# Patient Record
Sex: FEMALE | Race: WHITE | NOT HISPANIC OR LATINO
[De-identification: names, ages, dates, MRNs, and addresses within clinical notes are randomized per-mention and may not be internally consistent; named-entity substitution may affect disease eponyms.]

---

## 2017-05-10 ENCOUNTER — APPOINTMENT (OUTPATIENT)
Dept: CARDIOLOGY | Facility: CLINIC | Age: 62
End: 2017-05-10

## 2017-05-18 ENCOUNTER — APPOINTMENT (OUTPATIENT)
Dept: CARDIOLOGY | Facility: CLINIC | Age: 62
End: 2017-05-18

## 2017-06-05 ENCOUNTER — APPOINTMENT (OUTPATIENT)
Dept: CARDIOLOGY | Facility: CLINIC | Age: 62
End: 2017-06-05

## 2017-07-03 ENCOUNTER — APPOINTMENT (OUTPATIENT)
Dept: CARDIOLOGY | Facility: CLINIC | Age: 62
End: 2017-07-03

## 2017-07-10 ENCOUNTER — APPOINTMENT (OUTPATIENT)
Dept: CARDIOLOGY | Facility: CLINIC | Age: 62
End: 2017-07-10

## 2017-07-24 ENCOUNTER — APPOINTMENT (OUTPATIENT)
Dept: CARDIOLOGY | Facility: CLINIC | Age: 62
End: 2017-07-24

## 2017-09-26 ENCOUNTER — APPOINTMENT (OUTPATIENT)
Dept: CARDIOLOGY | Facility: CLINIC | Age: 62
End: 2017-09-26
Payer: MEDICARE

## 2017-09-26 PROCEDURE — 99214 OFFICE O/P EST MOD 30 MIN: CPT

## 2017-10-30 ENCOUNTER — APPOINTMENT (OUTPATIENT)
Dept: CARDIOLOGY | Facility: CLINIC | Age: 62
End: 2017-10-30
Payer: MEDICARE

## 2017-10-30 PROCEDURE — 99215 OFFICE O/P EST HI 40 MIN: CPT

## 2017-12-19 ENCOUNTER — RX RENEWAL (OUTPATIENT)
Age: 62
End: 2017-12-19

## 2017-12-20 ENCOUNTER — OTHER (OUTPATIENT)
Age: 62
End: 2017-12-20

## 2017-12-22 ENCOUNTER — MEDICATION RENEWAL (OUTPATIENT)
Age: 62
End: 2017-12-22

## 2017-12-28 ENCOUNTER — RX RENEWAL (OUTPATIENT)
Age: 62
End: 2017-12-28

## 2018-02-02 ENCOUNTER — RECORD ABSTRACTING (OUTPATIENT)
Age: 63
End: 2018-02-02

## 2018-02-05 ENCOUNTER — APPOINTMENT (OUTPATIENT)
Dept: CARDIOLOGY | Facility: CLINIC | Age: 63
End: 2018-02-05
Payer: MEDICARE

## 2018-02-05 VITALS
HEIGHT: 63 IN | DIASTOLIC BLOOD PRESSURE: 62 MMHG | SYSTOLIC BLOOD PRESSURE: 102 MMHG | WEIGHT: 240 LBS | HEART RATE: 92 BPM | BODY MASS INDEX: 42.52 KG/M2

## 2018-02-05 DIAGNOSIS — J93.9 PNEUMOTHORAX, UNSPECIFIED: ICD-10-CM

## 2018-02-05 DIAGNOSIS — I26.99 OTHER PULMONARY EMBOLISM W/OUT ACUTE COR PULMONALE: ICD-10-CM

## 2018-02-05 DIAGNOSIS — K92.2 GASTROINTESTINAL HEMORRHAGE, UNSPECIFIED: ICD-10-CM

## 2018-02-05 DIAGNOSIS — D64.9 ANEMIA, UNSPECIFIED: ICD-10-CM

## 2018-02-05 DIAGNOSIS — Z87.891 PERSONAL HISTORY OF NICOTINE DEPENDENCE: ICD-10-CM

## 2018-02-05 PROCEDURE — 99214 OFFICE O/P EST MOD 30 MIN: CPT

## 2018-02-05 RX ORDER — CYANOCOBALAMIN (VITAMIN B-12) 100 MCG/ML
100 VIAL (ML) INJECTION
Refills: 0 | Status: DISCONTINUED | COMMUNITY

## 2018-02-12 DIAGNOSIS — N28.9 DISORDER OF KIDNEY AND URETER, UNSPECIFIED: ICD-10-CM

## 2018-04-28 ENCOUNTER — RX RENEWAL (OUTPATIENT)
Age: 63
End: 2018-04-28

## 2018-05-23 ENCOUNTER — RX RENEWAL (OUTPATIENT)
Age: 63
End: 2018-05-23

## 2018-06-18 ENCOUNTER — APPOINTMENT (OUTPATIENT)
Dept: CARDIOLOGY | Facility: CLINIC | Age: 63
End: 2018-06-18
Payer: MEDICARE

## 2018-06-18 ENCOUNTER — NON-APPOINTMENT (OUTPATIENT)
Age: 63
End: 2018-06-18

## 2018-06-18 VITALS
HEIGHT: 63 IN | BODY MASS INDEX: 41.64 KG/M2 | WEIGHT: 235 LBS | HEART RATE: 79 BPM | DIASTOLIC BLOOD PRESSURE: 64 MMHG | SYSTOLIC BLOOD PRESSURE: 122 MMHG

## 2018-06-18 PROCEDURE — 93000 ELECTROCARDIOGRAM COMPLETE: CPT

## 2018-06-18 PROCEDURE — 99214 OFFICE O/P EST MOD 30 MIN: CPT

## 2018-06-25 ENCOUNTER — APPOINTMENT (OUTPATIENT)
Dept: CARDIOLOGY | Facility: CLINIC | Age: 63
End: 2018-06-25
Payer: MEDICARE

## 2018-06-25 PROCEDURE — 93306 TTE W/DOPPLER COMPLETE: CPT

## 2018-07-10 ENCOUNTER — APPOINTMENT (OUTPATIENT)
Dept: CARDIOLOGY | Facility: CLINIC | Age: 63
End: 2018-07-10
Payer: MEDICARE

## 2018-07-10 VITALS
HEIGHT: 63 IN | BODY MASS INDEX: 42.35 KG/M2 | DIASTOLIC BLOOD PRESSURE: 62 MMHG | HEART RATE: 86 BPM | SYSTOLIC BLOOD PRESSURE: 112 MMHG | WEIGHT: 239 LBS

## 2018-07-10 PROCEDURE — 99214 OFFICE O/P EST MOD 30 MIN: CPT

## 2018-09-11 ENCOUNTER — RX RENEWAL (OUTPATIENT)
Age: 63
End: 2018-09-11

## 2018-09-18 ENCOUNTER — RX RENEWAL (OUTPATIENT)
Age: 63
End: 2018-09-18

## 2018-10-15 ENCOUNTER — MEDICATION RENEWAL (OUTPATIENT)
Age: 63
End: 2018-10-15

## 2018-11-09 ENCOUNTER — RX RENEWAL (OUTPATIENT)
Age: 63
End: 2018-11-09

## 2018-11-12 ENCOUNTER — RX RENEWAL (OUTPATIENT)
Age: 63
End: 2018-11-12

## 2018-11-20 ENCOUNTER — APPOINTMENT (OUTPATIENT)
Dept: CARDIOLOGY | Facility: CLINIC | Age: 63
End: 2018-11-20
Payer: MEDICARE

## 2018-11-20 VITALS
BODY MASS INDEX: 41.82 KG/M2 | HEIGHT: 63 IN | HEART RATE: 95 BPM | WEIGHT: 236 LBS | DIASTOLIC BLOOD PRESSURE: 66 MMHG | SYSTOLIC BLOOD PRESSURE: 100 MMHG

## 2018-11-20 DIAGNOSIS — I70.90 UNSPECIFIED ATHEROSCLEROSIS: ICD-10-CM

## 2018-11-20 PROCEDURE — 99214 OFFICE O/P EST MOD 30 MIN: CPT

## 2018-11-20 NOTE — REASON FOR VISIT
[Follow-Up - Clinic] : a clinic follow-up of [Anticoagulation] : anticoagulation [Atrial Fibrillation] : atrial fibrillation [Dyspnea] : dyspnea [Heart Failure] : congestive heart failure [Medication Management] : Medication management [FreeTextEntry2] : Follow up [facility] for edema, shortness of breath

## 2018-11-20 NOTE — ADDENDUM
[FreeTextEntry1] : Please note the patient was seen and examined with PA\taina I was physically present during the service of the patient and personally examined the patient. \taina I was directly involved in the management plan and recommendations of the care provided to the patient. \taina I personally reviewed the history and physical examination as documented by the PA above.\par 11/20/2018\par

## 2018-11-20 NOTE — HISTORY OF PRESENT ILLNESS
[FreeTextEntry1] : ZANDER   is a 63 year old  female with severe COPD on 4L O2, hx of spontaneous pneumothorax 2012, PE '12, NSTEMI  + CVA 2012, AF on NOAC,  cor pumonale, pulm HTN, here for follow up due to hospital admission due to worsening shortness of breath X 3 weeks, edema and weight gain.  Admitted and diuresed IV Lasix 60mg IV with improvement of edema and SOB.  PE and DVT testing negative. Pt discharged on prehospital medications.  Weight has been stable, chronic dyspnea but back to baseline.\par She follows with Pulm HTN specialist DR. Cronin [facility] next appointment Dec. 11, 2018\par \par Futher detail of complex medical history as listed below:\par \par Hhistory of morbid obesity status post laparoscopic banding procedure, COPD with chronic hypoxic respiratory failure on home oxygen therapy, chronic atrial fibrillation on NOAC, severe pulmonary hypertension with cor pulmonale, atherosclerotic calcification, prior PE, CVA.\par \par Chronic dyspnea. Physical activity limited. Decrease in oxygen saturation with physical activity. At times, feels "air hungry", the need for more oxygen therapy and even feels short of breath while wearing supplemental oxygen therapy. Exercise tolerance is limited by dyspnea on exertion. It is notable that her prior 6 minute walk test did demonstrate improvement in functional status. \par \par There is no chest pain, coughing, or wheezing. \par There are no symptomatic palpitations, lightheadedness, or dizziness. \par There have been no residual defects from her stroke. \par \par Cardiac cath demonstrates severe pulmonary hypertension. Previously referred to Dr. Dale at [facility]. Underwent comprehensive cardiopulmonary workup. Believes the etiology of her lung disease is related to COPD, hypoxemia and long term sleep apnea with resulting pulmonary hypertension. WHO Group 3 and 1? There is consideration of lung transplantation if she loses weight. Diuretics were adjusted, changed Lasix to bumex. Now sees Dr. Gallardo locally at [facility]. \par \par Clinical improvement with combination diuretic therapy. \par Prior hypokalemia, hyponatremia, reanl insufficiency (high BUN/Cr ratio) and marked weight loss with metolazone\par Observed overnight in [facility], now frequency has been reduced\par Now takes metolazone twice a week along with Bumex bid (qd on Metolazone days)\par On higher doses of Aldactone\par \par Cardiac catheterization report has been reviewed from October 2017. PA pressure of 54/18 with a mean of 34. Moderate pulmonary hypertension. High normal filling pressures. Reduced cardiac output. Increased pulmonary resistance greater than 10 RAI PA diastolic to wedge gradient 10 mm of mercury consistent with pulmonary vascular disease. There was a reduction in pulmonary artery resistance with increasing cardiac output with nitric oxide.\par \par EKG demonstrates atrial fibrillation and nonspecific ST-T wave changes. \par \par June 2018. Hemoglobin 12.9, LFTs within normal limits, magnesium 1.7, TSH 2.3.\par Labs potassium 5.1, creatinine 1.4.\par \par Echocardiogram. '18. Technically difficult study. Normal left ventricular function. Right atrial and right ventricular enlargement. Tricuspid regurgitation. Severe pulmonary hypertension.\par \par Holter monitor 5.17 atrial fibrillation. Average heart rate 93. PVCs. Aberrancy\par \par Carotid Doppler. Mild nonobstructive disease bilaterally. \par \par CT angio from [facility], there is no evidence of greater than 30% stenosis or occlusion of the right or left carotid or vertebral arteries. There was occlusion of the inferior division of the right middle cerebral artery. The other arteries appeared unremarkable. There is no evidence of an AVM or an aneurysm. \par \par MRI, multiple infarcts suggesting embolic source\par \par Cardiac catheterization report May 2013, normal left ventricular function, right dominant circulation, left main arises normally from the left coronary sinus of Valsalva, bifurcates into LAD and circumflex, left main normal, LAD arises normally from the left main normal, left circumflex arises normally from the left main and terminates in the AV groove normal, RCA arises normally from the right sinus of Valsalva, RCA is normal, global LV function normal, ejection fraction 55% to 60%, normal coronary arteries, no evidence of vasoreactivity with nitric oxide. \par \par [facility].\par There is a chest x-ray, prominent cardiac silhouette, pulmonary vascular congestion. \par Chest CT, cardiomegaly, mild atherosclerosis, enlargement of pulmonary artery consistent with pulmonary hypertension, moderate emphysema\par Nuclear medicine scan, low probability of pulmonary embolism.\par Cardiac catheterization, severe pulmonary hypertension with normal wedge pressure. No significant improvement in response to FIO2 or nitric oxide. \par Echocardiogram, left atrial enlargement, pulmonary hypertension, septal wall motion abnormality consistent with RV overload, dilated right ventricle, mildly reduced RV function, dilated right atrium, and elevated right atrial pressures with nonreactive IVC \par

## 2018-11-20 NOTE — ASSESSMENT
[FreeTextEntry1] : Continue current medications.\par Follow up with Pulm HTN specialist and PMD, discuss O2 supplementation/management.\par \par Pt on Letairis - ck LFTs\par Hx of lyte disorder in past (when dosing of metazolone qd)  monitor lytes, BMP, Mg level ordered.\par \par Follow up as scheduled with Dr Dangelo Jan 2019.

## 2018-11-20 NOTE — REVIEW OF SYSTEMS
[Feeling Fatigued] : feeling fatigued [see HPI] : see HPI [Shortness Of Breath] : shortness of breath [Dyspnea on exertion] : dyspnea during exertion [Lower Ext Edema] : lower extremity edema [Negative] : Heme/Lymph [Recent Weight Gain (___ Lbs)] : no recent weight gain

## 2018-11-20 NOTE — PHYSICAL EXAM
[General Appearance - Well Developed] : well developed [Normal Appearance] : normal appearance [Well Groomed] : well groomed [General Appearance - Well Nourished] : well nourished [No Deformities] : no deformities [General Appearance - In No Acute Distress] : no acute distress [Normal Conjunctiva] : the conjunctiva exhibited no abnormalities [Eyelids - No Xanthelasma] : the eyelids demonstrated no xanthelasmas [Normal Oral Mucosa] : normal oral mucosa [No Oral Pallor] : no oral pallor [No Oral Cyanosis] : no oral cyanosis [Exaggerated Use Of Accessory Muscles For Inspiration] : no accessory muscle use [Auscultation Breath Sounds / Voice Sounds] : lungs were clear to auscultation bilaterally [Abdomen Soft] : soft [Abdomen Tenderness] : non-tender [Abdomen Mass (___ Cm)] : no abdominal mass palpated [Abnormal Walk] : normal gait [Gait - Sufficient For Exercise Testing] : the gait was sufficient for exercise testing [Nail Clubbing] : no clubbing of the fingernails [Cyanosis, Localized] : no localized cyanosis [Petechial Hemorrhages (___cm)] : no petechial hemorrhages [Skin Color & Pigmentation] : normal skin color and pigmentation [] : no rash [Skin Lesions] : no skin lesions [No Skin Ulcers] : no skin ulcer [No Xanthoma] : no  xanthoma was observed [Oriented To Time, Place, And Person] : oriented to person, place, and time [Affect] : the affect was normal [Mood] : the mood was normal [No Anxiety] : not feeling anxious [FreeTextEntry1] : chronic venous stasis

## 2018-12-04 ENCOUNTER — RESULT REVIEW (OUTPATIENT)
Age: 63
End: 2018-12-04

## 2018-12-31 ENCOUNTER — RX RENEWAL (OUTPATIENT)
Age: 63
End: 2018-12-31

## 2019-02-11 ENCOUNTER — RX RENEWAL (OUTPATIENT)
Age: 64
End: 2019-02-11

## 2019-04-19 ENCOUNTER — APPOINTMENT (OUTPATIENT)
Dept: CARDIOLOGY | Facility: CLINIC | Age: 64
End: 2019-04-19

## 2019-08-01 ENCOUNTER — RESULT CHARGE (OUTPATIENT)
Age: 64
End: 2019-08-01

## 2019-08-02 ENCOUNTER — NON-APPOINTMENT (OUTPATIENT)
Age: 64
End: 2019-08-02

## 2019-08-02 ENCOUNTER — APPOINTMENT (OUTPATIENT)
Dept: CARDIOLOGY | Facility: CLINIC | Age: 64
End: 2019-08-02
Payer: MEDICARE

## 2019-08-02 VITALS
DIASTOLIC BLOOD PRESSURE: 54 MMHG | WEIGHT: 233 LBS | BODY MASS INDEX: 41.29 KG/M2 | SYSTOLIC BLOOD PRESSURE: 100 MMHG | HEART RATE: 78 BPM | OXYGEN SATURATION: 84 % | HEIGHT: 63 IN

## 2019-08-02 PROCEDURE — 99215 OFFICE O/P EST HI 40 MIN: CPT

## 2019-08-02 PROCEDURE — 93000 ELECTROCARDIOGRAM COMPLETE: CPT

## 2019-08-02 RX ORDER — BUMETANIDE 2 MG/1
2 TABLET ORAL TWICE DAILY
Qty: 180 | Refills: 3 | Status: DISCONTINUED | COMMUNITY
Start: 2018-11-09 | End: 2019-08-02

## 2019-08-02 RX ORDER — METOLAZONE 5 MG/1
5 TABLET ORAL
Qty: 90 | Refills: 1 | Status: DISCONTINUED | COMMUNITY
End: 2019-08-02

## 2019-08-02 RX ORDER — OMEPRAZOLE 20 MG/1
20 CAPSULE, DELAYED RELEASE ORAL DAILY
Refills: 0 | Status: DISCONTINUED | COMMUNITY
End: 2019-08-02

## 2019-08-02 RX ORDER — DILTIAZEM HYDROCHLORIDE 30 MG/1
30 TABLET ORAL
Qty: 180 | Refills: 0 | Status: DISCONTINUED | COMMUNITY
End: 2019-08-02

## 2019-08-02 RX ORDER — POTASSIUM CHLORIDE 1500 MG/1
20 TABLET, FILM COATED, EXTENDED RELEASE ORAL DAILY
Refills: 0 | Status: DISCONTINUED | COMMUNITY
End: 2019-08-02

## 2019-08-02 RX ORDER — DILTIAZEM HYDROCHLORIDE 120 MG/1
120 CAPSULE, EXTENDED RELEASE ORAL
Qty: 90 | Refills: 3 | Status: DISCONTINUED | COMMUNITY
Start: 2017-12-28 | End: 2019-08-02

## 2019-08-06 NOTE — HISTORY OF PRESENT ILLNESS
[FreeTextEntry1] : ZANDER JEAN-BAPTISTE  is a 63 year old  F\par History of heart failure with preserved ejection fraction and severe pulmonary hypertension, cor pulmonale, chronic atrial fibrillation, chronic respiratory failure, COPD, sleep apnea, obesity, coronary artery disease, stroke, GI bleed, chronic renal insufficiency,prior PE, CVA, CAD/NSTEMI\par \par Prior hospital admission due to worsening shortness of breath, edema and weight gain\par IV Lasix with improvement of edema and SOB.\par \par Res admitted to the hospital with shortness of breath or lower extremity \par There was acute on chronic respiratory failure with heart failure, right greater than left. \par She had been off of her home Trilogy machine and diuretic therapy !!\par Typically takes metolazone twice a week along with Bumex bid (qd on Metolazone days) + Aldactone\par Initially, there was hyperkalemia and hyponatremia. \par Echocardiogram demonstrated normal left ventricular function, severe tricuspid regurgitation, and pulmonary hypertension. \par Treated with IV diuretics with significant urine output. \par She reports she lost about 30 pounds in the hospital\par Restarted on BiPAP therapy. \par She was discharged several days ago.\par \par There has been confusion regarding her medications. \par Now prescribed Bumex every other day. \par She is now off her metolazone, aldactone. \par She is on short acting diltiazem \par \par Chronic dyspnea. Physical activity limited. \par There is no chest pain, coughing, or wheezing. \par There are no symptomatic palpitations, lightheadedness, or dizziness. \par There have been no residual defects from her stroke. \par \par EKG atrial fibrillation with nonspecific ST-T wave changes. \par \par Echocardiogram, February 2019 normal left ventricular function reduced right ventricular function right-sided enlargement elevated pulmonary pressures and right-sided heart failure.\par \par Cardiac cath demonstrates severe pulmonary hypertension. Previously referred to Dr. Dale at [facility]. Underwent comprehensive cardiopulmonary workup. Believes the etiology of her lung disease is related to COPD, hypoxemia and long term sleep apnea with resulting pulmonary hypertension. WHO Group 3 and 1? There is consideration of lung transplantation if she loses weight. Diuretics were adjusted, changed Lasix to bumex. Now sees Dr. Gallardo locally at [facility]. \par \par Cardiac catheterization report has been reviewed from October 2017. PA pressure of 54/18 with a mean of 34. Moderate pulmonary hypertension. High normal filling pressures. Reduced cardiac output. Increased pulmonary resistance greater than 10 RAI PA diastolic to wedge gradient 10 mm of mercury consistent with pulmonary vascular disease. There was a reduction in pulmonary artery resistance with increasing cardiac output with nitric oxide.\par \par Cardiac catheterization report May 2013, normal left ventricular function, right dominant circulation, left main arises normally from the left coronary sinus of Valsalva, bifurcates into LAD and circumflex, left main normal, LAD arises normally from the left main normal, left circumflex arises normally from the left main and terminates in the AV groove normal, RCA arises normally from the right sinus of Valsalva, RCA is normal, global LV function normal, ejection fraction 55% to 60%, normal coronary arteries, no evidence of vasoreactivity with nitric oxide. \par \par [facility].\par There is a chest x-ray, prominent cardiac silhouette, pulmonary vascular congestion. \par Chest CT, cardiomegaly, mild atherosclerosis, enlargement of pulmonary artery consistent with pulmonary hypertension, moderate emphysema\par Nuclear medicine scan, low probability of pulmonary embolism.\par Cardiac catheterization, severe pulmonary hypertension with normal wedge pressure. No significant improvement in response to FIO2 or nitric oxide. \par Echocardiogram, left atrial enlargement, pulmonary hypertension, septal wall motion abnormality consistent with RV overload, dilated right ventricle, mildly reduced RV function, dilated right atrium, and elevated right atrial pressures with nonreactive IVC \par \par

## 2019-08-06 NOTE — PHYSICAL EXAM
[General Appearance - Well Developed] : well developed [Normal Appearance] : normal appearance [General Appearance - Well Nourished] : well nourished [Well Groomed] : well groomed [No Deformities] : no deformities [General Appearance - In No Acute Distress] : no acute distress [Normal Conjunctiva] : the conjunctiva exhibited no abnormalities [Eyelids - No Xanthelasma] : the eyelids demonstrated no xanthelasmas [Normal Oral Mucosa] : normal oral mucosa [No Oral Cyanosis] : no oral cyanosis [No Oral Pallor] : no oral pallor [Exaggerated Use Of Accessory Muscles For Inspiration] : no accessory muscle use [Auscultation Breath Sounds / Voice Sounds] : lungs were clear to auscultation bilaterally [Abdomen Soft] : soft [Abdomen Tenderness] : non-tender [Abdomen Mass (___ Cm)] : no abdominal mass palpated [Gait - Sufficient For Exercise Testing] : the gait was sufficient for exercise testing [Abnormal Walk] : normal gait [Nail Clubbing] : no clubbing of the fingernails [Cyanosis, Localized] : no localized cyanosis [Petechial Hemorrhages (___cm)] : no petechial hemorrhages [Skin Color & Pigmentation] : normal skin color and pigmentation [] : no rash [No Skin Ulcers] : no skin ulcer [Skin Lesions] : no skin lesions [No Xanthoma] : no  xanthoma was observed [Oriented To Time, Place, And Person] : oriented to person, place, and time [Affect] : the affect was normal [No Anxiety] : not feeling anxious [Mood] : the mood was normal [FreeTextEntry1] : chronic venous stasis Vital Signs Last 24 Hrs  T(C): 36.6 (14 Nov 2018 21:00), Max: 36.7 (14 Nov 2018 10:06)  T(F): 97.9 (14 Nov 2018 21:00), Max: 98 (14 Nov 2018 10:06)  HR: 103 (14 Nov 2018 21:00) (78 - 105)  BP: 115/71 (14 Nov 2018 21:00) (115/71 - 136/79)  BP(mean): --  RR: 20 (14 Nov 2018 21:00) (16 - 20)  SpO2: --

## 2019-08-06 NOTE — REASON FOR VISIT
[Follow-Up - Clinic] : a clinic follow-up of [Anticoagulation] : anticoagulation [Dyspnea] : dyspnea [Atrial Fibrillation] : atrial fibrillation [Heart Failure] : congestive heart failure [Medication Management] : Medication management [FreeTextEntry2] : Follow up [facility] for edema, shortness of breath

## 2019-08-06 NOTE — ASSESSMENT
[FreeTextEntry1] : Chronic hypoxic respiratory failure. COPD, need for lung transplant?\par Obesity. ROLANDA.\par Pulmonary vascular disease. \par Severe pulmonary hypertension. \par Cor pulmonale. WHO group 1/3. Low CO Elevated PVR. Normal wedge pressure. \par Normal coronary arteries and LV function. Atherosclerosis\par Atrial fibrillation. Prior CVA. Prior PE \par \par Echocardiogram demonstrates cor pulmonale with right-sided enlargement and pulmonary hypertension. \par \par Medication list reconciled. \par Followup blood work has been requested. \par \par Restart prior dose of diuretic\par Return to long-acting diltiazem. Rate control\par Monitor daily weights.\par Restart digoxin. Monitor digoxin level. Goal digoxin level.5-1. \par Continue NOAC, Eliquis. Understands risks and benefits of novel anticoagulant. Discussed bleeding precautions. Monitor hemoglobin and renal function. \par PAH directed rx per [facility] and [facility]. On Letairis, monitor LFTs. \par Weight has been contraindication to lung [place] [facility] \par Followup with pulmonary regarding home BiPAP and oxygen therapy.  Trilogy for sleep apnea. Treatment of COPD. Chronic oxygen therapy.\par Lifestyle measures: low sodium diet, weight loss, exercise. \par \par Reviewed red flag symptoms which would warrant inpatient evaluation. \par If increasing shortness of breath, weight gain, edema instructed to call our office. \par Despite clinical improvement remains at high risk of adverse CV events due to severity of PAH. \par

## 2019-09-03 ENCOUNTER — APPOINTMENT (OUTPATIENT)
Dept: CARDIOLOGY | Facility: CLINIC | Age: 64
End: 2019-09-03
Payer: MEDICARE

## 2019-09-03 VITALS
SYSTOLIC BLOOD PRESSURE: 100 MMHG | DIASTOLIC BLOOD PRESSURE: 60 MMHG | OXYGEN SATURATION: 85 % | HEIGHT: 63 IN | WEIGHT: 230 LBS | HEART RATE: 81 BPM | BODY MASS INDEX: 40.75 KG/M2

## 2019-09-03 PROCEDURE — 99214 OFFICE O/P EST MOD 30 MIN: CPT

## 2019-09-03 RX ORDER — SULFAMETHOXAZOLE AND TRIMETHOPRIM 800; 160 MG/1; MG/1
800-160 TABLET ORAL
Qty: 14 | Refills: 0 | Status: DISCONTINUED | COMMUNITY
End: 2019-09-03

## 2019-09-03 RX ORDER — BUMETANIDE 2 MG/1
2 TABLET ORAL TWICE DAILY
Qty: 180 | Refills: 1 | Status: DISCONTINUED | COMMUNITY
End: 2019-09-03

## 2019-09-05 NOTE — ASSESSMENT
[FreeTextEntry1] : Chronic hypoxic respiratory failure. COPD, need for lung transplant?\par Obesity. ROLANDA.\par Severe pulmonary hypertension. \par Cor pulmonale. WHO group 1/3. Low CO Elevated PVR. Normal wedge pressure. \par Normal coronary arteries and LV function. Atherosclerosis\par Atrial fibrillation. Prior CVA. Prior PE \par \par Echocardiogram demonstrates cor pulmonale with right-sided enlargement and pulmonary hypertension. \par Medication list reconciled. \par Followup blood work has been requested.\par \par Increased spironolactone to 100 mg \par Restart intermittent metolazone\par Return to her prior dose of Bumex. \par Monitor daily weights.  Weight has been contraindication to lung transplant eval at [facility]. \par Followup echocardiogram to evaluate tricuspid valve. There are variable reports and the severity of tricuspid regurgitation. If there is significant tricuspid regurgitation then will need to consider valve procedure. \par Rate control.  Monitor digoxin level. Goal digoxin level.5-1. \par Continue NOAC, Eliquis. Understands risks and benefits of novel anticoagulant. Discussed bleeding precautions. Monitor hemoglobin and renal function. \par PAH directed rx per [facility] and [facility]. On Letairis, monitor LFTs. \par Followup with pulmonary regarding home BiPAP and oxygen therapy. Trilogy for sleep apnea. Treatment of COPD. Chronic oxygen therapy.\par Lifestyle measures: low sodium diet, weight loss, exercise.  ? pulm rehab\par \par Reviewed red flag symptoms which would warrant inpatient evaluation. \par If increasing shortness of breath, weight gain, edema instructed to call our office. \par Despite clinical improvement remains at high risk of adverse CV events due to severity of PAH.

## 2019-09-05 NOTE — PHYSICAL EXAM
[General Appearance - Well Developed] : well developed [Normal Appearance] : normal appearance [Well Groomed] : well groomed [General Appearance - Well Nourished] : well nourished [No Deformities] : no deformities [General Appearance - In No Acute Distress] : no acute distress [Normal Conjunctiva] : the conjunctiva exhibited no abnormalities [Eyelids - No Xanthelasma] : the eyelids demonstrated no xanthelasmas [Normal Oral Mucosa] : normal oral mucosa [No Oral Pallor] : no oral pallor [No Oral Cyanosis] : no oral cyanosis [Exaggerated Use Of Accessory Muscles For Inspiration] : no accessory muscle use [Auscultation Breath Sounds / Voice Sounds] : lungs were clear to auscultation bilaterally [Abdomen Tenderness] : non-tender [Abdomen Soft] : soft [Abdomen Mass (___ Cm)] : no abdominal mass palpated [Abnormal Walk] : normal gait [Gait - Sufficient For Exercise Testing] : the gait was sufficient for exercise testing [Nail Clubbing] : no clubbing of the fingernails [Cyanosis, Localized] : no localized cyanosis [Petechial Hemorrhages (___cm)] : no petechial hemorrhages [Skin Color & Pigmentation] : normal skin color and pigmentation [] : no rash [Skin Lesions] : no skin lesions [No Skin Ulcers] : no skin ulcer [No Xanthoma] : no  xanthoma was observed [Oriented To Time, Place, And Person] : oriented to person, place, and time [Affect] : the affect was normal [Mood] : the mood was normal [No Anxiety] : not feeling anxious [FreeTextEntry1] : chronic venous stasis

## 2019-09-05 NOTE — HISTORY OF PRESENT ILLNESS
[FreeTextEntry1] : ZANDER JEAN-BAPTISTE  is a 63 year old  F\par \par \par History of heart failure with preserved ejection fraction, severe pulmonary hypertension/cor pulmonale/TR, chronic atrial fibrillation, chronic respiratory failure, COPD, sleep apnea, obesity, coronary artery disease, stroke, GI bleed, chronic renal insufficiency, prior PE, CVA\par \par Re-admitted to the hospital with shortness of breath and lower extremity edema\par There was acute on chronic respiratory failure with heart failure, right greater than left. \par She had been off of her home Trilogy machine and diuretic therapy !!\par Typically takes metolazone twice a week along with Bumex bid (qd on Metolazone days) + Aldactone\par Initially, there was hyperkalemia and hyponatremia. \par Echocardiogram demonstrated normal left ventricular function, severe tricuspid regurgitation, and pulmonary hypertension. \par Treated with IV diuretics with significant urine output. \par Restarted on BiPAP therapy. \par \par Intermittent weight gain and has been taking increased doses of diuretics\par With increased diuretic s he feels better. \par There is hypoxia with activity Chronic dyspnea. Physical activity limited. \par There is no chest pain, coughing, or wheezing. \par There are no symptomatic palpitations, lightheadedness, or dizziness. \par There have been no residual defects from her stroke. \par Medication list is reconciled. \par She is scheduled for followup blood work. \par \par Outside blood work, August 2019, hemoglobin 9.4, potassium 4.0, and creatinine of 1.5. \par \par EKG atrial fibrillation with nonspecific ST-T wave changes. \par \par Echocardiogram, February 2019 normal left ventricular function reduced right ventricular function right-sided enlargement elevated pulmonary pressures and right-sided heart failure.\par \par Cardiac cath demonstrates severe pulmonary hypertension. Previously referred to Dr. Dale at [facility]. Underwent comprehensive cardiopulmonary workup. Believes the etiology of her lung disease is related to COPD, hypoxemia and long term sleep apnea with resulting pulmonary hypertension. WHO Group 3 and 1? There is consideration of lung transplantation if she loses weight. Diuretics were adjusted, changed Lasix to bumex. Now sees Dr. Gallardo locally at [facility]. \par \par Cardiac catheterization report has been reviewed from October 2017. PA pressure of 54/18 with a mean of 34. Moderate pulmonary hypertension. High normal filling pressures. Reduced cardiac output. Increased pulmonary resistance greater than 10 RAI PA diastolic to wedge gradient 10 mm of mercury consistent with pulmonary vascular disease. There was a reduction in pulmonary artery resistance with increasing cardiac output with nitric oxide.\par \par Cardiac catheterization report May 2013, normal left ventricular function, right dominant circulation, left main arises normally from the left coronary sinus of Valsalva, bifurcates into LAD and circumflex, left main normal, LAD arises normally from the left main normal, left circumflex arises normally from the left main and terminates in the AV groove normal, RCA arises normally from the right sinus of Valsalva, RCA is normal, global LV function normal, ejection fraction 55% to 60%, normal coronary arteries, no evidence of vasoreactivity with nitric oxide. \par \par [facility].\par There is a chest x-ray, prominent cardiac silhouette, pulmonary vascular congestion. \par Chest CT, cardiomegaly, mild atherosclerosis, enlargement of pulmonary artery consistent with pulmonary hypertension, moderate emphysema\par Nuclear medicine scan, low probability of pulmonary embolism.\par Cardiac catheterization, severe pulmonary hypertension with normal wedge pressure. No significant improvement in response to FIO2 or nitric oxide. \par Echocardiogram, left atrial enlargement, pulmonary hypertension, septal wall motion abnormality consistent with RV overload, dilated right ventricle, mildly reduced RV function, dilated right atrium, and elevated right atrial pressures with nonreactive IVC \par \par

## 2019-09-16 ENCOUNTER — APPOINTMENT (OUTPATIENT)
Dept: CARDIOLOGY | Facility: CLINIC | Age: 64
End: 2019-09-16
Payer: MEDICARE

## 2019-09-16 ENCOUNTER — MEDICATION RENEWAL (OUTPATIENT)
Age: 64
End: 2019-09-16

## 2019-09-16 PROCEDURE — 93306 TTE W/DOPPLER COMPLETE: CPT

## 2019-09-17 ENCOUNTER — MEDICATION RENEWAL (OUTPATIENT)
Age: 64
End: 2019-09-17

## 2019-09-17 ENCOUNTER — CLINICAL ADVICE (OUTPATIENT)
Age: 64
End: 2019-09-17

## 2019-09-17 RX ORDER — BUMETANIDE 2 MG/1
2 TABLET ORAL
Qty: 135 | Refills: 3 | Status: DISCONTINUED | COMMUNITY
End: 2019-09-17

## 2019-09-23 ENCOUNTER — APPOINTMENT (OUTPATIENT)
Dept: CARDIOLOGY | Facility: CLINIC | Age: 64
End: 2019-09-23

## 2019-09-27 ENCOUNTER — RX RENEWAL (OUTPATIENT)
Age: 64
End: 2019-09-27

## 2019-09-30 ENCOUNTER — MEDICATION RENEWAL (OUTPATIENT)
Age: 64
End: 2019-09-30

## 2019-10-14 ENCOUNTER — APPOINTMENT (OUTPATIENT)
Dept: CARDIOLOGY | Facility: CLINIC | Age: 64
End: 2019-10-14
Payer: MEDICARE

## 2019-10-14 VITALS
SYSTOLIC BLOOD PRESSURE: 108 MMHG | OXYGEN SATURATION: 94 % | DIASTOLIC BLOOD PRESSURE: 62 MMHG | BODY MASS INDEX: 34.38 KG/M2 | HEART RATE: 72 BPM | WEIGHT: 194 LBS | HEIGHT: 63 IN

## 2019-10-14 DIAGNOSIS — J43.9 EMPHYSEMA, UNSPECIFIED: ICD-10-CM

## 2019-10-14 PROCEDURE — 99214 OFFICE O/P EST MOD 30 MIN: CPT

## 2019-10-14 NOTE — REASON FOR VISIT
[Follow-Up - Clinic] : a clinic follow-up of [Anticoagulation] : anticoagulation [Atrial Fibrillation] : atrial fibrillation [Medication Management] : Medication management [Dyspnea] : dyspnea [Heart Failure] : congestive heart failure [FreeTextEntry2] : Follow up [facility] for edema, shortness of breath

## 2019-10-14 NOTE — ADDENDUM
[FreeTextEntry1] : Please note the patient was reviewed with RONALDO Royal.\par I was physically present during the service of the patient\par I was directly involved in the management plan and recommendations of care provided to the patient. \par I personally reviewed the history and physical exam and examination as documented by the PA above.\par 10/14/2019\par

## 2019-10-14 NOTE — PHYSICAL EXAM
[General Appearance - Well Developed] : well developed [Normal Appearance] : normal appearance [No Deformities] : no deformities [Well Groomed] : well groomed [General Appearance - Well Nourished] : well nourished [Normal Conjunctiva] : the conjunctiva exhibited no abnormalities [General Appearance - In No Acute Distress] : no acute distress [Eyelids - No Xanthelasma] : the eyelids demonstrated no xanthelasmas [Normal Oral Mucosa] : normal oral mucosa [Exaggerated Use Of Accessory Muscles For Inspiration] : no accessory muscle use [No Oral Pallor] : no oral pallor [No Oral Cyanosis] : no oral cyanosis [Auscultation Breath Sounds / Voice Sounds] : lungs were clear to auscultation bilaterally [Abdomen Tenderness] : non-tender [Abdomen Mass (___ Cm)] : no abdominal mass palpated [Abdomen Soft] : soft [Abnormal Walk] : normal gait [Gait - Sufficient For Exercise Testing] : the gait was sufficient for exercise testing [Cyanosis, Localized] : no localized cyanosis [Nail Clubbing] : no clubbing of the fingernails [Petechial Hemorrhages (___cm)] : no petechial hemorrhages [Skin Color & Pigmentation] : normal skin color and pigmentation [] : no ischemic changes [No Skin Ulcers] : no skin ulcer [No Xanthoma] : no  xanthoma was observed [Skin Lesions] : no skin lesions [Affect] : the affect was normal [Mood] : the mood was normal [Oriented To Time, Place, And Person] : oriented to person, place, and time [No Anxiety] : not feeling anxious [FreeTextEntry1] : chronic venous stasis

## 2019-10-14 NOTE — REVIEW OF SYSTEMS
[see HPI] : see HPI [Dyspnea on exertion] : dyspnea during exertion [Negative] : Endocrine [Recent Weight Gain (___ Lbs)] : no recent weight gain [Feeling Fatigued] : not feeling fatigued [Shortness Of Breath] : no shortness of breath [Lower Ext Edema] : no extremity edema

## 2019-10-14 NOTE — ASSESSMENT
[FreeTextEntry1] : ZANDER JEAN-BAPTISTE  is a 63 year F  who presents today Oct 14, 2019 with the above history and the following active issues. \par \par Chronic hypoxic respiratory failure. COPD, now has decided to proceed with the lung transplant process.\par Obesity. ROLANDA.\par Severe pulmonary hypertension. \par Cor pulmonale. WHO group 1/3. Low CO Elevated PVR. Normal wedge pressure. \par Normal coronary arteries and LV function. Atherosclerosis\par Atrial fibrillation. Prior CVA. Prior PE \par \par Echocardiogram demonstrates cor pulmonale with right-sided enlargement and pulmonary hypertension. \par Medication list reconciled. \par Followup blood work has been requested.\par \par Continue spironolactone 100 mg \par Continue intermittent metolazone\par Continue Torsemide 40mg QAM and 20mg QHS\par Monitor daily weights.  \par \par Rate control.  Monitor digoxin level. Goal digoxin level.5-1. \par Continue NOAC, Eliquis. Understands risks and benefits of novel anticoagulant. Discussed bleeding precautions. Monitor hemoglobin and renal function. \par PAH directed rx per [facility] and [facility]. On Letairis, monitor LFTs. \par Followup with pulmonary regarding home BiPAP and oxygen therapy. Trilogy for sleep apnea. Treatment of COPD. Chronic oxygen therapy.\par Lifestyle measures: low sodium diet, weight loss, exercise.  ? pulm rehab\par \par Red flag symptoms which would warrant sooner emergent evaluation reviewed with the patient. \par Questions and concerns were addressed and answered. \par \par Clinical follow-up as scheduled with SD\par \par Sincerely,\par \par Angélica Royal PA-C\par Patients history, testing and plan reviewed with supervising MD: Dr. Sarbjit Dangelo

## 2019-10-14 NOTE — HISTORY OF PRESENT ILLNESS
[FreeTextEntry1] : ZANDER JEAN-BAPTISTE  is a 63 year F  who presents today Oct 14, 2019 in clinical follow-up. Clinically doing significantly better. Weight is down from 230 to 194lbs. She is feeling well and with no new complaints. Tollerating Torsemide 40mg QAM and 20mg QHS. This is working well for her.\par \par \par History of heart failure with preserved ejection fraction, severe pulmonary hypertension/cor pulmonale/TR, chronic atrial fibrillation, chronic respiratory failure, COPD, sleep apnea, obesity, coronary artery disease, stroke, GI bleed, chronic renal insufficiency, prior PE, CVA\par \par With increased diuretic s she feels better. \par There is hypoxia with activity Chronic dyspnea. Physical activity limited. \par There is no chest pain, coughing, or wheezing. \par There are no symptomatic palpitations, lightheadedness, or dizziness. \par There have been no residual defects from her stroke. \par Medication list is reconciled. \par Recent blood work requested.\par \par Today she denies chest pain, pressure, unusual shortness of breath, lightheadedness, dizziness, near syncope or syncope. \par \par Echo 9/16/19 EF 60%, mild-mod MR, mod pulmonary pressures, mild TR\par \par Echocardiogram demonstrated normal left ventricular function, severe tricuspid regurgitation, and pulmonary hypertension. \par Outside blood work, August 2019, hemoglobin 9.4, potassium 4.0, and creatinine of 1.5. \par \par EKG atrial fibrillation with nonspecific ST-T wave changes. \par \par Echocardiogram, February 2019 normal left ventricular function reduced right ventricular function right-sided enlargement elevated pulmonary pressures and right-sided heart failure.\par \par Cardiac cath demonstrates severe pulmonary hypertension. Previously referred to Dr. Dale at [facility]. Underwent comprehensive cardiopulmonary workup. Believes the etiology of her lung disease is related to COPD, hypoxemia and long term sleep apnea with resulting pulmonary hypertension. WHO Group 3 and 1? There is consideration of lung transplantation if she loses weight. Diuretics were adjusted, changed Lasix to bumex. Now sees Dr. Gallardo locally at [facility]. \par \par Cardiac catheterization report has been reviewed from October 2017. PA pressure of 54/18 with a mean of 34. Moderate pulmonary hypertension. High normal filling pressures. Reduced cardiac output. Increased pulmonary resistance greater than 10 RAI PA diastolic to wedge gradient 10 mm of mercury consistent with pulmonary vascular disease. There was a reduction in pulmonary artery resistance with increasing cardiac output with nitric oxide.\par \par Cardiac catheterization report May 2013, normal left ventricular function, right dominant circulation, left main arises normally from the left coronary sinus of Valsalva, bifurcates into LAD and circumflex, left main normal, LAD arises normally from the left main normal, left circumflex arises normally from the left main and terminates in the AV groove normal, RCA arises normally from the right sinus of Valsalva, RCA is normal, global LV function normal, ejection fraction 55% to 60%, normal coronary arteries, no evidence of vasoreactivity with nitric oxide. \par \par [facility].\par There is a chest x-ray, prominent cardiac silhouette, pulmonary vascular congestion. \par Chest CT, cardiomegaly, mild atherosclerosis, enlargement of pulmonary artery consistent with pulmonary hypertension, moderate emphysema\par Nuclear medicine scan, low probability of pulmonary embolism.\par Cardiac catheterization, severe pulmonary hypertension with normal wedge pressure. No significant improvement in response to FIO2 or nitric oxide. \par Echocardiogram, left atrial enlargement, pulmonary hypertension, septal wall motion abnormality consistent with RV overload, dilated right ventricle, mildly reduced RV function, dilated right atrium, and elevated right atrial pressures with nonreactive IVC \par \par

## 2019-11-04 ENCOUNTER — MEDICATION RENEWAL (OUTPATIENT)
Age: 64
End: 2019-11-04

## 2019-11-20 ENCOUNTER — RECORD ABSTRACTING (OUTPATIENT)
Age: 64
End: 2019-11-20

## 2019-12-03 ENCOUNTER — APPOINTMENT (OUTPATIENT)
Dept: CARDIOLOGY | Facility: CLINIC | Age: 64
End: 2019-12-03
Payer: MEDICARE

## 2019-12-03 VITALS
DIASTOLIC BLOOD PRESSURE: 70 MMHG | HEIGHT: 63 IN | SYSTOLIC BLOOD PRESSURE: 132 MMHG | HEART RATE: 110 BPM | BODY MASS INDEX: 34.55 KG/M2 | WEIGHT: 195 LBS | OXYGEN SATURATION: 87 %

## 2019-12-03 DIAGNOSIS — G47.30 SLEEP APNEA, UNSPECIFIED: ICD-10-CM

## 2019-12-03 PROCEDURE — 99214 OFFICE O/P EST MOD 30 MIN: CPT

## 2019-12-09 NOTE — REVIEW OF SYSTEMS
[Feeling Fatigued] : feeling fatigued [see HPI] : see HPI [Lower Ext Edema] : lower extremity edema [Dyspnea on exertion] : dyspnea during exertion [Shortness Of Breath] : shortness of breath [Negative] : Heme/Lymph [Recent Weight Loss (___ Lbs)] : recent [unfilled] ~Ulb weight loss

## 2019-12-09 NOTE — HISTORY OF PRESENT ILLNESS
[FreeTextEntry1] : ZANDER JEAN-BAPTISTE  is a 64 year old  F\par \par \par History of heart failure with preserved ejection fraction, severe pulmonary hypertension/cor pulmonale/TR, chronic atrial fibrillation, chronic respiratory failure, COPD, sleep apnea, obesity, coronary artery disease, stroke, GI bleed, chronic renal insufficiency, prior PE, CVA\par \par Re-admitted to the hospital with shortness of breath and lower extremity edema\par There was acute on chronic respiratory failure with heart failure, right greater than left. \par She had been off of her home Trilogy machine and diuretic therapy !!\par Typically takes metolazone twice a week along with Bumex bid (qd on Metolazone days) + Aldactone\par Initially, there was hyperkalemia and hyponatremia. \par Echocardiogram demonstrated normal left ventricular function, severe tricuspid regurgitation, and pulmonary hypertension. \par Treated with IV diuretics with significant urine output. \par Restarted on BiPAP therapy. \par \par There is hypoxia with activity \par Chronic dyspnea. \par Physical activity limited. \par There is no chest pain, coughing, or wheezing. \par There are no symptomatic palpitations, lightheadedness, or dizziness. \par There have been no residual defects from her stroke. \par Changed from Bumex to torsemide due to insurance coverage.\par She has lost about 40 pounds. \par There is less dyspnea. \par She is compliant with sleep apnea treatment and uses 5 L of home oxygen therapy.\par \par Echocardiogram September 2019 normal left ventricular function, mild to moderate mitral regurgitation, mild tricuspid regurgitation, moderate pulmonary hypertension \par  \par Outside blood work, August 2019, hemoglobin 9.4, potassium 4.0, and creatinine of 1.5. \par \par EKG atrial fibrillation with nonspecific ST-T wave changes. \par \par Cardiac cath demonstrates severe pulmonary hypertension. Previously referred to Dr. Dale at [facility]. Underwent comprehensive cardiopulmonary workup. Believes the etiology of her lung disease is related to COPD, hypoxemia and long term sleep apnea with resulting pulmonary hypertension. WHO Group 3 and 1? There is consideration of lung transplantation if she loses weight. Diuretics were adjusted, changed Lasix to bumex. Now sees Dr. Gallardo locally at [facility]. \par \par Cardiac catheterization report has been reviewed from October 2017. PA pressure of 54/18 with a mean of 34. Moderate pulmonary hypertension. High normal filling pressures. Reduced cardiac output. Increased pulmonary resistance greater than 10 RAI PA diastolic to wedge gradient 10 mm of mercury consistent with pulmonary vascular disease. There was a reduction in pulmonary artery resistance with increasing cardiac output with nitric oxide.\par \par Cardiac catheterization report May 2013, normal left ventricular function, right dominant circulation, left main arises normally from the left coronary sinus of Valsalva, bifurcates into LAD and circumflex, left main normal, LAD arises normally from the left main normal, left circumflex arises normally from the left main and terminates in the AV groove normal, RCA arises normally from the right sinus of Valsalva, RCA is normal, global LV function normal, ejection fraction 55% to 60%, normal coronary arteries, no evidence of vasoreactivity with nitric oxide. \par \par [facility].\par There is a chest x-ray, prominent cardiac silhouette, pulmonary vascular congestion. \par Chest CT, cardiomegaly, mild atherosclerosis, enlargement of pulmonary artery consistent with pulmonary hypertension, moderate emphysema\par Nuclear medicine scan, low probability of pulmonary embolism.\par Cardiac catheterization, severe pulmonary hypertension with normal wedge pressure. No significant improvement in response to FIO2 or nitric oxide. \par Echocardiogram, left atrial enlargement, pulmonary hypertension, septal wall motion abnormality consistent with RV overload, dilated right ventricle, mildly reduced RV function, dilated right atrium, and elevated right atrial pressures with nonreactive IVC \par \par

## 2019-12-09 NOTE — ASSESSMENT
[FreeTextEntry1] : \par Chronic hypoxic respiratory failure. COPD, need for lung transplant?\par Obesity. ROLANDA.\par Severe pulmonary hypertension. \par Cor pulmonale. WHO group 1/3. Low CO Elevated PVR. Normal wedge pressure. \par Normal coronary arteries and LV function. Atherosclerosis\par Atrial fibrillation. Prior CVA. Prior PE \par Echocardiogram demonstrates cor pulmonale with right-sided enlargement and pulmonary hypertension. \par Medication list reconciled. \par Followup blood work has been requested.\par \par Continue torsemide, alactone and intermttent metolazone\par Monitor daily weights.  Weight has been contraindication to lung transplant eval at [facility]. \par Followup echocardiogram to evaluate tricuspid valve. There are variable reports and the severity of tricuspid regurgitation. If there is significant tricuspid regurgitation then will need to consider valve procedure. \par Rate control.  Monitor digoxin level. Goal digoxin level.5-1. \par Continue NOAC, Eliquis. Understands risks and benefits of novel anticoagulant. Discussed bleeding precautions. Monitor hemoglobin and renal function. \par PAH directed rx per [facility] and [facility]. On Letairis, monitor LFTs. \par Followup with pulmonary regarding home BiPAP and oxygen therapy. Trilogy for sleep apnea. Treatment of COPD. Chronic oxygen therapy.\par Lifestyle measures: low sodium diet, weight loss, exercise.  ? pulm rehab\par \par Reviewed red flag symptoms which would warrant inpatient evaluation. \par If increasing shortness of breath, weight gain, edema instructed to call our office. \par Despite clinical improvement remains at high risk of adverse CV events due to severity of PAH.

## 2019-12-09 NOTE — PHYSICAL EXAM
[General Appearance - Well Developed] : well developed [Normal Appearance] : normal appearance [Well Groomed] : well groomed [General Appearance - Well Nourished] : well nourished [No Deformities] : no deformities [General Appearance - In No Acute Distress] : no acute distress [Eyelids - No Xanthelasma] : the eyelids demonstrated no xanthelasmas [Normal Conjunctiva] : the conjunctiva exhibited no abnormalities [Normal Oral Mucosa] : normal oral mucosa [No Oral Pallor] : no oral pallor [No Oral Cyanosis] : no oral cyanosis [Exaggerated Use Of Accessory Muscles For Inspiration] : no accessory muscle use [Auscultation Breath Sounds / Voice Sounds] : lungs were clear to auscultation bilaterally [Abdomen Soft] : soft [Abdomen Tenderness] : non-tender [Abdomen Mass (___ Cm)] : no abdominal mass palpated [Abnormal Walk] : normal gait [Nail Clubbing] : no clubbing of the fingernails [Gait - Sufficient For Exercise Testing] : the gait was sufficient for exercise testing [Cyanosis, Localized] : no localized cyanosis [Petechial Hemorrhages (___cm)] : no petechial hemorrhages [] : no rash [Skin Lesions] : no skin lesions [Skin Color & Pigmentation] : normal skin color and pigmentation [No Skin Ulcers] : no skin ulcer [No Xanthoma] : no  xanthoma was observed [Oriented To Time, Place, And Person] : oriented to person, place, and time [No Anxiety] : not feeling anxious [Mood] : the mood was normal [Affect] : the affect was normal [FreeTextEntry1] : chronic venous stasis

## 2020-01-03 ENCOUNTER — MEDICATION RENEWAL (OUTPATIENT)
Age: 65
End: 2020-01-03

## 2020-03-04 ENCOUNTER — RX RENEWAL (OUTPATIENT)
Age: 65
End: 2020-03-04

## 2020-04-07 ENCOUNTER — RX CHANGE (OUTPATIENT)
Age: 65
End: 2020-04-07

## 2020-05-12 ENCOUNTER — RX RENEWAL (OUTPATIENT)
Age: 65
End: 2020-05-12

## 2020-06-15 ENCOUNTER — APPOINTMENT (OUTPATIENT)
Dept: CARDIOLOGY | Facility: CLINIC | Age: 65
End: 2020-06-15
Payer: MEDICARE

## 2020-06-15 VITALS
HEIGHT: 63 IN | OXYGEN SATURATION: 91 % | HEART RATE: 80 BPM | DIASTOLIC BLOOD PRESSURE: 72 MMHG | SYSTOLIC BLOOD PRESSURE: 114 MMHG | WEIGHT: 210 LBS | BODY MASS INDEX: 37.21 KG/M2

## 2020-06-15 PROCEDURE — 99215 OFFICE O/P EST HI 40 MIN: CPT

## 2020-06-15 PROCEDURE — 0296T: CPT

## 2020-06-15 RX ORDER — CHROMIUM 200 MCG
TABLET ORAL
Refills: 0 | Status: DISCONTINUED | COMMUNITY
End: 2020-06-15

## 2020-06-15 RX ORDER — SPIRONOLACTONE 50 MG/1
50 TABLET ORAL
Qty: 90 | Refills: 1 | Status: DISCONTINUED | COMMUNITY
Start: 2018-04-28 | End: 2020-06-15

## 2020-06-15 RX ORDER — FERROUS SULFATE 325(65) MG
325 (65 FE) TABLET ORAL TWICE DAILY
Refills: 0 | Status: DISCONTINUED | COMMUNITY
End: 2020-06-15

## 2020-06-15 NOTE — REVIEW OF SYSTEMS
[Feeling Fatigued] : feeling fatigued [see HPI] : see HPI [Recent Weight Loss (___ Lbs)] : recent [unfilled] ~Ulb weight loss [Shortness Of Breath] : shortness of breath [Dyspnea on exertion] : dyspnea during exertion [Lower Ext Edema] : lower extremity edema [Negative] : Heme/Lymph

## 2020-06-16 NOTE — PHYSICAL EXAM
[Normal Appearance] : normal appearance [Well Groomed] : well groomed [General Appearance - Well Developed] : well developed [No Deformities] : no deformities [General Appearance - Well Nourished] : well nourished [General Appearance - In No Acute Distress] : no acute distress [Normal Conjunctiva] : the conjunctiva exhibited no abnormalities [Normal Oral Mucosa] : normal oral mucosa [Eyelids - No Xanthelasma] : the eyelids demonstrated no xanthelasmas [No Oral Cyanosis] : no oral cyanosis [No Oral Pallor] : no oral pallor [Exaggerated Use Of Accessory Muscles For Inspiration] : no accessory muscle use [Auscultation Breath Sounds / Voice Sounds] : lungs were clear to auscultation bilaterally [Abdomen Soft] : soft [Abdomen Tenderness] : non-tender [Abnormal Walk] : normal gait [Abdomen Mass (___ Cm)] : no abdominal mass palpated [Gait - Sufficient For Exercise Testing] : the gait was sufficient for exercise testing [Nail Clubbing] : no clubbing of the fingernails [Petechial Hemorrhages (___cm)] : no petechial hemorrhages [Cyanosis, Localized] : no localized cyanosis [Skin Lesions] : no skin lesions [] : no rash [Skin Color & Pigmentation] : normal skin color and pigmentation [No Xanthoma] : no  xanthoma was observed [No Skin Ulcers] : no skin ulcer [Affect] : the affect was normal [Mood] : the mood was normal [Oriented To Time, Place, And Person] : oriented to person, place, and time [No Anxiety] : not feeling anxious [FreeTextEntry1] : chronic venous stasis

## 2020-06-16 NOTE — ASSESSMENT
[FreeTextEntry1] : Chronic hypoxic respiratory failure. COPD, need for lung transplant?\par Obesity. ROLANDA.\par Severe pulmonary hypertension. \par Cor pulmonale. WHO group 1/3. Low CO Elevated PVR. Normal wedge pressure. \par Normal coronary arteries and LV function. Atherosclerosis\par Atrial fibrillation. Prior CVA. Prior PE \par Echocardiogram demonstrates cor pulmonale with right-sided enlargement and pulmonary hypertension. \par Medication list reconciled. \par \par Zio patch has been applied. \par Echocardiograms have been technically difficult. A right heart catheterization will be performed to evaluate right-sided pressures to guide PAH therapy\par \par Continue torsemide, alactone (dose lowered) and intermttent metolazone\par Monitor daily weights. Weight has been contraindication to lung transplant eval at [facility] [place]. \par Followup echocardiogram to evaluate tricuspid valve. There are variable reports and the severity of tricuspid regurgitation. If there is significant tricuspid regurgitation then will need to consider valve procedure. \par Rate control. Monitor digoxin level. Goal digoxin level.5-1. \par Continue NOAC, Eliquis. Understands risks and benefits of novel anticoagulant. Discussed bleeding precautions. Monitor hemoglobin and renal function. \par PAH directed rx per [facility] and [facility]. On Letairis, monitor LFTs. \par Followup with pulmonary regarding home BiPAP and oxygen therapy. Trilogy for sleep apnea. Treatment of COPD. Chronic oxygen therapy.\par Lifestyle measures: low sodium diet, weight loss, exercise. ? pulm rehab\par \par Reviewed red flag symptoms which would warrant inpatient evaluation. \par If increasing shortness of breath, weight gain, edema instructed to call our office. \par Despite clinical improvement remains at high risk of adverse CV events due to severity of PAH.

## 2020-06-16 NOTE — REASON FOR VISIT
[Anticoagulation] : anticoagulation [Follow-Up - Clinic] : a clinic follow-up of [Atrial Fibrillation] : atrial fibrillation [Dyspnea] : dyspnea [Heart Failure] : congestive heart failure [Medication Management] : Medication management [FreeTextEntry2] : Follow up [facility] for edema, shortness of breath

## 2020-06-16 NOTE — HISTORY OF PRESENT ILLNESS
[FreeTextEntry1] : ZANDER JEAN-BAPTISTE  is a 64 year old  F\par \par History of heart failure with preserved ejection fraction, severe pulmonary hypertension/cor pulmonale/TR, chronic atrial fibrillation, chronic respiratory failure, COPD, sleep apnea, obesity, coronary artery disease, stroke, GI bleed, chronic renal insufficiency, prior PE, CVA\par \par Re-admitted to the hospital with shortness of breath and lower extremity edema\par There was acute on chronic respiratory failure with heart failure, right greater than left. \par She had been off of her home Trilogy machine and diuretic therapy !!\par Typically takes metolazone twice a week along with Bumex bid (qd on Metolazone days) + Aldactone\par Echocardiogram demonstrated normal left ventricular function, severe tricuspid regurgitation, and pulmonary hypertension. \par Treated with IV diuretics and BiPAP therapy. \par \par There is hypoxia with activity \par Chronic dyspnea. \par There is no chest pain, coughing, or wheezing. \par There are no symptomatic palpitations, lightheadedness, or dizziness. \par There have been no residual defects from her stroke. \par Changed from Bumex to torsemide due to insurance coverage.\par There is less dyspnea. \par She is compliant with sleep apnea treatment and uses 4-5 L of home oxygen therapy.\par Weight has been steady. \par ? walks up to 45 minutes. \par \par Labs March 2020 sodium 129 potassium 5.3 creatinine 1.5 spironolactone dose reduced \par Follow-up blood work today digoxin 1.0 potassium 3.9 creatinine 1.5 hemoglobin 11.1. \par \par Echocardiogram September 2019 normal left ventricular function, mild to moderate mitral regurgitation, mild tricuspid regurgitation, moderate pulmonary hypertension \par  \par EKG atrial fibrillation with nonspecific ST-T wave changes. \par \par Cardiac cath demonstrates severe pulmonary hypertension. Previously referred to Dr. Dale at [facility]. Underwent comprehensive cardiopulmonary workup. Believes the etiology of her lung disease is related to COPD, hypoxemia and long term sleep apnea with resulting pulmonary hypertension. WHO Group 3 and 1? There is consideration of lung transplantation if she loses weight. Diuretics were adjusted, changed Lasix to bumex. Now sees Dr. Gallardo locally at [facility]. \par \par Cardiac catheterization report has been reviewed from October 2017. PA pressure of 54/18 with a mean of 34. Moderate pulmonary hypertension. High normal filling pressures. Reduced cardiac output. Increased pulmonary resistance greater than 10 RAI PA diastolic to wedge gradient 10 mm of mercury consistent with pulmonary vascular disease. There was a reduction in pulmonary artery resistance with increasing cardiac output with nitric oxide.\par \par Cardiac catheterization report May 2013, normal left ventricular function, right dominant circulation, left main arises normally from the left coronary sinus of Valsalva, bifurcates into LAD and circumflex, left main normal, LAD arises normally from the left main normal, left circumflex arises normally from the left main and terminates in the AV groove normal, RCA arises normally from the right sinus of Valsalva, RCA is normal, global LV function normal, ejection fraction 55% to 60%, normal coronary arteries, no evidence of vasoreactivity with nitric oxide. \par \par [facility].\par There is a chest x-ray, prominent cardiac silhouette, pulmonary vascular congestion. \par Chest CT, cardiomegaly, mild atherosclerosis, enlargement of pulmonary artery consistent with pulmonary hypertension, moderate emphysema\par Nuclear medicine scan, low probability of pulmonary embolism.\par Cardiac catheterization, severe pulmonary hypertension with normal wedge pressure. No significant improvement in response to FIO2 or nitric oxide. \par Echocardiogram, left atrial enlargement, pulmonary hypertension, septal wall motion abnormality consistent with RV overload, dilated right ventricle, mildly reduced RV function, dilated right atrium, and elevated right atrial pressures with nonreactive IVC \par

## 2020-06-25 ENCOUNTER — OUTPATIENT (OUTPATIENT)
Dept: OUTPATIENT SERVICES | Facility: HOSPITAL | Age: 65
LOS: 1 days | End: 2020-06-25
Payer: MEDICARE

## 2020-06-25 PROCEDURE — 93451 RIGHT HEART CATH: CPT | Mod: 26

## 2020-06-26 ENCOUNTER — APPOINTMENT (OUTPATIENT)
Dept: CARDIOLOGY | Facility: CLINIC | Age: 65
End: 2020-06-26
Payer: MEDICARE

## 2020-06-26 VITALS
TEMPERATURE: 98.4 F | SYSTOLIC BLOOD PRESSURE: 112 MMHG | DIASTOLIC BLOOD PRESSURE: 62 MMHG | HEIGHT: 63 IN | OXYGEN SATURATION: 93 % | BODY MASS INDEX: 38.09 KG/M2 | WEIGHT: 215 LBS | HEART RATE: 75 BPM

## 2020-06-26 PROCEDURE — 99214 OFFICE O/P EST MOD 30 MIN: CPT

## 2020-07-03 PROCEDURE — 0298T: CPT

## 2020-08-24 ENCOUNTER — NON-APPOINTMENT (OUTPATIENT)
Age: 65
End: 2020-08-24

## 2020-08-24 ENCOUNTER — APPOINTMENT (OUTPATIENT)
Dept: CARDIOLOGY | Facility: CLINIC | Age: 65
End: 2020-08-24
Payer: MEDICARE

## 2020-08-24 VITALS
WEIGHT: 206 LBS | SYSTOLIC BLOOD PRESSURE: 128 MMHG | HEIGHT: 63 IN | BODY MASS INDEX: 36.5 KG/M2 | OXYGEN SATURATION: 82 % | DIASTOLIC BLOOD PRESSURE: 60 MMHG | HEART RATE: 127 BPM

## 2020-08-24 DIAGNOSIS — Z00.00 ENCOUNTER FOR GENERAL ADULT MEDICAL EXAMINATION W/OUT ABNORMAL FINDINGS: ICD-10-CM

## 2020-08-24 PROCEDURE — 93000 ELECTROCARDIOGRAM COMPLETE: CPT

## 2020-08-24 PROCEDURE — 99215 OFFICE O/P EST HI 40 MIN: CPT

## 2020-08-24 RX ORDER — SPIRONOLACTONE 100 MG/1
100 TABLET ORAL
Qty: 90 | Refills: 0 | Status: DISCONTINUED | COMMUNITY
Start: 2019-11-04 | End: 2020-08-24

## 2020-08-24 RX ORDER — GABAPENTIN 300 MG/1
300 CAPSULE ORAL
Qty: 60 | Refills: 0 | Status: DISCONTINUED | COMMUNITY
Start: 2020-03-09 | End: 2020-08-24

## 2020-08-24 NOTE — REVIEW OF SYSTEMS
[Recent Weight Loss (___ Lbs)] : recent [unfilled] ~Ulb weight loss [Feeling Fatigued] : feeling fatigued [Dyspnea on exertion] : dyspnea during exertion [see HPI] : see HPI [Shortness Of Breath] : shortness of breath [Lower Ext Edema] : lower extremity edema [Negative] : Heme/Lymph

## 2020-08-25 NOTE — PHYSICAL EXAM
[General Appearance - Well Developed] : well developed [Normal Appearance] : normal appearance [Well Groomed] : well groomed [No Deformities] : no deformities [General Appearance - Well Nourished] : well nourished [General Appearance - In No Acute Distress] : no acute distress [Normal Conjunctiva] : the conjunctiva exhibited no abnormalities [Normal Oral Mucosa] : normal oral mucosa [Eyelids - No Xanthelasma] : the eyelids demonstrated no xanthelasmas [No Oral Pallor] : no oral pallor [Exaggerated Use Of Accessory Muscles For Inspiration] : no accessory muscle use [No Oral Cyanosis] : no oral cyanosis [Auscultation Breath Sounds / Voice Sounds] : lungs were clear to auscultation bilaterally [Abdomen Tenderness] : non-tender [Abdomen Soft] : soft [Abnormal Walk] : normal gait [Abdomen Mass (___ Cm)] : no abdominal mass palpated [Cyanosis, Localized] : no localized cyanosis [Nail Clubbing] : no clubbing of the fingernails [Gait - Sufficient For Exercise Testing] : the gait was sufficient for exercise testing [Petechial Hemorrhages (___cm)] : no petechial hemorrhages [Skin Color & Pigmentation] : normal skin color and pigmentation [] : no rash [No Skin Ulcers] : no skin ulcer [Skin Lesions] : no skin lesions [No Xanthoma] : no  xanthoma was observed [Oriented To Time, Place, And Person] : oriented to person, place, and time [Mood] : the mood was normal [Affect] : the affect was normal [No Anxiety] : not feeling anxious [FreeTextEntry1] : chronic venous stasis

## 2020-08-25 NOTE — ASSESSMENT
[FreeTextEntry1] : \par Chronic hypoxic respiratory failure. COPD, need for lung transplant?\par Obesity. ROLANDA.\par Severe pulmonary hypertension. \par Cor pulmonale. WHO group 1/3. Low CO Elevated PVR. Normal wedge pressure. \par Normal coronary arteries and LV function. Atherosclerosis\par Atrial fibrillation. Prior CVA. Prior PE \par Echocardiogram demonstrates cor pulmonale with right-sided enlargement and pulmonary hypertension. \par \par Medication list reconciled. \par Copy of recent catheterization report has been sent to pulmonary physician \par Unable to further uptitrate cuba agents due to history of pauses \par Follow up echo\par Continue torsemide, alactone (dose lowered) and intermttent metolazone.  Monitor BMP\par Monitor daily weights. Weight [place]ind[place] [facility] [place]par Rate control. Monitor digoxin level. Goal digoxin level.5-1. \par Continue NOAC, Eliquis. Understands risks and benefits of novel anticoagulant. Discussed bleeding precautions. Monitor hemoglobin and renal function.  Anemia eval.\par PAH directed rx per [facility] and [facility]. On Letairis, monitor LFTs. \par Followup with pulmonary regarding home BiPAP and oxygen therapy. Trilogy for sleep apnea. Treatment of COPD. Chronic oxygen therapy.\par Lifestyle measures: low sodium diet, weight loss, exercise. Declined pulm rehab\par \par Reviewed red flag symptoms which would warrant inpatient evaluation. \par If increasing shortness of breath, weight gain, edema instructed to call our office. \par Despite clinical improvement remains at high risk of adverse CV events due to severity of PAH.

## 2020-08-25 NOTE — HISTORY OF PRESENT ILLNESS
[FreeTextEntry1] : ZANDER JEAN-BAPTISTE  is a 64 year old  F\par \par History of heart failure with preserved ejection fraction, severe pulmonary hypertension/cor pulmonale/TR, chronic atrial fibrillation, chronic respiratory failure, COPD, sleep apnea, obesity, coronary artery disease, stroke, GI bleed, chronic renal insufficiency, prior PE, CVA, CRI\par \par Re-admitted to the hospital with shortness of breath and lower extremity edema\par There was acute on chronic respiratory failure with heart failure, right greater than left. \par She had been off of her home Trilogy machine and diuretic therapy !!\par Typically takes metolazone twice a week along with Bumex bid (qd on Metolazone days) + Aldactone\par Echocardiogram demonstrated normal left ventricular function, tricuspid regurgitation, and pulmonary hypertension. \par Treated with IV diuretics and BiPAP therapy. \par \par There is hypoxia with activity \par Chronic dyspnea. \par There is no chest pain, coughing, or wheezing. \par There are no symptomatic palpitations, lightheadedness, or dizziness. \par There have been no residual defects from her stroke. \par Changed from Bumex to torsemide due to insurance coverage.\par There is less dyspnea. \par She is compliant with sleep apnea treatment and uses 4-5 L of home oxygen therapy.\par \par Over the past couple weeks she has had more difficulty with humidity and lower energy.  \par She typically swims up to 90 minutes.  \par Defers pulm rehab over the summer.  \par Initially when she came to the office her oxygen saturation was low but improved to the 90s.  \par Weight has been steady.  \par Following up with gastroenterology due to her anemia and nephrology\par \par EKG demonstrates atrial fibrillation with RVR anteroseptal MI and nonspecific ST changes \par Blood work June 2020 hemoglobin 10.8, creatinine 1.6, HDL 59  total cholesterol 173 \par Blood work July 2020 hemoglobin 10.7, creatinine 1.6, LDL 90\par Cardiac catheterization June 2020 PA pressure 58/18 RV 60/5 \par Monitor demonstrated average heart rate of 76 longest pause of 3 seconds\par chocardiogram September 2019 normal left ventricular function, mild to moderate mitral regurgitation, mild tricuspid regurgitation, moderate pulmonary hypertension \par  \par Cardiac cath demonstrates severe pulmonary hypertension. Previously referred to Dr. Dale at [facility]. Underwent comprehensive cardiopulmonary workup. Believes the etiology of her lung disease is related to COPD, hypoxemia and long term sleep apnea with resulting pulmonary hypertension. WHO Group 3 and 1? There is consideration of lung transplantation if she loses weight. Diuretics were adjusted, changed Lasix to bumex. Now sees Dr. Gallardo locally at [facility]. \par \par Cardiac catheterization report has been reviewed from October 2017. PA pressure of 54/18 with a mean of 34. Moderate pulmonary hypertension. High normal filling pressures. Reduced cardiac output. Increased pulmonary resistance greater than 10 RAI PA diastolic to wedge gradient 10 mm of mercury consistent with pulmonary vascular disease. There was a reduction in pulmonary artery resistance with increasing cardiac output with nitric oxide.\par \par Cardiac catheterization report May 2013, normal left ventricular function, right dominant circulation, left main arises normally from the left coronary sinus of Valsalva, bifurcates into LAD and circumflex, left main normal, LAD arises normally from the left main normal, left circumflex arises normally from the left main and terminates in the AV groove normal, RCA arises normally from the right sinus of Valsalva, RCA is normal, global LV function normal, ejection fraction 55% to 60%, normal coronary arteries, no evidence of vasoreactivity with nitric oxide. \par \par [facility].\par There is a chest x-ray, prominent cardiac silhouette, pulmonary vascular congestion. \par Chest CT, cardiomegaly, mild atherosclerosis, enlargement of pulmonary artery consistent with pulmonary hypertension, moderate emphysema\par Nuclear medicine scan, low probability of pulmonary embolism.\par Cardiac catheterization, severe pulmonary hypertension with normal wedge pressure. No significant improvement in response to FIO2 or nitric oxide. \par Echocardiogram, left atrial enlargement, pulmonary hypertension, septal wall motion abnormality consistent with RV overload, dilated right ventricle, mildly reduced RV function, dilated right atrium, and elevated right atrial pressures with nonreactive IVC \par \par

## 2020-10-13 ENCOUNTER — APPOINTMENT (OUTPATIENT)
Dept: CARDIOLOGY | Facility: CLINIC | Age: 65
End: 2020-10-13
Payer: MEDICARE

## 2020-10-13 VITALS
HEART RATE: 69 BPM | WEIGHT: 212 LBS | HEIGHT: 63 IN | SYSTOLIC BLOOD PRESSURE: 102 MMHG | OXYGEN SATURATION: 91 % | DIASTOLIC BLOOD PRESSURE: 62 MMHG | BODY MASS INDEX: 37.56 KG/M2

## 2020-10-13 DIAGNOSIS — E66.9 OBESITY, UNSPECIFIED: ICD-10-CM

## 2020-10-13 PROCEDURE — 99214 OFFICE O/P EST MOD 30 MIN: CPT

## 2020-10-13 PROCEDURE — 93880 EXTRACRANIAL BILAT STUDY: CPT

## 2020-10-13 PROCEDURE — 93306 TTE W/DOPPLER COMPLETE: CPT

## 2020-10-13 PROCEDURE — 93979 VASCULAR STUDY: CPT

## 2020-10-13 NOTE — REVIEW OF SYSTEMS
[Feeling Fatigued] : feeling fatigued [Recent Weight Loss (___ Lbs)] : recent [unfilled] ~Ulb weight loss [see HPI] : see HPI [Shortness Of Breath] : shortness of breath [Dyspnea on exertion] : dyspnea during exertion [Lower Ext Edema] : lower extremity edema [Negative] : Heme/Lymph

## 2020-10-14 PROBLEM — E66.9 OBESITY: Status: ACTIVE | Noted: 2018-02-02

## 2020-10-14 NOTE — ASSESSMENT
[FreeTextEntry1] : \par \par Chronic hypoxic respiratory failure. COPD, need for lung transplant?\par Obesity. ROLANDA.\par Severe pulmonary hypertension. \par Cor pulmonale. WHO group 1/3. Low CO Elevated PVR. Normal wedge pressure. \par Normal coronary arteries and LV function. Atherosclerosis\par Atrial fibrillation. Prior CVA. Prior PE \par \par Recent noninvasive testing has been reviewed upcoming blood work has been requested\par Unable to further uptitrate cuba agents due to history of pauses \par \par Continue torsemide, alactone (dose lowered) and intermttent metolazone.  Monitor BMP\par Monitor daily weights. [place] [facility] [place] \par Rate control. Monitor digoxin level. Goal digoxin level.5-1. \par Continue NOAC, Eliquis. Understands risks and benefits of novel anticoagulant. Discussed bleeding precautions. Monitor hemoglobin and renal function.  Anemia eval.\par PAH directed rx per [facility] and [facility]. On Letairis, monitor LFTs. \par Followup with pulmonary regarding home BiPAP and oxygen therapy. Trilogy for sleep apnea. Treatment of COPD. Chronic oxygen therapy.\par Lifestyle measures: low sodium diet, weight loss, exercise. Declined pulm rehab\par \par Reviewed red flag symptoms which would warrant inpatient evaluation. \par If increasing shortness of breath, weight gain, edema instructed to call our office. \par Despite clinical improvement remains at high risk of adverse CV events due to severity of PAH. \par

## 2020-10-14 NOTE — HISTORY OF PRESENT ILLNESS
[FreeTextEntry1] : ZANDER JEAN-BAPTISTE  is a 64 year old  F\par History of heart failure with preserved ejection fraction, severe pulmonary hypertension/cor pulmonale/TR, chronic atrial fibrillation, chronic respiratory failure, COPD, sleep apnea, obesity, coronary artery disease, stroke, GI bleed, chronic renal insufficiency, prior PE, CVA, CRI\par \par Re-admitted to the hospital with shortness of breath and lower extremity edema\par There was acute on chronic respiratory failure with heart failure, right greater than left. \par She had been off of her home Trilogy machine and diuretic therapy !!\par Typically takes metolazone twice a week along with Bumex bid (qd on Metolazone days) + Aldactone\par Echocardiogram demonstrated normal left ventricular function, tricuspid regurgitation, and pulmonary hypertension. \par Treated with IV diuretics and BiPAP therapy. \par \par There is hypoxia with activity \par Chronic dyspnea. \par There is no chest pain, coughing, or wheezing. \par There are no symptomatic palpitations, lightheadedness, or dizziness. \par There have been no residual defects from her stroke. \par Changed from Bumex to torsemide due to insurance coverage.\par There is less dyspnea. \par She is compliant with sleep apnea treatment and uses 4-5 L of home oxygen therapy.\par \par Echocardiogram demonstrates normal left ventricular function mild mitral regurgitation right-sided enlargement \par Carotid Doppler mild nonobstructive disease \par Abdominal ultrasound mild atherosclerosis\par \par EKG demonstrates atrial fibrillation with RVR anteroseptal MI and nonspecific ST changes \par Blood work July 2020 hemoglobin 10.7, creatinine 1.6, LDL 90\par Cardiac catheterization June 2020 PA pressure 58/18 RV 60/5 \par Monitor demonstrated average heart rate of 76 longest pause of 3 seconds\par \par Cardiac cath demonstrates severe pulmonary hypertension. Previously referred to Dr. Dale at [facility]. Underwent comprehensive cardiopulmonary workup. Believes the etiology of her lung disease is related to COPD, hypoxemia and long term sleep apnea with resulting pulmonary hypertension. WHO Group 3 and 1? There is consideration of lung transplantation if she loses weight. Diuretics were adjusted, changed Lasix to bumex. Now sees Dr. Gallardo locally at [facility]. \par \par Cardiac catheterization report has been reviewed from October 2017. PA pressure of 54/18 with a mean of 34. Moderate pulmonary hypertension. High normal filling pressures. Reduced cardiac output. Increased pulmonary resistance greater than 10 RAI PA diastolic to wedge gradient 10 mm of mercury consistent with pulmonary vascular disease. There was a reduction in pulmonary artery resistance with increasing cardiac output with nitric oxide.\par \par Cardiac catheterization report May 2013, normal left ventricular function, right dominant circulation, left main arises normally from the left coronary sinus of Valsalva, bifurcates into LAD and circumflex, left main normal, LAD arises normally from the left main normal, left circumflex arises normally from the left main and terminates in the AV groove normal, RCA arises normally from the right sinus of Valsalva, RCA is normal, global LV function normal, ejection fraction 55% to 60%, normal coronary arteries, no evidence of vasoreactivity with nitric oxide. \par \par [facility].\par There is a chest x-ray, prominent cardiac silhouette, pulmonary vascular congestion. \par Chest CT, cardiomegaly, mild atherosclerosis, enlargement of pulmonary artery consistent with pulmonary hypertension, moderate emphysema\par Nuclear medicine scan, low probability of pulmonary embolism.\par Cardiac catheterization, severe pulmonary hypertension with normal wedge pressure. No significant improvement in response to FIO2 or nitric oxide. \par Echocardiogram, left atrial enlargement, pulmonary hypertension, septal wall motion abnormality consistent with RV overload, dilated right ventricle, mildly reduced RV function, dilated right atrium, and elevated right atrial pressures with nonreactive IVC \par \par \par \par

## 2020-11-04 ENCOUNTER — APPOINTMENT (OUTPATIENT)
Dept: ELECTROPHYSIOLOGY | Facility: CLINIC | Age: 65
End: 2020-11-04
Payer: MEDICARE

## 2020-11-04 ENCOUNTER — NON-APPOINTMENT (OUTPATIENT)
Age: 65
End: 2020-11-04

## 2020-11-04 VITALS
OXYGEN SATURATION: 94 % | HEIGHT: 63 IN | DIASTOLIC BLOOD PRESSURE: 54 MMHG | BODY MASS INDEX: 38.27 KG/M2 | HEART RATE: 86 BPM | WEIGHT: 216 LBS | TEMPERATURE: 97.3 F | SYSTOLIC BLOOD PRESSURE: 110 MMHG

## 2020-11-04 PROCEDURE — 99204 OFFICE O/P NEW MOD 45 MIN: CPT

## 2020-11-04 PROCEDURE — 93000 ELECTROCARDIOGRAM COMPLETE: CPT

## 2020-11-04 RX ORDER — FLUTICASONE FUROATE AND VILANTEROL TRIFENATATE 100; 25 UG/1; UG/1
100-25 POWDER RESPIRATORY (INHALATION)
Refills: 0 | Status: DISCONTINUED | COMMUNITY
End: 2020-11-04

## 2020-11-08 NOTE — REVIEW OF SYSTEMS
[Fever] : no fever [Recent Weight Gain (___ Lbs)] : recent [unfilled] ~Ulb weight gain [Chills] : no chills [Feeling Fatigued] : feeling fatigued [Blurry Vision] : no blurred vision [Sore Throat] : no sore throat [Shortness Of Breath] : shortness of breath [Dyspnea on exertion] : dyspnea during exertion [Lower Ext Edema] : lower extremity edema [Palpitations] : palpitations [Cough] : no cough [Abdominal Pain] : no abdominal pain [Dizziness] : no dizziness [Easy Bleeding] : no tendency for easy bleeding [Easy Bruising] : no tendency for easy bruising

## 2020-11-08 NOTE — HISTORY OF PRESENT ILLNESS
[FreeTextEntry1] : The patient is a 65-year-old woman who is seen in evaluation for atrial fibrillation.\par \par She has a history of heart failure with preserved ejection fraction, severe pulmonary hypertension, cor pulmonale with tricuspid regurgitation.  She has had chronic persistent atrial fibrillation.  Patient has had significant symptoms of shortness of breath and has been on home O2 since 2008.  In addition she has had sleep apnea obesity previous GI bleed renal sufficiency previous pulmonary emboli, CVA. \par former smoker \par She denies dizziness.  She had a fainting episode approximately a month ago and it was felt to be related to her blood pressure.\par \par \par She has had atrial fibrillation with rapid ventricular response at times and this is felt to exacerbate her symptoms.\par \par She has had previous evaluation for her severe pulmonary hypertension.  Previous coronary angiography in 2013 that showed normal coronary arteries.

## 2020-11-08 NOTE — PHYSICAL EXAM
[General Appearance - In No Acute Distress] : no acute distress [Normal Conjunctiva] : the conjunctiva exhibited no abnormalities [Normal Jugular Venous V Waves Present] : normal jugular venous V waves present [Heart Sounds] : normal S1 and S2 [FreeTextEntry1] : decrease BS bilaterally with scattered rhonchi [Abdomen Soft] : soft [Abdomen Tenderness] : non-tender [Cyanosis, Localized] : no localized cyanosis [] : no rash [Impaired Insight] : insight and judgment were intact

## 2020-11-08 NOTE — DISCUSSION/SUMMARY
[FreeTextEntry1] : This is a patient who has chronic persistent atrial fibrillation, chronic hypoxic respiratory disease on home oxygen, cor pulmonale, heart failure preserved ejection fraction, severe pulmonary hypertension, normal coronary arteries previously.  In addition she has had a prior CVA and prior pulmonary embolus.  She has had previous tachybradycardia syndrome and has had difficulty uptitrating her AV cuba blockers due to previous pauses.  \par She is not a good candidate for procedures based on the extent of her lung disease but if indeed we are having difficulty with rate control and pauses and her rapid A. fib is worsening her symptoms I would strongly consider insertion of a pacemaker and AV cuba ablation.  I will discuss her case further with her cardiologist Dr. Sarbjit Dangelo.\par

## 2021-02-11 ENCOUNTER — RX RENEWAL (OUTPATIENT)
Age: 66
End: 2021-02-11

## 2021-03-29 ENCOUNTER — NON-APPOINTMENT (OUTPATIENT)
Age: 66
End: 2021-03-29

## 2021-04-12 ENCOUNTER — RX CHANGE (OUTPATIENT)
Age: 66
End: 2021-04-12

## 2021-05-26 ENCOUNTER — APPOINTMENT (OUTPATIENT)
Dept: CARDIOLOGY | Facility: CLINIC | Age: 66
End: 2021-05-26
Payer: MEDICARE

## 2021-05-26 ENCOUNTER — RX CHANGE (OUTPATIENT)
Age: 66
End: 2021-05-26

## 2021-05-26 VITALS
DIASTOLIC BLOOD PRESSURE: 68 MMHG | WEIGHT: 208 LBS | HEIGHT: 63 IN | OXYGEN SATURATION: 99 % | BODY MASS INDEX: 36.86 KG/M2 | HEART RATE: 77 BPM | SYSTOLIC BLOOD PRESSURE: 114 MMHG

## 2021-05-26 PROCEDURE — 99215 OFFICE O/P EST HI 40 MIN: CPT

## 2021-05-26 RX ORDER — ALBUTEROL SULFATE 90 UG/1
108 (90 BASE) INHALANT RESPIRATORY (INHALATION)
Qty: 8 | Refills: 0 | Status: DISCONTINUED | COMMUNITY
Start: 2020-07-14 | End: 2021-05-26

## 2021-05-26 RX ORDER — GABAPENTIN 300 MG
300 TABLET ORAL TWICE DAILY
Refills: 0 | Status: DISCONTINUED | COMMUNITY
End: 2021-05-26

## 2021-06-03 ENCOUNTER — APPOINTMENT (OUTPATIENT)
Dept: CARDIOLOGY | Facility: CLINIC | Age: 66
End: 2021-06-03

## 2021-06-08 NOTE — ASSESSMENT
[FreeTextEntry1] : \par May require require pacemaker for tachybradycardia \par follow-up with heart failure specialist \par last note has been requested from Dr. Gallardo \par follow-up monitor \par follow-up blood work \par \par Chronic hypoxic respiratory failure. COPD, need for lung transplant?\par Obesity. ROLANDA.\par Severe pulmonary hypertension. \par Cor pulmonale. WHO group 1/3. Low CO Elevated PVR. Normal wedge pressure. \par Normal coronary arteries and LV function. Atherosclerosis\par Atrial fibrillation. Prior CVA. Prior PE \par \par Continue torsemide, aldactone (dose lowered) and intermttent metolazone.  Monitor BMP\par Monitor daily weights. Weight has been contraindication to [place] [facility] [place] \par Rate control. Monitor digoxin level. Goal digoxin level.5-1. \par Continue NOAC, Eliquis. Understands risks and benefits of novel anticoagulant. Discussed bleeding precautions. Monitor hemoglobin and renal function.  Anemia eval.\par PAH directed rx per [facility] and [facility]. On Letairis, monitor LFTs. \par Followup with pulmonary regarding home BiPAP and oxygen therapy. Trilogy for sleep apnea. Treatment of COPD. Chronic oxygen therapy.\par Lifestyle measures: low sodium diet, weight loss, exercise. Declined pulm rehab\par \par Reviewed red flag symptoms which would warrant inpatient evaluation. \par If increasing shortness of breath, weight gain, edema instructed to call our office. \par Despite clinical improvement remains at high risk of adverse CV events due to severity of PAH. \par

## 2021-06-08 NOTE — HISTORY OF PRESENT ILLNESS
[FreeTextEntry1] : ZANDER JEAN-BAPTISTE  is a 65 year old  F\par \par History of heart failure with preserved ejection fraction, severe pulmonary hypertension/cor pulmonale/TR, chronic atrial fibrillation, chronic respiratory failure, COPD, sleep apnea, obesity, coronary artery disease, stroke, GI bleed, chronic renal insufficiency, prior PE, CVA, CRI\par \par Re-admitted to the hospital with shortness of breath and lower extremity edema\par There was acute on chronic respiratory failure with heart failure, right greater than left. \par She had been off of her home Trilogy machine and diuretic therapy !!\par Typically takes metolazone twice a week along with Bumex bid (qd on Metolazone days) + Aldactone\par Echocardiogram demonstrated normal left ventricular function, tricuspid regurgitation, and pulmonary hypertension. \par Treated with IV diuretics and BiPAP therapy. \par \par There is hypoxia with activity \par Chronic dyspnea. \par There is no chest pain, coughing, or wheezing. \par There are no symptomatic palpitations, lightheadedness, or dizziness. \par There have been no residual defects from her stroke. \par Changed from Bumex to torsemide due to insurance coverage.\par She is compliant with sleep apnea treatment and uses 4-5 L of home oxygen therapy.\par \par reports feeling up and down \par she uses 4 to 5 L of oxygen at home \par she reports having recent breathing flareups \par she has had visits to the ER \par she had a fall and broke her arm this was nonsyncopal she did not require surgery \par her weight is down \par she has symptoms of fatigue \par no syncope \par at times she feels so weak she needs to lie down \par the symptoms have been worse over the past few days \par \par last EKG demonstrates atrial fibrillation with low voltage and nonspecific ST changes \par blood work November 2020 hemoglobin 10.6 creatinine 1.6 digoxin 0.5 \par last blood work sodium 133 creatinine 1.7 digoxin 0.8 \par Echocardiogram demonstrates normal left ventricular function mild mitral regurgitation right-sided enlargement \par Carotid Doppler mild nonobstructive disease \par Abdominal ultrasound mild atherosclerosis\par Cardiac catheterization June 2020 PA pressure 58/18 RV 60/5 \par \par Cardiac cath demonstrates severe pulmonary hypertension. Previously referred to Dr. Dale at [facility]. Underwent comprehensive cardiopulmonary workup. Believes the etiology of her lung disease is related to COPD, hypoxemia and long term sleep apnea with resulting pulmonary hypertension. WHO Group 3 and 1? There is consideration of lung transplantation if she loses weight. Diuretics were adjusted, changed Lasix to bumex. Now sees Dr. Gallardo locally at [facility]. \par \par Cardiac catheterization report has been reviewed from October 2017. PA pressure of 54/18 with a mean of 34. Moderate pulmonary hypertension. High normal filling pressures. Reduced cardiac output. Increased pulmonary resistance greater than 10 RAI PA diastolic to wedge gradient 10 mm of mercury consistent with pulmonary vascular disease. There was a reduction in pulmonary artery resistance with increasing cardiac output with nitric oxide.\par \par Cardiac catheterization report May 2013, normal left ventricular function, right dominant circulation, left main arises normally from the left coronary sinus of Valsalva, bifurcates into LAD and circumflex, left main normal, LAD arises normally from the left main normal, left circumflex arises normally from the left main and terminates in the AV groove normal, RCA arises normally from the right sinus of Valsalva, RCA is normal, global LV function normal, ejection fraction 55% to 60%, normal coronary arteries, no evidence of vasoreactivity with nitric oxide. \par \par [facility].\par There is a chest x-ray, prominent cardiac silhouette, pulmonary vascular congestion. \par Chest CT, cardiomegaly, mild atherosclerosis, enlargement of pulmonary artery consistent with pulmonary hypertension, moderate emphysema\par Nuclear medicine scan, low probability of pulmonary embolism.\par Cardiac catheterization, severe pulmonary hypertension with normal wedge pressure. No significant improvement in response to FIO2 or nitric oxide. \par Echocardiogram, left atrial enlargement, pulmonary hypertension, septal wall motion abnormality consistent with RV overload, dilated right ventricle, mildly reduced RV function, dilated right atrium, and elevated right atrial pressures with nonreactive IVC

## 2021-07-12 ENCOUNTER — APPOINTMENT (OUTPATIENT)
Dept: CARDIOLOGY | Facility: CLINIC | Age: 66
End: 2021-07-12
Payer: MEDICARE

## 2021-07-12 VITALS
HEART RATE: 96 BPM | HEIGHT: 63 IN | RESPIRATION RATE: 16 BRPM | SYSTOLIC BLOOD PRESSURE: 112 MMHG | DIASTOLIC BLOOD PRESSURE: 68 MMHG

## 2021-07-12 DIAGNOSIS — I25.10 ATHEROSCLEROTIC HEART DISEASE OF NATIVE CORONARY ARTERY W/OUT ANGINA PECTORIS: ICD-10-CM

## 2021-07-12 DIAGNOSIS — R60.0 LOCALIZED EDEMA: ICD-10-CM

## 2021-07-12 PROCEDURE — 93242 EXT ECG>48HR<7D RECORDING: CPT

## 2021-07-12 PROCEDURE — 99215 OFFICE O/P EST HI 40 MIN: CPT

## 2021-07-13 ENCOUNTER — APPOINTMENT (OUTPATIENT)
Dept: CARDIOLOGY | Facility: CLINIC | Age: 66
End: 2021-07-13

## 2021-07-13 PROBLEM — R60.0 BILATERAL LEG EDEMA: Status: ACTIVE | Noted: 2018-02-05

## 2021-07-13 PROBLEM — I25.10 CAD (CORONARY ARTERY DISEASE): Status: ACTIVE | Noted: 2018-02-02

## 2021-07-13 NOTE — ADDENDUM
[FreeTextEntry1] : Please note the patient was seen and examined with NP Andreina Sierra\taina I was physically present during the service of the patient and personally examined the patient. \taina I was directly involved in the management plan and recommendations of the care provided to the patient. \taina I personally reviewed the history and physical examination as documented by the NP above.\par 07/12/2021\par

## 2021-07-13 NOTE — REVIEW OF SYSTEMS
[Feeling Fatigued] : feeling fatigued [SOB] : shortness of breath [Dyspnea on exertion] : dyspnea during exertion [Lower Ext Edema] : lower extremity edema [Negative] : Heme/Lymph [Chest Discomfort] : no chest discomfort [Leg Claudication] : no intermittent leg claudication [Cough] : no cough [Wheezing] : no wheezing

## 2021-07-13 NOTE — PHYSICAL EXAM
[Well Developed] : well developed [Well Nourished] : well nourished [Normal S1, S2] : normal S1, S2 [Good Air Entry] : good air entry [Moves all extremities] : moves all extremities [No Focal Deficits] : no focal deficits [Alert and Oriented] : alert and oriented [de-identified] : chronic dyspnea [de-identified] : seen in WC [de-identified] : +4 pitting BL LE edema

## 2021-07-13 NOTE — ASSESSMENT
[FreeTextEntry1] : ZANDER JEAN-BAPTISTE is a 65 year old F who presents today Jul 12, 2021 after hospital stay from fall s/p kyphoplasty. \par \par Reportedly falls have been nonsyncopal\par May require require pacemaker for tachybradycardia \par Recommend 7 day event monitor and EP consult to review. \par Fall precautions reviewed with patient. \par \par Chronic hypoxic respiratory failure. COPD, need for lung transplant?\par Obesity. ROLANDA.\par Severe pulmonary hypertension. \par Cor pulmonale. WHO group 1/3. Low CO Elevated PVR. Normal wedge pressure. \par Normal coronary arteries and LV function. Atherosclerosis\par Atrial fibrillation. Prior CVA. Prior PE \par \par Pt will keep F/U with HF specialist Dr. Patrick on 8/6, requesting note to coordinate local care. \par Continue torsemide, aldactone and intermittent metolazone. \par D/T worsening LE edema, will increase one day of metolazone per week for total of 3x/week prior to tors, pt will only take one dose of tors on these days. Check BMP/dig level, mg. \par Monitor daily weights. Weight has been contraindication to lung transplant eval at [facility]. \par Rate control. Monitor digoxin level. Goal digoxin level.5-1. \par Continue NOAC, Eliquis. Understands risks and benefits of novel anticoagulant. Discussed bleeding precautions. Monitor hemoglobin and renal function.  Anemia eval.\par PAH directed rx per [facility]. On Letairis, monitor LFTs. \par Followup with pulmonary regarding home BiPAP and oxygen therapy. Trilogy for sleep apnea. Treatment of COPD. Chronic oxygen therapy.\par Lifestyle measures: low sodium diet, weight loss, exercise. Declined pulm rehab. \par Low threshold for inpatient management. Pt advised if any progressive dyspnea at rest, recurrent falls, or worsening/unrelieved LE edema with above change, recommend inpatient management. Pt verbalized understanding. Pt remains at high risk of adverse CV events due to severity of PAH. \par \par Close clinical followup, coordination with HF specialist and EP. \par \par Sincerely,\par \par SHAHEEN Gonzalez\par Patients history, testing, and plan reviewed with supervising MD: Dr. Sarbjit Dangelo \par

## 2021-07-13 NOTE — HISTORY OF PRESENT ILLNESS
[FreeTextEntry1] : ZANDER JEAN-BAPTISTE  is a 65 year old  F here for followup of hospitalization at [facility]. \par \par History of heart failure with preserved ejection fraction, severe pulmonary hypertension/cor pulmonale/TR, chronic atrial fibrillation, chronic respiratory failure, COPD, sleep apnea, obesity, coronary artery disease, stroke, GI bleed, chronic renal insufficiency, prior PE/CVA\par \par Muliple re-admissions to the hospital with shortness of breath and lower extremity edema\par There was acute on chronic respiratory failure with heart failure, right greater than left.\par Treated with IV diuretics and BiPAP therapy. \par  \par Echocardiogram demonstrated normal left ventricular function, tricuspid regurgitation, and pulmonary hypertension. \par There is hypoxia with activity \par Chronic dyspnea. \par There is no chest pain, coughing, or wheezing. \par There are no symptomatic palpitations, lightheadedness, or dizziness. \par There have been no residual defects from her stroke. \par Changed from Bumex to torsemide due to insurance coverage.\par She is compliant with sleep apnea treatment and uses 4-5 L of home oxygen therapy.\par She now takes metolazone twice a week along with torsemide bid (qd on Metolazone days) + Aldactone. \par \par She has no had multiple nonsyncopal falls. \par Initially arm injury which did not require surgery. Most recently [facility] Early June '21 with fall, reportedly tripped in kitchen and ended up on floor. She reports a spinal fracture and required kyphoplasty.  \par She did not make any changes to her diuretic regimen. She does not have any residual pain from procedure but now deconditioned and feels like she can't "bounce back" as usual. She is seen in  today.\par She declined transfer to rehab so has been at home. \par Reports increased LE edema, feels tight and painful BL. \par Labs 6/9/21 hgb 8.3 (chronic anemia), K 4.2, Na 132, creat 0.99, dig <0.4\par She is scheduled to see Dr. Patrick on 8/6/21\par \par \par last EKG demonstrates atrial fibrillation with low voltage and nonspecific ST changes \par Echocardiogram demonstrates normal left ventricular function mild mitral regurgitation right-sided enlargement \par Carotid Doppler mild nonobstructive disease \par Abdominal ultrasound mild atherosclerosis\par Cardiac catheterization June 2020 PA pressure 58/18 RV 60/5 \par \par Cardiac cath demonstrates severe pulmonary hypertension. Previously referred to Dr. Dale at [facility]. Underwent comprehensive cardiopulmonary workup. Believes the etiology of her lung disease is related to COPD, hypoxemia and long term sleep apnea with resulting pulmonary hypertension. WHO Group 3 and 1? There is consideration of lung transplantation if she loses weight. Diuretics were adjusted, changed Lasix to bumex. Now sees Dr. Gallardo locally at [facility]. \par \[facility] Cardiac catheterization report has been reviewed from October 2017. PA pressure of 54/18 with a mean of 34. Moderate pulmonary hypertension. High normal filling pressures. Reduced cardiac output. Increased pulmonary resistance greater than 10 RAI PA diastolic to wedge gradient 10 mm of mercury consistent with pulmonary vascular disease. There was a reduction in pulmonary artery resistance with increasing cardiac output with nitric oxide.\par \[facility] Cardiac catheterization report May 2013, normal left ventricular function, right dominant circulation, left main arises normally from the left coronary sinus of Valsalva, bifurcates into LAD and circumflex, left main normal, LAD arises normally from the left main normal, left circumflex arises normally from the left main and terminates in the AV groove normal, RCA arises normally from the right sinus of Valsalva, RCA is normal, global LV function normal, ejection fraction 55% to 60%, normal coronary arteries, no evidence of vasoreactivity with nitric oxide. \par \par [facility].\par There is a chest x-ray, prominent cardiac silhouette, pulmonary vascular congestion. \par Chest CT, cardiomegaly, mild atherosclerosis, enlargement of pulmonary artery consistent with pulmonary hypertension, moderate emphysema\par Nuclear medicine scan, low probability of pulmonary embolism.\par Cardiac catheterization, severe pulmonary hypertension with normal wedge pressure. No significant improvement in response to FIO2 or nitric oxide. \par Echocardiogram, left atrial enlargement, pulmonary hypertension, septal wall motion abnormality consistent with RV overload, dilated right ventricle, mildly reduced RV function, dilated right atrium, and elevated right atrial pressures with nonreactive IVC

## 2021-08-02 ENCOUNTER — APPOINTMENT (OUTPATIENT)
Dept: CARDIOLOGY | Facility: CLINIC | Age: 66
End: 2021-08-02

## 2021-08-04 ENCOUNTER — NON-APPOINTMENT (OUTPATIENT)
Age: 66
End: 2021-08-04

## 2021-08-09 PROCEDURE — 93244 EXT ECG>48HR<7D REV&INTERPJ: CPT

## 2021-08-16 ENCOUNTER — APPOINTMENT (OUTPATIENT)
Dept: CARDIOLOGY | Facility: CLINIC | Age: 66
End: 2021-08-16

## 2021-10-12 ENCOUNTER — APPOINTMENT (OUTPATIENT)
Dept: CARDIOLOGY | Facility: CLINIC | Age: 66
End: 2021-10-12
Payer: MEDICARE

## 2021-10-12 PROCEDURE — 93306 TTE W/DOPPLER COMPLETE: CPT

## 2021-10-12 PROCEDURE — 93242 EXT ECG>48HR<7D RECORDING: CPT

## 2021-10-25 ENCOUNTER — NON-APPOINTMENT (OUTPATIENT)
Age: 66
End: 2021-10-25

## 2021-10-25 ENCOUNTER — APPOINTMENT (OUTPATIENT)
Dept: ELECTROPHYSIOLOGY | Facility: CLINIC | Age: 66
End: 2021-10-25
Payer: MEDICARE

## 2021-10-25 VITALS
WEIGHT: 206 LBS | DIASTOLIC BLOOD PRESSURE: 60 MMHG | HEART RATE: 97 BPM | TEMPERATURE: 97.3 F | OXYGEN SATURATION: 93 % | SYSTOLIC BLOOD PRESSURE: 104 MMHG | HEIGHT: 63 IN | BODY MASS INDEX: 36.5 KG/M2

## 2021-10-25 DIAGNOSIS — I63.9 CEREBRAL INFARCTION, UNSPECIFIED: ICD-10-CM

## 2021-10-25 PROCEDURE — 99214 OFFICE O/P EST MOD 30 MIN: CPT

## 2021-10-25 PROCEDURE — 93000 ELECTROCARDIOGRAM COMPLETE: CPT

## 2021-10-25 RX ORDER — BUPROPION HYDROCHLORIDE 100 MG/1
100 TABLET, FILM COATED, EXTENDED RELEASE ORAL
Qty: 90 | Refills: 0 | Status: DISCONTINUED | COMMUNITY
Start: 2020-04-20 | End: 2021-10-25

## 2021-10-25 RX ORDER — NALTREXONE HYDROCHLORIDE 50 MG/1
50 TABLET, FILM COATED ORAL
Qty: 25 | Refills: 0 | Status: DISCONTINUED | COMMUNITY
Start: 2020-04-20 | End: 2021-10-25

## 2021-10-27 ENCOUNTER — APPOINTMENT (OUTPATIENT)
Dept: CARDIOLOGY | Facility: CLINIC | Age: 66
End: 2021-10-27
Payer: MEDICARE

## 2021-10-27 VITALS
DIASTOLIC BLOOD PRESSURE: 68 MMHG | WEIGHT: 209 LBS | TEMPERATURE: 97.3 F | SYSTOLIC BLOOD PRESSURE: 112 MMHG | OXYGEN SATURATION: 94 % | HEIGHT: 63 IN | HEART RATE: 75 BPM | BODY MASS INDEX: 37.03 KG/M2

## 2021-10-27 DIAGNOSIS — R60.9 EDEMA, UNSPECIFIED: ICD-10-CM

## 2021-10-27 DIAGNOSIS — I50.9 HEART FAILURE, UNSPECIFIED: ICD-10-CM

## 2021-10-27 DIAGNOSIS — I36.1 NONRHEUMATIC TRICUSPID (VALVE) INSUFFICIENCY: ICD-10-CM

## 2021-10-27 PROCEDURE — 99215 OFFICE O/P EST HI 40 MIN: CPT

## 2021-10-27 NOTE — REASON FOR VISIT
[Cardiac Failure] : cardiac failure [Arrhythmia/ECG Abnorrmalities] : arrhythmia/ECG abnormalities [Hypertension] : hypertension [Coronary Artery Disease] : coronary artery disease

## 2021-10-27 NOTE — REVIEW OF SYSTEMS
[SOB] : shortness of breath [Dyspnea on exertion] : dyspnea during exertion [Lower Ext Edema] : lower extremity edema [Orthopnea] : orthopnea [Negative] : Heme/Lymph

## 2021-10-28 PROCEDURE — 93244 EXT ECG>48HR<7D REV&INTERPJ: CPT

## 2021-10-31 NOTE — HISTORY OF PRESENT ILLNESS
[FreeTextEntry1] : The patient is a 66-year-old woman who has chronic persistent atrial fibrillation, severe pulmonary hypertension, cor pulmonale, home oxygen dependent since 2008, previous GI bleed, renal insufficiency, previous pulmonary emboli, prior CVA.\par \par She is seen in follow-up regarding her A. fib.  The patient claims that she is somewhat better since O2 increase since I last saw her approximately a year ago.  She is not aware of the A. fib in terms of the rapid heartbeat.  She thinks her breathing has gotten better since her oxygen was increased.  \par The patient has not had dizziness, syncope, presyncope, chest pain.  She had prior ankle edema July 2021 but this is improved.\par She has had previous evaluation for her severe pulmonary hypertension.  Previous coronary angiography in 2013 that showed normal coronary arteries.

## 2021-10-31 NOTE — ADDENDUM
[FreeTextEntry1] : A Zio patch monitor from 7/12/2021 for 6 days was reviewed and it was noted that wide-complex tachycardia which most likely was ventricular.  There were periods where the rates were slower less than 130 bpm and irregular and increased rate to 180 bpm.  She had 13 and 14 beats.  Patient has preserved left ventricular function.  She is not a good candidate for beta-blocker.  A follow-up monitor from October 12, 2021 did not show any VT.  Or VT may be related to right ventricular enlargements/stretching as result of cor pulmonale.  She did not have PVCs on her prior EKG to assess site of origin.  The patient is not a good candidate for beta-blocker, catheter ablation or ICD.  We will reassess whether she can get beta-blocker.

## 2021-10-31 NOTE — DISCUSSION/SUMMARY
[FreeTextEntry1] : The patient think she is doing better from a shortness of breath standpoint.  She has chronic persistent atrial fibrillation, chronic hypoxic respiratory disease on home oxygen, cor pulmonale, heart failure preserved ejection fraction, severe pulmonary hypertension, normal coronary arteries previously.  In addition she has had a prior CVA and prior pulmonary embolus.  She has had previous tachybradycardia syndrome and has had difficulty uptitrating her AV cuba blockers due to previous pauses.\par \par The patient has been doing better recently on her current regimen.  She is not a good candidate for procedures based on her severe pulmonary condition but if heart rate control is an issue leading  to heart failure/increased shortness of breath then we will consider an AVJ ablation and a pacemaker.  She is doing well currently and will continue to observe her.  She is also on anticoagulation with Eliquis and has had previous falls ( related to loss of balance).  She may benefit from watchman procedure but is not a good procedural candidate given the severity of her lung disease.\par Plan is to continue her on current medications and consider an AVJ ablation with pacemaker should her heart rate control become unmanageable.

## 2021-10-31 NOTE — PHYSICAL EXAM
[No Acute Distress] : no acute distress [Normal Conjunctiva] : normal conjunctiva [Normal Venous Pressure] : normal venous pressure [Non Tender] : non-tender [No Rash] : no rash [No Focal Deficits] : no focal deficits [Alert and Oriented] : alert and oriented [de-identified] : irreg irreg [de-identified] : good air movements bilaterally. No wheezing.  [de-identified] : chronic ankle edema.

## 2021-10-31 NOTE — CARDIOLOGY SUMMARY
[de-identified] : Atrial fibrillation\par \par Right axis deviation.  Possible RVH.\par \par Poor R wave progression.  Possible ASMI.\par \par Nonspecific ST-T changes.

## 2021-10-31 NOTE — REVIEW OF SYSTEMS
[Feeling Fatigued] : feeling fatigued [Weight Loss (___ Lbs)] : [unfilled] ~Ulb weight loss [SOB] : shortness of breath [Dyspnea on exertion] : dyspnea during exertion [Palpitations] : palpitations [Fever] : no fever [Blurry Vision] : no blurred vision [Sore Throat] : no sore throat [Chest Discomfort] : no chest discomfort [Lower Ext Edema] : no extremity edema [Orthopnea] : no orthopnea [PND] : no PND [Syncope] : no syncope [Cough] : no cough [Abdominal Pain] : no abdominal pain [Rash] : no rash [Dizziness] : no dizziness [Easy Bleeding] : no tendency for easy bleeding [Easy Bruising] : no tendency for easy bruising

## 2021-11-01 PROBLEM — R60.9 EDEMA: Status: ACTIVE | Noted: 2021-07-19

## 2021-11-01 PROBLEM — I36.1 NONRHEUMATIC TRICUSPID VALVE REGURGITATION: Status: ACTIVE | Noted: 2018-02-05

## 2021-11-01 PROBLEM — I50.9 CHF (CONGESTIVE HEART FAILURE): Status: ACTIVE | Noted: 2018-02-02

## 2021-11-01 NOTE — ASSESSMENT
[FreeTextEntry1] : \par \par Chronic hypoxic respiratory failure. COPD. Severe pulmonary hypertension. Atherosclerosis\par Atrial fibrillation. Prior CVA. Prior PE. HTN. Cor pulmonale. WHO group 1/3. Low CO Elevated PVR. Normal wedge pressure.\par \par she remains at high risk \par requested heart failure evaluation previously seen by [facility] heart failure service there was discussion of lung transplant \par outpatient GI follow-up outpatient pulmonary follow-up \par follow-up with electrophysiolog\par  \par Continue torsemide, aldactone and intermittent metolazone. Monitor daily weights. \par Rate control. Monitor digoxin level. Goal digoxin level.5-1. \par Continue NOAC, Eliquis. Understands risks and benefits of novel anticoagulant. Discussed bleeding precautions. Monitor hemoglobin and renal function.  Anemia eval.\par \par Prior hisotry of HFpEF, HTN, COPD, AFib, obesity, CAD, stroke, GI bleed, chronic renal insufficiency, anemia\par Emphasized importance of healthy lifestyle measure. Low sodium diet. Daily exercise.\par Requested refill on spirolactone.  \par Reports feeling much better since last visit. \par Chronic oxygen therapy. 6-10 Liters !!\par \par Discussed consult with sub-specialist. \par \par Pt advised if any progressive dyspnea at rest, recurrent falls, or worsening/unrelieved LE edema with above change, recommend inpatient management. Pt verbalized understanding. \par Pt remains at high risk of adverse CV events due to severity of PAH. \par Instructed to call if any new symptoms\par Oct 27 2021 12:45PM\par

## 2021-11-05 ENCOUNTER — APPOINTMENT (OUTPATIENT)
Dept: HEART FAILURE | Facility: CLINIC | Age: 66
End: 2021-11-05
Payer: MEDICARE

## 2021-11-05 VITALS
DIASTOLIC BLOOD PRESSURE: 58 MMHG | SYSTOLIC BLOOD PRESSURE: 110 MMHG | WEIGHT: 209 LBS | BODY MASS INDEX: 37.03 KG/M2 | TEMPERATURE: 97.1 F | HEART RATE: 73 BPM | OXYGEN SATURATION: 94 % | HEIGHT: 63 IN

## 2021-11-05 PROCEDURE — 99203 OFFICE O/P NEW LOW 30 MIN: CPT

## 2021-11-10 NOTE — REVIEW OF SYSTEMS
[Feeling Fatigued] : feeling fatigued [SOB] : shortness of breath [Dyspnea on exertion] : dyspnea during exertion [Negative] : Psychiatric

## 2021-11-10 NOTE — HISTORY OF PRESENT ILLNESS
[FreeTextEntry1] : 66 year old female with history of severe pulmonary HTN (Group 1, 2 & 3), HFpEF, Afib, COPD on home O2 (5L NC), CKD, ROLANDA on CPAP, morbid obesity ( s/p bariatric surgery in 2012 with subsequent lap band removal due to GI bleed), PE,  CVA (MCA infarct in 2012) who comes in to establish care with heart failure due to her pulmonary HTN. \par In regards to her pulmonary HTN management, she follows with Dr. Gallardo at [place] who she recently saw in 5/2021. She is on letaris, tadalafil in addition to digoxin and diuretics. She has had prior RHCs which showed low cardiac output however her most recently RHC in 2020 showed normal RA, elevated mPAP with normal cardiac output. She was admitted in June 2021 to the hospital for SOB where she was found to be anemic. She was given blood and and placed on BIPAP. She was also sent home on higher oxygenation \par She endroses that she has SOB with walking across the room but states that this has been her baseline for a while but she notices an over decline over the years\par \par  \par \par  \par

## 2021-11-10 NOTE — ASSESSMENT
[FreeTextEntry1] : 66 year old female with history of severe pulmonary HTN (Group 1, 2 & 3), HFpEF, Afib, COPD on home O2 (5L NC), CKD, ROLANDA on CPAP, morbid obesity ( s/p bariatric surgery in 2012 with subsequent lap band removal due to GI bleed), PE,  CVA (MCA infarct in 2012) who comes in to establish care with heart failure due to her pulmonary HTN who comes in to establish care with heart failure\par \par #Pulmonary HTN/cor pulmonale: Patient has had a diagnosis of pulmonary HTN for years and follows with a pulmonary HTN specialist (Dr. Gallardo). SHe has previously had poor 6 minute walk test but her last RHC revealed normal RA with preserved cardiac output from last year.  She states that recently her O2 requirement has increased however. I spoke with both her primary cardiologist (Dr. Rushing) and her pulmonary HTN specialist regarding her care going forward. Given that she has a component of group 2 pulmonary HTN, further implementation of vasodilator therapy is not feasible as this would increase her wedge and cause her to go into pulmonary edema. Dr. Gallardo thought there could be another medication he could try when he would see her next in 2 weeks. I also brought up that she likely would need a lung transplant eval and that she should be referred to a center since we currently do not a have a lung transplant center at [facility] currently\par On exam she is not volume overloaded but becomes dyspneic with minimal effort\par - c/w torsemide and metolazone\par - c/w digoxin\par - if symptoms continue to progress will get a repeat RHC to assess cardiac output and filling pressures\par \par #Chronic diastolic heart failure \par - As above\par \par #Afib \par - Recent zio patch with rate controlled Afib and NSVT \par - c/w AC\par \par \par \par close followup\par dilt\par

## 2021-11-10 NOTE — CARDIOLOGY SUMMARY
[de-identified] : EKG: rate controlled Afib  [de-identified] : Holter October 2021: rate controlled Afib, rare ventricular couplets and triplets  [de-identified] : Echo: October 2021: Normal LVEF, dilated LA, decreased RV function with EV enlargement, moderate TR, PASP 85, severe pulm HTN  [de-identified] : Cardiac Cath: [facility] from May 2013, normal left ventricular function, right dominant circulation, left main arises normally from the left coronary sinus of Valsalva, bifurcates into LAD and circumflex, left main normal, LAD arises normally from the left main normal, left circumflex arises normally from the left main and terminates in the AV groove normal, RCA arises normally from the right sinus of Valsalva, RCA is normal, \par \par RHC (6/2020): RA 2, RV 60/5, PAP 58/18/33, CO/CI: 6.5/3.3 \par \par  \par \par 6 Minute Walk Test (6/2020): 150m

## 2021-11-10 NOTE — PHYSICAL EXAM
[Normal S1, S2] : normal S1, S2 [No Murmur] : no murmur [No Edema] : no edema [Normal] : moves all extremities, no focal deficits, normal speech [de-identified] : SOB with talking [de-identified] : +JVD [de-identified] : mild wheezing [de-identified] : mildly cyanotic fingers

## 2022-01-01 ENCOUNTER — APPOINTMENT (OUTPATIENT)
Dept: CARDIOLOGY | Facility: CLINIC | Age: 67
End: 2022-01-01
Payer: MEDICARE

## 2022-01-01 ENCOUNTER — NON-APPOINTMENT (OUTPATIENT)
Age: 67
End: 2022-01-01

## 2022-01-01 ENCOUNTER — RX RENEWAL (OUTPATIENT)
Age: 67
End: 2022-01-01

## 2022-01-01 ENCOUNTER — TRANSCRIPTION ENCOUNTER (OUTPATIENT)
Age: 67
End: 2022-01-01

## 2022-01-01 ENCOUNTER — APPOINTMENT (OUTPATIENT)
Dept: PULMONOLOGY | Facility: CLINIC | Age: 67
End: 2022-01-01

## 2022-01-01 ENCOUNTER — APPOINTMENT (OUTPATIENT)
Dept: HEART FAILURE | Facility: CLINIC | Age: 67
End: 2022-01-01

## 2022-01-01 ENCOUNTER — INPATIENT (INPATIENT)
Facility: HOSPITAL | Age: 67
LOS: 14 days | Discharge: HOME | DRG: 264 | End: 2022-06-29
Attending: STUDENT IN AN ORGANIZED HEALTH CARE EDUCATION/TRAINING PROGRAM | Admitting: HOSPITALIST
Payer: COMMERCIAL

## 2022-01-01 ENCOUNTER — RX CHANGE (OUTPATIENT)
Age: 67
End: 2022-01-01

## 2022-01-01 ENCOUNTER — APPOINTMENT (OUTPATIENT)
Dept: CARDIOLOGY | Facility: CLINIC | Age: 67
End: 2022-01-01

## 2022-01-01 ENCOUNTER — INPATIENT (INPATIENT)
Facility: HOSPITAL | Age: 67
LOS: 31 days | Discharge: HOME HEALTH | DRG: 314 | End: 2022-09-12
Attending: STUDENT IN AN ORGANIZED HEALTH CARE EDUCATION/TRAINING PROGRAM | Admitting: INTERNAL MEDICINE
Payer: COMMERCIAL

## 2022-01-01 ENCOUNTER — RESULT CHARGE (OUTPATIENT)
Age: 67
End: 2022-01-01

## 2022-01-01 VITALS
HEIGHT: 63 IN | DIASTOLIC BLOOD PRESSURE: 85 MMHG | RESPIRATION RATE: 18 BRPM | WEIGHT: 205.03 LBS | HEART RATE: 80 BPM | TEMPERATURE: 98 F | OXYGEN SATURATION: 86 % | SYSTOLIC BLOOD PRESSURE: 116 MMHG

## 2022-01-01 VITALS
OXYGEN SATURATION: 99 % | RESPIRATION RATE: 19 BRPM | TEMPERATURE: 98 F | HEART RATE: 72 BPM | SYSTOLIC BLOOD PRESSURE: 119 MMHG | DIASTOLIC BLOOD PRESSURE: 69 MMHG

## 2022-01-01 VITALS
BODY MASS INDEX: 35.26 KG/M2 | TEMPERATURE: 97.8 F | HEART RATE: 55 BPM | WEIGHT: 199 LBS | SYSTOLIC BLOOD PRESSURE: 98 MMHG | DIASTOLIC BLOOD PRESSURE: 62 MMHG | HEIGHT: 63 IN | OXYGEN SATURATION: 91 %

## 2022-01-01 VITALS
RESPIRATION RATE: 18 BRPM | TEMPERATURE: 98 F | SYSTOLIC BLOOD PRESSURE: 104 MMHG | DIASTOLIC BLOOD PRESSURE: 62 MMHG | OXYGEN SATURATION: 94 % | HEART RATE: 58 BPM

## 2022-01-01 VITALS
SYSTOLIC BLOOD PRESSURE: 120 MMHG | DIASTOLIC BLOOD PRESSURE: 69 MMHG | TEMPERATURE: 98 F | RESPIRATION RATE: 18 BRPM | OXYGEN SATURATION: 100 % | HEART RATE: 78 BPM

## 2022-01-01 VITALS
WEIGHT: 215 LBS | DIASTOLIC BLOOD PRESSURE: 56 MMHG | HEIGHT: 63 IN | OXYGEN SATURATION: 88 % | TEMPERATURE: 97.5 F | BODY MASS INDEX: 38.09 KG/M2 | HEART RATE: 73 BPM | SYSTOLIC BLOOD PRESSURE: 108 MMHG

## 2022-01-01 DIAGNOSIS — E87.5 HYPERKALEMIA: ICD-10-CM

## 2022-01-01 DIAGNOSIS — I48.20 CHRONIC ATRIAL FIBRILLATION, UNSPECIFIED: Chronic | ICD-10-CM

## 2022-01-01 DIAGNOSIS — I27.23 PULMONARY HYPERTENSION DUE TO LUNG DISEASES AND HYPOXIA: ICD-10-CM

## 2022-01-01 DIAGNOSIS — G47.33 OBSTRUCTIVE SLEEP APNEA (ADULT) (PEDIATRIC): ICD-10-CM

## 2022-01-01 DIAGNOSIS — I27.20 PULMONARY HYPERTENSION, UNSPECIFIED: ICD-10-CM

## 2022-01-01 DIAGNOSIS — J44.9 CHRONIC OBSTRUCTIVE PULMONARY DISEASE, UNSPECIFIED: ICD-10-CM

## 2022-01-01 DIAGNOSIS — I48.20 CHRONIC ATRIAL FIBRILLATION, UNSP: ICD-10-CM

## 2022-01-01 DIAGNOSIS — I50.32 CHRONIC DIASTOLIC (CONGESTIVE) HEART FAILURE: ICD-10-CM

## 2022-01-01 DIAGNOSIS — N18.9 CHRONIC KIDNEY DISEASE, UNSPECIFIED: ICD-10-CM

## 2022-01-01 DIAGNOSIS — Z29.9 ENCOUNTER FOR PROPHYLACTIC MEASURES, UNSPECIFIED: ICD-10-CM

## 2022-01-01 DIAGNOSIS — I50.33 ACUTE ON CHRONIC DIASTOLIC (CONGESTIVE) HEART FAILURE: ICD-10-CM

## 2022-01-01 DIAGNOSIS — Z51.5 ENCOUNTER FOR PALLIATIVE CARE: ICD-10-CM

## 2022-01-01 DIAGNOSIS — R29.898 OTHER SYMPTOMS AND SIGNS INVOLVING THE MUSCULOSKELETAL SYSTEM: Chronic | ICD-10-CM

## 2022-01-01 DIAGNOSIS — R06.09 OTHER FORMS OF DYSPNEA: ICD-10-CM

## 2022-01-01 DIAGNOSIS — Z98.890 OTHER SPECIFIED POSTPROCEDURAL STATES: Chronic | ICD-10-CM

## 2022-01-01 DIAGNOSIS — Z01.812 ENCOUNTER FOR PREPROCEDURAL LABORATORY EXAMINATION: ICD-10-CM

## 2022-01-01 DIAGNOSIS — K59.00 CONSTIPATION, UNSPECIFIED: ICD-10-CM

## 2022-01-01 DIAGNOSIS — Z71.89 OTHER SPECIFIED COUNSELING: ICD-10-CM

## 2022-01-01 DIAGNOSIS — D64.9 ANEMIA, UNSPECIFIED: ICD-10-CM

## 2022-01-01 DIAGNOSIS — I48.20 CHRONIC ATRIAL FIBRILLATION, UNSPECIFIED: ICD-10-CM

## 2022-01-01 DIAGNOSIS — R04.0 EPISTAXIS: ICD-10-CM

## 2022-01-01 DIAGNOSIS — J44.1 CHRONIC OBSTRUCTIVE PULMONARY DISEASE WITH (ACUTE) EXACERBATION: ICD-10-CM

## 2022-01-01 DIAGNOSIS — Z51.81 ENCOUNTER FOR THERAPEUTIC DRUG LEVEL MONITORING: ICD-10-CM

## 2022-01-01 DIAGNOSIS — J96.01 ACUTE RESPIRATORY FAILURE WITH HYPOXIA: ICD-10-CM

## 2022-01-01 DIAGNOSIS — Z20.822 ENCOUNTER FOR PREPROCEDURAL LABORATORY EXAMINATION: ICD-10-CM

## 2022-01-01 DIAGNOSIS — K21.9 GASTRO-ESOPHAGEAL REFLUX DISEASE WITHOUT ESOPHAGITIS: ICD-10-CM

## 2022-01-01 DIAGNOSIS — Z86.39 PERSONAL HISTORY OF OTHER ENDOCRINE, NUTRITIONAL AND METABOLIC DISEASE: Chronic | ICD-10-CM

## 2022-01-01 DIAGNOSIS — J96.10 CHRONIC RESPIRATORY FAILURE, UNSPECIFIED WHETHER WITH HYPOXIA OR HYPERCAPNIA: ICD-10-CM

## 2022-01-01 DIAGNOSIS — F41.9 ANXIETY DISORDER, UNSPECIFIED: ICD-10-CM

## 2022-01-01 DIAGNOSIS — N17.9 ACUTE KIDNEY FAILURE, UNSPECIFIED: ICD-10-CM

## 2022-01-01 DIAGNOSIS — I27.81 COR PULMONALE (CHRONIC): ICD-10-CM

## 2022-01-01 DIAGNOSIS — E87.1 HYPO-OSMOLALITY AND HYPONATREMIA: ICD-10-CM

## 2022-01-01 DIAGNOSIS — F32.9 MAJOR DEPRESSIVE DISORDER, SINGLE EPISODE, UNSPECIFIED: Chronic | ICD-10-CM

## 2022-01-01 DIAGNOSIS — J96.21 ACUTE AND CHRONIC RESPIRATORY FAILURE WITH HYPOXIA: ICD-10-CM

## 2022-01-01 DIAGNOSIS — J18.9 PNEUMONIA, UNSPECIFIED ORGANISM: ICD-10-CM

## 2022-01-01 LAB
A1C WITH ESTIMATED AVERAGE GLUCOSE RESULT: 5.4 % — SIGNIFICANT CHANGE UP (ref 4–5.6)
ALBUMIN SERPL ELPH-MCNC: 3.4 G/DL — SIGNIFICANT CHANGE UP (ref 3.3–5)
ALBUMIN SERPL ELPH-MCNC: 3.4 G/DL — SIGNIFICANT CHANGE UP (ref 3.3–5)
ALBUMIN SERPL ELPH-MCNC: 3.5 G/DL — SIGNIFICANT CHANGE UP (ref 3.3–5)
ALBUMIN SERPL ELPH-MCNC: 3.6 G/DL — SIGNIFICANT CHANGE UP (ref 3.3–5)
ALBUMIN SERPL ELPH-MCNC: 3.7 G/DL — SIGNIFICANT CHANGE UP (ref 3.3–5)
ALBUMIN SERPL ELPH-MCNC: 3.8 G/DL — SIGNIFICANT CHANGE UP (ref 3.3–5)
ALBUMIN SERPL ELPH-MCNC: 3.9 G/DL — SIGNIFICANT CHANGE UP (ref 3.3–5)
ALBUMIN SERPL ELPH-MCNC: 4 G/DL — SIGNIFICANT CHANGE UP (ref 3.3–5)
ALBUMIN SERPL ELPH-MCNC: 4.1 G/DL — SIGNIFICANT CHANGE UP (ref 3.3–5)
ALBUMIN SERPL ELPH-MCNC: 4.2 G/DL — SIGNIFICANT CHANGE UP (ref 3.3–5)
ALBUMIN SERPL ELPH-MCNC: 4.3 G/DL — SIGNIFICANT CHANGE UP (ref 3.3–5)
ALBUMIN SERPL ELPH-MCNC: 4.3 G/DL — SIGNIFICANT CHANGE UP (ref 3.3–5)
ALBUMIN SERPL ELPH-MCNC: 4.4 G/DL — SIGNIFICANT CHANGE UP (ref 3.3–5)
ALBUMIN SERPL ELPH-MCNC: 4.5 G/DL — SIGNIFICANT CHANGE UP (ref 3.3–5)
ALP SERPL-CCNC: 101 U/L — SIGNIFICANT CHANGE UP (ref 40–120)
ALP SERPL-CCNC: 104 U/L — SIGNIFICANT CHANGE UP (ref 40–120)
ALP SERPL-CCNC: 105 U/L — SIGNIFICANT CHANGE UP (ref 40–120)
ALP SERPL-CCNC: 106 U/L — SIGNIFICANT CHANGE UP (ref 40–120)
ALP SERPL-CCNC: 108 U/L — SIGNIFICANT CHANGE UP (ref 40–120)
ALP SERPL-CCNC: 112 U/L — SIGNIFICANT CHANGE UP (ref 40–120)
ALP SERPL-CCNC: 113 U/L — SIGNIFICANT CHANGE UP (ref 40–120)
ALP SERPL-CCNC: 114 U/L — SIGNIFICANT CHANGE UP (ref 40–120)
ALP SERPL-CCNC: 120 U/L — SIGNIFICANT CHANGE UP (ref 40–120)
ALP SERPL-CCNC: 123 U/L — HIGH (ref 40–120)
ALP SERPL-CCNC: 124 U/L — HIGH (ref 40–120)
ALP SERPL-CCNC: 125 U/L — HIGH (ref 40–120)
ALP SERPL-CCNC: 126 U/L — HIGH (ref 40–120)
ALP SERPL-CCNC: 127 U/L — HIGH (ref 40–120)
ALP SERPL-CCNC: 130 U/L — HIGH (ref 40–120)
ALP SERPL-CCNC: 131 U/L — HIGH (ref 40–120)
ALP SERPL-CCNC: 134 U/L — HIGH (ref 40–120)
ALP SERPL-CCNC: 136 U/L — HIGH (ref 40–120)
ALP SERPL-CCNC: 137 U/L — HIGH (ref 40–120)
ALP SERPL-CCNC: 137 U/L — HIGH (ref 40–120)
ALP SERPL-CCNC: 138 U/L — HIGH (ref 40–120)
ALP SERPL-CCNC: 138 U/L — HIGH (ref 40–120)
ALP SERPL-CCNC: 140 U/L — HIGH (ref 40–120)
ALP SERPL-CCNC: 141 U/L — HIGH (ref 40–120)
ALP SERPL-CCNC: 159 U/L — HIGH (ref 40–120)
ALP SERPL-CCNC: 160 U/L — HIGH (ref 40–120)
ALP SERPL-CCNC: 161 U/L — HIGH (ref 40–120)
ALP SERPL-CCNC: 162 U/L — HIGH (ref 40–120)
ALP SERPL-CCNC: 164 U/L — HIGH (ref 40–120)
ALP SERPL-CCNC: 99 U/L — SIGNIFICANT CHANGE UP (ref 40–120)
ALT FLD-CCNC: 10 U/L — SIGNIFICANT CHANGE UP (ref 10–45)
ALT FLD-CCNC: 11 U/L — SIGNIFICANT CHANGE UP (ref 10–45)
ALT FLD-CCNC: 12 U/L — SIGNIFICANT CHANGE UP (ref 10–45)
ALT FLD-CCNC: 14 U/L — SIGNIFICANT CHANGE UP (ref 10–45)
ALT FLD-CCNC: 15 U/L — SIGNIFICANT CHANGE UP (ref 10–45)
ALT FLD-CCNC: 16 U/L — SIGNIFICANT CHANGE UP (ref 10–45)
ALT FLD-CCNC: 17 U/L — SIGNIFICANT CHANGE UP (ref 10–45)
ALT FLD-CCNC: 17 U/L — SIGNIFICANT CHANGE UP (ref 10–45)
ALT FLD-CCNC: 18 U/L — SIGNIFICANT CHANGE UP (ref 10–45)
ALT FLD-CCNC: 20 U/L — SIGNIFICANT CHANGE UP (ref 10–45)
ALT FLD-CCNC: 7 U/L — LOW (ref 10–45)
ALT FLD-CCNC: 7 U/L — LOW (ref 10–45)
ALT FLD-CCNC: 8 U/L — LOW (ref 10–45)
ALT FLD-CCNC: 8 U/L — LOW (ref 10–45)
ALT FLD-CCNC: 9 U/L — LOW (ref 10–45)
ANION GAP SERPL CALC-SCNC: 10 MMOL/L — SIGNIFICANT CHANGE UP (ref 5–17)
ANION GAP SERPL CALC-SCNC: 11 MMOL/L — SIGNIFICANT CHANGE UP (ref 5–17)
ANION GAP SERPL CALC-SCNC: 12 MMOL/L — SIGNIFICANT CHANGE UP (ref 5–17)
ANION GAP SERPL CALC-SCNC: 13 MMOL/L — SIGNIFICANT CHANGE UP (ref 5–17)
ANION GAP SERPL CALC-SCNC: 14 MMOL/L — SIGNIFICANT CHANGE UP (ref 5–17)
ANION GAP SERPL CALC-SCNC: 15 MMOL/L — SIGNIFICANT CHANGE UP (ref 5–17)
ANION GAP SERPL CALC-SCNC: 16 MMOL/L — SIGNIFICANT CHANGE UP (ref 5–17)
ANION GAP SERPL CALC-SCNC: 9 MMOL/L — SIGNIFICANT CHANGE UP (ref 5–17)
APPEARANCE UR: ABNORMAL
APPEARANCE UR: CLEAR — SIGNIFICANT CHANGE UP
APTT BLD: 104.3 SEC — HIGH (ref 27.5–35.5)
APTT BLD: 128.7 SEC — CRITICAL HIGH (ref 27.5–35.5)
APTT BLD: 159.6 SEC — CRITICAL HIGH (ref 27.5–35.5)
APTT BLD: 30.4 SEC — SIGNIFICANT CHANGE UP (ref 27.5–35.5)
APTT BLD: 30.6 SEC — SIGNIFICANT CHANGE UP (ref 27.5–35.5)
APTT BLD: 31.3 SEC — SIGNIFICANT CHANGE UP (ref 27.5–35.5)
APTT BLD: 57.5 SEC — HIGH (ref 27.5–35.5)
APTT BLD: 82.1 SEC — HIGH (ref 27.5–35.5)
APTT BLD: 90.9 SEC — HIGH (ref 27.5–35.5)
AST SERPL-CCNC: 12 U/L — SIGNIFICANT CHANGE UP (ref 10–40)
AST SERPL-CCNC: 14 U/L — SIGNIFICANT CHANGE UP (ref 10–40)
AST SERPL-CCNC: 14 U/L — SIGNIFICANT CHANGE UP (ref 10–40)
AST SERPL-CCNC: 15 U/L — SIGNIFICANT CHANGE UP (ref 10–40)
AST SERPL-CCNC: 15 U/L — SIGNIFICANT CHANGE UP (ref 10–40)
AST SERPL-CCNC: 17 U/L — SIGNIFICANT CHANGE UP (ref 10–40)
AST SERPL-CCNC: 18 U/L — SIGNIFICANT CHANGE UP (ref 10–40)
AST SERPL-CCNC: 19 U/L — SIGNIFICANT CHANGE UP (ref 10–40)
AST SERPL-CCNC: 19 U/L — SIGNIFICANT CHANGE UP (ref 10–40)
AST SERPL-CCNC: 20 U/L — SIGNIFICANT CHANGE UP (ref 10–40)
AST SERPL-CCNC: 20 U/L — SIGNIFICANT CHANGE UP (ref 10–40)
AST SERPL-CCNC: 21 U/L — SIGNIFICANT CHANGE UP (ref 10–40)
AST SERPL-CCNC: 22 U/L — SIGNIFICANT CHANGE UP (ref 10–40)
AST SERPL-CCNC: 23 U/L — SIGNIFICANT CHANGE UP (ref 10–40)
AST SERPL-CCNC: 26 U/L — SIGNIFICANT CHANGE UP (ref 10–40)
AST SERPL-CCNC: 26 U/L — SIGNIFICANT CHANGE UP (ref 10–40)
AST SERPL-CCNC: 27 U/L — SIGNIFICANT CHANGE UP (ref 10–40)
AST SERPL-CCNC: 27 U/L — SIGNIFICANT CHANGE UP (ref 10–40)
AST SERPL-CCNC: 28 U/L — SIGNIFICANT CHANGE UP (ref 10–40)
AST SERPL-CCNC: 29 U/L — SIGNIFICANT CHANGE UP (ref 10–40)
AST SERPL-CCNC: 33 U/L — SIGNIFICANT CHANGE UP (ref 10–40)
BACTERIA # UR AUTO: ABNORMAL
BASE EXCESS BLDA CALC-SCNC: 5 MMOL/L — HIGH (ref -2–3)
BASE EXCESS BLDV CALC-SCNC: 6.5 MMOL/L — HIGH (ref -2–2)
BASOPHILS # BLD AUTO: 0.02 K/UL — SIGNIFICANT CHANGE UP (ref 0–0.2)
BASOPHILS # BLD AUTO: 0.03 K/UL — SIGNIFICANT CHANGE UP (ref 0–0.2)
BASOPHILS # BLD AUTO: 0.04 K/UL — SIGNIFICANT CHANGE UP (ref 0–0.2)
BASOPHILS # BLD AUTO: 0.05 K/UL — SIGNIFICANT CHANGE UP (ref 0–0.2)
BASOPHILS NFR BLD AUTO: 0.4 % — SIGNIFICANT CHANGE UP (ref 0–2)
BASOPHILS NFR BLD AUTO: 0.4 % — SIGNIFICANT CHANGE UP (ref 0–2)
BASOPHILS NFR BLD AUTO: 0.5 % — SIGNIFICANT CHANGE UP (ref 0–2)
BASOPHILS NFR BLD AUTO: 0.5 % — SIGNIFICANT CHANGE UP (ref 0–2)
BASOPHILS NFR BLD AUTO: 0.6 % — SIGNIFICANT CHANGE UP (ref 0–2)
BASOPHILS NFR BLD AUTO: 0.6 % — SIGNIFICANT CHANGE UP (ref 0–2)
BASOPHILS NFR BLD AUTO: 0.7 % — SIGNIFICANT CHANGE UP (ref 0–2)
BASOPHILS NFR BLD AUTO: 0.8 % — SIGNIFICANT CHANGE UP (ref 0–2)
BASOPHILS NFR BLD AUTO: 0.9 % — SIGNIFICANT CHANGE UP (ref 0–2)
BASOPHILS NFR BLD AUTO: 1 % — SIGNIFICANT CHANGE UP (ref 0–2)
BILIRUB DIRECT SERPL-MCNC: 0.2 MG/DL — SIGNIFICANT CHANGE UP (ref 0–0.3)
BILIRUB INDIRECT FLD-MCNC: 0.2 MG/DL — SIGNIFICANT CHANGE UP (ref 0.2–1)
BILIRUB SERPL-MCNC: 0.2 MG/DL — SIGNIFICANT CHANGE UP (ref 0.2–1.2)
BILIRUB SERPL-MCNC: 0.2 MG/DL — SIGNIFICANT CHANGE UP (ref 0.2–1.2)
BILIRUB SERPL-MCNC: 0.3 MG/DL — SIGNIFICANT CHANGE UP (ref 0.2–1.2)
BILIRUB SERPL-MCNC: 0.4 MG/DL — SIGNIFICANT CHANGE UP (ref 0.2–1.2)
BILIRUB SERPL-MCNC: 0.5 MG/DL — SIGNIFICANT CHANGE UP (ref 0.2–1.2)
BILIRUB UR-MCNC: NEGATIVE — SIGNIFICANT CHANGE UP
BILIRUB UR-MCNC: NEGATIVE — SIGNIFICANT CHANGE UP
BLD GP AB SCN SERPL QL: NEGATIVE — SIGNIFICANT CHANGE UP
BUN SERPL-MCNC: 55 MG/DL — HIGH (ref 7–23)
BUN SERPL-MCNC: 58 MG/DL — HIGH (ref 7–23)
BUN SERPL-MCNC: 58 MG/DL — HIGH (ref 7–23)
BUN SERPL-MCNC: 62 MG/DL — HIGH (ref 7–23)
BUN SERPL-MCNC: 63 MG/DL — HIGH (ref 7–23)
BUN SERPL-MCNC: 64 MG/DL — HIGH (ref 7–23)
BUN SERPL-MCNC: 69 MG/DL — HIGH (ref 7–23)
BUN SERPL-MCNC: 70 MG/DL — HIGH (ref 7–23)
BUN SERPL-MCNC: 71 MG/DL — HIGH (ref 7–23)
BUN SERPL-MCNC: 71 MG/DL — HIGH (ref 7–23)
BUN SERPL-MCNC: 72 MG/DL — HIGH (ref 7–23)
BUN SERPL-MCNC: 72 MG/DL — HIGH (ref 7–23)
BUN SERPL-MCNC: 73 MG/DL — HIGH (ref 7–23)
BUN SERPL-MCNC: 73 MG/DL — HIGH (ref 7–23)
BUN SERPL-MCNC: 74 MG/DL — HIGH (ref 7–23)
BUN SERPL-MCNC: 75 MG/DL — HIGH (ref 7–23)
BUN SERPL-MCNC: 76 MG/DL — HIGH (ref 7–23)
BUN SERPL-MCNC: 76 MG/DL — HIGH (ref 7–23)
BUN SERPL-MCNC: 77 MG/DL — HIGH (ref 7–23)
BUN SERPL-MCNC: 77 MG/DL — HIGH (ref 7–23)
BUN SERPL-MCNC: 78 MG/DL — HIGH (ref 7–23)
BUN SERPL-MCNC: 79 MG/DL — HIGH (ref 7–23)
BUN SERPL-MCNC: 80 MG/DL — HIGH (ref 7–23)
BUN SERPL-MCNC: 84 MG/DL — HIGH (ref 7–23)
BUN SERPL-MCNC: 85 MG/DL — HIGH (ref 7–23)
BUN SERPL-MCNC: 86 MG/DL — HIGH (ref 7–23)
BUN SERPL-MCNC: 86 MG/DL — HIGH (ref 7–23)
BUN SERPL-MCNC: 87 MG/DL — HIGH (ref 7–23)
BUN SERPL-MCNC: 88 MG/DL — HIGH (ref 7–23)
BUN SERPL-MCNC: 89 MG/DL — HIGH (ref 7–23)
BUN SERPL-MCNC: 89 MG/DL — HIGH (ref 7–23)
BUN SERPL-MCNC: 90 MG/DL — HIGH (ref 7–23)
BUN SERPL-MCNC: 90 MG/DL — HIGH (ref 7–23)
BUN SERPL-MCNC: 91 MG/DL — HIGH (ref 7–23)
BUN SERPL-MCNC: 92 MG/DL — HIGH (ref 7–23)
BUN SERPL-MCNC: 93 MG/DL — HIGH (ref 7–23)
BUN SERPL-MCNC: 94 MG/DL — HIGH (ref 7–23)
BUN SERPL-MCNC: 94 MG/DL — HIGH (ref 7–23)
BUN SERPL-MCNC: 95 MG/DL — HIGH (ref 7–23)
BUN SERPL-MCNC: 95 MG/DL — HIGH (ref 7–23)
BUN SERPL-MCNC: 96 MG/DL — HIGH (ref 7–23)
BUN SERPL-MCNC: 96 MG/DL — HIGH (ref 7–23)
BUN SERPL-MCNC: 97 MG/DL — HIGH (ref 7–23)
BUN SERPL-MCNC: 97 MG/DL — HIGH (ref 7–23)
CA-I SERPL-SCNC: 1.25 MMOL/L — SIGNIFICANT CHANGE UP (ref 1.15–1.33)
CALCIUM SERPL-MCNC: 10 MG/DL — SIGNIFICANT CHANGE UP (ref 8.4–10.5)
CALCIUM SERPL-MCNC: 10.1 MG/DL — SIGNIFICANT CHANGE UP (ref 8.4–10.5)
CALCIUM SERPL-MCNC: 10.2 MG/DL — SIGNIFICANT CHANGE UP (ref 8.4–10.5)
CALCIUM SERPL-MCNC: 9 MG/DL — SIGNIFICANT CHANGE UP (ref 8.4–10.5)
CALCIUM SERPL-MCNC: 9 MG/DL — SIGNIFICANT CHANGE UP (ref 8.4–10.5)
CALCIUM SERPL-MCNC: 9.2 MG/DL — SIGNIFICANT CHANGE UP (ref 8.4–10.5)
CALCIUM SERPL-MCNC: 9.3 MG/DL — SIGNIFICANT CHANGE UP (ref 8.4–10.5)
CALCIUM SERPL-MCNC: 9.4 MG/DL — SIGNIFICANT CHANGE UP (ref 8.4–10.5)
CALCIUM SERPL-MCNC: 9.5 MG/DL — SIGNIFICANT CHANGE UP (ref 8.4–10.5)
CALCIUM SERPL-MCNC: 9.6 MG/DL — SIGNIFICANT CHANGE UP (ref 8.4–10.5)
CALCIUM SERPL-MCNC: 9.7 MG/DL — SIGNIFICANT CHANGE UP (ref 8.4–10.5)
CALCIUM SERPL-MCNC: 9.8 MG/DL — SIGNIFICANT CHANGE UP (ref 8.4–10.5)
CALCIUM SERPL-MCNC: 9.9 MG/DL — SIGNIFICANT CHANGE UP (ref 8.4–10.5)
CHLORIDE BLDV-SCNC: 96 MMOL/L — SIGNIFICANT CHANGE UP (ref 96–108)
CHLORIDE SERPL-SCNC: 81 MMOL/L — LOW (ref 96–108)
CHLORIDE SERPL-SCNC: 83 MMOL/L — LOW (ref 96–108)
CHLORIDE SERPL-SCNC: 83 MMOL/L — LOW (ref 96–108)
CHLORIDE SERPL-SCNC: 84 MMOL/L — LOW (ref 96–108)
CHLORIDE SERPL-SCNC: 85 MMOL/L — LOW (ref 96–108)
CHLORIDE SERPL-SCNC: 85 MMOL/L — LOW (ref 96–108)
CHLORIDE SERPL-SCNC: 86 MMOL/L — LOW (ref 96–108)
CHLORIDE SERPL-SCNC: 87 MMOL/L — LOW (ref 96–108)
CHLORIDE SERPL-SCNC: 88 MMOL/L — LOW (ref 96–108)
CHLORIDE SERPL-SCNC: 89 MMOL/L — LOW (ref 96–108)
CHLORIDE SERPL-SCNC: 90 MMOL/L — LOW (ref 96–108)
CHLORIDE SERPL-SCNC: 91 MMOL/L — LOW (ref 96–108)
CHLORIDE SERPL-SCNC: 91 MMOL/L — LOW (ref 96–108)
CHLORIDE SERPL-SCNC: 92 MMOL/L — LOW (ref 96–108)
CHLORIDE SERPL-SCNC: 93 MMOL/L — LOW (ref 96–108)
CHLORIDE SERPL-SCNC: 94 MMOL/L — LOW (ref 96–108)
CHLORIDE SERPL-SCNC: 95 MMOL/L — LOW (ref 96–108)
CHLORIDE SERPL-SCNC: 95 MMOL/L — LOW (ref 96–108)
CHLORIDE UR-SCNC: 50 MMOL/L — SIGNIFICANT CHANGE UP
CHOLEST SERPL-MCNC: 103 MG/DL — SIGNIFICANT CHANGE UP
CO2 BLDA-SCNC: 31 MMOL/L — HIGH (ref 19–24)
CO2 BLDV-SCNC: 34 MMOL/L — HIGH (ref 22–26)
CO2 SERPL-SCNC: 24 MMOL/L — SIGNIFICANT CHANGE UP (ref 22–31)
CO2 SERPL-SCNC: 26 MMOL/L — SIGNIFICANT CHANGE UP (ref 22–31)
CO2 SERPL-SCNC: 26 MMOL/L — SIGNIFICANT CHANGE UP (ref 22–31)
CO2 SERPL-SCNC: 27 MMOL/L — SIGNIFICANT CHANGE UP (ref 22–31)
CO2 SERPL-SCNC: 28 MMOL/L — SIGNIFICANT CHANGE UP (ref 22–31)
CO2 SERPL-SCNC: 29 MMOL/L — SIGNIFICANT CHANGE UP (ref 22–31)
CO2 SERPL-SCNC: 30 MMOL/L — SIGNIFICANT CHANGE UP (ref 22–31)
CO2 SERPL-SCNC: 31 MMOL/L — SIGNIFICANT CHANGE UP (ref 22–31)
CO2 SERPL-SCNC: 32 MMOL/L — HIGH (ref 22–31)
CO2 SERPL-SCNC: 33 MMOL/L — HIGH (ref 22–31)
CO2 SERPL-SCNC: 34 MMOL/L — HIGH (ref 22–31)
CO2 SERPL-SCNC: 35 MMOL/L — HIGH (ref 22–31)
CO2 SERPL-SCNC: 36 MMOL/L — HIGH (ref 22–31)
CO2 SERPL-SCNC: 36 MMOL/L — HIGH (ref 22–31)
COLOR SPEC: SIGNIFICANT CHANGE UP
COLOR SPEC: SIGNIFICANT CHANGE UP
CREAT ?TM UR-MCNC: 30 MG/DL — SIGNIFICANT CHANGE UP
CREAT ?TM UR-MCNC: 33 MG/DL — SIGNIFICANT CHANGE UP
CREAT ?TM UR-MCNC: 33 MG/DL — SIGNIFICANT CHANGE UP
CREAT ?TM UR-MCNC: 34 MG/DL — SIGNIFICANT CHANGE UP
CREAT SERPL-MCNC: 1.29 MG/DL — SIGNIFICANT CHANGE UP (ref 0.5–1.3)
CREAT SERPL-MCNC: 1.41 MG/DL — HIGH (ref 0.5–1.3)
CREAT SERPL-MCNC: 1.47 MG/DL — HIGH (ref 0.5–1.3)
CREAT SERPL-MCNC: 1.47 MG/DL — HIGH (ref 0.5–1.3)
CREAT SERPL-MCNC: 1.5 MG/DL — HIGH (ref 0.5–1.3)
CREAT SERPL-MCNC: 1.5 MG/DL — HIGH (ref 0.5–1.3)
CREAT SERPL-MCNC: 1.53 MG/DL — HIGH (ref 0.5–1.3)
CREAT SERPL-MCNC: 1.54 MG/DL — HIGH (ref 0.5–1.3)
CREAT SERPL-MCNC: 1.55 MG/DL — HIGH (ref 0.5–1.3)
CREAT SERPL-MCNC: 1.56 MG/DL — HIGH (ref 0.5–1.3)
CREAT SERPL-MCNC: 1.61 MG/DL — HIGH (ref 0.5–1.3)
CREAT SERPL-MCNC: 1.62 MG/DL — HIGH (ref 0.5–1.3)
CREAT SERPL-MCNC: 1.63 MG/DL — HIGH (ref 0.5–1.3)
CREAT SERPL-MCNC: 1.65 MG/DL — HIGH (ref 0.5–1.3)
CREAT SERPL-MCNC: 1.67 MG/DL — HIGH (ref 0.5–1.3)
CREAT SERPL-MCNC: 1.68 MG/DL — HIGH (ref 0.5–1.3)
CREAT SERPL-MCNC: 1.68 MG/DL — HIGH (ref 0.5–1.3)
CREAT SERPL-MCNC: 1.7 MG/DL — HIGH (ref 0.5–1.3)
CREAT SERPL-MCNC: 1.77 MG/DL — HIGH (ref 0.5–1.3)
CREAT SERPL-MCNC: 1.78 MG/DL — HIGH (ref 0.5–1.3)
CREAT SERPL-MCNC: 1.8 MG/DL — HIGH (ref 0.5–1.3)
CREAT SERPL-MCNC: 1.83 MG/DL — HIGH (ref 0.5–1.3)
CREAT SERPL-MCNC: 1.84 MG/DL — HIGH (ref 0.5–1.3)
CREAT SERPL-MCNC: 1.89 MG/DL — HIGH (ref 0.5–1.3)
CREAT SERPL-MCNC: 1.89 MG/DL — HIGH (ref 0.5–1.3)
CREAT SERPL-MCNC: 1.9 MG/DL — HIGH (ref 0.5–1.3)
CREAT SERPL-MCNC: 1.92 MG/DL — HIGH (ref 0.5–1.3)
CREAT SERPL-MCNC: 1.92 MG/DL — HIGH (ref 0.5–1.3)
CREAT SERPL-MCNC: 1.94 MG/DL — HIGH (ref 0.5–1.3)
CREAT SERPL-MCNC: 1.94 MG/DL — HIGH (ref 0.5–1.3)
CREAT SERPL-MCNC: 1.96 MG/DL — HIGH (ref 0.5–1.3)
CREAT SERPL-MCNC: 1.97 MG/DL — HIGH (ref 0.5–1.3)
CREAT SERPL-MCNC: 1.99 MG/DL — HIGH (ref 0.5–1.3)
CREAT SERPL-MCNC: 2.02 MG/DL — HIGH (ref 0.5–1.3)
CREAT SERPL-MCNC: 2.02 MG/DL — HIGH (ref 0.5–1.3)
CREAT SERPL-MCNC: 2.05 MG/DL — HIGH (ref 0.5–1.3)
CREAT SERPL-MCNC: 2.06 MG/DL — HIGH (ref 0.5–1.3)
CREAT SERPL-MCNC: 2.06 MG/DL — HIGH (ref 0.5–1.3)
CREAT SERPL-MCNC: 2.08 MG/DL — HIGH (ref 0.5–1.3)
CREAT SERPL-MCNC: 2.09 MG/DL — HIGH (ref 0.5–1.3)
CREAT SERPL-MCNC: 2.1 MG/DL — HIGH (ref 0.5–1.3)
CREAT SERPL-MCNC: 2.12 MG/DL — HIGH (ref 0.5–1.3)
CREAT SERPL-MCNC: 2.15 MG/DL — HIGH (ref 0.5–1.3)
CREAT SERPL-MCNC: 2.16 MG/DL — HIGH (ref 0.5–1.3)
CREAT SERPL-MCNC: 2.18 MG/DL — HIGH (ref 0.5–1.3)
CREAT SERPL-MCNC: 2.19 MG/DL — HIGH (ref 0.5–1.3)
CREAT SERPL-MCNC: 2.2 MG/DL — HIGH (ref 0.5–1.3)
CREAT SERPL-MCNC: 2.21 MG/DL — HIGH (ref 0.5–1.3)
CREAT SERPL-MCNC: 2.22 MG/DL — HIGH (ref 0.5–1.3)
CREAT SERPL-MCNC: 2.25 MG/DL — HIGH (ref 0.5–1.3)
CREAT SERPL-MCNC: 2.28 MG/DL — HIGH (ref 0.5–1.3)
CREAT SERPL-MCNC: 2.29 MG/DL — HIGH (ref 0.5–1.3)
CREAT SERPL-MCNC: 2.29 MG/DL — HIGH (ref 0.5–1.3)
CREAT SERPL-MCNC: 2.32 MG/DL — HIGH (ref 0.5–1.3)
CREAT SERPL-MCNC: 2.34 MG/DL — HIGH (ref 0.5–1.3)
CREAT SERPL-MCNC: 2.36 MG/DL — HIGH (ref 0.5–1.3)
CREAT SERPL-MCNC: 2.39 MG/DL — HIGH (ref 0.5–1.3)
CYSTATIN C SERPL-MCNC: 2.64 MG/L — HIGH (ref 0.66–1.26)
DIFF PNL FLD: ABNORMAL
DIFF PNL FLD: NEGATIVE — SIGNIFICANT CHANGE UP
DIGOXIN SERPL-MCNC: 0.6 NG/ML — LOW (ref 0.8–2)
DIGOXIN SERPL-MCNC: 0.8 NG/ML — SIGNIFICANT CHANGE UP (ref 0.8–2)
DIGOXIN SERPL-MCNC: 1.1 NG/ML — SIGNIFICANT CHANGE UP (ref 0.8–2)
DIGOXIN SERPL-MCNC: 1.2 NG/ML — SIGNIFICANT CHANGE UP (ref 0.8–2)
DIGOXIN SERPL-MCNC: 1.2 NG/ML — SIGNIFICANT CHANGE UP (ref 0.8–2)
DIGOXIN SERPL-MCNC: 1.9 NG/ML — SIGNIFICANT CHANGE UP (ref 0.8–2)
EGFR: 22 ML/MIN/1.73M2 — LOW
EGFR: 23 ML/MIN/1.73M2 — LOW
EGFR: 24 ML/MIN/1.73M2 — LOW
EGFR: 25 ML/MIN/1.73M2 — LOW
EGFR: 26 ML/MIN/1.73M2 — LOW
EGFR: 27 ML/MIN/1.73M2 — LOW
EGFR: 28 ML/MIN/1.73M2 — LOW
EGFR: 29 ML/MIN/1.73M2 — LOW
EGFR: 30 ML/MIN/1.73M2 — LOW
EGFR: 31 ML/MIN/1.73M2 — LOW
EGFR: 31 ML/MIN/1.73M2 — LOW
EGFR: 33 ML/MIN/1.73M2 — LOW
EGFR: 34 ML/MIN/1.73M2 — LOW
EGFR: 34 ML/MIN/1.73M2 — LOW
EGFR: 35 ML/MIN/1.73M2 — LOW
EGFR: 35 ML/MIN/1.73M2 — LOW
EGFR: 36 ML/MIN/1.73M2 — LOW
EGFR: 36 ML/MIN/1.73M2 — LOW
EGFR: 37 ML/MIN/1.73M2 — LOW
EGFR: 37 ML/MIN/1.73M2 — LOW
EGFR: 38 ML/MIN/1.73M2 — LOW
EGFR: 38 ML/MIN/1.73M2 — LOW
EGFR: 39 ML/MIN/1.73M2 — LOW
EGFR: 39 ML/MIN/1.73M2 — LOW
EGFR: 41 ML/MIN/1.73M2 — LOW
EGFR: 45 ML/MIN/1.73M2 — LOW
EOSINOPHIL # BLD AUTO: 0.05 K/UL — SIGNIFICANT CHANGE UP (ref 0–0.5)
EOSINOPHIL # BLD AUTO: 0.07 K/UL — SIGNIFICANT CHANGE UP (ref 0–0.5)
EOSINOPHIL # BLD AUTO: 0.07 K/UL — SIGNIFICANT CHANGE UP (ref 0–0.5)
EOSINOPHIL # BLD AUTO: 0.08 K/UL — SIGNIFICANT CHANGE UP (ref 0–0.5)
EOSINOPHIL # BLD AUTO: 0.09 K/UL — SIGNIFICANT CHANGE UP (ref 0–0.5)
EOSINOPHIL # BLD AUTO: 0.09 K/UL — SIGNIFICANT CHANGE UP (ref 0–0.5)
EOSINOPHIL # BLD AUTO: 0.1 K/UL — SIGNIFICANT CHANGE UP (ref 0–0.5)
EOSINOPHIL # BLD AUTO: 0.11 K/UL — SIGNIFICANT CHANGE UP (ref 0–0.5)
EOSINOPHIL # BLD AUTO: 0.11 K/UL — SIGNIFICANT CHANGE UP (ref 0–0.5)
EOSINOPHIL # BLD AUTO: 0.12 K/UL — SIGNIFICANT CHANGE UP (ref 0–0.5)
EOSINOPHIL NFR BLD AUTO: 1.2 % — SIGNIFICANT CHANGE UP (ref 0–6)
EOSINOPHIL NFR BLD AUTO: 1.6 % — SIGNIFICANT CHANGE UP (ref 0–6)
EOSINOPHIL NFR BLD AUTO: 1.6 % — SIGNIFICANT CHANGE UP (ref 0–6)
EOSINOPHIL NFR BLD AUTO: 1.8 % — SIGNIFICANT CHANGE UP (ref 0–6)
EOSINOPHIL NFR BLD AUTO: 1.9 % — SIGNIFICANT CHANGE UP (ref 0–6)
EOSINOPHIL NFR BLD AUTO: 1.9 % — SIGNIFICANT CHANGE UP (ref 0–6)
EOSINOPHIL NFR BLD AUTO: 2.1 % — SIGNIFICANT CHANGE UP (ref 0–6)
EOSINOPHIL NFR BLD AUTO: 2.1 % — SIGNIFICANT CHANGE UP (ref 0–6)
EOSINOPHIL NFR BLD AUTO: 2.3 % — SIGNIFICANT CHANGE UP (ref 0–6)
EPI CELLS # UR: 1 /HPF — SIGNIFICANT CHANGE UP
ESTIMATED AVERAGE GLUCOSE: 108 MG/DL — SIGNIFICANT CHANGE UP (ref 68–114)
FERRITIN SERPL-MCNC: 26 NG/ML — SIGNIFICANT CHANGE UP (ref 15–150)
FERRITIN SERPL-MCNC: 53 NG/ML — SIGNIFICANT CHANGE UP (ref 15–150)
FOLATE SERPL-MCNC: 6.3 NG/ML — SIGNIFICANT CHANGE UP
GAS PNL BLDA: SIGNIFICANT CHANGE UP
GAS PNL BLDA: SIGNIFICANT CHANGE UP
GAS PNL BLDV: 130 MMOL/L — LOW (ref 136–145)
GAS PNL BLDV: SIGNIFICANT CHANGE UP
GFR/BSA.PRED SERPLBLD CYS-BASED-ARV: 20 ML/MIN/1.73M2 — LOW
GLUCOSE BLDC GLUCOMTR-MCNC: 114 MG/DL — HIGH (ref 70–99)
GLUCOSE BLDV-MCNC: 88 MG/DL — SIGNIFICANT CHANGE UP (ref 70–99)
GLUCOSE SERPL-MCNC: 101 MG/DL — HIGH (ref 70–99)
GLUCOSE SERPL-MCNC: 102 MG/DL — HIGH (ref 70–99)
GLUCOSE SERPL-MCNC: 107 MG/DL — HIGH (ref 70–99)
GLUCOSE SERPL-MCNC: 114 MG/DL — HIGH (ref 70–99)
GLUCOSE SERPL-MCNC: 115 MG/DL — HIGH (ref 70–99)
GLUCOSE SERPL-MCNC: 115 MG/DL — HIGH (ref 70–99)
GLUCOSE SERPL-MCNC: 133 MG/DL — HIGH (ref 70–99)
GLUCOSE SERPL-MCNC: 133 MG/DL — HIGH (ref 70–99)
GLUCOSE SERPL-MCNC: 141 MG/DL — HIGH (ref 70–99)
GLUCOSE SERPL-MCNC: 144 MG/DL — HIGH (ref 70–99)
GLUCOSE SERPL-MCNC: 151 MG/DL — HIGH (ref 70–99)
GLUCOSE SERPL-MCNC: 168 MG/DL — HIGH (ref 70–99)
GLUCOSE SERPL-MCNC: 178 MG/DL — HIGH (ref 70–99)
GLUCOSE SERPL-MCNC: 63 MG/DL — LOW (ref 70–99)
GLUCOSE SERPL-MCNC: 68 MG/DL — LOW (ref 70–99)
GLUCOSE SERPL-MCNC: 70 MG/DL — SIGNIFICANT CHANGE UP (ref 70–99)
GLUCOSE SERPL-MCNC: 71 MG/DL — SIGNIFICANT CHANGE UP (ref 70–99)
GLUCOSE SERPL-MCNC: 71 MG/DL — SIGNIFICANT CHANGE UP (ref 70–99)
GLUCOSE SERPL-MCNC: 72 MG/DL — SIGNIFICANT CHANGE UP (ref 70–99)
GLUCOSE SERPL-MCNC: 72 MG/DL — SIGNIFICANT CHANGE UP (ref 70–99)
GLUCOSE SERPL-MCNC: 73 MG/DL — SIGNIFICANT CHANGE UP (ref 70–99)
GLUCOSE SERPL-MCNC: 74 MG/DL — SIGNIFICANT CHANGE UP (ref 70–99)
GLUCOSE SERPL-MCNC: 75 MG/DL — SIGNIFICANT CHANGE UP (ref 70–99)
GLUCOSE SERPL-MCNC: 76 MG/DL — SIGNIFICANT CHANGE UP (ref 70–99)
GLUCOSE SERPL-MCNC: 76 MG/DL — SIGNIFICANT CHANGE UP (ref 70–99)
GLUCOSE SERPL-MCNC: 77 MG/DL — SIGNIFICANT CHANGE UP (ref 70–99)
GLUCOSE SERPL-MCNC: 77 MG/DL — SIGNIFICANT CHANGE UP (ref 70–99)
GLUCOSE SERPL-MCNC: 78 MG/DL — SIGNIFICANT CHANGE UP (ref 70–99)
GLUCOSE SERPL-MCNC: 78 MG/DL — SIGNIFICANT CHANGE UP (ref 70–99)
GLUCOSE SERPL-MCNC: 79 MG/DL — SIGNIFICANT CHANGE UP (ref 70–99)
GLUCOSE SERPL-MCNC: 79 MG/DL — SIGNIFICANT CHANGE UP (ref 70–99)
GLUCOSE SERPL-MCNC: 80 MG/DL — SIGNIFICANT CHANGE UP (ref 70–99)
GLUCOSE SERPL-MCNC: 80 MG/DL — SIGNIFICANT CHANGE UP (ref 70–99)
GLUCOSE SERPL-MCNC: 81 MG/DL — SIGNIFICANT CHANGE UP (ref 70–99)
GLUCOSE SERPL-MCNC: 82 MG/DL — SIGNIFICANT CHANGE UP (ref 70–99)
GLUCOSE SERPL-MCNC: 83 MG/DL — SIGNIFICANT CHANGE UP (ref 70–99)
GLUCOSE SERPL-MCNC: 84 MG/DL — SIGNIFICANT CHANGE UP (ref 70–99)
GLUCOSE SERPL-MCNC: 85 MG/DL — SIGNIFICANT CHANGE UP (ref 70–99)
GLUCOSE SERPL-MCNC: 87 MG/DL — SIGNIFICANT CHANGE UP (ref 70–99)
GLUCOSE SERPL-MCNC: 87 MG/DL — SIGNIFICANT CHANGE UP (ref 70–99)
GLUCOSE SERPL-MCNC: 89 MG/DL — SIGNIFICANT CHANGE UP (ref 70–99)
GLUCOSE SERPL-MCNC: 89 MG/DL — SIGNIFICANT CHANGE UP (ref 70–99)
GLUCOSE SERPL-MCNC: 91 MG/DL — SIGNIFICANT CHANGE UP (ref 70–99)
GLUCOSE SERPL-MCNC: 91 MG/DL — SIGNIFICANT CHANGE UP (ref 70–99)
GLUCOSE SERPL-MCNC: 94 MG/DL — SIGNIFICANT CHANGE UP (ref 70–99)
GLUCOSE SERPL-MCNC: 98 MG/DL — SIGNIFICANT CHANGE UP (ref 70–99)
GLUCOSE UR QL: NEGATIVE — SIGNIFICANT CHANGE UP
GLUCOSE UR QL: NEGATIVE — SIGNIFICANT CHANGE UP
HCO3 BLDA-SCNC: 30 MMOL/L — HIGH (ref 21–28)
HCO3 BLDV-SCNC: 32 MMOL/L — HIGH (ref 22–29)
HCT VFR BLD CALC: 22.5 % — LOW (ref 34.5–45)
HCT VFR BLD CALC: 22.8 % — LOW (ref 34.5–45)
HCT VFR BLD CALC: 23.4 % — LOW (ref 34.5–45)
HCT VFR BLD CALC: 23.5 % — LOW (ref 34.5–45)
HCT VFR BLD CALC: 23.7 % — LOW (ref 34.5–45)
HCT VFR BLD CALC: 23.8 % — LOW (ref 34.5–45)
HCT VFR BLD CALC: 24 % — LOW (ref 34.5–45)
HCT VFR BLD CALC: 24.1 % — LOW (ref 34.5–45)
HCT VFR BLD CALC: 24.3 % — LOW (ref 34.5–45)
HCT VFR BLD CALC: 24.5 % — LOW (ref 34.5–45)
HCT VFR BLD CALC: 24.5 % — LOW (ref 34.5–45)
HCT VFR BLD CALC: 24.7 % — LOW (ref 34.5–45)
HCT VFR BLD CALC: 24.7 % — LOW (ref 34.5–45)
HCT VFR BLD CALC: 25.1 % — LOW (ref 34.5–45)
HCT VFR BLD CALC: 25.2 % — LOW (ref 34.5–45)
HCT VFR BLD CALC: 25.3 % — LOW (ref 34.5–45)
HCT VFR BLD CALC: 25.5 % — LOW (ref 34.5–45)
HCT VFR BLD CALC: 25.6 % — LOW (ref 34.5–45)
HCT VFR BLD CALC: 25.7 % — LOW (ref 34.5–45)
HCT VFR BLD CALC: 25.9 % — LOW (ref 34.5–45)
HCT VFR BLD CALC: 25.9 % — LOW (ref 34.5–45)
HCT VFR BLD CALC: 26 % — LOW (ref 34.5–45)
HCT VFR BLD CALC: 26.2 % — LOW (ref 34.5–45)
HCT VFR BLD CALC: 26.3 % — LOW (ref 34.5–45)
HCT VFR BLD CALC: 26.3 % — LOW (ref 34.5–45)
HCT VFR BLD CALC: 26.4 % — LOW (ref 34.5–45)
HCT VFR BLD CALC: 26.5 % — LOW (ref 34.5–45)
HCT VFR BLD CALC: 26.5 % — LOW (ref 34.5–45)
HCT VFR BLD CALC: 26.6 % — LOW (ref 34.5–45)
HCT VFR BLD CALC: 26.8 % — LOW (ref 34.5–45)
HCT VFR BLD CALC: 26.9 % — LOW (ref 34.5–45)
HCT VFR BLD CALC: 27.1 % — LOW (ref 34.5–45)
HCT VFR BLD CALC: 28.6 % — LOW (ref 34.5–45)
HCT VFR BLD CALC: 28.8 % — LOW (ref 34.5–45)
HCT VFR BLD CALC: 29.3 % — LOW (ref 34.5–45)
HCT VFR BLD CALC: 29.8 % — LOW (ref 34.5–45)
HCT VFR BLD CALC: 29.9 % — LOW (ref 34.5–45)
HCT VFR BLD CALC: 30.4 % — LOW (ref 34.5–45)
HCT VFR BLD CALC: 31.1 % — LOW (ref 34.5–45)
HCT VFR BLD CALC: 31.2 % — LOW (ref 34.5–45)
HCT VFR BLD CALC: 31.5 % — LOW (ref 34.5–45)
HCT VFR BLDA CALC: 25 % — LOW (ref 34.5–46.5)
HCV AB S/CO SERPL IA: 0.19 S/CO — SIGNIFICANT CHANGE UP (ref 0–0.99)
HCV AB SERPL-IMP: SIGNIFICANT CHANGE UP
HDLC SERPL-MCNC: 64 MG/DL — SIGNIFICANT CHANGE UP
HGB BLD CALC-MCNC: 8.4 G/DL — LOW (ref 11.7–16.1)
HGB BLD-MCNC: 6.7 G/DL — CRITICAL LOW (ref 11.5–15.5)
HGB BLD-MCNC: 7 G/DL — CRITICAL LOW (ref 11.5–15.5)
HGB BLD-MCNC: 7.1 G/DL — LOW (ref 11.5–15.5)
HGB BLD-MCNC: 7.2 G/DL — LOW (ref 11.5–15.5)
HGB BLD-MCNC: 7.2 G/DL — LOW (ref 11.5–15.5)
HGB BLD-MCNC: 7.3 G/DL — LOW (ref 11.5–15.5)
HGB BLD-MCNC: 7.4 G/DL — LOW (ref 11.5–15.5)
HGB BLD-MCNC: 7.5 G/DL — LOW (ref 11.5–15.5)
HGB BLD-MCNC: 7.6 G/DL — LOW (ref 11.5–15.5)
HGB BLD-MCNC: 7.7 G/DL — LOW (ref 11.5–15.5)
HGB BLD-MCNC: 7.8 G/DL — LOW (ref 11.5–15.5)
HGB BLD-MCNC: 7.9 G/DL — LOW (ref 11.5–15.5)
HGB BLD-MCNC: 7.9 G/DL — LOW (ref 11.5–15.5)
HGB BLD-MCNC: 8 G/DL — LOW (ref 11.5–15.5)
HGB BLD-MCNC: 8.1 G/DL — LOW (ref 11.5–15.5)
HGB BLD-MCNC: 8.5 G/DL — LOW (ref 11.5–15.5)
HGB BLD-MCNC: 9.3 G/DL — LOW (ref 11.5–15.5)
HGB BLD-MCNC: 9.4 G/DL — LOW (ref 11.5–15.5)
HGB BLD-MCNC: 9.4 G/DL — LOW (ref 11.5–15.5)
HOROWITZ INDEX BLDA+IHG-RTO: 100 — SIGNIFICANT CHANGE UP
HYALINE CASTS # UR AUTO: 2 /LPF — SIGNIFICANT CHANGE UP (ref 0–2)
IMM GRANULOCYTES NFR BLD AUTO: 0.4 % — SIGNIFICANT CHANGE UP (ref 0–1.5)
IMM GRANULOCYTES NFR BLD AUTO: 0.4 % — SIGNIFICANT CHANGE UP (ref 0–1.5)
IMM GRANULOCYTES NFR BLD AUTO: 0.5 % — SIGNIFICANT CHANGE UP (ref 0–1.5)
IMM GRANULOCYTES NFR BLD AUTO: 0.6 % — SIGNIFICANT CHANGE UP (ref 0–1.5)
IMM GRANULOCYTES NFR BLD AUTO: 0.6 % — SIGNIFICANT CHANGE UP (ref 0–1.5)
IMM GRANULOCYTES NFR BLD AUTO: 0.7 % — SIGNIFICANT CHANGE UP (ref 0–1.5)
IMM GRANULOCYTES NFR BLD AUTO: 0.7 % — SIGNIFICANT CHANGE UP (ref 0–1.5)
IMM GRANULOCYTES NFR BLD AUTO: 0.8 % — SIGNIFICANT CHANGE UP (ref 0–1.5)
IMM GRANULOCYTES NFR BLD AUTO: 0.8 % — SIGNIFICANT CHANGE UP (ref 0–1.5)
IMM GRANULOCYTES NFR BLD AUTO: 1.2 % — SIGNIFICANT CHANGE UP (ref 0–1.5)
INR BLD: 1.03 RATIO — SIGNIFICANT CHANGE UP (ref 0.88–1.16)
INR BLD: 1.14 RATIO — SIGNIFICANT CHANGE UP (ref 0.88–1.16)
INR BLD: 1.15 RATIO — SIGNIFICANT CHANGE UP (ref 0.88–1.16)
INR BLD: 2.31 RATIO — HIGH (ref 0.88–1.16)
IRON SATN MFR SERPL: 10 % — LOW (ref 14–50)
IRON SATN MFR SERPL: 27 UG/DL — LOW (ref 30–160)
IRON SATN MFR SERPL: 31 UG/DL — SIGNIFICANT CHANGE UP (ref 30–160)
IRON SATN MFR SERPL: 8 % — LOW (ref 14–50)
KETONES UR-MCNC: NEGATIVE — SIGNIFICANT CHANGE UP
KETONES UR-MCNC: NEGATIVE — SIGNIFICANT CHANGE UP
LACTATE BLDV-MCNC: 1.3 MMOL/L — SIGNIFICANT CHANGE UP (ref 0.7–2)
LACTATE SERPL-SCNC: 0.9 MMOL/L — SIGNIFICANT CHANGE UP (ref 0.7–2)
LEGIONELLA AG UR QL: NEGATIVE — SIGNIFICANT CHANGE UP
LEUKOCYTE ESTERASE UR-ACNC: ABNORMAL
LEUKOCYTE ESTERASE UR-ACNC: NEGATIVE — SIGNIFICANT CHANGE UP
LIPID PNL WITH DIRECT LDL SERPL: 30 MG/DL — SIGNIFICANT CHANGE UP
LYMPHOCYTES # BLD AUTO: 0.46 K/UL — LOW (ref 1–3.3)
LYMPHOCYTES # BLD AUTO: 0.48 K/UL — LOW (ref 1–3.3)
LYMPHOCYTES # BLD AUTO: 0.48 K/UL — LOW (ref 1–3.3)
LYMPHOCYTES # BLD AUTO: 0.53 K/UL — LOW (ref 1–3.3)
LYMPHOCYTES # BLD AUTO: 0.58 K/UL — LOW (ref 1–3.3)
LYMPHOCYTES # BLD AUTO: 0.63 K/UL — LOW (ref 1–3.3)
LYMPHOCYTES # BLD AUTO: 0.67 K/UL — LOW (ref 1–3.3)
LYMPHOCYTES # BLD AUTO: 0.69 K/UL — LOW (ref 1–3.3)
LYMPHOCYTES # BLD AUTO: 0.75 K/UL — LOW (ref 1–3.3)
LYMPHOCYTES # BLD AUTO: 0.81 K/UL — LOW (ref 1–3.3)
LYMPHOCYTES # BLD AUTO: 0.83 K/UL — LOW (ref 1–3.3)
LYMPHOCYTES # BLD AUTO: 0.86 K/UL — LOW (ref 1–3.3)
LYMPHOCYTES # BLD AUTO: 11.3 % — LOW (ref 13–44)
LYMPHOCYTES # BLD AUTO: 11.7 % — LOW (ref 13–44)
LYMPHOCYTES # BLD AUTO: 11.9 % — LOW (ref 13–44)
LYMPHOCYTES # BLD AUTO: 12 % — LOW (ref 13–44)
LYMPHOCYTES # BLD AUTO: 12.3 % — LOW (ref 13–44)
LYMPHOCYTES # BLD AUTO: 12.5 % — LOW (ref 13–44)
LYMPHOCYTES # BLD AUTO: 12.8 % — LOW (ref 13–44)
LYMPHOCYTES # BLD AUTO: 13.2 % — SIGNIFICANT CHANGE UP (ref 13–44)
LYMPHOCYTES # BLD AUTO: 13.6 % — SIGNIFICANT CHANGE UP (ref 13–44)
LYMPHOCYTES # BLD AUTO: 14.2 % — SIGNIFICANT CHANGE UP (ref 13–44)
LYMPHOCYTES # BLD AUTO: 14.6 % — SIGNIFICANT CHANGE UP (ref 13–44)
LYMPHOCYTES # BLD AUTO: 15.4 % — SIGNIFICANT CHANGE UP (ref 13–44)
MAGNESIUM SERPL-MCNC: 1.7 MG/DL — SIGNIFICANT CHANGE UP (ref 1.6–2.6)
MAGNESIUM SERPL-MCNC: 1.9 MG/DL — SIGNIFICANT CHANGE UP (ref 1.6–2.6)
MAGNESIUM SERPL-MCNC: 2 MG/DL — SIGNIFICANT CHANGE UP (ref 1.6–2.6)
MAGNESIUM SERPL-MCNC: 2.1 MG/DL — SIGNIFICANT CHANGE UP (ref 1.6–2.6)
MAGNESIUM SERPL-MCNC: 2.2 MG/DL — SIGNIFICANT CHANGE UP (ref 1.6–2.6)
MAGNESIUM SERPL-MCNC: 2.3 MG/DL — SIGNIFICANT CHANGE UP (ref 1.6–2.6)
MAGNESIUM SERPL-MCNC: 2.4 MG/DL — SIGNIFICANT CHANGE UP (ref 1.6–2.6)
MAGNESIUM SERPL-MCNC: 2.5 MG/DL — SIGNIFICANT CHANGE UP (ref 1.6–2.6)
MAGNESIUM SERPL-MCNC: 2.6 MG/DL — SIGNIFICANT CHANGE UP (ref 1.6–2.6)
MCHC RBC-ENTMCNC: 25 PG — LOW (ref 27–34)
MCHC RBC-ENTMCNC: 25.2 PG — LOW (ref 27–34)
MCHC RBC-ENTMCNC: 25.2 PG — LOW (ref 27–34)
MCHC RBC-ENTMCNC: 25.3 PG — LOW (ref 27–34)
MCHC RBC-ENTMCNC: 25.3 PG — LOW (ref 27–34)
MCHC RBC-ENTMCNC: 25.4 PG — LOW (ref 27–34)
MCHC RBC-ENTMCNC: 25.5 PG — LOW (ref 27–34)
MCHC RBC-ENTMCNC: 25.6 PG — LOW (ref 27–34)
MCHC RBC-ENTMCNC: 25.8 PG — LOW (ref 27–34)
MCHC RBC-ENTMCNC: 26 PG — LOW (ref 27–34)
MCHC RBC-ENTMCNC: 26.1 PG — LOW (ref 27–34)
MCHC RBC-ENTMCNC: 26.2 PG — LOW (ref 27–34)
MCHC RBC-ENTMCNC: 26.2 PG — LOW (ref 27–34)
MCHC RBC-ENTMCNC: 26.3 PG — LOW (ref 27–34)
MCHC RBC-ENTMCNC: 26.6 PG — LOW (ref 27–34)
MCHC RBC-ENTMCNC: 26.7 PG — LOW (ref 27–34)
MCHC RBC-ENTMCNC: 26.9 PG — LOW (ref 27–34)
MCHC RBC-ENTMCNC: 26.9 PG — LOW (ref 27–34)
MCHC RBC-ENTMCNC: 27 PG — SIGNIFICANT CHANGE UP (ref 27–34)
MCHC RBC-ENTMCNC: 27 PG — SIGNIFICANT CHANGE UP (ref 27–34)
MCHC RBC-ENTMCNC: 27.1 PG — SIGNIFICANT CHANGE UP (ref 27–34)
MCHC RBC-ENTMCNC: 27.2 PG — SIGNIFICANT CHANGE UP (ref 27–34)
MCHC RBC-ENTMCNC: 27.2 PG — SIGNIFICANT CHANGE UP (ref 27–34)
MCHC RBC-ENTMCNC: 27.3 PG — SIGNIFICANT CHANGE UP (ref 27–34)
MCHC RBC-ENTMCNC: 27.4 PG — SIGNIFICANT CHANGE UP (ref 27–34)
MCHC RBC-ENTMCNC: 27.5 PG — SIGNIFICANT CHANGE UP (ref 27–34)
MCHC RBC-ENTMCNC: 27.5 PG — SIGNIFICANT CHANGE UP (ref 27–34)
MCHC RBC-ENTMCNC: 27.6 PG — SIGNIFICANT CHANGE UP (ref 27–34)
MCHC RBC-ENTMCNC: 27.7 PG — SIGNIFICANT CHANGE UP (ref 27–34)
MCHC RBC-ENTMCNC: 27.8 PG — SIGNIFICANT CHANGE UP (ref 27–34)
MCHC RBC-ENTMCNC: 27.8 PG — SIGNIFICANT CHANGE UP (ref 27–34)
MCHC RBC-ENTMCNC: 27.9 PG — SIGNIFICANT CHANGE UP (ref 27–34)
MCHC RBC-ENTMCNC: 28 GM/DL — LOW (ref 32–36)
MCHC RBC-ENTMCNC: 28 PG — SIGNIFICANT CHANGE UP (ref 27–34)
MCHC RBC-ENTMCNC: 28.2 PG — SIGNIFICANT CHANGE UP (ref 27–34)
MCHC RBC-ENTMCNC: 28.2 PG — SIGNIFICANT CHANGE UP (ref 27–34)
MCHC RBC-ENTMCNC: 28.3 PG — SIGNIFICANT CHANGE UP (ref 27–34)
MCHC RBC-ENTMCNC: 28.4 PG — SIGNIFICANT CHANGE UP (ref 27–34)
MCHC RBC-ENTMCNC: 28.5 GM/DL — LOW (ref 32–36)
MCHC RBC-ENTMCNC: 28.5 PG — SIGNIFICANT CHANGE UP (ref 27–34)
MCHC RBC-ENTMCNC: 29 GM/DL — LOW (ref 32–36)
MCHC RBC-ENTMCNC: 29.4 GM/DL — LOW (ref 32–36)
MCHC RBC-ENTMCNC: 29.4 GM/DL — LOW (ref 32–36)
MCHC RBC-ENTMCNC: 29.5 GM/DL — LOW (ref 32–36)
MCHC RBC-ENTMCNC: 29.5 GM/DL — LOW (ref 32–36)
MCHC RBC-ENTMCNC: 29.6 GM/DL — LOW (ref 32–36)
MCHC RBC-ENTMCNC: 29.6 GM/DL — LOW (ref 32–36)
MCHC RBC-ENTMCNC: 29.7 GM/DL — LOW (ref 32–36)
MCHC RBC-ENTMCNC: 29.8 GM/DL — LOW (ref 32–36)
MCHC RBC-ENTMCNC: 29.9 GM/DL — LOW (ref 32–36)
MCHC RBC-ENTMCNC: 30 GM/DL — LOW (ref 32–36)
MCHC RBC-ENTMCNC: 30 GM/DL — LOW (ref 32–36)
MCHC RBC-ENTMCNC: 30.1 GM/DL — LOW (ref 32–36)
MCHC RBC-ENTMCNC: 30.1 GM/DL — LOW (ref 32–36)
MCHC RBC-ENTMCNC: 30.2 GM/DL — LOW (ref 32–36)
MCHC RBC-ENTMCNC: 30.3 GM/DL — LOW (ref 32–36)
MCHC RBC-ENTMCNC: 30.3 GM/DL — LOW (ref 32–36)
MCHC RBC-ENTMCNC: 30.4 GM/DL — LOW (ref 32–36)
MCHC RBC-ENTMCNC: 30.5 GM/DL — LOW (ref 32–36)
MCHC RBC-ENTMCNC: 30.6 GM/DL — LOW (ref 32–36)
MCHC RBC-ENTMCNC: 30.6 GM/DL — LOW (ref 32–36)
MCHC RBC-ENTMCNC: 30.7 GM/DL — LOW (ref 32–36)
MCHC RBC-ENTMCNC: 30.8 GM/DL — LOW (ref 32–36)
MCHC RBC-ENTMCNC: 30.9 GM/DL — LOW (ref 32–36)
MCHC RBC-ENTMCNC: 31 GM/DL — LOW (ref 32–36)
MCHC RBC-ENTMCNC: 31.1 GM/DL — LOW (ref 32–36)
MCHC RBC-ENTMCNC: 31.1 GM/DL — LOW (ref 32–36)
MCHC RBC-ENTMCNC: 31.2 GM/DL — LOW (ref 32–36)
MCHC RBC-ENTMCNC: 31.2 GM/DL — LOW (ref 32–36)
MCHC RBC-ENTMCNC: 31.8 GM/DL — LOW (ref 32–36)
MCV RBC AUTO: 82.3 FL — SIGNIFICANT CHANGE UP (ref 80–100)
MCV RBC AUTO: 83.6 FL — SIGNIFICANT CHANGE UP (ref 80–100)
MCV RBC AUTO: 83.7 FL — SIGNIFICANT CHANGE UP (ref 80–100)
MCV RBC AUTO: 84.2 FL — SIGNIFICANT CHANGE UP (ref 80–100)
MCV RBC AUTO: 84.3 FL — SIGNIFICANT CHANGE UP (ref 80–100)
MCV RBC AUTO: 84.8 FL — SIGNIFICANT CHANGE UP (ref 80–100)
MCV RBC AUTO: 84.9 FL — SIGNIFICANT CHANGE UP (ref 80–100)
MCV RBC AUTO: 85.2 FL — SIGNIFICANT CHANGE UP (ref 80–100)
MCV RBC AUTO: 85.7 FL — SIGNIFICANT CHANGE UP (ref 80–100)
MCV RBC AUTO: 85.8 FL — SIGNIFICANT CHANGE UP (ref 80–100)
MCV RBC AUTO: 85.9 FL — SIGNIFICANT CHANGE UP (ref 80–100)
MCV RBC AUTO: 86 FL — SIGNIFICANT CHANGE UP (ref 80–100)
MCV RBC AUTO: 86.2 FL — SIGNIFICANT CHANGE UP (ref 80–100)
MCV RBC AUTO: 86.2 FL — SIGNIFICANT CHANGE UP (ref 80–100)
MCV RBC AUTO: 86.5 FL — SIGNIFICANT CHANGE UP (ref 80–100)
MCV RBC AUTO: 86.7 FL — SIGNIFICANT CHANGE UP (ref 80–100)
MCV RBC AUTO: 86.9 FL — SIGNIFICANT CHANGE UP (ref 80–100)
MCV RBC AUTO: 86.9 FL — SIGNIFICANT CHANGE UP (ref 80–100)
MCV RBC AUTO: 87.6 FL — SIGNIFICANT CHANGE UP (ref 80–100)
MCV RBC AUTO: 88.2 FL — SIGNIFICANT CHANGE UP (ref 80–100)
MCV RBC AUTO: 88.6 FL — SIGNIFICANT CHANGE UP (ref 80–100)
MCV RBC AUTO: 88.8 FL — SIGNIFICANT CHANGE UP (ref 80–100)
MCV RBC AUTO: 88.9 FL — SIGNIFICANT CHANGE UP (ref 80–100)
MCV RBC AUTO: 89 FL — SIGNIFICANT CHANGE UP (ref 80–100)
MCV RBC AUTO: 90 FL — SIGNIFICANT CHANGE UP (ref 80–100)
MCV RBC AUTO: 90.5 FL — SIGNIFICANT CHANGE UP (ref 80–100)
MCV RBC AUTO: 90.6 FL — SIGNIFICANT CHANGE UP (ref 80–100)
MCV RBC AUTO: 90.6 FL — SIGNIFICANT CHANGE UP (ref 80–100)
MCV RBC AUTO: 90.8 FL — SIGNIFICANT CHANGE UP (ref 80–100)
MCV RBC AUTO: 90.9 FL — SIGNIFICANT CHANGE UP (ref 80–100)
MCV RBC AUTO: 91 FL — SIGNIFICANT CHANGE UP (ref 80–100)
MCV RBC AUTO: 91 FL — SIGNIFICANT CHANGE UP (ref 80–100)
MCV RBC AUTO: 91.1 FL — SIGNIFICANT CHANGE UP (ref 80–100)
MCV RBC AUTO: 91.1 FL — SIGNIFICANT CHANGE UP (ref 80–100)
MCV RBC AUTO: 91.2 FL — SIGNIFICANT CHANGE UP (ref 80–100)
MCV RBC AUTO: 91.3 FL — SIGNIFICANT CHANGE UP (ref 80–100)
MCV RBC AUTO: 91.3 FL — SIGNIFICANT CHANGE UP (ref 80–100)
MCV RBC AUTO: 91.4 FL — SIGNIFICANT CHANGE UP (ref 80–100)
MCV RBC AUTO: 91.5 FL — SIGNIFICANT CHANGE UP (ref 80–100)
MCV RBC AUTO: 91.5 FL — SIGNIFICANT CHANGE UP (ref 80–100)
MCV RBC AUTO: 91.9 FL — SIGNIFICANT CHANGE UP (ref 80–100)
MCV RBC AUTO: 92 FL — SIGNIFICANT CHANGE UP (ref 80–100)
MCV RBC AUTO: 92 FL — SIGNIFICANT CHANGE UP (ref 80–100)
MCV RBC AUTO: 92.3 FL — SIGNIFICANT CHANGE UP (ref 80–100)
MONOCYTES # BLD AUTO: 0.39 K/UL — SIGNIFICANT CHANGE UP (ref 0–0.9)
MONOCYTES # BLD AUTO: 0.41 K/UL — SIGNIFICANT CHANGE UP (ref 0–0.9)
MONOCYTES # BLD AUTO: 0.41 K/UL — SIGNIFICANT CHANGE UP (ref 0–0.9)
MONOCYTES # BLD AUTO: 0.42 K/UL — SIGNIFICANT CHANGE UP (ref 0–0.9)
MONOCYTES # BLD AUTO: 0.42 K/UL — SIGNIFICANT CHANGE UP (ref 0–0.9)
MONOCYTES # BLD AUTO: 0.43 K/UL — SIGNIFICANT CHANGE UP (ref 0–0.9)
MONOCYTES # BLD AUTO: 0.43 K/UL — SIGNIFICANT CHANGE UP (ref 0–0.9)
MONOCYTES # BLD AUTO: 0.48 K/UL — SIGNIFICANT CHANGE UP (ref 0–0.9)
MONOCYTES # BLD AUTO: 0.48 K/UL — SIGNIFICANT CHANGE UP (ref 0–0.9)
MONOCYTES # BLD AUTO: 0.5 K/UL — SIGNIFICANT CHANGE UP (ref 0–0.9)
MONOCYTES # BLD AUTO: 0.66 K/UL — SIGNIFICANT CHANGE UP (ref 0–0.9)
MONOCYTES # BLD AUTO: 0.67 K/UL — SIGNIFICANT CHANGE UP (ref 0–0.9)
MONOCYTES NFR BLD AUTO: 10.7 % — SIGNIFICANT CHANGE UP (ref 2–14)
MONOCYTES NFR BLD AUTO: 11.4 % — SIGNIFICANT CHANGE UP (ref 2–14)
MONOCYTES NFR BLD AUTO: 12.2 % — SIGNIFICANT CHANGE UP (ref 2–14)
MONOCYTES NFR BLD AUTO: 8.3 % — SIGNIFICANT CHANGE UP (ref 2–14)
MONOCYTES NFR BLD AUTO: 8.4 % — SIGNIFICANT CHANGE UP (ref 2–14)
MONOCYTES NFR BLD AUTO: 8.5 % — SIGNIFICANT CHANGE UP (ref 2–14)
MONOCYTES NFR BLD AUTO: 8.6 % — SIGNIFICANT CHANGE UP (ref 2–14)
MONOCYTES NFR BLD AUTO: 8.8 % — SIGNIFICANT CHANGE UP (ref 2–14)
MONOCYTES NFR BLD AUTO: 9 % — SIGNIFICANT CHANGE UP (ref 2–14)
MONOCYTES NFR BLD AUTO: 9.4 % — SIGNIFICANT CHANGE UP (ref 2–14)
MONOCYTES NFR BLD AUTO: 9.8 % — SIGNIFICANT CHANGE UP (ref 2–14)
MONOCYTES NFR BLD AUTO: 9.9 % — SIGNIFICANT CHANGE UP (ref 2–14)
MRSA PCR RESULT.: DETECTED
MRSA PCR RESULT.: SIGNIFICANT CHANGE UP
NEUTROPHILS # BLD AUTO: 2.67 K/UL — SIGNIFICANT CHANGE UP (ref 1.8–7.4)
NEUTROPHILS # BLD AUTO: 2.88 K/UL — SIGNIFICANT CHANGE UP (ref 1.8–7.4)
NEUTROPHILS # BLD AUTO: 3.02 K/UL — SIGNIFICANT CHANGE UP (ref 1.8–7.4)
NEUTROPHILS # BLD AUTO: 3.1 K/UL — SIGNIFICANT CHANGE UP (ref 1.8–7.4)
NEUTROPHILS # BLD AUTO: 3.56 K/UL — SIGNIFICANT CHANGE UP (ref 1.8–7.4)
NEUTROPHILS # BLD AUTO: 3.57 K/UL — SIGNIFICANT CHANGE UP (ref 1.8–7.4)
NEUTROPHILS # BLD AUTO: 3.69 K/UL — SIGNIFICANT CHANGE UP (ref 1.8–7.4)
NEUTROPHILS # BLD AUTO: 3.72 K/UL — SIGNIFICANT CHANGE UP (ref 1.8–7.4)
NEUTROPHILS # BLD AUTO: 4.09 K/UL — SIGNIFICANT CHANGE UP (ref 1.8–7.4)
NEUTROPHILS # BLD AUTO: 4.16 K/UL — SIGNIFICANT CHANGE UP (ref 1.8–7.4)
NEUTROPHILS # BLD AUTO: 5.03 K/UL — SIGNIFICANT CHANGE UP (ref 1.8–7.4)
NEUTROPHILS # BLD AUTO: 5.15 K/UL — SIGNIFICANT CHANGE UP (ref 1.8–7.4)
NEUTROPHILS NFR BLD AUTO: 72.9 % — SIGNIFICANT CHANGE UP (ref 43–77)
NEUTROPHILS NFR BLD AUTO: 73.1 % — SIGNIFICANT CHANGE UP (ref 43–77)
NEUTROPHILS NFR BLD AUTO: 73.2 % — SIGNIFICANT CHANGE UP (ref 43–77)
NEUTROPHILS NFR BLD AUTO: 73.3 % — SIGNIFICANT CHANGE UP (ref 43–77)
NEUTROPHILS NFR BLD AUTO: 73.6 % — SIGNIFICANT CHANGE UP (ref 43–77)
NEUTROPHILS NFR BLD AUTO: 73.7 % — SIGNIFICANT CHANGE UP (ref 43–77)
NEUTROPHILS NFR BLD AUTO: 74.3 % — SIGNIFICANT CHANGE UP (ref 43–77)
NEUTROPHILS NFR BLD AUTO: 74.9 % — SIGNIFICANT CHANGE UP (ref 43–77)
NEUTROPHILS NFR BLD AUTO: 75.3 % — SIGNIFICANT CHANGE UP (ref 43–77)
NEUTROPHILS NFR BLD AUTO: 75.6 % — SIGNIFICANT CHANGE UP (ref 43–77)
NEUTROPHILS NFR BLD AUTO: 75.6 % — SIGNIFICANT CHANGE UP (ref 43–77)
NEUTROPHILS NFR BLD AUTO: 76.4 % — SIGNIFICANT CHANGE UP (ref 43–77)
NITRITE UR-MCNC: NEGATIVE — SIGNIFICANT CHANGE UP
NITRITE UR-MCNC: NEGATIVE — SIGNIFICANT CHANGE UP
NON HDL CHOLESTEROL: 39 MG/DL — SIGNIFICANT CHANGE UP
NRBC # BLD: 0 /100 WBCS — SIGNIFICANT CHANGE UP (ref 0–0)
NT-PROBNP SERPL-SCNC: 7563 PG/ML — HIGH (ref 0–300)
NT-PROBNP SERPL-SCNC: 7812 PG/ML — HIGH (ref 0–300)
NT-PROBNP SERPL-SCNC: 7941 PG/ML — HIGH (ref 0–300)
NT-PROBNP SERPL-SCNC: 8947 PG/ML — HIGH (ref 0–300)
NT-PROBNP SERPL-SCNC: HIGH PG/ML (ref 0–300)
OSMOLALITY UR: 332 MOS/KG — SIGNIFICANT CHANGE UP (ref 300–900)
PCO2 BLDA: 44 MMHG — SIGNIFICANT CHANGE UP (ref 32–45)
PCO2 BLDV: 52 MMHG — HIGH (ref 39–42)
PH BLDA: 7.44 — SIGNIFICANT CHANGE UP (ref 7.35–7.45)
PH BLDV: 7.4 — SIGNIFICANT CHANGE UP (ref 7.32–7.43)
PH UR: 5.5 — SIGNIFICANT CHANGE UP (ref 5–8)
PH UR: 6 — SIGNIFICANT CHANGE UP (ref 5–8)
PHOSPHATE SERPL-MCNC: 3.2 MG/DL — SIGNIFICANT CHANGE UP (ref 2.5–4.5)
PHOSPHATE SERPL-MCNC: 3.2 MG/DL — SIGNIFICANT CHANGE UP (ref 2.5–4.5)
PHOSPHATE SERPL-MCNC: 3.3 MG/DL — SIGNIFICANT CHANGE UP (ref 2.5–4.5)
PHOSPHATE SERPL-MCNC: 3.3 MG/DL — SIGNIFICANT CHANGE UP (ref 2.5–4.5)
PHOSPHATE SERPL-MCNC: 3.4 MG/DL — SIGNIFICANT CHANGE UP (ref 2.5–4.5)
PHOSPHATE SERPL-MCNC: 3.4 MG/DL — SIGNIFICANT CHANGE UP (ref 2.5–4.5)
PHOSPHATE SERPL-MCNC: 3.5 MG/DL — SIGNIFICANT CHANGE UP (ref 2.5–4.5)
PHOSPHATE SERPL-MCNC: 3.6 MG/DL — SIGNIFICANT CHANGE UP (ref 2.5–4.5)
PHOSPHATE SERPL-MCNC: 3.7 MG/DL — SIGNIFICANT CHANGE UP (ref 2.5–4.5)
PHOSPHATE SERPL-MCNC: 3.8 MG/DL — SIGNIFICANT CHANGE UP (ref 2.5–4.5)
PHOSPHATE SERPL-MCNC: 3.9 MG/DL — SIGNIFICANT CHANGE UP (ref 2.5–4.5)
PHOSPHATE SERPL-MCNC: 4 MG/DL — SIGNIFICANT CHANGE UP (ref 2.5–4.5)
PHOSPHATE SERPL-MCNC: 4 MG/DL — SIGNIFICANT CHANGE UP (ref 2.5–4.5)
PHOSPHATE SERPL-MCNC: 4.1 MG/DL — SIGNIFICANT CHANGE UP (ref 2.5–4.5)
PHOSPHATE SERPL-MCNC: 4.1 MG/DL — SIGNIFICANT CHANGE UP (ref 2.5–4.5)
PHOSPHATE SERPL-MCNC: 4.2 MG/DL — SIGNIFICANT CHANGE UP (ref 2.5–4.5)
PHOSPHATE SERPL-MCNC: 4.3 MG/DL — SIGNIFICANT CHANGE UP (ref 2.5–4.5)
PHOSPHATE SERPL-MCNC: 4.3 MG/DL — SIGNIFICANT CHANGE UP (ref 2.5–4.5)
PHOSPHATE SERPL-MCNC: 4.4 MG/DL — SIGNIFICANT CHANGE UP (ref 2.5–4.5)
PHOSPHATE SERPL-MCNC: 4.5 MG/DL — SIGNIFICANT CHANGE UP (ref 2.5–4.5)
PHOSPHATE SERPL-MCNC: 4.6 MG/DL — HIGH (ref 2.5–4.5)
PHOSPHATE SERPL-MCNC: 4.6 MG/DL — HIGH (ref 2.5–4.5)
PHOSPHATE SERPL-MCNC: 4.7 MG/DL — HIGH (ref 2.5–4.5)
PLATELET # BLD AUTO: 163 K/UL — SIGNIFICANT CHANGE UP (ref 150–400)
PLATELET # BLD AUTO: 175 K/UL — SIGNIFICANT CHANGE UP (ref 150–400)
PLATELET # BLD AUTO: 185 K/UL — SIGNIFICANT CHANGE UP (ref 150–400)
PLATELET # BLD AUTO: 187 K/UL — SIGNIFICANT CHANGE UP (ref 150–400)
PLATELET # BLD AUTO: 189 K/UL — SIGNIFICANT CHANGE UP (ref 150–400)
PLATELET # BLD AUTO: 190 K/UL — SIGNIFICANT CHANGE UP (ref 150–400)
PLATELET # BLD AUTO: 192 K/UL — SIGNIFICANT CHANGE UP (ref 150–400)
PLATELET # BLD AUTO: 192 K/UL — SIGNIFICANT CHANGE UP (ref 150–400)
PLATELET # BLD AUTO: 193 K/UL — SIGNIFICANT CHANGE UP (ref 150–400)
PLATELET # BLD AUTO: 197 K/UL — SIGNIFICANT CHANGE UP (ref 150–400)
PLATELET # BLD AUTO: 198 K/UL — SIGNIFICANT CHANGE UP (ref 150–400)
PLATELET # BLD AUTO: 202 K/UL — SIGNIFICANT CHANGE UP (ref 150–400)
PLATELET # BLD AUTO: 205 K/UL — SIGNIFICANT CHANGE UP (ref 150–400)
PLATELET # BLD AUTO: 206 K/UL — SIGNIFICANT CHANGE UP (ref 150–400)
PLATELET # BLD AUTO: 207 K/UL — SIGNIFICANT CHANGE UP (ref 150–400)
PLATELET # BLD AUTO: 209 K/UL — SIGNIFICANT CHANGE UP (ref 150–400)
PLATELET # BLD AUTO: 211 K/UL — SIGNIFICANT CHANGE UP (ref 150–400)
PLATELET # BLD AUTO: 215 K/UL — SIGNIFICANT CHANGE UP (ref 150–400)
PLATELET # BLD AUTO: 218 K/UL — SIGNIFICANT CHANGE UP (ref 150–400)
PLATELET # BLD AUTO: 223 K/UL — SIGNIFICANT CHANGE UP (ref 150–400)
PLATELET # BLD AUTO: 224 K/UL — SIGNIFICANT CHANGE UP (ref 150–400)
PLATELET # BLD AUTO: 225 K/UL — SIGNIFICANT CHANGE UP (ref 150–400)
PLATELET # BLD AUTO: 228 K/UL — SIGNIFICANT CHANGE UP (ref 150–400)
PLATELET # BLD AUTO: 230 K/UL — SIGNIFICANT CHANGE UP (ref 150–400)
PLATELET # BLD AUTO: 231 K/UL — SIGNIFICANT CHANGE UP (ref 150–400)
PLATELET # BLD AUTO: 231 K/UL — SIGNIFICANT CHANGE UP (ref 150–400)
PLATELET # BLD AUTO: 233 K/UL — SIGNIFICANT CHANGE UP (ref 150–400)
PLATELET # BLD AUTO: 234 K/UL — SIGNIFICANT CHANGE UP (ref 150–400)
PLATELET # BLD AUTO: 234 K/UL — SIGNIFICANT CHANGE UP (ref 150–400)
PLATELET # BLD AUTO: 237 K/UL — SIGNIFICANT CHANGE UP (ref 150–400)
PLATELET # BLD AUTO: 239 K/UL — SIGNIFICANT CHANGE UP (ref 150–400)
PLATELET # BLD AUTO: 240 K/UL — SIGNIFICANT CHANGE UP (ref 150–400)
PLATELET # BLD AUTO: 245 K/UL — SIGNIFICANT CHANGE UP (ref 150–400)
PLATELET # BLD AUTO: 247 K/UL — SIGNIFICANT CHANGE UP (ref 150–400)
PLATELET # BLD AUTO: 248 K/UL — SIGNIFICANT CHANGE UP (ref 150–400)
PLATELET # BLD AUTO: 248 K/UL — SIGNIFICANT CHANGE UP (ref 150–400)
PLATELET # BLD AUTO: 254 K/UL — SIGNIFICANT CHANGE UP (ref 150–400)
PLATELET # BLD AUTO: 255 K/UL — SIGNIFICANT CHANGE UP (ref 150–400)
PLATELET # BLD AUTO: 256 K/UL — SIGNIFICANT CHANGE UP (ref 150–400)
PLATELET # BLD AUTO: 258 K/UL — SIGNIFICANT CHANGE UP (ref 150–400)
PLATELET # BLD AUTO: 258 K/UL — SIGNIFICANT CHANGE UP (ref 150–400)
PLATELET # BLD AUTO: 284 K/UL — SIGNIFICANT CHANGE UP (ref 150–400)
PLATELET # BLD AUTO: 298 K/UL — SIGNIFICANT CHANGE UP (ref 150–400)
PLATELET # BLD AUTO: 313 K/UL — SIGNIFICANT CHANGE UP (ref 150–400)
PLATELET # BLD AUTO: 315 K/UL — SIGNIFICANT CHANGE UP (ref 150–400)
PLATELET # BLD AUTO: 324 K/UL — SIGNIFICANT CHANGE UP (ref 150–400)
PO2 BLDA: 178 MMHG — HIGH (ref 83–108)
PO2 BLDV: 61 MMHG — HIGH (ref 25–45)
POTASSIUM BLDV-SCNC: 4.5 MMOL/L — SIGNIFICANT CHANGE UP (ref 3.5–5.1)
POTASSIUM SERPL-MCNC: 3.2 MMOL/L — LOW (ref 3.5–5.3)
POTASSIUM SERPL-MCNC: 3.2 MMOL/L — LOW (ref 3.5–5.3)
POTASSIUM SERPL-MCNC: 3.4 MMOL/L — LOW (ref 3.5–5.3)
POTASSIUM SERPL-MCNC: 3.4 MMOL/L — LOW (ref 3.5–5.3)
POTASSIUM SERPL-MCNC: 3.5 MMOL/L — SIGNIFICANT CHANGE UP (ref 3.5–5.3)
POTASSIUM SERPL-MCNC: 3.6 MMOL/L — SIGNIFICANT CHANGE UP (ref 3.5–5.3)
POTASSIUM SERPL-MCNC: 3.6 MMOL/L — SIGNIFICANT CHANGE UP (ref 3.5–5.3)
POTASSIUM SERPL-MCNC: 3.7 MMOL/L — SIGNIFICANT CHANGE UP (ref 3.5–5.3)
POTASSIUM SERPL-MCNC: 3.8 MMOL/L — SIGNIFICANT CHANGE UP (ref 3.5–5.3)
POTASSIUM SERPL-MCNC: 3.9 MMOL/L — SIGNIFICANT CHANGE UP (ref 3.5–5.3)
POTASSIUM SERPL-MCNC: 4 MMOL/L — SIGNIFICANT CHANGE UP (ref 3.5–5.3)
POTASSIUM SERPL-MCNC: 4.1 MMOL/L — SIGNIFICANT CHANGE UP (ref 3.5–5.3)
POTASSIUM SERPL-MCNC: 4.2 MMOL/L — SIGNIFICANT CHANGE UP (ref 3.5–5.3)
POTASSIUM SERPL-MCNC: 4.3 MMOL/L — SIGNIFICANT CHANGE UP (ref 3.5–5.3)
POTASSIUM SERPL-MCNC: 4.4 MMOL/L — SIGNIFICANT CHANGE UP (ref 3.5–5.3)
POTASSIUM SERPL-MCNC: 4.5 MMOL/L — SIGNIFICANT CHANGE UP (ref 3.5–5.3)
POTASSIUM SERPL-MCNC: 5 MMOL/L — SIGNIFICANT CHANGE UP (ref 3.5–5.3)
POTASSIUM SERPL-MCNC: 5.1 MMOL/L — SIGNIFICANT CHANGE UP (ref 3.5–5.3)
POTASSIUM SERPL-MCNC: 5.3 MMOL/L — SIGNIFICANT CHANGE UP (ref 3.5–5.3)
POTASSIUM SERPL-MCNC: 5.5 MMOL/L — HIGH (ref 3.5–5.3)
POTASSIUM SERPL-SCNC: 3.2 MMOL/L — LOW (ref 3.5–5.3)
POTASSIUM SERPL-SCNC: 3.2 MMOL/L — LOW (ref 3.5–5.3)
POTASSIUM SERPL-SCNC: 3.4 MMOL/L — LOW (ref 3.5–5.3)
POTASSIUM SERPL-SCNC: 3.4 MMOL/L — LOW (ref 3.5–5.3)
POTASSIUM SERPL-SCNC: 3.5 MMOL/L — SIGNIFICANT CHANGE UP (ref 3.5–5.3)
POTASSIUM SERPL-SCNC: 3.6 MMOL/L — SIGNIFICANT CHANGE UP (ref 3.5–5.3)
POTASSIUM SERPL-SCNC: 3.6 MMOL/L — SIGNIFICANT CHANGE UP (ref 3.5–5.3)
POTASSIUM SERPL-SCNC: 3.7 MMOL/L — SIGNIFICANT CHANGE UP (ref 3.5–5.3)
POTASSIUM SERPL-SCNC: 3.8 MMOL/L — SIGNIFICANT CHANGE UP (ref 3.5–5.3)
POTASSIUM SERPL-SCNC: 3.9 MMOL/L — SIGNIFICANT CHANGE UP (ref 3.5–5.3)
POTASSIUM SERPL-SCNC: 4 MMOL/L — SIGNIFICANT CHANGE UP (ref 3.5–5.3)
POTASSIUM SERPL-SCNC: 4.1 MMOL/L — SIGNIFICANT CHANGE UP (ref 3.5–5.3)
POTASSIUM SERPL-SCNC: 4.2 MMOL/L — SIGNIFICANT CHANGE UP (ref 3.5–5.3)
POTASSIUM SERPL-SCNC: 4.3 MMOL/L — SIGNIFICANT CHANGE UP (ref 3.5–5.3)
POTASSIUM SERPL-SCNC: 4.4 MMOL/L — SIGNIFICANT CHANGE UP (ref 3.5–5.3)
POTASSIUM SERPL-SCNC: 4.5 MMOL/L — SIGNIFICANT CHANGE UP (ref 3.5–5.3)
POTASSIUM SERPL-SCNC: 5 MMOL/L — SIGNIFICANT CHANGE UP (ref 3.5–5.3)
POTASSIUM SERPL-SCNC: 5.1 MMOL/L — SIGNIFICANT CHANGE UP (ref 3.5–5.3)
POTASSIUM SERPL-SCNC: 5.3 MMOL/L — SIGNIFICANT CHANGE UP (ref 3.5–5.3)
POTASSIUM SERPL-SCNC: 5.5 MMOL/L — HIGH (ref 3.5–5.3)
POTASSIUM UR-SCNC: 28 MMOL/L — SIGNIFICANT CHANGE UP
PROCALCITONIN SERPL-MCNC: 0.28 NG/ML — HIGH (ref 0.02–0.1)
PROT ?TM UR-MCNC: 10 MG/DL — SIGNIFICANT CHANGE UP (ref 0–12)
PROT ?TM UR-MCNC: 9 MG/DL — SIGNIFICANT CHANGE UP (ref 0–12)
PROT SERPL-MCNC: 6.3 G/DL — SIGNIFICANT CHANGE UP (ref 6–8.3)
PROT SERPL-MCNC: 6.4 G/DL — SIGNIFICANT CHANGE UP (ref 6–8.3)
PROT SERPL-MCNC: 6.5 G/DL — SIGNIFICANT CHANGE UP (ref 6–8.3)
PROT SERPL-MCNC: 6.6 G/DL — SIGNIFICANT CHANGE UP (ref 6–8.3)
PROT SERPL-MCNC: 6.7 G/DL — SIGNIFICANT CHANGE UP (ref 6–8.3)
PROT SERPL-MCNC: 6.8 G/DL — SIGNIFICANT CHANGE UP (ref 6–8.3)
PROT SERPL-MCNC: 6.9 G/DL — SIGNIFICANT CHANGE UP (ref 6–8.3)
PROT SERPL-MCNC: 7 G/DL — SIGNIFICANT CHANGE UP (ref 6–8.3)
PROT SERPL-MCNC: 7.1 G/DL — SIGNIFICANT CHANGE UP (ref 6–8.3)
PROT SERPL-MCNC: 7.2 G/DL — SIGNIFICANT CHANGE UP (ref 6–8.3)
PROT SERPL-MCNC: 7.2 G/DL — SIGNIFICANT CHANGE UP (ref 6–8.3)
PROT SERPL-MCNC: 7.3 G/DL — SIGNIFICANT CHANGE UP (ref 6–8.3)
PROT SERPL-MCNC: 7.4 G/DL — SIGNIFICANT CHANGE UP (ref 6–8.3)
PROT SERPL-MCNC: 7.4 G/DL — SIGNIFICANT CHANGE UP (ref 6–8.3)
PROT SERPL-MCNC: 7.9 G/DL — SIGNIFICANT CHANGE UP (ref 6–8.3)
PROT SERPL-MCNC: 7.9 G/DL — SIGNIFICANT CHANGE UP (ref 6–8.3)
PROT SERPL-MCNC: 8 G/DL — SIGNIFICANT CHANGE UP (ref 6–8.3)
PROT SERPL-MCNC: 8.1 G/DL — SIGNIFICANT CHANGE UP (ref 6–8.3)
PROT SERPL-MCNC: 8.2 G/DL — SIGNIFICANT CHANGE UP (ref 6–8.3)
PROT UR-MCNC: NEGATIVE — SIGNIFICANT CHANGE UP
PROT UR-MCNC: SIGNIFICANT CHANGE UP
PROT/CREAT UR-RTO: 0.3 RATIO — HIGH (ref 0–0.2)
PROTHROM AB SERPL-ACNC: 12 SEC — SIGNIFICANT CHANGE UP (ref 10.5–13.4)
PROTHROM AB SERPL-ACNC: 13.1 SEC — SIGNIFICANT CHANGE UP (ref 10.5–13.4)
PROTHROM AB SERPL-ACNC: 13.3 SEC — SIGNIFICANT CHANGE UP (ref 10.5–13.4)
PROTHROM AB SERPL-ACNC: 27 SEC — HIGH (ref 10.5–13.4)
RAPID RVP RESULT: SIGNIFICANT CHANGE UP
RBC # BLD: 2.48 M/UL — LOW (ref 3.8–5.2)
RBC # BLD: 2.61 M/UL — LOW (ref 3.8–5.2)
RBC # BLD: 2.62 M/UL — LOW (ref 3.8–5.2)
RBC # BLD: 2.64 M/UL — LOW (ref 3.8–5.2)
RBC # BLD: 2.69 M/UL — LOW (ref 3.8–5.2)
RBC # BLD: 2.7 M/UL — LOW (ref 3.8–5.2)
RBC # BLD: 2.72 M/UL — LOW (ref 3.8–5.2)
RBC # BLD: 2.75 M/UL — LOW (ref 3.8–5.2)
RBC # BLD: 2.77 M/UL — LOW (ref 3.8–5.2)
RBC # BLD: 2.78 M/UL — LOW (ref 3.8–5.2)
RBC # BLD: 2.78 M/UL — LOW (ref 3.8–5.2)
RBC # BLD: 2.8 M/UL — LOW (ref 3.8–5.2)
RBC # BLD: 2.81 M/UL — LOW (ref 3.8–5.2)
RBC # BLD: 2.83 M/UL — LOW (ref 3.8–5.2)
RBC # BLD: 2.83 M/UL — LOW (ref 3.8–5.2)
RBC # BLD: 2.87 M/UL — LOW (ref 3.8–5.2)
RBC # BLD: 2.89 M/UL — LOW (ref 3.8–5.2)
RBC # BLD: 2.9 M/UL — LOW (ref 3.8–5.2)
RBC # BLD: 2.91 M/UL — LOW (ref 3.8–5.2)
RBC # BLD: 2.92 M/UL — LOW (ref 3.8–5.2)
RBC # BLD: 2.95 M/UL — LOW (ref 3.8–5.2)
RBC # BLD: 2.95 M/UL — LOW (ref 3.8–5.2)
RBC # BLD: 2.96 M/UL — LOW (ref 3.8–5.2)
RBC # BLD: 2.97 M/UL — LOW (ref 3.8–5.2)
RBC # BLD: 2.98 M/UL — LOW (ref 3.8–5.2)
RBC # BLD: 3.01 M/UL — LOW (ref 3.8–5.2)
RBC # BLD: 3.04 M/UL — LOW (ref 3.8–5.2)
RBC # BLD: 3.04 M/UL — LOW (ref 3.8–5.2)
RBC # BLD: 3.1 M/UL — LOW (ref 3.8–5.2)
RBC # BLD: 3.1 M/UL — LOW (ref 3.8–5.2)
RBC # BLD: 3.14 M/UL — LOW (ref 3.8–5.2)
RBC # BLD: 3.16 M/UL — LOW (ref 3.8–5.2)
RBC # BLD: 3.32 M/UL — LOW (ref 3.8–5.2)
RBC # BLD: 3.37 M/UL — LOW (ref 3.8–5.2)
RBC # BLD: 3.4 M/UL — LOW (ref 3.8–5.2)
RBC # BLD: 3.55 M/UL — LOW (ref 3.8–5.2)
RBC # BLD: 3.58 M/UL — LOW (ref 3.8–5.2)
RBC # BLD: 3.58 M/UL — LOW (ref 3.8–5.2)
RBC # BLD: 3.64 M/UL — LOW (ref 3.8–5.2)
RBC # BLD: 3.7 M/UL — LOW (ref 3.8–5.2)
RBC # FLD: 17 % — HIGH (ref 10.3–14.5)
RBC # FLD: 17.1 % — HIGH (ref 10.3–14.5)
RBC # FLD: 17.3 % — HIGH (ref 10.3–14.5)
RBC # FLD: 17.5 % — HIGH (ref 10.3–14.5)
RBC # FLD: 17.5 % — HIGH (ref 10.3–14.5)
RBC # FLD: 17.6 % — HIGH (ref 10.3–14.5)
RBC # FLD: 17.8 % — HIGH (ref 10.3–14.5)
RBC # FLD: 17.8 % — HIGH (ref 10.3–14.5)
RBC # FLD: 17.9 % — HIGH (ref 10.3–14.5)
RBC # FLD: 18 % — HIGH (ref 10.3–14.5)
RBC # FLD: 18 % — HIGH (ref 10.3–14.5)
RBC # FLD: 18.1 % — HIGH (ref 10.3–14.5)
RBC # FLD: 18.2 % — HIGH (ref 10.3–14.5)
RBC # FLD: 18.3 % — HIGH (ref 10.3–14.5)
RBC # FLD: 18.4 % — HIGH (ref 10.3–14.5)
RBC # FLD: 18.4 % — HIGH (ref 10.3–14.5)
RBC # FLD: 18.5 % — HIGH (ref 10.3–14.5)
RBC # FLD: 18.6 % — HIGH (ref 10.3–14.5)
RBC # FLD: 18.9 % — HIGH (ref 10.3–14.5)
RBC # FLD: 19 % — HIGH (ref 10.3–14.5)
RBC # FLD: 19.2 % — HIGH (ref 10.3–14.5)
RBC # FLD: 19.3 % — HIGH (ref 10.3–14.5)
RBC # FLD: 19.4 % — HIGH (ref 10.3–14.5)
RBC # FLD: 19.5 % — HIGH (ref 10.3–14.5)
RBC # FLD: 19.6 % — HIGH (ref 10.3–14.5)
RBC # FLD: 19.6 % — HIGH (ref 10.3–14.5)
RBC # FLD: 19.7 % — HIGH (ref 10.3–14.5)
RBC # FLD: 19.9 % — HIGH (ref 10.3–14.5)
RBC # FLD: 20 % — HIGH (ref 10.3–14.5)
RBC CASTS # UR COMP ASSIST: 3 /HPF — SIGNIFICANT CHANGE UP (ref 0–4)
RETICS #: 104.1 K/UL — SIGNIFICANT CHANGE UP (ref 25–125)
RETICS/RBC NFR: 3.5 % — HIGH (ref 0.5–2.5)
RH IG SCN BLD-IMP: POSITIVE — SIGNIFICANT CHANGE UP
S AUREUS DNA NOSE QL NAA+PROBE: DETECTED
S AUREUS DNA NOSE QL NAA+PROBE: SIGNIFICANT CHANGE UP
SAO2 % BLDA: 97.6 % — SIGNIFICANT CHANGE UP (ref 94–98)
SAO2 % BLDV: 89.4 % — HIGH (ref 67–88)
SARS-COV-2 RNA SPEC QL NAA+PROBE: SIGNIFICANT CHANGE UP
SODIUM SERPL-SCNC: 126 MMOL/L — LOW (ref 135–145)
SODIUM SERPL-SCNC: 127 MMOL/L — LOW (ref 135–145)
SODIUM SERPL-SCNC: 128 MMOL/L — LOW (ref 135–145)
SODIUM SERPL-SCNC: 129 MMOL/L — LOW (ref 135–145)
SODIUM SERPL-SCNC: 130 MMOL/L — LOW (ref 135–145)
SODIUM SERPL-SCNC: 131 MMOL/L — LOW (ref 135–145)
SODIUM SERPL-SCNC: 132 MMOL/L — LOW (ref 135–145)
SODIUM SERPL-SCNC: 133 MMOL/L — LOW (ref 135–145)
SODIUM SERPL-SCNC: 134 MMOL/L — LOW (ref 135–145)
SODIUM SERPL-SCNC: 135 MMOL/L — SIGNIFICANT CHANGE UP (ref 135–145)
SODIUM SERPL-SCNC: 136 MMOL/L — SIGNIFICANT CHANGE UP (ref 135–145)
SODIUM SERPL-SCNC: 136 MMOL/L — SIGNIFICANT CHANGE UP (ref 135–145)
SODIUM SERPL-SCNC: 137 MMOL/L — SIGNIFICANT CHANGE UP (ref 135–145)
SODIUM SERPL-SCNC: 137 MMOL/L — SIGNIFICANT CHANGE UP (ref 135–145)
SODIUM UR-SCNC: 40 MMOL/L — SIGNIFICANT CHANGE UP
SODIUM UR-SCNC: 52 MMOL/L — SIGNIFICANT CHANGE UP
SODIUM UR-SCNC: 90 MMOL/L — SIGNIFICANT CHANGE UP
SP GR SPEC: 1.01 — SIGNIFICANT CHANGE UP (ref 1.01–1.02)
SP GR SPEC: 1.01 — SIGNIFICANT CHANGE UP (ref 1.01–1.02)
TIBC SERPL-MCNC: 293 UG/DL — SIGNIFICANT CHANGE UP (ref 220–430)
TIBC SERPL-MCNC: 350 UG/DL — SIGNIFICANT CHANGE UP (ref 220–430)
TRANSFERRIN SERPL-MCNC: 308 MG/DL — SIGNIFICANT CHANGE UP (ref 200–360)
TRIGL SERPL-MCNC: 48 MG/DL — SIGNIFICANT CHANGE UP
UIBC SERPL-MCNC: 263 UG/DL — SIGNIFICANT CHANGE UP (ref 110–370)
UIBC SERPL-MCNC: 322 UG/DL — SIGNIFICANT CHANGE UP (ref 110–370)
UROBILINOGEN FLD QL: NEGATIVE — SIGNIFICANT CHANGE UP
UROBILINOGEN FLD QL: NEGATIVE — SIGNIFICANT CHANGE UP
UUN UR-MCNC: 298 MG/DL — SIGNIFICANT CHANGE UP
UUN UR-MCNC: 397 MG/DL — SIGNIFICANT CHANGE UP
UUN UR-MCNC: 425 MG/DL — SIGNIFICANT CHANGE UP
UUN UR-MCNC: 428 MG/DL — SIGNIFICANT CHANGE UP
VIT B12 SERPL-MCNC: 692 PG/ML — SIGNIFICANT CHANGE UP (ref 232–1245)
WBC # BLD: 3.38 K/UL — LOW (ref 3.8–10.5)
WBC # BLD: 3.61 K/UL — LOW (ref 3.8–10.5)
WBC # BLD: 3.67 K/UL — LOW (ref 3.8–10.5)
WBC # BLD: 3.69 K/UL — LOW (ref 3.8–10.5)
WBC # BLD: 3.72 K/UL — LOW (ref 3.8–10.5)
WBC # BLD: 3.72 K/UL — LOW (ref 3.8–10.5)
WBC # BLD: 3.76 K/UL — LOW (ref 3.8–10.5)
WBC # BLD: 3.9 K/UL — SIGNIFICANT CHANGE UP (ref 3.8–10.5)
WBC # BLD: 3.91 K/UL — SIGNIFICANT CHANGE UP (ref 3.8–10.5)
WBC # BLD: 3.96 K/UL — SIGNIFICANT CHANGE UP (ref 3.8–10.5)
WBC # BLD: 4.07 K/UL — SIGNIFICANT CHANGE UP (ref 3.8–10.5)
WBC # BLD: 4.09 K/UL — SIGNIFICANT CHANGE UP (ref 3.8–10.5)
WBC # BLD: 4.1 K/UL — SIGNIFICANT CHANGE UP (ref 3.8–10.5)
WBC # BLD: 4.14 K/UL — SIGNIFICANT CHANGE UP (ref 3.8–10.5)
WBC # BLD: 4.16 K/UL — SIGNIFICANT CHANGE UP (ref 3.8–10.5)
WBC # BLD: 4.18 K/UL — SIGNIFICANT CHANGE UP (ref 3.8–10.5)
WBC # BLD: 4.21 K/UL — SIGNIFICANT CHANGE UP (ref 3.8–10.5)
WBC # BLD: 4.22 K/UL — SIGNIFICANT CHANGE UP (ref 3.8–10.5)
WBC # BLD: 4.22 K/UL — SIGNIFICANT CHANGE UP (ref 3.8–10.5)
WBC # BLD: 4.24 K/UL — SIGNIFICANT CHANGE UP (ref 3.8–10.5)
WBC # BLD: 4.24 K/UL — SIGNIFICANT CHANGE UP (ref 3.8–10.5)
WBC # BLD: 4.28 K/UL — SIGNIFICANT CHANGE UP (ref 3.8–10.5)
WBC # BLD: 4.4 K/UL — SIGNIFICANT CHANGE UP (ref 3.8–10.5)
WBC # BLD: 4.4 K/UL — SIGNIFICANT CHANGE UP (ref 3.8–10.5)
WBC # BLD: 4.46 K/UL — SIGNIFICANT CHANGE UP (ref 3.8–10.5)
WBC # BLD: 4.47 K/UL — SIGNIFICANT CHANGE UP (ref 3.8–10.5)
WBC # BLD: 4.51 K/UL — SIGNIFICANT CHANGE UP (ref 3.8–10.5)
WBC # BLD: 4.62 K/UL — SIGNIFICANT CHANGE UP (ref 3.8–10.5)
WBC # BLD: 4.79 K/UL — SIGNIFICANT CHANGE UP (ref 3.8–10.5)
WBC # BLD: 4.83 K/UL — SIGNIFICANT CHANGE UP (ref 3.8–10.5)
WBC # BLD: 4.87 K/UL — SIGNIFICANT CHANGE UP (ref 3.8–10.5)
WBC # BLD: 4.9 K/UL — SIGNIFICANT CHANGE UP (ref 3.8–10.5)
WBC # BLD: 4.94 K/UL — SIGNIFICANT CHANGE UP (ref 3.8–10.5)
WBC # BLD: 5.01 K/UL — SIGNIFICANT CHANGE UP (ref 3.8–10.5)
WBC # BLD: 5.21 K/UL — SIGNIFICANT CHANGE UP (ref 3.8–10.5)
WBC # BLD: 5.26 K/UL — SIGNIFICANT CHANGE UP (ref 3.8–10.5)
WBC # BLD: 5.59 K/UL — SIGNIFICANT CHANGE UP (ref 3.8–10.5)
WBC # BLD: 5.59 K/UL — SIGNIFICANT CHANGE UP (ref 3.8–10.5)
WBC # BLD: 5.69 K/UL — SIGNIFICANT CHANGE UP (ref 3.8–10.5)
WBC # BLD: 5.9 K/UL — SIGNIFICANT CHANGE UP (ref 3.8–10.5)
WBC # BLD: 6.27 K/UL — SIGNIFICANT CHANGE UP (ref 3.8–10.5)
WBC # BLD: 6.65 K/UL — SIGNIFICANT CHANGE UP (ref 3.8–10.5)
WBC # BLD: 6.82 K/UL — SIGNIFICANT CHANGE UP (ref 3.8–10.5)
WBC # BLD: 7.11 K/UL — SIGNIFICANT CHANGE UP (ref 3.8–10.5)
WBC # BLD: 7.82 K/UL — SIGNIFICANT CHANGE UP (ref 3.8–10.5)
WBC # BLD: 8.21 K/UL — SIGNIFICANT CHANGE UP (ref 3.8–10.5)
WBC # BLD: 8.27 K/UL — SIGNIFICANT CHANGE UP (ref 3.8–10.5)
WBC # BLD: 8.44 K/UL — SIGNIFICANT CHANGE UP (ref 3.8–10.5)
WBC # FLD AUTO: 3.38 K/UL — LOW (ref 3.8–10.5)
WBC # FLD AUTO: 3.61 K/UL — LOW (ref 3.8–10.5)
WBC # FLD AUTO: 3.67 K/UL — LOW (ref 3.8–10.5)
WBC # FLD AUTO: 3.69 K/UL — LOW (ref 3.8–10.5)
WBC # FLD AUTO: 3.72 K/UL — LOW (ref 3.8–10.5)
WBC # FLD AUTO: 3.72 K/UL — LOW (ref 3.8–10.5)
WBC # FLD AUTO: 3.76 K/UL — LOW (ref 3.8–10.5)
WBC # FLD AUTO: 3.9 K/UL — SIGNIFICANT CHANGE UP (ref 3.8–10.5)
WBC # FLD AUTO: 3.91 K/UL — SIGNIFICANT CHANGE UP (ref 3.8–10.5)
WBC # FLD AUTO: 3.96 K/UL — SIGNIFICANT CHANGE UP (ref 3.8–10.5)
WBC # FLD AUTO: 4.07 K/UL — SIGNIFICANT CHANGE UP (ref 3.8–10.5)
WBC # FLD AUTO: 4.09 K/UL — SIGNIFICANT CHANGE UP (ref 3.8–10.5)
WBC # FLD AUTO: 4.1 K/UL — SIGNIFICANT CHANGE UP (ref 3.8–10.5)
WBC # FLD AUTO: 4.14 K/UL — SIGNIFICANT CHANGE UP (ref 3.8–10.5)
WBC # FLD AUTO: 4.16 K/UL — SIGNIFICANT CHANGE UP (ref 3.8–10.5)
WBC # FLD AUTO: 4.18 K/UL — SIGNIFICANT CHANGE UP (ref 3.8–10.5)
WBC # FLD AUTO: 4.21 K/UL — SIGNIFICANT CHANGE UP (ref 3.8–10.5)
WBC # FLD AUTO: 4.22 K/UL — SIGNIFICANT CHANGE UP (ref 3.8–10.5)
WBC # FLD AUTO: 4.22 K/UL — SIGNIFICANT CHANGE UP (ref 3.8–10.5)
WBC # FLD AUTO: 4.24 K/UL — SIGNIFICANT CHANGE UP (ref 3.8–10.5)
WBC # FLD AUTO: 4.24 K/UL — SIGNIFICANT CHANGE UP (ref 3.8–10.5)
WBC # FLD AUTO: 4.28 K/UL — SIGNIFICANT CHANGE UP (ref 3.8–10.5)
WBC # FLD AUTO: 4.4 K/UL — SIGNIFICANT CHANGE UP (ref 3.8–10.5)
WBC # FLD AUTO: 4.4 K/UL — SIGNIFICANT CHANGE UP (ref 3.8–10.5)
WBC # FLD AUTO: 4.46 K/UL — SIGNIFICANT CHANGE UP (ref 3.8–10.5)
WBC # FLD AUTO: 4.47 K/UL — SIGNIFICANT CHANGE UP (ref 3.8–10.5)
WBC # FLD AUTO: 4.51 K/UL — SIGNIFICANT CHANGE UP (ref 3.8–10.5)
WBC # FLD AUTO: 4.62 K/UL — SIGNIFICANT CHANGE UP (ref 3.8–10.5)
WBC # FLD AUTO: 4.79 K/UL — SIGNIFICANT CHANGE UP (ref 3.8–10.5)
WBC # FLD AUTO: 4.83 K/UL — SIGNIFICANT CHANGE UP (ref 3.8–10.5)
WBC # FLD AUTO: 4.87 K/UL — SIGNIFICANT CHANGE UP (ref 3.8–10.5)
WBC # FLD AUTO: 4.9 K/UL — SIGNIFICANT CHANGE UP (ref 3.8–10.5)
WBC # FLD AUTO: 4.94 K/UL — SIGNIFICANT CHANGE UP (ref 3.8–10.5)
WBC # FLD AUTO: 5.01 K/UL — SIGNIFICANT CHANGE UP (ref 3.8–10.5)
WBC # FLD AUTO: 5.21 K/UL — SIGNIFICANT CHANGE UP (ref 3.8–10.5)
WBC # FLD AUTO: 5.26 K/UL — SIGNIFICANT CHANGE UP (ref 3.8–10.5)
WBC # FLD AUTO: 5.59 K/UL — SIGNIFICANT CHANGE UP (ref 3.8–10.5)
WBC # FLD AUTO: 5.59 K/UL — SIGNIFICANT CHANGE UP (ref 3.8–10.5)
WBC # FLD AUTO: 5.69 K/UL — SIGNIFICANT CHANGE UP (ref 3.8–10.5)
WBC # FLD AUTO: 5.9 K/UL — SIGNIFICANT CHANGE UP (ref 3.8–10.5)
WBC # FLD AUTO: 6.27 K/UL — SIGNIFICANT CHANGE UP (ref 3.8–10.5)
WBC # FLD AUTO: 6.65 K/UL — SIGNIFICANT CHANGE UP (ref 3.8–10.5)
WBC # FLD AUTO: 6.82 K/UL — SIGNIFICANT CHANGE UP (ref 3.8–10.5)
WBC # FLD AUTO: 7.11 K/UL — SIGNIFICANT CHANGE UP (ref 3.8–10.5)
WBC # FLD AUTO: 7.82 K/UL — SIGNIFICANT CHANGE UP (ref 3.8–10.5)
WBC # FLD AUTO: 8.21 K/UL — SIGNIFICANT CHANGE UP (ref 3.8–10.5)
WBC # FLD AUTO: 8.27 K/UL — SIGNIFICANT CHANGE UP (ref 3.8–10.5)
WBC # FLD AUTO: 8.44 K/UL — SIGNIFICANT CHANGE UP (ref 3.8–10.5)
WBC UR QL: 530 /HPF — HIGH (ref 0–5)

## 2022-01-01 PROCEDURE — ZZZZZ: CPT

## 2022-01-01 PROCEDURE — 99233 SBSQ HOSP IP/OBS HIGH 50: CPT

## 2022-01-01 PROCEDURE — 82570 ASSAY OF URINE CREATININE: CPT

## 2022-01-01 PROCEDURE — 36415 COLL VENOUS BLD VENIPUNCTURE: CPT

## 2022-01-01 PROCEDURE — 99233 SBSQ HOSP IP/OBS HIGH 50: CPT | Mod: GC

## 2022-01-01 PROCEDURE — 97116 GAIT TRAINING THERAPY: CPT

## 2022-01-01 PROCEDURE — 82610 CYSTATIN C: CPT

## 2022-01-01 PROCEDURE — 74176 CT ABD & PELVIS W/O CONTRAST: CPT

## 2022-01-01 PROCEDURE — 84295 ASSAY OF SERUM SODIUM: CPT

## 2022-01-01 PROCEDURE — 85610 PROTHROMBIN TIME: CPT

## 2022-01-01 PROCEDURE — 86923 COMPATIBILITY TEST ELECTRIC: CPT

## 2022-01-01 PROCEDURE — 87449 NOS EACH ORGANISM AG IA: CPT

## 2022-01-01 PROCEDURE — C2624: CPT

## 2022-01-01 PROCEDURE — 97110 THERAPEUTIC EXERCISES: CPT

## 2022-01-01 PROCEDURE — 12345: CPT | Mod: NC,GC

## 2022-01-01 PROCEDURE — 82330 ASSAY OF CALCIUM: CPT

## 2022-01-01 PROCEDURE — 80048 BASIC METABOLIC PNL TOTAL CA: CPT

## 2022-01-01 PROCEDURE — 82803 BLOOD GASES ANY COMBINATION: CPT

## 2022-01-01 PROCEDURE — 74176 CT ABD & PELVIS W/O CONTRAST: CPT | Mod: 26

## 2022-01-01 PROCEDURE — 82962 GLUCOSE BLOOD TEST: CPT

## 2022-01-01 PROCEDURE — 83735 ASSAY OF MAGNESIUM: CPT

## 2022-01-01 PROCEDURE — 80162 ASSAY OF DIGOXIN TOTAL: CPT

## 2022-01-01 PROCEDURE — 86901 BLOOD TYPING SEROLOGIC RH(D): CPT

## 2022-01-01 PROCEDURE — 83540 ASSAY OF IRON: CPT

## 2022-01-01 PROCEDURE — 99223 1ST HOSP IP/OBS HIGH 75: CPT

## 2022-01-01 PROCEDURE — 0225U NFCT DS DNA&RNA 21 SARSCOV2: CPT

## 2022-01-01 PROCEDURE — 93306 TTE W/DOPPLER COMPLETE: CPT | Mod: 26

## 2022-01-01 PROCEDURE — 83880 ASSAY OF NATRIURETIC PEPTIDE: CPT

## 2022-01-01 PROCEDURE — 97535 SELF CARE MNGMENT TRAINING: CPT

## 2022-01-01 PROCEDURE — 85730 THROMBOPLASTIN TIME PARTIAL: CPT

## 2022-01-01 PROCEDURE — 99153 MOD SED SAME PHYS/QHP EA: CPT

## 2022-01-01 PROCEDURE — C1889: CPT

## 2022-01-01 PROCEDURE — U0005: CPT

## 2022-01-01 PROCEDURE — 84100 ASSAY OF PHOSPHORUS: CPT

## 2022-01-01 PROCEDURE — 99222 1ST HOSP IP/OBS MODERATE 55: CPT

## 2022-01-01 PROCEDURE — 84132 ASSAY OF SERUM POTASSIUM: CPT

## 2022-01-01 PROCEDURE — 71045 X-RAY EXAM CHEST 1 VIEW: CPT | Mod: 26

## 2022-01-01 PROCEDURE — 94640 AIRWAY INHALATION TREATMENT: CPT

## 2022-01-01 PROCEDURE — 97161 PT EVAL LOW COMPLEX 20 MIN: CPT

## 2022-01-01 PROCEDURE — 83605 ASSAY OF LACTIC ACID: CPT

## 2022-01-01 PROCEDURE — 81001 URINALYSIS AUTO W/SCOPE: CPT

## 2022-01-01 PROCEDURE — 99223 1ST HOSP IP/OBS HIGH 75: CPT | Mod: GC

## 2022-01-01 PROCEDURE — C8924: CPT

## 2022-01-01 PROCEDURE — 99356: CPT

## 2022-01-01 PROCEDURE — 99232 SBSQ HOSP IP/OBS MODERATE 35: CPT | Mod: GC

## 2022-01-01 PROCEDURE — 82435 ASSAY OF BLOOD CHLORIDE: CPT

## 2022-01-01 PROCEDURE — C1769: CPT

## 2022-01-01 PROCEDURE — C1894: CPT

## 2022-01-01 PROCEDURE — 97166 OT EVAL MOD COMPLEX 45 MIN: CPT

## 2022-01-01 PROCEDURE — P9016: CPT

## 2022-01-01 PROCEDURE — 99231 SBSQ HOSP IP/OBS SF/LOW 25: CPT | Mod: 24,57

## 2022-01-01 PROCEDURE — 93321 DOPPLER ECHO F-UP/LMTD STD: CPT

## 2022-01-01 PROCEDURE — 86900 BLOOD TYPING SEROLOGIC ABO: CPT

## 2022-01-01 PROCEDURE — 93010 ELECTROCARDIOGRAM REPORT: CPT

## 2022-01-01 PROCEDURE — 84540 ASSAY OF URINE/UREA-N: CPT

## 2022-01-01 PROCEDURE — 87641 MR-STAPH DNA AMP PROBE: CPT

## 2022-01-01 PROCEDURE — 83036 HEMOGLOBIN GLYCOSYLATED A1C: CPT

## 2022-01-01 PROCEDURE — 84300 ASSAY OF URINE SODIUM: CPT

## 2022-01-01 PROCEDURE — 85025 COMPLETE CBC W/AUTO DIFF WBC: CPT

## 2022-01-01 PROCEDURE — 99152 MOD SED SAME PHYS/QHP 5/>YRS: CPT

## 2022-01-01 PROCEDURE — 99214 OFFICE O/P EST MOD 30 MIN: CPT

## 2022-01-01 PROCEDURE — 81003 URINALYSIS AUTO W/O SCOPE: CPT

## 2022-01-01 PROCEDURE — 99232 SBSQ HOSP IP/OBS MODERATE 35: CPT | Mod: 24

## 2022-01-01 PROCEDURE — 93308 TTE F-UP OR LMTD: CPT | Mod: 26

## 2022-01-01 PROCEDURE — 87640 STAPH A DNA AMP PROBE: CPT

## 2022-01-01 PROCEDURE — 93321 DOPPLER ECHO F-UP/LMTD STD: CPT | Mod: 26

## 2022-01-01 PROCEDURE — 33289 TCAT IMPL WRLS P-ART PRS SNR: CPT

## 2022-01-01 PROCEDURE — 80061 LIPID PANEL: CPT

## 2022-01-01 PROCEDURE — 82947 ASSAY GLUCOSE BLOOD QUANT: CPT

## 2022-01-01 PROCEDURE — 71250 CT THORAX DX C-: CPT

## 2022-01-01 PROCEDURE — 36600 WITHDRAWAL OF ARTERIAL BLOOD: CPT

## 2022-01-01 PROCEDURE — 83935 ASSAY OF URINE OSMOLALITY: CPT

## 2022-01-01 PROCEDURE — 85014 HEMATOCRIT: CPT

## 2022-01-01 PROCEDURE — 97530 THERAPEUTIC ACTIVITIES: CPT

## 2022-01-01 PROCEDURE — 99239 HOSP IP/OBS DSCHRG MGMT >30: CPT | Mod: GC

## 2022-01-01 PROCEDURE — 36430 TRANSFUSION BLD/BLD COMPNT: CPT

## 2022-01-01 PROCEDURE — 86850 RBC ANTIBODY SCREEN: CPT

## 2022-01-01 PROCEDURE — 84145 PROCALCITONIN (PCT): CPT

## 2022-01-01 PROCEDURE — 80076 HEPATIC FUNCTION PANEL: CPT

## 2022-01-01 PROCEDURE — 71250 CT THORAX DX C-: CPT | Mod: 26

## 2022-01-01 PROCEDURE — 93308 TTE F-UP OR LMTD: CPT

## 2022-01-01 PROCEDURE — 86803 HEPATITIS C AB TEST: CPT

## 2022-01-01 PROCEDURE — 82436 ASSAY OF URINE CHLORIDE: CPT

## 2022-01-01 PROCEDURE — 99497 ADVNCD CARE PLAN 30 MIN: CPT | Mod: 25

## 2022-01-01 PROCEDURE — 85018 HEMOGLOBIN: CPT

## 2022-01-01 PROCEDURE — U0003: CPT

## 2022-01-01 PROCEDURE — 99232 SBSQ HOSP IP/OBS MODERATE 35: CPT

## 2022-01-01 PROCEDURE — 99215 OFFICE O/P EST HI 40 MIN: CPT

## 2022-01-01 PROCEDURE — 93005 ELECTROCARDIOGRAM TRACING: CPT

## 2022-01-01 PROCEDURE — 80053 COMPREHEN METABOLIC PANEL: CPT

## 2022-01-01 PROCEDURE — 85027 COMPLETE CBC AUTOMATED: CPT

## 2022-01-01 PROCEDURE — 85045 AUTOMATED RETICULOCYTE COUNT: CPT

## 2022-01-01 PROCEDURE — 83550 IRON BINDING TEST: CPT

## 2022-01-01 PROCEDURE — C1760: CPT

## 2022-01-01 PROCEDURE — 71045 X-RAY EXAM CHEST 1 VIEW: CPT

## 2022-01-01 PROCEDURE — 84466 ASSAY OF TRANSFERRIN: CPT

## 2022-01-01 PROCEDURE — 93306 TTE W/DOPPLER COMPLETE: CPT

## 2022-01-01 PROCEDURE — 84156 ASSAY OF PROTEIN URINE: CPT

## 2022-01-01 PROCEDURE — 82728 ASSAY OF FERRITIN: CPT

## 2022-01-01 PROCEDURE — 82607 VITAMIN B-12: CPT

## 2022-01-01 PROCEDURE — 82746 ASSAY OF FOLIC ACID SERUM: CPT

## 2022-01-01 PROCEDURE — 93264 REM MNTR WRLS P-ART PRS SNR: CPT

## 2022-01-01 PROCEDURE — 84133 ASSAY OF URINE POTASSIUM: CPT

## 2022-01-01 RX ORDER — ASPIRIN/CALCIUM CARB/MAGNESIUM 324 MG
81 TABLET ORAL DAILY
Refills: 0 | Status: DISCONTINUED | OUTPATIENT
Start: 2022-01-01 | End: 2022-01-01

## 2022-01-01 RX ORDER — AZITHROMYCIN 500 MG/1
TABLET, FILM COATED ORAL
Refills: 0 | Status: DISCONTINUED | OUTPATIENT
Start: 2022-01-01 | End: 2022-01-01

## 2022-01-01 RX ORDER — POTASSIUM CHLORIDE 20 MEQ
40 PACKET (EA) ORAL ONCE
Refills: 0 | Status: COMPLETED | OUTPATIENT
Start: 2022-01-01 | End: 2022-01-01

## 2022-01-01 RX ORDER — FERROUS SULFATE 325(65) MG
325 TABLET ORAL DAILY
Refills: 0 | Status: DISCONTINUED | OUTPATIENT
Start: 2022-01-01 | End: 2022-01-01

## 2022-01-01 RX ORDER — FUROSEMIDE 40 MG
10 TABLET ORAL
Qty: 500 | Refills: 0 | Status: DISCONTINUED | OUTPATIENT
Start: 2022-01-01 | End: 2022-01-01

## 2022-01-01 RX ORDER — AMBRISENTAN 10 MG/1
1 TABLET, FILM COATED ORAL
Qty: 30 | Refills: 0
Start: 2022-01-01 | End: 2022-01-01

## 2022-01-01 RX ORDER — IPRATROPIUM/ALBUTEROL SULFATE 18-103MCG
3 AEROSOL WITH ADAPTER (GRAM) INHALATION
Qty: 360 | Refills: 0
Start: 2022-01-01 | End: 2022-01-01

## 2022-01-01 RX ORDER — BUMETANIDE 0.25 MG/ML
2 INJECTION INTRAMUSCULAR; INTRAVENOUS
Qty: 120 | Refills: 0
Start: 2022-01-01 | End: 2022-01-01

## 2022-01-01 RX ORDER — BUMETANIDE 2 MG/1
2 TABLET ORAL TWICE DAILY
Refills: 0 | Status: ACTIVE | COMMUNITY
Start: 2022-01-01

## 2022-01-01 RX ORDER — DIGOXIN 250 MCG
1 TABLET ORAL
Qty: 0 | Refills: 0 | DISCHARGE

## 2022-01-01 RX ORDER — SPIRONOLACTONE 25 MG/1
1 TABLET, FILM COATED ORAL
Qty: 60 | Refills: 0
Start: 2022-01-01 | End: 2022-01-01

## 2022-01-01 RX ORDER — FERROUS SULFATE 325(65) MG
325 TABLET ORAL
Refills: 0 | Status: DISCONTINUED | OUTPATIENT
Start: 2022-01-01 | End: 2022-01-01

## 2022-01-01 RX ORDER — BUDESONIDE AND FORMOTEROL FUMARATE DIHYDRATE 160; 4.5 UG/1; UG/1
2 AEROSOL RESPIRATORY (INHALATION)
Refills: 0 | Status: DISCONTINUED | OUTPATIENT
Start: 2022-01-01 | End: 2022-01-01

## 2022-01-01 RX ORDER — SPIRONOLACTONE 25 MG/1
50 TABLET, FILM COATED ORAL DAILY
Refills: 0 | Status: DISCONTINUED | OUTPATIENT
Start: 2022-01-01 | End: 2022-01-01

## 2022-01-01 RX ORDER — BUMETANIDE 0.25 MG/ML
4 INJECTION INTRAMUSCULAR; INTRAVENOUS EVERY 12 HOURS
Refills: 0 | Status: DISCONTINUED | OUTPATIENT
Start: 2022-01-01 | End: 2022-01-01

## 2022-01-01 RX ORDER — HEPARIN SODIUM 5000 [USP'U]/ML
3500 INJECTION INTRAVENOUS; SUBCUTANEOUS EVERY 6 HOURS
Refills: 0 | Status: DISCONTINUED | OUTPATIENT
Start: 2022-01-01 | End: 2022-01-01

## 2022-01-01 RX ORDER — FLUTICASONE FUROATE, UMECLIDINIUM BROMIDE AND VILANTEROL TRIFENATATE 200; 62.5; 25 UG/1; UG/1; UG/1
1 POWDER RESPIRATORY (INHALATION)
Qty: 0 | Refills: 0 | DISCHARGE

## 2022-01-01 RX ORDER — TADALAFIL 10 MG/1
2 TABLET, FILM COATED ORAL
Qty: 0 | Refills: 0 | DISCHARGE

## 2022-01-01 RX ORDER — BUMETANIDE 0.25 MG/ML
2 INJECTION INTRAMUSCULAR; INTRAVENOUS
Qty: 20 | Refills: 0 | Status: DISCONTINUED | OUTPATIENT
Start: 2022-01-01 | End: 2022-01-01

## 2022-01-01 RX ORDER — BUMETANIDE 0.25 MG/ML
2 INJECTION INTRAMUSCULAR; INTRAVENOUS
Qty: 360 | Refills: 0
Start: 2022-01-01 | End: 2022-01-01

## 2022-01-01 RX ORDER — FERROUS SULFATE 325(65) MG
1 TABLET ORAL
Qty: 30 | Refills: 0
Start: 2022-01-01 | End: 2022-01-01

## 2022-01-01 RX ORDER — POLYETHYLENE GLYCOL 3350 17 G/17G
17 POWDER, FOR SOLUTION ORAL DAILY
Refills: 0 | Status: DISCONTINUED | OUTPATIENT
Start: 2022-01-01 | End: 2022-01-01

## 2022-01-01 RX ORDER — APIXABAN 2.5 MG/1
5 TABLET, FILM COATED ORAL EVERY 12 HOURS
Refills: 0 | Status: COMPLETED | OUTPATIENT
Start: 2022-01-01 | End: 2022-01-01

## 2022-01-01 RX ORDER — FERROUS SULFATE 325(65) MG
1 TABLET ORAL
Qty: 0 | Refills: 0 | DISCHARGE
Start: 2022-01-01

## 2022-01-01 RX ORDER — APIXABAN 2.5 MG/1
5 TABLET, FILM COATED ORAL
Refills: 0 | Status: DISCONTINUED | OUTPATIENT
Start: 2022-01-01 | End: 2022-01-01

## 2022-01-01 RX ORDER — ACETAMINOPHEN 500 MG
975 TABLET ORAL EVERY 6 HOURS
Refills: 0 | Status: DISCONTINUED | OUTPATIENT
Start: 2022-01-01 | End: 2022-01-01

## 2022-01-01 RX ORDER — TORSEMIDE 20 MG/1
20 TABLET ORAL
Qty: 360 | Refills: 1 | Status: DISCONTINUED | COMMUNITY
Start: 1900-01-01 | End: 2022-01-01

## 2022-01-01 RX ORDER — BUPROPION HYDROCHLORIDE 150 MG/1
150 TABLET, EXTENDED RELEASE ORAL DAILY
Refills: 0 | Status: DISCONTINUED | OUTPATIENT
Start: 2022-01-01 | End: 2022-01-01

## 2022-01-01 RX ORDER — LEVALBUTEROL HYDROCHLORIDE 1.25 MG/3ML
1.25 SOLUTION RESPIRATORY (INHALATION)
Refills: 0 | Status: ACTIVE | COMMUNITY

## 2022-01-01 RX ORDER — SODIUM CHLORIDE 9 MG/ML
3 INJECTION INTRAMUSCULAR; INTRAVENOUS; SUBCUTANEOUS EVERY 8 HOURS
Refills: 0 | Status: DISCONTINUED | OUTPATIENT
Start: 2022-01-01 | End: 2022-01-01

## 2022-01-01 RX ORDER — HEPARIN SODIUM 5000 [USP'U]/ML
INJECTION INTRAVENOUS; SUBCUTANEOUS
Qty: 25000 | Refills: 0 | Status: DISCONTINUED | OUTPATIENT
Start: 2022-01-01 | End: 2022-01-01

## 2022-01-01 RX ORDER — DILTIAZEM HCL 120 MG
1 CAPSULE, EXT RELEASE 24 HR ORAL
Qty: 0 | Refills: 0 | DISCHARGE

## 2022-01-01 RX ORDER — AMBRISENTAN 10 MG/1
10 TABLET, FILM COATED ORAL DAILY
Refills: 0 | Status: DISCONTINUED | OUTPATIENT
Start: 2022-01-01 | End: 2022-01-01

## 2022-01-01 RX ORDER — DIGOXIN 250 MCG
125 TABLET ORAL DAILY
Refills: 0 | Status: DISCONTINUED | OUTPATIENT
Start: 2022-01-01 | End: 2022-01-01

## 2022-01-01 RX ORDER — ACETAMINOPHEN 500 MG
650 TABLET ORAL EVERY 6 HOURS
Refills: 0 | Status: DISCONTINUED | OUTPATIENT
Start: 2022-01-01 | End: 2022-01-01

## 2022-01-01 RX ORDER — BUMETANIDE 0.25 MG/ML
2 INJECTION INTRAMUSCULAR; INTRAVENOUS
Refills: 0 | Status: DISCONTINUED | OUTPATIENT
Start: 2022-01-01 | End: 2022-01-01

## 2022-01-01 RX ORDER — POTASSIUM CHLORIDE 20 MEQ
20 PACKET (EA) ORAL ONCE
Refills: 0 | Status: COMPLETED | OUTPATIENT
Start: 2022-01-01 | End: 2022-01-01

## 2022-01-01 RX ORDER — HEPARIN SODIUM 5000 [USP'U]/ML
7500 INJECTION INTRAVENOUS; SUBCUTANEOUS EVERY 6 HOURS
Refills: 0 | Status: DISCONTINUED | OUTPATIENT
Start: 2022-01-01 | End: 2022-01-01

## 2022-01-01 RX ORDER — SODIUM CHLORIDE 5 G/100ML
150 INJECTION, SOLUTION INTRAVENOUS
Refills: 0 | Status: COMPLETED | OUTPATIENT
Start: 2022-01-01 | End: 2022-01-01

## 2022-01-01 RX ORDER — DAPAGLIFLOZIN 10 MG/1
1 TABLET, FILM COATED ORAL
Qty: 30 | Refills: 0
Start: 2022-01-01

## 2022-01-01 RX ORDER — IPRATROPIUM/ALBUTEROL SULFATE 18-103MCG
3 AEROSOL WITH ADAPTER (GRAM) INHALATION EVERY 6 HOURS
Refills: 0 | Status: DISCONTINUED | OUTPATIENT
Start: 2022-01-01 | End: 2022-01-01

## 2022-01-01 RX ORDER — HYDROMORPHONE HYDROCHLORIDE 2 MG/ML
0.5 INJECTION INTRAMUSCULAR; INTRAVENOUS; SUBCUTANEOUS EVERY 4 HOURS
Refills: 0 | Status: DISCONTINUED | OUTPATIENT
Start: 2022-01-01 | End: 2022-01-01

## 2022-01-01 RX ORDER — BUMETANIDE 0.25 MG/ML
4 INJECTION INTRAMUSCULAR; INTRAVENOUS
Refills: 0 | Status: DISCONTINUED | OUTPATIENT
Start: 2022-01-01 | End: 2022-01-01

## 2022-01-01 RX ORDER — SODIUM CHLORIDE 0.65 %
1 AEROSOL, SPRAY (ML) NASAL
Refills: 0 | Status: DISCONTINUED | OUTPATIENT
Start: 2022-01-01 | End: 2022-01-01

## 2022-01-01 RX ORDER — LEVALBUTEROL 1.25 MG/.5ML
3 SOLUTION, CONCENTRATE RESPIRATORY (INHALATION)
Qty: 0 | Refills: 0 | DISCHARGE

## 2022-01-01 RX ORDER — SENNA PLUS 8.6 MG/1
2 TABLET ORAL AT BEDTIME
Refills: 0 | Status: DISCONTINUED | OUTPATIENT
Start: 2022-01-01 | End: 2022-01-01

## 2022-01-01 RX ORDER — BUMETANIDE 0.25 MG/ML
2 INJECTION INTRAMUSCULAR; INTRAVENOUS ONCE
Refills: 0 | Status: COMPLETED | OUTPATIENT
Start: 2022-01-01 | End: 2022-01-01

## 2022-01-01 RX ORDER — BUMETANIDE 0.25 MG/ML
2 INJECTION INTRAMUSCULAR; INTRAVENOUS EVERY 8 HOURS
Refills: 0 | Status: DISCONTINUED | OUTPATIENT
Start: 2022-01-01 | End: 2022-01-01

## 2022-01-01 RX ORDER — AZITHROMYCIN 500 MG/1
500 TABLET, FILM COATED ORAL ONCE
Refills: 0 | Status: COMPLETED | OUTPATIENT
Start: 2022-01-01 | End: 2022-01-01

## 2022-01-01 RX ORDER — ATORVASTATIN CALCIUM 20 MG/1
20 TABLET, FILM COATED ORAL
Qty: 90 | Refills: 3 | Status: ACTIVE | COMMUNITY
Start: 1900-01-01 | End: 1900-01-01

## 2022-01-01 RX ORDER — POTASSIUM CHLORIDE 20 MEQ
10 PACKET (EA) ORAL ONCE
Refills: 0 | Status: COMPLETED | OUTPATIENT
Start: 2022-01-01 | End: 2022-01-01

## 2022-01-01 RX ORDER — FUROSEMIDE 40 MG
80 TABLET ORAL
Refills: 0 | Status: DISCONTINUED | OUTPATIENT
Start: 2022-01-01 | End: 2022-01-01

## 2022-01-01 RX ORDER — SPIRONOLACTONE 25 MG/1
1 TABLET, FILM COATED ORAL
Qty: 180 | Refills: 0
Start: 2022-01-01 | End: 2022-01-01

## 2022-01-01 RX ORDER — PANTOPRAZOLE SODIUM 20 MG/1
40 TABLET, DELAYED RELEASE ORAL
Refills: 0 | Status: DISCONTINUED | OUTPATIENT
Start: 2022-01-01 | End: 2022-01-01

## 2022-01-01 RX ORDER — CHLORHEXIDINE GLUCONATE 213 G/1000ML
1 SOLUTION TOPICAL
Refills: 0 | Status: DISCONTINUED | OUTPATIENT
Start: 2022-01-01 | End: 2022-01-01

## 2022-01-01 RX ORDER — FLUTICASONE FUROATE, UMECLIDINIUM BROMIDE AND VILANTEROL TRIFENATATE 100; 62.5; 25 UG/1; UG/1; UG/1
100-62.5-25 POWDER RESPIRATORY (INHALATION)
Qty: 60 | Refills: 0 | Status: ACTIVE | COMMUNITY
Start: 2020-02-03

## 2022-01-01 RX ORDER — OXYMETAZOLINE HYDROCHLORIDE 0.5 MG/ML
1 SPRAY NASAL ONCE
Refills: 0 | Status: COMPLETED | OUTPATIENT
Start: 2022-01-01 | End: 2022-01-01

## 2022-01-01 RX ORDER — TIOTROPIUM BROMIDE 18 UG/1
1 CAPSULE ORAL; RESPIRATORY (INHALATION) DAILY
Refills: 0 | Status: DISCONTINUED | OUTPATIENT
Start: 2022-01-01 | End: 2022-01-01

## 2022-01-01 RX ORDER — SPIRONOLACTONE 25 MG/1
25 TABLET ORAL TWICE DAILY
Qty: 60 | Refills: 1 | Status: COMPLETED | COMMUNITY
Start: 2020-05-12 | End: 2022-01-01

## 2022-01-01 RX ORDER — DIGOXIN 125 UG/1
125 TABLET ORAL
Refills: 0 | Status: ACTIVE | COMMUNITY

## 2022-01-01 RX ORDER — POLYETHYLENE GLYCOL 3350 17 G/17G
17 POWDER, FOR SOLUTION ORAL DAILY
Refills: 0 | Status: ACTIVE | COMMUNITY
Start: 2022-01-01

## 2022-01-01 RX ORDER — ACETAZOLAMIDE 250 MG/1
250 TABLET ORAL ONCE
Refills: 0 | Status: COMPLETED | OUTPATIENT
Start: 2022-01-01 | End: 2022-01-01

## 2022-01-01 RX ORDER — TADALAFIL 10 MG/1
2 TABLET, FILM COATED ORAL
Qty: 60 | Refills: 0
Start: 2022-01-01 | End: 2022-01-01

## 2022-01-01 RX ORDER — TIOTROPIUM BROMIDE AND OLODATEROL 3.124; 2.736 UG/1; UG/1
2 SPRAY, METERED RESPIRATORY (INHALATION)
Qty: 1 | Refills: 0
Start: 2022-01-01

## 2022-01-01 RX ORDER — APIXABAN 2.5 MG/1
5 TABLET, FILM COATED ORAL EVERY 12 HOURS
Refills: 0 | Status: DISCONTINUED | OUTPATIENT
Start: 2022-01-01 | End: 2022-01-01

## 2022-01-01 RX ORDER — MUPIROCIN 20 MG/G
1 OINTMENT TOPICAL
Refills: 0 | Status: COMPLETED | OUTPATIENT
Start: 2022-01-01 | End: 2022-01-01

## 2022-01-01 RX ORDER — SODIUM CHLORIDE 0.65 %
2 AEROSOL, SPRAY (ML) NASAL
Refills: 0 | Status: DISCONTINUED | OUTPATIENT
Start: 2022-01-01 | End: 2022-01-01

## 2022-01-01 RX ORDER — APIXABAN 5 MG/1
5 TABLET, FILM COATED ORAL
Qty: 180 | Refills: 3 | Status: ACTIVE | COMMUNITY
Start: 2018-05-23

## 2022-01-01 RX ORDER — AMBRISENTAN 10 MG/1
10 TABLET, FILM COATED ORAL DAILY
Refills: 0 | Status: COMPLETED | OUTPATIENT
Start: 2022-01-01 | End: 2022-01-01

## 2022-01-01 RX ORDER — ALBUTEROL 90 UG/1
2 AEROSOL, METERED ORAL EVERY 6 HOURS
Refills: 0 | Status: DISCONTINUED | OUTPATIENT
Start: 2022-01-01 | End: 2022-01-01

## 2022-01-01 RX ORDER — BUPROPION HYDROCHLORIDE 150 MG/1
300 TABLET, EXTENDED RELEASE ORAL DAILY
Refills: 0 | Status: DISCONTINUED | OUTPATIENT
Start: 2022-01-01 | End: 2022-01-01

## 2022-01-01 RX ORDER — UMECLIDINIUM 62.5 UG/1
1 AEROSOL, POWDER ORAL
Qty: 21 | Refills: 0
Start: 2022-01-01 | End: 2022-01-01

## 2022-01-01 RX ORDER — DILTIAZEM HCL 120 MG
120 CAPSULE, EXT RELEASE 24 HR ORAL DAILY
Refills: 0 | Status: DISCONTINUED | OUTPATIENT
Start: 2022-01-01 | End: 2022-01-01

## 2022-01-01 RX ORDER — AMBRISENTAN 10 MG/1
1 TABLET, FILM COATED ORAL
Qty: 90 | Refills: 0
Start: 2022-01-01 | End: 2022-01-01

## 2022-01-01 RX ORDER — TADALAFIL 20 MG/1
20 TABLET ORAL DAILY
Refills: 0 | Status: ACTIVE | COMMUNITY
Start: 2022-01-01

## 2022-01-01 RX ORDER — ROSUVASTATIN CALCIUM 5 MG/1
5 TABLET, FILM COATED ORAL
Qty: 90 | Refills: 3 | Status: DISCONTINUED | COMMUNITY
Start: 1900-01-01 | End: 2022-01-01

## 2022-01-01 RX ORDER — SODIUM CHLORIDE 5 G/100ML
150 INJECTION, SOLUTION INTRAVENOUS
Refills: 0 | Status: DISCONTINUED | OUTPATIENT
Start: 2022-01-01 | End: 2022-01-01

## 2022-01-01 RX ORDER — HYDROMORPHONE HYDROCHLORIDE 2 MG/ML
0.25 INJECTION INTRAMUSCULAR; INTRAVENOUS; SUBCUTANEOUS EVERY 4 HOURS
Refills: 0 | Status: DISCONTINUED | OUTPATIENT
Start: 2022-01-01 | End: 2022-01-01

## 2022-01-01 RX ORDER — POTASSIUM CHLORIDE 20 MEQ
40 PACKET (EA) ORAL EVERY 4 HOURS
Refills: 0 | Status: COMPLETED | OUTPATIENT
Start: 2022-01-01 | End: 2022-01-01

## 2022-01-01 RX ORDER — ATORVASTATIN CALCIUM 80 MG/1
20 TABLET, FILM COATED ORAL AT BEDTIME
Refills: 0 | Status: DISCONTINUED | OUTPATIENT
Start: 2022-01-01 | End: 2022-01-01

## 2022-01-01 RX ORDER — METOLAZONE 5 MG/1
5 TABLET ORAL
Qty: 16 | Refills: 0 | Status: DISCONTINUED | COMMUNITY
Start: 2019-09-03 | End: 2022-01-01

## 2022-01-01 RX ORDER — ACETAMINOPHEN 500 MG
1000 TABLET ORAL ONCE
Refills: 0 | Status: COMPLETED | OUTPATIENT
Start: 2022-01-01 | End: 2022-01-01

## 2022-01-01 RX ORDER — SPIRONOLACTONE 25 MG/1
1 TABLET, FILM COATED ORAL
Qty: 0 | Refills: 0 | DISCHARGE

## 2022-01-01 RX ORDER — ROSUVASTATIN CALCIUM 5 MG/1
1 TABLET ORAL
Qty: 0 | Refills: 0 | DISCHARGE

## 2022-01-01 RX ORDER — BUDESONIDE AND FORMOTEROL FUMARATE DIHYDRATE 160; 4.5 UG/1; UG/1
2 AEROSOL RESPIRATORY (INHALATION)
Qty: 1 | Refills: 0
Start: 2022-01-01 | End: 2022-01-01

## 2022-01-01 RX ORDER — AMBRISENTAN 10 MG/1
10 TABLET, FILM COATED ORAL DAILY
Refills: 0 | Status: ACTIVE | COMMUNITY
Start: 2022-01-01

## 2022-01-01 RX ORDER — FLUTICASONE PROPIONATE 50 MCG
1 SPRAY, SUSPENSION NASAL
Refills: 0 | Status: DISCONTINUED | OUTPATIENT
Start: 2022-01-01 | End: 2022-01-01

## 2022-01-01 RX ORDER — ROSUVASTATIN CALCIUM 5 MG/1
5 TABLET ORAL AT BEDTIME
Refills: 0 | Status: DISCONTINUED | OUTPATIENT
Start: 2022-01-01 | End: 2022-01-01

## 2022-01-01 RX ORDER — PANTOPRAZOLE 40 MG/1
40 TABLET, DELAYED RELEASE ORAL
Refills: 0 | Status: ACTIVE | COMMUNITY

## 2022-01-01 RX ORDER — MULTIVIT WITH MIN/MFOLATE/K2 340-15/3 G
3 POWDER (GRAM) ORAL
Qty: 30 | Refills: 0 | Status: ACTIVE | COMMUNITY
Start: 2022-01-01

## 2022-01-01 RX ORDER — FLUTICASONE PROPIONATE 50 UG/1
50 SPRAY, METERED NASAL
Refills: 0 | Status: ACTIVE | COMMUNITY

## 2022-01-01 RX ORDER — IRON SUCROSE 20 MG/ML
200 INJECTION, SOLUTION INTRAVENOUS ONCE
Refills: 0 | Status: COMPLETED | OUTPATIENT
Start: 2022-01-01 | End: 2022-01-01

## 2022-01-01 RX ORDER — AMBRISENTAN 10 MG/1
1 TABLET, FILM COATED ORAL
Qty: 0 | Refills: 0 | DISCHARGE

## 2022-01-01 RX ORDER — FUROSEMIDE 40 MG
60 TABLET ORAL
Refills: 0 | Status: DISCONTINUED | OUTPATIENT
Start: 2022-01-01 | End: 2022-01-01

## 2022-01-01 RX ORDER — DIGOXIN 250 MCG
1 TABLET ORAL
Qty: 0 | Refills: 0 | DISCHARGE
Start: 2022-01-01

## 2022-01-01 RX ORDER — MAGNESIUM OXIDE 400 MG ORAL TABLET 241.3 MG
400 TABLET ORAL
Refills: 0 | Status: COMPLETED | OUTPATIENT
Start: 2022-01-01 | End: 2022-01-01

## 2022-01-01 RX ORDER — SODIUM ZIRCONIUM CYCLOSILICATE 10 G/10G
10 POWDER, FOR SUSPENSION ORAL ONCE
Refills: 0 | Status: COMPLETED | OUTPATIENT
Start: 2022-01-01 | End: 2022-01-01

## 2022-01-01 RX ORDER — BUMETANIDE 0.25 MG/ML
1 INJECTION INTRAMUSCULAR; INTRAVENOUS
Qty: 20 | Refills: 0 | Status: DISCONTINUED | OUTPATIENT
Start: 2022-01-01 | End: 2022-01-01

## 2022-01-01 RX ORDER — FUROSEMIDE 40 MG
40 TABLET ORAL
Refills: 0 | Status: DISCONTINUED | OUTPATIENT
Start: 2022-01-01 | End: 2022-01-01

## 2022-01-01 RX ORDER — DIGOXIN 250 MCG
125 TABLET ORAL EVERY OTHER DAY
Refills: 0 | Status: DISCONTINUED | OUTPATIENT
Start: 2022-01-01 | End: 2022-01-01

## 2022-01-01 RX ORDER — DIGOXIN 125 UG/1
125 TABLET ORAL
Qty: 7 | Refills: 0 | Status: DISCONTINUED | COMMUNITY
Start: 2017-12-19 | End: 2022-01-01

## 2022-01-01 RX ORDER — TIOTROPIUM BROMIDE AND OLODATEROL 3.124; 2.736 UG/1; UG/1
2 SPRAY, METERED RESPIRATORY (INHALATION) DAILY
Refills: 0 | Status: DISCONTINUED | OUTPATIENT
Start: 2022-01-01 | End: 2022-01-01

## 2022-01-01 RX ORDER — LANOLIN ALCOHOL/MO/W.PET/CERES
3 CREAM (GRAM) TOPICAL AT BEDTIME
Refills: 0 | Status: DISCONTINUED | OUTPATIENT
Start: 2022-01-01 | End: 2022-01-01

## 2022-01-01 RX ORDER — ASPIRIN/CALCIUM CARB/MAGNESIUM 324 MG
1 TABLET ORAL
Qty: 30 | Refills: 0
Start: 2022-01-01 | End: 2022-01-01

## 2022-01-01 RX ORDER — BUMETANIDE 0.25 MG/ML
6 INJECTION INTRAMUSCULAR; INTRAVENOUS EVERY 12 HOURS
Refills: 0 | Status: DISCONTINUED | OUTPATIENT
Start: 2022-01-01 | End: 2022-01-01

## 2022-01-01 RX ORDER — TADALAFIL 20 MG/1
20 TABLET ORAL
Refills: 0 | Status: DISCONTINUED | COMMUNITY
End: 2022-01-01

## 2022-01-01 RX ORDER — TORSEMIDE 20 MG/1
20 TABLET ORAL DAILY
Qty: 60 | Refills: 0 | Status: DISCONTINUED | COMMUNITY
Start: 2019-09-17 | End: 2022-01-01

## 2022-01-01 RX ORDER — PANTOPRAZOLE SODIUM 20 MG/1
1 TABLET, DELAYED RELEASE ORAL
Qty: 0 | Refills: 0 | DISCHARGE

## 2022-01-01 RX ORDER — TADALAFIL 10 MG/1
40 TABLET, FILM COATED ORAL DAILY
Refills: 0 | Status: DISCONTINUED | OUTPATIENT
Start: 2022-01-01 | End: 2022-01-01

## 2022-01-01 RX ORDER — BUMETANIDE 0.25 MG/ML
2 INJECTION INTRAMUSCULAR; INTRAVENOUS
Qty: 0 | Refills: 0 | DISCHARGE
Start: 2022-01-01

## 2022-01-01 RX ORDER — BUPROPION HYDROCHLORIDE 100 MG/1
100 TABLET, FILM COATED ORAL TWICE DAILY
Refills: 0 | Status: ACTIVE | COMMUNITY
Start: 2022-01-01

## 2022-01-01 RX ORDER — NIRMATRELVIR AND RITONAVIR 150-100 MG
2 KIT ORAL
Qty: 30 | Refills: 0
Start: 2022-01-01 | End: 2022-01-01

## 2022-01-01 RX ORDER — APIXABAN 2.5 MG/1
1 TABLET, FILM COATED ORAL
Qty: 0 | Refills: 0 | DISCHARGE

## 2022-01-01 RX ORDER — SPIRONOLACTONE 25 MG/1
1 TABLET, FILM COATED ORAL
Qty: 0 | Refills: 0 | DISCHARGE
Start: 2022-01-01

## 2022-01-01 RX ORDER — POLYETHYLENE GLYCOL 3350 17 G/17G
17 POWDER, FOR SOLUTION ORAL
Qty: 0 | Refills: 0 | DISCHARGE
Start: 2022-01-01

## 2022-01-01 RX ORDER — FUROSEMIDE 40 MG
40 TABLET ORAL ONCE
Refills: 0 | Status: COMPLETED | OUTPATIENT
Start: 2022-01-01 | End: 2022-01-01

## 2022-01-01 RX ORDER — BUMETANIDE 0.25 MG/ML
2 INJECTION INTRAMUSCULAR; INTRAVENOUS EVERY 12 HOURS
Refills: 0 | Status: DISCONTINUED | OUTPATIENT
Start: 2022-01-01 | End: 2022-01-01

## 2022-01-01 RX ORDER — DILTIAZEM HYDROCHLORIDE 120 MG/1
120 CAPSULE, EXTENDED RELEASE ORAL
Qty: 90 | Refills: 0 | Status: ACTIVE | COMMUNITY
Start: 2019-08-02 | End: 1900-01-01

## 2022-01-01 RX ORDER — BUMETANIDE 0.25 MG/ML
3 INJECTION INTRAMUSCULAR; INTRAVENOUS
Refills: 0 | Status: DISCONTINUED | OUTPATIENT
Start: 2022-01-01 | End: 2022-01-01

## 2022-01-01 RX ORDER — CHLORHEXIDINE GLUCONATE 4 %
325 (65 FE) LIQUID (ML) TOPICAL
Refills: 0 | Status: ACTIVE | COMMUNITY
Start: 2022-01-01

## 2022-01-01 RX ORDER — BUPROPION HYDROCHLORIDE 150 MG/1
1 TABLET, EXTENDED RELEASE ORAL
Qty: 0 | Refills: 0 | DISCHARGE

## 2022-01-01 RX ORDER — SPIRONOLACTONE 25 MG/1
25 TABLET, FILM COATED ORAL
Refills: 0 | Status: DISCONTINUED | OUTPATIENT
Start: 2022-01-01 | End: 2022-01-01

## 2022-01-01 RX ORDER — CEPHALEXIN 500 MG
500 CAPSULE ORAL EVERY 12 HOURS
Refills: 0 | Status: COMPLETED | OUTPATIENT
Start: 2022-01-01 | End: 2022-01-01

## 2022-01-01 RX ORDER — EMPAGLIFLOZIN 10 MG/1
1 TABLET, FILM COATED ORAL
Qty: 30 | Refills: 0
Start: 2022-01-01

## 2022-01-01 RX ORDER — EMPAGLIFLOZIN 10 MG/1
1 TABLET, FILM COATED ORAL
Qty: 30 | Refills: 0
Start: 2022-01-01 | End: 2022-01-01

## 2022-01-01 RX ORDER — POLYETHYLENE GLYCOL 3350 17 G/17G
17 POWDER, FOR SOLUTION ORAL
Qty: 510 | Refills: 0
Start: 2022-01-01 | End: 2022-01-01

## 2022-01-01 RX ADMIN — Medication 120 MILLIGRAM(S): at 05:29

## 2022-01-01 RX ADMIN — TIOTROPIUM BROMIDE 1 CAPSULE(S): 18 CAPSULE ORAL; RESPIRATORY (INHALATION) at 12:26

## 2022-01-01 RX ADMIN — APIXABAN 5 MILLIGRAM(S): 2.5 TABLET, FILM COATED ORAL at 06:03

## 2022-01-01 RX ADMIN — SPIRONOLACTONE 50 MILLIGRAM(S): 25 TABLET, FILM COATED ORAL at 05:06

## 2022-01-01 RX ADMIN — BUMETANIDE 2 MILLIGRAM(S): 0.25 INJECTION INTRAMUSCULAR; INTRAVENOUS at 13:16

## 2022-01-01 RX ADMIN — TADALAFIL 40 MILLIGRAM(S): 10 TABLET, FILM COATED ORAL at 08:20

## 2022-01-01 RX ADMIN — Medication 80 MILLIGRAM(S): at 17:11

## 2022-01-01 RX ADMIN — BUMETANIDE 10 MG/HR: 0.25 INJECTION INTRAMUSCULAR; INTRAVENOUS at 21:06

## 2022-01-01 RX ADMIN — BUDESONIDE AND FORMOTEROL FUMARATE DIHYDRATE 2 PUFF(S): 160; 4.5 AEROSOL RESPIRATORY (INHALATION) at 17:21

## 2022-01-01 RX ADMIN — TIOTROPIUM BROMIDE 1 CAPSULE(S): 18 CAPSULE ORAL; RESPIRATORY (INHALATION) at 11:50

## 2022-01-01 RX ADMIN — SPIRONOLACTONE 25 MILLIGRAM(S): 25 TABLET, FILM COATED ORAL at 18:03

## 2022-01-01 RX ADMIN — APIXABAN 5 MILLIGRAM(S): 2.5 TABLET, FILM COATED ORAL at 17:40

## 2022-01-01 RX ADMIN — TADALAFIL 40 MILLIGRAM(S): 10 TABLET, FILM COATED ORAL at 13:01

## 2022-01-01 RX ADMIN — Medication 80 MILLIGRAM(S): at 13:47

## 2022-01-01 RX ADMIN — Medication 120 MILLIGRAM(S): at 05:15

## 2022-01-01 RX ADMIN — TIOTROPIUM BROMIDE AND OLODATEROL 2 PUFF(S): 3.124; 2.736 SPRAY, METERED RESPIRATORY (INHALATION) at 11:53

## 2022-01-01 RX ADMIN — Medication 975 MILLIGRAM(S): at 07:01

## 2022-01-01 RX ADMIN — TADALAFIL 40 MILLIGRAM(S): 10 TABLET, FILM COATED ORAL at 11:20

## 2022-01-01 RX ADMIN — PANTOPRAZOLE SODIUM 40 MILLIGRAM(S): 20 TABLET, DELAYED RELEASE ORAL at 05:42

## 2022-01-01 RX ADMIN — Medication 120 MILLIGRAM(S): at 05:24

## 2022-01-01 RX ADMIN — Medication 325 MILLIGRAM(S): at 11:44

## 2022-01-01 RX ADMIN — BUMETANIDE 4 MILLIGRAM(S): 0.25 INJECTION INTRAMUSCULAR; INTRAVENOUS at 06:19

## 2022-01-01 RX ADMIN — Medication 975 MILLIGRAM(S): at 22:32

## 2022-01-01 RX ADMIN — HYDROMORPHONE HYDROCHLORIDE 0.5 MILLIGRAM(S): 2 INJECTION INTRAMUSCULAR; INTRAVENOUS; SUBCUTANEOUS at 16:40

## 2022-01-01 RX ADMIN — TADALAFIL 40 MILLIGRAM(S): 10 TABLET, FILM COATED ORAL at 11:52

## 2022-01-01 RX ADMIN — Medication 650 MILLIGRAM(S): at 22:07

## 2022-01-01 RX ADMIN — BUDESONIDE AND FORMOTEROL FUMARATE DIHYDRATE 2 PUFF(S): 160; 4.5 AEROSOL RESPIRATORY (INHALATION) at 06:14

## 2022-01-01 RX ADMIN — Medication 40 MILLIEQUIVALENT(S): at 07:32

## 2022-01-01 RX ADMIN — POLYETHYLENE GLYCOL 3350 17 GRAM(S): 17 POWDER, FOR SOLUTION ORAL at 12:44

## 2022-01-01 RX ADMIN — TIOTROPIUM BROMIDE AND OLODATEROL 2 PUFF(S): 3.124; 2.736 SPRAY, METERED RESPIRATORY (INHALATION) at 11:19

## 2022-01-01 RX ADMIN — Medication 2 SPRAY(S): at 05:17

## 2022-01-01 RX ADMIN — Medication 2 SPRAY(S): at 18:01

## 2022-01-01 RX ADMIN — HYDROMORPHONE HYDROCHLORIDE 0.25 MILLIGRAM(S): 2 INJECTION INTRAMUSCULAR; INTRAVENOUS; SUBCUTANEOUS at 02:18

## 2022-01-01 RX ADMIN — Medication 975 MILLIGRAM(S): at 22:38

## 2022-01-01 RX ADMIN — Medication 120 MILLIGRAM(S): at 05:47

## 2022-01-01 RX ADMIN — BUDESONIDE AND FORMOTEROL FUMARATE DIHYDRATE 2 PUFF(S): 160; 4.5 AEROSOL RESPIRATORY (INHALATION) at 18:03

## 2022-01-01 RX ADMIN — Medication 650 MILLIGRAM(S): at 21:55

## 2022-01-01 RX ADMIN — HYDROMORPHONE HYDROCHLORIDE 0.25 MILLIGRAM(S): 2 INJECTION INTRAMUSCULAR; INTRAVENOUS; SUBCUTANEOUS at 04:27

## 2022-01-01 RX ADMIN — AMBRISENTAN 10 MILLIGRAM(S): 10 TABLET, FILM COATED ORAL at 11:31

## 2022-01-01 RX ADMIN — TIOTROPIUM BROMIDE AND OLODATEROL 2 PUFF(S): 3.124; 2.736 SPRAY, METERED RESPIRATORY (INHALATION) at 11:57

## 2022-01-01 RX ADMIN — BUMETANIDE 4 MILLIGRAM(S): 0.25 INJECTION INTRAMUSCULAR; INTRAVENOUS at 05:21

## 2022-01-01 RX ADMIN — Medication 975 MILLIGRAM(S): at 22:34

## 2022-01-01 RX ADMIN — ATORVASTATIN CALCIUM 20 MILLIGRAM(S): 80 TABLET, FILM COATED ORAL at 21:09

## 2022-01-01 RX ADMIN — Medication 5 MILLIGRAM(S): at 05:46

## 2022-01-01 RX ADMIN — Medication 2 SPRAY(S): at 17:11

## 2022-01-01 RX ADMIN — Medication 120 MILLIGRAM(S): at 06:34

## 2022-01-01 RX ADMIN — TIOTROPIUM BROMIDE 1 CAPSULE(S): 18 CAPSULE ORAL; RESPIRATORY (INHALATION) at 11:43

## 2022-01-01 RX ADMIN — Medication 1 SPRAY(S): at 17:36

## 2022-01-01 RX ADMIN — SENNA PLUS 2 TABLET(S): 8.6 TABLET ORAL at 21:09

## 2022-01-01 RX ADMIN — TIOTROPIUM BROMIDE 1 CAPSULE(S): 18 CAPSULE ORAL; RESPIRATORY (INHALATION) at 11:54

## 2022-01-01 RX ADMIN — TADALAFIL 40 MILLIGRAM(S): 10 TABLET, FILM COATED ORAL at 11:53

## 2022-01-01 RX ADMIN — BUDESONIDE AND FORMOTEROL FUMARATE DIHYDRATE 2 PUFF(S): 160; 4.5 AEROSOL RESPIRATORY (INHALATION) at 05:32

## 2022-01-01 RX ADMIN — CHLORHEXIDINE GLUCONATE 1 APPLICATION(S): 213 SOLUTION TOPICAL at 05:45

## 2022-01-01 RX ADMIN — BUMETANIDE 2 MILLIGRAM(S): 0.25 INJECTION INTRAMUSCULAR; INTRAVENOUS at 17:49

## 2022-01-01 RX ADMIN — BUDESONIDE AND FORMOTEROL FUMARATE DIHYDRATE 2 PUFF(S): 160; 4.5 AEROSOL RESPIRATORY (INHALATION) at 05:46

## 2022-01-01 RX ADMIN — Medication 81 MILLIGRAM(S): at 11:12

## 2022-01-01 RX ADMIN — Medication 650 MILLIGRAM(S): at 06:01

## 2022-01-01 RX ADMIN — Medication 2 SPRAY(S): at 06:13

## 2022-01-01 RX ADMIN — TIOTROPIUM BROMIDE 1 CAPSULE(S): 18 CAPSULE ORAL; RESPIRATORY (INHALATION) at 11:08

## 2022-01-01 RX ADMIN — BUPROPION HYDROCHLORIDE 150 MILLIGRAM(S): 150 TABLET, EXTENDED RELEASE ORAL at 13:03

## 2022-01-01 RX ADMIN — Medication 1 SPRAY(S): at 23:37

## 2022-01-01 RX ADMIN — Medication 2 SPRAY(S): at 23:22

## 2022-01-01 RX ADMIN — ATORVASTATIN CALCIUM 20 MILLIGRAM(S): 80 TABLET, FILM COATED ORAL at 22:31

## 2022-01-01 RX ADMIN — BUMETANIDE 4 MILLIGRAM(S): 0.25 INJECTION INTRAMUSCULAR; INTRAVENOUS at 17:12

## 2022-01-01 RX ADMIN — Medication 650 MILLIGRAM(S): at 22:26

## 2022-01-01 RX ADMIN — Medication 2 SPRAY(S): at 11:28

## 2022-01-01 RX ADMIN — AMBRISENTAN 10 MILLIGRAM(S): 10 TABLET, FILM COATED ORAL at 12:27

## 2022-01-01 RX ADMIN — APIXABAN 5 MILLIGRAM(S): 2.5 TABLET, FILM COATED ORAL at 18:36

## 2022-01-01 RX ADMIN — Medication 3 MILLILITER(S): at 00:10

## 2022-01-01 RX ADMIN — BUPROPION HYDROCHLORIDE 300 MILLIGRAM(S): 150 TABLET, EXTENDED RELEASE ORAL at 11:59

## 2022-01-01 RX ADMIN — ATORVASTATIN CALCIUM 20 MILLIGRAM(S): 80 TABLET, FILM COATED ORAL at 21:20

## 2022-01-01 RX ADMIN — BUMETANIDE 4 MILLIGRAM(S): 0.25 INJECTION INTRAMUSCULAR; INTRAVENOUS at 05:17

## 2022-01-01 RX ADMIN — Medication 975 MILLIGRAM(S): at 06:30

## 2022-01-01 RX ADMIN — TADALAFIL 40 MILLIGRAM(S): 10 TABLET, FILM COATED ORAL at 11:10

## 2022-01-01 RX ADMIN — TIOTROPIUM BROMIDE AND OLODATEROL 2 PUFF(S): 3.124; 2.736 SPRAY, METERED RESPIRATORY (INHALATION) at 11:55

## 2022-01-01 RX ADMIN — BUDESONIDE AND FORMOTEROL FUMARATE DIHYDRATE 2 PUFF(S): 160; 4.5 AEROSOL RESPIRATORY (INHALATION) at 17:01

## 2022-01-01 RX ADMIN — BUDESONIDE AND FORMOTEROL FUMARATE DIHYDRATE 2 PUFF(S): 160; 4.5 AEROSOL RESPIRATORY (INHALATION) at 17:33

## 2022-01-01 RX ADMIN — Medication 975 MILLIGRAM(S): at 02:01

## 2022-01-01 RX ADMIN — TADALAFIL 40 MILLIGRAM(S): 10 TABLET, FILM COATED ORAL at 12:25

## 2022-01-01 RX ADMIN — APIXABAN 5 MILLIGRAM(S): 2.5 TABLET, FILM COATED ORAL at 06:52

## 2022-01-01 RX ADMIN — SPIRONOLACTONE 25 MILLIGRAM(S): 25 TABLET, FILM COATED ORAL at 05:21

## 2022-01-01 RX ADMIN — Medication 650 MILLIGRAM(S): at 21:46

## 2022-01-01 RX ADMIN — Medication 975 MILLIGRAM(S): at 23:10

## 2022-01-01 RX ADMIN — BUDESONIDE AND FORMOTEROL FUMARATE DIHYDRATE 2 PUFF(S): 160; 4.5 AEROSOL RESPIRATORY (INHALATION) at 06:56

## 2022-01-01 RX ADMIN — PANTOPRAZOLE SODIUM 40 MILLIGRAM(S): 20 TABLET, DELAYED RELEASE ORAL at 06:13

## 2022-01-01 RX ADMIN — Medication 650 MILLIGRAM(S): at 22:12

## 2022-01-01 RX ADMIN — Medication 975 MILLIGRAM(S): at 21:18

## 2022-01-01 RX ADMIN — Medication 325 MILLIGRAM(S): at 11:10

## 2022-01-01 RX ADMIN — Medication 80 MILLIGRAM(S): at 05:11

## 2022-01-01 RX ADMIN — SPIRONOLACTONE 50 MILLIGRAM(S): 25 TABLET, FILM COATED ORAL at 06:06

## 2022-01-01 RX ADMIN — TADALAFIL 40 MILLIGRAM(S): 10 TABLET, FILM COATED ORAL at 11:32

## 2022-01-01 RX ADMIN — PANTOPRAZOLE SODIUM 40 MILLIGRAM(S): 20 TABLET, DELAYED RELEASE ORAL at 05:55

## 2022-01-01 RX ADMIN — Medication 120 MILLIGRAM(S): at 05:46

## 2022-01-01 RX ADMIN — APIXABAN 5 MILLIGRAM(S): 2.5 TABLET, FILM COATED ORAL at 06:19

## 2022-01-01 RX ADMIN — AZITHROMYCIN 255 MILLIGRAM(S): 500 TABLET, FILM COATED ORAL at 12:45

## 2022-01-01 RX ADMIN — TIOTROPIUM BROMIDE AND OLODATEROL 2 PUFF(S): 3.124; 2.736 SPRAY, METERED RESPIRATORY (INHALATION) at 11:16

## 2022-01-01 RX ADMIN — Medication 81 MILLIGRAM(S): at 12:21

## 2022-01-01 RX ADMIN — Medication 80 MILLIGRAM(S): at 06:29

## 2022-01-01 RX ADMIN — BUPROPION HYDROCHLORIDE 300 MILLIGRAM(S): 150 TABLET, EXTENDED RELEASE ORAL at 13:07

## 2022-01-01 RX ADMIN — Medication 3 MILLIGRAM(S): at 02:02

## 2022-01-01 RX ADMIN — Medication 2 SPRAY(S): at 22:46

## 2022-01-01 RX ADMIN — Medication 3 MILLIGRAM(S): at 22:41

## 2022-01-01 RX ADMIN — BUDESONIDE AND FORMOTEROL FUMARATE DIHYDRATE 2 PUFF(S): 160; 4.5 AEROSOL RESPIRATORY (INHALATION) at 18:50

## 2022-01-01 RX ADMIN — AMBRISENTAN 10 MILLIGRAM(S): 10 TABLET, FILM COATED ORAL at 12:50

## 2022-01-01 RX ADMIN — Medication 650 MILLIGRAM(S): at 23:06

## 2022-01-01 RX ADMIN — TADALAFIL 40 MILLIGRAM(S): 10 TABLET, FILM COATED ORAL at 11:49

## 2022-01-01 RX ADMIN — BUPROPION HYDROCHLORIDE 300 MILLIGRAM(S): 150 TABLET, EXTENDED RELEASE ORAL at 12:40

## 2022-01-01 RX ADMIN — APIXABAN 5 MILLIGRAM(S): 2.5 TABLET, FILM COATED ORAL at 17:22

## 2022-01-01 RX ADMIN — Medication 975 MILLIGRAM(S): at 23:37

## 2022-01-01 RX ADMIN — TIOTROPIUM BROMIDE AND OLODATEROL 2 PUFF(S): 3.124; 2.736 SPRAY, METERED RESPIRATORY (INHALATION) at 12:59

## 2022-01-01 RX ADMIN — SPIRONOLACTONE 25 MILLIGRAM(S): 25 TABLET, FILM COATED ORAL at 05:14

## 2022-01-01 RX ADMIN — ATORVASTATIN CALCIUM 20 MILLIGRAM(S): 80 TABLET, FILM COATED ORAL at 20:08

## 2022-01-01 RX ADMIN — Medication 650 MILLIGRAM(S): at 01:08

## 2022-01-01 RX ADMIN — PANTOPRAZOLE SODIUM 40 MILLIGRAM(S): 20 TABLET, DELAYED RELEASE ORAL at 05:10

## 2022-01-01 RX ADMIN — Medication 2 SPRAY(S): at 05:12

## 2022-01-01 RX ADMIN — SODIUM CHLORIDE 300 MILLILITER(S): 5 INJECTION, SOLUTION INTRAVENOUS at 15:40

## 2022-01-01 RX ADMIN — BUDESONIDE AND FORMOTEROL FUMARATE DIHYDRATE 2 PUFF(S): 160; 4.5 AEROSOL RESPIRATORY (INHALATION) at 06:04

## 2022-01-01 RX ADMIN — Medication 650 MILLIGRAM(S): at 21:25

## 2022-01-01 RX ADMIN — BUDESONIDE AND FORMOTEROL FUMARATE DIHYDRATE 2 PUFF(S): 160; 4.5 AEROSOL RESPIRATORY (INHALATION) at 17:00

## 2022-01-01 RX ADMIN — Medication 2 SPRAY(S): at 06:41

## 2022-01-01 RX ADMIN — BUMETANIDE 10 MG/HR: 0.25 INJECTION INTRAMUSCULAR; INTRAVENOUS at 22:37

## 2022-01-01 RX ADMIN — Medication 3 MILLILITER(S): at 17:21

## 2022-01-01 RX ADMIN — Medication 3 MILLIGRAM(S): at 23:40

## 2022-01-01 RX ADMIN — Medication 975 MILLIGRAM(S): at 20:02

## 2022-01-01 RX ADMIN — ATORVASTATIN CALCIUM 20 MILLIGRAM(S): 80 TABLET, FILM COATED ORAL at 22:17

## 2022-01-01 RX ADMIN — TADALAFIL 40 MILLIGRAM(S): 10 TABLET, FILM COATED ORAL at 13:07

## 2022-01-01 RX ADMIN — SPIRONOLACTONE 50 MILLIGRAM(S): 25 TABLET, FILM COATED ORAL at 05:42

## 2022-01-01 RX ADMIN — BUDESONIDE AND FORMOTEROL FUMARATE DIHYDRATE 2 PUFF(S): 160; 4.5 AEROSOL RESPIRATORY (INHALATION) at 17:47

## 2022-01-01 RX ADMIN — Medication 3 MILLILITER(S): at 11:52

## 2022-01-01 RX ADMIN — Medication 975 MILLIGRAM(S): at 22:59

## 2022-01-01 RX ADMIN — Medication 650 MILLIGRAM(S): at 14:00

## 2022-01-01 RX ADMIN — Medication 975 MILLIGRAM(S): at 21:44

## 2022-01-01 RX ADMIN — HYDROMORPHONE HYDROCHLORIDE 0.25 MILLIGRAM(S): 2 INJECTION INTRAMUSCULAR; INTRAVENOUS; SUBCUTANEOUS at 18:27

## 2022-01-01 RX ADMIN — BUMETANIDE 10 MG/HR: 0.25 INJECTION INTRAMUSCULAR; INTRAVENOUS at 06:40

## 2022-01-01 RX ADMIN — Medication 2 SPRAY(S): at 06:25

## 2022-01-01 RX ADMIN — Medication 3 MILLILITER(S): at 15:02

## 2022-01-01 RX ADMIN — APIXABAN 5 MILLIGRAM(S): 2.5 TABLET, FILM COATED ORAL at 17:47

## 2022-01-01 RX ADMIN — Medication 325 MILLIGRAM(S): at 06:40

## 2022-01-01 RX ADMIN — Medication 40 MILLIEQUIVALENT(S): at 11:46

## 2022-01-01 RX ADMIN — Medication 975 MILLIGRAM(S): at 02:00

## 2022-01-01 RX ADMIN — APIXABAN 5 MILLIGRAM(S): 2.5 TABLET, FILM COATED ORAL at 05:30

## 2022-01-01 RX ADMIN — Medication 40 MILLIEQUIVALENT(S): at 18:48

## 2022-01-01 RX ADMIN — BUDESONIDE AND FORMOTEROL FUMARATE DIHYDRATE 2 PUFF(S): 160; 4.5 AEROSOL RESPIRATORY (INHALATION) at 17:24

## 2022-01-01 RX ADMIN — BUMETANIDE 2 MILLIGRAM(S): 0.25 INJECTION INTRAMUSCULAR; INTRAVENOUS at 13:44

## 2022-01-01 RX ADMIN — Medication 3 MILLILITER(S): at 06:09

## 2022-01-01 RX ADMIN — BUDESONIDE AND FORMOTEROL FUMARATE DIHYDRATE 2 PUFF(S): 160; 4.5 AEROSOL RESPIRATORY (INHALATION) at 17:23

## 2022-01-01 RX ADMIN — Medication 81 MILLIGRAM(S): at 11:55

## 2022-01-01 RX ADMIN — BUPROPION HYDROCHLORIDE 300 MILLIGRAM(S): 150 TABLET, EXTENDED RELEASE ORAL at 11:12

## 2022-01-01 RX ADMIN — CHLORHEXIDINE GLUCONATE 1 APPLICATION(S): 213 SOLUTION TOPICAL at 05:20

## 2022-01-01 RX ADMIN — APIXABAN 5 MILLIGRAM(S): 2.5 TABLET, FILM COATED ORAL at 05:13

## 2022-01-01 RX ADMIN — Medication 2 SPRAY(S): at 23:17

## 2022-01-01 RX ADMIN — PANTOPRAZOLE SODIUM 40 MILLIGRAM(S): 20 TABLET, DELAYED RELEASE ORAL at 05:26

## 2022-01-01 RX ADMIN — BUMETANIDE 10 MG/HR: 0.25 INJECTION INTRAMUSCULAR; INTRAVENOUS at 09:02

## 2022-01-01 RX ADMIN — TIOTROPIUM BROMIDE 1 CAPSULE(S): 18 CAPSULE ORAL; RESPIRATORY (INHALATION) at 11:28

## 2022-01-01 RX ADMIN — Medication 120 MILLIGRAM(S): at 06:48

## 2022-01-01 RX ADMIN — PANTOPRAZOLE SODIUM 40 MILLIGRAM(S): 20 TABLET, DELAYED RELEASE ORAL at 06:34

## 2022-01-01 RX ADMIN — Medication 125 MICROGRAM(S): at 06:03

## 2022-01-01 RX ADMIN — TIOTROPIUM BROMIDE AND OLODATEROL 2 PUFF(S): 3.124; 2.736 SPRAY, METERED RESPIRATORY (INHALATION) at 13:04

## 2022-01-01 RX ADMIN — Medication 60 MILLIGRAM(S): at 13:23

## 2022-01-01 RX ADMIN — CHLORHEXIDINE GLUCONATE 1 APPLICATION(S): 213 SOLUTION TOPICAL at 05:17

## 2022-01-01 RX ADMIN — Medication 500 MILLIGRAM(S): at 04:28

## 2022-01-01 RX ADMIN — Medication 120 MILLIGRAM(S): at 05:23

## 2022-01-01 RX ADMIN — Medication 120 MILLIGRAM(S): at 05:49

## 2022-01-01 RX ADMIN — PANTOPRAZOLE SODIUM 40 MILLIGRAM(S): 20 TABLET, DELAYED RELEASE ORAL at 05:56

## 2022-01-01 RX ADMIN — Medication 650 MILLIGRAM(S): at 22:04

## 2022-01-01 RX ADMIN — Medication 650 MILLIGRAM(S): at 08:28

## 2022-01-01 RX ADMIN — Medication 650 MILLIGRAM(S): at 02:30

## 2022-01-01 RX ADMIN — TADALAFIL 40 MILLIGRAM(S): 10 TABLET, FILM COATED ORAL at 13:14

## 2022-01-01 RX ADMIN — Medication 2 SPRAY(S): at 13:07

## 2022-01-01 RX ADMIN — Medication 2 SPRAY(S): at 22:11

## 2022-01-01 RX ADMIN — BUDESONIDE AND FORMOTEROL FUMARATE DIHYDRATE 2 PUFF(S): 160; 4.5 AEROSOL RESPIRATORY (INHALATION) at 06:17

## 2022-01-01 RX ADMIN — BUPROPION HYDROCHLORIDE 300 MILLIGRAM(S): 150 TABLET, EXTENDED RELEASE ORAL at 11:28

## 2022-01-01 RX ADMIN — BUPROPION HYDROCHLORIDE 150 MILLIGRAM(S): 150 TABLET, EXTENDED RELEASE ORAL at 12:57

## 2022-01-01 RX ADMIN — Medication 3 MILLIGRAM(S): at 22:10

## 2022-01-01 RX ADMIN — TADALAFIL 40 MILLIGRAM(S): 10 TABLET, FILM COATED ORAL at 11:29

## 2022-01-01 RX ADMIN — APIXABAN 5 MILLIGRAM(S): 2.5 TABLET, FILM COATED ORAL at 17:17

## 2022-01-01 RX ADMIN — BUPROPION HYDROCHLORIDE 300 MILLIGRAM(S): 150 TABLET, EXTENDED RELEASE ORAL at 13:27

## 2022-01-01 RX ADMIN — BUMETANIDE 4 MILLIGRAM(S): 0.25 INJECTION INTRAMUSCULAR; INTRAVENOUS at 18:04

## 2022-01-01 RX ADMIN — CHLORHEXIDINE GLUCONATE 1 APPLICATION(S): 213 SOLUTION TOPICAL at 05:47

## 2022-01-01 RX ADMIN — HYDROMORPHONE HYDROCHLORIDE 0.25 MILLIGRAM(S): 2 INJECTION INTRAMUSCULAR; INTRAVENOUS; SUBCUTANEOUS at 00:50

## 2022-01-01 RX ADMIN — Medication 120 MILLIGRAM(S): at 04:29

## 2022-01-01 RX ADMIN — Medication 3 MILLILITER(S): at 11:38

## 2022-01-01 RX ADMIN — AMBRISENTAN 10 MILLIGRAM(S): 10 TABLET, FILM COATED ORAL at 12:55

## 2022-01-01 RX ADMIN — Medication 120 MILLIGRAM(S): at 06:03

## 2022-01-01 RX ADMIN — SPIRONOLACTONE 50 MILLIGRAM(S): 25 TABLET, FILM COATED ORAL at 04:28

## 2022-01-01 RX ADMIN — Medication 975 MILLIGRAM(S): at 06:15

## 2022-01-01 RX ADMIN — Medication 3 MILLIGRAM(S): at 23:50

## 2022-01-01 RX ADMIN — BUMETANIDE 6 MILLIGRAM(S): 0.25 INJECTION INTRAMUSCULAR; INTRAVENOUS at 05:20

## 2022-01-01 RX ADMIN — Medication 3 MILLILITER(S): at 18:08

## 2022-01-01 RX ADMIN — CHLORHEXIDINE GLUCONATE 1 APPLICATION(S): 213 SOLUTION TOPICAL at 06:35

## 2022-01-01 RX ADMIN — Medication 3 MILLILITER(S): at 22:04

## 2022-01-01 RX ADMIN — PANTOPRAZOLE SODIUM 40 MILLIGRAM(S): 20 TABLET, DELAYED RELEASE ORAL at 06:03

## 2022-01-01 RX ADMIN — CHLORHEXIDINE GLUCONATE 1 APPLICATION(S): 213 SOLUTION TOPICAL at 05:50

## 2022-01-01 RX ADMIN — Medication 2 SPRAY(S): at 18:03

## 2022-01-01 RX ADMIN — TADALAFIL 40 MILLIGRAM(S): 10 TABLET, FILM COATED ORAL at 11:50

## 2022-01-01 RX ADMIN — Medication 3 MILLIGRAM(S): at 23:10

## 2022-01-01 RX ADMIN — BUDESONIDE AND FORMOTEROL FUMARATE DIHYDRATE 2 PUFF(S): 160; 4.5 AEROSOL RESPIRATORY (INHALATION) at 17:11

## 2022-01-01 RX ADMIN — Medication 2 SPRAY(S): at 23:08

## 2022-01-01 RX ADMIN — TIOTROPIUM BROMIDE 1 CAPSULE(S): 18 CAPSULE ORAL; RESPIRATORY (INHALATION) at 11:11

## 2022-01-01 RX ADMIN — APIXABAN 5 MILLIGRAM(S): 2.5 TABLET, FILM COATED ORAL at 18:15

## 2022-01-01 RX ADMIN — Medication 975 MILLIGRAM(S): at 20:20

## 2022-01-01 RX ADMIN — TADALAFIL 40 MILLIGRAM(S): 10 TABLET, FILM COATED ORAL at 12:44

## 2022-01-01 RX ADMIN — APIXABAN 5 MILLIGRAM(S): 2.5 TABLET, FILM COATED ORAL at 05:56

## 2022-01-01 RX ADMIN — Medication 3 MILLILITER(S): at 18:56

## 2022-01-01 RX ADMIN — Medication 120 MILLIGRAM(S): at 06:40

## 2022-01-01 RX ADMIN — PANTOPRAZOLE SODIUM 40 MILLIGRAM(S): 20 TABLET, DELAYED RELEASE ORAL at 05:13

## 2022-01-01 RX ADMIN — APIXABAN 5 MILLIGRAM(S): 2.5 TABLET, FILM COATED ORAL at 06:14

## 2022-01-01 RX ADMIN — ROSUVASTATIN CALCIUM 5 MILLIGRAM(S): 5 TABLET ORAL at 22:04

## 2022-01-01 RX ADMIN — Medication 10 MILLIEQUIVALENT(S): at 08:53

## 2022-01-01 RX ADMIN — ATORVASTATIN CALCIUM 20 MILLIGRAM(S): 80 TABLET, FILM COATED ORAL at 20:34

## 2022-01-01 RX ADMIN — Medication 3 MILLIGRAM(S): at 22:18

## 2022-01-01 RX ADMIN — BUPROPION HYDROCHLORIDE 300 MILLIGRAM(S): 150 TABLET, EXTENDED RELEASE ORAL at 11:32

## 2022-01-01 RX ADMIN — TADALAFIL 40 MILLIGRAM(S): 10 TABLET, FILM COATED ORAL at 17:12

## 2022-01-01 RX ADMIN — BUMETANIDE 2 MILLIGRAM(S): 0.25 INJECTION INTRAMUSCULAR; INTRAVENOUS at 12:15

## 2022-01-01 RX ADMIN — Medication 3 MILLIGRAM(S): at 22:32

## 2022-01-01 RX ADMIN — Medication 125 MICROGRAM(S): at 05:36

## 2022-01-01 RX ADMIN — TADALAFIL 40 MILLIGRAM(S): 10 TABLET, FILM COATED ORAL at 13:43

## 2022-01-01 RX ADMIN — BUPROPION HYDROCHLORIDE 150 MILLIGRAM(S): 150 TABLET, EXTENDED RELEASE ORAL at 11:22

## 2022-01-01 RX ADMIN — SPIRONOLACTONE 50 MILLIGRAM(S): 25 TABLET, FILM COATED ORAL at 05:39

## 2022-01-01 RX ADMIN — BUPROPION HYDROCHLORIDE 300 MILLIGRAM(S): 150 TABLET, EXTENDED RELEASE ORAL at 12:12

## 2022-01-01 RX ADMIN — SPIRONOLACTONE 50 MILLIGRAM(S): 25 TABLET, FILM COATED ORAL at 05:31

## 2022-01-01 RX ADMIN — HYDROMORPHONE HYDROCHLORIDE 0.25 MILLIGRAM(S): 2 INJECTION INTRAMUSCULAR; INTRAVENOUS; SUBCUTANEOUS at 01:23

## 2022-01-01 RX ADMIN — Medication 650 MILLIGRAM(S): at 21:57

## 2022-01-01 RX ADMIN — Medication 125 MICROGRAM(S): at 05:30

## 2022-01-01 RX ADMIN — SODIUM ZIRCONIUM CYCLOSILICATE 10 GRAM(S): 10 POWDER, FOR SUSPENSION ORAL at 08:40

## 2022-01-01 RX ADMIN — BUMETANIDE 5 MG/HR: 0.25 INJECTION INTRAMUSCULAR; INTRAVENOUS at 14:18

## 2022-01-01 RX ADMIN — Medication 3 MILLILITER(S): at 23:32

## 2022-01-01 RX ADMIN — Medication 975 MILLIGRAM(S): at 21:38

## 2022-01-01 RX ADMIN — BUMETANIDE 4 MILLIGRAM(S): 0.25 INJECTION INTRAMUSCULAR; INTRAVENOUS at 21:14

## 2022-01-01 RX ADMIN — Medication 81 MILLIGRAM(S): at 11:21

## 2022-01-01 RX ADMIN — ROSUVASTATIN CALCIUM 5 MILLIGRAM(S): 5 TABLET ORAL at 22:06

## 2022-01-01 RX ADMIN — APIXABAN 5 MILLIGRAM(S): 2.5 TABLET, FILM COATED ORAL at 05:17

## 2022-01-01 RX ADMIN — ALBUTEROL 2 PUFF(S): 90 AEROSOL, METERED ORAL at 12:30

## 2022-01-01 RX ADMIN — Medication 2 SPRAY(S): at 22:18

## 2022-01-01 RX ADMIN — Medication 2 SPRAY(S): at 22:27

## 2022-01-01 RX ADMIN — Medication 120 MILLIGRAM(S): at 05:39

## 2022-01-01 RX ADMIN — Medication 975 MILLIGRAM(S): at 22:08

## 2022-01-01 RX ADMIN — AMBRISENTAN 10 MILLIGRAM(S): 10 TABLET, FILM COATED ORAL at 11:38

## 2022-01-01 RX ADMIN — MUPIROCIN 1 APPLICATION(S): 20 OINTMENT TOPICAL at 05:29

## 2022-01-01 RX ADMIN — Medication 125 MICROGRAM(S): at 12:30

## 2022-01-01 RX ADMIN — APIXABAN 5 MILLIGRAM(S): 2.5 TABLET, FILM COATED ORAL at 18:19

## 2022-01-01 RX ADMIN — Medication 120 MILLIGRAM(S): at 05:09

## 2022-01-01 RX ADMIN — ATORVASTATIN CALCIUM 20 MILLIGRAM(S): 80 TABLET, FILM COATED ORAL at 22:18

## 2022-01-01 RX ADMIN — BUMETANIDE 5 MG/HR: 0.25 INJECTION INTRAMUSCULAR; INTRAVENOUS at 21:44

## 2022-01-01 RX ADMIN — APIXABAN 5 MILLIGRAM(S): 2.5 TABLET, FILM COATED ORAL at 05:46

## 2022-01-01 RX ADMIN — ATORVASTATIN CALCIUM 20 MILLIGRAM(S): 80 TABLET, FILM COATED ORAL at 22:08

## 2022-01-01 RX ADMIN — CHLORHEXIDINE GLUCONATE 1 APPLICATION(S): 213 SOLUTION TOPICAL at 05:33

## 2022-01-01 RX ADMIN — Medication 5 MILLIGRAM(S): at 05:30

## 2022-01-01 RX ADMIN — BUDESONIDE AND FORMOTEROL FUMARATE DIHYDRATE 2 PUFF(S): 160; 4.5 AEROSOL RESPIRATORY (INHALATION) at 05:13

## 2022-01-01 RX ADMIN — BUDESONIDE AND FORMOTEROL FUMARATE DIHYDRATE 2 PUFF(S): 160; 4.5 AEROSOL RESPIRATORY (INHALATION) at 17:12

## 2022-01-01 RX ADMIN — CHLORHEXIDINE GLUCONATE 1 APPLICATION(S): 213 SOLUTION TOPICAL at 05:11

## 2022-01-01 RX ADMIN — PANTOPRAZOLE SODIUM 40 MILLIGRAM(S): 20 TABLET, DELAYED RELEASE ORAL at 05:45

## 2022-01-01 RX ADMIN — Medication 3 MILLILITER(S): at 17:02

## 2022-01-01 RX ADMIN — BUDESONIDE AND FORMOTEROL FUMARATE DIHYDRATE 2 PUFF(S): 160; 4.5 AEROSOL RESPIRATORY (INHALATION) at 05:30

## 2022-01-01 RX ADMIN — TIOTROPIUM BROMIDE 1 CAPSULE(S): 18 CAPSULE ORAL; RESPIRATORY (INHALATION) at 12:46

## 2022-01-01 RX ADMIN — CHLORHEXIDINE GLUCONATE 1 APPLICATION(S): 213 SOLUTION TOPICAL at 05:46

## 2022-01-01 RX ADMIN — AMBRISENTAN 10 MILLIGRAM(S): 10 TABLET, FILM COATED ORAL at 12:16

## 2022-01-01 RX ADMIN — BUMETANIDE 4 MILLIGRAM(S): 0.25 INJECTION INTRAMUSCULAR; INTRAVENOUS at 18:00

## 2022-01-01 RX ADMIN — HYDROMORPHONE HYDROCHLORIDE 0.5 MILLIGRAM(S): 2 INJECTION INTRAMUSCULAR; INTRAVENOUS; SUBCUTANEOUS at 15:41

## 2022-01-01 RX ADMIN — AMBRISENTAN 10 MILLIGRAM(S): 10 TABLET, FILM COATED ORAL at 11:57

## 2022-01-01 RX ADMIN — Medication 81 MILLIGRAM(S): at 13:06

## 2022-01-01 RX ADMIN — APIXABAN 5 MILLIGRAM(S): 2.5 TABLET, FILM COATED ORAL at 17:38

## 2022-01-01 RX ADMIN — Medication 650 MILLIGRAM(S): at 22:18

## 2022-01-01 RX ADMIN — Medication 2 SPRAY(S): at 23:03

## 2022-01-01 RX ADMIN — POLYETHYLENE GLYCOL 3350 17 GRAM(S): 17 POWDER, FOR SOLUTION ORAL at 11:29

## 2022-01-01 RX ADMIN — TIOTROPIUM BROMIDE 1 CAPSULE(S): 18 CAPSULE ORAL; RESPIRATORY (INHALATION) at 14:18

## 2022-01-01 RX ADMIN — Medication 120 MILLIGRAM(S): at 05:17

## 2022-01-01 RX ADMIN — TADALAFIL 40 MILLIGRAM(S): 10 TABLET, FILM COATED ORAL at 18:11

## 2022-01-01 RX ADMIN — CHLORHEXIDINE GLUCONATE 1 APPLICATION(S): 213 SOLUTION TOPICAL at 06:29

## 2022-01-01 RX ADMIN — TIOTROPIUM BROMIDE 1 CAPSULE(S): 18 CAPSULE ORAL; RESPIRATORY (INHALATION) at 12:40

## 2022-01-01 RX ADMIN — OXYMETAZOLINE HYDROCHLORIDE 1 SPRAY(S): 0.5 SPRAY NASAL at 11:11

## 2022-01-01 RX ADMIN — Medication 975 MILLIGRAM(S): at 22:28

## 2022-01-01 RX ADMIN — APIXABAN 5 MILLIGRAM(S): 2.5 TABLET, FILM COATED ORAL at 05:05

## 2022-01-01 RX ADMIN — ATORVASTATIN CALCIUM 20 MILLIGRAM(S): 80 TABLET, FILM COATED ORAL at 21:05

## 2022-01-01 RX ADMIN — Medication 81 MILLIGRAM(S): at 11:28

## 2022-01-01 RX ADMIN — PANTOPRAZOLE SODIUM 40 MILLIGRAM(S): 20 TABLET, DELAYED RELEASE ORAL at 05:24

## 2022-01-01 RX ADMIN — ROSUVASTATIN CALCIUM 5 MILLIGRAM(S): 5 TABLET ORAL at 21:25

## 2022-01-01 RX ADMIN — BUPROPION HYDROCHLORIDE 300 MILLIGRAM(S): 150 TABLET, EXTENDED RELEASE ORAL at 13:43

## 2022-01-01 RX ADMIN — Medication 1 SPRAY(S): at 12:17

## 2022-01-01 RX ADMIN — HYDROMORPHONE HYDROCHLORIDE 0.25 MILLIGRAM(S): 2 INJECTION INTRAMUSCULAR; INTRAVENOUS; SUBCUTANEOUS at 09:47

## 2022-01-01 RX ADMIN — Medication 2 SPRAY(S): at 11:49

## 2022-01-01 RX ADMIN — TIOTROPIUM BROMIDE 1 CAPSULE(S): 18 CAPSULE ORAL; RESPIRATORY (INHALATION) at 12:01

## 2022-01-01 RX ADMIN — Medication 5 MG/HR: at 08:25

## 2022-01-01 RX ADMIN — APIXABAN 5 MILLIGRAM(S): 2.5 TABLET, FILM COATED ORAL at 05:23

## 2022-01-01 RX ADMIN — Medication 2 SPRAY(S): at 12:32

## 2022-01-01 RX ADMIN — Medication 120 MILLIGRAM(S): at 06:29

## 2022-01-01 RX ADMIN — TADALAFIL 40 MILLIGRAM(S): 10 TABLET, FILM COATED ORAL at 12:23

## 2022-01-01 RX ADMIN — APIXABAN 5 MILLIGRAM(S): 2.5 TABLET, FILM COATED ORAL at 18:23

## 2022-01-01 RX ADMIN — Medication 975 MILLIGRAM(S): at 16:03

## 2022-01-01 RX ADMIN — Medication 325 MILLIGRAM(S): at 12:54

## 2022-01-01 RX ADMIN — SPIRONOLACTONE 50 MILLIGRAM(S): 25 TABLET, FILM COATED ORAL at 05:10

## 2022-01-01 RX ADMIN — Medication 975 MILLIGRAM(S): at 20:29

## 2022-01-01 RX ADMIN — TIOTROPIUM BROMIDE 1 CAPSULE(S): 18 CAPSULE ORAL; RESPIRATORY (INHALATION) at 13:00

## 2022-01-01 RX ADMIN — Medication 3 MILLIGRAM(S): at 21:28

## 2022-01-01 RX ADMIN — APIXABAN 5 MILLIGRAM(S): 2.5 TABLET, FILM COATED ORAL at 17:01

## 2022-01-01 RX ADMIN — Medication 650 MILLIGRAM(S): at 00:29

## 2022-01-01 RX ADMIN — BUMETANIDE 4 MILLIGRAM(S): 0.25 INJECTION INTRAMUSCULAR; INTRAVENOUS at 07:52

## 2022-01-01 RX ADMIN — TIOTROPIUM BROMIDE 1 CAPSULE(S): 18 CAPSULE ORAL; RESPIRATORY (INHALATION) at 13:34

## 2022-01-01 RX ADMIN — Medication 325 MILLIGRAM(S): at 06:25

## 2022-01-01 RX ADMIN — CHLORHEXIDINE GLUCONATE 1 APPLICATION(S): 213 SOLUTION TOPICAL at 05:13

## 2022-01-01 RX ADMIN — HEPARIN SODIUM 1200 UNIT(S)/HR: 5000 INJECTION INTRAVENOUS; SUBCUTANEOUS at 22:28

## 2022-01-01 RX ADMIN — Medication 3 MILLILITER(S): at 05:48

## 2022-01-01 RX ADMIN — MUPIROCIN 1 APPLICATION(S): 20 OINTMENT TOPICAL at 17:48

## 2022-01-01 RX ADMIN — Medication 975 MILLIGRAM(S): at 12:46

## 2022-01-01 RX ADMIN — BUMETANIDE 2 MILLIGRAM(S): 0.25 INJECTION INTRAMUSCULAR; INTRAVENOUS at 06:31

## 2022-01-01 RX ADMIN — PANTOPRAZOLE SODIUM 40 MILLIGRAM(S): 20 TABLET, DELAYED RELEASE ORAL at 06:25

## 2022-01-01 RX ADMIN — SPIRONOLACTONE 50 MILLIGRAM(S): 25 TABLET, FILM COATED ORAL at 05:51

## 2022-01-01 RX ADMIN — Medication 650 MILLIGRAM(S): at 22:30

## 2022-01-01 RX ADMIN — Medication 20 MILLIEQUIVALENT(S): at 12:03

## 2022-01-01 RX ADMIN — APIXABAN 5 MILLIGRAM(S): 2.5 TABLET, FILM COATED ORAL at 17:33

## 2022-01-01 RX ADMIN — TADALAFIL 40 MILLIGRAM(S): 10 TABLET, FILM COATED ORAL at 13:03

## 2022-01-01 RX ADMIN — Medication 3 MILLILITER(S): at 12:42

## 2022-01-01 RX ADMIN — Medication 650 MILLIGRAM(S): at 22:03

## 2022-01-01 RX ADMIN — Medication 975 MILLIGRAM(S): at 19:42

## 2022-01-01 RX ADMIN — Medication 120 MILLIGRAM(S): at 05:14

## 2022-01-01 RX ADMIN — Medication 975 MILLIGRAM(S): at 18:14

## 2022-01-01 RX ADMIN — Medication 1 SPRAY(S): at 11:10

## 2022-01-01 RX ADMIN — Medication 81 MILLIGRAM(S): at 11:09

## 2022-01-01 RX ADMIN — PANTOPRAZOLE SODIUM 40 MILLIGRAM(S): 20 TABLET, DELAYED RELEASE ORAL at 05:15

## 2022-01-01 RX ADMIN — BUPROPION HYDROCHLORIDE 150 MILLIGRAM(S): 150 TABLET, EXTENDED RELEASE ORAL at 12:44

## 2022-01-01 RX ADMIN — Medication 3 MILLILITER(S): at 17:00

## 2022-01-01 RX ADMIN — Medication 500 MILLIGRAM(S): at 17:39

## 2022-01-01 RX ADMIN — Medication 2 SPRAY(S): at 06:51

## 2022-01-01 RX ADMIN — Medication 975 MILLIGRAM(S): at 22:13

## 2022-01-01 RX ADMIN — BUMETANIDE 2 MILLIGRAM(S): 0.25 INJECTION INTRAMUSCULAR; INTRAVENOUS at 06:06

## 2022-01-01 RX ADMIN — Medication 325 MILLIGRAM(S): at 11:13

## 2022-01-01 RX ADMIN — TADALAFIL 40 MILLIGRAM(S): 10 TABLET, FILM COATED ORAL at 12:00

## 2022-01-01 RX ADMIN — SPIRONOLACTONE 50 MILLIGRAM(S): 25 TABLET, FILM COATED ORAL at 06:13

## 2022-01-01 RX ADMIN — BUMETANIDE 132 MILLIGRAM(S): 0.25 INJECTION INTRAMUSCULAR; INTRAVENOUS at 13:05

## 2022-01-01 RX ADMIN — AMBRISENTAN 10 MILLIGRAM(S): 10 TABLET, FILM COATED ORAL at 13:27

## 2022-01-01 RX ADMIN — BUMETANIDE 124 MILLIGRAM(S): 0.25 INJECTION INTRAMUSCULAR; INTRAVENOUS at 05:55

## 2022-01-01 RX ADMIN — Medication 80 MILLIGRAM(S): at 17:56

## 2022-01-01 RX ADMIN — Medication 650 MILLIGRAM(S): at 12:47

## 2022-01-01 RX ADMIN — TADALAFIL 40 MILLIGRAM(S): 10 TABLET, FILM COATED ORAL at 15:13

## 2022-01-01 RX ADMIN — Medication 120 MILLIGRAM(S): at 06:23

## 2022-01-01 RX ADMIN — Medication 2 SPRAY(S): at 05:04

## 2022-01-01 RX ADMIN — ATORVASTATIN CALCIUM 20 MILLIGRAM(S): 80 TABLET, FILM COATED ORAL at 22:13

## 2022-01-01 RX ADMIN — TIOTROPIUM BROMIDE 1 CAPSULE(S): 18 CAPSULE ORAL; RESPIRATORY (INHALATION) at 13:01

## 2022-01-01 RX ADMIN — Medication 1 SPRAY(S): at 00:56

## 2022-01-01 RX ADMIN — BUPROPION HYDROCHLORIDE 300 MILLIGRAM(S): 150 TABLET, EXTENDED RELEASE ORAL at 12:13

## 2022-01-01 RX ADMIN — APIXABAN 5 MILLIGRAM(S): 2.5 TABLET, FILM COATED ORAL at 17:11

## 2022-01-01 RX ADMIN — Medication 2 SPRAY(S): at 06:26

## 2022-01-01 RX ADMIN — Medication 975 MILLIGRAM(S): at 21:04

## 2022-01-01 RX ADMIN — BUDESONIDE AND FORMOTEROL FUMARATE DIHYDRATE 2 PUFF(S): 160; 4.5 AEROSOL RESPIRATORY (INHALATION) at 04:30

## 2022-01-01 RX ADMIN — AMBRISENTAN 10 MILLIGRAM(S): 10 TABLET, FILM COATED ORAL at 11:30

## 2022-01-01 RX ADMIN — Medication 650 MILLIGRAM(S): at 13:23

## 2022-01-01 RX ADMIN — BUDESONIDE AND FORMOTEROL FUMARATE DIHYDRATE 2 PUFF(S): 160; 4.5 AEROSOL RESPIRATORY (INHALATION) at 05:11

## 2022-01-01 RX ADMIN — Medication 2 SPRAY(S): at 17:21

## 2022-01-01 RX ADMIN — Medication 60 MILLIGRAM(S): at 05:39

## 2022-01-01 RX ADMIN — Medication 400 MILLIGRAM(S): at 07:47

## 2022-01-01 RX ADMIN — TADALAFIL 40 MILLIGRAM(S): 10 TABLET, FILM COATED ORAL at 18:08

## 2022-01-01 RX ADMIN — BUMETANIDE 10 MG/HR: 0.25 INJECTION INTRAMUSCULAR; INTRAVENOUS at 06:59

## 2022-01-01 RX ADMIN — BUMETANIDE 4 MILLIGRAM(S): 0.25 INJECTION INTRAMUSCULAR; INTRAVENOUS at 17:40

## 2022-01-01 RX ADMIN — PANTOPRAZOLE SODIUM 40 MILLIGRAM(S): 20 TABLET, DELAYED RELEASE ORAL at 05:38

## 2022-01-01 RX ADMIN — Medication 2 SPRAY(S): at 22:28

## 2022-01-01 RX ADMIN — Medication 650 MILLIGRAM(S): at 17:14

## 2022-01-01 RX ADMIN — Medication 2 SPRAY(S): at 17:16

## 2022-01-01 RX ADMIN — Medication 2 SPRAY(S): at 11:13

## 2022-01-01 RX ADMIN — Medication 1 SPRAY(S): at 13:31

## 2022-01-01 RX ADMIN — TIOTROPIUM BROMIDE AND OLODATEROL 2 PUFF(S): 3.124; 2.736 SPRAY, METERED RESPIRATORY (INHALATION) at 11:38

## 2022-01-01 RX ADMIN — Medication 81 MILLIGRAM(S): at 11:07

## 2022-01-01 RX ADMIN — Medication 40 MILLIEQUIVALENT(S): at 08:12

## 2022-01-01 RX ADMIN — Medication 650 MILLIGRAM(S): at 08:29

## 2022-01-01 RX ADMIN — Medication 2 SPRAY(S): at 05:08

## 2022-01-01 RX ADMIN — Medication 975 MILLIGRAM(S): at 22:47

## 2022-01-01 RX ADMIN — Medication 3 MILLIGRAM(S): at 22:34

## 2022-01-01 RX ADMIN — MUPIROCIN 1 APPLICATION(S): 20 OINTMENT TOPICAL at 18:12

## 2022-01-01 RX ADMIN — Medication 325 MILLIGRAM(S): at 06:19

## 2022-01-01 RX ADMIN — Medication 650 MILLIGRAM(S): at 21:34

## 2022-01-01 RX ADMIN — Medication 3 MILLILITER(S): at 06:12

## 2022-01-01 RX ADMIN — HYDROMORPHONE HYDROCHLORIDE 0.25 MILLIGRAM(S): 2 INJECTION INTRAMUSCULAR; INTRAVENOUS; SUBCUTANEOUS at 18:12

## 2022-01-01 RX ADMIN — Medication 120 MILLIGRAM(S): at 05:55

## 2022-01-01 RX ADMIN — Medication 40 MILLIGRAM(S): at 05:06

## 2022-01-01 RX ADMIN — TIOTROPIUM BROMIDE 1 CAPSULE(S): 18 CAPSULE ORAL; RESPIRATORY (INHALATION) at 11:30

## 2022-01-01 RX ADMIN — Medication 81 MILLIGRAM(S): at 11:15

## 2022-01-01 RX ADMIN — Medication 2 SPRAY(S): at 00:16

## 2022-01-01 RX ADMIN — Medication 3 MILLIGRAM(S): at 01:08

## 2022-01-01 RX ADMIN — Medication 1 SPRAY(S): at 00:49

## 2022-01-01 RX ADMIN — TADALAFIL 40 MILLIGRAM(S): 10 TABLET, FILM COATED ORAL at 12:39

## 2022-01-01 RX ADMIN — CHLORHEXIDINE GLUCONATE 1 APPLICATION(S): 213 SOLUTION TOPICAL at 05:22

## 2022-01-01 RX ADMIN — CHLORHEXIDINE GLUCONATE 1 APPLICATION(S): 213 SOLUTION TOPICAL at 05:32

## 2022-01-01 RX ADMIN — TIOTROPIUM BROMIDE 1 CAPSULE(S): 18 CAPSULE ORAL; RESPIRATORY (INHALATION) at 12:21

## 2022-01-01 RX ADMIN — Medication 975 MILLIGRAM(S): at 02:31

## 2022-01-01 RX ADMIN — Medication 975 MILLIGRAM(S): at 00:07

## 2022-01-01 RX ADMIN — Medication 125 MICROGRAM(S): at 05:57

## 2022-01-01 RX ADMIN — Medication 5 MILLIGRAM(S): at 17:23

## 2022-01-01 RX ADMIN — AMBRISENTAN 10 MILLIGRAM(S): 10 TABLET, FILM COATED ORAL at 13:14

## 2022-01-01 RX ADMIN — BUPROPION HYDROCHLORIDE 300 MILLIGRAM(S): 150 TABLET, EXTENDED RELEASE ORAL at 12:26

## 2022-01-01 RX ADMIN — Medication 650 MILLIGRAM(S): at 07:20

## 2022-01-01 RX ADMIN — BUDESONIDE AND FORMOTEROL FUMARATE DIHYDRATE 2 PUFF(S): 160; 4.5 AEROSOL RESPIRATORY (INHALATION) at 05:17

## 2022-01-01 RX ADMIN — BUMETANIDE 2 MILLIGRAM(S): 0.25 INJECTION INTRAMUSCULAR; INTRAVENOUS at 17:07

## 2022-01-01 RX ADMIN — Medication 2 SPRAY(S): at 17:41

## 2022-01-01 RX ADMIN — CHLORHEXIDINE GLUCONATE 1 APPLICATION(S): 213 SOLUTION TOPICAL at 06:05

## 2022-01-01 RX ADMIN — Medication 125 MICROGRAM(S): at 10:29

## 2022-01-01 RX ADMIN — AMBRISENTAN 10 MILLIGRAM(S): 10 TABLET, FILM COATED ORAL at 11:08

## 2022-01-01 RX ADMIN — BUPROPION HYDROCHLORIDE 300 MILLIGRAM(S): 150 TABLET, EXTENDED RELEASE ORAL at 11:43

## 2022-01-01 RX ADMIN — BUDESONIDE AND FORMOTEROL FUMARATE DIHYDRATE 2 PUFF(S): 160; 4.5 AEROSOL RESPIRATORY (INHALATION) at 17:25

## 2022-01-01 RX ADMIN — Medication 3 MILLILITER(S): at 05:39

## 2022-01-01 RX ADMIN — ROSUVASTATIN CALCIUM 5 MILLIGRAM(S): 5 TABLET ORAL at 21:27

## 2022-01-01 RX ADMIN — SPIRONOLACTONE 25 MILLIGRAM(S): 25 TABLET, FILM COATED ORAL at 18:19

## 2022-01-01 RX ADMIN — Medication 2 SPRAY(S): at 05:21

## 2022-01-01 RX ADMIN — AMBRISENTAN 10 MILLIGRAM(S): 10 TABLET, FILM COATED ORAL at 11:51

## 2022-01-01 RX ADMIN — Medication 2 SPRAY(S): at 12:43

## 2022-01-01 RX ADMIN — Medication 975 MILLIGRAM(S): at 21:25

## 2022-01-01 RX ADMIN — SPIRONOLACTONE 25 MILLIGRAM(S): 25 TABLET, FILM COATED ORAL at 18:01

## 2022-01-01 RX ADMIN — BUDESONIDE AND FORMOTEROL FUMARATE DIHYDRATE 2 PUFF(S): 160; 4.5 AEROSOL RESPIRATORY (INHALATION) at 06:30

## 2022-01-01 RX ADMIN — Medication 3 MILLILITER(S): at 00:38

## 2022-01-01 RX ADMIN — BUDESONIDE AND FORMOTEROL FUMARATE DIHYDRATE 2 PUFF(S): 160; 4.5 AEROSOL RESPIRATORY (INHALATION) at 18:09

## 2022-01-01 RX ADMIN — Medication 60 MILLIGRAM(S): at 13:45

## 2022-01-01 RX ADMIN — Medication 975 MILLIGRAM(S): at 23:05

## 2022-01-01 RX ADMIN — SODIUM CHLORIDE 300 MILLILITER(S): 5 INJECTION, SOLUTION INTRAVENOUS at 16:31

## 2022-01-01 RX ADMIN — Medication 325 MILLIGRAM(S): at 12:12

## 2022-01-01 RX ADMIN — BUDESONIDE AND FORMOTEROL FUMARATE DIHYDRATE 2 PUFF(S): 160; 4.5 AEROSOL RESPIRATORY (INHALATION) at 19:47

## 2022-01-01 RX ADMIN — Medication 975 MILLIGRAM(S): at 07:02

## 2022-01-01 RX ADMIN — BUDESONIDE AND FORMOTEROL FUMARATE DIHYDRATE 2 PUFF(S): 160; 4.5 AEROSOL RESPIRATORY (INHALATION) at 05:20

## 2022-01-01 RX ADMIN — BUDESONIDE AND FORMOTEROL FUMARATE DIHYDRATE 2 PUFF(S): 160; 4.5 AEROSOL RESPIRATORY (INHALATION) at 06:51

## 2022-01-01 RX ADMIN — Medication 975 MILLIGRAM(S): at 23:24

## 2022-01-01 RX ADMIN — MUPIROCIN 1 APPLICATION(S): 20 OINTMENT TOPICAL at 06:32

## 2022-01-01 RX ADMIN — CHLORHEXIDINE GLUCONATE 1 APPLICATION(S): 213 SOLUTION TOPICAL at 05:07

## 2022-01-01 RX ADMIN — AMBRISENTAN 10 MILLIGRAM(S): 10 TABLET, FILM COATED ORAL at 13:43

## 2022-01-01 RX ADMIN — HEPARIN SODIUM 900 UNIT(S)/HR: 5000 INJECTION INTRAVENOUS; SUBCUTANEOUS at 15:17

## 2022-01-01 RX ADMIN — Medication 3 MILLILITER(S): at 11:20

## 2022-01-01 RX ADMIN — CHLORHEXIDINE GLUCONATE 1 APPLICATION(S): 213 SOLUTION TOPICAL at 05:30

## 2022-01-01 RX ADMIN — Medication 650 MILLIGRAM(S): at 23:38

## 2022-01-01 RX ADMIN — Medication 975 MILLIGRAM(S): at 21:56

## 2022-01-01 RX ADMIN — Medication 1000 MILLIGRAM(S): at 08:17

## 2022-01-01 RX ADMIN — BUMETANIDE 124 MILLIGRAM(S): 0.25 INJECTION INTRAMUSCULAR; INTRAVENOUS at 17:32

## 2022-01-01 RX ADMIN — Medication 2 SPRAY(S): at 12:41

## 2022-01-01 RX ADMIN — Medication 650 MILLIGRAM(S): at 23:32

## 2022-01-01 RX ADMIN — APIXABAN 5 MILLIGRAM(S): 2.5 TABLET, FILM COATED ORAL at 17:21

## 2022-01-01 RX ADMIN — BUPROPION HYDROCHLORIDE 300 MILLIGRAM(S): 150 TABLET, EXTENDED RELEASE ORAL at 11:04

## 2022-01-01 RX ADMIN — Medication 975 MILLIGRAM(S): at 12:06

## 2022-01-01 RX ADMIN — Medication 125 MICROGRAM(S): at 12:00

## 2022-01-01 RX ADMIN — Medication 125 MICROGRAM(S): at 06:34

## 2022-01-01 RX ADMIN — BUDESONIDE AND FORMOTEROL FUMARATE DIHYDRATE 2 PUFF(S): 160; 4.5 AEROSOL RESPIRATORY (INHALATION) at 05:43

## 2022-01-01 RX ADMIN — BUPROPION HYDROCHLORIDE 150 MILLIGRAM(S): 150 TABLET, EXTENDED RELEASE ORAL at 11:37

## 2022-01-01 RX ADMIN — Medication 60 MILLIGRAM(S): at 05:16

## 2022-01-01 RX ADMIN — Medication 3 MILLILITER(S): at 06:35

## 2022-01-01 RX ADMIN — Medication 975 MILLIGRAM(S): at 22:39

## 2022-01-01 RX ADMIN — Medication 120 MILLIGRAM(S): at 05:07

## 2022-01-01 RX ADMIN — BUPROPION HYDROCHLORIDE 300 MILLIGRAM(S): 150 TABLET, EXTENDED RELEASE ORAL at 13:06

## 2022-01-01 RX ADMIN — BUMETANIDE 2 MILLIGRAM(S): 0.25 INJECTION INTRAMUSCULAR; INTRAVENOUS at 17:01

## 2022-01-01 RX ADMIN — ATORVASTATIN CALCIUM 20 MILLIGRAM(S): 80 TABLET, FILM COATED ORAL at 20:22

## 2022-01-01 RX ADMIN — BUMETANIDE 2 MILLIGRAM(S): 0.25 INJECTION INTRAMUSCULAR; INTRAVENOUS at 12:59

## 2022-01-01 RX ADMIN — TIOTROPIUM BROMIDE 1 CAPSULE(S): 18 CAPSULE ORAL; RESPIRATORY (INHALATION) at 11:12

## 2022-01-01 RX ADMIN — Medication 125 MICROGRAM(S): at 05:31

## 2022-01-01 RX ADMIN — SODIUM CHLORIDE 300 MILLILITER(S): 5 INJECTION, SOLUTION INTRAVENOUS at 21:23

## 2022-01-01 RX ADMIN — APIXABAN 5 MILLIGRAM(S): 2.5 TABLET, FILM COATED ORAL at 18:38

## 2022-01-01 RX ADMIN — APIXABAN 5 MILLIGRAM(S): 2.5 TABLET, FILM COATED ORAL at 17:32

## 2022-01-01 RX ADMIN — TIOTROPIUM BROMIDE AND OLODATEROL 2 PUFF(S): 3.124; 2.736 SPRAY, METERED RESPIRATORY (INHALATION) at 12:21

## 2022-01-01 RX ADMIN — Medication 120 MILLIGRAM(S): at 06:19

## 2022-01-01 RX ADMIN — Medication 325 MILLIGRAM(S): at 06:52

## 2022-01-01 RX ADMIN — TIOTROPIUM BROMIDE AND OLODATEROL 2 PUFF(S): 3.124; 2.736 SPRAY, METERED RESPIRATORY (INHALATION) at 11:20

## 2022-01-01 RX ADMIN — Medication 975 MILLIGRAM(S): at 21:06

## 2022-01-01 RX ADMIN — BUMETANIDE 2 MILLIGRAM(S): 0.25 INJECTION INTRAMUSCULAR; INTRAVENOUS at 22:31

## 2022-01-01 RX ADMIN — SODIUM CHLORIDE 300 MILLILITER(S): 5 INJECTION, SOLUTION INTRAVENOUS at 13:40

## 2022-01-01 RX ADMIN — PANTOPRAZOLE SODIUM 40 MILLIGRAM(S): 20 TABLET, DELAYED RELEASE ORAL at 04:29

## 2022-01-01 RX ADMIN — Medication 20 MILLIGRAM(S): at 08:39

## 2022-01-01 RX ADMIN — Medication 975 MILLIGRAM(S): at 11:38

## 2022-01-01 RX ADMIN — Medication 125 MICROGRAM(S): at 05:06

## 2022-01-01 RX ADMIN — APIXABAN 5 MILLIGRAM(S): 2.5 TABLET, FILM COATED ORAL at 05:09

## 2022-01-01 RX ADMIN — SODIUM CHLORIDE 300 MILLILITER(S): 5 INJECTION, SOLUTION INTRAVENOUS at 00:06

## 2022-01-01 RX ADMIN — BUMETANIDE 132 MILLIGRAM(S): 0.25 INJECTION INTRAMUSCULAR; INTRAVENOUS at 13:44

## 2022-01-01 RX ADMIN — BUDESONIDE AND FORMOTEROL FUMARATE DIHYDRATE 2 PUFF(S): 160; 4.5 AEROSOL RESPIRATORY (INHALATION) at 05:16

## 2022-01-01 RX ADMIN — BUMETANIDE 132 MILLIGRAM(S): 0.25 INJECTION INTRAMUSCULAR; INTRAVENOUS at 14:15

## 2022-01-01 RX ADMIN — ATORVASTATIN CALCIUM 20 MILLIGRAM(S): 80 TABLET, FILM COATED ORAL at 21:06

## 2022-01-01 RX ADMIN — TADALAFIL 40 MILLIGRAM(S): 10 TABLET, FILM COATED ORAL at 12:40

## 2022-01-01 RX ADMIN — TIOTROPIUM BROMIDE 1 CAPSULE(S): 18 CAPSULE ORAL; RESPIRATORY (INHALATION) at 12:11

## 2022-01-01 RX ADMIN — BUDESONIDE AND FORMOTEROL FUMARATE DIHYDRATE 2 PUFF(S): 160; 4.5 AEROSOL RESPIRATORY (INHALATION) at 17:02

## 2022-01-01 RX ADMIN — BUMETANIDE 4 MILLIGRAM(S): 0.25 INJECTION INTRAMUSCULAR; INTRAVENOUS at 05:14

## 2022-01-01 RX ADMIN — PANTOPRAZOLE SODIUM 40 MILLIGRAM(S): 20 TABLET, DELAYED RELEASE ORAL at 05:16

## 2022-01-01 RX ADMIN — BUMETANIDE 4 MILLIGRAM(S): 0.25 INJECTION INTRAMUSCULAR; INTRAVENOUS at 18:01

## 2022-01-01 RX ADMIN — BUMETANIDE 2 MILLIGRAM(S): 0.25 INJECTION INTRAMUSCULAR; INTRAVENOUS at 14:11

## 2022-01-01 RX ADMIN — ROSUVASTATIN CALCIUM 5 MILLIGRAM(S): 5 TABLET ORAL at 21:35

## 2022-01-01 RX ADMIN — Medication 2 SPRAY(S): at 06:00

## 2022-01-01 RX ADMIN — Medication 325 MILLIGRAM(S): at 05:08

## 2022-01-01 RX ADMIN — SPIRONOLACTONE 50 MILLIGRAM(S): 25 TABLET, FILM COATED ORAL at 06:03

## 2022-01-01 RX ADMIN — PANTOPRAZOLE SODIUM 40 MILLIGRAM(S): 20 TABLET, DELAYED RELEASE ORAL at 06:30

## 2022-01-01 RX ADMIN — PANTOPRAZOLE SODIUM 40 MILLIGRAM(S): 20 TABLET, DELAYED RELEASE ORAL at 05:31

## 2022-01-01 RX ADMIN — AMBRISENTAN 10 MILLIGRAM(S): 10 TABLET, FILM COATED ORAL at 12:51

## 2022-01-01 RX ADMIN — Medication 975 MILLIGRAM(S): at 22:00

## 2022-01-01 RX ADMIN — BUMETANIDE 2 MILLIGRAM(S): 0.25 INJECTION INTRAMUSCULAR; INTRAVENOUS at 12:25

## 2022-01-01 RX ADMIN — BUDESONIDE AND FORMOTEROL FUMARATE DIHYDRATE 2 PUFF(S): 160; 4.5 AEROSOL RESPIRATORY (INHALATION) at 06:48

## 2022-01-01 RX ADMIN — Medication 125 MICROGRAM(S): at 11:13

## 2022-01-01 RX ADMIN — TADALAFIL 40 MILLIGRAM(S): 10 TABLET, FILM COATED ORAL at 12:34

## 2022-01-01 RX ADMIN — Medication 2 SPRAY(S): at 17:46

## 2022-01-01 RX ADMIN — Medication 40 MILLIEQUIVALENT(S): at 11:48

## 2022-01-01 RX ADMIN — Medication 650 MILLIGRAM(S): at 22:40

## 2022-01-01 RX ADMIN — BUDESONIDE AND FORMOTEROL FUMARATE DIHYDRATE 2 PUFF(S): 160; 4.5 AEROSOL RESPIRATORY (INHALATION) at 17:41

## 2022-01-01 RX ADMIN — Medication 125 MICROGRAM(S): at 06:29

## 2022-01-01 RX ADMIN — APIXABAN 5 MILLIGRAM(S): 2.5 TABLET, FILM COATED ORAL at 06:12

## 2022-01-01 RX ADMIN — Medication 81 MILLIGRAM(S): at 11:51

## 2022-01-01 RX ADMIN — AMBRISENTAN 10 MILLIGRAM(S): 10 TABLET, FILM COATED ORAL at 11:14

## 2022-01-01 RX ADMIN — CHLORHEXIDINE GLUCONATE 1 APPLICATION(S): 213 SOLUTION TOPICAL at 06:04

## 2022-01-01 RX ADMIN — BUMETANIDE 132 MILLIGRAM(S): 0.25 INJECTION INTRAMUSCULAR; INTRAVENOUS at 05:33

## 2022-01-01 RX ADMIN — Medication 650 MILLIGRAM(S): at 23:03

## 2022-01-01 RX ADMIN — BUMETANIDE 132 MILLIGRAM(S): 0.25 INJECTION INTRAMUSCULAR; INTRAVENOUS at 05:47

## 2022-01-01 RX ADMIN — BUDESONIDE AND FORMOTEROL FUMARATE DIHYDRATE 2 PUFF(S): 160; 4.5 AEROSOL RESPIRATORY (INHALATION) at 05:26

## 2022-01-01 RX ADMIN — BUMETANIDE 10 MG/HR: 0.25 INJECTION INTRAMUSCULAR; INTRAVENOUS at 16:51

## 2022-01-01 RX ADMIN — Medication 975 MILLIGRAM(S): at 13:15

## 2022-01-01 RX ADMIN — SODIUM CHLORIDE 300 MILLILITER(S): 5 INJECTION, SOLUTION INTRAVENOUS at 13:06

## 2022-01-01 RX ADMIN — BUPROPION HYDROCHLORIDE 150 MILLIGRAM(S): 150 TABLET, EXTENDED RELEASE ORAL at 11:15

## 2022-01-01 RX ADMIN — Medication 1 SPRAY(S): at 05:18

## 2022-01-01 RX ADMIN — Medication 2 SPRAY(S): at 05:15

## 2022-01-01 RX ADMIN — TADALAFIL 40 MILLIGRAM(S): 10 TABLET, FILM COATED ORAL at 11:43

## 2022-01-01 RX ADMIN — BUMETANIDE 2 MILLIGRAM(S): 0.25 INJECTION INTRAMUSCULAR; INTRAVENOUS at 11:01

## 2022-01-01 RX ADMIN — TIOTROPIUM BROMIDE 1 CAPSULE(S): 18 CAPSULE ORAL; RESPIRATORY (INHALATION) at 11:56

## 2022-01-01 RX ADMIN — Medication 3 MILLILITER(S): at 21:30

## 2022-01-01 RX ADMIN — AMBRISENTAN 10 MILLIGRAM(S): 10 TABLET, FILM COATED ORAL at 11:55

## 2022-01-01 RX ADMIN — PANTOPRAZOLE SODIUM 40 MILLIGRAM(S): 20 TABLET, DELAYED RELEASE ORAL at 07:01

## 2022-01-01 RX ADMIN — Medication 975 MILLIGRAM(S): at 06:31

## 2022-01-01 RX ADMIN — PANTOPRAZOLE SODIUM 40 MILLIGRAM(S): 20 TABLET, DELAYED RELEASE ORAL at 05:47

## 2022-01-01 RX ADMIN — Medication 2 SPRAY(S): at 17:25

## 2022-01-01 RX ADMIN — MUPIROCIN 1 APPLICATION(S): 20 OINTMENT TOPICAL at 17:01

## 2022-01-01 RX ADMIN — Medication 2 SPRAY(S): at 18:38

## 2022-01-01 RX ADMIN — APIXABAN 5 MILLIGRAM(S): 2.5 TABLET, FILM COATED ORAL at 05:15

## 2022-01-01 RX ADMIN — Medication 125 MICROGRAM(S): at 12:40

## 2022-01-01 RX ADMIN — AMBRISENTAN 10 MILLIGRAM(S): 10 TABLET, FILM COATED ORAL at 08:17

## 2022-01-01 RX ADMIN — BUPROPION HYDROCHLORIDE 150 MILLIGRAM(S): 150 TABLET, EXTENDED RELEASE ORAL at 11:51

## 2022-01-01 RX ADMIN — MUPIROCIN 1 APPLICATION(S): 20 OINTMENT TOPICAL at 17:25

## 2022-01-01 RX ADMIN — BUMETANIDE 2 MILLIGRAM(S): 0.25 INJECTION INTRAMUSCULAR; INTRAVENOUS at 06:15

## 2022-01-01 RX ADMIN — TIOTROPIUM BROMIDE AND OLODATEROL 2 PUFF(S): 3.124; 2.736 SPRAY, METERED RESPIRATORY (INHALATION) at 13:17

## 2022-01-01 RX ADMIN — Medication 81 MILLIGRAM(S): at 12:12

## 2022-01-01 RX ADMIN — BUPROPION HYDROCHLORIDE 300 MILLIGRAM(S): 150 TABLET, EXTENDED RELEASE ORAL at 11:50

## 2022-01-01 RX ADMIN — Medication 975 MILLIGRAM(S): at 23:01

## 2022-01-01 RX ADMIN — CHLORHEXIDINE GLUCONATE 1 APPLICATION(S): 213 SOLUTION TOPICAL at 05:15

## 2022-01-01 RX ADMIN — TADALAFIL 40 MILLIGRAM(S): 10 TABLET, FILM COATED ORAL at 12:26

## 2022-01-01 RX ADMIN — PANTOPRAZOLE SODIUM 40 MILLIGRAM(S): 20 TABLET, DELAYED RELEASE ORAL at 05:04

## 2022-01-01 RX ADMIN — BUDESONIDE AND FORMOTEROL FUMARATE DIHYDRATE 2 PUFF(S): 160; 4.5 AEROSOL RESPIRATORY (INHALATION) at 17:05

## 2022-01-01 RX ADMIN — PANTOPRAZOLE SODIUM 40 MILLIGRAM(S): 20 TABLET, DELAYED RELEASE ORAL at 06:19

## 2022-01-01 RX ADMIN — APIXABAN 5 MILLIGRAM(S): 2.5 TABLET, FILM COATED ORAL at 06:02

## 2022-01-01 RX ADMIN — BUDESONIDE AND FORMOTEROL FUMARATE DIHYDRATE 2 PUFF(S): 160; 4.5 AEROSOL RESPIRATORY (INHALATION) at 06:35

## 2022-01-01 RX ADMIN — TIOTROPIUM BROMIDE 1 CAPSULE(S): 18 CAPSULE ORAL; RESPIRATORY (INHALATION) at 11:04

## 2022-01-01 RX ADMIN — BUMETANIDE 2 MILLIGRAM(S): 0.25 INJECTION INTRAMUSCULAR; INTRAVENOUS at 21:49

## 2022-01-01 RX ADMIN — ATORVASTATIN CALCIUM 20 MILLIGRAM(S): 80 TABLET, FILM COATED ORAL at 21:38

## 2022-01-01 RX ADMIN — Medication 125 MICROGRAM(S): at 06:48

## 2022-01-01 RX ADMIN — TADALAFIL 40 MILLIGRAM(S): 10 TABLET, FILM COATED ORAL at 12:32

## 2022-01-01 RX ADMIN — ROSUVASTATIN CALCIUM 5 MILLIGRAM(S): 5 TABLET ORAL at 21:30

## 2022-01-01 RX ADMIN — BUMETANIDE 4 MILLIGRAM(S): 0.25 INJECTION INTRAMUSCULAR; INTRAVENOUS at 05:04

## 2022-01-01 RX ADMIN — Medication 500 MILLIGRAM(S): at 17:05

## 2022-01-01 RX ADMIN — SPIRONOLACTONE 50 MILLIGRAM(S): 25 TABLET, FILM COATED ORAL at 05:46

## 2022-01-01 RX ADMIN — Medication 120 MILLIGRAM(S): at 05:04

## 2022-01-01 RX ADMIN — Medication 1 SPRAY(S): at 11:08

## 2022-01-01 RX ADMIN — ATORVASTATIN CALCIUM 20 MILLIGRAM(S): 80 TABLET, FILM COATED ORAL at 20:48

## 2022-01-01 RX ADMIN — Medication 3 MILLIGRAM(S): at 21:07

## 2022-01-01 RX ADMIN — Medication 2 SPRAY(S): at 23:24

## 2022-01-01 RX ADMIN — Medication 3 MILLILITER(S): at 17:17

## 2022-01-01 RX ADMIN — Medication 325 MILLIGRAM(S): at 13:07

## 2022-01-01 RX ADMIN — Medication 2 SPRAY(S): at 21:21

## 2022-01-01 RX ADMIN — BUDESONIDE AND FORMOTEROL FUMARATE DIHYDRATE 2 PUFF(S): 160; 4.5 AEROSOL RESPIRATORY (INHALATION) at 06:26

## 2022-01-01 RX ADMIN — Medication 120 MILLIGRAM(S): at 05:30

## 2022-01-01 RX ADMIN — BUDESONIDE AND FORMOTEROL FUMARATE DIHYDRATE 2 PUFF(S): 160; 4.5 AEROSOL RESPIRATORY (INHALATION) at 17:52

## 2022-01-01 RX ADMIN — TADALAFIL 40 MILLIGRAM(S): 10 TABLET, FILM COATED ORAL at 12:51

## 2022-01-01 RX ADMIN — BUPROPION HYDROCHLORIDE 300 MILLIGRAM(S): 150 TABLET, EXTENDED RELEASE ORAL at 12:01

## 2022-01-01 RX ADMIN — Medication 975 MILLIGRAM(S): at 11:08

## 2022-01-01 RX ADMIN — Medication 125 MICROGRAM(S): at 12:23

## 2022-01-01 RX ADMIN — Medication 2 SPRAY(S): at 06:04

## 2022-01-01 RX ADMIN — Medication 125 MICROGRAM(S): at 05:55

## 2022-01-01 RX ADMIN — BUMETANIDE 2 MILLIGRAM(S): 0.25 INJECTION INTRAMUSCULAR; INTRAVENOUS at 06:25

## 2022-01-01 RX ADMIN — Medication 2 SPRAY(S): at 18:16

## 2022-01-01 RX ADMIN — Medication 81 MILLIGRAM(S): at 12:11

## 2022-01-01 RX ADMIN — Medication 1 SPRAY(S): at 11:27

## 2022-01-01 RX ADMIN — Medication 2 SPRAY(S): at 11:29

## 2022-01-01 RX ADMIN — Medication 3 MILLILITER(S): at 05:45

## 2022-01-01 RX ADMIN — Medication 1 SPRAY(S): at 17:07

## 2022-01-01 RX ADMIN — ATORVASTATIN CALCIUM 20 MILLIGRAM(S): 80 TABLET, FILM COATED ORAL at 23:16

## 2022-01-01 RX ADMIN — ROSUVASTATIN CALCIUM 5 MILLIGRAM(S): 5 TABLET ORAL at 22:03

## 2022-01-01 RX ADMIN — BUPROPION HYDROCHLORIDE 300 MILLIGRAM(S): 150 TABLET, EXTENDED RELEASE ORAL at 12:38

## 2022-01-01 RX ADMIN — APIXABAN 5 MILLIGRAM(S): 2.5 TABLET, FILM COATED ORAL at 18:03

## 2022-01-01 RX ADMIN — Medication 975 MILLIGRAM(S): at 07:30

## 2022-01-01 RX ADMIN — Medication 20 MILLIGRAM(S): at 14:15

## 2022-01-01 RX ADMIN — PANTOPRAZOLE SODIUM 40 MILLIGRAM(S): 20 TABLET, DELAYED RELEASE ORAL at 06:14

## 2022-01-01 RX ADMIN — Medication 2 SPRAY(S): at 12:53

## 2022-01-01 RX ADMIN — Medication 3 MILLILITER(S): at 06:04

## 2022-01-01 RX ADMIN — Medication 3 MILLILITER(S): at 17:47

## 2022-01-01 RX ADMIN — Medication 125 MICROGRAM(S): at 05:10

## 2022-01-01 RX ADMIN — Medication 650 MILLIGRAM(S): at 04:38

## 2022-01-01 RX ADMIN — Medication 2 SPRAY(S): at 11:46

## 2022-01-01 RX ADMIN — Medication 500 MILLIGRAM(S): at 05:55

## 2022-01-01 RX ADMIN — PANTOPRAZOLE SODIUM 40 MILLIGRAM(S): 20 TABLET, DELAYED RELEASE ORAL at 05:17

## 2022-01-01 RX ADMIN — SPIRONOLACTONE 50 MILLIGRAM(S): 25 TABLET, FILM COATED ORAL at 06:30

## 2022-01-01 RX ADMIN — Medication 3 MILLILITER(S): at 12:21

## 2022-01-01 RX ADMIN — POLYETHYLENE GLYCOL 3350 17 GRAM(S): 17 POWDER, FOR SOLUTION ORAL at 11:10

## 2022-01-01 RX ADMIN — BUDESONIDE AND FORMOTEROL FUMARATE DIHYDRATE 2 PUFF(S): 160; 4.5 AEROSOL RESPIRATORY (INHALATION) at 17:13

## 2022-01-01 RX ADMIN — Medication 81 MILLIGRAM(S): at 08:18

## 2022-01-01 RX ADMIN — BUPROPION HYDROCHLORIDE 300 MILLIGRAM(S): 150 TABLET, EXTENDED RELEASE ORAL at 13:32

## 2022-01-01 RX ADMIN — CHLORHEXIDINE GLUCONATE 1 APPLICATION(S): 213 SOLUTION TOPICAL at 05:14

## 2022-01-01 RX ADMIN — SPIRONOLACTONE 50 MILLIGRAM(S): 25 TABLET, FILM COATED ORAL at 06:42

## 2022-01-01 RX ADMIN — Medication 125 MICROGRAM(S): at 05:28

## 2022-01-01 RX ADMIN — Medication 3 MILLILITER(S): at 17:09

## 2022-01-01 RX ADMIN — Medication 2 SPRAY(S): at 16:56

## 2022-01-01 RX ADMIN — PANTOPRAZOLE SODIUM 40 MILLIGRAM(S): 20 TABLET, DELAYED RELEASE ORAL at 05:46

## 2022-01-01 RX ADMIN — BUPROPION HYDROCHLORIDE 300 MILLIGRAM(S): 150 TABLET, EXTENDED RELEASE ORAL at 11:10

## 2022-01-01 RX ADMIN — APIXABAN 5 MILLIGRAM(S): 2.5 TABLET, FILM COATED ORAL at 18:01

## 2022-01-01 RX ADMIN — APIXABAN 5 MILLIGRAM(S): 2.5 TABLET, FILM COATED ORAL at 18:32

## 2022-01-01 RX ADMIN — BUMETANIDE 10 MG/HR: 0.25 INJECTION INTRAMUSCULAR; INTRAVENOUS at 21:17

## 2022-01-01 RX ADMIN — CHLORHEXIDINE GLUCONATE 1 APPLICATION(S): 213 SOLUTION TOPICAL at 06:41

## 2022-01-01 RX ADMIN — AMBRISENTAN 10 MILLIGRAM(S): 10 TABLET, FILM COATED ORAL at 11:28

## 2022-01-01 RX ADMIN — Medication 5 MILLIGRAM(S): at 05:07

## 2022-01-01 RX ADMIN — TADALAFIL 40 MILLIGRAM(S): 10 TABLET, FILM COATED ORAL at 12:55

## 2022-01-01 RX ADMIN — Medication 975 MILLIGRAM(S): at 05:16

## 2022-01-01 RX ADMIN — TADALAFIL 40 MILLIGRAM(S): 10 TABLET, FILM COATED ORAL at 14:12

## 2022-01-01 RX ADMIN — Medication 1 SPRAY(S): at 05:36

## 2022-01-01 RX ADMIN — Medication 2 SPRAY(S): at 06:55

## 2022-01-01 RX ADMIN — APIXABAN 5 MILLIGRAM(S): 2.5 TABLET, FILM COATED ORAL at 05:31

## 2022-01-01 RX ADMIN — BUDESONIDE AND FORMOTEROL FUMARATE DIHYDRATE 2 PUFF(S): 160; 4.5 AEROSOL RESPIRATORY (INHALATION) at 17:14

## 2022-01-01 RX ADMIN — Medication 120 MILLIGRAM(S): at 05:56

## 2022-01-01 RX ADMIN — Medication 650 MILLIGRAM(S): at 06:31

## 2022-01-01 RX ADMIN — BUPROPION HYDROCHLORIDE 150 MILLIGRAM(S): 150 TABLET, EXTENDED RELEASE ORAL at 12:11

## 2022-01-01 RX ADMIN — Medication 3 MILLIGRAM(S): at 22:17

## 2022-01-01 RX ADMIN — Medication 125 MICROGRAM(S): at 05:42

## 2022-01-01 RX ADMIN — PANTOPRAZOLE SODIUM 40 MILLIGRAM(S): 20 TABLET, DELAYED RELEASE ORAL at 05:11

## 2022-01-01 RX ADMIN — Medication 125 MICROGRAM(S): at 06:14

## 2022-01-01 RX ADMIN — PANTOPRAZOLE SODIUM 40 MILLIGRAM(S): 20 TABLET, DELAYED RELEASE ORAL at 06:51

## 2022-01-01 RX ADMIN — BUMETANIDE 10 MG/HR: 0.25 INJECTION INTRAMUSCULAR; INTRAVENOUS at 22:12

## 2022-01-01 RX ADMIN — BUDESONIDE AND FORMOTEROL FUMARATE DIHYDRATE 2 PUFF(S): 160; 4.5 AEROSOL RESPIRATORY (INHALATION) at 18:12

## 2022-01-01 RX ADMIN — TADALAFIL 40 MILLIGRAM(S): 10 TABLET, FILM COATED ORAL at 18:02

## 2022-01-01 RX ADMIN — Medication 120 MILLIGRAM(S): at 05:20

## 2022-01-01 RX ADMIN — Medication 125 MICROGRAM(S): at 05:46

## 2022-01-01 RX ADMIN — Medication 2 SPRAY(S): at 06:17

## 2022-01-01 RX ADMIN — SODIUM CHLORIDE 300 MILLILITER(S): 5 INJECTION, SOLUTION INTRAVENOUS at 05:31

## 2022-01-01 RX ADMIN — BUDESONIDE AND FORMOTEROL FUMARATE DIHYDRATE 2 PUFF(S): 160; 4.5 AEROSOL RESPIRATORY (INHALATION) at 06:34

## 2022-01-01 RX ADMIN — Medication 1 SPRAY(S): at 05:46

## 2022-01-01 RX ADMIN — Medication 1 SPRAY(S): at 00:00

## 2022-01-01 RX ADMIN — APIXABAN 5 MILLIGRAM(S): 2.5 TABLET, FILM COATED ORAL at 06:25

## 2022-01-01 RX ADMIN — Medication 2 SPRAY(S): at 18:20

## 2022-01-01 RX ADMIN — Medication 2 SPRAY(S): at 23:50

## 2022-01-01 RX ADMIN — Medication 81 MILLIGRAM(S): at 13:01

## 2022-01-01 RX ADMIN — Medication 325 MILLIGRAM(S): at 05:15

## 2022-01-01 RX ADMIN — Medication 975 MILLIGRAM(S): at 06:53

## 2022-01-01 RX ADMIN — ATORVASTATIN CALCIUM 20 MILLIGRAM(S): 80 TABLET, FILM COATED ORAL at 20:33

## 2022-01-01 RX ADMIN — BUDESONIDE AND FORMOTEROL FUMARATE DIHYDRATE 2 PUFF(S): 160; 4.5 AEROSOL RESPIRATORY (INHALATION) at 18:20

## 2022-01-01 RX ADMIN — Medication 650 MILLIGRAM(S): at 22:10

## 2022-01-01 RX ADMIN — Medication 3 MILLILITER(S): at 05:26

## 2022-01-01 RX ADMIN — BUDESONIDE AND FORMOTEROL FUMARATE DIHYDRATE 2 PUFF(S): 160; 4.5 AEROSOL RESPIRATORY (INHALATION) at 19:19

## 2022-01-01 RX ADMIN — Medication 120 MILLIGRAM(S): at 05:10

## 2022-01-01 RX ADMIN — Medication 40 MILLIGRAM(S): at 18:52

## 2022-01-01 RX ADMIN — AMBRISENTAN 10 MILLIGRAM(S): 10 TABLET, FILM COATED ORAL at 12:54

## 2022-01-01 RX ADMIN — APIXABAN 5 MILLIGRAM(S): 2.5 TABLET, FILM COATED ORAL at 06:40

## 2022-01-01 RX ADMIN — Medication 120 MILLIGRAM(S): at 06:14

## 2022-01-01 RX ADMIN — APIXABAN 5 MILLIGRAM(S): 2.5 TABLET, FILM COATED ORAL at 06:55

## 2022-01-01 RX ADMIN — Medication 650 MILLIGRAM(S): at 01:57

## 2022-01-01 RX ADMIN — Medication 3 MILLIGRAM(S): at 22:08

## 2022-01-01 RX ADMIN — IRON SUCROSE 110 MILLIGRAM(S): 20 INJECTION, SOLUTION INTRAVENOUS at 16:51

## 2022-01-01 RX ADMIN — HYDROMORPHONE HYDROCHLORIDE 0.25 MILLIGRAM(S): 2 INJECTION INTRAMUSCULAR; INTRAVENOUS; SUBCUTANEOUS at 20:47

## 2022-01-01 RX ADMIN — BUDESONIDE AND FORMOTEROL FUMARATE DIHYDRATE 2 PUFF(S): 160; 4.5 AEROSOL RESPIRATORY (INHALATION) at 05:08

## 2022-01-01 RX ADMIN — Medication 3 MILLIGRAM(S): at 21:48

## 2022-01-01 RX ADMIN — TIOTROPIUM BROMIDE 1 CAPSULE(S): 18 CAPSULE ORAL; RESPIRATORY (INHALATION) at 12:13

## 2022-01-01 RX ADMIN — BUDESONIDE AND FORMOTEROL FUMARATE DIHYDRATE 2 PUFF(S): 160; 4.5 AEROSOL RESPIRATORY (INHALATION) at 05:15

## 2022-01-01 RX ADMIN — BUPROPION HYDROCHLORIDE 150 MILLIGRAM(S): 150 TABLET, EXTENDED RELEASE ORAL at 11:53

## 2022-01-01 RX ADMIN — TADALAFIL 40 MILLIGRAM(S): 10 TABLET, FILM COATED ORAL at 13:09

## 2022-01-01 RX ADMIN — Medication 3 MILLILITER(S): at 11:15

## 2022-01-01 RX ADMIN — BUPROPION HYDROCHLORIDE 300 MILLIGRAM(S): 150 TABLET, EXTENDED RELEASE ORAL at 11:13

## 2022-01-01 RX ADMIN — SODIUM CHLORIDE 300 MILLILITER(S): 5 INJECTION, SOLUTION INTRAVENOUS at 13:36

## 2022-01-01 RX ADMIN — Medication 1 SPRAY(S): at 11:12

## 2022-01-01 RX ADMIN — Medication 1 SPRAY(S): at 17:13

## 2022-01-01 RX ADMIN — ATORVASTATIN CALCIUM 20 MILLIGRAM(S): 80 TABLET, FILM COATED ORAL at 22:28

## 2022-01-01 RX ADMIN — Medication 3 MILLILITER(S): at 00:13

## 2022-01-01 RX ADMIN — Medication 125 MICROGRAM(S): at 05:24

## 2022-01-01 RX ADMIN — Medication 975 MILLIGRAM(S): at 20:50

## 2022-01-01 RX ADMIN — Medication 2 SPRAY(S): at 13:38

## 2022-01-01 RX ADMIN — APIXABAN 5 MILLIGRAM(S): 2.5 TABLET, FILM COATED ORAL at 18:00

## 2022-01-01 RX ADMIN — BUMETANIDE 6 MILLIGRAM(S): 0.25 INJECTION INTRAMUSCULAR; INTRAVENOUS at 17:37

## 2022-01-01 RX ADMIN — BUMETANIDE 4 MILLIGRAM(S): 0.25 INJECTION INTRAMUSCULAR; INTRAVENOUS at 18:20

## 2022-01-01 RX ADMIN — BUDESONIDE AND FORMOTEROL FUMARATE DIHYDRATE 2 PUFF(S): 160; 4.5 AEROSOL RESPIRATORY (INHALATION) at 05:14

## 2022-01-01 RX ADMIN — Medication 3 MILLIGRAM(S): at 21:20

## 2022-01-01 RX ADMIN — HEPARIN SODIUM 900 UNIT(S)/HR: 5000 INJECTION INTRAVENOUS; SUBCUTANEOUS at 08:25

## 2022-01-01 RX ADMIN — Medication 975 MILLIGRAM(S): at 06:02

## 2022-01-01 RX ADMIN — Medication 975 MILLIGRAM(S): at 06:43

## 2022-01-01 RX ADMIN — BUMETANIDE 2 MILLIGRAM(S): 0.25 INJECTION INTRAMUSCULAR; INTRAVENOUS at 05:45

## 2022-01-01 RX ADMIN — Medication 975 MILLIGRAM(S): at 13:20

## 2022-01-01 RX ADMIN — Medication 10 MILLIGRAM(S): at 13:51

## 2022-01-01 RX ADMIN — ATORVASTATIN CALCIUM 20 MILLIGRAM(S): 80 TABLET, FILM COATED ORAL at 22:34

## 2022-01-01 RX ADMIN — TIOTROPIUM BROMIDE 1 CAPSULE(S): 18 CAPSULE ORAL; RESPIRATORY (INHALATION) at 11:55

## 2022-01-01 RX ADMIN — Medication 60 MILLIGRAM(S): at 05:09

## 2022-01-01 RX ADMIN — ATORVASTATIN CALCIUM 20 MILLIGRAM(S): 80 TABLET, FILM COATED ORAL at 20:30

## 2022-01-01 RX ADMIN — BUDESONIDE AND FORMOTEROL FUMARATE DIHYDRATE 2 PUFF(S): 160; 4.5 AEROSOL RESPIRATORY (INHALATION) at 16:55

## 2022-01-01 RX ADMIN — TIOTROPIUM BROMIDE 1 CAPSULE(S): 18 CAPSULE ORAL; RESPIRATORY (INHALATION) at 08:18

## 2022-01-01 RX ADMIN — SENNA PLUS 2 TABLET(S): 8.6 TABLET ORAL at 21:44

## 2022-01-01 RX ADMIN — BUDESONIDE AND FORMOTEROL FUMARATE DIHYDRATE 2 PUFF(S): 160; 4.5 AEROSOL RESPIRATORY (INHALATION) at 05:40

## 2022-01-01 RX ADMIN — TIOTROPIUM BROMIDE 1 CAPSULE(S): 18 CAPSULE ORAL; RESPIRATORY (INHALATION) at 13:06

## 2022-01-01 RX ADMIN — SODIUM CHLORIDE 300 MILLILITER(S): 5 INJECTION, SOLUTION INTRAVENOUS at 05:27

## 2022-01-01 RX ADMIN — BUDESONIDE AND FORMOTEROL FUMARATE DIHYDRATE 2 PUFF(S): 160; 4.5 AEROSOL RESPIRATORY (INHALATION) at 05:50

## 2022-01-01 RX ADMIN — Medication 650 MILLIGRAM(S): at 07:47

## 2022-01-01 RX ADMIN — BUMETANIDE 132 MILLIGRAM(S): 0.25 INJECTION INTRAMUSCULAR; INTRAVENOUS at 20:18

## 2022-01-01 RX ADMIN — Medication 20 MILLIGRAM(S): at 05:45

## 2022-01-01 RX ADMIN — BUDESONIDE AND FORMOTEROL FUMARATE DIHYDRATE 2 PUFF(S): 160; 4.5 AEROSOL RESPIRATORY (INHALATION) at 18:38

## 2022-01-01 RX ADMIN — BUMETANIDE 10 MG/HR: 0.25 INJECTION INTRAMUSCULAR; INTRAVENOUS at 07:06

## 2022-01-01 RX ADMIN — Medication 975 MILLIGRAM(S): at 05:22

## 2022-01-01 RX ADMIN — Medication 3 MILLIGRAM(S): at 22:13

## 2022-01-01 RX ADMIN — BUDESONIDE AND FORMOTEROL FUMARATE DIHYDRATE 2 PUFF(S): 160; 4.5 AEROSOL RESPIRATORY (INHALATION) at 05:07

## 2022-01-01 RX ADMIN — Medication 2 SPRAY(S): at 12:21

## 2022-01-01 RX ADMIN — Medication 975 MILLIGRAM(S): at 23:50

## 2022-01-01 RX ADMIN — BUDESONIDE AND FORMOTEROL FUMARATE DIHYDRATE 2 PUFF(S): 160; 4.5 AEROSOL RESPIRATORY (INHALATION) at 18:01

## 2022-01-01 RX ADMIN — CHLORHEXIDINE GLUCONATE 1 APPLICATION(S): 213 SOLUTION TOPICAL at 10:00

## 2022-01-01 RX ADMIN — Medication 20 MILLIGRAM(S): at 12:13

## 2022-01-01 RX ADMIN — Medication 650 MILLIGRAM(S): at 16:44

## 2022-01-01 RX ADMIN — Medication 975 MILLIGRAM(S): at 21:29

## 2022-01-01 RX ADMIN — ROSUVASTATIN CALCIUM 5 MILLIGRAM(S): 5 TABLET ORAL at 21:09

## 2022-01-01 RX ADMIN — SPIRONOLACTONE 50 MILLIGRAM(S): 25 TABLET, FILM COATED ORAL at 05:17

## 2022-01-01 RX ADMIN — Medication 5 MG/HR: at 07:46

## 2022-01-01 RX ADMIN — Medication 325 MILLIGRAM(S): at 05:14

## 2022-01-01 RX ADMIN — AMBRISENTAN 10 MILLIGRAM(S): 10 TABLET, FILM COATED ORAL at 12:33

## 2022-01-01 RX ADMIN — BUPROPION HYDROCHLORIDE 300 MILLIGRAM(S): 150 TABLET, EXTENDED RELEASE ORAL at 11:07

## 2022-01-01 RX ADMIN — Medication 3 MILLIGRAM(S): at 23:21

## 2022-01-01 RX ADMIN — BUDESONIDE AND FORMOTEROL FUMARATE DIHYDRATE 2 PUFF(S): 160; 4.5 AEROSOL RESPIRATORY (INHALATION) at 17:29

## 2022-01-01 RX ADMIN — BUPROPION HYDROCHLORIDE 150 MILLIGRAM(S): 150 TABLET, EXTENDED RELEASE ORAL at 13:16

## 2022-01-01 RX ADMIN — BUPROPION HYDROCHLORIDE 150 MILLIGRAM(S): 150 TABLET, EXTENDED RELEASE ORAL at 13:01

## 2022-01-01 RX ADMIN — Medication 325 MILLIGRAM(S): at 05:05

## 2022-01-01 RX ADMIN — Medication 2 SPRAY(S): at 17:04

## 2022-01-01 RX ADMIN — Medication 1 SPRAY(S): at 16:37

## 2022-01-01 RX ADMIN — Medication 325 MILLIGRAM(S): at 06:15

## 2022-01-01 RX ADMIN — TIOTROPIUM BROMIDE 1 CAPSULE(S): 18 CAPSULE ORAL; RESPIRATORY (INHALATION) at 13:07

## 2022-01-01 RX ADMIN — Medication 120 MILLIGRAM(S): at 05:13

## 2022-01-01 RX ADMIN — Medication 650 MILLIGRAM(S): at 04:40

## 2022-01-01 RX ADMIN — SENNA PLUS 2 TABLET(S): 8.6 TABLET ORAL at 22:07

## 2022-01-01 RX ADMIN — Medication 650 MILLIGRAM(S): at 22:44

## 2022-01-01 RX ADMIN — Medication 975 MILLIGRAM(S): at 23:00

## 2022-01-01 RX ADMIN — BUPROPION HYDROCHLORIDE 300 MILLIGRAM(S): 150 TABLET, EXTENDED RELEASE ORAL at 12:07

## 2022-01-01 RX ADMIN — Medication 3 MILLIGRAM(S): at 02:29

## 2022-01-01 RX ADMIN — ATORVASTATIN CALCIUM 20 MILLIGRAM(S): 80 TABLET, FILM COATED ORAL at 21:48

## 2022-01-01 RX ADMIN — BUPROPION HYDROCHLORIDE 300 MILLIGRAM(S): 150 TABLET, EXTENDED RELEASE ORAL at 12:31

## 2022-01-01 RX ADMIN — Medication 125 MICROGRAM(S): at 05:25

## 2022-01-01 RX ADMIN — BUDESONIDE AND FORMOTEROL FUMARATE DIHYDRATE 2 PUFF(S): 160; 4.5 AEROSOL RESPIRATORY (INHALATION) at 18:00

## 2022-01-01 RX ADMIN — ATORVASTATIN CALCIUM 20 MILLIGRAM(S): 80 TABLET, FILM COATED ORAL at 20:50

## 2022-01-01 RX ADMIN — APIXABAN 5 MILLIGRAM(S): 2.5 TABLET, FILM COATED ORAL at 17:29

## 2022-01-01 RX ADMIN — Medication 81 MILLIGRAM(S): at 11:11

## 2022-01-01 RX ADMIN — Medication 650 MILLIGRAM(S): at 23:08

## 2022-01-01 RX ADMIN — Medication 120 MILLIGRAM(S): at 05:12

## 2022-01-01 RX ADMIN — Medication 125 MICROGRAM(S): at 05:17

## 2022-01-01 RX ADMIN — PANTOPRAZOLE SODIUM 40 MILLIGRAM(S): 20 TABLET, DELAYED RELEASE ORAL at 06:41

## 2022-01-01 RX ADMIN — PANTOPRAZOLE SODIUM 40 MILLIGRAM(S): 20 TABLET, DELAYED RELEASE ORAL at 05:49

## 2022-01-01 RX ADMIN — BUDESONIDE AND FORMOTEROL FUMARATE DIHYDRATE 2 PUFF(S): 160; 4.5 AEROSOL RESPIRATORY (INHALATION) at 05:47

## 2022-01-01 RX ADMIN — Medication 3 MILLILITER(S): at 17:51

## 2022-01-01 RX ADMIN — ACETAZOLAMIDE 250 MILLIGRAM(S): 250 TABLET ORAL at 18:08

## 2022-01-01 RX ADMIN — BUPROPION HYDROCHLORIDE 300 MILLIGRAM(S): 150 TABLET, EXTENDED RELEASE ORAL at 12:22

## 2022-01-01 RX ADMIN — ROSUVASTATIN CALCIUM 5 MILLIGRAM(S): 5 TABLET ORAL at 21:49

## 2022-01-01 RX ADMIN — PANTOPRAZOLE SODIUM 40 MILLIGRAM(S): 20 TABLET, DELAYED RELEASE ORAL at 05:06

## 2022-01-01 RX ADMIN — Medication 40 MILLIGRAM(S): at 08:10

## 2022-01-01 RX ADMIN — HYDROMORPHONE HYDROCHLORIDE 0.25 MILLIGRAM(S): 2 INJECTION INTRAMUSCULAR; INTRAVENOUS; SUBCUTANEOUS at 01:02

## 2022-01-01 RX ADMIN — Medication 2 SPRAY(S): at 11:59

## 2022-01-01 RX ADMIN — SPIRONOLACTONE 50 MILLIGRAM(S): 25 TABLET, FILM COATED ORAL at 05:28

## 2022-01-01 RX ADMIN — ATORVASTATIN CALCIUM 20 MILLIGRAM(S): 80 TABLET, FILM COATED ORAL at 21:03

## 2022-01-01 RX ADMIN — APIXABAN 5 MILLIGRAM(S): 2.5 TABLET, FILM COATED ORAL at 17:12

## 2022-01-01 RX ADMIN — SODIUM CHLORIDE 300 MILLILITER(S): 5 INJECTION, SOLUTION INTRAVENOUS at 18:08

## 2022-01-01 RX ADMIN — SPIRONOLACTONE 25 MILLIGRAM(S): 25 TABLET, FILM COATED ORAL at 05:17

## 2022-01-01 RX ADMIN — Medication 975 MILLIGRAM(S): at 10:28

## 2022-01-01 RX ADMIN — Medication 40 MILLIGRAM(S): at 05:26

## 2022-01-01 RX ADMIN — TADALAFIL 40 MILLIGRAM(S): 10 TABLET, FILM COATED ORAL at 15:16

## 2022-01-01 RX ADMIN — SPIRONOLACTONE 25 MILLIGRAM(S): 25 TABLET, FILM COATED ORAL at 06:23

## 2022-01-01 RX ADMIN — BUPROPION HYDROCHLORIDE 300 MILLIGRAM(S): 150 TABLET, EXTENDED RELEASE ORAL at 11:55

## 2022-01-01 RX ADMIN — BUDESONIDE AND FORMOTEROL FUMARATE DIHYDRATE 2 PUFF(S): 160; 4.5 AEROSOL RESPIRATORY (INHALATION) at 05:56

## 2022-01-01 RX ADMIN — Medication 2 SPRAY(S): at 12:28

## 2022-01-01 RX ADMIN — BUDESONIDE AND FORMOTEROL FUMARATE DIHYDRATE 2 PUFF(S): 160; 4.5 AEROSOL RESPIRATORY (INHALATION) at 06:13

## 2022-01-01 RX ADMIN — MUPIROCIN 1 APPLICATION(S): 20 OINTMENT TOPICAL at 05:47

## 2022-01-01 RX ADMIN — Medication 2 SPRAY(S): at 05:14

## 2022-01-01 RX ADMIN — Medication 975 MILLIGRAM(S): at 22:09

## 2022-01-01 RX ADMIN — Medication 2 SPRAY(S): at 05:52

## 2022-01-01 RX ADMIN — Medication 20 MILLIEQUIVALENT(S): at 13:51

## 2022-01-01 RX ADMIN — Medication 120 MILLIGRAM(S): at 05:25

## 2022-01-01 RX ADMIN — ATORVASTATIN CALCIUM 20 MILLIGRAM(S): 80 TABLET, FILM COATED ORAL at 21:07

## 2022-01-01 RX ADMIN — MUPIROCIN 1 APPLICATION(S): 20 OINTMENT TOPICAL at 07:08

## 2022-01-01 RX ADMIN — Medication 650 MILLIGRAM(S): at 21:50

## 2022-01-01 RX ADMIN — Medication 60 MILLIGRAM(S): at 04:29

## 2022-01-01 RX ADMIN — HEPARIN SODIUM 1500 UNIT(S)/HR: 5000 INJECTION INTRAVENOUS; SUBCUTANEOUS at 22:45

## 2022-01-01 RX ADMIN — SPIRONOLACTONE 25 MILLIGRAM(S): 25 TABLET, FILM COATED ORAL at 18:00

## 2022-01-01 RX ADMIN — BUPROPION HYDROCHLORIDE 300 MILLIGRAM(S): 150 TABLET, EXTENDED RELEASE ORAL at 08:18

## 2022-01-01 RX ADMIN — Medication 3 MILLILITER(S): at 12:59

## 2022-01-01 RX ADMIN — APIXABAN 5 MILLIGRAM(S): 2.5 TABLET, FILM COATED ORAL at 16:56

## 2022-01-01 RX ADMIN — Medication 3 MILLILITER(S): at 19:19

## 2022-01-01 RX ADMIN — AMBRISENTAN 10 MILLIGRAM(S): 10 TABLET, FILM COATED ORAL at 11:18

## 2022-01-01 RX ADMIN — Medication 3 MILLILITER(S): at 11:49

## 2022-01-01 RX ADMIN — SODIUM CHLORIDE 300 MILLILITER(S): 5 INJECTION, SOLUTION INTRAVENOUS at 19:01

## 2022-01-01 RX ADMIN — Medication 500 MILLIGRAM(S): at 17:23

## 2022-01-01 RX ADMIN — Medication 81 MILLIGRAM(S): at 18:10

## 2022-01-01 RX ADMIN — Medication 125 MICROGRAM(S): at 13:43

## 2022-01-01 RX ADMIN — TADALAFIL 40 MILLIGRAM(S): 10 TABLET, FILM COATED ORAL at 12:21

## 2022-01-01 RX ADMIN — BUDESONIDE AND FORMOTEROL FUMARATE DIHYDRATE 2 PUFF(S): 160; 4.5 AEROSOL RESPIRATORY (INHALATION) at 06:41

## 2022-01-01 RX ADMIN — Medication 2 SPRAY(S): at 05:57

## 2022-01-01 RX ADMIN — ATORVASTATIN CALCIUM 20 MILLIGRAM(S): 80 TABLET, FILM COATED ORAL at 21:28

## 2022-01-01 RX ADMIN — Medication 650 MILLIGRAM(S): at 08:10

## 2022-01-01 RX ADMIN — Medication 80 MILLIGRAM(S): at 06:49

## 2022-01-01 RX ADMIN — Medication 120 MILLIGRAM(S): at 06:15

## 2022-01-01 RX ADMIN — Medication 5 MILLIGRAM(S): at 05:31

## 2022-01-01 RX ADMIN — Medication 20 MILLIEQUIVALENT(S): at 07:05

## 2022-01-01 RX ADMIN — CHLORHEXIDINE GLUCONATE 1 APPLICATION(S): 213 SOLUTION TOPICAL at 05:27

## 2022-01-01 RX ADMIN — ROSUVASTATIN CALCIUM 5 MILLIGRAM(S): 5 TABLET ORAL at 21:43

## 2022-01-01 RX ADMIN — HEPARIN SODIUM 1200 UNIT(S)/HR: 5000 INJECTION INTRAVENOUS; SUBCUTANEOUS at 08:23

## 2022-01-01 RX ADMIN — Medication 2 SPRAY(S): at 11:47

## 2022-01-01 RX ADMIN — Medication 125 MICROGRAM(S): at 11:56

## 2022-01-01 RX ADMIN — Medication 60 MILLIGRAM(S): at 13:09

## 2022-01-01 RX ADMIN — BUDESONIDE AND FORMOTEROL FUMARATE DIHYDRATE 2 PUFF(S): 160; 4.5 AEROSOL RESPIRATORY (INHALATION) at 05:39

## 2022-01-01 RX ADMIN — APIXABAN 5 MILLIGRAM(S): 2.5 TABLET, FILM COATED ORAL at 05:25

## 2022-01-01 RX ADMIN — APIXABAN 5 MILLIGRAM(S): 2.5 TABLET, FILM COATED ORAL at 17:03

## 2022-01-01 RX ADMIN — Medication 120 MILLIGRAM(S): at 05:42

## 2022-01-01 RX ADMIN — Medication 650 MILLIGRAM(S): at 13:22

## 2022-01-01 RX ADMIN — BUDESONIDE AND FORMOTEROL FUMARATE DIHYDRATE 2 PUFF(S): 160; 4.5 AEROSOL RESPIRATORY (INHALATION) at 17:36

## 2022-01-01 RX ADMIN — APIXABAN 5 MILLIGRAM(S): 2.5 TABLET, FILM COATED ORAL at 05:38

## 2022-01-01 RX ADMIN — Medication 5 MG/HR: at 00:04

## 2022-01-01 RX ADMIN — Medication 1 SPRAY(S): at 17:12

## 2022-01-01 RX ADMIN — ROSUVASTATIN CALCIUM 5 MILLIGRAM(S): 5 TABLET ORAL at 21:48

## 2022-01-01 RX ADMIN — AMBRISENTAN 10 MILLIGRAM(S): 10 TABLET, FILM COATED ORAL at 17:39

## 2022-01-01 RX ADMIN — Medication 975 MILLIGRAM(S): at 22:17

## 2022-01-01 RX ADMIN — AMBRISENTAN 10 MILLIGRAM(S): 10 TABLET, FILM COATED ORAL at 11:33

## 2022-01-01 RX ADMIN — ACETAZOLAMIDE 250 MILLIGRAM(S): 250 TABLET ORAL at 17:06

## 2022-01-01 RX ADMIN — CHLORHEXIDINE GLUCONATE 1 APPLICATION(S): 213 SOLUTION TOPICAL at 05:39

## 2022-01-01 RX ADMIN — TADALAFIL 40 MILLIGRAM(S): 10 TABLET, FILM COATED ORAL at 11:14

## 2022-01-01 RX ADMIN — POLYETHYLENE GLYCOL 3350 17 GRAM(S): 17 POWDER, FOR SOLUTION ORAL at 16:21

## 2022-01-01 RX ADMIN — BUDESONIDE AND FORMOTEROL FUMARATE DIHYDRATE 2 PUFF(S): 160; 4.5 AEROSOL RESPIRATORY (INHALATION) at 18:15

## 2022-01-01 RX ADMIN — Medication 2 SPRAY(S): at 17:23

## 2022-01-01 RX ADMIN — PANTOPRAZOLE SODIUM 40 MILLIGRAM(S): 20 TABLET, DELAYED RELEASE ORAL at 05:22

## 2022-01-01 RX ADMIN — Medication 40 MILLIEQUIVALENT(S): at 18:52

## 2022-01-01 RX ADMIN — Medication 650 MILLIGRAM(S): at 21:42

## 2022-01-01 RX ADMIN — Medication 500 MILLIGRAM(S): at 05:17

## 2022-01-01 RX ADMIN — Medication 975 MILLIGRAM(S): at 23:04

## 2022-01-01 RX ADMIN — Medication 120 MILLIGRAM(S): at 08:54

## 2022-01-01 RX ADMIN — BUMETANIDE 4 MILLIGRAM(S): 0.25 INJECTION INTRAMUSCULAR; INTRAVENOUS at 06:03

## 2022-01-01 RX ADMIN — Medication 120 MILLIGRAM(S): at 11:07

## 2022-01-01 RX ADMIN — Medication 81 MILLIGRAM(S): at 13:03

## 2022-01-01 RX ADMIN — ROSUVASTATIN CALCIUM 5 MILLIGRAM(S): 5 TABLET ORAL at 21:51

## 2022-01-01 RX ADMIN — BUPROPION HYDROCHLORIDE 150 MILLIGRAM(S): 150 TABLET, EXTENDED RELEASE ORAL at 11:19

## 2022-01-01 RX ADMIN — Medication 975 MILLIGRAM(S): at 06:21

## 2022-01-01 RX ADMIN — Medication 650 MILLIGRAM(S): at 03:56

## 2022-01-01 RX ADMIN — ROSUVASTATIN CALCIUM 5 MILLIGRAM(S): 5 TABLET ORAL at 21:44

## 2022-01-01 RX ADMIN — PANTOPRAZOLE SODIUM 40 MILLIGRAM(S): 20 TABLET, DELAYED RELEASE ORAL at 06:48

## 2022-01-01 RX ADMIN — Medication 5 MILLIGRAM(S): at 05:56

## 2022-01-01 RX ADMIN — Medication 120 MILLIGRAM(S): at 05:36

## 2022-01-01 RX ADMIN — Medication 81 MILLIGRAM(S): at 11:20

## 2022-01-01 RX ADMIN — MUPIROCIN 1 APPLICATION(S): 20 OINTMENT TOPICAL at 17:09

## 2022-01-01 RX ADMIN — APIXABAN 5 MILLIGRAM(S): 2.5 TABLET, FILM COATED ORAL at 05:49

## 2022-01-01 RX ADMIN — Medication 60 MILLIGRAM(S): at 05:42

## 2022-01-01 RX ADMIN — BUDESONIDE AND FORMOTEROL FUMARATE DIHYDRATE 2 PUFF(S): 160; 4.5 AEROSOL RESPIRATORY (INHALATION) at 18:58

## 2022-01-01 RX ADMIN — TIOTROPIUM BROMIDE AND OLODATEROL 2 PUFF(S): 3.124; 2.736 SPRAY, METERED RESPIRATORY (INHALATION) at 14:17

## 2022-01-01 RX ADMIN — Medication 975 MILLIGRAM(S): at 21:00

## 2022-01-01 RX ADMIN — Medication 2 SPRAY(S): at 12:01

## 2022-01-01 RX ADMIN — ATORVASTATIN CALCIUM 20 MILLIGRAM(S): 80 TABLET, FILM COATED ORAL at 21:52

## 2022-01-01 RX ADMIN — Medication 2 SPRAY(S): at 05:20

## 2022-01-01 RX ADMIN — Medication 3 MILLILITER(S): at 11:14

## 2022-01-01 RX ADMIN — Medication 650 MILLIGRAM(S): at 21:27

## 2022-01-01 RX ADMIN — BUMETANIDE 2 MILLIGRAM(S): 0.25 INJECTION INTRAMUSCULAR; INTRAVENOUS at 12:40

## 2022-01-01 RX ADMIN — BUPROPION HYDROCHLORIDE 300 MILLIGRAM(S): 150 TABLET, EXTENDED RELEASE ORAL at 11:29

## 2022-01-01 RX ADMIN — Medication 125 MICROGRAM(S): at 05:56

## 2022-01-01 RX ADMIN — BUMETANIDE 2 MILLIGRAM(S): 0.25 INJECTION INTRAMUSCULAR; INTRAVENOUS at 05:10

## 2022-01-01 RX ADMIN — Medication 3 MILLILITER(S): at 05:33

## 2022-01-01 RX ADMIN — APIXABAN 5 MILLIGRAM(S): 2.5 TABLET, FILM COATED ORAL at 18:57

## 2022-01-01 RX ADMIN — Medication 975 MILLIGRAM(S): at 22:40

## 2022-01-01 RX ADMIN — Medication 1 SPRAY(S): at 05:12

## 2022-01-01 RX ADMIN — ROSUVASTATIN CALCIUM 5 MILLIGRAM(S): 5 TABLET ORAL at 22:44

## 2022-01-01 RX ADMIN — Medication 125 MICROGRAM(S): at 11:29

## 2022-01-01 RX ADMIN — SENNA PLUS 2 TABLET(S): 8.6 TABLET ORAL at 21:04

## 2022-01-01 RX ADMIN — BUDESONIDE AND FORMOTEROL FUMARATE DIHYDRATE 2 PUFF(S): 160; 4.5 AEROSOL RESPIRATORY (INHALATION) at 05:22

## 2022-01-01 RX ADMIN — APIXABAN 5 MILLIGRAM(S): 2.5 TABLET, FILM COATED ORAL at 17:49

## 2022-01-01 RX ADMIN — Medication 650 MILLIGRAM(S): at 00:00

## 2022-01-01 RX ADMIN — HYDROMORPHONE HYDROCHLORIDE 0.25 MILLIGRAM(S): 2 INJECTION INTRAMUSCULAR; INTRAVENOUS; SUBCUTANEOUS at 00:32

## 2022-01-01 RX ADMIN — TIOTROPIUM BROMIDE AND OLODATEROL 2 PUFF(S): 3.124; 2.736 SPRAY, METERED RESPIRATORY (INHALATION) at 12:56

## 2022-01-01 RX ADMIN — APIXABAN 5 MILLIGRAM(S): 2.5 TABLET, FILM COATED ORAL at 18:10

## 2022-01-01 RX ADMIN — HYDROMORPHONE HYDROCHLORIDE 0.25 MILLIGRAM(S): 2 INJECTION INTRAMUSCULAR; INTRAVENOUS; SUBCUTANEOUS at 01:08

## 2022-01-01 RX ADMIN — BUDESONIDE AND FORMOTEROL FUMARATE DIHYDRATE 2 PUFF(S): 160; 4.5 AEROSOL RESPIRATORY (INHALATION) at 17:03

## 2022-01-01 RX ADMIN — BUMETANIDE 2 MILLIGRAM(S): 0.25 INJECTION INTRAMUSCULAR; INTRAVENOUS at 05:38

## 2022-01-01 RX ADMIN — SODIUM CHLORIDE 300 MILLILITER(S): 5 INJECTION, SOLUTION INTRAVENOUS at 16:37

## 2022-01-01 RX ADMIN — Medication 125 MICROGRAM(S): at 05:11

## 2022-01-01 RX ADMIN — Medication 650 MILLIGRAM(S): at 03:19

## 2022-01-01 RX ADMIN — TADALAFIL 40 MILLIGRAM(S): 10 TABLET, FILM COATED ORAL at 13:33

## 2022-01-01 RX ADMIN — Medication 3 MILLIGRAM(S): at 22:35

## 2022-01-01 RX ADMIN — Medication 975 MILLIGRAM(S): at 21:20

## 2022-01-01 RX ADMIN — Medication 2 SPRAY(S): at 12:37

## 2022-01-01 RX ADMIN — TADALAFIL 40 MILLIGRAM(S): 10 TABLET, FILM COATED ORAL at 11:12

## 2022-01-01 RX ADMIN — CHLORHEXIDINE GLUCONATE 1 APPLICATION(S): 213 SOLUTION TOPICAL at 06:13

## 2022-01-01 RX ADMIN — Medication 120 MILLIGRAM(S): at 05:22

## 2022-01-01 RX ADMIN — Medication 2 SPRAY(S): at 23:21

## 2022-01-01 RX ADMIN — TIOTROPIUM BROMIDE AND OLODATEROL 2 PUFF(S): 3.124; 2.736 SPRAY, METERED RESPIRATORY (INHALATION) at 12:12

## 2022-01-01 RX ADMIN — HYDROMORPHONE HYDROCHLORIDE 0.25 MILLIGRAM(S): 2 INJECTION INTRAMUSCULAR; INTRAVENOUS; SUBCUTANEOUS at 21:17

## 2022-01-01 RX ADMIN — Medication 3 MILLIGRAM(S): at 22:28

## 2022-01-01 RX ADMIN — SPIRONOLACTONE 25 MILLIGRAM(S): 25 TABLET, FILM COATED ORAL at 05:07

## 2022-01-01 RX ADMIN — SPIRONOLACTONE 50 MILLIGRAM(S): 25 TABLET, FILM COATED ORAL at 05:56

## 2022-01-01 RX ADMIN — Medication 60 MILLIGRAM(S): at 15:16

## 2022-01-01 RX ADMIN — BUDESONIDE AND FORMOTEROL FUMARATE DIHYDRATE 2 PUFF(S): 160; 4.5 AEROSOL RESPIRATORY (INHALATION) at 17:16

## 2022-01-01 RX ADMIN — PANTOPRAZOLE SODIUM 40 MILLIGRAM(S): 20 TABLET, DELAYED RELEASE ORAL at 06:54

## 2022-01-01 RX ADMIN — Medication 3 MILLILITER(S): at 17:40

## 2022-01-01 RX ADMIN — Medication 975 MILLIGRAM(S): at 22:10

## 2022-01-01 RX ADMIN — HEPARIN SODIUM 1700 UNIT(S)/HR: 5000 INJECTION INTRAVENOUS; SUBCUTANEOUS at 17:00

## 2022-01-01 RX ADMIN — ATORVASTATIN CALCIUM 20 MILLIGRAM(S): 80 TABLET, FILM COATED ORAL at 22:42

## 2022-01-01 RX ADMIN — Medication 3 MILLIGRAM(S): at 22:19

## 2022-01-01 RX ADMIN — Medication 125 MICROGRAM(S): at 06:13

## 2022-01-01 RX ADMIN — BUMETANIDE 2 MILLIGRAM(S): 0.25 INJECTION INTRAMUSCULAR; INTRAVENOUS at 06:02

## 2022-01-01 RX ADMIN — Medication 125 MICROGRAM(S): at 11:51

## 2022-01-01 RX ADMIN — Medication 975 MILLIGRAM(S): at 21:48

## 2022-01-01 RX ADMIN — Medication 650 MILLIGRAM(S): at 20:22

## 2022-01-01 RX ADMIN — TADALAFIL 40 MILLIGRAM(S): 10 TABLET, FILM COATED ORAL at 12:08

## 2022-01-01 RX ADMIN — ATORVASTATIN CALCIUM 20 MILLIGRAM(S): 80 TABLET, FILM COATED ORAL at 21:25

## 2022-01-01 RX ADMIN — PANTOPRAZOLE SODIUM 40 MILLIGRAM(S): 20 TABLET, DELAYED RELEASE ORAL at 05:57

## 2022-01-01 RX ADMIN — Medication 2 SPRAY(S): at 18:34

## 2022-01-01 RX ADMIN — APIXABAN 5 MILLIGRAM(S): 2.5 TABLET, FILM COATED ORAL at 17:23

## 2022-01-01 RX ADMIN — BUDESONIDE AND FORMOTEROL FUMARATE DIHYDRATE 2 PUFF(S): 160; 4.5 AEROSOL RESPIRATORY (INHALATION) at 18:34

## 2022-01-01 RX ADMIN — Medication 3 MILLILITER(S): at 17:33

## 2022-01-01 RX ADMIN — BUPROPION HYDROCHLORIDE 150 MILLIGRAM(S): 150 TABLET, EXTENDED RELEASE ORAL at 13:51

## 2022-01-01 RX ADMIN — AMBRISENTAN 10 MILLIGRAM(S): 10 TABLET, FILM COATED ORAL at 15:40

## 2022-01-01 RX ADMIN — TIOTROPIUM BROMIDE AND OLODATEROL 2 PUFF(S): 3.124; 2.736 SPRAY, METERED RESPIRATORY (INHALATION) at 11:15

## 2022-01-01 RX ADMIN — BUDESONIDE AND FORMOTEROL FUMARATE DIHYDRATE 2 PUFF(S): 160; 4.5 AEROSOL RESPIRATORY (INHALATION) at 17:48

## 2022-01-01 RX ADMIN — AMBRISENTAN 10 MILLIGRAM(S): 10 TABLET, FILM COATED ORAL at 11:11

## 2022-01-01 RX ADMIN — TADALAFIL 40 MILLIGRAM(S): 10 TABLET, FILM COATED ORAL at 11:19

## 2022-01-01 RX ADMIN — Medication 2 SPRAY(S): at 17:36

## 2022-01-01 RX ADMIN — Medication 2 SPRAY(S): at 00:55

## 2022-01-01 RX ADMIN — TADALAFIL 40 MILLIGRAM(S): 10 TABLET, FILM COATED ORAL at 11:38

## 2022-01-01 RX ADMIN — Medication 20 MILLIGRAM(S): at 05:31

## 2022-01-01 RX ADMIN — Medication 3 MILLIGRAM(S): at 22:37

## 2022-01-01 RX ADMIN — Medication 1 SPRAY(S): at 05:55

## 2022-01-01 RX ADMIN — TIOTROPIUM BROMIDE 1 CAPSULE(S): 18 CAPSULE ORAL; RESPIRATORY (INHALATION) at 13:36

## 2022-01-01 RX ADMIN — APIXABAN 5 MILLIGRAM(S): 2.5 TABLET, FILM COATED ORAL at 17:25

## 2022-01-01 RX ADMIN — BUMETANIDE 5 MG/HR: 0.25 INJECTION INTRAMUSCULAR; INTRAVENOUS at 07:47

## 2022-01-01 RX ADMIN — Medication 2 SPRAY(S): at 11:55

## 2022-01-01 RX ADMIN — PANTOPRAZOLE SODIUM 40 MILLIGRAM(S): 20 TABLET, DELAYED RELEASE ORAL at 05:28

## 2022-01-01 RX ADMIN — Medication 5 MG/HR: at 16:30

## 2022-01-01 RX ADMIN — Medication 3 MILLILITER(S): at 18:00

## 2022-01-01 RX ADMIN — Medication 3 MILLILITER(S): at 13:15

## 2022-01-01 RX ADMIN — BUDESONIDE AND FORMOTEROL FUMARATE DIHYDRATE 2 PUFF(S): 160; 4.5 AEROSOL RESPIRATORY (INHALATION) at 05:41

## 2022-01-01 RX ADMIN — Medication 2 SPRAY(S): at 23:11

## 2022-01-01 RX ADMIN — SODIUM CHLORIDE 300 MILLILITER(S): 5 INJECTION, SOLUTION INTRAVENOUS at 17:31

## 2022-01-01 RX ADMIN — Medication 975 MILLIGRAM(S): at 22:41

## 2022-01-01 RX ADMIN — HEPARIN SODIUM 0 UNIT(S)/HR: 5000 INJECTION INTRAVENOUS; SUBCUTANEOUS at 06:58

## 2022-01-01 RX ADMIN — BUDESONIDE AND FORMOTEROL FUMARATE DIHYDRATE 2 PUFF(S): 160; 4.5 AEROSOL RESPIRATORY (INHALATION) at 06:10

## 2022-01-01 RX ADMIN — POLYETHYLENE GLYCOL 3350 17 GRAM(S): 17 POWDER, FOR SOLUTION ORAL at 12:06

## 2022-01-01 RX ADMIN — TADALAFIL 40 MILLIGRAM(S): 10 TABLET, FILM COATED ORAL at 14:18

## 2022-01-01 RX ADMIN — Medication 3 MILLILITER(S): at 11:19

## 2022-01-01 RX ADMIN — Medication 5 MG/HR: at 15:45

## 2022-01-01 RX ADMIN — PANTOPRAZOLE SODIUM 40 MILLIGRAM(S): 20 TABLET, DELAYED RELEASE ORAL at 05:30

## 2022-01-01 RX ADMIN — Medication 2 SPRAY(S): at 22:31

## 2022-01-01 RX ADMIN — Medication 3 MILLILITER(S): at 13:03

## 2022-01-01 RX ADMIN — Medication 125 MICROGRAM(S): at 05:45

## 2022-01-01 RX ADMIN — Medication 3 MILLILITER(S): at 00:36

## 2022-01-01 RX ADMIN — Medication 120 MILLIGRAM(S): at 06:57

## 2022-01-01 RX ADMIN — PANTOPRAZOLE SODIUM 40 MILLIGRAM(S): 20 TABLET, DELAYED RELEASE ORAL at 05:21

## 2022-01-01 RX ADMIN — Medication 81 MILLIGRAM(S): at 11:03

## 2022-01-01 RX ADMIN — SPIRONOLACTONE 50 MILLIGRAM(S): 25 TABLET, FILM COATED ORAL at 06:34

## 2022-01-01 RX ADMIN — TIOTROPIUM BROMIDE 1 CAPSULE(S): 18 CAPSULE ORAL; RESPIRATORY (INHALATION) at 11:48

## 2022-01-01 RX ADMIN — MUPIROCIN 1 APPLICATION(S): 20 OINTMENT TOPICAL at 06:35

## 2022-01-01 RX ADMIN — BUMETANIDE 10 MG/HR: 0.25 INJECTION INTRAMUSCULAR; INTRAVENOUS at 11:55

## 2022-01-01 RX ADMIN — BUPROPION HYDROCHLORIDE 150 MILLIGRAM(S): 150 TABLET, EXTENDED RELEASE ORAL at 11:20

## 2022-01-01 RX ADMIN — Medication 3 MILLILITER(S): at 05:31

## 2022-01-01 RX ADMIN — BUPROPION HYDROCHLORIDE 300 MILLIGRAM(S): 150 TABLET, EXTENDED RELEASE ORAL at 12:44

## 2022-01-01 RX ADMIN — Medication 5 MILLIGRAM(S): at 18:57

## 2022-01-01 RX ADMIN — Medication 40 MILLIEQUIVALENT(S): at 18:32

## 2022-01-01 RX ADMIN — APIXABAN 5 MILLIGRAM(S): 2.5 TABLET, FILM COATED ORAL at 05:45

## 2022-01-01 RX ADMIN — BUPROPION HYDROCHLORIDE 150 MILLIGRAM(S): 150 TABLET, EXTENDED RELEASE ORAL at 11:50

## 2022-01-01 RX ADMIN — Medication 3 MILLILITER(S): at 12:56

## 2022-01-01 RX ADMIN — Medication 2 SPRAY(S): at 17:29

## 2022-01-01 RX ADMIN — Medication 125 MICROGRAM(S): at 05:39

## 2022-01-01 RX ADMIN — AMBRISENTAN 10 MILLIGRAM(S): 10 TABLET, FILM COATED ORAL at 12:22

## 2022-01-01 RX ADMIN — BUDESONIDE AND FORMOTEROL FUMARATE DIHYDRATE 2 PUFF(S): 160; 4.5 AEROSOL RESPIRATORY (INHALATION) at 17:49

## 2022-01-01 RX ADMIN — HEPARIN SODIUM 0 UNIT(S)/HR: 5000 INJECTION INTRAVENOUS; SUBCUTANEOUS at 08:18

## 2022-01-01 RX ADMIN — Medication 975 MILLIGRAM(S): at 01:26

## 2022-01-01 RX ADMIN — AMBRISENTAN 10 MILLIGRAM(S): 10 TABLET, FILM COATED ORAL at 12:01

## 2022-01-01 RX ADMIN — BUDESONIDE AND FORMOTEROL FUMARATE DIHYDRATE 2 PUFF(S): 160; 4.5 AEROSOL RESPIRATORY (INHALATION) at 16:37

## 2022-01-01 RX ADMIN — Medication 325 MILLIGRAM(S): at 12:45

## 2022-01-01 RX ADMIN — ATORVASTATIN CALCIUM 20 MILLIGRAM(S): 80 TABLET, FILM COATED ORAL at 21:04

## 2022-01-01 RX ADMIN — BUMETANIDE 10 MG/HR: 0.25 INJECTION INTRAMUSCULAR; INTRAVENOUS at 11:45

## 2022-01-01 RX ADMIN — BUDESONIDE AND FORMOTEROL FUMARATE DIHYDRATE 2 PUFF(S): 160; 4.5 AEROSOL RESPIRATORY (INHALATION) at 17:50

## 2022-01-01 RX ADMIN — TIOTROPIUM BROMIDE AND OLODATEROL 2 PUFF(S): 3.124; 2.736 SPRAY, METERED RESPIRATORY (INHALATION) at 18:45

## 2022-01-01 RX ADMIN — BUDESONIDE AND FORMOTEROL FUMARATE DIHYDRATE 2 PUFF(S): 160; 4.5 AEROSOL RESPIRATORY (INHALATION) at 05:09

## 2022-01-01 RX ADMIN — BUDESONIDE AND FORMOTEROL FUMARATE DIHYDRATE 2 PUFF(S): 160; 4.5 AEROSOL RESPIRATORY (INHALATION) at 05:57

## 2022-01-01 RX ADMIN — Medication 3 MILLILITER(S): at 12:12

## 2022-01-01 RX ADMIN — AMBRISENTAN 10 MILLIGRAM(S): 10 TABLET, FILM COATED ORAL at 13:36

## 2022-01-01 RX ADMIN — Medication 2 SPRAY(S): at 12:12

## 2022-01-01 RX ADMIN — PANTOPRAZOLE SODIUM 40 MILLIGRAM(S): 20 TABLET, DELAYED RELEASE ORAL at 06:02

## 2022-01-01 RX ADMIN — TIOTROPIUM BROMIDE 1 CAPSULE(S): 18 CAPSULE ORAL; RESPIRATORY (INHALATION) at 11:59

## 2022-01-01 RX ADMIN — APIXABAN 5 MILLIGRAM(S): 2.5 TABLET, FILM COATED ORAL at 17:00

## 2022-01-01 RX ADMIN — Medication 3 MILLILITER(S): at 05:07

## 2022-01-01 RX ADMIN — AMBRISENTAN 10 MILLIGRAM(S): 10 TABLET, FILM COATED ORAL at 11:24

## 2022-01-01 RX ADMIN — Medication 5 MG/HR: at 11:08

## 2022-01-01 RX ADMIN — SPIRONOLACTONE 25 MILLIGRAM(S): 25 TABLET, FILM COATED ORAL at 17:12

## 2022-01-01 RX ADMIN — BUMETANIDE 2 MILLIGRAM(S): 0.25 INJECTION INTRAMUSCULAR; INTRAVENOUS at 18:15

## 2022-01-01 RX ADMIN — Medication 5 MILLIGRAM(S): at 13:03

## 2022-01-01 RX ADMIN — HEPARIN SODIUM 1200 UNIT(S)/HR: 5000 INJECTION INTRAVENOUS; SUBCUTANEOUS at 16:27

## 2022-01-01 RX ADMIN — Medication 2 SPRAY(S): at 00:00

## 2022-01-01 RX ADMIN — AMBRISENTAN 10 MILLIGRAM(S): 10 TABLET, FILM COATED ORAL at 12:03

## 2022-01-01 RX ADMIN — Medication 3 MILLIGRAM(S): at 21:38

## 2022-01-01 RX ADMIN — BUMETANIDE 132 MILLIGRAM(S): 0.25 INJECTION INTRAMUSCULAR; INTRAVENOUS at 06:34

## 2022-01-01 RX ADMIN — SPIRONOLACTONE 50 MILLIGRAM(S): 25 TABLET, FILM COATED ORAL at 05:25

## 2022-01-01 RX ADMIN — Medication 3 MILLILITER(S): at 05:46

## 2022-01-01 RX ADMIN — Medication 3 MILLIGRAM(S): at 23:17

## 2022-01-01 RX ADMIN — AMBRISENTAN 10 MILLIGRAM(S): 10 TABLET, FILM COATED ORAL at 11:49

## 2022-01-01 RX ADMIN — Medication 125 MICROGRAM(S): at 04:29

## 2022-01-01 RX ADMIN — ALBUTEROL 2 PUFF(S): 90 AEROSOL, METERED ORAL at 05:37

## 2022-01-01 RX ADMIN — SPIRONOLACTONE 50 MILLIGRAM(S): 25 TABLET, FILM COATED ORAL at 05:30

## 2022-01-01 RX ADMIN — MAGNESIUM OXIDE 400 MG ORAL TABLET 400 MILLIGRAM(S): 241.3 TABLET ORAL at 08:06

## 2022-01-01 RX ADMIN — SPIRONOLACTONE 50 MILLIGRAM(S): 25 TABLET, FILM COATED ORAL at 05:55

## 2022-01-01 RX ADMIN — HYDROMORPHONE HYDROCHLORIDE 0.25 MILLIGRAM(S): 2 INJECTION INTRAMUSCULAR; INTRAVENOUS; SUBCUTANEOUS at 10:09

## 2022-01-01 RX ADMIN — Medication 975 MILLIGRAM(S): at 20:59

## 2022-01-01 RX ADMIN — PANTOPRAZOLE SODIUM 40 MILLIGRAM(S): 20 TABLET, DELAYED RELEASE ORAL at 05:39

## 2022-01-01 RX ADMIN — TADALAFIL 40 MILLIGRAM(S): 10 TABLET, FILM COATED ORAL at 11:11

## 2022-01-01 RX ADMIN — Medication 2 SPRAY(S): at 17:12

## 2022-01-01 RX ADMIN — Medication 2 SPRAY(S): at 00:54

## 2022-01-01 RX ADMIN — ATORVASTATIN CALCIUM 20 MILLIGRAM(S): 80 TABLET, FILM COATED ORAL at 22:35

## 2022-01-01 RX ADMIN — TIOTROPIUM BROMIDE 1 CAPSULE(S): 18 CAPSULE ORAL; RESPIRATORY (INHALATION) at 12:31

## 2022-01-01 RX ADMIN — BUPROPION HYDROCHLORIDE 300 MILLIGRAM(S): 150 TABLET, EXTENDED RELEASE ORAL at 11:49

## 2022-01-01 RX ADMIN — Medication 2 SPRAY(S): at 11:54

## 2022-01-01 RX ADMIN — Medication 3 MILLILITER(S): at 06:32

## 2022-01-01 RX ADMIN — Medication 2 SPRAY(S): at 18:29

## 2022-01-01 RX ADMIN — BUDESONIDE AND FORMOTEROL FUMARATE DIHYDRATE 2 PUFF(S): 160; 4.5 AEROSOL RESPIRATORY (INHALATION) at 05:33

## 2022-01-01 RX ADMIN — BUMETANIDE 132 MILLIGRAM(S): 0.25 INJECTION INTRAMUSCULAR; INTRAVENOUS at 08:59

## 2022-01-01 RX ADMIN — MAGNESIUM OXIDE 400 MG ORAL TABLET 400 MILLIGRAM(S): 241.3 TABLET ORAL at 17:01

## 2022-01-01 RX ADMIN — Medication 650 MILLIGRAM(S): at 23:14

## 2022-01-01 RX ADMIN — TADALAFIL 40 MILLIGRAM(S): 10 TABLET, FILM COATED ORAL at 14:17

## 2022-01-01 RX ADMIN — Medication 3 MILLILITER(S): at 18:11

## 2022-01-01 RX ADMIN — HYDROMORPHONE HYDROCHLORIDE 0.25 MILLIGRAM(S): 2 INJECTION INTRAMUSCULAR; INTRAVENOUS; SUBCUTANEOUS at 05:00

## 2022-01-01 RX ADMIN — AMBRISENTAN 10 MILLIGRAM(S): 10 TABLET, FILM COATED ORAL at 11:59

## 2022-01-01 RX ADMIN — APIXABAN 5 MILLIGRAM(S): 2.5 TABLET, FILM COATED ORAL at 05:21

## 2022-01-01 RX ADMIN — Medication 650 MILLIGRAM(S): at 08:25

## 2022-01-01 RX ADMIN — Medication 20 MILLIGRAM(S): at 15:02

## 2022-01-01 RX ADMIN — APIXABAN 5 MILLIGRAM(S): 2.5 TABLET, FILM COATED ORAL at 05:20

## 2022-01-01 RX ADMIN — BUDESONIDE AND FORMOTEROL FUMARATE DIHYDRATE 2 PUFF(S): 160; 4.5 AEROSOL RESPIRATORY (INHALATION) at 17:09

## 2022-01-01 RX ADMIN — Medication 3 MILLILITER(S): at 06:14

## 2022-01-01 RX ADMIN — SENNA PLUS 2 TABLET(S): 8.6 TABLET ORAL at 22:03

## 2022-01-01 RX ADMIN — Medication 3 MILLILITER(S): at 17:24

## 2022-01-23 NOTE — HISTORY OF PRESENT ILLNESS
[FreeTextEntry1] : ZANDER JEAN-BAPTISTE  is a 66 year old  F\par \par History of heart failure with preserved ejection fraction, severe pulmonary hypertension/cor pulmonale/TR, chronic atrial fibrillation, chronic respiratory failure, COPD, sleep apnea, obesity, coronary artery disease, stroke, GI bleed, chronic renal insufficiency, prior PE/CVA, anemia. \par  \par Histor of prior nonsyncopal falls. Initially arm injury which did not require surgery.\par Most recently [facility] Early June '21 with fall, reportedly on carpet and ended up on floor. She reports a spinal fracture and required kyphoplasty.  \par \par re admitted acute on chronic respiratory failure pulmonary hypertension anemia \par admitted 4 days chest x-ray had consolidation fecal occult blood was positive BNP was elevated \par treated with BiPAP transfused \par \par in the interim she saw her pulmonary specialist at [place] she is now on a new higher flow oxygen \par she reports feeling much better and having no further falls \par she is back to using metolazone twice a week \par Reports on 6 L while out of the house. At home on 7/8 L. Up to 10L. \par Reports decreased LE edema. \par Triple COVID19 vaccinated\par Reports weight loss. \par \par There is chronic dyspnea on exertion and orthopnea. \par There is no chest pain, coughing, or wheezing. \par There are no symptomatic palpitations, lightheadedness, or dizziness. \par There have been no residual defects from her stroke. \par \par Recent monitor with rate controlled atrial fibrillation and ventricular ectopy \par echocardiogram demonstrates normal left ventricular function reduced right ventricular function tricuspid regurgitation severe pulmonary hypertension\par recent echocardiogram demonstrates normal left ventricular function RV overload right-sided enlargement reduced RV function tricuspid regurgitation severe pulmonary hypertension \par last EKG demonstrates atrial fibrillation with a right axis deviation poor R wave progression and nonspecific ST changes \par outpatient blood work October 2021 potassium 3.9 creatinine 1.5 hemoglobin 10.6 \par follow-up monitor demonstrates atrial fibrillation with no significant pauses or ectopy\par \par Carotid Doppler mild nonobstructive disease \par Abdominal ultrasound mild atherosclerosis\par Cardiac catheterization June 2020 PA pressure 58/18 RV 60/5 \par \par Cardiac cath demonstrates severe pulmonary hypertension. Previously referred to Dr. Dale at [facility]. Underwent comprehensive cardiopulmonary workup. Believes the etiology of her lung disease is related to COPD, hypoxemia and long term sleep apnea with resulting pulmonary hypertension. WHO Group 3 and 1? There is consideration of lung transplantation if she loses weight. Diuretics were adjusted, changed Lasix to bumex. Now sees Dr. Gallardo locally at [facility]. \par \par Cardiac catheterization report has been reviewed from October 2017. PA pressure of 54/18 with a mean of 34. Moderate pulmonary hypertension. High normal filling pressures. Reduced cardiac output. Increased pulmonary resistance greater than 10 RAI PA diastolic to wedge gradient 10 mm of mercury consistent with pulmonary vascular disease. There was a reduction in pulmonary artery resistance with increasing cardiac output with nitric oxide.\par \par Cardiac catheterization report May 2013, normal left ventricular function, right dominant circulation, left main arises normally from the left coronary sinus of Valsalva, bifurcates into LAD and circumflex, left main normal, LAD arises normally from the left main normal, left circumflex arises normally from the left main and terminates in the AV groove normal, RCA arises normally from the right sinus of Valsalva, RCA is normal, global LV function normal, ejection fraction 55% to 60%, normal coronary arteries, no evidence of vasoreactivity with nitric oxide. \par \par [facility].\par There is a chest x-ray, prominent cardiac silhouette, pulmonary vascular congestion. \par Chest CT, cardiomegaly, mild atherosclerosis, enlargement of pulmonary artery consistent with pulmonary hypertension, moderate emphysema\par Nuclear medicine scan, low probability of pulmonary embolism.\par Cardiac catheterization, severe pulmonary hypertension with normal wedge pressure. No significant improvement in response to FIO2 or nitric oxide. \par Echocardiogram, left atrial enlargement, pulmonary hypertension, septal wall motion abnormality consistent with RV overload, dilated right ventricle, mildly reduced RV function, dilated right atrium, and elevated right atrial pressures with nonreactive IVC . \par \par \par \par Chronic hypoxic respiratory failure. COPD. Severe pulmonary hypertension. Atherosclerosis\par Atrial fibrillation. Prior CVA. Prior PE. HTN. Cor pulmonale. WHO group 1/3. Low CO Elevated PVR. Normal wedge pressure.\par \par she remains at high risk \par requested heart failure evaluation previously seen by [facility] heart failure service there was discussion of lung transplant \par outpatient GI follow-up outpatient pulmonary follow-up \par follow-up with electrophysiolog\par  \par Continue torsemide, aldactone and intermittent metolazone. Monitor daily weights. \par Rate control. Monitor digoxin level. Goal digoxin level.5-1. \par Continue NOAC, Eliquis. Understands risks and benefits of novel anticoagulant. Discussed bleeding precautions. Monitor hemoglobin and renal function.  Anemia eval.\par \par Prior hisotry of HFpEF, HTN, COPD, AFib, obesity, CAD, stroke, GI bleed, chronic renal insufficiency, anemia\par Emphasized importance of healthy lifestyle measure. Low sodium diet. Daily exercise.\par Requested refill on spirolactone.  \par Reports feeling much better since last visit. \par Chronic oxygen therapy. 6-10 Liters !!\par \par Discussed consult with sub-specialist. \par \par Pt advised if any progressive dyspnea at rest, recurrent falls, or worsening/unrelieved LE edema with above change, recommend inpatient management. Pt verbalized understanding. \par Pt remains at high risk of adverse CV events due to severity of PAH. \par Instructed to call if any new symptoms\par Oct 27 2021 12:45PM

## 2022-02-09 NOTE — HISTORY OF PRESENT ILLNESS
[FreeTextEntry1] : ZANDER JEAN-BAPTISTE  is a 66 year old  F\par \par History of heart failure with preserved ejection fraction, severe pulmonary hypertension/cor pulmonale/TR, chronic atrial fibrillation, chronic respiratory failure, COPD, sleep apnea, obesity, coronary artery disease, stroke, GI bleed, chronic renal insufficiency, prior PE/CVA, anemia. \par  \par Histor of prior nonsyncopal falls. Initially arm injury which did not require surgery.\par Most recently [facility] Early June '21 with fall, reportedly on carpet and ended up on floor. She reports a spinal fracture and required kyphoplasty.  \par \par re admitted acute on chronic respiratory failure pulmonary hypertension anemia \par admitted 4 days chest x-ray had consolidation fecal occult blood was positive BNP was elevated \par treated with BiPAP transfused \par \par in the interim she saw her pulmonary specialist at [place] she is now on a new higher flow oxygen \par she reports feeling much better and having no further falls \par she is back to using metolazone twice a week \par Reports on 6 L while out of the house. At home on 7/8 L. Up to 10L. \par Reports decreased LE edema. \par Reports weight loss. \par \par There is chronic dyspnea on exertion and orthopnea. \par There is no chest pain, coughing, or wheezing. \par There are no symptomatic palpitations, lightheadedness, or dizziness. \par There have been no residual defects from her stroke. \par \par Recent monitor with rate controlled atrial fibrillation and ventricular ectopy \par echocardiogram demonstrates normal left ventricular function reduced right ventricular function tricuspid regurgitation severe pulmonary hypertension\par last EKG demonstrates atrial fibrillation with a right axis deviation poor R wave progression and nonspecific ST changes \par October 2021 potassium 3.9 creatinine 1.5 hemoglobin 10.6 \par \par Carotid Doppler mild nonobstructive disease \par Abdominal ultrasound mild atherosclerosis\par Cardiac catheterization June 2020 PA pressure 58/18 RV 60/5 \par \par Cardiac cath demonstrates severe pulmonary hypertension. Previously referred to Dr. Dale at [facility]. Underwent comprehensive cardiopulmonary workup. Believes the etiology of her lung disease is related to COPD, hypoxemia and long term sleep apnea with resulting pulmonary hypertension. WHO Group 3 and 1? There is consideration of lung transplantation if she loses weight. Diuretics were adjusted, changed Lasix to bumex. Now sees Dr. Gallardo locally at [facility]. \par \par Cardiac catheterization report has been reviewed from October 2017. PA pressure of 54/18 with a mean of 34. Moderate pulmonary hypertension. High normal filling pressures. Reduced cardiac output. Increased pulmonary resistance greater than 10 RAI PA diastolic to wedge gradient 10 mm of mercury consistent with pulmonary vascular disease. There was a reduction in pulmonary artery resistance with increasing cardiac output with nitric oxide.\par \par Cardiac catheterization report May 2013, normal left ventricular function, right dominant circulation, left main arises normally from the left coronary sinus of Valsalva, bifurcates into LAD and circumflex, left main normal, LAD arises normally from the left main normal, left circumflex arises normally from the left main and terminates in the AV groove normal, RCA arises normally from the right sinus of Valsalva, RCA is normal, global LV function normal, ejection fraction 55% to 60%, normal coronary arteries, no evidence of vasoreactivity with nitric oxide. \par \par [facility].\par There is a chest x-ray, prominent cardiac silhouette, pulmonary vascular congestion. \par Chest CT, cardiomegaly, mild atherosclerosis, enlargement of pulmonary artery consistent with pulmonary hypertension, moderate emphysema\par Nuclear medicine scan, low probability of pulmonary embolism.\par Cardiac catheterization, severe pulmonary hypertension with normal wedge pressure. No significant improvement in response to FIO2 or nitric oxide. \par Echocardiogram, left atrial enlargement, pulmonary hypertension, septal wall motion abnormality consistent with RV overload, dilated right ventricle, mildly reduced RV function, dilated right atrium, and elevated right atrial pressures with nonreactive IVC . \par \par \par Chronic hypoxic respiratory failure. COPD. Severe pulmonary hypertension. Atherosclerosis\par Atrial fibrillation. Prior CVA. Prior PE. HTN. Cor pulmonale. WHO group 1/3. Low CO Elevated PVR. Normal wedge pressure.\par \par Prior history of HFpEF, HTN, COPD, AFib, obesity, CAD, stroke, GI bleed, chronic renal insufficiency, anemia\par \par she remains at high risk \par requested heart failure evaluation previously seen by [facility] heart failure service there was discussion of lung transplant \par  \par Continue torsemide, aldactone and intermittent metolazone. Monitor daily weights. \par Rate control. Monitor digoxin level. Goal digoxin level.5-1. \par Continue NOAC, Eliquis. Understands risks and benefits of novel anticoagulant. Discussed bleeding precautions. Monitor hemoglobin and renal function.  Anemia eval.\par Chronic oxygen therapy. 6-10 Liters !!\par Emphasized importance of healthy lifestyle measure. Low sodium diet. Daily exercise.\par \par follow-up with HF, electrophysiology GI and pulm\par Pt advised if any progressive dyspnea at rest, recurrent falls, or worsening/unrelieved LE edema with above change, recommend inpatient management. Pt verbalized understanding. \par Pt remains at high risk of adverse CV events due to severity of PAH. \par Instructed to call if any new symptoms\par

## 2022-05-27 NOTE — ASSESSMENT
[FreeTextEntry1] : \par Chronic hypoxic respiratory failure. COPD. Severe pulmonary hypertension.  Cor pulmonale. WHO group 1/3. Low CO Elevated PVR. \par HFpEF\par Atrial fibrillation. Prior CVA\par Prior PE\par HTN\par Atherosclerosis. \par Obesity\par Anemia/GI bleed\par Chronic renal insufficiency\par \par high risk of MACE\par Outside records have been requested from [facility]\par Will refer to lung transplant team\par Follow up with HF and PH specialist\par Refer to EP for LAAo if unable to take OAC\par \par  Continue torsemide, aldactone and intermittent metolazone. Monitor daily weights.  Monitor labs, renal function and digoxin level\par Rate control. Monitor digoxin level. Goal digoxin level.5-1. \par Continue NOAC, Eliquis. Understands risks and benefits of novel anticoagulant. Discussed bleeding precautions. Monitor hemoglobin and renal function.  Anemia eval.  Gi f/u\par Low sodium diet. Daily exercise as tolerated.  ? cardiopulm rehab\par Chronic oxygen therapy. 6-10 Liters !!\par \par Low threshold for ER if progressive dyspnea, sx\par Instructed to call if any new symptoms

## 2022-05-27 NOTE — HISTORY OF PRESENT ILLNESS
[FreeTextEntry1] : ZANDER JEAN-BAPTISTE  is a 66 year old  F\par \par \par \par History of heart failure with preserved ejection fraction, severe pulmonary hypertension/cor pulmonale/TR, chronic atrial fibrillation, chronic respiratory failure, COPD, sleep apnea, obesity, coronary artery disease, stroke, GI bleed, chronic renal insufficiency, prior PE/CVA, anemia, falls. \par  \par Histor of prior nonsyncopal falls. \par Arm injury which did not require surgery.\par Then [facility] June '21 with fall, spinal fracture and required kyphoplasty.  \par \par re admitted acute on chronic respiratory failure pulmonary hypertension anemia \par admitted 4 days \par chest x-ray had consolidation fecal occult blood was positive BNP was elevated \par treated with BiPAP transfused \par \par There is chronic dyspnea on exertion, edema and orthopnea. \par There is no chest pain\par There are no symptomatic palpitations, lightheadedness, or syncope\par \par She was last seen in the ICU.  She reports she is admitted for 1 week. \par \par Recently presented to the hospital with shortness of breath.  Given Lasix.  Required nonrebreather.  There was pulmonary vascular congestion.  Elevated BNP.  Acute on chronic respiratory failure.  Required high flow.  ICU care.  BiPAP.  Lower extremity Doppler and VQ scan were negative.  Required transfusion.  IV diuretics.  Transition to nasal cannula and home trilogy.  Return to baseline oxygen requirements.  Discharged on home oxygen.  Previous admit transferred from [place] for treatment of pulmonary hypertension\par \par Now takes a higher dose of torsemide and metolazone twice a week.  Other medications remain unchanged including anticoagulation, MRA, calcium channel blocker and digoxin. \par \par She reports feeling better when she was discharged in the hospital.  Now she returns the office and reports increasing fatigue, dyspnea, less edema.  She is walking less.  There is a decline in her quality of life.  She saw her pulmonary hypertension specialist.  Told that there are no further options.  She uses 8 L of oxygen.  \par \par April 2022 sodium 131 potassium 4.3 creatinine 1.7 hemoglobin 9.2\par May 2022 hemoglobin 8.4 BNP 8859 sodium 133 potassium 4.6 creatinine 1.9 LDL 39\par \par last monitor with rate controlled atrial fibrillation and ventricular ectopy \par echocardiogram demonstrates normal left ventricular function reduced right ventricular function tricuspid regurgitation severe pulmonary hypertension\par last EKG demonstrates atrial fibrillation with a right axis deviation poor R wave progression and nonspecific ST changes \par Carotid Doppler mild nonobstructive disease \par Abdominal ultrasound mild atherosclerosis\par \par Cardiac cath demonstrates severe pulmonary hypertension. Previously referred to Dr. Dale at [facility]. Underwent comprehensive cardiopulmonary workup. Believes the etiology of her lung disease is related to COPD, hypoxemia and long term sleep apnea with resulting pulmonary hypertension. WHO Group 3 and 1? There is consideration of lung transplantation if she loses weight. Diuretics were adjusted, changed Lasix to bumex. [facility] \par \[facility] Cardiac catheterization report has been reviewed from October 2017. PA pressure of 54/18 with a mean of 34. Moderate pulmonary hypertension. High normal filling pressures. Reduced cardiac output. Increased pulmonary resistance greater than 10 RAI PA diastolic to wedge gradient 10 mm of mercury consistent with pulmonary vascular disease. There was a reduction in pulmonary artery resistance with increasing cardiac output with nitric oxide.\par \par Cardiac catheterization report May 2013, normal left ventricular function, right dominant circulation, left main arises normally from the left coronary sinus of Valsalva, bifurcates into LAD and circumflex, left main normal, LAD arises normally from the left main normal, left circumflex arises normally from the left main and terminates in the AV groove normal, RCA arises normally from the right sinus of Valsalva, RCA is normal, global LV function normal, ejection fraction 55% to 60%, normal coronary arteries, no evidence of vasoreactivity with nitric oxide. \par \par [facility].\par There is a chest x-ray, prominent cardiac silhouette, pulmonary vascular congestion. \par Chest CT, cardiomegaly, mild atherosclerosis, enlargement of pulmonary artery consistent with pulmonary hypertension, moderate emphysema\par Nuclear medicine scan, low probability of pulmonary embolism.\par Cardiac catheterization, severe pulmonary hypertension with normal wedge pressure. No significant improvement in response to FIO2 or nitric oxide. \par Echocardiogram, left atrial enlargement, pulmonary hypertension, septal wall motion abnormality consistent with RV overload, dilated right ventricle, mildly reduced RV function, dilated right atrium, and elevated right atrial pressures with nonreactive IVC . \par

## 2022-06-14 NOTE — H&P ADULT - ATTENDING COMMENTS
66F w/ pmh of pulmonary hypertension, HTN, HFpEF (echo 60% PASP 79mmhg), COPD  (8L), Afib , HLD,  CKD  admitted to [facility]  for RINALDI and fatigue x 2 wks , increased o2 reqs , imaging w/ RUL consolidation , patient tx w/ diuretics and abx , T/F to [facility] for further HF evaluation, currently on HFNC , here labs w/ hgb 7.4 stable, BNP > 15K ; will continue oxygen supplementation , wean as tolerated ,  continue diuresis , obtain echo and  HF consult during day time.  continue COPD  medications,   obtain pulm consult, can continue other home medications

## 2022-06-14 NOTE — PROGRESS NOTE ADULT - PROBLEM SELECTOR PLAN 1
Most likely 2/2 to COPD and possible superimposed pneumonia vs. HFpEF vs. COPD exacerbation   - Reportedly CT with RUL consolidation     Plan:   - Abx as below  - Supplementary O2 as need to titrate to O2 saturation of 90-93%   - Avoid over oxygenation to prevent decreasing respiratory drive   - HFNC start at 40% and 40L   - ABG reviewed and oxygenation more then adequate will titrate down Most likely 2/2 to COPD and possible superimposed pneumonia vs. HFpEF vs. COPD exacerbation   - Reportedly CT with RUL consolidation     Plan:   - Abx as below  - Supplementary O2 as need to titrate to O2 saturation of 90-93%   - Avoid over oxygenation to prevent decreasing respiratory drive   - HFNC start at 40% and 40L   - Switch from Lasix 40 IV BID to Lasix gtt @10mg/hr  - ABG reviewed and oxygenation more then adequate will titrate down Most likely 2/2 to COPD and possible superimposed pneumonia vs. HFpEF vs. COPD exacerbation   - Reportedly CT with RUL consolidation     Plan:   - Abx as below  - Supplementary O2 as need to titrate to O2 saturation of 90-93%   - Avoid over oxygenation to prevent decreasing respiratory drive   - HFNC start at 40% and 40L   - Switch from Lasix 40 IV BID to Bumex gtt @1mg/hr  - ABG reviewed and oxygenation more then adequate will titrate down  - Obtain CT scan from [place]  - Patient will need CT scan when euvolemic

## 2022-06-14 NOTE — H&P ADULT - PROBLEM SELECTOR PLAN 6
- C/w home tadalafil 40mg qdaily   - Will require pulm team to order ambrisentan 10mg home medication  - TTE for further characterization of cardiac pathology

## 2022-06-14 NOTE — CONSULT NOTE ADULT - SUBJECTIVE AND OBJECTIVE BOX
Patient seen and evaluated at bedside    Chief Complaint: Shortness of breath     HPI:  Patient is a 65 y/o F w/ pmh of pulmonary hypertension, HTN, HFpEF (echo 60% PASP 79mmhg), COPD  (8L), Afib (in digoxin, diltiazem), HLD, prior spont pneumothorax, and CKD who presents as transfer from [facility] with 1-2 weeks of fatigue, lethargy, and progressively worsening RINALDI. Over the past year, the patient has had a long standing history of worsening SOB that has been attributed to her heart failure. She has been hospitalized 4 times in the past year and has been treated as an outpatient with aggressively with diuretics and vasodilators for COPD.     At the OSH, she was noted to have mil interstitial edema. She was given lasix and was admitted to medicine for diuresis. CT scan was noted to have a RUL consolidation concerning for active infection. She was started on Ceftriaxone. Pulmonary, Cardiology and Nephrology were consulted. She was found to have worsening hypoxia to the 70s then transitioned to HFNC. The Cardiology team at that point recommended transfer to [facility] and was accepted by Dr. Archer.  (14 Jun 2022 03:38)      PMHx:   COPD exacerbation    Severe pulmonary hypertension    Chronic kidney disease, unspecified CKD stage    Acute on chronic diastolic congestive heart failure    Pulmonary embolism    Anxiety and depression    GERD (gastroesophageal reflux disease)    Obstructive sleep apnea    Chronic atrial fibrillation        PSHx:   H/O pneumonectomy    Chronic atrial fibrillation    H/O hyperlipidemia    Major depression    Right leg weakness        Allergies:  nitrates (Unknown)      Home Meds:    Current Medications:   acetaminophen     Tablet .. 650 milliGRAM(s) Oral every 6 hours PRN  albuterol/ipratropium for Nebulization 3 milliLiter(s) Nebulizer every 6 hours  apixaban 5 milliGRAM(s) Oral two times a day  budesonide 160 MICROgram(s)/formoterol 4.5 MICROgram(s) Inhaler 2 Puff(s) Inhalation two times a day  buPROPion XL (24-Hour) . 150 milliGRAM(s) Oral daily  digoxin     Tablet 125 MICROGram(s) Oral daily  diltiazem    milliGRAM(s) Oral daily  furosemide   Injectable 40 milliGRAM(s) IV Push two times a day  pantoprazole    Tablet 40 milliGRAM(s) Oral before breakfast  rosuvastatin 5 milliGRAM(s) Oral at bedtime  spironolactone 50 milliGRAM(s) Oral daily  tadalafil 40 milliGRAM(s) Oral daily  tiotropium 2.5 MICROgram(s)/olodaterol 2.5 MICROgram(s) (STIOLTO) Inhaler 2 Puff(s) Inhalation daily      FAMILY HISTORY:  No pertinent family history        Social History:  Smoking History:  Alcohol Use:  Drug Use:    REVIEW OF SYSTEMS:  Constitutional:     [x ] negative [ ] fevers [ ] chills [ ] weight loss [ ] weight gain  HEENT:                  [x ] negative [ ] dry eyes [ ] eye irritation [ ] postnasal drip [ ] nasal congestion  CV:                         [ x] negative  [ ] chest pain [ ] orthopnea [ ] palpitations [ ] murmur  Resp:                     [x ] negative [ ] cough [ ] shortness of breath [ ] dyspnea [ ] wheezing [ ] sputum [ ]hemoptysis  GI:                          [ x] negative [ ] nausea [ ] vomiting [ ] diarrhea [ ] constipation [ ] abd pain [ ] dysphagia   :                        [ x] negative [ ] dysuria [ ] nocturia [ ] hematuria [ ] increased urinary frequency  Musculoskeletal: [x ] negative [ ] back pain [ ] myalgias [ ] arthralgias [ ] fracture  Skin:                       [ x] negative [ ] rash [ ] itch  Neurological:        [ x] negative [ ] headache [ ] dizziness [ ] syncope [ ] weakness [ ] numbness  Psychiatric:           [ x] negative [ ] anxiety [ ] depression  Endocrine:            [ x] negative [ ] diabetes [ ] thyroid problem  Heme/Lymph:      [ x] negative [ ] anemia [ ] bleeding problem  Allergic/Immune: [ x] negative [ ] itchy eyes [ ] nasal discharge [ ] hives [ ] angioedema    [ x] All other systems negative  [ ] Unable to assess ROS due to      Physical Exam:  T(F): 97 (06-14), Max: 97.5 (06-14)  HR: 71 (06-14) (71 - 78)  BP: 112/75 (06-14) (112/75 - 120/69)  RR: 21 (06-14)  SpO2: 94% (06-14)  GENERAL: No acute distress, well-developed  HEAD:  Atraumatic, Normocephalic  ENT: EOMI, PERRLA, conjunctiva and sclera clear, Neck supple, No JVD, moist mucosa  CHEST/LUNG: Clear to auscultation bilaterally; No wheeze, equal breath sounds bilaterally   BACK: No spinal tenderness  HEART: Regular rate and rhythm; No murmurs, rubs, or gallops  ABDOMEN: Soft, Nontender, Nondistended; Bowel sounds present  EXTREMITIES:  No clubbing, cyanosis, or edema  PSYCH: Nl behavior, nl affect  NEUROLOGY: AAOx3, non-focal, cranial nerves intact  SKIN: Normal color, No rashes or lesions  LINES:    Cardiovascular Diagnostic Testing:    ECG: Personally reviewed:    Echo: Personally reviewed:    Stress Testing:    Cath:    Imaging:    CXR: Personally reviewed    Labs: Personally reviewed                        7.4    8.44  )-----------( 205      ( 14 Jun 2022 02:45 )             24.5     06-14    133<L>  |  94<L>  |  63<H>  ----------------------------<  115<H>  3.9   |  27  |  1.29    Ca    9.3      14 Jun 2022 02:45  Phos  3.2     06-14  Mg     1.7     06-14    TPro  7.0  /  Alb  3.8  /  TBili  0.3  /  DBili  x   /  AST  14  /  ALT  10  /  AlkPhos  105  06-14    PT/INR - ( 14 Jun 2022 02:45 )   PT: 27.0 sec;   INR: 2.31 ratio         PTT - ( 14 Jun 2022 02:45 )  PTT:31.3 sec  Serum Pro-Brain Natriuretic Peptide: 14457 pg/mL (06-14 @ 02:45)         Patient seen and evaluated at bedside    Chief Complaint: Shortness of breath     HPI:  This is a 67yo Female with PMHx of pHTN, HTN, HFpEF (EF 60%), COPD on baseline 8L O2 at home, Afib on Digoxin and Diltiazem, HLD, prior hx of spontaneous pneumothorax, and CKD, who presented as a transfer from [facility] for 1-2 weeks of fatigue, lethargy, progressive dyspnea on exertion.     Long standing hx of worsening shortness of breath. Hospitalized 4 times in the past year and has been treated as an outpatient with aggressively with diuretics and vasodilators for COPD.     At OSH, given Lasix 60mg IV BID. CT with RUL consolidation concerning for active infection. She was started on Ceftriaxone. Pulmonary, Cardiology and Nephrology were consulted. She was found to have worsening hypoxia to the 70s then transitioned to HFNC. Transferred here for further evaluation.     EKG Afib, rate 77, non-specific ST changes.     Currently on HFNC FiO2 50% with SpO2 97%. Denies shortness of breath at rest. Denies fevers, chills, chest pain.       PMHx:   COPD exacerbation  Severe pulmonary hypertension  Chronic kidney disease, unspecified CKD stage  Acute on chronic diastolic congestive heart failure  Pulmonary embolism  Anxiety and depression  GERD (gastroesophageal reflux disease)  Obstructive sleep apnea  Chronic atrial fibrillation      PSHx:   H/O pneumonectomy  Chronic atrial fibrillation  H/O hyperlipidemia  Major depression  Right leg weakness      Allergies:  nitrates (Unknown)      Current Medications:   acetaminophen     Tablet .. 650 milliGRAM(s) Oral every 6 hours PRN  albuterol/ipratropium for Nebulization 3 milliLiter(s) Nebulizer every 6 hours  apixaban 5 milliGRAM(s) Oral two times a day  budesonide 160 MICROgram(s)/formoterol 4.5 MICROgram(s) Inhaler 2 Puff(s) Inhalation two times a day  buPROPion XL (24-Hour) . 150 milliGRAM(s) Oral daily  digoxin     Tablet 125 MICROGram(s) Oral daily  diltiazem    milliGRAM(s) Oral daily  furosemide   Injectable 40 milliGRAM(s) IV Push two times a day  pantoprazole    Tablet 40 milliGRAM(s) Oral before breakfast  rosuvastatin 5 milliGRAM(s) Oral at bedtime  spironolactone 50 milliGRAM(s) Oral daily  tadalafil 40 milliGRAM(s) Oral daily  tiotropium 2.5 MICROgram(s)/olodaterol 2.5 MICROgram(s) (STIOLTO) Inhaler 2 Puff(s) Inhalation daily      FAMILY HISTORY:  No pertinent family history    Social History:  Smoking History: denies  Alcohol Use: denies  Drug Use: denies     REVIEW OF SYSTEMS:  Constitutional:     [x ] negative [ ] fevers [ ] chills [ ] weight loss [ ] weight gain  HEENT:                  [x ] negative [ ] dry eyes [ ] eye irritation [ ] postnasal drip [ ] nasal congestion  CV:                         [ x] negative  [ ] chest pain [ ] orthopnea [ ] palpitations [ ] murmur  Resp:                     [ ] negative [ ] cough [x ] shortness of breath [ x] dyspnea [ ] wheezing [ ] sputum [ ]hemoptysis  GI:                          [ x] negative [ ] nausea [ ] vomiting [ ] diarrhea [ ] constipation [ ] abd pain [ ] dysphagia   :                        [ x] negative [ ] dysuria [ ] nocturia [ ] hematuria [ ] increased urinary frequency  Musculoskeletal: [x ] negative [ ] back pain [ ] myalgias [ ] arthralgias [ ] fracture  Skin:                       [ x] negative [ ] rash [ ] itch  Neurological:        [ x] negative [ ] headache [ ] dizziness [ ] syncope [ ] weakness [ ] numbness  Psychiatric:           [ x] negative [ ] anxiety [ ] depression  Endocrine:            [ x] negative [ ] diabetes [ ] thyroid problem  Heme/Lymph:      [ x] negative [ ] anemia [ ] bleeding problem  Allergic/Immune: [ x] negative [ ] itchy eyes [ ] nasal discharge [ ] hives [ ] angioedema    [ x] All other systems negative  [ ] Unable to assess ROS due to      Physical Exam:  T(F): 97 (06-14), Max: 97.5 (06-14)  HR: 71 (06-14) (71 - 78)  BP: 112/75 (06-14) (112/75 - 120/69)  RR: 21 (06-14)  SpO2: 94% (06-14)  GENERAL: No acute distress, well-developed  HEAD:  Atraumatic, Normocephalic  ENT: EOMI, conjunctiva and sclera clear, Neck supple, elevated JVD, moist mucosa  CHEST/LUNG: crackles at bases, no wheezing   HEART: Regular rate and irregular rhythm; No murmurs, rubs, or gallops  ABDOMEN: Soft, Nontender, Nondistended; Bowel sounds present  EXTREMITIES:  1+ edema bilaterally   PSYCH: Nl behavior, nl affect  NEUROLOGY: AAOx3, non-focal    Cardiovascular Diagnostic Testing:    ECG: Personally reviewed:  Afib, non-specific ST changes    Imaging:    CXR: Personally reviewed    Labs: Personally reviewed                        7.4    8.44  )-----------( 205      ( 14 Jun 2022 02:45 )             24.5     06-14    133<L>  |  94<L>  |  63<H>  ----------------------------<  115<H>  3.9   |  27  |  1.29    Ca    9.3      14 Jun 2022 02:45  Phos  3.2     06-14  Mg     1.7     06-14    TPro  7.0  /  Alb  3.8  /  TBili  0.3  /  DBili  x   /  AST  14  /  ALT  10  /  AlkPhos  105  06-14    PT/INR - ( 14 Jun 2022 02:45 )   PT: 27.0 sec;   INR: 2.31 ratio         PTT - ( 14 Jun 2022 02:45 )  PTT:31.3 sec  Serum Pro-Brain Natriuretic Peptide: 74767 pg/mL (06-14 @ 02:45)

## 2022-06-14 NOTE — H&P ADULT - PROBLEM SELECTOR PLAN 1
Most likely 2/2 to COPD and possible superimposed pneumonia vs. HFpEF vs. COPD exacerbation   - Reportedly CT with RUL consolidation     Plan:   - Abx as below  - Supplementary O2 as need to titrate to O2 saturation of 90-93%   - Avoid over oxygenation to prevent decreasing respiratory drive   - HFNC start at 40% and 40L   - Stat ABG since OSH transport Most likely 2/2 to COPD and possible superimposed pneumonia vs. HFpEF vs. COPD exacerbation   - Reportedly CT with RUL consolidation     Plan:   - Abx as below  - Supplementary O2 as need to titrate to O2 saturation of 90-93%   - Avoid over oxygenation to prevent decreasing respiratory drive   - HFNC start at 40% and 40L   - ABG reviewed and oxygenation more then adequate will titrate down

## 2022-06-14 NOTE — PROVIDER CONTACT NOTE (OTHER) - BACKGROUND
Patient 65 y/o F H/O pulmonary hypertension, HTN, HFpEF (echo 60% PASP 79mmhg), COPD  (8L), Afib (in digoxin, diltiazem, Eliquis, transferred from [facility] concerning for COPD vs. HFpEF

## 2022-06-14 NOTE — H&P ADULT - NSHPPHYSICALEXAM_GEN_ALL_CORE
PHYSICAL EXAM:  GENERAL: NAD, lying in bed comfortably  HEAD:  Atraumatic, Normocephalic  EYES: EOMI, PERRLA, conjunctiva and sclera clear  ENT: Moist mucous membranes  NECK: Supple, + JVD  CHEST/LUNG: Bilateral decrease in breath sounds, no wheezing, rales or rhonchi. Unlabored respirations  HEART: Regular rate and rhythm; Normal s1 and s2, No murmurs, rubs, or gallops  ABDOMEN: Bowel sounds present; Soft, Nontender, Nondistended. No hepatomegaly  EXTREMITIES:  2+ Peripheral Pulses, brisk capillary refill. No clubbing, cyanosis, Non pitting edema, Scar showing wear skin graft was taken   NERVOUS SYSTEM:  Alert & Oriented X3, speech clear. No deficits   MSK: FROM all 4 extremities, full and equal strength  SKIN: No rashes or lesions

## 2022-06-14 NOTE — CONSULT NOTE ADULT - ASSESSMENT
a 65 y/o F w/ pmh of pulmonary hypertension, HTN, HFpEF (echo 60% PASP 79mmhg), COPD  (8L), Afib (in digoxin, diltiazem), HLD, prior spont pneumothorax, and CKD who presents as transfer from [facility] with 1-2 weeks of fatigue, lethargy, and progressively worsening RINALDI. Referred by Cardiologist Dr Sarbjit Dangelo for lung transplant evaluation transfer to [facility].    #pre lung transplant eval  #fluid overload  #PAH  -management of PAH per pulmonary service  -recommend aggressive diuresis  -discussed risk and benefits of lung transplant at this time patient would like to think about it   -from transplant perspective BMI out of candidacy range however willing to work with patient and extensive edema/fluid overload  -may benefit from [facility]

## 2022-06-14 NOTE — PROGRESS NOTE ADULT - PROBLEM SELECTOR PLAN 2
Reported as HFpEF although unclear exact etiology. Most likely 2/2 to pulmonary HTN and WHO class III. Differential includes ICM vs. NICM vs. RHF from Pulm HTN.    Plan:   - TTE to assess LV function  - Lasix 40mg IV q12 hours until further evaluation, holding home torsemide  - C/w home Spironolactone  - Consider Advanced HF consult pending evaluation with TTE   - BNP 12,525 at OSH, now 15,334   - Trend BNP q72 hours   - Strict ins and outs   - Daily standing weights  - Replete K > 4, Mg > 2, and Phos > 3.

## 2022-06-14 NOTE — H&P ADULT - NSICDXPASTMEDICALHX_GEN_ALL_CORE_FT
PAST MEDICAL HISTORY:  Acute on chronic diastolic congestive heart failure     Anxiety and depression     Chronic atrial fibrillation     Chronic kidney disease, unspecified CKD stage     COPD exacerbation     GERD (gastroesophageal reflux disease)     Obstructive sleep apnea     Pulmonary embolism     Severe pulmonary hypertension

## 2022-06-14 NOTE — CONSULT NOTE ADULT - SUBJECTIVE AND OBJECTIVE BOX
CHIEF COMPLAINT:  shortness of breath, dyspnea on exertion     HPI:    Victoria Domingo is a 66 year old female w/pulmonary HTN, HTN, HFpEF, COPD (on 8 L at home), afib, HLD, prior spontaneous pneumothorax, and CKD who presents to [facility] as a transfer from [facility] with reported 1-2 weeks of fatigue, lethargy, and progressively worsening shortness of breath.  She has reportedly been hospitalized 4 times in the past year.  Per chart review, she was initially diuresed at OSH and started on ceftriaxone as CT scan showed RUL consolidation concerning for pneumonia.  She was requiring HFNC to maintain normal O2 saturations and was subsequently transferred to [facility] for further management.    On exam patient states that she can only walk 5-10 feet with oxygen before feeling short of breath, which overall represents a worsening of her condition.  She states that she does feel slightly overloaded with regards to her volume status but that she does not normally eat that much.  She endorses compliance with her home diuretics and states that she takes torsemide 40 mg daily as well as daily spironolactone and metolazone twice a week.   She endorses a former history of smoking but states she quit about 27 years ago.        PAST MEDICAL & SURGICAL HISTORY:  COPD exacerbation      Severe pulmonary hypertension      Chronic kidney disease, unspecified CKD stage      Acute on chronic diastolic congestive heart failure      Pulmonary embolism      Anxiety and depression      GERD (gastroesophageal reflux disease)      Obstructive sleep apnea      Chronic atrial fibrillation      H/O pneumonectomy      Right leg weakness          FAMILY HISTORY:  No pertinent family history        SOCIAL HISTORY:  Smoking: [ ] Never Smoked [x ] Former Smoker (__ packs x ___ years) [ ] Current Smoker  (__ packs x ___ years)      Allergies    nitrates (Unknown)    Intolerances        HOME MEDICATIONS:  Home Medications:  ambrisentan 10 mg oral tablet: 1 tab(s) orally once a day (2022 03:27)  apixaban 5 mg oral tablet: 1 tab(s) orally 2 times a day (2022 03:26)  buPROPion 100 mg oral tablet: 1 tab(s) orally 2 times a day (2022 03:31)  digoxin 125 mcg (0.125 mg) oral tablet: 1 tab(s) orally every other day (at bedtime) (2022 03:28)  dilTIAZem 120 mg/24 hours oral capsule, extended release: 1 cap(s) orally once a day (2022 03:31)  pantoprazole 40 mg oral delayed release tablet: 1 tab(s) orally once a day (2022 03:27)  rosuvastatin 5 mg oral tablet: 1 tab(s) orally once a day (2022 03:27)  spironolactone 50 mg oral tablet: 1 tab(s) orally once a day (2022 03:29)  tadalafil 20 mg oral tablet: 2 tab(s) orally once a day (2022 03:29)  torsemide 20 mg oral tablet: 2 tab(s) orally once a day (2022 03:30)      REVIEW OF SYSTEMS:  Constitutional: [ ] negative [ ] fevers [ ] chills [ ] weight loss [ ] weight gain  HEENT: [ ] negative [ ] dry eyes [ ] eye irritation [ ] postnasal drip [ ] nasal congestion  CV: [ ] negative  [ ] chest pain [ ] orthopnea [ ] palpitations [ ] murmur  Resp: [ ] negative [ ] cough [x ] shortness of breath [ x] dyspnea [ ] wheezing [ ] sputum [ ] hemoptysis  GI: [ ] negative [ ] nausea [ ] vomiting [ ] diarrhea [ ] constipation [ ] abd pain [ ] dysphagia   : [ ] negative [ ] dysuria [ ] nocturia [ ] hematuria [ ] increased urinary frequency  Musculoskeletal: [ ] negative [ ] back pain [ ] myalgias [ ] arthralgias [ ] fracture  Skin: [ ] negative [ ] rash [ ] itch  Neurological: [ ] negative [ ] headache [ ] dizziness [ ] syncope [ ] weakness [ ] numbness  Psychiatric: [ ] negative [ ] anxiety [ ] depression  Endocrine: [ ] negative [ ] diabetes [ ] thyroid problem  Hematologic/Lymphatic: [ ] negative [ ] anemia [ ] bleeding problem  Allergic/Immunologic: [ ] negative [ ] itchy eyes [ ] nasal discharge [ ] hives [ ] angioedema  [ x] All other systems negative  [ ] Unable to assess ROS because ________    OBJECTIVE:  ICU Vital Signs Last 24 Hrs  T(C): 36.6 (2022 08:21), Max: 36.6 (2022 08:21)  T(F): 97.8 (2022 08:21), Max: 97.8 (2022 08:21)  HR: 78 (2022 08:21) (71 - 78)  BP: 114/64 (2022 08:21) (112/75 - 120/69)  BP(mean): --  ABP: --  ABP(mean): --  RR: 20 (2022 08:21) (18 - 22)  SpO2: 93% (2022 08:21) (92% - 100%)        06-13 @ 07:01  -  -14 @ 07:00  --------------------------------------------------------  IN: 240 mL / OUT: 400 mL / NET: -160 mL      CAPILLARY BLOOD GLUCOSE      POCT Blood Glucose.: 114 mg/dL (2022 07:22)      PHYSICAL EXAM:  General:  no acute distress   HEENT: atraumatic, normocephalic   Neck:  thick neck   Respiratory:  no crackles, no cough/wheezes, no use of accessory muscles of respiration    Cardiovascular:  RRR, no rubs, gallops, m urmurs   Abdomen: obese, non-tender   Extremities: bilateral edema, no significant cyanosis   Skin: no rash   Neurological: no gross/focal deficits appreciated    Psychiatry: appropriate     LINES:     HOSPITAL MEDICATIONS:  Standing Meds:  albuterol/ipratropium for Nebulization 3 milliLiter(s) Nebulizer every 6 hours  apixaban 5 milliGRAM(s) Oral two times a day  budesonide 160 MICROgram(s)/formoterol 4.5 MICROgram(s) Inhaler 2 Puff(s) Inhalation two times a day  buPROPion XL (24-Hour) . 150 milliGRAM(s) Oral daily  digoxin     Tablet 125 MICROGram(s) Oral daily  diltiazem    milliGRAM(s) Oral daily  furosemide   Injectable 40 milliGRAM(s) IV Push two times a day  pantoprazole    Tablet 40 milliGRAM(s) Oral before breakfast  rosuvastatin 5 milliGRAM(s) Oral at bedtime  spironolactone 50 milliGRAM(s) Oral daily  tadalafil 40 milliGRAM(s) Oral daily  tiotropium 2.5 MICROgram(s)/olodaterol 2.5 MICROgram(s) (STIOLTO) Inhaler 2 Puff(s) Inhalation daily      PRN Meds:  acetaminophen     Tablet .. 650 milliGRAM(s) Oral every 6 hours PRN      LABS:                        7.4    8.44  )-----------( 205      ( 2022 02:45 )             24.5     Hgb Trend: 7.4<--  06-14    133<L>  |  94<L>  |  63<H>  ----------------------------<  115<H>  3.9   |  27  |  1.29    Ca    9.3      2022 02:45  Phos  3.2       Mg     1.7         TPro  7.0  /  Alb  3.8  /  TBili  0.3  /  DBili  x   /  AST  14  /  ALT  10  /  AlkPhos  105  14    Creatinine Trend: 1.29<--  PT/INR - ( 2022 02:45 )   PT: 27.0 sec;   INR: 2.31 ratio         PTT - ( 2022 02:45 )  PTT:31.3 sec  Urinalysis Basic - ( 2022 02:31 )    Color: Light Yellow / Appearance: Clear / S.011 / pH: x  Gluc: x / Ketone: Negative  / Bili: Negative / Urobili: Negative   Blood: x / Protein: Negative / Nitrite: Negative   Leuk Esterase: Negative / RBC: x / WBC x   Sq Epi: x / Non Sq Epi: x / Bacteria: x      Arterial Blood Gas:   @ 02:39  7.44/44/178/30/97.6/5.0  ABG lactate: --        MICROBIOLOGY:       RADIOLOGY:  [ ] Reviewed and interpreted by me    PULMONARY FUNCTION TESTS:    EKG:   CHIEF COMPLAINT:  shortness of breath, dyspnea on exertion     HPI:    Victoria Domingo is a 66 year old female w/pulmonary HTN, HTN, HFpEF, COPD (on 8 L at home), afib, HLD, prior spontaneous pneumothorax, and CKD who presents to [facility] as a transfer from [facility] with reported 1-2 weeks of fatigue, lethargy, and progressively worsening shortness of breath.  She has reportedly been hospitalized 4 times in the past year.  Per chart review, she was initially diuresed at OSH and started on ceftriaxone as CT scan showed RUL consolidation concerning for pneumonia.  She was requiring HFNC to maintain normal O2 saturations and was subsequently transferred to [facility] for further management.    On exam patient states that she can only walk 5-10 feet with oxygen before feeling short of breath, which overall represents a worsening of her condition.  She states that she does feel slightly overloaded with regards to her volume status but that she does not normally eat that much.  She endorses compliance with her home diuretics and states that she takes torsemide 40 mg daily as well as daily spironolactone and metolazone twice a week.   She endorses a former history of smoking but states she quit about 27 years ago.        PAST MEDICAL & SURGICAL HISTORY:  COPD exacerbation      Severe pulmonary hypertension      Chronic kidney disease, unspecified CKD stage      Acute on chronic diastolic congestive heart failure      Pulmonary embolism      Anxiety and depression      GERD (gastroesophageal reflux disease)      Obstructive sleep apnea      Chronic atrial fibrillation      H/O pneumonectomy      Right leg weakness          FAMILY HISTORY:  No pertinent family history in parents        SOCIAL HISTORY:  Smoking: [ ] Never Smoked [x ] Former Smoker (__ packs x ___ years) [ ] Current Smoker  (__ packs x ___ years)  no current etoh    Allergies    nitrates (Unknown)    Intolerances        HOME MEDICATIONS:  Home Medications:  ambrisentan 10 mg oral tablet: 1 tab(s) orally once a day (2022 03:27)  apixaban 5 mg oral tablet: 1 tab(s) orally 2 times a day (2022 03:26)  buPROPion 100 mg oral tablet: 1 tab(s) orally 2 times a day (2022 03:31)  digoxin 125 mcg (0.125 mg) oral tablet: 1 tab(s) orally every other day (at bedtime) (2022 03:28)  dilTIAZem 120 mg/24 hours oral capsule, extended release: 1 cap(s) orally once a day (2022 03:31)  pantoprazole 40 mg oral delayed release tablet: 1 tab(s) orally once a day (2022 03:27)  rosuvastatin 5 mg oral tablet: 1 tab(s) orally once a day (2022 03:27)  spironolactone 50 mg oral tablet: 1 tab(s) orally once a day (2022 03:29)  tadalafil 20 mg oral tablet: 2 tab(s) orally once a day (2022 03:29)  torsemide 20 mg oral tablet: 2 tab(s) orally once a day (2022 03:30)      REVIEW OF SYSTEMS:  Constitutional: [x ] negative fevers or chills  [ ] fevers [ ] chills [ ] weight loss [ ] weight gain  HEENT: [ ] negative [ ] dry eyes [ ] eye irritation [ ] postnasal drip [ ] nasal congestion  CV: [ ] negative  [ ] chest pain [ ] orthopnea [ ] palpitations [ ] murmur  Resp: [ ] negative [ ] cough [x ] shortness of breath [ x] dyspnea [ ] wheezing [ ] sputum [ ] hemoptysis  GI: [ ] negative [ ] nausea [ ] vomiting [ ] diarrhea [ ] constipation [ ] abd pain [ ] dysphagia   : [ ] negative [ ] dysuria [ ] nocturia [ ] hematuria [ ] increased urinary frequency  Musculoskeletal: [ ] negative [ ] back pain [ ] myalgias [ ] arthralgias [ ] fracture  Skin: [ ] negative [ ] rash [ ] itch  Neurological: [ ] negative [ ] headache [ ] dizziness [ ] syncope [ ] weakness [ ] numbness  Psychiatric: [ ] negative [ ] anxiety [ ] depression  Endocrine: [ ] negative [ ] diabetes [ ] thyroid problem  Hematologic/Lymphatic: [ ] negative [ ] anemia [ ] bleeding problem  Allergic/Immunologic: [ ] negative [ ] itchy eyes [ ] nasal discharge [ ] hives [ ] angioedema  [ x] All other systems negative  [ ] Unable to assess ROS because ________    OBJECTIVE:  ICU Vital Signs Last 24 Hrs  T(C): 36.6 (2022 08:21), Max: 36.6 (2022 08:21)  T(F): 97.8 (2022 08:21), Max: 97.8 (2022 08:21)  HR: 78 (2022 08:21) (71 - 78)  BP: 114/64 (2022 08:21) (112/75 - 120/69)  BP(mean): --  ABP: --  ABP(mean): --  RR: 20 (2022 08:21) (18 - 22)  SpO2: 93% (2022 08:21) (92% - 100%)        06-13 @ 07:01  -  06-14 @ 07:00  --------------------------------------------------------  IN: 240 mL / OUT: 400 mL / NET: -160 mL      CAPILLARY BLOOD GLUCOSE      POCT Blood Glucose.: 114 mg/dL (2022 07:22)      PHYSICAL EXAM:  General:  no acute distress   HEENT: atraumatic, normocephalic   Neck:  thick neck   Respiratory:  no crackles, no cough/wheezes, no use of accessory muscles of respiration    Cardiovascular:  RRR, no rubs, gallops, m urmurs   Abdomen: obese, non-tender   Extremities: bilateral edema, no significant cyanosis   Skin: no rash   Neurological: no gross/focal deficits appreciated    Psychiatry: appropriate     LINES:     HOSPITAL MEDICATIONS:  Standing Meds:  albuterol/ipratropium for Nebulization 3 milliLiter(s) Nebulizer every 6 hours  apixaban 5 milliGRAM(s) Oral two times a day  budesonide 160 MICROgram(s)/formoterol 4.5 MICROgram(s) Inhaler 2 Puff(s) Inhalation two times a day  buPROPion XL (24-Hour) . 150 milliGRAM(s) Oral daily  digoxin     Tablet 125 MICROGram(s) Oral daily  diltiazem    milliGRAM(s) Oral daily  furosemide   Injectable 40 milliGRAM(s) IV Push two times a day  pantoprazole    Tablet 40 milliGRAM(s) Oral before breakfast  rosuvastatin 5 milliGRAM(s) Oral at bedtime  spironolactone 50 milliGRAM(s) Oral daily  tadalafil 40 milliGRAM(s) Oral daily  tiotropium 2.5 MICROgram(s)/olodaterol 2.5 MICROgram(s) (STIOLTO) Inhaler 2 Puff(s) Inhalation daily      PRN Meds:  acetaminophen     Tablet .. 650 milliGRAM(s) Oral every 6 hours PRN      LABS:                        7.4    8.44  )-----------( 205      ( 2022 02:45 )             24.5     Hgb Trend: 7.4<--  14    133<L>  |  94<L>  |  63<H>  ----------------------------<  115<H>  3.9   |  27  |  1.29    Ca    9.3      2022 02:45  Phos  3.2       Mg     1.7         TPro  7.0  /  Alb  3.8  /  TBili  0.3  /  DBili  x   /  AST  14  /  ALT  10  /  AlkPhos  105  14    Creatinine Trend: 1.29<--  PT/INR - ( 2022 02:45 )   PT: 27.0 sec;   INR: 2.31 ratio         PTT - ( 2022 02:45 )  PTT:31.3 sec  Urinalysis Basic - ( 2022 02:31 )    Color: Light Yellow / Appearance: Clear / S.011 / pH: x  Gluc: x / Ketone: Negative  / Bili: Negative / Urobili: Negative   Blood: x / Protein: Negative / Nitrite: Negative   Leuk Esterase: Negative / RBC: x / WBC x   Sq Epi: x / Non Sq Epi: x / Bacteria: x      Arterial Blood Gas:   @ 02:39  7.44/44/178/30/97.6/5.0  ABG lactate: --        MICROBIOLOGY:       RADIOLOGY:  [ ] Reviewed and interpreted by me    PULMONARY FUNCTION TESTS:    EKG:

## 2022-06-14 NOTE — PROGRESS NOTE ADULT - PROBLEM SELECTOR PLAN 9
- Nocturnal CPAP with home machine after verified during the day - Nocturnal AVAPs with home machine after verified during the day

## 2022-06-14 NOTE — PROGRESS NOTE ADULT - ASSESSMENT
Patient is a 67 y/o F w/ pmh of pulmonary hypertension, HTN, HFpEF (echo 60% PASP 79mmhg), COPD  (8L), Afib (in digoxin, diltiazem), HLD, prior spont pneumothorax, and CKD who presents as transfer from [facility] with 1-2 weeks of fatigue, lethargy, and progressively worsening RINALDI concerning for COPD vs. HFpEF. Unclear etiology of heart failure although I suspect his is likely R sided heart disease from severe COPD, per patient she underwent a RHF, but never a LHC per patient.  Patient is a 67 y/o F w/ pmh of pulmonary hypertension, HTN, HFpEF (echo 60% PASP 79mmhg), COPD  (8L), Afib (in digoxin, diltiazem), HLD, prior spont pneumothorax, and CKD who presents as transfer from [facility] with 1-2 weeks of fatigue, lethargy, and progressively worsening RINALDI concerning for COPD vs. HFpEF. Unclear etiology of heart failure although I suspect hers is likely R sided heart disease from severe COPD, per patient she underwent a RHF, but never a LHC per patient.

## 2022-06-14 NOTE — PROGRESS NOTE ADULT - PROBLEM SELECTOR PLAN 3
Severe advanced pulmonary hypertension with advanced COPD with potentially superimposed heart failure. Home on 8L high concentrate = 4L in hospital. Per patient saturates in the high 80s.     Plan;   - Pulmonary consult in the am   - C/w home tadalafil 40mg qdaily   - Will require pulm team to order ambrisentan 10mg home medication   - Possible outpatient lung transplant workup  - No colonoscopy or mammogram in 10 years Severe advanced pulmonary hypertension with advanced COPD with potentially superimposed heart failure. Home on 8L high concentrate = 4L in hospital. Per patient saturates in the high 80s.     Plan;   - Pulmonary consult in the am   - C/w home tadalafil 40mg qdaily   - Will speak to pharmacy about ambrisentan 10mg home medication   - Possible outpatient lung transplant workup  - No colonoscopy or mammogram in 10 years

## 2022-06-14 NOTE — PROGRESS NOTE ADULT - ASSESSMENT
Patient is a 65 y/o F w/ pmh of pulmonary hypertension, HTN, HFpEF (echo 60% PASP 79mmhg), COPD  (8L), Afib (in digoxin, diltiazem), HLD, prior spont pneumothorax, and CKD who presents as transfer from [facility] with 1-2 weeks of fatigue, lethargy, and progressively worsening RINALDI concerning for COPD vs. HFpEF. Unclear etiology of heart failure although I suspect his is likely R sided heart disease from severe COPD, per patient she underwent a RHF, but never a LHC per patient

## 2022-06-14 NOTE — PROGRESS NOTE ADULT - PROBLEM SELECTOR PLAN 2
Reported as HFpEF although unclear exact etiology. Most likely 2/2 to pulmonary HTN and WHO class III. Differential includes ICM vs. NICM vs. RHF from Pulm HTN.    Plan:   - TTE to assess LV function  - Lasix 40mg IV q12 hours until further evaluation, holding home torsemide  - C/w home Spironolactone  - Consider Advanced HF consult pending evaluation with TTE   - BNP 12,525 at OSH, now 15,334   - Trend BNP q72 hours   - Strict ins and outs   - Daily standing weights  - Replete K > 4, Mg > 2, and Phos > 3 Reported as HFpEF although unclear exact etiology. Most likely 2/2 to pulmonary HTN and WHO class III. Differential includes ICM vs. NICM vs. RHF from Pulm HTN.    Plan:   - F/u CXR   - TTE to assess LV function   - Switch from Lasix 40 IV BID to Lasix gtt @10mg/hr  - C/w home Spironolactone  - Consider Advanced HF consult pending evaluation with TTE   - BNP 12,525 at OSH, now 15,334   - Trend BNP q72 hours   - Strict ins and outs   - Daily standing weights  - Replete K > 4, Mg > 2, and Phos > 3 Reported as HFpEF although unclear exact etiology. Most likely 2/2 to pulmonary HTN and WHO class III. Differential includes ICM vs. NICM vs. RHF from Pulm HTN.    Plan:   - F/u CXR   - TTE to assess LV function   - Switch from Lasix 40 IV BID to Bumex gtt @1mg/hr  - C/w home Spironolactone  - Consider Advanced HF consult pending evaluation with TTE   - BNP 12,525 at OSH, now 15,334   - Trend BNP q72 hours   - Strict ins and outs   - Daily standing weights  - Replete K > 4, Mg > 2, and Phos > 3

## 2022-06-14 NOTE — H&P ADULT - PROBLEM SELECTOR PLAN 9
- Nocturnal CPAP with home machine after verified during the day Complex Repair And Rotation Flap Text: The defect edges were debeveled with a #15 scalpel blade.  The primary defect was closed partially with a complex linear closure.  Given the location of the remaining defect, shape of the defect and the proximity to free margins a rotation flap was deemed most appropriate for complete closure of the defect.  Using a sterile surgical marker, an appropriate advancement flap was drawn incorporating the defect and placing the expected incisions within the relaxed skin tension lines where possible.    The area thus outlined was incised deep to adipose tissue with a #15 scalpel blade.  The skin margins were undermined to an appropriate distance in all directions utilizing iris scissors.

## 2022-06-14 NOTE — H&P ADULT - PROBLEM SELECTOR PLAN 2
Reported as HFpEF although unclear exact etiology. Most likely 2/2 to pulmonary HTN and WHO class III. Differential includes ICM vs. NICM vs. RHF from Pulm HTN.    Plan:   - TTE to assess LV function  - Consider Advanced HF consult pending evaluation with TTE   - BNP 12,525 at OSH, now 15,334   - Trend BNP q48 hours   - Strict ins and outs   - Daily standing weights  - Replete K > 4, Mg > 2, and Phos > 3 Reported as HFpEF although unclear exact etiology. Most likely 2/2 to pulmonary HTN and WHO class III. Differential includes ICM vs. NICM vs. RHF from Pulm HTN.    Plan:   - TTE to assess LV function  - Consider Advanced HF consult pending evaluation with TTE   - BNP 12,525 at OSH, now 15,334   - Trend BNP q72 hours   - Strict ins and outs   - Daily standing weights  - Replete K > 4, Mg > 2, and Phos > 3 Reported as HFpEF although unclear exact etiology. Most likely 2/2 to pulmonary HTN and WHO class III. Differential includes ICM vs. NICM vs. RHF from Pulm HTN.    Plan:   - TTE to assess LV function  - Lasix 40mg IV q12 hours until further evaluation   - Consider Advanced HF consult pending evaluation with TTE   - BNP 12,525 at OSH, now 15,334   - Trend BNP q72 hours   - Strict ins and outs   - Daily standing weights  - Replete K > 4, Mg > 2, and Phos > 3

## 2022-06-14 NOTE — CONSULT NOTE ADULT - SUBJECTIVE AND OBJECTIVE BOX
Lung Transplant Consultation   =====================================================  HPI:  Patient is a 65 y/o F w/ pmh of pulmonary hypertension, HTN, HFpEF (echo 60% PASP 79mmhg), COPD  (8L), Afib (in digoxin, diltiazem), HLD, prior spont pneumothorax, and CKD who presents as transfer from [facility] with 1-2 weeks of fatigue, lethargy, and progressively worsening RINALDI. Over the past year, the patient has had a long standing history of worsening SOB that has been attributed to her heart failure. She has been hospitalized 4 times in the past year and has been treated as an outpatient with aggressively with diuretics and vasodilators for COPD.     At the OSH, she was noted to have mil interstitial edema. She was given lasix and was admitted to medicine for diuresis. CT scan was noted to have a RUL consolidation concerning for active infection. She was started on Ceftriaxone. Pulmonary, Cardiology and Nephrology were consulted. She was found to have worsening hypoxia to the 70s then transitioned to HFNC. The Cardiology team at that point recommended transfer to [facility] and was accepted by Dr. Archer.  (2022 03:38)      PAST MEDICAL & SURGICAL HISTORY:  COPD exacerbation      Severe pulmonary hypertension      Chronic kidney disease, unspecified CKD stage      Acute on chronic diastolic congestive heart failure      Pulmonary embolism      Anxiety and depression      GERD (gastroesophageal reflux disease)      Obstructive sleep apnea      Chronic atrial fibrillation      H/O pneumonectomy      Right leg weakness        Home Meds: Home Medications:  ambrisentan 10 mg oral tablet: 1 tab(s) orally once a day (2022 03:27)  apixaban 5 mg oral tablet: 1 tab(s) orally 2 times a day (2022 03:26)  buPROPion 100 mg oral tablet: 1 tab(s) orally 2 times a day (2022 03:31)  digoxin 125 mcg (0.125 mg) oral tablet: 1 tab(s) orally every other day (at bedtime) (2022 03:28)  dilTIAZem 120 mg/24 hours oral capsule, extended release: 1 cap(s) orally once a day (2022 03:31)  pantoprazole 40 mg oral delayed release tablet: 1 tab(s) orally once a day (2022 03:27)  rosuvastatin 5 mg oral tablet: 1 tab(s) orally once a day (2022 03:27)  spironolactone 50 mg oral tablet: 1 tab(s) orally once a day (2022 03:29)  tadalafil 20 mg oral tablet: 2 tab(s) orally once a day (:29)  torsemide 20 mg oral tablet: 2 tab(s) orally once a day (2022 03:30)    Allergies: Allergies    nitrates (Unknown)    Intolerances      Soc:   Advanced Directives: Presumed Full Code     ROS:    REVIEW OF SYSTEMS    [x] A ten-point review of systems was otherwise negative except as noted.  [ ] Due to altered mental status/intubation, subjective information were not able to be obtained from the patient. History was obtained, to the extent possible, from review of the chart and collateral sources of information.      CURRENT MEDICATIONS:   --------------------------------------------------------------------------------------  Neurologic Medications  acetaminophen     Tablet .. 650 milliGRAM(s) Oral every 6 hours PRN Temp greater or equal to 38C (100.4F), Mild Pain (1 - 3), Moderate Pain (4 - 6)  buPROPion XL (24-Hour) . 150 milliGRAM(s) Oral daily    Respiratory Medications  albuterol/ipratropium for Nebulization 3 milliLiter(s) Nebulizer every 6 hours  budesonide 160 MICROgram(s)/formoterol 4.5 MICROgram(s) Inhaler 2 Puff(s) Inhalation two times a day  tiotropium 2.5 MICROgram(s)/olodaterol 2.5 MICROgram(s) (STIOLTO) Inhaler 2 Puff(s) Inhalation daily    Cardiovascular Medications  buMETAnide Infusion 1 mG/Hr IV Continuous <Continuous>  digoxin     Tablet 125 MICROGram(s) Oral daily  diltiazem    milliGRAM(s) Oral daily  spironolactone 50 milliGRAM(s) Oral daily  tadalafil 40 milliGRAM(s) Oral daily    Gastrointestinal Medications  pantoprazole    Tablet 40 milliGRAM(s) Oral before breakfast    Genitourinary Medications    Hematologic/Oncologic Medications  apixaban 5 milliGRAM(s) Oral two times a day    Antimicrobial/Immunologic Medications    Endocrine/Metabolic Medications  rosuvastatin 5 milliGRAM(s) Oral at bedtime    Topical/Other Medications  chlorhexidine 2% Cloths 1 Application(s) Topical <User Schedule>    --------------------------------------------------------------------------------------    VITAL SIGNS, INS/OUTS (last 24 hours):  --------------------------------------------------------------------------------------  ICU Vital Signs Last 24 Hrs  T(C): 36.4 (2022 15:59), Max: 36.6 (2022 08:21)  T(F): 97.6 (2022 15:59), Max: 97.8 (2022 08:21)  HR: 75 (2022 15:59) (71 - 81)  BP: 101/52 (2022 15:59) (101/52 - 120/69)  BP(mean): --  ABP: --  ABP(mean): --  RR: 20 (2022 15:59) (18 - 22)  SpO2: 99% (2022 15:59) (92% - 100%)    I&O's Summary    2022 07:01  -  2022 07:00  --------------------------------------------------------  IN: 240 mL / OUT: 400 mL / NET: -160 mL    2022 07:01  -  2022 17:27  --------------------------------------------------------  IN: 480 mL / OUT: 300 mL / NET: 180 mL      --------------------------------------------------------------------------------------    EXAM:  General/Neuro  Exam: Normal, NAD, alert, oriented x 3, no focal deficits.     Respiratory  Exam: Lungs diminished BS BL auscultation, Normal expansion/effort.      Cardiovascular  Exam: S1, S2.  Regular rate and rhythm.  +++ Peripheral edema   Cardiac Rhythm: Normal Sinus Rhythm    GI  Exam: Abdomen soft, Non-tender, Non-distended.      Extremities  Exam: Extremities warm, pink, well-perfused.      Derm:  Exam: Good skin turgor, no skin breakdown.      :   Exam: voiding    LABS  --------------------------------------------------------------------------------------  Labs:  CAPILLARY BLOOD GLUCOSE      POCT Blood Glucose.: 114 mg/dL (2022 07:22)                          7.4    8.44  )-----------( 205      ( 2022 02:45 )             24.5         06-14    133<L>  |  94<L>  |  63<H>  ----------------------------<  115<H>  3.9   |  27  |  1.29      Calcium, Total Serum: 9.3 mg/dL (22 @ 02:45)      LFTs:             7.0  | 0.3  | 14       ------------------[105     ( 2022 02:45 )  3.8  | x    | 10          Lipase:x      Amylase:x         Lactate, Blood: 0.9 mmol/L (22 @ 02:45)    ABG - ( 2022 02:39 )  pH: 7.44  /  pCO2: 44    /  pO2: 178   / HCO3: 30    / Base Excess: 5.0   /  SaO2: 97.6              Coags:     27.0   ----< 2.31    ( 2022 02:45 )     31.3        Serum Pro-Brain Natriuretic Peptide: 72190 pg/mL (22 @ 02:45)      Urinalysis Basic - ( 2022 02:31 )    Color: Light Yellow / Appearance: Clear / S.011 / pH: x  Gluc: x / Ketone: Negative  / Bili: Negative / Urobili: Negative   Blood: x / Protein: Negative / Nitrite: Negative   Leuk Esterase: Negative / RBC: x / WBC x   Sq Epi: x / Non Sq Epi: x / Bacteria: x          --------------------------------------------------------------------------------------    OTHER LABS    IMAGING RESULTS

## 2022-06-14 NOTE — PROGRESS NOTE ADULT - PROBLEM SELECTOR PLAN 4
H/H 7.4/24.5 i/s/o chronic kidney disease, AOCD. R/o occult bleed   - Anemia panel - ferritin, TIBC, Transferrin  - FOBT H/H 7.4/24.5 i/s/o chronic kidney disease, AOCD. R/o occult bleed   - Anemia panel - ferritin, TIBC, Transferrin  - FOBT  -  transfusion if Hb < 7  - Maintain active type and screen.

## 2022-06-14 NOTE — H&P ADULT - PROBLEM SELECTOR PLAN 4
RUL consolidation on OSH CT scan.   - C/w ceftriaxone 1g qdaily   - Start azithromycin 500mg qdaily for 3 days for COPD and atypical coverage   - Unknown if cultures obtained   - Afebrile non-toxic, w/o leukocytosis   - Will culture if febrile RUL consolidation on OSH CT scan.   - C/w ceftriaxone 1g qdaily (06/14 - 06/17)   - Start azithromycin 500mg once, 250mg for 4 days for COPD and atypical coverage ( 6/14 - 6/19)   - Unknown if cultures obtained, will not culture further   - Afebrile non-toxic, w/o leukocytosis   - Will culture if febrile

## 2022-06-14 NOTE — H&P ADULT - NSHPLABSRESULTS_GEN_ALL_CORE
7.4    8.44  )-----------( 205      ( 14 Jun 2022 02:45 )             24.5     06-14-22 @ 02:45    133<L>  |  94<L>  |  63<H>             --------------------------< 115<H>     3.9  |  27  | 1.29    eGFR AA: --  eGFR N-AA: --    Calcium: 9.3  Phosphorus: 3.2  Magnesium: 1.7    AST: 14    ALT: 10  AlkPhos: 105  Protein: 7.0  Albumin: 3.8  TBili: 0.3  D-Bili: --

## 2022-06-14 NOTE — PROGRESS NOTE ADULT - PROBLEM SELECTOR PLAN 5
RUL consolidation on OSH CT scan.   - C/w ceftriaxone 1g qdaily (06/14 - 06/17)   - Start azithromycin 500mg once, 250mg for 4 days for COPD and atypical coverage ( 6/14 - 6/19)   - Unknown if cultures obtained, will not culture further   - Afebrile non-toxic, w/o leukocytosis   - Will culture if febrile.  - CXR ordered 6/14 AM

## 2022-06-14 NOTE — PROGRESS NOTE ADULT - PROBLEM SELECTOR PLAN 4
RUL consolidation on OSH CT scan.   - C/w ceftriaxone 1g qdaily (06/14 - 06/17)   - Start azithromycin 500mg once, 250mg for 4 days for COPD and atypical coverage ( 6/14 - 6/19)   - Unknown if cultures obtained, will not culture further   - Afebrile non-toxic, w/o leukocytosis   - Will culture if febrile RUL consolidation on OSH CT scan.   - WIll obtain OSH records  - F/u Procalcitonin  - C/w ceftriaxone 1g qdaily (06/14 - 06/17)   - Start azithromycin 500mg once, 250mg for 4 days for COPD and atypical coverage ( 6/14 - 6/19)   - Unknown if cultures obtained, will not culture further   - Afebrile non-toxic, w/o leukocytosis   - Will culture if febrile

## 2022-06-14 NOTE — H&P ADULT - PROBLEM SELECTOR PLAN 11
DVT/PE ppx: Eliquis 5mg BID   Diet: DASH/TLC  Code: Full   Dispo pending medical stabilization DVT/PE ppx: Eliquis 5mg BID   Diet: DASH/TLC  Code: Full   Dispo: pending medical stabilization

## 2022-06-14 NOTE — H&P ADULT - NSHPREVIEWOFSYSTEMS_GEN_ALL_CORE
REVIEW OF SYSTEMS:  CONSTITUTIONAL: No weakness, fevers or chills, + fatigue   EYES/ENT: No visual changes;  No vertigo or throat pain   NECK: No pain or stiffness  RESPIRATORY: No cough, wheezing, hemoptysis; + shortness of breath  CARDIOVASCULAR: No chest pain or palpitations  GASTROINTESTINAL: No abdominal or epigastric pain. No nausea, vomiting, or hematemesis; No diarrhea or constipation. No melena or hematochezia.  GENITOURINARY: No dysuria, frequency or hematuria  NEUROLOGICAL: No numbness or weakness  SKIN: No itching, burning, rashes, or lesions   All other review of systems is negative unless indicated above.

## 2022-06-14 NOTE — PROGRESS NOTE ADULT - PROBLEM SELECTOR PLAN 12
DVT/PE ppx: Eliquis 5mg BID   Diet: DASH/TLC  Code: Full   Dispo: pending medical stabilization.  B/l LE tenderness - order b/L VA duplex US

## 2022-06-14 NOTE — CONSULT NOTE ADULT - ASSESSMENT
This is a 65yo Female with PMHx of pHTN, HTN, HFpEF (EF 60%), COPD on baseline 8L O2 at home, Afib on Digoxin and Diltiazem, HLD, prior hx of spontaneous pneumothorax, and CKD, who presented as a transfer from [place] for 1-2 weeks of fatigue, lethargy, progressive dyspnea on exertion.     1. Acute on Chronic HFpEF  - Currently on Lasix 40mg IV BID. Exam with elevated JVD, lower extremity edema, crackles at bases. BNP 15k  - Please order CXR  - Recommend Lasix gtt at 10mg/hr  - Strict I+Os, monitor on Telemetry   - Keep K > 4, Mag > 2    2. Severe Pulmonary HTN (Group 1, 2, 3)  - Continue Tadalafil 40mg daily     3. Chronic Afib  - Continue Digoxin, Diltiazem      Mahin Valenzuela MD  Cardiology Fellow - PGY 4  Text or Call: [phone]  For all New Consults and Questions:  [email or web address]   Login: [employer]

## 2022-06-14 NOTE — PROGRESS NOTE ADULT - PROBLEM SELECTOR PLAN 3
Severe advanced pulmonary hypertension with advanced COPD with potentially superimposed heart failure. Home on 8L high concentrate = 4L in hospital. Per patient saturates in the high 80s.     Plan;   - Pulmonary consult in the am   - C/w home tadalafil 40mg qdaily   - Will require pulm team to order ambrisentan 10mg home medication   - Possible outpatient lung transplant workup  - No colonoscopy or mammogram in 10 years.

## 2022-06-14 NOTE — PROGRESS NOTE ADULT - ATTENDING COMMENTS
66F w/ h/o pulmonary hypertension, HTN, HFpEF (echo 60% PASP 85), COPD  (home on 8L), chronic Afib , HLD,  CKD presenting with worsening volume overload and acute hypoxemic respiratory failure c/w acute on chronic HF + PNA.  #Acute hypoxemic respiratory failure - multifactorial from HF, pulm HTN, PNA. Wean HFNC, complete Abx for 5 day course. Diuresis.    #Acute on chronic HF - Cards following, Bumex gtt, tele  #Pulm HTN (groups 1-3) - continue home meds, pulm evaluating for transplant  #Chronic Afib - continue eliquis  #Anemia - trend hgb  #COPD/ROLANDA - AVAPS

## 2022-06-14 NOTE — CONSULT NOTE ADULT - ASSESSMENT
Victoria Domingo is a 66 year old female w/pulmonary HTN, HTN, HFpEF, COPD (on 8 L at home), afib, HLD, prior spontaneous pneumothorax, and CKD who presents to [facility] as a transfer from [facility] with reported 1-2 weeks of fatigue, lethargy, and progressively worsening shortness of breath.  She has reportedly been hospitalized 4 times in the past year.  Per chart review, she was initially diuresed at OSH and started on ceftriaxone as CT scan showed RUL consolidation concerning for pneumonia.  She was requiring HFNC to maintain normal O2 saturations and was subsequently transferred to [facility] for further management.      #Acute hypoxic respiratory failure  - etiology likely multifactorial and related to volume overload given physical exam findings, significantly elevated BNP  - patient afebrile, without white count, but previously treated at OSH with ceftriaxone given concern for pneumonia    #Pulm HTN  - patient w/multiple reported hospitalizations in past year requiring diuresis, also states she has had previous RHC  - reportedly on ambresentan and tadalafil at home, on tadalafil while inaptient   - currently diuresed with lasix 40 mg IV BID   - heart failure following    #COPD  - patient reportedly on 8 L at home, currently requiring HFNC to maintain normal O2 saturations but presentation not consistent with exacerbation    Recommendations:  - agree w/heart failure consult  - please confirm patient on ambrisentan at home, if so patient will likely have to bring in medication to be continued as inpatient (would discuss with pharmacy)  - please obtain outside RHC records as well as any records pertaining to prior treatment of pulmonary HTN including previous therapies utilized, patient will possibly need repeat RHC this admission but will require more aggressive diuresis first as below  - would diurese aggressively to net negative at least 1 L daily for the next several days, please maintain strict Is/Os while diuresing  - obtain TTE  - check pro-calcitonin, obtain CT scan from OSH as there was reportedly consolidation on this scan  - please provide and encourage use of incentive spirometry while inpatinet  - ensure patient has PT/OT  - f/u any pulmonary transplant recommendations    Note incomplete, to be discussed w/attending    Landry Knott PGY4  [id number] Victoria Domingo is a 66 year old female w/pulmonary HTN, HTN, HFpEF, COPD (on 8 L at home), afib, HLD, prior spontaneous pneumothorax, and CKD who presents to [facility] as a transfer from [facility] with reported 1-2 weeks of fatigue, lethargy, and progressively worsening shortness of breath.  She has reportedly been hospitalized 4 times in the past year.  Per chart review, she was initially diuresed at OSH and started on ceftriaxone as CT scan showed RUL consolidation concerning for pneumonia.  She was requiring HFNC to maintain normal O2 saturations and was subsequently transferred to [facility] for further management.      #Acute hypoxic respiratory failure  - etiology likely multifactorial and related to volume overload given physical exam findings, significantly elevated BNP  - patient afebrile, without white count, but previously treated at OSH with ceftriaxone given concern for pneumonia    #Pulm HTN  - patient w/multiple reported hospitalizations in past year requiring diuresis, also states she has had previous RHC  - reportedly on ambresentan and tadalafil at home, on tadalafil while inaptient   - currently diuresed with lasix 40 mg IV BID   - heart failure following    #COPD  - patient reportedly on 8 L at home, currently requiring HFNC to maintain normal O2 saturations but presentation not consistent with exacerbation    Recommendations:  - agree w/heart failure consult  - please confirm patient on ambrisentan at home, if so patient will likely have to bring in medication as ideally this would continued as inpatient (would discuss with pharmacy)  - please obtain outside RHC records as well as any records pertaining to prior treatment of pulmonary HTN including previous therapies utilized, patient will possibly need repeat RHC this admission but will require more aggressive diuresis first as below  - would diurese aggressively to net negative at least 1 L daily for the next several days, please maintain strict Is/Os while diuresing  - obtain TTE  - check pro-calcitonin, obtain CT scan from OSH as there was reportedly consolidation on this scan  - please provide and encourage use of incentive spirometry while inpatinet  - ensure patient has PT/OT  - f/u any pulmonary transplant recommendations    Patient discussed w/Dr. Vince Knott PGY4  [id number] Victoria Domingo is a 66 year old female w/pulmonary HTN, HTN, HFpEF, COPD (on 8 L at home), afib, HLD, prior spontaneous pneumothorax, and CKD who presents to [facility] as a transfer from [facility] with reported 1-2 weeks of fatigue, lethargy, and progressively worsening shortness of breath.  She has reportedly been hospitalized 4 times in the past year.  Per chart review, she was initially diuresed at OSH and started on ceftriaxone as CT scan showed RUL consolidation concerning for pneumonia.  She was requiring HFNC to maintain normal O2 saturations and was subsequently transferred to [facility] for further management.      #Acute hypoxic respiratory failure  - etiology likely multifactorial and related to volume overload given physical exam findings, significantly elevated BNP  - patient afebrile, without white count, but previously treated at OSH with ceftriaxone given concern for pneumonia    #Pulm HTN  - patient w/multiple reported hospitalizations in past year requiring diuresis, also states she has had previous RHC  - reportedly on ambresentan and tadalafil at home, on tadalafil while inaptient   - currently diuresed with lasix 40 mg IV BID   - heart failure following    #COPD  - patient reportedly on 8 L at home, currently requiring HFNC to maintain normal O2 saturations but presentation not consistent with exacerbation    Recommendations:  - agree w/heart failure consult  - continue empiric antibiotics for total of 5 days of therapy   - please confirm patient on ambrisentan at home, if so patient will likely have to bring in medication as ideally this would continued as inpatient (would discuss with pharmacy)  - please obtain outside RHC records as well as any records pertaining to prior treatment of pulmonary HTN including previous therapies utilized, patient will possibly need repeat RHC this admission but will require more aggressive diuresis first as below  - would diurese aggressively to net negative at least 1 L daily for the next several days, please maintain strict Is/Os while diuresing  - obtain TTE w/bubble study   - please order patient's home AVAPs for night time use and ensure AVAPs initiated at night   - check pro-calcitonin, upload CT scan from OSH as there was reportedly consolidation on this scan, patient will need repeat CT scan when more euvolemic  - please provide and encourage use of incentive spirometry while inpatinet  - ensure patient has PT/OT  - f/u any pulmonary transplant recommendations    Patient discussed w/Dr. Vince Knott PGY4  [id number]

## 2022-06-14 NOTE — PROVIDER CONTACT NOTE (OTHER) - ASSESSMENT
Pt alert and oriented x 4. VSS. Asymptomatic. Bumex gtt continues and tolerated well. Strict I&O's maintained.

## 2022-06-14 NOTE — PROGRESS NOTE ADULT - SUBJECTIVE AND OBJECTIVE BOX
DATE OF SERVICE: 22 @ 07:13    Patient is a 66y old  Female who presents with a chief complaint of     SUBJECTIVE / OVERNIGHT EVENTS: NAEO.  Patient afebrile o/n. Patient feels     MEDICATIONS  (STANDING):  albuterol/ipratropium for Nebulization 3 milliLiter(s) Nebulizer every 6 hours  apixaban 5 milliGRAM(s) Oral two times a day  budesonide 160 MICROgram(s)/formoterol 4.5 MICROgram(s) Inhaler 2 Puff(s) Inhalation two times a day  buPROPion XL (24-Hour) . 150 milliGRAM(s) Oral daily  digoxin     Tablet 125 MICROGram(s) Oral daily  diltiazem    milliGRAM(s) Oral daily  furosemide   Injectable 40 milliGRAM(s) IV Push two times a day  pantoprazole    Tablet 40 milliGRAM(s) Oral before breakfast  rosuvastatin 5 milliGRAM(s) Oral at bedtime  spironolactone 50 milliGRAM(s) Oral daily  tadalafil 40 milliGRAM(s) Oral daily  tiotropium 2.5 MICROgram(s)/olodaterol 2.5 MICROgram(s) (STIOLTO) Inhaler 2 Puff(s) Inhalation daily    MEDICATIONS  (PRN):  acetaminophen     Tablet .. 650 milliGRAM(s) Oral every 6 hours PRN Temp greater or equal to 38C (100.4F), Mild Pain (1 - 3), Moderate Pain (4 - 6)      Vital Signs Last 24 Hrs  T(C): 36.1 (2022 04:18), Max: 36.4 (2022 00:20)  T(F): 97 (2022 04:18), Max: 97.5 (2022 00:20)  HR: 71 (2022 04:18) (71 - 78)  BP: 112/75 (2022 04:18) (112/75 - 120/69)  BP(mean): --  RR: 21 (2022 04:18) (18 - 22)  SpO2: 94% (2022 04:18) (92% - 100%)  CAPILLARY BLOOD GLUCOSE        I&O's Summary    2022 07:01  -  2022 07:00  --------------------------------------------------------  IN: 240 mL / OUT: 400 mL / NET: -160 mL        PHYSICAL EXAM:  GENERAL: NAD, lying in bed comfortably  HEAD:  Atraumatic, Normocephalic  EYES: EOMI, PERRLA, conjunctiva and sclera clear  ENT: Moist mucous membranes  NECK: Supple, + JVD  CHEST/LUNG: Bilateral decrease in breath sounds, no wheezing, rales or rhonchi. Unlabored respirations  HEART: Regular rate and rhythm; Normal s1 and s2, No murmurs, rubs, or gallops  ABDOMEN: Bowel sounds present; Soft, Nontender, Nondistended. No hepatomegaly  EXTREMITIES:  2+ Peripheral Pulses, brisk capillary refill. No clubbing, cyanosis, Non pitting edema, Scar showing where skin graft was taken   NERVOUS SYSTEM:  Alert & Oriented X3, speech clear. No deficits   MSK: FROM all 4 extremities, full and equal strength  SKIN: No rashes or lesions    LABS:                        7.4    8.44  )-----------( 205      ( 2022 02:45 )             24.5     06-14    133<L>  |  94<L>  |  63<H>  ----------------------------<  115<H>  3.9   |  27  |  1.29    Ca    9.3      2022 02:45  Phos  3.2     06-14  Mg     1.7     06-14    TPro  7.0  /  Alb  3.8  /  TBili  0.3  /  DBili  x   /  AST  14  /  ALT  10  /  AlkPhos  105  06-14    PT/INR - ( 2022 02:45 )   PT: 27.0 sec;   INR: 2.31 ratio         PTT - ( 2022 02:45 )  PTT:31.3 sec      Urinalysis Basic - ( 2022 02:31 )    Color: Light Yellow / Appearance: Clear / S.011 / pH: x  Gluc: x / Ketone: Negative  / Bili: Negative / Urobili: Negative   Blood: x / Protein: Negative / Nitrite: Negative   Leuk Esterase: Negative / RBC: x / WBC x   Sq Epi: x / Non Sq Epi: x / Bacteria: x        RADIOLOGY & ADDITIONAL TESTS:    Imaging Personally Reviewed:    Consultant(s) Notes Reviewed:      Care Discussed with Consultants/Other Providers:   DATE OF SERVICE: 22 @ 07:13    Patient is a 66y old  Female who presents with a chief complaint of     SUBJECTIVE / OVERNIGHT EVENTS: NAEO.  Patient afebrile o/n. Patient feels slightly better this morning on HFNC. Patient says she feels a little tired. Denies CP, SOB, fevers/chills, N/V/D    MEDICATIONS  (STANDING):  albuterol/ipratropium for Nebulization 3 milliLiter(s) Nebulizer every 6 hours  apixaban 5 milliGRAM(s) Oral two times a day  budesonide 160 MICROgram(s)/formoterol 4.5 MICROgram(s) Inhaler 2 Puff(s) Inhalation two times a day  buPROPion XL (24-Hour) . 150 milliGRAM(s) Oral daily  digoxin     Tablet 125 MICROGram(s) Oral daily  diltiazem    milliGRAM(s) Oral daily  furosemide   Injectable 40 milliGRAM(s) IV Push two times a day  pantoprazole    Tablet 40 milliGRAM(s) Oral before breakfast  rosuvastatin 5 milliGRAM(s) Oral at bedtime  spironolactone 50 milliGRAM(s) Oral daily  tadalafil 40 milliGRAM(s) Oral daily  tiotropium 2.5 MICROgram(s)/olodaterol 2.5 MICROgram(s) (STIOLTO) Inhaler 2 Puff(s) Inhalation daily    MEDICATIONS  (PRN):  acetaminophen     Tablet .. 650 milliGRAM(s) Oral every 6 hours PRN Temp greater or equal to 38C (100.4F), Mild Pain (1 - 3), Moderate Pain (4 - 6)      Vital Signs Last 24 Hrs  T(C): 36.1 (2022 04:18), Max: 36.4 (2022 00:20)  T(F): 97 (2022 04:18), Max: 97.5 (2022 00:20)  HR: 71 (2022 04:18) (71 - 78)  BP: 112/75 (2022 04:18) (112/75 - 120/69)  BP(mean): --  RR: 21 (2022 04:18) (18 - 22)  SpO2: 94% (2022 04:18) (92% - 100%)  CAPILLARY BLOOD GLUCOSE        I&O's Summary    2022 07:01  -  2022 07:00  --------------------------------------------------------  IN: 240 mL / OUT: 400 mL / NET: -160 mL        PHYSICAL EXAM:  GENERAL: NAD, lying in bed comfortably  HEAD:  Atraumatic, Normocephalic  EYES: EOMI, PERRLA, conjunctiva and sclera clear  ENT: Moist mucous membranes  NECK: Supple, + JVD  CHEST/LUNG: Bilateral decrease in breath sounds, no wheezing, rales or rhonchi. Unlabored respirations  HEART: Regular rate and rhythm; Normal s1 and s2, No murmurs, rubs, or gallops  ABDOMEN: Bowel sounds present; Soft, Nontender, Nondistended. No hepatomegaly  EXTREMITIES:  2+ Peripheral Pulses, brisk capillary refill. No clubbing, cyanosis, Non pitting edema, Scar showing where skin graft was taken   NERVOUS SYSTEM:  Alert & Oriented X3, speech clear. No deficits   MSK: FROM all 4 extremities, full and equal strength  SKIN: No rashes or lesions    LABS:                        7.4    8.44  )-----------( 205      ( 2022 02:45 )             24.5     06-14    133<L>  |  94<L>  |  63<H>  ----------------------------<  115<H>  3.9   |  27  |  1.29    Ca    9.3      2022 02:45  Phos  3.2     06-14  Mg     1.7     06-14    TPro  7.0  /  Alb  3.8  /  TBili  0.3  /  DBili  x   /  AST  14  /  ALT  10  /  AlkPhos  105  06-14    PT/INR - ( 2022 02:45 )   PT: 27.0 sec;   INR: 2.31 ratio         PTT - ( 2022 02:45 )  PTT:31.3 sec      Urinalysis Basic - ( 2022 02:31 )    Color: Light Yellow / Appearance: Clear / S.011 / pH: x  Gluc: x / Ketone: Negative  / Bili: Negative / Urobili: Negative   Blood: x / Protein: Negative / Nitrite: Negative   Leuk Esterase: Negative / RBC: x / WBC x   Sq Epi: x / Non Sq Epi: x / Bacteria: x        RADIOLOGY & ADDITIONAL TESTS:    Imaging Personally Reviewed:    Consultant(s) Notes Reviewed:      Care Discussed with Consultants/Other Providers:   DATE OF SERVICE: 22 @ 07:13    Patient is a 66y old  Female who presents with a chief complaint of     SUBJECTIVE / OVERNIGHT EVENTS: NAEO.  Patient afebrile o/n. Patient feels slightly better this morning on HFNC. Patient says she feels a little tired. Denies CP, SOB, fevers/chills, N/V/D    MEDICATIONS  (STANDING):  albuterol/ipratropium for Nebulization 3 milliLiter(s) Nebulizer every 6 hours  apixaban 5 milliGRAM(s) Oral two times a day  budesonide 160 MICROgram(s)/formoterol 4.5 MICROgram(s) Inhaler 2 Puff(s) Inhalation two times a day  buPROPion XL (24-Hour) . 150 milliGRAM(s) Oral daily  digoxin     Tablet 125 MICROGram(s) Oral daily  diltiazem    milliGRAM(s) Oral daily  furosemide   Injectable 40 milliGRAM(s) IV Push two times a day  pantoprazole    Tablet 40 milliGRAM(s) Oral before breakfast  rosuvastatin 5 milliGRAM(s) Oral at bedtime  spironolactone 50 milliGRAM(s) Oral daily  tadalafil 40 milliGRAM(s) Oral daily  tiotropium 2.5 MICROgram(s)/olodaterol 2.5 MICROgram(s) (STIOLTO) Inhaler 2 Puff(s) Inhalation daily    MEDICATIONS  (PRN):  acetaminophen     Tablet .. 650 milliGRAM(s) Oral every 6 hours PRN Temp greater or equal to 38C (100.4F), Mild Pain (1 - 3), Moderate Pain (4 - 6)      Vital Signs Last 24 Hrs  T(C): 36.1 (2022 04:18), Max: 36.4 (2022 00:20)  T(F): 97 (2022 04:18), Max: 97.5 (2022 00:20)  HR: 71 (2022 04:18) (71 - 78)  BP: 112/75 (2022 04:18) (112/75 - 120/69)  BP(mean): --  RR: 21 (2022 04:18) (18 - 22)  SpO2: 94% (2022 04:18) (92% - 100%)  CAPILLARY BLOOD GLUCOSE        I&O's Summary    2022 07:01  -  2022 07:00  --------------------------------------------------------  IN: 240 mL / OUT: 400 mL / NET: -160 mL        PHYSICAL EXAM:  GENERAL: NAD, lying in bed comfortably  HEAD:  Atraumatic, Normocephalic  EYES: EOMI, PERRLA, conjunctiva and sclera clear  ENT: Moist mucous membranes  NECK: Supple, + JVD  CHEST/LUNG: Bilateral decrease in breath sounds, no wheezing, rales or rhonchi. Unlabored respirations  HEART: Irregularly irregular rate and rhythm; Normal s1 and s2, No murmurs, rubs, or gallops  ABDOMEN: Bowel sounds present; Soft, Nontender, Nondistended. No hepatomegaly  EXTREMITIES:  2+ Peripheral Pulses, brisk capillary refill. No clubbing, cyanosis, Non pitting edema, Scar showing where skin graft was taken   NERVOUS SYSTEM:  Alert & Oriented X3, speech clear. No deficits   MSK: FROM all 4 extremities, full and equal strength  SKIN: No rashes or lesions    LABS:                        7.4    8.44  )-----------( 205      ( 2022 02:45 )             24.5     06-14    133<L>  |  94<L>  |  63<H>  ----------------------------<  115<H>  3.9   |  27  |  1.29    Ca    9.3      2022 02:45  Phos  3.2     06-14  Mg     1.7     06-14    TPro  7.0  /  Alb  3.8  /  TBili  0.3  /  DBili  x   /  AST  14  /  ALT  10  /  AlkPhos  105  06-14    PT/INR - ( 2022 02:45 )   PT: 27.0 sec;   INR: 2.31 ratio         PTT - ( 2022 02:45 )  PTT:31.3 sec      Urinalysis Basic - ( 2022 02:31 )    Color: Light Yellow / Appearance: Clear / S.011 / pH: x  Gluc: x / Ketone: Negative  / Bili: Negative / Urobili: Negative   Blood: x / Protein: Negative / Nitrite: Negative   Leuk Esterase: Negative / RBC: x / WBC x   Sq Epi: x / Non Sq Epi: x / Bacteria: x        RADIOLOGY & ADDITIONAL TESTS:    Imaging Personally Reviewed:    Consultant(s) Notes Reviewed:      Care Discussed with Consultants/Other Providers:

## 2022-06-14 NOTE — PROGRESS NOTE ADULT - SUBJECTIVE AND OBJECTIVE BOX
Alejandro Roth    Medical Student  Please see "resident assignment" column on [facility] for contact and coverage info  ___________________________________________________________________________________________________      ZANDER JEAN-BAPTISTE 66y Female    Overnight events/subjective: No acute overnight events. Nurse reports SpO2 . Patient seen and examined at bedside. Pt rreports improvement with her breathing on HFNC. Denies CP, SOB, Cough, or hemoptysis. Pt complains of b/l LE pain and tenderness from her feet to few inches about her knees. The pain is chronic and denies acute exacerbation. She denies fever, chills, chest pain, shortness of breath, abdominal pain, nausea, vomiting, changes in bowel habits, or urinary symptoms.    Vital Signs Last 24 Hrs  T(C): 36.6 (2022 08:21), Max: 36.6 (2022 08:21)  T(F): 97.8 (2022 08:21), Max: 97.8 (2022 08:21)  HR: 78 (2022 08:21) (71 - 78)  BP: 114/64 (2022 08:21) (112/75 - 120/69)  BP(mean): --  RR: 20 (2022 08:21) (18 - 22)  SpO2: 93% (2022 08:21) (92% - 100%)    PHYSICAL EXAM:  GENERAL: no acute distress, laying comfortably at 30-45 degree incline.   HEENT - atraumatic, normocephalic, pupils equal and reactive to light; extraocular muscles intact  CV: + rhythym irregularly irregular; normal S1/S2; + grade II-III systolic ejec murmur, otherwise no rubs, or gallops, No JVD, No Hepatojuglar reflux  PULM: clear to auscultation bilaterally; no rales, rhonchi, wheezing  ABDOMEN: soft, nontender, nondistended; bowel sounds present  MSK: extremities atraumatic; ny cyanosis or clubbing  SKIN: +cool, dry, no rashes or lesions  NEURO:  no focal deficits, sensory and motor grossly intact  PSYCH: alert and oriented x3; appropriate mood and affect  EXT: limited due to tenderness. B/L swelling unable to illicit pitting vs nonpitting due to pain.     HOSPITAL MEDICATIONS:  MEDICATIONS  (STANDING):  albuterol/ipratropium for Nebulization 3 milliLiter(s) Nebulizer every 6 hours  apixaban 5 milliGRAM(s) Oral two times a day  budesonide 160 MICROgram(s)/formoterol 4.5 MICROgram(s) Inhaler 2 Puff(s) Inhalation two times a day  buPROPion XL (24-Hour) . 150 milliGRAM(s) Oral daily  digoxin     Tablet 125 MICROGram(s) Oral daily  diltiazem    milliGRAM(s) Oral daily  furosemide   Injectable 40 milliGRAM(s) IV Push two times a day  pantoprazole    Tablet 40 milliGRAM(s) Oral before breakfast  rosuvastatin 5 milliGRAM(s) Oral at bedtime  spironolactone 50 milliGRAM(s) Oral daily  tadalafil 40 milliGRAM(s) Oral daily  tiotropium 2.5 MICROgram(s)/olodaterol 2.5 MICROgram(s) (STIOLTO) Inhaler 2 Puff(s) Inhalation daily    MEDICATIONS  (PRN):  acetaminophen     Tablet .. 650 milliGRAM(s) Oral every 6 hours PRN Temp greater or equal to 38C (100.4F), Mild Pain (1 - 3), Moderate Pain (4 - 6)      LABS:                        7.4    8.44  )-----------( 205      ( 2022 02:45 )             24.5     06-14    133<L>  |  94<L>  |  63<H>  ----------------------------<  115<H>  3.9   |  27  |  1.29    Ca    9.3      2022 02:45  Phos  3.2     06-14  Mg     1.7     06-14    TPro  7.0  /  Alb  3.8  /  TBili  0.3  /  DBili  x   /  AST  14  /  ALT  10  /  AlkPhos  105  06-14    PT/INR - ( 2022 02:45 )   PT: 27.0 sec;   INR: 2.31 ratio      Serum Pro-Brain Natriuretic Peptide (22 @ 02:45)    Serum Pro-Brain Natriuretic Peptide: 76739 pg/mL    Blood Gas Profile - Arterial (22 @ 02:39)    pH, Arterial: 7.44: No collection time indicated, please interpret with caution    pCO2, Arterial: 44 mmHg    pO2, Arterial: 178 mmHg    HCO3, Arterial: 30 mmol/L    Base Excess, Arterial: 5.0 mmol/L    Oxygen Saturation, Arterial: 97.6 %    Total CO2, Arterial: 31 mmol/L    FIO2, Arterial: 100    Blood Gas Source Arterial: Arterial    Urinalysis (22 @ 02:31)    Glucose Qualitative, Urine: Negative    Blood, Urine: Negative    pH Urine: 5.5    Color: Light Yellow    Urine Appearance: Clear    Bilirubin: Negative    Ketone - Urine: Negative    Specific Gravity: 1.011    Protein, Urine: Negative    Urobilinogen: Negative    Nitrite: Negative    Leukocyte Esterase Concentration: Negative         PTT - ( 2022 02:45 )  PTT:31.3 sec  Urinalysis Basic - ( 2022 02:31 )    Color: Light Yellow / Appearance: Clear / S.011 / pH: x  Gluc: x / Ketone: Negative  / Bili: Negative / Urobili: Negative   Blood: x / Protein: Negative / Nitrite: Negative   Leuk Esterase: Negative / RBC: x / WBC x   Sq Epi: x / Non Sq Epi: x / Bacteria: x

## 2022-06-14 NOTE — PROGRESS NOTE ADULT - PROBLEM SELECTOR PLAN 1
Most likely 2/2 to COPD and possible superimposed pneumonia vs. HFpEF vs. COPD exacerbation   - Reportedly CT with RUL consolidation     Plan:   - Abx as below  - Supplementary O2 as need to titrate to O2 saturation of 90-93%   - Avoid over oxygenation to prevent decreasing respiratory drive   - HFNC start at 40% and 40L   - ABG reviewed and oxygenation more then adequate will titrate down.

## 2022-06-14 NOTE — PROGRESS NOTE ADULT - PROBLEM SELECTOR PLAN 6
C/w home tadalafil 40mg qdaily   - Will require pulm team to order ambrisentan 10mg home medication  - TTE for further characterization of cardiac pathology.

## 2022-06-14 NOTE — H&P ADULT - PROBLEM SELECTOR PLAN 3
Severe advanced pulmonary hypertension with advanced COPD with potentially superimposed heart failure. Home on 8L high concentrate = 4L in hospital. Per patient saturates in the high 80s.     Plan;   - Pulmonary consult in the am   - C/w home tadalafil 40mg qdaily   - Will require pulm team to order ambrisentan 10mg home medication   - Possible outpatient lung transplant workup  - No colonoscopy or mammogram in 10 years

## 2022-06-14 NOTE — H&P ADULT - ASSESSMENT
Patient is a 65 y/o F w/ pmh of pulmonary hypertension, HTN, HFpEF (echo 60% PASP 79mmhg), COPD  (8L), Afib (in digoxin, diltiazem), HLD, prior spont pneumothorax, and CKD who presents as transfer from [facility] with 1-2 weeks of fatigue, lethargy, and progressively worsening RINALDI concerning for COPD vs. HFpEF. Unclear etiology of heart failure although I suspect his is likely R sided heart disease from severe COPD, per patient she underwent a RHF, but never a LHC per patient.

## 2022-06-14 NOTE — H&P ADULT - HISTORY OF PRESENT ILLNESS
Patient is a 67 y/o F w/ pmh of pulmonary hypertension, HTN, HFpEF (echo 60% PASP 79mmhg), COPD  (8L), Afib (in digoxin, diltiazem), HLD, prior spont pneumothorax, and CKD who presents as transfer from [facility] with 1-2 weeks of fatigue, lethargy, and progressively worsening RINALDI. Over the past year, the patient has had a long standing history of worsening SOB that has been attributed to her heart failure. She has been hospitalized 4 times in the past year and has been treated as an outpatient with aggressively with diuretics and vasodilators for COPD.     At the OSH, she was noted to have mil interstitial edema. She was given lasix and was admitted to medicine for diuresis. CT scan was noted to have a RUL consolidation concerning for active infection. She was started on Ceftriaxone. Pulmonary, Cardiology and Nephrology were consulted. She was found to have worsening hypoxia to the 70s then transitioned to HFNC. The Cardiology team at that point recommended transfer to [facility] and was accepted by Dr. Archer.

## 2022-06-14 NOTE — PROGRESS NOTE ADULT - PROBLEM SELECTOR PLAN 6
- C/w home tadalafil 40mg qdaily   - Will require pulm team to order ambrisentan 10mg home medication  - TTE for further characterization of cardiac pathology - C/w home tadalafil 40mg qdaily   - Will speak to pharmacy about ambrisentan 10mg home medication   - TTE for further characterization of cardiac pathology

## 2022-06-14 NOTE — PATIENT PROFILE ADULT - FALL HARM RISK - HARM RISK INTERVENTIONS
Assistance with ambulation/Assistance OOB with selected safe patient handling equipment/Communicate Risk of Fall with Harm to all staff/Monitor for mental status changes/Monitor gait and stability/Reinforce activity limits and safety measures with patient and family/Reorient to person, place and time as needed/Review medications for side effects contributing to fall risk/Sit up slowly, dangle for a short time, stand at bedside before walking/Tailored Fall Risk Interventions/Use of alarms - bed, chair and/or voice tab/Visual Cue: Yellow wristband and red socks/Bed in lowest position, wheels locked, appropriate side rails in place/Call bell, personal items and telephone in reach/Instruct patient to call for assistance before getting out of bed or chair/Non-slip footwear when patient is out of bed/Browns to call system/Physically safe environment - no spills, clutter or unnecessary equipment/Purposeful Proactive Rounding/Room/bathroom lighting operational, light cord in reach

## 2022-06-14 NOTE — H&P ADULT - PROBLEM SELECTOR PLAN 7
Home regimen: Digoxin 125mcg every other day, Diltiazem 120mg ER daily, AC with eliquis 5mg BID   - C/w home medications as above  - C/w eliquis 5mg BID   - No active bleeding hemoglobin was between 7-8 at OSH

## 2022-06-14 NOTE — CONSULT NOTE ADULT - ATTENDING COMMENTS
66 year old female with history of severe pulmonary HTN (Group 1, 2 & 3), HFpEF, Afib, COPD on home O2 (5L NC), CKD, ROLANDA on CPAP, morbid obesity ( s/p bariatric surgery in 2012 with subsequent lap band removal due to GI bleed), PE, CVA (MCA infarct in 2012) who comes in with with worsening SOB to an OSH. She was started on hi flow nasal cannula and started on diuresis. She was transferred to [facility] for consideration of lung transplant evaluation. On exam patient is massively volume overloaded but warm  Would start bumex drip at 1mg/hr, keep strict I/Os   Will obtain a RHC when closer to euvolemia  Will consult pulm to help guide further pulmonary HTN management  Obtain repeat echo  Transplant pulmonology recs appreciated
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, on ambrisentan and tadalafil), COPD on 8L, afib, CKD admitted with worsening sob and acute hypoxemic respiratory failure due to likely acute pulmonary edema    Had prior echo that showed grade III diastolic dysfunction and severe pulm htn with preserved TAPSE  Admitted to [place] on 6/10 for acute hypoxemic respiratory failure; RUL consolidation, decompensated diastolic hf; tx to [facility] for transplant eval    # acute hypoxemic respiratory failure   # acute on chronic decompensated diastolic HF  # acute on chronic RV failure  - clinically hypervolemic  - c/w HFNC  - would diurese aggressively to keep net negative  - f/u TTE here, will eventually need RHC/LHC as part of transplant eval    #COPD  - not in an exacerbation  - c/w inhalers, no need for systemic steroids  - chronic hypercapnic respiratory failure; c/w AVAPS qhs and prn (home settings:  PEEP 5 inspiratory pressure range 6-20, RR 12)    # community acquired pneumonia  - started on treatment for consolidation seen on prior CT chest at [facility] on 6/10  - would upload CT chest to our PACS here  - would do total 5 day course of ceftriaxone (started at [place])  - will eventually need repeat CT chest as part of lung transplant workup; had lymphadenopathy on prior CT that was never worked up, as per patient; if still present once she is more euvolemic, she will likely need EBUS

## 2022-06-15 NOTE — PROGRESS NOTE ADULT - PROBLEM SELECTOR PLAN 3
Severe advanced pulmonary hypertension with advanced COPD with potentially superimposed heart failure. Home on 8L high concentrate = 4L in hospital. Per patient saturates in the high 80s.     Plan;    - C/w home tadalafil 40mg qdaily   - ambrisentan 10mg not available per pharmacy, will need to be brought in   - C/w Duonebs q6  - No colonoscopy or mammogram in 10 years.

## 2022-06-15 NOTE — PROGRESS NOTE ADULT - SUBJECTIVE AND OBJECTIVE BOX
Ashley Paz MD  Cardiology Fellow  [phone]  All Cardiology service information can be found 24/7 on [email or web address], password: [email or web address]      Overnight Events: Patient is feeling better, still on high flow.     Review Of Systems: No chest pain or palpitations            Current Meds:  acetaminophen     Tablet .. 650 milliGRAM(s) Oral every 6 hours PRN  albuterol/ipratropium for Nebulization 3 milliLiter(s) Nebulizer every 6 hours  apixaban 5 milliGRAM(s) Oral two times a day  budesonide 160 MICROgram(s)/formoterol 4.5 MICROgram(s) Inhaler 2 Puff(s) Inhalation two times a day  buMETAnide IVPB 4 milliGRAM(s) IV Intermittent two times a day  buPROPion XL (24-Hour) . 150 milliGRAM(s) Oral daily  chlorhexidine 2% Cloths 1 Application(s) Topical <User Schedule>  digoxin     Tablet 125 MICROGram(s) Oral daily  diltiazem    milliGRAM(s) Oral daily  mupirocin 2% Ointment 1 Application(s) Both Nostrils two times a day  pantoprazole    Tablet 40 milliGRAM(s) Oral before breakfast  rosuvastatin 5 milliGRAM(s) Oral at bedtime  spironolactone 50 milliGRAM(s) Oral daily  tadalafil 40 milliGRAM(s) Oral daily  tiotropium 2.5 MICROgram(s)/olodaterol 2.5 MICROgram(s) (STIOLTO) Inhaler 2 Puff(s) Inhalation daily      Vitals:  T(F): 98 (06-15), Max: 98.8 (06-15)  HR: 81 (06-15) (75 - 101)  BP: 111/72 (06-15) (101/52 - 124/80)  RR: 18 (06-15)  SpO2: 92% (06-15)  I&O's Summary    14 Jun 2022 07:01  -  15 Nile 2022 07:00  --------------------------------------------------------  IN: 1130 mL / OUT: 2100 mL / NET: -970 mL    15 Nile 2022 07:01  -  15 Nile 2022 13:45  --------------------------------------------------------  IN: 360 mL / OUT: 0 mL / NET: 360 mL        Physical Exam:  Appearance: No acute distress; well appearing  Eyes: PERRL, EOMI, pink conjunctiva  HEENT: Normal oral mucosa  Cardiovascular: RRR, S1, S2, no murmurs, rubs, or gallops; no edema; no JVD  Respiratory: crackles bilaterally   Gastrointestinal: soft, non-tender, non-distended with normal bowel sounds  Musculoskeletal: 1+ pitting edema, improved.                           7.7    7.11  )-----------( 224      ( 15 Nile 2022 10:15 )             25.9     06-15    134<L>  |  92<L>  |  69<H>  ----------------------------<  94  4.4   |  29  |  1.54<H>    Ca    9.4      15 Nile 2022 03:07  Phos  3.2     06-14  Mg     1.7     06-14    TPro  7.0  /  Alb  3.8  /  TBili  0.3  /  DBili  x   /  AST  14  /  ALT  10  /  AlkPhos  105  06-14    PT/INR - ( 14 Jun 2022 02:45 )   PT: 27.0 sec;   INR: 2.31 ratio         PTT - ( 14 Jun 2022 02:45 )  PTT:31.3 sec      Serum Pro-Brain Natriuretic Peptide: 96568 pg/mL (06-14 @ 02:45)

## 2022-06-15 NOTE — PROGRESS NOTE ADULT - ASSESSMENT
Patient is a 67 y/o F w/ pmh of pulmonary hypertension, HTN, HFpEF (echo 60% PASP 79mmhg), COPD  (8L), Afib (in digoxin, diltiazem), HLD, prior spont pneumothorax, and CKD who presents as transfer from [facility] with 1-2 weeks of fatigue, lethargy, and progressively worsening RINALDI concerning for COPD vs. HFpEF. Unclear etiology of heart failure although I suspect hers is likely R sided heart disease from severe COPD, per patient she underwent a RHF, but never a LHC per patient, TTE revealed normal LV systolic fxn and severe pulm HTN

## 2022-06-15 NOTE — PROGRESS NOTE ADULT - ATTENDING COMMENTS
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, on ambrisentan and tadalafil), COPD on 8L, afib, CKD admitted with worsening sob and acute hypoxemic respiratory failure due to likely acute pulmonary edema    Feels same as yesterday.  Ambulated today but felt dyspneic.    # acute hypoxemic respiratory failure   # acute on chronic decompensated diastolic HF  # acute on chronic RV failure  # pulm htn  - clinically hypervolemic  - c/w HFNC  - would diurese aggressively to keep net negative (drip versus pushes)  - TTE reviewed, will eventually needs RHC/LHC but only once euvolemic  - c/w tadalafil, okay to hold ambrisentan    #COPD  - not in an exacerbation  - c/w inhalers, no need for systemic steroids  - chronic hypercapnic respiratory failure; c/w AVAPS qhs and prn (home settings:  PEEP 5 inspiratory pressure range 6-20, RR 12)    # community acquired pneumonia  - started on treatment for consolidation seen on prior CT chest at [facility] on 6/10; recommended giving five days but was not started on abx here  - afebrile now, monitor off abx  - will eventually need repeat CT chest as part of lung transplant workup; had lymphadenopathy on prior CT that was never worked up, as per patient; if still present once she is more euvolemic, she will likely need EBUS .

## 2022-06-15 NOTE — PROGRESS NOTE ADULT - PROBLEM SELECTOR PLAN 4
RUL consolidation on OSH CT scan.   - F/u CT scan- to be uploaded  - Procal 0.28  - C/w ceftriaxone 1g qdaily (06/14 - 06/17)   - legionella Ag negative- will d/c azithromycin   - Afebrile non-toxic, w/o leukocytosis   - Will culture if febrile RUL consolidation on OSH CT scan.   - F/u CT scan- to be uploaded  - Procal 0.28  - C/w ceftriaxone 1g qdaily (06/11 - 06/15)   - legionella Ag negative- will d/c azithromycin   - Afebrile non-toxic, w/o leukocytosis   - Will culture if febrile

## 2022-06-15 NOTE — PROGRESS NOTE ADULT - PROBLEM SELECTOR PLAN 5
Admission Cr at OSH 1.2. Admission Cr: 1.29   - Uptrending Cr likely i/s/o diuresis  - CTM Cr qdaily

## 2022-06-15 NOTE — OCCUPATIONAL THERAPY INITIAL EVALUATION ADULT - PRECAUTIONS/LIMITATIONS, REHAB EVAL
+NCHF. She has been hospitalized 4 times in the past year and has been treated as an outpatient with aggressively with diuretics and vasodilators for COPD. At the OSH, she was noted to have mil interstitial edema. She was given lasix and was admitted to medicine for diuresis. CT scan was noted to have a RUL consolidation concerning for active infection. She was started on Ceftriaxone. Pulmonary, Cardiology and Nephrology were consulted. She was found to have worsening hypoxia to the 70s then transitioned to HFNC. The Cardiology team at that point recommended transfer to [facility] and was accepted by Dr. Archer. Concerning for COPD vs. HFpEF. Unclear etiology of heart failure although I suspect hers is likely R sided heart disease from severe COPD, per patient she underwent a RHF, but never a LHC per patient, TTE revealed normal LV systolic fxn and severe pulm HTN/fall precautions/oxygen therapy device and L/min

## 2022-06-15 NOTE — PROGRESS NOTE ADULT - PROBLEM SELECTOR PLAN 2
-patient diuresing will, decrease bumex to 4mg IV BID   -may consider patient for RHC and cardiomems tomorrow. Will have [facility] ACP give patient education today

## 2022-06-15 NOTE — PROGRESS NOTE ADULT - PROBLEM SELECTOR PLAN 2
Reported as HFpEF although unclear exact etiology. Most likely 2/2 to pulmonary HTN and WHO class III. Differential includes ICM vs. NICM vs. RHF from Pulm HTN.    Plan:   - C/w Bumex gtt @1mg/hr- goal 1L net negative per day  - C/w home Spironolactone, trend cr  - BNP 12,525 at OSH, now 15,334 6/14  - Trend BNP q72 hours, next on 6/17  - Strict ins and outs, net neg 970cc 6/15  - Daily standing weights  - Replete K > 4, Mg > 2, and Phos > 3.  - Fu CXR  - TTE showed severe pulm HTN, normal LV systolic fxn  - F/u limited TTE w/ bubble study

## 2022-06-15 NOTE — PROGRESS NOTE ADULT - PROBLEM SELECTOR PLAN 1
-patient is still on Hi flow however this can likely be weaned down. Respiratory to assist to get her back to her home requirements  -Appreciate pulmonary team input  -She will eventually need consideration for lung transplant   -reportedly on ambresentan and tadalafil at home, on tadalafil while inpatient

## 2022-06-15 NOTE — PROGRESS NOTE ADULT - ATTENDING COMMENTS
66F w/ h/o pulmonary hypertension, HTN, HFpEF (echo 60% PASP 85), COPD  (home on 8L), chronic Afib , HLD,  CKD presenting with worsening volume overload and acute hypoxemic respiratory failure c/w acute on chronic HF + PNA.  #Acute hypoxemic respiratory failure - multifactorial from HF, pulm HTN, PNA. Wean HFNC, s/p abx. Diuresis with bumex gtt.    #Acute on chronic HF - Cards following, Bumex gtt with appropriate output, tele  #Pulm HTN (groups 1-3) - continue tadalafil, need to get home ambrisentan, pulm evaluating for transplant.  #Chronic Afib - continue eliquis  #Anemia - trend hgb  #COPD/ROLANDA - AVAPS . 66F w/ h/o pulmonary hypertension, HTN, HFpEF (echo 60% PASP 85), COPD  (home on 8L), chronic Afib , HLD,  CKD presenting with worsening volume overload and acute hypoxemic respiratory failure c/w acute on chronic HF + PNA.  #Acute hypoxemic respiratory failure - multifactorial from HF, pulm HTN, PNA. Wean HFNC, s/p abx. Diuresis with bumex.    #Acute on chronic HF - Cards following, transitioning bumex gtt to 4mg q12  #Pulm HTN (groups 1-3) - continue tadalafil, need to get home ambrisentan, pulm evaluating for transplant.  #Chronic Afib - continue eliquis  #Anemia - trend hgb  #COPD/ROLANDA - AVAPS . 66F w/ h/o pulmonary hypertension, HTN, HFpEF (echo 60% PASP 85), COPD  (home on 8L), chronic Afib , HLD,  CKD presenting with worsening volume overload and acute hypoxemic respiratory failure c/w acute on chronic HF + PNA.  #Acute hypoxemic respiratory failure - multifactorial from HF, pulm HTN, PNA. Wean HFNC, s/p abx. Diuresis with bumex.    #Acute on chronic HF - Cards following, transitioning bumex gtt to 4mg q12  #Pulm HTN (groups 1-3) - continue tadalafil, need to get home ambrisentan, pulm evaluating for transplant.  #Chronic Afib - continue eliquis  #Anemia - trend hgb  #COPD/ROLANDA - AVAPS, inhalers

## 2022-06-15 NOTE — PROGRESS NOTE ADULT - PROBLEM SELECTOR PLAN 6
- C/w home tadalafil 40mg qdaily   - ambrisentan 10mg not available per pharmacy, will need to be brought in   - F/u Pulm, transplant pulm

## 2022-06-15 NOTE — PROGRESS NOTE ADULT - PROBLEM SELECTOR PLAN 6
C/w home tadalafil 40mg qdaily   - ambrisentan 10mg not available per pharmacy, will need to be brought in   - F/u Pulm, transplant pulm.

## 2022-06-15 NOTE — PROGRESS NOTE ADULT - SUBJECTIVE AND OBJECTIVE BOX
Chet Pearson MD    Internal Medicine Resident (PGY-3)  Please see "resident assignment" column on [place] for contact and coverage info  ___________________________________________________________________________________________________      ZANDER JEAN-BAPTISTE 66y Female    Overnight events/subjective: No acute overnight events. Nurse reports SpO2 in the low 90s on HFNC 50%/50lpm. Patient seen and examined at bedside. Pt reports improvement with her breathing on HFNC. Coughing started today productive of white sputum. Denies CP, purulent sputum or hemoptysis. B/l LE pain and tenderness from her feet to few inches about her knees improved with tylenol. The pain is chronic and denies acute exacerbation. She denies fever, chills, chest pain, abdominal pain, nausea, vomiting, no changes in bowel habits, no bloody stools, or urinary symptoms.    I&O's Summary    2022 07:01  -  15 Nile 2022 07:00  --------------------------------------------------------  IN: 1130 mL / OUT: 2100 mL / NET: -970 mL    CAPILLARY BLOOD GLUCOSE        Vital Signs Last 24 Hrs  T(C): 37.1 (15 Nile 2022 08:27), Max: 37.1 (15 Nile 2022 08:27)  T(F): 98.8 (15 Nile 2022 08:27), Max: 98.8 (15 Nile 2022 08:27)  HR: 92 (15 Nile 2022 08:27) (71 - 101)  BP: 105/55 (15 Nile 2022 08:27) (101/52 - 124/80)  BP(mean): --  RR: 18 (15 Nile 2022 08:27) (18 - 21)  SpO2: 96% (15 Nile 2022 08:27) (92% - 99%)    PHYSICAL EXAM:  GENERAL: no acute distress, laying comfortably at 30-45 degree incline.   HEENT - atraumatic, normocephalic, pupils equal and reactive to light; extraocular muscles intact  CV: + rhythm irregularly irregular; normal S1/S2; + grade II systolic ejec murmur, otherwise no rubs, or gallops, No JVD, No Hepatojuglar reflux  PULM: clear to auscultation bilaterally; no rales, rhonchi, wheezing  ABDOMEN: Abdominal tenderness all 4 quadrants No rebound tenderness. soft, nondistended; bowel sounds present  MSK: extremities atraumatic; ny cyanosis or clubbing  SKIN: +cool, dry, no rashes or lesions  NEURO:  no focal deficits, sensory and motor grossly intact  PSYCH: alert and oriented x3; appropriate mood and affect  EXT: Decr LE tenderness. B/L nonpitting. + venous stasis.      HOSPITAL MEDICATIONS:  MEDICATIONS  (STANDING):  albuterol/ipratropium for Nebulization 3 milliLiter(s) Nebulizer every 6 hours  apixaban 5 milliGRAM(s) Oral two times a day  budesonide 160 MICROgram(s)/formoterol 4.5 MICROgram(s) Inhaler 2 Puff(s) Inhalation two times a day  buMETAnide Infusion 1 mG/Hr (5 mL/Hr) IV Continuous <Continuous>  buPROPion XL (24-Hour) . 150 milliGRAM(s) Oral daily  chlorhexidine 2% Cloths 1 Application(s) Topical <User Schedule>  digoxin     Tablet 125 MICROGram(s) Oral daily  diltiazem    milliGRAM(s) Oral daily  pantoprazole    Tablet 40 milliGRAM(s) Oral before breakfast  rosuvastatin 5 milliGRAM(s) Oral at bedtime  spironolactone 50 milliGRAM(s) Oral daily  tadalafil 40 milliGRAM(s) Oral daily  tiotropium 2.5 MICROgram(s)/olodaterol 2.5 MICROgram(s) (STIOLTO) Inhaler 2 Puff(s) Inhalation daily    MEDICATIONS  (PRN):  acetaminophen     Tablet .. 650 milliGRAM(s) Oral every 6 hours PRN Temp greater or equal to 38C (100.4F), Mild Pain (1 - 3), Moderate Pain (4 - 6)      LABS:          CBC               7.4    8.44  )-----------( 205      ( 2022 02:45 )             24.5     06-15    134<L>  |  92<L>  |  69<H> <--63  ----------------------------<  94  4.4   |  29  |  1.54<H> <--1.29    Ca    9.4      15 Nile 2022 03:07  Phos  3.2       Mg     1.7         TPro  7.0  /  Alb  3.8  /  TBili  0.3  /  DBili  x   /  AST  14  /  ALT  10  /  AlkPhos  105      PT/INR - ( 2022 02:45 )   PT: 27.0 sec;   INR: 2.31 ratio         PTT - ( 2022 02:45 )  PTT:31.3 sec  Urinalysis Basic - ( 2022 02:31 )    Blood Gas Profile - Venous (06.15.22 @ 06:41)    pH, Venous: 7.40: No collection time indicated, please interpret with caution    pCO2, Venous: 52 mmHg    pO2, Venous: 61 mmHg    HCO3, Venous: 32 mmol/L    Base Excess, Venous: 6.5 mmol/L    Oxygen Saturation, Venous: 89.4 %    Total CO2, Venous: 34 mmol/L    Color: Light Yellow / Appearance: Clear / S.011 / pH: x  Gluc: x / Ketone: Negative  / Bili: Negative / Urobili: Negative   Blood: x / Protein: Negative / Nitrite: Negative   Leuk Esterase: Negative / RBC: x / WBC x   Sq Epi: x / Non Sq Epi: x / Bacteria: x    < from: TTE with Doppler (w/Cont) (22 @ 09:59) >  Conclusions:  Endocardial visualization enhanced with intravenous  injection of Ultrasonic Enhancing Agent (Lumason).  Normal left ventricular systolic function. No segmental  wall motion abnormalities.  Right ventricular enlargement with decreased right  ventricular systolic function.  Severe pulmonary hypertension.    < end of copied text >

## 2022-06-15 NOTE — PROGRESS NOTE ADULT - ASSESSMENT
Patient is a 67 y/o F w/ pmh of pulmonary hypertension, HTN, HFpEF (echo 60% PASP 79mmhg), COPD  (8L), Afib (in digoxin, diltiazem), HLD, prior spont pneumothorax, and CKD who presents as transfer from [facility] with 1-2 weeks of fatigue, lethargy, and progressively worsening RINALDI concerning for COPD vs. HFpEF. Unclear etiology of heart failure although I suspect hers is likely R sided heart disease from severe COPD, per patient she underwent a RHF, but never a LHC per patient, TTE revealed normal LV systolic fxn and severe pulm HTN

## 2022-06-15 NOTE — PROGRESS NOTE ADULT - PROBLEM SELECTOR PLAN 1
Most likely 2/2 to worsening pulm HTN and acute on chronic HFpEF w/ possible COPD exacerbation w/ superimposed pneumonia    - Reportedly CT with RUL consolidation, will be uploaded by radiology CD given to radiology department      Plan:   - Abx as below  - Supplementary O2 as need to titrate to O2 saturation of 90-93%   - Avoid over oxygenation to prevent decreasing respiratory drive   - HFNC start at 40% and 40L  - C/w nocturnal AVAPs (Patient's own)   - C/w Bumex gtt @1mg/hr- goal 1L net negative per day  - ABG reviewed and oxygenation more then adequate will titrate down  - CT scan from [facility] to be uploaded   - Patient will need CT scan when euvolemic Most likely 2/2 to worsening pulm HTN and acute on chronic HFpEF w/ possible COPD exacerbation w/ superimposed pneumonia    - Reportedly CT with RUL consolidation, will be uploaded by radiology CD given to radiology department      Plan:   - Abx as below  - Supplementary O2 as need to titrate to O2 saturation of 90-93%   - Avoid over oxygenation to prevent decreasing respiratory drive   - HFNC start at 40% and 40L  - C/w nocturnal AVAPs (Patient's own)   - C/w Bumex gtt @1mg/hr- goal 1L net negative per day  - C/w Duonebs q6  - ABG reviewed and oxygenation more then adequate will titrate down  - CT scan from [facility] to be uploaded   - Patient will need CT scan when euvolemic

## 2022-06-15 NOTE — PROGRESS NOTE ADULT - SUBJECTIVE AND OBJECTIVE BOX
CHIEF COMPLAINT:  shortness of breath, dyspnea on exertion     Interval events:  - patient seen and examined this AM, states overall that breathing is stable compared with prior exam  - TTE  demonstrating decreased RV systolic function, RV enlargement, and PASP 85 mm Hg  - patient started on bumex gtt         PAST MEDICAL & SURGICAL HISTORY:  COPD exacerbation      Severe pulmonary hypertension      Chronic kidney disease, unspecified CKD stage      Acute on chronic diastolic congestive heart failure      Pulmonary embolism      Anxiety and depression      GERD (gastroesophageal reflux disease)      Obstructive sleep apnea      Chronic atrial fibrillation      H/O pneumonectomy      Right leg weakness          FAMILY HISTORY:  No pertinent family history in parents        SOCIAL HISTORY:  Smoking: [ ] Never Smoked [x ] Former Smoker (__ packs x ___ years) [ ] Current Smoker  (__ packs x ___ years)  no current etoh    Allergies    nitrates (Unknown)    Intolerances        HOME MEDICATIONS:  Home Medications:  ambrisentan 10 mg oral tablet: 1 tab(s) orally once a day (2022 03:27)  apixaban 5 mg oral tablet: 1 tab(s) orally 2 times a day (2022 03:26)  buPROPion 100 mg oral tablet: 1 tab(s) orally 2 times a day (2022 03:31)  digoxin 125 mcg (0.125 mg) oral tablet: 1 tab(s) orally every other day (at bedtime) (2022 03:28)  dilTIAZem 120 mg/24 hours oral capsule, extended release: 1 cap(s) orally once a day (2022 03:31)  pantoprazole 40 mg oral delayed release tablet: 1 tab(s) orally once a day (2022 03:27)  rosuvastatin 5 mg oral tablet: 1 tab(s) orally once a day (2022 03:27)  spironolactone 50 mg oral tablet: 1 tab(s) orally once a day (2022 03:29)  tadalafil 20 mg oral tablet: 2 tab(s) orally once a day (2022 03:29)  torsemide 20 mg oral tablet: 2 tab(s) orally once a day (2022 03:30)      REVIEW OF SYSTEMS:  Constitutional: [x ] negative fevers or chills  [ ] fevers [ ] chills [ ] weight loss [ ] weight gain  HEENT: [ ] negative [ ] dry eyes [ ] eye irritation [ ] postnasal drip [ ] nasal congestion  CV: [ ] negative  [ ] chest pain [ ] orthopnea [ ] palpitations [ ] murmur  Resp: [ ] negative [ ] cough [x ] shortness of breath [ x] dyspnea [ ] wheezing [ ] sputum [ ] hemoptysis  GI: [ ] negative [ ] nausea [ ] vomiting [ ] diarrhea [ ] constipation [ ] abd pain [ ] dysphagia   : [ ] negative [ ] dysuria [ ] nocturia [ ] hematuria [ ] increased urinary frequency  Musculoskeletal: [ ] negative [ ] back pain [ ] myalgias [ ] arthralgias [ ] fracture  Skin: [ ] negative [ ] rash [ ] itch  Neurological: [ ] negative [ ] headache [ ] dizziness [ ] syncope [ ] weakness [ ] numbness  Psychiatric: [ ] negative [ ] anxiety [ ] depression  Endocrine: [ ] negative [ ] diabetes [ ] thyroid problem  Hematologic/Lymphatic: [ ] negative [ ] anemia [ ] bleeding problem  Allergic/Immunologic: [ ] negative [ ] itchy eyes [ ] nasal discharge [ ] hives [ ] angioedema  [ x] All other systems negative  [ ] Unable to assess ROS because ________    OBJECTIVE:  ICU Vital Signs Last 24 Hrs  T(C): 36.6 (2022 08:21), Max: 36.6 (2022 08:21)  T(F): 97.8 (2022 08:21), Max: 97.8 (2022 08:21)  HR: 78 (2022 08:21) (71 - 78)  BP: 114/64 (2022 08:21) (112/75 - 120/69)  BP(mean): --  ABP: --  ABP(mean): --  RR: 20 (2022 08:21) (18 - 22)  SpO2: 93% (2022 08:21) (92% - 100%)        -13 @ 07:01  -  -14 @ 07:00  --------------------------------------------------------  IN: 240 mL / OUT: 400 mL / NET: -160 mL      CAPILLARY BLOOD GLUCOSE      POCT Blood Glucose.: 114 mg/dL (2022 07:22)      PHYSICAL EXAM:  General:  no acute distress   HEENT: atraumatic, normocephalic   Neck:  thick neck   Respiratory:  no crackles, no cough/wheezes, no use of accessory muscles of respiration    Cardiovascular:  RRR, no rubs, gallops, m urmurs   Abdomen: obese, non-tender   Extremities: bilateral edema, no significant cyanosis   Skin: no rash   Neurological: no gross/focal deficits appreciated    Psychiatry: appropriate     LINES:     HOSPITAL MEDICATIONS:  Standing Meds:  albuterol/ipratropium for Nebulization 3 milliLiter(s) Nebulizer every 6 hours  apixaban 5 milliGRAM(s) Oral two times a day  budesonide 160 MICROgram(s)/formoterol 4.5 MICROgram(s) Inhaler 2 Puff(s) Inhalation two times a day  buPROPion XL (24-Hour) . 150 milliGRAM(s) Oral daily  digoxin     Tablet 125 MICROGram(s) Oral daily  diltiazem    milliGRAM(s) Oral daily  furosemide   Injectable 40 milliGRAM(s) IV Push two times a day  pantoprazole    Tablet 40 milliGRAM(s) Oral before breakfast  rosuvastatin 5 milliGRAM(s) Oral at bedtime  spironolactone 50 milliGRAM(s) Oral daily  tadalafil 40 milliGRAM(s) Oral daily  tiotropium 2.5 MICROgram(s)/olodaterol 2.5 MICROgram(s) (STIOLTO) Inhaler 2 Puff(s) Inhalation daily      PRN Meds:  acetaminophen     Tablet .. 650 milliGRAM(s) Oral every 6 hours PRN      LABS:                        7.4    8.44  )-----------( 205      ( 2022 02:45 )             24.5     Hgb Trend: 7.4<--  06-14    133<L>  |  94<L>  |  63<H>  ----------------------------<  115<H>  3.9   |  27  |  1.29    Ca    9.3      2022 02:45  Phos  3.2     -  Mg     1.7     -    TPro  7.0  /  Alb  3.8  /  TBili  0.3  /  DBili  x   /  AST  14  /  ALT  10  /  AlkPhos  105      Creatinine Trend: 1.29<--  PT/INR - ( 2022 02:45 )   PT: 27.0 sec;   INR: 2.31 ratio         PTT - ( 2022 02:45 )  PTT:31.3 sec  Urinalysis Basic - ( 2022 02:31 )    Color: Light Yellow / Appearance: Clear / S.011 / pH: x  Gluc: x / Ketone: Negative  / Bili: Negative / Urobili: Negative   Blood: x / Protein: Negative / Nitrite: Negative   Leuk Esterase: Negative / RBC: x / WBC x   Sq Epi: x / Non Sq Epi: x / Bacteria: x      Arterial Blood Gas:   @ 02:39  7.44/44/178/30/97.6/5.0  ABG lactate: --        MICROBIOLOGY:       RADIOLOGY:  [ ] Reviewed and interpreted by me    PULMONARY FUNCTION TESTS:    EKG:

## 2022-06-15 NOTE — OCCUPATIONAL THERAPY INITIAL EVALUATION ADULT - ANTICIPATED DISCHARGE DISPOSITION, OT EVAL
Recommending subacute rehab to improve functional abilities. Discussed with  Denise/rehabilitation facility

## 2022-06-15 NOTE — PROGRESS NOTE ADULT - PROBLEM SELECTOR PLAN 3
Severe advanced pulmonary hypertension with advanced COPD with potentially superimposed heart failure. Home on 8L high concentrate = 4L in hospital. Per patient saturates in the high 80s.     Plan;    - C/w home tadalafil 40mg qdaily   - ambrisentan 10mg not available per pharmacy, will need to be brought in   - No colonoscopy or mammogram in 10 years Severe advanced pulmonary hypertension with advanced COPD with potentially superimposed heart failure. Home on 8L high concentrate = 4L in hospital. Per patient saturates in the high 80s.     Plan;    - C/w home tadalafil 40mg qdaily   - ambrisentan 10mg not available per pharmacy, will need to be brought in   - C/w Duonebs q6  - No colonoscopy or mammogram in 10 years

## 2022-06-15 NOTE — PROGRESS NOTE ADULT - ATTENDING COMMENTS
Patient was transferred for a lung transplant evaluation however she is currently not a candidate due to BMI. Patient still noted to be volume overloaded. Will continue with diuresis and continue to optimize her volume status. once her volume is optimized, will plan for  RHC and possible cardiomems implant.  Her pulmonary HTN is from WHO  group 2/3 predominantly, will continue to optimize volume and respiratory status

## 2022-06-15 NOTE — PROGRESS NOTE ADULT - PROBLEM SELECTOR PLAN 4
H/H 7.4/24.5 i/s/o chronic kidney disease, AOCD. On eliquis R/o occult bleed   - Anemia panel - ferritin, TIBC, Transferrin  - FOBT  -  transfusion if Hb < 7  - Maintain active type and screen.  - Abdominal CT

## 2022-06-15 NOTE — PROGRESS NOTE ADULT - ASSESSMENT
Victoria Domingo is a 66 year old female w/pulmonary HTN, HTN, HFpEF, COPD (on 8 L at home), afib, HLD, prior spontaneous pneumothorax, and CKD who presents to [facility] as a transfer from [facility] with reported 1-2 weeks of fatigue, lethargy, and progressively worsening shortness of breath.  She has reportedly been hospitalized 4 times in the past year.  Per chart review, she was initially diuresed at OSH and started on ceftriaxone as CT scan showed RUL consolidation concerning for pneumonia.  She was requiring HFNC to maintain normal O2 saturations and was subsequently transferred to [facility] for further management.      #Acute hypoxic respiratory failure  - etiology likely multifactorial and related to volume overload given physical exam findings, significantly elevated BNP  - patient afebrile, without white count, but previously treated at OSH with ceftriaxone given concern for pneumonia    #Pulm HTN  - patient w/multiple reported hospitalizations in past year requiring diuresis, also states she has had previous RHC  - reportedly on ambresentan and tadalafil at home, on tadalafil while inaptient   - currently on bumex gtt for diuresis  - heart failure following  - TTE demonstrating RV enlargement, decreased RV systolic function, and PASP 85 mm Hg    #COPD  - patient reportedly on 8 L at home, currently requiring HFNC to maintain normal O2 saturations but presentation not consistent with exacerbation    Recommendations:  - agree w/heart failure consult  - continue ceftriaxone for total of 5 days of therapy   - please re-start patient's home ambrisentan  - please obtain outside RHC records as well as any records pertaining to prior treatment of pulmonary HTN including previous therapies utilized, patient will possibly need repeat RHC this admission but will require more aggressive diuresis first as below  - agree w/bumex gtt, would diurese aggressively to net negative at least 1 L daily for the next several days and so please follow up on Is/Os and adjust bumex gtt as needed   - please order patient's home AVAPs for night time use and ensure AVAPs initiated at night   - please provide and encourage use of incentive spirometry while inpatinet  - ensure patient has PT/OT  - f/u any pulmonary transplant recommendations    Patient discussed w/Dr. Vince Knott [id number]  [id number]

## 2022-06-15 NOTE — PROGRESS NOTE ADULT - SUBJECTIVE AND OBJECTIVE BOX
DATE OF SERVICE: 06-15-22 @ 06:58    Patient is a 66y old  Female who presents with a chief complaint of SOB (2022 17:26)      SUBJECTIVE / OVERNIGHT EVENTS: Patient had brief episode of tachycardia to 130s, patient has Hx of Afib. NAEO. Patient afebrile o/n. Patient feels.     MEDICATIONS  (STANDING):  albuterol/ipratropium for Nebulization 3 milliLiter(s) Nebulizer every 6 hours  apixaban 5 milliGRAM(s) Oral two times a day  budesonide 160 MICROgram(s)/formoterol 4.5 MICROgram(s) Inhaler 2 Puff(s) Inhalation two times a day  buMETAnide Infusion 1 mG/Hr (5 mL/Hr) IV Continuous <Continuous>  buPROPion XL (24-Hour) . 150 milliGRAM(s) Oral daily  chlorhexidine 2% Cloths 1 Application(s) Topical <User Schedule>  digoxin     Tablet 125 MICROGram(s) Oral daily  diltiazem    milliGRAM(s) Oral daily  pantoprazole    Tablet 40 milliGRAM(s) Oral before breakfast  rosuvastatin 5 milliGRAM(s) Oral at bedtime  spironolactone 50 milliGRAM(s) Oral daily  tadalafil 40 milliGRAM(s) Oral daily  tiotropium 2.5 MICROgram(s)/olodaterol 2.5 MICROgram(s) (STIOLTO) Inhaler 2 Puff(s) Inhalation daily    MEDICATIONS  (PRN):  acetaminophen     Tablet .. 650 milliGRAM(s) Oral every 6 hours PRN Temp greater or equal to 38C (100.4F), Mild Pain (1 - 3), Moderate Pain (4 - 6)      Vital Signs Last 24 Hrs  T(C): 36.7 (15 Nile 2022 05:51), Max: 36.7 (15 Nile 2022 00:02)  T(F): 98 (15 Nile 2022 05:51), Max: 98 (15 Nile 2022 00:02)  HR: 76 (15 Nile 2022 06:00) (71 - 101)  BP: 110/70 (15 Nile 2022 06:00) (101/52 - 124/80)  BP(mean): --  RR: 18 (15 Nile 2022 05:51) (18 - 21)  SpO2: 96% (15 Nile 2022 05:51) (92% - 99%)  CAPILLARY BLOOD GLUCOSE      POCT Blood Glucose.: 114 mg/dL (2022 07:22)    I&O's Summary    2022 07:01  -  2022 07:00  --------------------------------------------------------  IN: 240 mL / OUT: 400 mL / NET: -160 mL    2022 07:01  -  15 Nile 2022 06:58  --------------------------------------------------------  IN: 1130 mL / OUT: 2100 mL / NET: -970 mL        PHYSICAL EXAM:  GENERAL: NAD, well-developed  HEAD:  Atraumatic, Normocephalic  EYES: EOMI, PERRLA, conjunctiva and sclera clear  NECK: Supple, No JVD  CHEST/LUNG: Clear to auscultation bilaterally; No wheeze  HEART: Regular rate and rhythm; No murmurs, rubs, or gallops  ABDOMEN: Soft, Nontender, Nondistended; Bowel sounds present  EXTREMITIES:  2+ Peripheral Pulses, No clubbing, cyanosis, or edema  PSYCH: AAOx3  NEUROLOGY: non-focal  SKIN: No rashes or lesions    LABS:                        7.4    8.44  )-----------( 205      ( 2022 02:45 )             24.5     06-15    134<L>  |  92<L>  |  69<H>  ----------------------------<  94  4.4   |  29  |  1.54<H>    Ca    9.4      15 Nile 2022 03:07  Phos  3.2     06-14  Mg     1.7     06-14    TPro  7.0  /  Alb  3.8  /  TBili  0.3  /  DBili  x   /  AST  14  /  ALT  10  /  AlkPhos  105  06-14    PT/INR - ( 2022 02:45 )   PT: 27.0 sec;   INR: 2.31 ratio         PTT - ( 2022 02:45 )  PTT:31.3 sec      Urinalysis Basic - ( 2022 02:31 )    Color: Light Yellow / Appearance: Clear / S.011 / pH: x  Gluc: x / Ketone: Negative  / Bili: Negative / Urobili: Negative   Blood: x / Protein: Negative / Nitrite: Negative   Leuk Esterase: Negative / RBC: x / WBC x   Sq Epi: x / Non Sq Epi: x / Bacteria: x        RADIOLOGY & ADDITIONAL TESTS:    Imaging Personally Reviewed:    Consultant(s) Notes Reviewed:      Care Discussed with Consultants/Other Providers:   DATE OF SERVICE: 06-15-22 @ 06:58    Patient is a 66y old  Female who presents with a chief complaint of SOB (2022 17:26)      SUBJECTIVE / OVERNIGHT EVENTS: Patient had brief episode of tachycardia to 130s, asymptomatic self resolved, patient has Hx of Afib. NAEO. Patient afebrile o/n. Patient feels "ok" this AM, says dyspnea is improved with the HFNC, tolerated home AVAPs o/n. Patient says she feels a discomfort in her chest and noted some sputum production since yesterday- white in color. Patient denies CP, fevers/chills, N/V/D     MEDICATIONS  (STANDING):  albuterol/ipratropium for Nebulization 3 milliLiter(s) Nebulizer every 6 hours  apixaban 5 milliGRAM(s) Oral two times a day  budesonide 160 MICROgram(s)/formoterol 4.5 MICROgram(s) Inhaler 2 Puff(s) Inhalation two times a day  buMETAnide Infusion 1 mG/Hr (5 mL/Hr) IV Continuous <Continuous>  buPROPion XL (24-Hour) . 150 milliGRAM(s) Oral daily  chlorhexidine 2% Cloths 1 Application(s) Topical <User Schedule>  digoxin     Tablet 125 MICROGram(s) Oral daily  diltiazem    milliGRAM(s) Oral daily  pantoprazole    Tablet 40 milliGRAM(s) Oral before breakfast  rosuvastatin 5 milliGRAM(s) Oral at bedtime  spironolactone 50 milliGRAM(s) Oral daily  tadalafil 40 milliGRAM(s) Oral daily  tiotropium 2.5 MICROgram(s)/olodaterol 2.5 MICROgram(s) (STIOLTO) Inhaler 2 Puff(s) Inhalation daily    MEDICATIONS  (PRN):  acetaminophen     Tablet .. 650 milliGRAM(s) Oral every 6 hours PRN Temp greater or equal to 38C (100.4F), Mild Pain (1 - 3), Moderate Pain (4 - 6)      Vital Signs Last 24 Hrs  T(C): 36.7 (15 Nile 2022 05:51), Max: 36.7 (15 Nile 2022 00:02)  T(F): 98 (15 Nile 2022 05:51), Max: 98 (15 Nile 2022 00:02)  HR: 76 (15 Nile 2022 06:00) (71 - 101)  BP: 110/70 (15 Nile 2022 06:00) (101/52 - 124/80)  BP(mean): --  RR: 18 (15 Nile 2022 05:51) (18 - 21)  SpO2: 96% (15 Nile 2022 05:51) (92% - 99%)  CAPILLARY BLOOD GLUCOSE      POCT Blood Glucose.: 114 mg/dL (2022 07:22)    I&O's Summary    2022 07:01  -  2022 07:00  --------------------------------------------------------  IN: 240 mL / OUT: 400 mL / NET: -160 mL    2022 07:01  -  15 Nile 2022 06:58  --------------------------------------------------------  IN: 1130 mL / OUT: 2100 mL / NET: -970 mL        PHYSICAL EXAM:  GENERAL: NAD, HFNC  HEAD:  Atraumatic, Normocephalic  EYES: EOMI, PERRLA, conjunctiva and sclera clear  ENT: Moist mucous membranes  NECK: Supple, + JVD  CHEST/LUNG: Bilateral decrease in breath sounds, no wheezing, rales or rhonchi. Unlabored respirations  HEART: Irregularly irregular rate and rhythm; Normal s1 and s2, No murmurs, rubs, or gallops  ABDOMEN: Bowel sounds present; Soft, Nontender, Nondistended. No hepatomegaly  EXTREMITIES:  2+ Peripheral Pulses, brisk capillary refill. No clubbing, cyanosis, Non pitting edema, Scar showing where skin graft was taken   NERVOUS SYSTEM:  Alert & Oriented X3, speech clear. No deficits   MSK: FROM all 4 extremities, full and equal strength  SKIN: No rashes or lesions      LABS:                        7.4    8.44  )-----------( 205      ( 2022 02:45 )             24.5     06-15    134<L>  |  92<L>  |  69<H>  ----------------------------<  94  4.4   |  29  |  1.54<H>    Ca    9.4      15 Nile 2022 03:07  Phos  3.2     -  Mg     1.7     -    TPro  7.0  /  Alb  3.8  /  TBili  0.3  /  DBili  x   /  AST  14  /  ALT  10  /  AlkPhos  105  06-14    PT/INR - ( 2022 02:45 )   PT: 27.0 sec;   INR: 2.31 ratio         PTT - ( 2022 02:45 )  PTT:31.3 sec      Urinalysis Basic - ( 2022 02:31 )    Color: Light Yellow / Appearance: Clear / S.011 / pH: x  Gluc: x / Ketone: Negative  / Bili: Negative / Urobili: Negative   Blood: x / Protein: Negative / Nitrite: Negative   Leuk Esterase: Negative / RBC: x / WBC x   Sq Epi: x / Non Sq Epi: x / Bacteria: x        RADIOLOGY & ADDITIONAL TESTS:    Imaging Personally Reviewed:    Consultant(s) Notes Reviewed:      Care Discussed with Consultants/Other Providers:   DATE OF SERVICE: 06-15-22 @ 06:58    Patient is a 66y old  Female who presents with a chief complaint of SOB (2022 17:26)      SUBJECTIVE / OVERNIGHT EVENTS: Patient had brief episode of tachycardia to 130s, asymptomatic self resolved, patient has Hx of Afib. NAEO. Patient afebrile o/n. Patient feels "ok" this AM, says dyspnea is improved with the HFNC, tolerated home AVAPs o/n. Patient says she feels a discomfort in her chest and noted some sputum production since yesterday- white in color. Patient says she is considering lung transplant. Patient denies CP, fevers/chills, N/V/D     MEDICATIONS  (STANDING):  albuterol/ipratropium for Nebulization 3 milliLiter(s) Nebulizer every 6 hours  apixaban 5 milliGRAM(s) Oral two times a day  budesonide 160 MICROgram(s)/formoterol 4.5 MICROgram(s) Inhaler 2 Puff(s) Inhalation two times a day  buMETAnide Infusion 1 mG/Hr (5 mL/Hr) IV Continuous <Continuous>  buPROPion XL (24-Hour) . 150 milliGRAM(s) Oral daily  chlorhexidine 2% Cloths 1 Application(s) Topical <User Schedule>  digoxin     Tablet 125 MICROGram(s) Oral daily  diltiazem    milliGRAM(s) Oral daily  pantoprazole    Tablet 40 milliGRAM(s) Oral before breakfast  rosuvastatin 5 milliGRAM(s) Oral at bedtime  spironolactone 50 milliGRAM(s) Oral daily  tadalafil 40 milliGRAM(s) Oral daily  tiotropium 2.5 MICROgram(s)/olodaterol 2.5 MICROgram(s) (STIOLTO) Inhaler 2 Puff(s) Inhalation daily    MEDICATIONS  (PRN):  acetaminophen     Tablet .. 650 milliGRAM(s) Oral every 6 hours PRN Temp greater or equal to 38C (100.4F), Mild Pain (1 - 3), Moderate Pain (4 - 6)      Vital Signs Last 24 Hrs  T(C): 36.7 (15 Nile 2022 05:51), Max: 36.7 (15 Nile 2022 00:02)  T(F): 98 (15 Nile 2022 05:51), Max: 98 (15 Nile 2022 00:02)  HR: 76 (15 Nile 2022 06:00) (71 - 101)  BP: 110/70 (15 Nile 2022 06:00) (101/52 - 124/80)  BP(mean): --  RR: 18 (15 Nile 2022 05:51) (18 - 21)  SpO2: 96% (15 Nile 2022 05:51) (92% - 99%)  CAPILLARY BLOOD GLUCOSE      POCT Blood Glucose.: 114 mg/dL (2022 07:22)    I&O's Summary    2022 07:01  -  2022 07:00  --------------------------------------------------------  IN: 240 mL / OUT: 400 mL / NET: -160 mL    2022 07:01  -  15 Nile 2022 06:58  --------------------------------------------------------  IN: 1130 mL / OUT: 2100 mL / NET: -970 mL        PHYSICAL EXAM:  GENERAL: NAD, HFNC  HEAD:  Atraumatic, Normocephalic  EYES: EOMI, PERRLA, conjunctiva and sclera clear  ENT: Moist mucous membranes  NECK: Supple, + JVD  CHEST/LUNG: Bilateral decrease in breath sounds, no wheezing, rales or rhonchi. Unlabored respirations  HEART: Irregularly irregular rate and rhythm; Normal s1 and s2, No murmurs, rubs, or gallops  ABDOMEN: Bowel sounds present; Soft, Nontender, Nondistended. No hepatomegaly  EXTREMITIES:  2+ Peripheral Pulses, brisk capillary refill. No clubbing, cyanosis, Non pitting edema, Scar showing where skin graft was taken   NERVOUS SYSTEM:  Alert & Oriented X3, speech clear. No deficits   MSK: FROM all 4 extremities, full and equal strength  SKIN: No rashes or lesions      LABS:                        7.4    8.44  )-----------( 205      ( 2022 02:45 )             24.5     06-15    134<L>  |  92<L>  |  69<H>  ----------------------------<  94  4.4   |  29  |  1.54<H>    Ca    9.4      15 Nile 2022 03:07  Phos  3.2     06-14  Mg     1.7     06-14    TPro  7.0  /  Alb  3.8  /  TBili  0.3  /  DBili  x   /  AST  14  /  ALT  10  /  AlkPhos  105  06-14    PT/INR - ( 2022 02:45 )   PT: 27.0 sec;   INR: 2.31 ratio         PTT - ( 2022 02:45 )  PTT:31.3 sec      Urinalysis Basic - ( 2022 02:31 )    Color: Light Yellow / Appearance: Clear / S.011 / pH: x  Gluc: x / Ketone: Negative  / Bili: Negative / Urobili: Negative   Blood: x / Protein: Negative / Nitrite: Negative   Leuk Esterase: Negative / RBC: x / WBC x   Sq Epi: x / Non Sq Epi: x / Bacteria: x        RADIOLOGY & ADDITIONAL TESTS:    Imaging Personally Reviewed:    Consultant(s) Notes Reviewed:      Care Discussed with Consultants/Other Providers:

## 2022-06-15 NOTE — PHYSICAL THERAPY INITIAL EVALUATION ADULT - ADDITIONAL COMMENTS
Pt lives in trailers with sons with 4 steps to enter with b/l handrail. Pt was (I) with all ADLs and amb without AD

## 2022-06-15 NOTE — PROGRESS NOTE ADULT - ASSESSMENT
This is a 66 year old female with history of severe pulmonary HTN (Group 1, 2 & 3), HFpEF, Afib, COPD on home O2 (5L NC), CKD, ROLANDA on CPAP, morbid obesity ( s/p bariatric surgery in 2012 with subsequent lap band removal due to GI bleed), PE, CVA (MCA infarct in 2012) who comes in to establish care with heart failure due to her pulmonary HTN who who presented as a transfer from [place] for 1-2 weeks of fatigue, lethargy, progressive dyspnea on exertion.     Studies:   6/2022 TTE:  Normal LVEF, dilated LA, decreased RV function with EV enlargement, moderate TR, PASP 85, severe pulm HTN   5/2013 Cardiac Cath/PCI: Cardiac Cath: right dominant circulation, left main arises normally from the left coronary sinus of Valsalva, bifurcates into LAD and circumflex, left main normal, LAD arises normally from the left main normal, left circumflex arises normally from the left main and terminates in the AV groove normal, RCA arises normally from the right sinus of Valsalva, RCA is normal,     Hemodynamics:  RHC (6/2020): RA 2, RV 60/5, PAP 58/18/33, CO/CI: 6.5/3.3

## 2022-06-15 NOTE — PROGRESS NOTE ADULT - PROBLEM SELECTOR PLAN 1
Most likely 2/2 to worsening pulm HTN and acute on chronic HFpEF w/ possible COPD exacerbation w/ superimposed pneumonia    - Reportedly CT with RUL consolidation, will be uploaded by radiology CD given to radiology department      Plan:   - Abx as below  - Supplementary O2 as need to titrate to O2 saturation of 90-93%   - Avoid over oxygenation to prevent decreasing respiratory drive   - HFNC start at 40% and 40L  - C/w nocturnal AVAPs (Patient's own)   - C/w Bumex gtt @1mg/hr- goal 1L net negative per day  - C/w Duonebs q6  - ABG reviewed and oxygenation more then adequate will titrate down  - CT scan, CXR from [facility] uploaded   - Patient will need CT scan when euvolemic.  - HFNC on 50% and 50L sating low 90s 6/15

## 2022-06-15 NOTE — PHYSICAL THERAPY INITIAL EVALUATION ADULT - PERTINENT HX OF CURRENT PROBLEM, REHAB EVAL
Pt is a 65 y/o female admitted to [facility] on 6/14/22 transferred from [facility] of pulmonary hypertension, HTN, HFpEF, COPD  (8L), Afib , HLD, prior spont pneumothorax, and CKD who presents  with 1-2 weeks of fatigue, lethargy, and progressively worsening RINALDI. Over the past year, the patient has had a long standing history of worsening SOB that has been attributed to her heart failure.

## 2022-06-15 NOTE — PROGRESS NOTE ADULT - PROBLEM SELECTOR PLAN 2
Reported as HFpEF although unclear exact etiology. Most likely 2/2 to pulmonary HTN and WHO class III. Differential includes ICM vs. NICM vs. RHF from Pulm HTN.    Plan:   - F/u CXR  - TTE showed severe pulm HTN, normal LV systolic fxn  - F/u limited TTE w/ bubble study   - C/w Bumex gtt @1mg/hr- goal 1L net negative per day  - C/w home Spironolactone  - BNP 12,525 at OSH, now 15,334   - Trend BNP q72 hours   - Strict ins and outs   - Daily standing weights  - Replete K > 4, Mg > 2, and Phos > 3 Reported as HFpEF although unclear exact etiology. Most likely 2/2 to pulmonary HTN and WHO class III. Differential includes ICM vs. NICM vs. RHF from Pulm HTN.    Plan:   - F/u CXR  - TTE showed severe pulm HTN, normal LV systolic fxn, RV enlargement w/ decreased systolic fxn  - F/u limited TTE w/ bubble study   - C/w Bumex gtt @1mg/hr- goal 1L net negative per day  - C/w home Spironolactone  - BNP 12,525 at OSH, now 15,334   - Trend BNP q72 hours   - Strict ins and outs   - Daily standing weights  - Replete K > 4, Mg > 2, and Phos > 3

## 2022-06-15 NOTE — PROGRESS NOTE ADULT - PROBLEM SELECTOR PLAN 5
- Continue Digoxin, Diltiazem  -patient on apixaban 5mg, please hold AM dose tomorrow morning in case she goes for RHC

## 2022-06-15 NOTE — PROGRESS NOTE ADULT - PROBLEM SELECTOR PLAN 5
RUL consolidation on OSH CT scan. 6/15 new onset cough productive of white sputum  - Procal 0.28  - C/w ceftriaxone 1g qdaily (06/11 - 06/15), completed  - legionella Ag negative- will d/c azithromycin   - Afebrile non-toxic, w/o leukocytosis   - Will culture if febrile.  - Reviewed uploaded CT scan

## 2022-06-16 NOTE — PROGRESS NOTE ADULT - PROBLEM SELECTOR PLAN 9
Addended by: RAMA LIAO on: 1/18/2018 01:44 PM     Modules accepted: Orders     C/w nocturnal AVAPs- patient's own.

## 2022-06-16 NOTE — PROGRESS NOTE ADULT - ATTENDING COMMENTS
Patient was transferred for a lung transplant evaluation however she is currently not a candidate due to BMI, can be reevaluated as an oupatient. Patient still noted to be volume overloaded. Diuresed well with bumex 4mg IV BID. Will plan for  RHC and cardiomem placement tomorrow.  Her pulmonary HTN is from WHO  group 2/3 predominantly, will continue to optimize volume and respiratory status

## 2022-06-16 NOTE — PROGRESS NOTE ADULT - PROBLEM SELECTOR PLAN 1
Most likely 2/2 to worsening pulm HTN and acute on chronic HFpEF w/ possible COPD exacerbation w/ superimposed pneumonia    - Reportedly CT with RUL consolidation, will be uploaded by radiology CD given to radiology department      Plan:   - Abx as below  - Avoid over oxygenation to prevent decreasing respiratory drive   - HFNC start at 40% and 40L titrate to O2 saturation of 90-93%   - C/w nocturnal AVAPs (Patient's own)   - Switch bumex to 4mg IV BID- goal 1L net negative per day  - C/w Duonebs q6  - CXR shows improving pulm edema  - Patient will need repeat CT scan when euvolemic Most likely 2/2 to worsening pulm HTN and acute on chronic HFpEF w/ possible COPD exacerbation w/ superimposed pneumonia    - Reportedly CT with RUL consolidation, will be uploaded by radiology CD given to radiology department      Plan:   - Abx as below  - Avoid over oxygenation to prevent decreasing respiratory drive   - HFNC start at 40% and 40L titrate to O2 saturation of 88-92%   - C/w nocturnal AVAPs (Patient's own)   - Switch bumex to 4mg IV BID- goal 1L net negative per day  - C/w Duonebs q6  - CXR shows improving pulm edema  - Patient will need repeat CT scan when euvolemic

## 2022-06-16 NOTE — PROGRESS NOTE ADULT - PROBLEM SELECTOR PLAN 12
DVT/PE ppx: Eliquis 5mg BID for Afib  Diet: DASH/TLC  Code: Full   Dispo: pending medical stabilization.

## 2022-06-16 NOTE — PROGRESS NOTE ADULT - SUBJECTIVE AND OBJECTIVE BOX
Chet Pearson MD    Internal Medicine Resident (PGY-3)  Please see "resident assignment" column on [place] for contact and coverage info  ___________________________________________________________________________________________________      ZANDER JEAN-BAPTISTE 66y Female    Overnight events/subjective: No acute overnight events. Respiratory therapist reports SpO2 droped to 88% so HFNC increased to 50%/50lpm, since then sating 93-96. Patient seen and examined at bedside. Pt reports improvement with her breathing on HFNC. Coughing resolved. B/l LE pain and tenderness from her feet to few inches about her knees improved. She denies fever, chills, chest pain, abdominal pain, nausea, vomiting, no changes in bowel habits, no bloody stools, or urinary symptoms.    Vital Signs Last 24 Hrs  T(C): 36.6 (16 Jun 2022 04:00), Max: 36.7 (15 Nile 2022 11:30)  T(F): 97.8 (16 Jun 2022 04:00), Max: 98 (15 Nile 2022 11:30)  HR: 84 (16 Jun 2022 08:24) (63 - 90)  BP: 103/61 (16 Jun 2022 06:32) (96/60 - 119/69)  BP(mean): --  RR: 20 (16 Jun 2022 08:24) (18 - 20)  SpO2: 92% (16 Jun 2022 08:24) (92% - 100%)    I&O's Summary    15 Nile 2022 07:01  -  16 Jun 2022 07:00  --------------------------------------------------------  IN: 550 mL / OUT: 2600 mL / NET: -2050 mL      PHYSICAL EXAM:  GENERAL: no acute distress, laying comfortably at 30-45 degree incline.   HEENT - atraumatic, normocephalic, pupils equal and reactive to light; extraocular muscles intact  CV: + rhythm irregularly irregular; normal S1/S2; otherwise no rubs, or gallops, ?JVD, No Hepatojuglar reflux  PULM: clear to auscultation bilaterally; no rales, rhonchi, wheezing  ABDOMEN: No tenderness. No rebound tenderness. soft, nondistended; bowel sounds present  MSK: extremities atraumatic; ny cyanosis or clubbing  SKIN: +cool, dry, no rashes or lesions  NEURO:  no focal deficits, sensory and motor grossly intact  PSYCH: alert and oriented x3; appropriate mood and affect  EXT: Decr LE tenderness. B/L nonpitting. + venous stasis.        HOSPITAL MEDICATIONS:  MEDICATIONS  (STANDING):  albuterol/ipratropium for Nebulization 3 milliLiter(s) Nebulizer every 6 hours  budesonide 160 MICROgram(s)/formoterol 4.5 MICROgram(s) Inhaler 2 Puff(s) Inhalation two times a day  buMETAnide IVPB 4 milliGRAM(s) IV Intermittent two times a day  buPROPion XL (24-Hour) . 150 milliGRAM(s) Oral daily  chlorhexidine 2% Cloths 1 Application(s) Topical <User Schedule>  digoxin     Tablet 125 MICROGram(s) Oral daily  diltiazem    milliGRAM(s) Oral daily  mupirocin 2% Ointment 1 Application(s) Both Nostrils two times a day  pantoprazole    Tablet 40 milliGRAM(s) Oral before breakfast  rosuvastatin 5 milliGRAM(s) Oral at bedtime  spironolactone 50 milliGRAM(s) Oral daily  tadalafil 40 milliGRAM(s) Oral daily  tiotropium 2.5 MICROgram(s)/olodaterol 2.5 MICROgram(s) (STIOLTO) Inhaler 2 Puff(s) Inhalation daily    MEDICATIONS  (PRN):  acetaminophen     Tablet .. 650 milliGRAM(s) Oral every 6 hours PRN Temp greater or equal to 38C (100.4F), Mild Pain (1 - 3), Moderate Pain (4 - 6)      LABS:                                8.0  <--7.7  5.59 <--7.11 )-----------( 231      ( 16 Jun 2022 07:20 )                     26.8 <-- 26.8    06-16    133<L>  |  92<L>  |  72<H> <--69  ----------------------------<  77  4.2   |  31  |  1.53<H> <--1.53    Ca    9.6      16 Jun 2022 07:20  Phos  3.8     06-16  Mg     1.9     06-16    TPro  7.1  /  Alb  3.7  /  TBili  0.3  /  DBili  x   /  AST  12  /  ALT  7<L>  /  AlkPhos  108  06-16

## 2022-06-16 NOTE — PROGRESS NOTE ADULT - PROBLEM SELECTOR PLAN 2
Reported as HFpEF although unclear exact etiology. Most likely 2/2 to pulmonary HTN and WHO class III. Differential includes ICM vs. NICM vs. RHF from Pulm HTN.    Plan:   - CXR shows improving Pulm edema  - TTE showed severe pulm HTN, normal LV systolic fxn, RV enlargement w/ decreased systolic fxn  - No PFO on limited TTE w/ bubble study   - Switch bumex to 4mg IV BID- goal 1L net negative per day  - C/w home Spironolactone  - BNP 12,525 at OSH, now 15,334   - Trend BNP q72 hours   - Strict ins and outs   - Daily standing weights  - Replete K > 4, Mg > 2, and Phos > 3  - Eventual RHC/Cardio mems per cards/HF.

## 2022-06-16 NOTE — PROGRESS NOTE ADULT - SUBJECTIVE AND OBJECTIVE BOX
Ashley Paz MD  Cardiology Fellow  [phone]  All Cardiology service information can be found 24/7 on [email or web address], password: [email or web address]      Overnight Events: Patient is off Hi flow and  back on her home O2 requirement. She is feeling well. She got teaching with the nurse for cardiomems yesterday.     Review Of Systems: No chest pain, shortness of breath, or palpitations            Current Meds:  acetaminophen     Tablet .. 650 milliGRAM(s) Oral every 6 hours PRN  albuterol/ipratropium for Nebulization 3 milliLiter(s) Nebulizer every 6 hours  budesonide 160 MICROgram(s)/formoterol 4.5 MICROgram(s) Inhaler 2 Puff(s) Inhalation two times a day  buMETAnide IVPB 4 milliGRAM(s) IV Intermittent two times a day  buPROPion XL (24-Hour) . 150 milliGRAM(s) Oral daily  chlorhexidine 2% Cloths 1 Application(s) Topical <User Schedule>  digoxin     Tablet 125 MICROGram(s) Oral daily  diltiazem    milliGRAM(s) Oral daily  mupirocin 2% Ointment 1 Application(s) Both Nostrils two times a day  pantoprazole    Tablet 40 milliGRAM(s) Oral before breakfast  rosuvastatin 5 milliGRAM(s) Oral at bedtime  spironolactone 50 milliGRAM(s) Oral daily  tadalafil 40 milliGRAM(s) Oral daily  tiotropium 2.5 MICROgram(s)/olodaterol 2.5 MICROgram(s) (STIOLTO) Inhaler 2 Puff(s) Inhalation daily      Vitals:  T(F): 97.6 (06-16), Max: 98.1 (06-16)  HR: 73 (06-16) (63 - 84)  BP: 104/66 (06-16) (96/60 - 112/67)  RR: 20 (06-16)  SpO2: 93% (06-16)  I&O's Summary    15 Nile 2022 07:01  -  16 Jun 2022 07:00  --------------------------------------------------------  IN: 550 mL / OUT: 2600 mL / NET: -2050 mL        Physical Exam:  Appearance: No acute distress; well appearing  Eyes: PERRL, EOMI, pink conjunctiva  HEENT: Normal oral mucosa  Cardiovascular: RRR, S1, S2, no murmurs, rubs, or gallops; no edema; no JVD  Respiratory: Clear to auscultation bilaterally  Gastrointestinal: soft, non-tender, non-distended with normal bowel sounds  Musculoskeletal: No clubbing; no joint deformity   Neurologic: Non-focal  Lymphatic: No lymphadenopathy  Psychiatry: AAOx3, mood & affect appropriate  Skin: No rashes, ecchymoses, or cyanosis                          8.0    5.59  )-----------( 231      ( 16 Jun 2022 07:20 )             26.8     06-16    133<L>  |  92<L>  |  72<H>  ----------------------------<  77  4.2   |  31  |  1.53<H>    Ca    9.6      16 Jun 2022 07:20  Phos  3.8     06-16  Mg     1.9     06-16    TPro  7.1  /  Alb  3.7  /  TBili  0.3  /  DBili  x   /  AST  12  /  ALT  7<L>  /  AlkPhos  108  06-16          Serum Pro-Brain Natriuretic Peptide: 45546 pg/mL (06-14 @ 02:45)

## 2022-06-16 NOTE — PROGRESS NOTE ADULT - PROBLEM SELECTOR PLAN 1
Most likely 2/2 to worsening pulm HTN and acute on chronic HFpEF w/ possible COPD exacerbation w/ superimposed pneumonia    - Reportedly CT with RUL consolidation, will be uploaded by radiology CD given to radiology department      Plan:   - Abx as below  - Avoid over oxygenation to prevent decreasing respiratory drive   - HFNC start at 40% and 40L titrate to O2 saturation of 90-93%   - C/w nocturnal AVAPs (Patient's own)   - Switch bumex to 4mg IV BID- goal 1L net negative per day  - C/w Duonebs q6  - CXR shows improving pulm edema  - Patient will need repeat CT scan when euvolemic.  - HFNC on 50% and 50L sating low 90s 6/15.

## 2022-06-16 NOTE — PROGRESS NOTE ADULT - PROBLEM SELECTOR PLAN 4
H/H 7.4/24.5 i/s/o chronic kidney disease, AOCD. On eliquis R/o occult bleed   - Anemia panel - ferritin, TIBC, Transferrin  - FOBT  -  transfusion if Hb < 7  - Maintain active type and screen.  - Abdominal pain resolved, No active bleeding hemoglobin was between 7-8 at OSH.  - continue to trend CBC

## 2022-06-16 NOTE — PROGRESS NOTE ADULT - ATTENDING COMMENTS
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     # acute hypoxemic respiratory failure   # acute on chronic decompensated diastolic HF  # acute on chronic RV failure  # pulm htn  - clinically hypervolemic  - c/w HFNC  - continue to diurese aggressively to keep net negative  - TTE reviewed, will eventually needs RHC/LHC but only once euvolemic  - c/w tadalafil, okay to hold ambrisentan    #COPD  - not in an exacerbation  - c/w inhalers, no need for systemic steroids  - chronic hypercapnic respiratory failure  - c/w AVAPS qhs and prn (home settings:  PEEP 5 inspiratory pressure range 6-20, RR 12)    # community acquired pneumonia  - started on treatment for consolidation seen on prior CT chest at [facility] on 6/10; recommended giving five days but was not started on abx here  - afebrile now, monitor off abx  - will eventually need repeat CT chest as part of lung transplant workup; had lymphadenopathy on prior CT that was never worked up, as per patient; if still present once she is more euvolemic, she will likely need EBUS .

## 2022-06-16 NOTE — PROGRESS NOTE ADULT - PROBLEM SELECTOR PLAN 5
RUL consolidation on OSH CT scan.   - OSH CT scan uploaded  - Procal 0.28  - S/p ceftriaxone 1g qdaily (06/11 - 06/15)   - legionella Ag negative- S/p Azithromycin 6/14-6/14   - Afebrile non-toxic, w/o leukocytosis   - Will culture if febrile  - Will need repeat CT scan after diuresis.

## 2022-06-16 NOTE — PROGRESS NOTE ADULT - PROBLEM SELECTOR PLAN 3
Severe advanced pulmonary hypertension with advanced COPD with potentially superimposed heart failure. Home on 8L high concentrate = 4L in hospital. Per patient saturates in the high 80s.     Plan;    - C/w home tadalafil 40mg qdaily   - ambrisentan 10mg not available per pharmacy, will need to be brought in   - C/w Duonebs q6  - No colonoscopy or mammogram in 10 years

## 2022-06-16 NOTE — PROGRESS NOTE ADULT - ATTENDING COMMENTS
66F w/ h/o pulmonary hypertension, HTN, HFpEF (echo 60% PASP 85), COPD  (home on 8L)chronic Afib , HLD,  CKD presenting with worsening volume overload and acute hypoxemic respiratory failure c/w acute on chronic HF + PNA. Improving on IV diuresis  #Acute hypoxemic respiratory failure - multifactorial from HF, pulm HTN, PNA (resolved). Bumnex 4mg IV bid, transitioned to home 02, ctm   #Acute on chronic HF - Cards following, continue bumex, possible RHC with cardiomems today  #Pulm HTN (groups 2/3) - continue tadalafil,, pulm evaluating for transplant.  #Chronic Afib - continue eliquis  #Anemia - trend hgb  #COPD/ROLANDA - AVAPS, inhalers.

## 2022-06-16 NOTE — PROGRESS NOTE ADULT - SUBJECTIVE AND OBJECTIVE BOX
DATE OF SERVICE: 06-16-22 @ 06:50    Patient is a 66y old  Female who presents with a chief complaint of 66y old  Female who presents with a chief complaint of SOB (15 Nile 2022 08:59)      SUBJECTIVE / OVERNIGHT EVENTS: NAEO. Patient afebrile o/n. Patient feels    MEDICATIONS  (STANDING):  albuterol/ipratropium for Nebulization 3 milliLiter(s) Nebulizer every 6 hours  budesonide 160 MICROgram(s)/formoterol 4.5 MICROgram(s) Inhaler 2 Puff(s) Inhalation two times a day  buMETAnide IVPB 4 milliGRAM(s) IV Intermittent two times a day  buPROPion XL (24-Hour) . 150 milliGRAM(s) Oral daily  chlorhexidine 2% Cloths 1 Application(s) Topical <User Schedule>  digoxin     Tablet 125 MICROGram(s) Oral daily  diltiazem    milliGRAM(s) Oral daily  mupirocin 2% Ointment 1 Application(s) Both Nostrils two times a day  pantoprazole    Tablet 40 milliGRAM(s) Oral before breakfast  rosuvastatin 5 milliGRAM(s) Oral at bedtime  spironolactone 50 milliGRAM(s) Oral daily  tadalafil 40 milliGRAM(s) Oral daily  tiotropium 2.5 MICROgram(s)/olodaterol 2.5 MICROgram(s) (STIOLTO) Inhaler 2 Puff(s) Inhalation daily    MEDICATIONS  (PRN):  acetaminophen     Tablet .. 650 milliGRAM(s) Oral every 6 hours PRN Temp greater or equal to 38C (100.4F), Mild Pain (1 - 3), Moderate Pain (4 - 6)      Vital Signs Last 24 Hrs  T(C): 36.6 (16 Jun 2022 04:00), Max: 37.1 (15 Nile 2022 08:27)  T(F): 97.8 (16 Jun 2022 04:00), Max: 98.8 (15 Nile 2022 08:27)  HR: 76 (16 Jun 2022 06:32) (63 - 92)  BP: 103/61 (16 Jun 2022 06:32) (96/60 - 119/69)  BP(mean): --  RR: 20 (16 Jun 2022 06:00) (18 - 20)  SpO2: 93% (16 Jun 2022 06:00) (92% - 100%)  CAPILLARY BLOOD GLUCOSE        I&O's Summary    14 Jun 2022 07:01  -  15 Nile 2022 07:00  --------------------------------------------------------  IN: 1130 mL / OUT: 2100 mL / NET: -970 mL    15 Nile 2022 07:01  -  16 Jun 2022 06:50  --------------------------------------------------------  IN: 550 mL / OUT: 1950 mL / NET: -1400 mL        PHYSICAL EXAM:  GENERAL: NAD, HFNC  HEAD:  Atraumatic, Normocephalic  EYES: EOMI, PERRLA, conjunctiva and sclera clear  ENT: Moist mucous membranes  NECK: Supple, + JVD  CHEST/LUNG: Bilateral decrease in breath sounds, no wheezing, rales or rhonchi. Unlabored respirations  HEART: Irregularly irregular rate and rhythm; Normal s1 and s2, No murmurs, rubs, or gallops  ABDOMEN: Bowel sounds present; Soft, Nontender, Nondistended. No hepatomegaly  EXTREMITIES:  2+ Peripheral Pulses, brisk capillary refill. No clubbing, cyanosis, Non pitting edema, Scar showing where skin graft was taken   NERVOUS SYSTEM:  Alert & Oriented X3, speech clear. No deficits   MSK: FROM all 4 extremities, full and equal strength  SKIN: No rashes or lesions    LABS:                        7.7    7.11  )-----------( 224      ( 15 Nile 2022 10:15 )             25.9     06-15    134<L>  |  92<L>  |  69<H>  ----------------------------<  94  4.4   |  29  |  1.54<H>    Ca    9.4      15 Nile 2022 03:07                RADIOLOGY & ADDITIONAL TESTS:    Imaging Personally Reviewed:    Consultant(s) Notes Reviewed:      Care Discussed with Consultants/Other Providers:   DATE OF SERVICE: 06-16-22 @ 06:50    Patient is a 66y old  Female who presents with a chief complaint of 66y old  Female who presents with a chief complaint of SOB (15 Nile 2022 08:59)      SUBJECTIVE / OVERNIGHT EVENTS: NAEO. Patient afebrile o/n. Patient feels better this morning, says little to no sputum production overnight. Patient endorses minimal cough. Denies CP, SOB, fevers/chills, N/V/D.     MEDICATIONS  (STANDING):  albuterol/ipratropium for Nebulization 3 milliLiter(s) Nebulizer every 6 hours  budesonide 160 MICROgram(s)/formoterol 4.5 MICROgram(s) Inhaler 2 Puff(s) Inhalation two times a day  buMETAnide IVPB 4 milliGRAM(s) IV Intermittent two times a day  buPROPion XL (24-Hour) . 150 milliGRAM(s) Oral daily  chlorhexidine 2% Cloths 1 Application(s) Topical <User Schedule>  digoxin     Tablet 125 MICROGram(s) Oral daily  diltiazem    milliGRAM(s) Oral daily  mupirocin 2% Ointment 1 Application(s) Both Nostrils two times a day  pantoprazole    Tablet 40 milliGRAM(s) Oral before breakfast  rosuvastatin 5 milliGRAM(s) Oral at bedtime  spironolactone 50 milliGRAM(s) Oral daily  tadalafil 40 milliGRAM(s) Oral daily  tiotropium 2.5 MICROgram(s)/olodaterol 2.5 MICROgram(s) (STIOLTO) Inhaler 2 Puff(s) Inhalation daily    MEDICATIONS  (PRN):  acetaminophen     Tablet .. 650 milliGRAM(s) Oral every 6 hours PRN Temp greater or equal to 38C (100.4F), Mild Pain (1 - 3), Moderate Pain (4 - 6)      Vital Signs Last 24 Hrs  T(C): 36.6 (16 Jun 2022 04:00), Max: 37.1 (15 Nile 2022 08:27)  T(F): 97.8 (16 Jun 2022 04:00), Max: 98.8 (15 Nile 2022 08:27)  HR: 76 (16 Jun 2022 06:32) (63 - 92)  BP: 103/61 (16 Jun 2022 06:32) (96/60 - 119/69)  BP(mean): --  RR: 20 (16 Jun 2022 06:00) (18 - 20)  SpO2: 93% (16 Jun 2022 06:00) (92% - 100%)  CAPILLARY BLOOD GLUCOSE        I&O's Summary    14 Jun 2022 07:01  -  15 Nile 2022 07:00  --------------------------------------------------------  IN: 1130 mL / OUT: 2100 mL / NET: -970 mL    15 Nile 2022 07:01  -  16 Jun 2022 06:50  --------------------------------------------------------  IN: 550 mL / OUT: 1950 mL / NET: -1400 mL        PHYSICAL EXAM:  GENERAL: NAD, HFNC  HEAD:  Atraumatic, Normocephalic  EYES: EOMI, PERRLA, conjunctiva and sclera clear  ENT: Moist mucous membranes  NECK: Supple, + JVD  CHEST/LUNG: Bilateral decrease in breath sounds, no wheezing, rales or rhonchi. Unlabored respirations  HEART: Irregularly irregular rate and rhythm; Normal s1 and s2, No murmurs, rubs, or gallops  ABDOMEN: Bowel sounds present; Soft, Nontender, Nondistended. No hepatomegaly  EXTREMITIES:  2+ Peripheral Pulses, brisk capillary refill. No clubbing, cyanosis, Non pitting edema, Scar showing where skin graft was taken   NERVOUS SYSTEM:  Alert & Oriented X3, speech clear. No deficits   MSK: FROM all 4 extremities, full and equal strength  SKIN: No rashes or lesions    LABS:                        7.7    7.11  )-----------( 224      ( 15 Nile 2022 10:15 )             25.9     06-15    134<L>  |  92<L>  |  69<H>  ----------------------------<  94  4.4   |  29  |  1.54<H>    Ca    9.4      15 Nile 2022 03:07                RADIOLOGY & ADDITIONAL TESTS:    Imaging Personally Reviewed:    Consultant(s) Notes Reviewed:      Care Discussed with Consultants/Other Providers:

## 2022-06-16 NOTE — PROGRESS NOTE ADULT - ASSESSMENT
This is a 66 year old female with history of severe pulmonary HTN (Group 1, 2 & 3), HFpEF, Afib, COPD on home O2 (5L NC), CKD, ROLANDA on CPAP, morbid obesity ( s/p bariatric surgery in 2012 with subsequent lap band removal due to GI bleed), PE, CVA (MCA infarct in 2012) who comes in to establish care with heart failure due to her pulmonary HTN who who presented as a transfer from [place] for 1-2 weeks of fatigue, lethargy, progressive dyspnea on exertion.     Studies:   6/2022 TTE:  Normal LVEF, dilated LA, decreased RV function with EV enlargement, moderate TR, PASP 85, severe pulm HTN   5/2013 Cardiac Cath/PCI: Cardiac Cath: right dominant circulation, left main arises normally from the left coronary sinus of Valsalva, bifurcates into LAD and circumflex, left main normal, LAD arises normally from the left main normal, left circumflex arises normally from the left main and terminates in the AV groove normal, RCA arises normally from the right sinus of Valsalva, RCA is normal,     Hemodynamics:  RHC (6/2020): RA 2, RV 60/5, PAP 58/18/33, CO/CI: 6.5/3.3

## 2022-06-16 NOTE — PROGRESS NOTE ADULT - PROBLEM SELECTOR PLAN 1
-back to her home O2 requirement of 8 L  -Appreciate pulmonary team input  -She will eventually need consideration for lung transplant   -reportedly on ambresentan and tadalafil at home, on tadalafil while inpatient

## 2022-06-16 NOTE — PROGRESS NOTE ADULT - ASSESSMENT
Patient is a 65 y/o F w/ pmh of pulmonary hypertension, HTN, HFpEF (echo 60% PASP 79mmhg), COPD  (8L), Afib (in digoxin, diltiazem), HLD, prior spont pneumothorax, and CKD who presents as transfer from [facility] with 1-2 weeks of fatigue, lethargy, and progressively worsening RINALDI concerning for progression of pulm HTN/COPD vs. acute on chronic HFpEF, TTE revealed normal LV systolic fxn and severe pulm HTN, undergoing diuresis, pending RHC/cardio mems and repeat imaging once euvolemic

## 2022-06-16 NOTE — PROGRESS NOTE ADULT - SUBJECTIVE AND OBJECTIVE BOX
CHIEF COMPLAINT:  shortness of breath, dyspnea on exertion     Interval events:  - patient seen and examined this AM, states overall that breathing is improved compared with prior exam  - transitioned from bumex gtt to 4 mg IV BID          PAST MEDICAL & SURGICAL HISTORY:  COPD exacerbation      Severe pulmonary hypertension      Chronic kidney disease, unspecified CKD stage      Acute on chronic diastolic congestive heart failure      Pulmonary embolism      Anxiety and depression      GERD (gastroesophageal reflux disease)      Obstructive sleep apnea      Chronic atrial fibrillation      H/O pneumonectomy      Right leg weakness          FAMILY HISTORY:  No pertinent family history in parents        SOCIAL HISTORY:  Smoking: [ ] Never Smoked [x ] Former Smoker (__ packs x ___ years) [ ] Current Smoker  (__ packs x ___ years)  no current etoh    Allergies    nitrates (Unknown)    Intolerances        HOME MEDICATIONS:  Home Medications:  ambrisentan 10 mg oral tablet: 1 tab(s) orally once a day (2022 03:27)  apixaban 5 mg oral tablet: 1 tab(s) orally 2 times a day (2022 03:26)  buPROPion 100 mg oral tablet: 1 tab(s) orally 2 times a day (2022 03:31)  digoxin 125 mcg (0.125 mg) oral tablet: 1 tab(s) orally every other day (at bedtime) (2022 03:28)  dilTIAZem 120 mg/24 hours oral capsule, extended release: 1 cap(s) orally once a day (2022 03:31)  pantoprazole 40 mg oral delayed release tablet: 1 tab(s) orally once a day (2022 03:27)  rosuvastatin 5 mg oral tablet: 1 tab(s) orally once a day (2022 03:27)  spironolactone 50 mg oral tablet: 1 tab(s) orally once a day (2022 03:29)  tadalafil 20 mg oral tablet: 2 tab(s) orally once a day (2022 03:29)  torsemide 20 mg oral tablet: 2 tab(s) orally once a day (2022 03:30)      REVIEW OF SYSTEMS:  Constitutional: [x ] negative fevers or chills  [ ] fevers [ ] chills [ ] weight loss [ ] weight gain  HEENT: [ ] negative [ ] dry eyes [ ] eye irritation [ ] postnasal drip [ ] nasal congestion  CV: [ ] negative  [ ] chest pain [ ] orthopnea [ ] palpitations [ ] murmur  Resp: [ ] negative [ ] cough [x ] shortness of breath [ x] dyspnea [ ] wheezing [ ] sputum [ ] hemoptysis  GI: [ ] negative [ ] nausea [ ] vomiting [ ] diarrhea [ ] constipation [ ] abd pain [ ] dysphagia   : [ ] negative [ ] dysuria [ ] nocturia [ ] hematuria [ ] increased urinary frequency  Musculoskeletal: [ ] negative [ ] back pain [ ] myalgias [ ] arthralgias [ ] fracture  Skin: [ ] negative [ ] rash [ ] itch  Neurological: [ ] negative [ ] headache [ ] dizziness [ ] syncope [ ] weakness [ ] numbness  Psychiatric: [ ] negative [ ] anxiety [ ] depression  Endocrine: [ ] negative [ ] diabetes [ ] thyroid problem  Hematologic/Lymphatic: [ ] negative [ ] anemia [ ] bleeding problem  Allergic/Immunologic: [ ] negative [ ] itchy eyes [ ] nasal discharge [ ] hives [ ] angioedema  [ x] All other systems negative  [ ] Unable to assess ROS because ________    OBJECTIVE:  ICU Vital Signs Last 24 Hrs  T(C): 36.6 (2022 08:21), Max: 36.6 (2022 08:21)  T(F): 97.8 (2022 08:21), Max: 97.8 (2022 08:21)  HR: 78 (2022 08:21) (71 - 78)  BP: 114/64 (2022 08:21) (112/75 - 120/69)  BP(mean): --  ABP: --  ABP(mean): --  RR: 20 (2022 08:21) (18 - 22)  SpO2: 93% (2022 08:21) (92% - 100%)        06-13 @ 07:01  -  06-14 @ 07:00  --------------------------------------------------------  IN: 240 mL / OUT: 400 mL / NET: -160 mL      CAPILLARY BLOOD GLUCOSE      POCT Blood Glucose.: 114 mg/dL (2022 07:22)      PHYSICAL EXAM:  General:  no acute distress   HEENT: atraumatic, normocephalic   Neck:  thick neck   Respiratory:  no crackles, no cough/wheezes, no use of accessory muscles of respiration    Cardiovascular:  RRR, no rubs, gallops, m urmurs   Abdomen: obese, non-tender   Extremities: bilateral edema, no significant cyanosis   Skin: no rash   Neurological: no gross/focal deficits appreciated    Psychiatry: appropriate     LINES:     HOSPITAL MEDICATIONS:  Standing Meds:  albuterol/ipratropium for Nebulization 3 milliLiter(s) Nebulizer every 6 hours  apixaban 5 milliGRAM(s) Oral two times a day  budesonide 160 MICROgram(s)/formoterol 4.5 MICROgram(s) Inhaler 2 Puff(s) Inhalation two times a day  buPROPion XL (24-Hour) . 150 milliGRAM(s) Oral daily  digoxin     Tablet 125 MICROGram(s) Oral daily  diltiazem    milliGRAM(s) Oral daily  furosemide   Injectable 40 milliGRAM(s) IV Push two times a day  pantoprazole    Tablet 40 milliGRAM(s) Oral before breakfast  rosuvastatin 5 milliGRAM(s) Oral at bedtime  spironolactone 50 milliGRAM(s) Oral daily  tadalafil 40 milliGRAM(s) Oral daily  tiotropium 2.5 MICROgram(s)/olodaterol 2.5 MICROgram(s) (STIOLTO) Inhaler 2 Puff(s) Inhalation daily      PRN Meds:  acetaminophen     Tablet .. 650 milliGRAM(s) Oral every 6 hours PRN      LABS:                        7.4    8.44  )-----------( 205      ( 2022 02:45 )             24.5     Hgb Trend: 7.4<--  06-14    133<L>  |  94<L>  |  63<H>  ----------------------------<  115<H>  3.9   |  27  |  1.29    Ca    9.3      2022 02:45  Phos  3.2     06-14  Mg     1.7     -14    TPro  7.0  /  Alb  3.8  /  TBili  0.3  /  DBili  x   /  AST  14  /  ALT  10  /  AlkPhos  105  06-14    Creatinine Trend: 1.29<--  PT/INR - ( 2022 02:45 )   PT: 27.0 sec;   INR: 2.31 ratio         PTT - ( 2022 02:45 )  PTT:31.3 sec  Urinalysis Basic - ( 2022 02:31 )    Color: Light Yellow / Appearance: Clear / S.011 / pH: x  Gluc: x / Ketone: Negative  / Bili: Negative / Urobili: Negative   Blood: x / Protein: Negative / Nitrite: Negative   Leuk Esterase: Negative / RBC: x / WBC x   Sq Epi: x / Non Sq Epi: x / Bacteria: x      Arterial Blood Gas:   @ 02:39  7.44/44/178/30/97.6/5.0  ABG lactate: --        MICROBIOLOGY:       RADIOLOGY:  [ ] Reviewed and interpreted by me    PULMONARY FUNCTION TESTS:    EKG:

## 2022-06-16 NOTE — PROGRESS NOTE ADULT - PROBLEM SELECTOR PLAN 2
-continue with bumex to 4mg IV BID today  -Plan RHC and cardiomems tomorrow, please hold eliquis starting today.

## 2022-06-16 NOTE — PROGRESS NOTE ADULT - ASSESSMENT
Patient is a 67 y/o F w/ pmh of pulmonary hypertension, HTN, HFpEF (echo 60% PASP 79mmhg), COPD  (8L), Afib (in digoxin, diltiazem), HLD, prior spont pneumothorax, and CKD who presents as transfer from [facility] with 1-2 weeks of fatigue, lethargy, and progressively worsening RINALDI concerning for progression of pulm HTN/COPD vs. acute on chronic HFpEF, TTE revealed normal LV systolic fxn and severe pulm HTN, undergoing diuresis, pending RHC/cardio mems and repeat imaging once euvolemic

## 2022-06-16 NOTE — PROGRESS NOTE ADULT - PROBLEM SELECTOR PLAN 2
Reported as HFpEF although unclear exact etiology. Most likely 2/2 to pulmonary HTN and WHO class III. Differential includes ICM vs. NICM vs. RHF from Pulm HTN.    Plan:   - CXR shows improving Pulm edema  - TTE showed severe pulm HTN, normal LV systolic fxn, RV enlargement w/ decreased systolic fxn  - No PFO on limited TTE w/ bubble study   - Switch bumex to 4mg IV BID- goal 1L net negative per day  - C/w home Spironolactone  - BNP 12,525 at OSH, now 15,334   - Trend BNP q72 hours   - Strict ins and outs   - Daily standing weights  - Replete K > 4, Mg > 2, and Phos > 3  - Eventual RHC/Cardio mems per cards/HF

## 2022-06-16 NOTE — PROGRESS NOTE ADULT - ASSESSMENT
Victoria Domingo is a 66 year old female w/pulmonary HTN, HTN, HFpEF, COPD (on 8 L at home), afib, HLD, prior spontaneous pneumothorax, and CKD who presents to [facility] as a transfer from [facility] with reported 1-2 weeks of fatigue, lethargy, and progressively worsening shortness of breath.  She has reportedly been hospitalized 4 times in the past year.  Per chart review, she was initially diuresed at OSH and started on ceftriaxone as CT scan showed RUL consolidation concerning for pneumonia.  She was requiring HFNC to maintain normal O2 saturations and was subsequently transferred to [facility] for further management.      #Acute hypoxic respiratory failure  - etiology likely multifactorial and related to volume overload given physical exam findings, significantly elevated BNP  - patient afebrile, without white count, but previously treated at OSH with ceftriaxone given concern for pneumonia    #Pulm HTN  - patient w/multiple reported hospitalizations in past year requiring diuresis, also states she has had previous RHC  - reportedly on ambresentan and tadalafil at home, on tadalafil while inaptient   - currently on bumex 4 mg BID for diuresis  - heart failure following, per notes plan for RHC with possible cardiomems placement   - TTE demonstrating RV enlargement, decreased RV systolic function, and PASP 85 mm Hg    #COPD  - patient reportedly on 8 L at home, currently requiring HFNC to maintain normal O2 saturations but presentation not consistent with exacerbation    Recommendations:  - agree w/bumex 4 mg IV BID  - please order patient's home AVAPs for night time use and ensure AVAPs initiated at night   - please provide and encourage use of incentive spirometry while inpatinet  - ensure patient has PT/OT  - f/u any pulmonary transplant recommendations    Note incomplete    Patient to be discussed w/Dr. Vince Knott PGY4  [id number] Victoria Domingo is a 66 year old female w/pulmonary HTN, HTN, HFpEF, COPD (on 8 L at home), afib, HLD, prior spontaneous pneumothorax, and CKD who presents to [facility] as a transfer from [facility] with reported 1-2 weeks of fatigue, lethargy, and progressively worsening shortness of breath.  She has reportedly been hospitalized 4 times in the past year.  Per chart review, she was initially diuresed at OSH and started on ceftriaxone as CT scan showed RUL consolidation concerning for pneumonia.  She was requiring HFNC to maintain normal O2 saturations and was subsequently transferred to [facility] for further management.      #Acute hypoxic respiratory failure--improving  - etiology likely multifactorial and related to volume overload given physical exam findings, significantly elevated BNP  - patient afebrile, without white count, but previously treated at OSH with ceftriaxone given concern for pneumonia  - O2 requirements improving w/diuresis     #Pulm HTN  - patient w/multiple reported hospitalizations in past year requiring diuresis, also states she has had previous RHC  - reportedly on ambresentan and tadalafil at home, on tadalafil while inaptient   - currently on bumex 4 mg BID for diuresis  - heart failure following, per notes plan for RHC with possible cardiomems placement   - TTE demonstrating RV enlargement, decreased RV systolic function, and PASP 85 mm Hg    #COPD  - patient reportedly on 8 L at home, now w/improving O2 requirements as above   - presentation not consistent with exacerbation    Recommendations:  - agree w/bumex 4 mg IV BID for now, monitor strict Is/Os and diurese patient to net negative fluid balance   - please order patient's home AVAPs for night time use and ensure AVAPs initiated at night   - please provide and encourage use of incentive spirometry while inpatinet  - ensure patient has PT/OT  - f/u any pulmonary transplant recommendations  - f/u heart failure recommendations    Patient discussed w/Dr. Vince Knott PGY4  [id number]

## 2022-06-16 NOTE — PROGRESS NOTE ADULT - PROBLEM SELECTOR PLAN 4
RUL consolidation on OSH CT scan.   - OSH CT scan uploaded  - Procal 0.28  - S/p ceftriaxone 1g qdaily (06/11 - 06/15)   - legionella Ag negative- S/p Azithromycin 6/14-6/14   - Afebrile non-toxic, w/o leukocytosis   - Will culture if febrile  - Will need repeat CT scan after diuresis

## 2022-06-17 NOTE — PROGRESS NOTE ADULT - PROBLEM SELECTOR PLAN 6
C/w home tadalafil 40mg qdaily   - ambrisentan 10mg ordered by pharmacy, patient will need proof of enrollment in clinical trial, Dr. Gallardo's (pulm) office called and left a message  - F/u Pulm, transplant pulm.

## 2022-06-17 NOTE — PROGRESS NOTE ADULT - PROBLEM SELECTOR PLAN 2
-continue with bumex to 4mg IV BID today  -Plan RHC and cardiomems today, please hold eliquis, restart after RHC

## 2022-06-17 NOTE — PROGRESS NOTE ADULT - ATTENDING COMMENTS
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     # acute hypoxemic respiratory failure   # acute on chronic decompensated diastolic HF  # acute on chronic RV failure  # pulm htn  - clinically hypervolemic still  - c/w HFNC  - continue to diurese aggressively to keep net negative  - TTE reviewed, planning on RHC today  - c/w tadalafil, okay to hold ambrisentan    #COPD  - not in an exacerbation  - c/w inhalers, no need for systemic steroids  - chronic hypercapnic respiratory failure  - c/w AVAPS qhs and prn (home settings:  PEEP 5 inspiratory pressure range 6-20, RR 12)    # community acquired pneumonia  - started on treatment for consolidation seen on prior CT chest at [facility] on 6/10; recommended giving five days but was not started on abx here  - afebrile now, monitor off abx  - will eventually need repeat CT chest as part of lung transplant workup; had lymphadenopathy on prior CT that was never worked up, as per patient; if still present once she is more euvolemic, she will likely need EBUS as part of transplant workup

## 2022-06-17 NOTE — PROGRESS NOTE ADULT - PROBLEM SELECTOR PLAN 1
-back to her home O2 requirement of 8 L  -will get RHC and Cardiomems today  -Appreciate pulmonary team input  -She will eventually need consideration for lung transplant   -reportedly on ambresentan and tadalafil at home, on tadalafil while inpatient

## 2022-06-17 NOTE — PROGRESS NOTE ADULT - ATTENDING COMMENTS
66F w/ h/o pulmonary hypertension, HTN, HFpEF (echo 60% PASP 85), COPD  (home on 8L)chronic Afib , HLD,  CKD presenting with worsening volume overload and acute hypoxemic respiratory failure c/w acute on chronic HF + PNA. Improving on IV diuresis  #Acute hypoxemic respiratory failure - multifactorial from HF, pulm HTN, PNA (resolved). Bumex 4mg IV bid, on home O2  #Acute on chronic HF - Cards following, continue bumex, RHC with cardiomems today  #Pulm HTN (groups 2/3) - continue tadalafil,, pulm evaluating for transplant, needs to lose weight  #Chronic Afib - continue eliquis  #Anemia - trend hgb  #COPD/ROLANDA - AVAPS, inhalers.

## 2022-06-17 NOTE — PROGRESS NOTE ADULT - SUBJECTIVE AND OBJECTIVE BOX
HPI:  65 y/o F w/ pmh of pulmonary hypertension, HFpEF (echo 60% PASP 79mmhg), COPD (8L), Afib (in digoxin, diltiazem), HLD, prior spontaneous pneumothorax, and CKD who presented from [facility] with fatigue, lethargy, worsening RINALDI.  She was fluid overloaded with suspicion of pneumonia, treated with diuresis and antibiotics.  Patient was transferred to [facility] for further management.    Patient seen at bedside, on room air, in no acute distress. She states her breathing is better with diuresis.  She is motivated to participate in physical therapy. She states she is having a cardiac procedure done today.        Review Of Systems:  Constitutional: [ ] Fever [ ] Chills [ ] Fatigue [ ] Weight change   HEENT: [ ] Blurred vision [ ] Eye Pain [ ] Headache [ ] Runny nose [ ] Sore Throat   Respiratory: [ ] Cough [ ] Wheezing [ ] Shortness of breath  Cardiovascular: [ ] Chest Pain [ ] Palpitations [ ] RINALDI [ ] PND [ ] Orthopnea  Gastrointestinal: [ ] Abdominal Pain [ ] Diarrhea [ ] Constipation [ ] Hemorrhoids [ ] Nausea [ ] Vomiting  Genitourinary: [ ] Nocturia [ ] Dysuria [ ] Incontinence  Extremities: [ ] Swelling [ ] Joint Pain  Neurologic: [ ] Focal deficit [ ] Paresthesias [ ] Syncope  Lymphatic: [ ] Swelling [ ] Lymphadenopathy   Skin: [ ] Rash [ ] Ecchymoses [ ] Wounds [ ] Lesions  Psychiatry: [ ] Depression [ ] Suicidal/Homicidal Ideation [ ] Anxiety [ ] Sleep Disturbances  [x] 10 point review of systems is otherwise negative except as mentioned above            [ ]Unable to obtain    Medications:  acetaminophen Tablet .. 650 milliGRAM(s) Oral every 6 hours PRN  albuterol/ipratropium for Nebulization 3 milliLiter(s) Nebulizer every 6 hours  budesonide 160 MICROgram(s)/formoterol 4.5 MICROgram(s) Inhaler 2 Puff(s) Inhalation two times a day  buMETAnide IVPB 4 milliGRAM(s) IV Intermittent two times a day  buPROPion XL (24-Hour) . 150 milliGRAM(s) Oral daily  chlorhexidine 2% Cloths 1 Application(s) Topical <User Schedule>  digoxin  Tablet 125 MICROGram(s) Oral daily  diltiazem  milliGRAM(s) Oral daily  fluticasone propionate 50 MICROgram(s)/spray Nasal Spray 1 Spray(s) Both Nostrils two times a day PRN  mupirocin 2% Ointment 1 Application(s) Both Nostrils two times a day  pantoprazole Tablet 40 milliGRAM(s) Oral before breakfast  rosuvastatin 5 milliGRAM(s) Oral at bedtime  spironolactone 50 milliGRAM(s) Oral daily  tadalafil 40 milliGRAM(s) Oral daily  tiotropium 2.5 MICROgram(s)/olodaterol 2.5 MICROgram(s) (STIOLTO) Inhaler 2 Puff(s) Inhalation daily    PMH/PSH/FH/SH: [x] Unchanged  Vitals:  T(C): 36.4 (06-17-22 @ 11:20), Max: 37.2 (06-17-22 @ 00:04)  HR: 84 (06-17-22 @ 11:20) (67 - 87)  BP: 106/69 (06-17-22 @ 11:20) (101/60 - 122/69)  BP(mean): --  RR: 18 (06-17-22 @ 11:20) (18 - 20)  SpO2: 89% (06-17-22 @ 11:20) (87% - 95%)    I&O's Summary    16 Jun 2022 07:01  -  17 Jun 2022 07:00  --------------------------------------------------------  IN: 720 mL / OUT: 3200 mL / NET: -2480 mL        Physical Exam:  Appearance: ON 10l nc, comfortable  Cardiovascular: regular rate and rhythm   Respiratory: bibasilar crackles, no wheezing   Gastrointestinal: obese, soft, non-tender, non-distended, +BS  Extremities: warm and well perfused, +++pitting edema     06-17    132<L>  |  94<L>  |  75<H>  ----------------------------<  63<L>  4.4   |  24  |  1.47<H>    Ca    9.5      17 Jun 2022 05:58  Phos  3.4     06-17  Mg     1.9     06-17    TPro  7.1  /  Alb  3.7  /  TBili  0.3  /  DBili  x   /  AST  12  /  ALT  7<L>  /  AlkPhos  108  06-16    Serum Pro-Brain Natriuretic Peptide: 84421 pg/mL (06-14 @ 02:45

## 2022-06-17 NOTE — PROGRESS NOTE ADULT - ASSESSMENT
This is a 66 year old female with history of severe pulmonary HTN (Group 1, 2 & 3), HFpEF, Afib, COPD on home O2 (5L NC), CKD, ROLANDA on CPAP, morbid obesity ( s/p bariatric surgery in 2012 with subsequent lap band removal due to GI bleed), PE, CVA (MCA infarct in 2012) who comes in to establish care with heart failure due to her pulmonary HTN who who presented as a transfer from [place] for 1-2 weeks of fatigue, lethargy, progressive dyspnea on exertion.     Studies:   6/2022 TTE:  Normal LVEF, dilated LA, decreased RV function with EV enlargement, moderate TR, PASP 85, severe pulm HTN   5/2013 Cardiac Cath/PCI: Cardiac Cath: right dominant circulation, left main arises normally from the left coronary sinus of Valsalva, bifurcates into LAD and circumflex, left main normal, LAD arises normally from the left main normal, left circumflex arises normally from the left main and terminates in the AV groove normal, RCA arises normally from the right sinus of Valsalva, RCA is normal,     Hemodynamics:  RHC (6/2020): RA 2, RV 60/5, PAP 58/18/33, CO/CI: 6.5/3.3

## 2022-06-17 NOTE — PROGRESS NOTE ADULT - ASSESSMENT
Victoria Domingo is a 66 year old female w/pulmonary HTN, HTN, HFpEF, COPD (on 8 L at home), afib, HLD, prior spontaneous pneumothorax, and CKD who presents to [facility] as a transfer from [facility] with reported 1-2 weeks of fatigue, lethargy, and progressively worsening shortness of breath.  She has reportedly been hospitalized 4 times in the past year.  Per chart review, she was initially diuresed at OSH and started on ceftriaxone as CT scan showed RUL consolidation concerning for pneumonia.  She was requiring HFNC to maintain normal O2 saturations and was subsequently transferred to [facility] for further management.      #Acute hypoxic respiratory failure--improving  - etiology likely multifactorial and related to volume overload given physical exam findings, significantly elevated BNP  - patient afebrile, without white count, but previously treated at OSH with ceftriaxone given concern for pneumonia  - O2 requirements improving w/diuresis     #Pulm HTN  - patient w/multiple reported hospitalizations in past year requiring diuresis, also states she has had previous RHC  - reportedly on ambresentan and tadalafil at home, on tadalafil while inaptient   - currently on bumex 4 mg BID for diuresis  - heart failure following, per notes plan for RHC with possible cardiomems placement   - TTE demonstrating RV enlargement, decreased RV systolic function, and PASP 85 mm Hg    #COPD  - patient reportedly on 8 L at home, now w/improving O2 requirements as above   - presentation not consistent with exacerbation    Recommendations:  - agree w/bumex 4 mg IV BID for now, monitor strict Is/Os and diurese patient to net negative fluid balance   - please order patient's home AVAPs for night time use and ensure AVAPs initiated at night   - please provide and encourage use of incentive spirometry while inpatinet  - ensure patient has PT/OT  - f/u any pulmonary transplant recommendations  - f/u heart failure recommendations    Note incomplete     Patient to be discussed w/Dr. Vince Knott PGY4  [id number] Victoria Domingo is a 66 year old female w/pulmonary HTN, HTN, HFpEF, COPD (on 8 L at home), afib, HLD, prior spontaneous pneumothorax, and CKD who presents to [facility] as a transfer from [facility] with reported 1-2 weeks of fatigue, lethargy, and progressively worsening shortness of breath.  She has reportedly been hospitalized 4 times in the past year.  Per chart review, she was initially diuresed at OSH and started on ceftriaxone as CT scan showed RUL consolidation concerning for pneumonia.  She was requiring HFNC to maintain normal O2 saturations and was subsequently transferred to [facility] for further management.      #Acute hypoxic respiratory failure--improving  - etiology likely multifactorial and related to volume overload given physical exam findings, significantly elevated BNP  - patient afebrile, without white count, but previously treated at OSH with ceftriaxone given concern for pneumonia  - O2 requirements improving w/diuresis, patient now on home O2 (8L)    #Pulm HTN  - patient w/multiple reported hospitalizations in past year requiring diuresis, also states she has had previous RHC  - reportedly on ambresentan and tadalafil at home, on tadalafil while inaptient   - currently on bumex 4 mg BID for diuresis  - heart failure following, to undergo RHC w/cardiomems placement 6/17  - TTE demonstrating RV enlargement, decreased RV systolic function, and PASP 85 mm Hg    #COPD  - patient reportedly on 8 L at home, now w/improving O2 requirements as above   - presentation not consistent with exacerbation    Recommendations:  - agree w/bumex 4 mg IV BID for now, monitor strict Is/Os and diurese patient to net negative fluid balance   - f/u results of RHC   - continue home AVAPs qhs  - please provide and encourage use of incentive spirometry while inpatient  - ensure patient has PT/OT  - f/u any pulmonary transplant recommendations  - f/u heart failure recommendations    Patient discussed w/Dr. Vince Knott PGY4  [id number]

## 2022-06-17 NOTE — PROGRESS NOTE ADULT - PROBLEM SELECTOR PLAN 2
Reported as HFpEF although unclear exact etiology. Most likely 2/2 to pulmonary HTN and WHO class III. Differential includes ICM vs. NICM vs. RHF from Pulm HTN.    Plan:   - CXR shows improving Pulm edema  - TTE showed severe pulm HTN, normal LV systolic fxn, RV enlargement w/ decreased systolic fxn  - No PFO on limited TTE w/ bubble study   - Switch bumex to 4mg IV BID- goal 1L net negative per day  - C/w home Spironolactone  - BNP 12,525 at OSH, now 15,334   - Trend BNP q72 hours   - Strict ins and outs   - Daily standing weights  - Replete K > 4, Mg > 2, and Phos > 3  - Eventual RHC/Cardio mems per cards/HF. Reported as HFpEF although unclear exact etiology. Most likely 2/2 to pulmonary HTN and WHO class III. Differential includes ICM vs. NICM vs. RHF from Pulm HTN.    Plan:   - CXR shows improving Pulm edema  - TTE showed severe pulm HTN, normal LV systolic fxn, RV enlargement w/ decreased systolic fxn  - No PFO on limited TTE w/ bubble study   - C/w bumex 4mg IV BID- goal 1L net negative per day  - C/w home Spironolactone  - BNP 12,525 at OSH, now 15,334   - Trend BNP q72 hours   - Strict ins and outs   - Daily standing weights  - Replete K > 4, Mg > 2, and Phos > 3  - Eventual RHC/Cardio mems per cards/HF.

## 2022-06-17 NOTE — PROGRESS NOTE ADULT - PROBLEM SELECTOR PLAN 1
Most likely 2/2 to worsening pulm HTN and acute on chronic HFpEF w/ possible COPD exacerbation w/ superimposed pneumonia        Plan:   - Abx as below  - Avoid over oxygenation to prevent decreasing respiratory drive   - HFNC start at 40% and 40L titrate to O2 saturation of 90-93%   - C/w nocturnal AVAPs (Patient's own)   - Switch bumex to 4mg IV BID- goal 1L net negative per day  - C/w Duonebs q6  - CXR shows improving pulm edema  - Patient will need repeat CT scan when euvolemic.  - HFNC on 50% and 50L sating low 90s 6/15. Most likely 2/2 to worsening pulm HTN and acute on chronic HFpEF w/ possible COPD exacerbation w/ superimposed pneumonia        Plan:   - Abx as below  - Avoid over oxygenation to prevent decreasing respiratory drive   - HFNC start at 40% and 40L titrate to O2 saturation of 90-93%   - C/w nocturnal AVAPs (Patient's own)   - C/w bumex 4mg IV BID- goal 1L net negative per day  - C/w Duonebs q6  - CXR shows improving pulm edema  - Patient will need repeat CT scan when euvolemic.  - HFNC on 50% and 50L sating low 90s 6/15.

## 2022-06-17 NOTE — PROGRESS NOTE ADULT - ASSESSMENT
67 y/o F w/ pmh of pulmonary hypertension, HTN, HFpEF (echo 60% PASP 79mmhg), COPD (8L), Afib (in digoxin, diltiazem), HLD, prior spontaneous pneumothorax, and CKD who presented from [facility] with fatigue, lethargy, worsening RINALDI.  She was fluid overloaded with suspicion of pneumonia, treated with diuresis and antibiotics.  Patient was transferred to [facility] for further management. Transplant pulmonology consulted lung transplant evaluation.    #pre lung transplant  #fluid overload  #PAH  -aggressive diuresis needed prior to launching eval  -management of PH per cardiology/primary team  -Needs to lose weight   -motivated for lung transplant eval    Above discussed with Dr. Reaves   67 y/o F w/ pmh of pulmonary hypertension, HTN, HFpEF (echo 60% PASP 79mmhg), COPD (8L), Afib (in digoxin, diltiazem), HLD, prior spontaneous pneumothorax, and CKD who presented from [facility] with fatigue, lethargy, worsening RINALDI.  She was fluid overloaded with suspicion of pneumonia, treated with diuresis and antibiotics.  Patient was transferred to [facility] for further management. Transplant pulmonology consulted lung transplant evaluation.    #pre lung transplant  #fluid overload  #PAH  -aggressive diuresis needed prior to launching eval  -management of PH per cardiology/primary team  -Cardimems scheduled for today  -Needs to lose weight   -motivated for lung transplant eval    Above discussed with Dr. Reaves

## 2022-06-17 NOTE — PROGRESS NOTE ADULT - ATTENDING COMMENTS
Patient diuresing really well on bumex 4mg IV BID. Planned for cardiomems today however received eliquis so will plan for cardiomems on Monday

## 2022-06-17 NOTE — PROGRESS NOTE ADULT - ASSESSMENT
Patient is a 65 y/o F w/ pmh of pulmonary hypertension, HTN, HFpEF (echo 60% PASP 79mmhg), COPD  (8L), Afib (in digoxin, diltiazem), HLD, prior spont pneumothorax, and CKD who presents as transfer from [facility] with 1-2 weeks of fatigue, lethargy, and progressively worsening RINALDI concerning for progression of pulm HTN/COPD vs. acute on chronic HFpEF, TTE revealed normal LV systolic fxn and severe pulm HTN, undergoing diuresis, pending RHC/cardio mems and repeat imaging once euvolemic

## 2022-06-17 NOTE — PROGRESS NOTE ADULT - PROBLEM SELECTOR PLAN 5
RUL consolidation on OSH CT scan.   - OSH CT scan uploaded  - Procal 0.28  - S/p ceftriaxone 1g qdaily (06/11 - 06/15)   - legionella Ag negative- S/p Azithromycin 6/14   - Afebrile non-toxic, w/o leukocytosis   - Will culture if febrile  - Will need repeat CT scan after diuresis.

## 2022-06-17 NOTE — PROGRESS NOTE ADULT - PROBLEM SELECTOR PLAN 3
Severe advanced pulmonary hypertension with advanced COPD with potentially superimposed heart failure. Home on 8L high concentrate = 4L in hospital. Per patient saturates in the high 80s.     Plan;    - C/w home tadalafil 40mg qdaily   - ambrisentan 10mg ordered by pharmacy, patient will need proof of enrollment in clinical trial, Dr. Gallardo's (pul) office called and left a message  - C/w Duonebs q6  - No colonoscopy or mammogram in 10 years.

## 2022-06-17 NOTE — PROGRESS NOTE ADULT - SUBJECTIVE AND OBJECTIVE BOX
Ashley Paz MD  Cardiology Fellow  [phone]  All Cardiology service information can be found 24/7 on [email or web address], password: [email or web address]      Overnight Events: patient continues to diurese well.     Review Of Systems: No chest pain, shortness of breath, or palpitations            Current Meds:  acetaminophen     Tablet .. 650 milliGRAM(s) Oral every 6 hours PRN  albuterol/ipratropium for Nebulization 3 milliLiter(s) Nebulizer every 6 hours  budesonide 160 MICROgram(s)/formoterol 4.5 MICROgram(s) Inhaler 2 Puff(s) Inhalation two times a day  buMETAnide IVPB 4 milliGRAM(s) IV Intermittent two times a day  buPROPion XL (24-Hour) . 150 milliGRAM(s) Oral daily  chlorhexidine 2% Cloths 1 Application(s) Topical <User Schedule>  digoxin     Tablet 125 MICROGram(s) Oral daily  diltiazem    milliGRAM(s) Oral daily  fluticasone propionate 50 MICROgram(s)/spray Nasal Spray 1 Spray(s) Both Nostrils two times a day PRN  mupirocin 2% Ointment 1 Application(s) Both Nostrils two times a day  pantoprazole    Tablet 40 milliGRAM(s) Oral before breakfast  rosuvastatin 5 milliGRAM(s) Oral at bedtime  spironolactone 50 milliGRAM(s) Oral daily  tadalafil 40 milliGRAM(s) Oral daily  tiotropium 2.5 MICROgram(s)/olodaterol 2.5 MICROgram(s) (STIOLTO) Inhaler 2 Puff(s) Inhalation daily      Vitals:  T(F): 97.6 (06-17), Max: 98.9 (06-17)  HR: 84 (06-17) (67 - 87)  BP: 106/69 (06-17) (101/60 - 122/69)  RR: 18 (06-17)  SpO2: 89% (06-17)  I&O's Summary    16 Jun 2022 07:01  -  17 Jun 2022 07:00  --------------------------------------------------------  IN: 720 mL / OUT: 3200 mL / NET: -2480 mL    17 Jun 2022 07:01  -  17 Jun 2022 13:42  --------------------------------------------------------  IN: 480 mL / OUT: 1000 mL / NET: -520 mL        Physical Exam:  Appearance: No acute distress; well appearing  Eyes: PERRL, EOMI, pink conjunctiva  HEENT: Normal oral mucosa  Cardiovascular: RRR, S1, S2, no murmurs, rubs, or gallops; no edema; no JVD  Respiratory: Clear to auscultation bilaterally  Gastrointestinal: soft, non-tender, non-distended with normal bowel sounds  Musculoskeletal: No clubbing; no joint deformity   Neurologic: Non-focal  Lymphatic: No lymphadenopathy  Psychiatry: AAOx3, mood & affect appropriate  Skin: No rashes, ecchymoses, or cyanosis                          8.0    5.69  )-----------( 223      ( 17 Jun 2022 05:58 )             28.6     06-17    132<L>  |  94<L>  |  75<H>  ----------------------------<  63<L>  4.4   |  24  |  1.47<H>    Ca    9.5      17 Jun 2022 05:58  Phos  3.4     06-17  Mg     1.9     06-17    TPro  7.1  /  Alb  3.7  /  TBili  0.3  /  DBili  x   /  AST  12  /  ALT  7<L>  /  AlkPhos  108  06-16          Serum Pro-Brain Natriuretic Peptide: [id number] pg/mL (06-14 @ 02:45)

## 2022-06-17 NOTE — PROGRESS NOTE ADULT - PROBLEM SELECTOR PLAN 12
DVT/PE ppx: Eliquis 5mg BID for Afib  Diet: DASH/TLC  Code: Full   Dispo: pending medical stabilization. DVT/PE ppx: Eliquis 5mg BID for Afib (Held this AM for possible RHC)  Diet: DASH/TLC  Code: Full   Dispo: pending medical stabilization.

## 2022-06-17 NOTE — PROGRESS NOTE ADULT - SUBJECTIVE AND OBJECTIVE BOX
DATE OF SERVICE: 06-17-22 @ 06:53    Patient is a 66y old  Female who presents with a chief complaint of 66y old  Female who presents with a chief complaint of SOB (16 Jun 2022 08:33)      SUBJECTIVE / OVERNIGHT EVENTS: NAEO. Patient afebrile o/n. Patient feels    MEDICATIONS  (STANDING):  albuterol/ipratropium for Nebulization 3 milliLiter(s) Nebulizer every 6 hours  budesonide 160 MICROgram(s)/formoterol 4.5 MICROgram(s) Inhaler 2 Puff(s) Inhalation two times a day  buMETAnide IVPB 4 milliGRAM(s) IV Intermittent two times a day  buPROPion XL (24-Hour) . 150 milliGRAM(s) Oral daily  chlorhexidine 2% Cloths 1 Application(s) Topical <User Schedule>  digoxin     Tablet 125 MICROGram(s) Oral daily  diltiazem    milliGRAM(s) Oral daily  mupirocin 2% Ointment 1 Application(s) Both Nostrils two times a day  pantoprazole    Tablet 40 milliGRAM(s) Oral before breakfast  rosuvastatin 5 milliGRAM(s) Oral at bedtime  spironolactone 50 milliGRAM(s) Oral daily  tadalafil 40 milliGRAM(s) Oral daily  tiotropium 2.5 MICROgram(s)/olodaterol 2.5 MICROgram(s) (STIOLTO) Inhaler 2 Puff(s) Inhalation daily    MEDICATIONS  (PRN):  acetaminophen     Tablet .. 650 milliGRAM(s) Oral every 6 hours PRN Temp greater or equal to 38C (100.4F), Mild Pain (1 - 3), Moderate Pain (4 - 6)      Vital Signs Last 24 Hrs  T(C): 36.9 (17 Jun 2022 04:32), Max: 37.2 (17 Jun 2022 00:04)  T(F): 98.4 (17 Jun 2022 04:32), Max: 98.9 (17 Jun 2022 00:04)  HR: 71 (17 Jun 2022 04:32) (67 - 87)  BP: 101/60 (17 Jun 2022 04:32) (101/60 - 122/69)  BP(mean): --  RR: 19 (17 Jun 2022 04:32) (19 - 20)  SpO2: 95% (17 Jun 2022 04:32) (90% - 95%)  CAPILLARY BLOOD GLUCOSE        I&O's Summary    15 Nile 2022 07:01  -  16 Jun 2022 07:00  --------------------------------------------------------  IN: 550 mL / OUT: 2600 mL / NET: -2050 mL    16 Jun 2022 07:01  -  17 Jun 2022 06:53  --------------------------------------------------------  IN: 720 mL / OUT: 2950 mL / NET: -2230 mL        PHYSICAL EXAM:  GENERAL: NAD, 8LNC  HEAD:  Atraumatic, Normocephalic  EYES: EOMI, PERRLA, conjunctiva and sclera clear  ENT: Moist mucous membranes  NECK: Supple, + JVD  CHEST/LUNG: Bilateral decrease in breath sounds, no wheezing, rales or rhonchi. Unlabored respirations  HEART: Irregularly irregular rate and rhythm; Normal s1 and s2, No murmurs, rubs, or gallops  ABDOMEN: Bowel sounds present; Soft, Nontender, Nondistended. No hepatomegaly  EXTREMITIES:  2+ Peripheral Pulses, brisk capillary refill. No clubbing, cyanosis, Non pitting edema, Scar showing where skin graft was taken   NERVOUS SYSTEM:  Alert & Oriented X3, speech clear. No deficits   MSK: FROM all 4 extremities, full and equal strength  SKIN: No rashes or lesions    LABS:                        8.0    5.69  )-----------( 223      ( 17 Jun 2022 05:58 )             28.6     06-17    132<L>  |  94<L>  |  75<H>  ----------------------------<  63<L>  4.4   |  24  |  1.47<H>    Ca    9.5      17 Jun 2022 05:58  Phos  3.4     06-17  Mg     1.9     06-17    TPro  7.1  /  Alb  3.7  /  TBili  0.3  /  DBili  x   /  AST  12  /  ALT  7<L>  /  AlkPhos  108  06-16              RADIOLOGY & ADDITIONAL TESTS:    Imaging Personally Reviewed:    Consultant(s) Notes Reviewed:      Care Discussed with Consultants/Other Providers:   DATE OF SERVICE: 06-17-22 @ 06:53    Patient is a 66y old  Female who presents with a chief complaint of 66y old  Female who presents with a chief complaint of SOB (16 Jun 2022 08:33)      SUBJECTIVE / OVERNIGHT EVENTS: NAEO. Patient afebrile o/n. Patient feels closer to baseline this morning, says she only feels short of breath when doing things, still on 8LNC. Denies CP, palpitations, N/V/D, fevers/chills    MEDICATIONS  (STANDING):  albuterol/ipratropium for Nebulization 3 milliLiter(s) Nebulizer every 6 hours  budesonide 160 MICROgram(s)/formoterol 4.5 MICROgram(s) Inhaler 2 Puff(s) Inhalation two times a day  buMETAnide IVPB 4 milliGRAM(s) IV Intermittent two times a day  buPROPion XL (24-Hour) . 150 milliGRAM(s) Oral daily  chlorhexidine 2% Cloths 1 Application(s) Topical <User Schedule>  digoxin     Tablet 125 MICROGram(s) Oral daily  diltiazem    milliGRAM(s) Oral daily  mupirocin 2% Ointment 1 Application(s) Both Nostrils two times a day  pantoprazole    Tablet 40 milliGRAM(s) Oral before breakfast  rosuvastatin 5 milliGRAM(s) Oral at bedtime  spironolactone 50 milliGRAM(s) Oral daily  tadalafil 40 milliGRAM(s) Oral daily  tiotropium 2.5 MICROgram(s)/olodaterol 2.5 MICROgram(s) (STIOLTO) Inhaler 2 Puff(s) Inhalation daily    MEDICATIONS  (PRN):  acetaminophen     Tablet .. 650 milliGRAM(s) Oral every 6 hours PRN Temp greater or equal to 38C (100.4F), Mild Pain (1 - 3), Moderate Pain (4 - 6)      Vital Signs Last 24 Hrs  T(C): 36.9 (17 Jun 2022 04:32), Max: 37.2 (17 Jun 2022 00:04)  T(F): 98.4 (17 Jun 2022 04:32), Max: 98.9 (17 Jun 2022 00:04)  HR: 71 (17 Jun 2022 04:32) (67 - 87)  BP: 101/60 (17 Jun 2022 04:32) (101/60 - 122/69)  BP(mean): --  RR: 19 (17 Jun 2022 04:32) (19 - 20)  SpO2: 95% (17 Jun 2022 04:32) (90% - 95%)  CAPILLARY BLOOD GLUCOSE        I&O's Summary    15 Nile 2022 07:01  -  16 Jun 2022 07:00  --------------------------------------------------------  IN: 550 mL / OUT: 2600 mL / NET: -2050 mL    16 Jun 2022 07:01  -  17 Jun 2022 06:53  --------------------------------------------------------  IN: 720 mL / OUT: 2950 mL / NET: -2230 mL        PHYSICAL EXAM:  GENERAL: NAD, 8LNC  HEAD:  Atraumatic, Normocephalic  EYES: EOMI, PERRLA, conjunctiva and sclera clear  ENT: Moist mucous membranes  NECK: Supple, + JVD  CHEST/LUNG: Bilateral decrease in breath sounds, no wheezing, rales or rhonchi. Unlabored respirations  HEART: Irregularly irregular rate and rhythm; Normal s1 and s2, No murmurs, rubs, or gallops  ABDOMEN: Bowel sounds present; Soft, Nontender, Nondistended. No hepatomegaly  EXTREMITIES:  2+ Peripheral Pulses, brisk capillary refill. No clubbing, cyanosis, Non pitting edema, Scar showing where skin graft was taken   NERVOUS SYSTEM:  Alert & Oriented X3, speech clear. No deficits   MSK: FROM all 4 extremities, full and equal strength  SKIN: No rashes or lesions    LABS:                        8.0    5.69  )-----------( 223      ( 17 Jun 2022 05:58 )             28.6     06-17    132<L>  |  94<L>  |  75<H>  ----------------------------<  63<L>  4.4   |  24  |  1.47<H>    Ca    9.5      17 Jun 2022 05:58  Phos  3.4     06-17  Mg     1.9     06-17    TPro  7.1  /  Alb  3.7  /  TBili  0.3  /  DBili  x   /  AST  12  /  ALT  7<L>  /  AlkPhos  108  06-16              RADIOLOGY & ADDITIONAL TESTS:    Imaging Personally Reviewed:    Consultant(s) Notes Reviewed:      Care Discussed with Consultants/Other Providers:

## 2022-06-17 NOTE — PROGRESS NOTE ADULT - SUBJECTIVE AND OBJECTIVE BOX
CHIEF COMPLAINT:  shortness of breath, dyspnea on exertion     Interval events:  - no acute events overnight           PAST MEDICAL & SURGICAL HISTORY:  COPD exacerbation      Severe pulmonary hypertension      Chronic kidney disease, unspecified CKD stage      Acute on chronic diastolic congestive heart failure      Pulmonary embolism      Anxiety and depression      GERD (gastroesophageal reflux disease)      Obstructive sleep apnea      Chronic atrial fibrillation      H/O pneumonectomy      Right leg weakness          FAMILY HISTORY:  No pertinent family history in parents        SOCIAL HISTORY:  Smoking: [ ] Never Smoked [x ] Former Smoker (__ packs x ___ years) [ ] Current Smoker  (__ packs x ___ years)  no current etoh    Allergies    nitrates (Unknown)    Intolerances        HOME MEDICATIONS:  Home Medications:  ambrisentan 10 mg oral tablet: 1 tab(s) orally once a day (2022 03:27)  apixaban 5 mg oral tablet: 1 tab(s) orally 2 times a day (2022 03:26)  buPROPion 100 mg oral tablet: 1 tab(s) orally 2 times a day (2022 03:31)  digoxin 125 mcg (0.125 mg) oral tablet: 1 tab(s) orally every other day (at bedtime) (2022 03:28)  dilTIAZem 120 mg/24 hours oral capsule, extended release: 1 cap(s) orally once a day (2022 03:31)  pantoprazole 40 mg oral delayed release tablet: 1 tab(s) orally once a day (2022 03:27)  rosuvastatin 5 mg oral tablet: 1 tab(s) orally once a day (2022 03:27)  spironolactone 50 mg oral tablet: 1 tab(s) orally once a day (2022 03:29)  tadalafil 20 mg oral tablet: 2 tab(s) orally once a day (2022 03:29)  torsemide 20 mg oral tablet: 2 tab(s) orally once a day (2022 03:30)      REVIEW OF SYSTEMS:  Constitutional: [x ] negative fevers or chills  [ ] fevers [ ] chills [ ] weight loss [ ] weight gain  HEENT: [ ] negative [ ] dry eyes [ ] eye irritation [ ] postnasal drip [ ] nasal congestion  CV: [ ] negative  [ ] chest pain [ ] orthopnea [ ] palpitations [ ] murmur  Resp: [ ] negative [ ] cough [x ] shortness of breath [ x] dyspnea [ ] wheezing [ ] sputum [ ] hemoptysis  GI: [ ] negative [ ] nausea [ ] vomiting [ ] diarrhea [ ] constipation [ ] abd pain [ ] dysphagia   : [ ] negative [ ] dysuria [ ] nocturia [ ] hematuria [ ] increased urinary frequency  Musculoskeletal: [ ] negative [ ] back pain [ ] myalgias [ ] arthralgias [ ] fracture  Skin: [ ] negative [ ] rash [ ] itch  Neurological: [ ] negative [ ] headache [ ] dizziness [ ] syncope [ ] weakness [ ] numbness  Psychiatric: [ ] negative [ ] anxiety [ ] depression  Endocrine: [ ] negative [ ] diabetes [ ] thyroid problem  Hematologic/Lymphatic: [ ] negative [ ] anemia [ ] bleeding problem  Allergic/Immunologic: [ ] negative [ ] itchy eyes [ ] nasal discharge [ ] hives [ ] angioedema  [ x] All other systems negative  [ ] Unable to assess ROS because ________    OBJECTIVE:  ICU Vital Signs Last 24 Hrs  T(C): 36.6 (2022 08:21), Max: 36.6 (2022 08:21)  T(F): 97.8 (2022 08:21), Max: 97.8 (2022 08:21)  HR: 78 (2022 08:21) (71 - 78)  BP: 114/64 (2022 08:21) (112/75 - 120/69)  BP(mean): --  ABP: --  ABP(mean): --  RR: 20 (2022 08:21) (18 - 22)  SpO2: 93% (2022 08:21) (92% - 100%)        13 @ 07:01  -  06-14 @ 07:00  --------------------------------------------------------  IN: 240 mL / OUT: 400 mL / NET: -160 mL      CAPILLARY BLOOD GLUCOSE      POCT Blood Glucose.: 114 mg/dL (2022 07:22)      PHYSICAL EXAM:  General:  no acute distress   HEENT: atraumatic, normocephalic   Neck:  thick neck   Respiratory:  no crackles, no cough/wheezes, no use of accessory muscles of respiration    Cardiovascular:  RRR, no rubs, gallops, m urmurs   Abdomen: obese, non-tender   Extremities: bilateral edema, no significant cyanosis   Skin: no rash   Neurological: no gross/focal deficits appreciated    Psychiatry: appropriate     LINES:     HOSPITAL MEDICATIONS:  Standing Meds:  albuterol/ipratropium for Nebulization 3 milliLiter(s) Nebulizer every 6 hours  apixaban 5 milliGRAM(s) Oral two times a day  budesonide 160 MICROgram(s)/formoterol 4.5 MICROgram(s) Inhaler 2 Puff(s) Inhalation two times a day  buPROPion XL (24-Hour) . 150 milliGRAM(s) Oral daily  digoxin     Tablet 125 MICROGram(s) Oral daily  diltiazem    milliGRAM(s) Oral daily  furosemide   Injectable 40 milliGRAM(s) IV Push two times a day  pantoprazole    Tablet 40 milliGRAM(s) Oral before breakfast  rosuvastatin 5 milliGRAM(s) Oral at bedtime  spironolactone 50 milliGRAM(s) Oral daily  tadalafil 40 milliGRAM(s) Oral daily  tiotropium 2.5 MICROgram(s)/olodaterol 2.5 MICROgram(s) (STIOLTO) Inhaler 2 Puff(s) Inhalation daily      PRN Meds:  acetaminophen     Tablet .. 650 milliGRAM(s) Oral every 6 hours PRN      LABS:                        7.4    8.44  )-----------( 205      ( 2022 02:45 )             24.5     Hgb Trend: 7.4<--      133<L>  |  94<L>  |  63<H>  ----------------------------<  115<H>  3.9   |  27  |  1.29    Ca    9.3      2022 02:45  Phos  3.2       Mg     1.7         TPro  7.0  /  Alb  3.8  /  TBili  0.3  /  DBili  x   /  AST  14  /  ALT  10  /  AlkPhos  105      Creatinine Trend: 1.29<--  PT/INR - ( 2022 02:45 )   PT: 27.0 sec;   INR: 2.31 ratio         PTT - ( 2022 02:45 )  PTT:31.3 sec  Urinalysis Basic - ( 2022 02:31 )    Color: Light Yellow / Appearance: Clear / S.011 / pH: x  Gluc: x / Ketone: Negative  / Bili: Negative / Urobili: Negative   Blood: x / Protein: Negative / Nitrite: Negative   Leuk Esterase: Negative / RBC: x / WBC x   Sq Epi: x / Non Sq Epi: x / Bacteria: x      Arterial Blood Gas:   @ 02:39  7.44/44/178/30/97.6/5.0  ABG lactate: --        MICROBIOLOGY:       RADIOLOGY:  [ ] Reviewed and interpreted by me    PULMONARY FUNCTION TESTS:    EKG:   CHIEF COMPLAINT:  shortness of breath, dyspnea on exertion     Interval events:  - no acute events overnight, patient scheduled for RHC and cardiomems placement today ()          PAST MEDICAL & SURGICAL HISTORY:  COPD exacerbation      Severe pulmonary hypertension      Chronic kidney disease, unspecified CKD stage      Acute on chronic diastolic congestive heart failure      Pulmonary embolism      Anxiety and depression      GERD (gastroesophageal reflux disease)      Obstructive sleep apnea      Chronic atrial fibrillation      H/O pneumonectomy      Right leg weakness          FAMILY HISTORY:  No pertinent family history in parents        SOCIAL HISTORY:  Smoking: [ ] Never Smoked [x ] Former Smoker (__ packs x ___ years) [ ] Current Smoker  (__ packs x ___ years)  no current etoh    Allergies    nitrates (Unknown)    Intolerances        HOME MEDICATIONS:  Home Medications:  ambrisentan 10 mg oral tablet: 1 tab(s) orally once a day (2022 03:27)  apixaban 5 mg oral tablet: 1 tab(s) orally 2 times a day (2022 03:26)  buPROPion 100 mg oral tablet: 1 tab(s) orally 2 times a day (2022 03:31)  digoxin 125 mcg (0.125 mg) oral tablet: 1 tab(s) orally every other day (at bedtime) (2022 03:28)  dilTIAZem 120 mg/24 hours oral capsule, extended release: 1 cap(s) orally once a day (2022 03:31)  pantoprazole 40 mg oral delayed release tablet: 1 tab(s) orally once a day (2022 03:27)  rosuvastatin 5 mg oral tablet: 1 tab(s) orally once a day (2022 03:27)  spironolactone 50 mg oral tablet: 1 tab(s) orally once a day (2022 03:29)  tadalafil 20 mg oral tablet: 2 tab(s) orally once a day (2022 03:29)  torsemide 20 mg oral tablet: 2 tab(s) orally once a day (2022 03:30)      REVIEW OF SYSTEMS:  Constitutional: [x ] negative fevers or chills  [ ] fevers [ ] chills [ ] weight loss [ ] weight gain  HEENT: [ ] negative [ ] dry eyes [ ] eye irritation [ ] postnasal drip [ ] nasal congestion  CV: [ ] negative  [ ] chest pain [ ] orthopnea [ ] palpitations [ ] murmur  Resp: [ ] negative [ ] cough [x ] shortness of breath [ x] dyspnea [ ] wheezing [ ] sputum [ ] hemoptysis  GI: [ ] negative [ ] nausea [ ] vomiting [ ] diarrhea [ ] constipation [ ] abd pain [ ] dysphagia   : [ ] negative [ ] dysuria [ ] nocturia [ ] hematuria [ ] increased urinary frequency  Musculoskeletal: [ ] negative [ ] back pain [ ] myalgias [ ] arthralgias [ ] fracture  Skin: [ ] negative [ ] rash [ ] itch  Neurological: [ ] negative [ ] headache [ ] dizziness [ ] syncope [ ] weakness [ ] numbness  Psychiatric: [ ] negative [ ] anxiety [ ] depression  Endocrine: [ ] negative [ ] diabetes [ ] thyroid problem  Hematologic/Lymphatic: [ ] negative [ ] anemia [ ] bleeding problem  Allergic/Immunologic: [ ] negative [ ] itchy eyes [ ] nasal discharge [ ] hives [ ] angioedema  [ x] All other systems negative  [ ] Unable to assess ROS because ________    OBJECTIVE:  ICU Vital Signs Last 24 Hrs  T(C): 36.6 (2022 08:21), Max: 36.6 (2022 08:21)  T(F): 97.8 (2022 08:21), Max: 97.8 (2022 08:21)  HR: 78 (2022 08:21) (71 - 78)  BP: 114/64 (2022 08:21) (112/75 - 120/69)  BP(mean): --  ABP: --  ABP(mean): --  RR: 20 (2022 08:21) (18 - 22)  SpO2: 93% (2022 08:21) (92% - 100%)        06-13 @ 07:01  -  06-14 @ 07:00  --------------------------------------------------------  IN: 240 mL / OUT: 400 mL / NET: -160 mL      CAPILLARY BLOOD GLUCOSE      POCT Blood Glucose.: 114 mg/dL (2022 07:22)      PHYSICAL EXAM:  General:  no acute distress   HEENT: atraumatic, normocephalic   Neck:  thick neck   Respiratory:  no crackles, no cough/wheezes, no use of accessory muscles of respiration    Cardiovascular:  RRR, no rubs, gallops, m urmurs   Abdomen: obese, non-tender   Extremities: bilateral edema, no significant cyanosis   Skin: no rash   Neurological: no gross/focal deficits appreciated    Psychiatry: appropriate     LINES:     HOSPITAL MEDICATIONS:  Standing Meds:  albuterol/ipratropium for Nebulization 3 milliLiter(s) Nebulizer every 6 hours  apixaban 5 milliGRAM(s) Oral two times a day  budesonide 160 MICROgram(s)/formoterol 4.5 MICROgram(s) Inhaler 2 Puff(s) Inhalation two times a day  buPROPion XL (24-Hour) . 150 milliGRAM(s) Oral daily  digoxin     Tablet 125 MICROGram(s) Oral daily  diltiazem    milliGRAM(s) Oral daily  furosemide   Injectable 40 milliGRAM(s) IV Push two times a day  pantoprazole    Tablet 40 milliGRAM(s) Oral before breakfast  rosuvastatin 5 milliGRAM(s) Oral at bedtime  spironolactone 50 milliGRAM(s) Oral daily  tadalafil 40 milliGRAM(s) Oral daily  tiotropium 2.5 MICROgram(s)/olodaterol 2.5 MICROgram(s) (STIOLTO) Inhaler 2 Puff(s) Inhalation daily      PRN Meds:  acetaminophen     Tablet .. 650 milliGRAM(s) Oral every 6 hours PRN      LABS:                        7.4    8.44  )-----------( 205      ( 2022 02:45 )             24.5     Hgb Trend: 7.4<--      133<L>  |  94<L>  |  63<H>  ----------------------------<  115<H>  3.9   |  27  |  1.29    Ca    9.3      2022 02:45  Phos  3.2     -  Mg     1.7         TPro  7.0  /  Alb  3.8  /  TBili  0.3  /  DBili  x   /  AST  14  /  ALT  10  /  AlkPhos  105  -14    Creatinine Trend: 1.29<--  PT/INR - ( 2022 02:45 )   PT: 27.0 sec;   INR: 2.31 ratio         PTT - ( 2022 02:45 )  PTT:31.3 sec  Urinalysis Basic - ( 2022 02:31 )    Color: Light Yellow / Appearance: Clear / S.011 / pH: x  Gluc: x / Ketone: Negative  / Bili: Negative / Urobili: Negative   Blood: x / Protein: Negative / Nitrite: Negative   Leuk Esterase: Negative / RBC: x / WBC x   Sq Epi: x / Non Sq Epi: x / Bacteria: x      Arterial Blood Gas:   @ 02:39  7.44/44/178/30/97.6/5.0  ABG lactate: --        MICROBIOLOGY:       RADIOLOGY:  [ ] Reviewed and interpreted by me    PULMONARY FUNCTION TESTS:    EKG:

## 2022-06-18 NOTE — PROGRESS NOTE ADULT - PROBLEM SELECTOR PLAN 2
Reported as HFpEF although unclear exact etiology. Most likely 2/2 to pulmonary HTN and WHO class III. Differential includes ICM vs. NICM vs. RHF from Pulm HTN.    Plan:   - CXR shows improving Pulm edema  - TTE showed severe pulm HTN, normal LV systolic fxn, RV enlargement w/ decreased systolic fxn  - No PFO on limited TTE w/ bubble study   - C/w bumex 4mg IV BID- goal 1L net negative per day  - C/w home Spironolactone  - BNP 12,525 at OSH, now 15,334   - Trend BNP q72 hours   - Strict ins and outs   - Daily standing weights  - Replete K > 4, Mg > 2, and Phos > 3  - Eventual RHC/Cardio mems per cards/HF, plan for Monday 6/20

## 2022-06-18 NOTE — PROGRESS NOTE ADULT - SUBJECTIVE AND OBJECTIVE BOX
Interval Events:  No events overnight, patient states she feels improved  CardioMEMS deferred until Monday    REVIEW OF SYSTEMS:  Constitutional: [ ] Fever [ ] Chills [ ] Fatigue [ ] Weight change   HEENT: [ ] Blurred vision [ ] Eye Pain [ ] Headache [ ] Runny nose [ ] Sore Throat   Respiratory: [ ] Cough [ ] Wheezing [ ] Shortness of breath  Cardiovascular: [ ] Chest Pain [ ] Palpitations [ ] RINALDI [ ] PND [ ] Orthopnea  Gastrointestinal: [ ] Abdominal Pain [ ] Diarrhea [ ] Constipation [ ] Hemorrhoids [ ] Nausea [ ] Vomiting  Genitourinary: [ ] Nocturia [ ] Dysuria [ ] Incontinence  Extremities: [ ] Swelling [ ] Joint Pain  Neurologic: [ ] Focal deficit [ ] Paresthesias [ ] Syncope  Lymphatic: [ ] Swelling [ ] Lymphadenopathy   Skin: [ ] Rash [ ] Ecchymoses [ ] Wounds [ ] Lesions  Psychiatry: [ ] Depression [ ] Suicidal/Homicidal Ideation [ ] Anxiety [ ] Sleep Disturbances      OBJECTIVE:  ICU Vital Signs Last 24 Hrs  T(C): 36.9 (18 Jun 2022 08:30), Max: 37 (18 Jun 2022 04:18)  T(F): 98.4 (18 Jun 2022 08:30), Max: 98.6 (18 Jun 2022 04:18)  HR: 72 (18 Jun 2022 09:43) (67 - 86)  BP: 106/53 (18 Jun 2022 08:30) (106/53 - 127/76)  BP(mean): --  ABP: --  ABP(mean): --  RR: 18 (18 Jun 2022 08:30) (18 - 18)  SpO2: 93% (18 Jun 2022 09:43) (90% - 100%)        06-17 @ 07:01  -  06-18 @ 07:00  --------------------------------------------------------  IN: 770 mL / OUT: 4600 mL / NET: -3830 mL    06-18 @ 07:01  - 06-18 @ 11:36  --------------------------------------------------------  IN: 400 mL / OUT: 0 mL / NET: 400 mL          PHYSICAL EXAM:  Appearance:  comfortable  Cardiovascular: regular rate and rhythm   Respiratory: bibasilar crackles, no wheezing   Gastrointestinal: obese, soft, non-tender, non-distended, +BS  Extremities: warm and well perfused, ++pitting edema     HOSPITAL MEDICATIONS:  MEDICATIONS  (STANDING):  albuterol/ipratropium for Nebulization 3 milliLiter(s) Nebulizer every 6 hours  budesonide 160 MICROgram(s)/formoterol 4.5 MICROgram(s) Inhaler 2 Puff(s) Inhalation two times a day  buMETAnide IVPB 4 milliGRAM(s) IV Intermittent two times a day  buPROPion XL (24-Hour) . 150 milliGRAM(s) Oral daily  chlorhexidine 2% Cloths 1 Application(s) Topical <User Schedule>  digoxin     Tablet 125 MICROGram(s) Oral daily  diltiazem    milliGRAM(s) Oral daily  mupirocin 2% Ointment 1 Application(s) Both Nostrils two times a day  pantoprazole    Tablet 40 milliGRAM(s) Oral before breakfast  rosuvastatin 5 milliGRAM(s) Oral at bedtime  spironolactone 50 milliGRAM(s) Oral daily  tadalafil 40 milliGRAM(s) Oral daily  tiotropium 2.5 MICROgram(s)/olodaterol 2.5 MICROgram(s) (STIOLTO) Inhaler 2 Puff(s) Inhalation daily    MEDICATIONS  (PRN):  acetaminophen     Tablet .. 650 milliGRAM(s) Oral every 6 hours PRN Temp greater or equal to 38C (100.4F), Mild Pain (1 - 3), Moderate Pain (4 - 6)  fluticasone propionate 50 MICROgram(s)/spray Nasal Spray 1 Spray(s) Both Nostrils two times a day PRN Congestion      LABS:                        7.9    5.59  )-----------( 248      ( 18 Jun 2022 06:24 )             26.9     06-18    130<L>  |  90<L>  |  71<H>  ----------------------------<  81  4.2   |  30  |  1.55<H>    Ca    9.4      18 Jun 2022 06:24  Phos  3.3     06-18  Mg     1.9     06-18    TPro  6.9  /  Alb  3.5  /  TBili  0.4  /  DBili  x   /  AST  15  /  ALT  9<L>  /  AlkPhos  127<H>  06-18      RADIOLOGY:  [x] Reviewed and interpreted by me

## 2022-06-18 NOTE — PROGRESS NOTE ADULT - ASSESSMENT
67 y/o F w/ pmh of pulmonary hypertension, HTN, HFpEF (echo 60% PASP 79mmhg), COPD (8L), Afib (in digoxin, diltiazem), HLD, prior spontaneous pneumothorax, and CKD who presented from [facility] with fatigue, lethargy, worsening RINALDI.  She was fluid overloaded with suspicion of pneumonia, treated with diuresis and antibiotics.  Patient was transferred to [facility] for further management. Transplant pulmonology consulted lung transplant evaluation.    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, on ambrisentan and tadalafil), COPD on 8L, afib, CKD admitted with worsening sob and acute hypoxemic respiratory failure due to likely acute pulmonary edema    # acute hypoxemic respiratory failure   # acute on chronic decompensated diastolic HF  # acute on chronic RV failure  # pulm htn  #lung transplant evaluation    Plan  -Feels improved today, cont to diurese aggressively to keep net negative  -Planned for CardioMEMS on Monday  -Aggressive diuresis needed prior to launching eval  -Management of PH per cardiology/primary team  -Needs to lose weight   -Motivated for lung transplant eval  -CT chest to evaluate LAD once more euvolemic

## 2022-06-18 NOTE — PROGRESS NOTE ADULT - PROBLEM SELECTOR PLAN 1
Most likely 2/2 to worsening pulm HTN and acute on chronic HFpEF w/ possible COPD exacerbation w/ superimposed pneumonia        Plan:   - Completed 5 day course of Abx  - Avoid over oxygenation to prevent decreasing respiratory drive   - Currently on 8LNC (home settings)   - C/w nocturnal AVAPs (Patient's own)   - C/w bumex 4mg IV BID- goal 1L net negative per day  - C/w Duonebs q6  - CXR shows improving pulm edema  - Patient will need repeat CT scan when euvolemic.

## 2022-06-18 NOTE — PROGRESS NOTE ADULT - PROBLEM SELECTOR PLAN 12
DVT/PE ppx: Eliquis 5mg BID for Afib (Hold 6/19 for possible RHC on 6/20)  Diet: DASH/TLC  Code: Full   Dispo: pending medical stabilization.

## 2022-06-18 NOTE — PROGRESS NOTE ADULT - ATTENDING COMMENTS
above plans discussed with Dr. Soria    #Acute on chronic hypoxemic respiratory failure - multifactorial from HF, pulm HTN, PNA (resolved). Continue Bumex 4mg IV bid, on home O2 8L  #Acute on chronic HF - Cards following, continue bumex, RHC with cardiomems on Monday; continue to hold eliquis  #Pulm HTN (groups 2/3) - continue tadalafil, pulm evaluating for transplant  #Chronic Afib - holding eliquis for now in anticipation of RHC/cardiomems on Monday  #Anemia - trend hgb  #COPD/ROLANDA - AVAPS, inhalers    Zhanna Velasquez MD  Division of Hospital Medicine  Contact via [employer] Teams  Office: [phone]

## 2022-06-18 NOTE — CHART NOTE - NSCHARTNOTEFT_GEN_A_CORE
Heart Failure Brief Chart Note    -Patient is net negative 4.6L as of today.  -Okay decrease Bumex from 4mg IV BID to 2mg IV BID.    Discussed with Dr. Solmoon.    Mercy Quintero MD PGY-6  Cardiology Fellow

## 2022-06-18 NOTE — PROGRESS NOTE ADULT - ASSESSMENT
Patient is a 65 y/o F w/ pmh of pulmonary hypertension, HTN, HFpEF (echo 60% PASP 79mmhg), COPD  (8L), Afib (in digoxin, diltiazem), HLD, prior spont pneumothorax, and CKD who presents as transfer from [facility] with 1-2 weeks of fatigue, lethargy, and progressively worsening RINALDI concerning for progression of pulm HTN/COPD vs. acute on chronic HFpEF, TTE revealed normal LV systolic fxn and severe pulm HTN, undergoing diuresis, pending RHC/cardio mems and repeat imaging once euvolemic

## 2022-06-18 NOTE — PROGRESS NOTE ADULT - SUBJECTIVE AND OBJECTIVE BOX
DATE OF SERVICE: 06-18-22 @ 07:01    Patient is a 66y old  Female who presents with a chief complaint of shortness of breath and hypoxia (17 Jun 2022 13:13)      SUBJECTIVE / OVERNIGHT EVENTS: NAEO. Patient afebrile o/n. Patient feels     MEDICATIONS  (STANDING):  albuterol/ipratropium for Nebulization 3 milliLiter(s) Nebulizer every 6 hours  budesonide 160 MICROgram(s)/formoterol 4.5 MICROgram(s) Inhaler 2 Puff(s) Inhalation two times a day  buMETAnide IVPB 4 milliGRAM(s) IV Intermittent two times a day  buPROPion XL (24-Hour) . 150 milliGRAM(s) Oral daily  chlorhexidine 2% Cloths 1 Application(s) Topical <User Schedule>  digoxin     Tablet 125 MICROGram(s) Oral daily  diltiazem    milliGRAM(s) Oral daily  mupirocin 2% Ointment 1 Application(s) Both Nostrils two times a day  pantoprazole    Tablet 40 milliGRAM(s) Oral before breakfast  rosuvastatin 5 milliGRAM(s) Oral at bedtime  spironolactone 50 milliGRAM(s) Oral daily  tadalafil 40 milliGRAM(s) Oral daily  tiotropium 2.5 MICROgram(s)/olodaterol 2.5 MICROgram(s) (STIOLTO) Inhaler 2 Puff(s) Inhalation daily    MEDICATIONS  (PRN):  acetaminophen     Tablet .. 650 milliGRAM(s) Oral every 6 hours PRN Temp greater or equal to 38C (100.4F), Mild Pain (1 - 3), Moderate Pain (4 - 6)  fluticasone propionate 50 MICROgram(s)/spray Nasal Spray 1 Spray(s) Both Nostrils two times a day PRN Congestion      Vital Signs Last 24 Hrs  T(C): 37 (18 Jun 2022 04:18), Max: 37 (18 Jun 2022 04:18)  T(F): 98.6 (18 Jun 2022 04:18), Max: 98.6 (18 Jun 2022 04:18)  HR: 67 (18 Jun 2022 06:04) (67 - 86)  BP: 110/69 (18 Jun 2022 04:18) (106/69 - 127/76)  BP(mean): --  RR: 18 (18 Jun 2022 04:18) (18 - 19)  SpO2: 91% (18 Jun 2022 06:04) (87% - 100%)  CAPILLARY BLOOD GLUCOSE        I&O's Summary    17 Jun 2022 07:01  -  18 Jun 2022 07:00  --------------------------------------------------------  IN: 770 mL / OUT: 4600 mL / NET: -3830 mL        PHYSICAL EXAM:  GENERAL: NAD, 8LNC  HEAD:  Atraumatic, Normocephalic  EYES: EOMI, PERRLA, conjunctiva and sclera clear  ENT: Moist mucous membranes  NECK: Supple, + JVD  CHEST/LUNG: Bilateral decrease in breath sounds, no wheezing, rales or rhonchi. Unlabored respirations  HEART: Irregularly irregular rate and rhythm; Normal s1 and s2, No murmurs, rubs, or gallops  ABDOMEN: Bowel sounds present; Soft, Nontender, Nondistended. No hepatomegaly  EXTREMITIES:  2+ Peripheral Pulses, brisk capillary refill. No clubbing, cyanosis, Non pitting edema, Scar showing where skin graft was taken   NERVOUS SYSTEM:  Alert & Oriented X3, speech clear. No deficits   MSK: FROM all 4 extremities, full and equal strength  SKIN: No rashes or lesions    LABS:                        7.9    5.59  )-----------( 248      ( 18 Jun 2022 06:24 )             26.9     06-17    132<L>  |  94<L>  |  75<H>  ----------------------------<  63<L>  4.4   |  24  |  1.47<H>    Ca    9.5      17 Jun 2022 05:58  Phos  3.4     06-17  Mg     1.9     06-17    TPro  7.1  /  Alb  3.7  /  TBili  0.3  /  DBili  x   /  AST  12  /  ALT  7<L>  /  AlkPhos  108  06-16              RADIOLOGY & ADDITIONAL TESTS:    Imaging Personally Reviewed:    Consultant(s) Notes Reviewed:      Care Discussed with Consultants/Other Providers:   DATE OF SERVICE: 06-18-22 @ 07:01    Patient is a 66y old  Female who presents with a chief complaint of shortness of breath and hypoxia (17 Jun 2022 13:13)      SUBJECTIVE / OVERNIGHT EVENTS: Patient with intermittent desaturations to low 80s last night. Patient afebrile o/n. Patient feels "good" this AM, says she does not feel short of breath on 8LN at rest, denies CP, palpitations, N/V/D.    MEDICATIONS  (STANDING):  albuterol/ipratropium for Nebulization 3 milliLiter(s) Nebulizer every 6 hours  budesonide 160 MICROgram(s)/formoterol 4.5 MICROgram(s) Inhaler 2 Puff(s) Inhalation two times a day  buMETAnide IVPB 4 milliGRAM(s) IV Intermittent two times a day  buPROPion XL (24-Hour) . 150 milliGRAM(s) Oral daily  chlorhexidine 2% Cloths 1 Application(s) Topical <User Schedule>  digoxin     Tablet 125 MICROGram(s) Oral daily  diltiazem    milliGRAM(s) Oral daily  mupirocin 2% Ointment 1 Application(s) Both Nostrils two times a day  pantoprazole    Tablet 40 milliGRAM(s) Oral before breakfast  rosuvastatin 5 milliGRAM(s) Oral at bedtime  spironolactone 50 milliGRAM(s) Oral daily  tadalafil 40 milliGRAM(s) Oral daily  tiotropium 2.5 MICROgram(s)/olodaterol 2.5 MICROgram(s) (STIOLTO) Inhaler 2 Puff(s) Inhalation daily    MEDICATIONS  (PRN):  acetaminophen     Tablet .. 650 milliGRAM(s) Oral every 6 hours PRN Temp greater or equal to 38C (100.4F), Mild Pain (1 - 3), Moderate Pain (4 - 6)  fluticasone propionate 50 MICROgram(s)/spray Nasal Spray 1 Spray(s) Both Nostrils two times a day PRN Congestion      Vital Signs Last 24 Hrs  T(C): 37 (18 Jun 2022 04:18), Max: 37 (18 Jun 2022 04:18)  T(F): 98.6 (18 Jun 2022 04:18), Max: 98.6 (18 Jun 2022 04:18)  HR: 67 (18 Jun 2022 06:04) (67 - 86)  BP: 110/69 (18 Jun 2022 04:18) (106/69 - 127/76)  BP(mean): --  RR: 18 (18 Jun 2022 04:18) (18 - 19)  SpO2: 91% (18 Jun 2022 06:04) (87% - 100%)  CAPILLARY BLOOD GLUCOSE        I&O's Summary    17 Jun 2022 07:01  -  18 Jun 2022 07:00  --------------------------------------------------------  IN: 770 mL / OUT: 4600 mL / NET: -3830 mL        PHYSICAL EXAM:  GENERAL: NAD, 8LNC  HEAD:  Atraumatic, Normocephalic  EYES: EOMI, PERRLA, conjunctiva and sclera clear  ENT: Moist mucous membranes  NECK: Supple, + JVD  CHEST/LUNG: Bilateral decrease in breath sounds, no wheezing, rales or rhonchi. Unlabored respirations  HEART: Irregularly irregular rate and rhythm; Normal s1 and s2, No murmurs, rubs, or gallops  ABDOMEN: Bowel sounds present; Soft, Nontender, Nondistended. No hepatomegaly  EXTREMITIES:  2+ Peripheral Pulses, brisk capillary refill. No clubbing, cyanosis, Non pitting edema, Scar showing where skin graft was taken   NERVOUS SYSTEM:  Alert & Oriented X3, speech clear. No deficits   MSK: FROM all 4 extremities, full and equal strength  SKIN: No rashes or lesions    LABS:                        7.9    5.59  )-----------( 248      ( 18 Jun 2022 06:24 )             26.9     06-17    132<L>  |  94<L>  |  75<H>  ----------------------------<  63<L>  4.4   |  24  |  1.47<H>    Ca    9.5      17 Jun 2022 05:58  Phos  3.4     06-17  Mg     1.9     06-17    TPro  7.1  /  Alb  3.7  /  TBili  0.3  /  DBili  x   /  AST  12  /  ALT  7<L>  /  AlkPhos  108  06-16              RADIOLOGY & ADDITIONAL TESTS:    Imaging Personally Reviewed:    Consultant(s) Notes Reviewed:      Care Discussed with Consultants/Other Providers:

## 2022-06-19 NOTE — PROGRESS NOTE ADULT - ASSESSMENT
Patient is a 65 y/o F w/ pmh of pulmonary hypertension, HTN, HFpEF (echo 60% PASP 79mmhg), COPD  (8L), Afib (in digoxin, diltiazem), HLD, prior spont pneumothorax, and CKD who presents as transfer from [facility] with 1-2 weeks of fatigue, lethargy, and progressively worsening RINALDI concerning for progression of pulm HTN/COPD vs. acute on chronic HFpEF, TTE revealed normal LV systolic fxn and severe pulm HTN, undergoing diuresis, pending RHC/cardio mems and repeat imaging once euvolemic

## 2022-06-19 NOTE — PROGRESS NOTE ADULT - SUBJECTIVE AND OBJECTIVE BOX
PROGRESS NOTE:     Patient is a 66y old  Female who presents with a chief complaint of shortness of breath and hypoxia (18 Jun 2022 11:34)      SUBJECTIVE / OVERNIGHT EVENTS:     REVIEW OF SYSTEMS:    CONSTITUTIONAL: No weakness, fevers or chills  EYES/ENT: No visual changes;  No vertigo or throat pain   NECK: No pain or stiffness  RESPIRATORY: No cough, wheezing, hemoptysis; No shortness of breath  CARDIOVASCULAR: No chest pain or palpitations  GASTROINTESTINAL: No abdominal or epigastric pain. No nausea, vomiting, or hematemesis; No diarrhea or constipation. No melena or hematochezia.  GENITOURINARY: No dysuria, frequency or hematuria  NEUROLOGICAL: No numbness or weakness  SKIN: No itching, rashes    MEDICATIONS  (STANDING):  albuterol/ipratropium for Nebulization 3 milliLiter(s) Nebulizer every 6 hours  budesonide 160 MICROgram(s)/formoterol 4.5 MICROgram(s) Inhaler 2 Puff(s) Inhalation two times a day  buMETAnide Injectable 2 milliGRAM(s) IV Push two times a day  buPROPion XL (24-Hour) . 150 milliGRAM(s) Oral daily  chlorhexidine 2% Cloths 1 Application(s) Topical <User Schedule>  digoxin     Tablet 125 MICROGram(s) Oral daily  diltiazem    milliGRAM(s) Oral daily  mupirocin 2% Ointment 1 Application(s) Both Nostrils two times a day  pantoprazole    Tablet 40 milliGRAM(s) Oral before breakfast  rosuvastatin 5 milliGRAM(s) Oral at bedtime  spironolactone 50 milliGRAM(s) Oral daily  tadalafil 40 milliGRAM(s) Oral daily  tiotropium 2.5 MICROgram(s)/olodaterol 2.5 MICROgram(s) (STIOLTO) Inhaler 2 Puff(s) Inhalation daily    MEDICATIONS  (PRN):  acetaminophen     Tablet .. 650 milliGRAM(s) Oral every 6 hours PRN Temp greater or equal to 38C (100.4F), Mild Pain (1 - 3), Moderate Pain (4 - 6)  fluticasone propionate 50 MICROgram(s)/spray Nasal Spray 1 Spray(s) Both Nostrils two times a day PRN Congestion      CAPILLARY BLOOD GLUCOSE        I&O's Summary    18 Jun 2022 07:01  -  19 Jun 2022 07:00  --------------------------------------------------------  IN: 640 mL / OUT: 2750 mL / NET: -2110 mL        PHYSICAL EXAM:  Vital Signs Last 24 Hrs  T(C): 36.7 (19 Jun 2022 04:55), Max: 36.9 (18 Jun 2022 08:30)  T(F): 98.1 (19 Jun 2022 04:55), Max: 98.4 (18 Jun 2022 08:30)  HR: 69 (19 Jun 2022 06:37) (52 - 81)  BP: 119/75 (19 Jun 2022 04:55) (97/54 - 119/75)  BP(mean): --  RR: 18 (19 Jun 2022 04:55) (18 - 19)  SpO2: 91% (19 Jun 2022 06:37) (90% - 95%)    CONSTITUTIONAL: NAD, well-developed  RESPIRATORY: Normal respiratory effort; lungs are clear to auscultation bilaterally  CARDIOVASCULAR: Regular rate and rhythm, normal S1 and S2, no murmur/rub/gallop; No lower extremity edema; Peripheral pulses are 2+ bilaterally  ABDOMEN: Nontender to palpation, normoactive bowel sounds, no rebound/guarding; No hepatosplenomegaly  MUSCLOSKELETAL: no clubbing or cyanosis of digits; no joint swelling or tenderness to palpation  PSYCH: A+O to person, place, and time; affect appropriate  NEURO: Non-focal, no tremors  SKIN: No rashes    LABS:                        7.9    5.59  )-----------( 248      ( 18 Jun 2022 06:24 )             26.9     06-19    130<L>  |  88<L>  |  72<H>  ----------------------------<  78  4.2   |  29  |  1.63<H>    Ca    9.4      19 Jun 2022 06:40  Phos  3.6     06-19  Mg     1.9     06-19    TPro  6.7  /  Alb  3.5  /  TBili  0.4  /  DBili  x   /  AST  14  /  ALT  9<L>  /  AlkPhos  120  06-19                RADIOLOGY & ADDITIONAL TESTS:  No new imaging or tests    COORDINATION OF CARE:  Care Discussed with Consultants/Other Providers [Y/N]:  Prior or Outpatient Records Reviewed [Y/N]:   PROGRESS NOTE:     Patient is a 66y old  Female who presents with a chief complaint of shortness of breath and hypoxia (18 Jun 2022 11:34)      SUBJECTIVE / OVERNIGHT EVENTS: No acute events overnight. Tolerating NC at 8L. No desat if not exerting self. Patient states that she would like to participate in PT more, but is limited by desat during exertion. Still experiencing chest pressure. ROS otherwise negative.       MEDICATIONS  (STANDING):  albuterol/ipratropium for Nebulization 3 milliLiter(s) Nebulizer every 6 hours  budesonide 160 MICROgram(s)/formoterol 4.5 MICROgram(s) Inhaler 2 Puff(s) Inhalation two times a day  buMETAnide Injectable 2 milliGRAM(s) IV Push two times a day  buPROPion XL (24-Hour) . 150 milliGRAM(s) Oral daily  chlorhexidine 2% Cloths 1 Application(s) Topical <User Schedule>  digoxin     Tablet 125 MICROGram(s) Oral daily  diltiazem    milliGRAM(s) Oral daily  mupirocin 2% Ointment 1 Application(s) Both Nostrils two times a day  pantoprazole    Tablet 40 milliGRAM(s) Oral before breakfast  rosuvastatin 5 milliGRAM(s) Oral at bedtime  spironolactone 50 milliGRAM(s) Oral daily  tadalafil 40 milliGRAM(s) Oral daily  tiotropium 2.5 MICROgram(s)/olodaterol 2.5 MICROgram(s) (STIOLTO) Inhaler 2 Puff(s) Inhalation daily    MEDICATIONS  (PRN):  acetaminophen     Tablet .. 650 milliGRAM(s) Oral every 6 hours PRN Temp greater or equal to 38C (100.4F), Mild Pain (1 - 3), Moderate Pain (4 - 6)  fluticasone propionate 50 MICROgram(s)/spray Nasal Spray 1 Spray(s) Both Nostrils two times a day PRN Congestion      CAPILLARY BLOOD GLUCOSE        I&O's Summary    18 Jun 2022 07:01  -  19 Jun 2022 07:00  --------------------------------------------------------  IN: 640 mL / OUT: 2750 mL / NET: -2110 mL        PHYSICAL EXAM:  Vital Signs Last 24 Hrs  T(C): 36.7 (19 Jun 2022 04:55), Max: 36.9 (18 Jun 2022 08:30)  T(F): 98.1 (19 Jun 2022 04:55), Max: 98.4 (18 Jun 2022 08:30)  HR: 69 (19 Jun 2022 06:37) (52 - 81)  BP: 119/75 (19 Jun 2022 04:55) (97/54 - 119/75)  BP(mean): --  RR: 18 (19 Jun 2022 04:55) (18 - 19)  SpO2: 91% (19 Jun 2022 06:37) (90% - 95%)    CONSTITUTIONAL: Mild to moderate effort while speaking  RESPIRATORY: Mild effort on 8L at rest, moderate effort when speaking. Lungs are clear to auscultation bilaterally except basilar crackles  CARDIOVASCULAR: Irregular rhythm, normal S1 and S2, no murmur/rub/gallop; 1+ lower extremity edema  ABDOMEN: Nontender to palpation, normoactive bowel sounds, no rebound/guarding; No hepatosplenomegaly  MUSCULOSKELETAL: no joint swelling or tenderness to palpation  PSYCH: A+O to person, place, and time; affect appropriate  NEURO: Non-focal, no tremors  SKIN: No rashes    LABS:                        7.9    5.59  )-----------( 248      ( 18 Jun 2022 06:24 )             26.9     06-19    130<L>  |  88<L>  |  72<H>  ----------------------------<  78  4.2   |  29  |  1.63<H>    Ca    9.4      19 Jun 2022 06:40  Phos  3.6     06-19  Mg     1.9     06-19    TPro  6.7  /  Alb  3.5  /  TBili  0.4  /  DBili  x   /  AST  14  /  ALT  9<L>  /  AlkPhos  120  06-19

## 2022-06-19 NOTE — PROGRESS NOTE ADULT - ATTENDING COMMENTS
above plans discussed with Dr. Blount    #Acute on chronic hypoxemic respiratory failure - multifactorial from HF, pulm HTN, PNA (resolved). Continue Bumex 2mg IV bid, on home O2 8L  #Acute on chronic HF - Cards following, continue bumex, RHC with cardiomems on Monday; continue to hold eliquis  #Pulm HTN (groups 2/3) - continue tadalafil, pulm evaluating for transplant  #Chronic Afib - holding eliquis for now in anticipation of RHC/cardiomems on Monday  #Anemia - trend hgb  #COPD/ROLANDA - AVAPS, inhalers      Zhanna Velasquez MD  Division of Hospital Medicine  Contact via [employer] Teams  Office: [phone]

## 2022-06-19 NOTE — PROGRESS NOTE ADULT - ASSESSMENT
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, on ambrisentan and tadalafil), COPD on 8L, afib, CKD admitted with worsening sob and acute hypoxemic respiratory failure due to likely acute pulmonary edema    # acute hypoxemic respiratory failure   # acute on chronic decompensated diastolic HF  # acute on chronic RV failure  # pulm htn  #lung transplant evaluation    Plan  -Feels improved today, cont to diuresis as per primary team  -Planned for CardioMEMS on Monday  -Aggressive diuresis needed prior to launching eval  -Management of PH per cardiology/primary team  -Needs to lose weight   -Motivated for lung transplant eval  -CT chest to evaluate LAD once more euvolemic  -Encourage OOB to chair

## 2022-06-19 NOTE — PROGRESS NOTE ADULT - SUBJECTIVE AND OBJECTIVE BOX
Interval Events:  No acute events overnight  Dyspnea improved  Planned for OOB to chair today    REVIEW OF SYSTEMS:  Constitutional:   Eyes:  ENT:  CV:  Resp:  GI:  :  MSK:  Integumentary:  Neurological:  Psychiatric:  Endocrine:  Hematologic/Lymphatic:  Allergic/Immunologic:  [x] All other systems negative  [ ] Unable to assess ROS because ________    OBJECTIVE:  ICU Vital Signs Last 24 Hrs  T(C): 36.7 (19 Jun 2022 04:55), Max: 36.7 (18 Jun 2022 16:02)  T(F): 98.1 (19 Jun 2022 04:55), Max: 98.1 (19 Jun 2022 04:55)  HR: 89 (19 Jun 2022 09:32) (52 - 89)  BP: 119/75 (19 Jun 2022 04:55) (97/54 - 119/75)  BP(mean): --  ABP: --  ABP(mean): --  RR: 18 (19 Jun 2022 04:55) (18 - 19)  SpO2: 94% (19 Jun 2022 09:32) (91% - 95%)        06-18 @ 07:01 - 06-19 @ 07:00  --------------------------------------------------------  IN: 640 mL / OUT: 2750 mL / NET: -2110 mL    06-19 @ 07:01 - 06-19 @ 11:28  --------------------------------------------------------  IN: 300 mL / OUT: 0 mL / NET: 300 mL      CAPILLARY BLOOD GLUCOSE          PHYSICAL EXAM:  Appearance:  comfortable  Cardiovascular: regular rate and rhythm   Respiratory: bibasilar crackles, no wheezing   Gastrointestinal: obese, soft, non-tender, non-distended, +BS  Extremities: warm and well perfused, 1.5+ pitting edema     HOSPITAL MEDICATIONS:  MEDICATIONS  (STANDING):  albuterol/ipratropium for Nebulization 3 milliLiter(s) Nebulizer every 6 hours  budesonide 160 MICROgram(s)/formoterol 4.5 MICROgram(s) Inhaler 2 Puff(s) Inhalation two times a day  buMETAnide Injectable 2 milliGRAM(s) IV Push two times a day  buPROPion XL (24-Hour) . 150 milliGRAM(s) Oral daily  chlorhexidine 2% Cloths 1 Application(s) Topical <User Schedule>  digoxin     Tablet 125 MICROGram(s) Oral daily  diltiazem    milliGRAM(s) Oral daily  magnesium oxide 400 milliGRAM(s) Oral two times a day with meals  mupirocin 2% Ointment 1 Application(s) Both Nostrils two times a day  pantoprazole    Tablet 40 milliGRAM(s) Oral before breakfast  rosuvastatin 5 milliGRAM(s) Oral at bedtime  spironolactone 50 milliGRAM(s) Oral daily  tadalafil 40 milliGRAM(s) Oral daily  tiotropium 2.5 MICROgram(s)/olodaterol 2.5 MICROgram(s) (STIOLTO) Inhaler 2 Puff(s) Inhalation daily    MEDICATIONS  (PRN):  acetaminophen     Tablet .. 650 milliGRAM(s) Oral every 6 hours PRN Temp greater or equal to 38C (100.4F), Mild Pain (1 - 3), Moderate Pain (4 - 6)  fluticasone propionate 50 MICROgram(s)/spray Nasal Spray 1 Spray(s) Both Nostrils two times a day PRN Congestion      LABS:                        7.7    5.21  )-----------( 233      ( 19 Jun 2022 07:31 )             26.2     06-19    130<L>  |  88<L>  |  72<H>  ----------------------------<  78  4.2   |  29  |  1.63<H>    Ca    9.4      19 Jun 2022 06:40  Phos  3.6     06-19  Mg     1.9     06-19    TPro  6.7  /  Alb  3.5  /  TBili  0.4  /  DBili  x   /  AST  14  /  ALT  9<L>  /  AlkPhos  120  06-19      RADIOLOGY:  [x] Reviewed and interpreted by me

## 2022-06-20 NOTE — PROGRESS NOTE ADULT - ASSESSMENT
Patient is a 65 y/o F w/ pmh of pulmonary hypertension, HTN, HFpEF (echo 60% PASP 79mmhg), COPD  (8L), Afib (in digoxin, diltiazem), HLD, prior spont pneumothorax, and CKD who presents as transfer from [facility] with 1-2 weeks of fatigue, lethargy, and progressively worsening RINALDI concerning for progression of pulm HTN/COPD vs. acute on chronic HFpEF, TTE revealed normal LV systolic fxn and severe pulm HTN, undergoing diuresis, pending RHC/cardio mems and repeat imaging once euvolemic

## 2022-06-20 NOTE — PROGRESS NOTE ADULT - SUBJECTIVE AND OBJECTIVE BOX
CHIEF COMPLAINT:  shortness of breath, dyspnea on exertion     Interval events:  - no acute events overnight, patient scheduled for RHC and cardiomems placement today ()          PAST MEDICAL & SURGICAL HISTORY:  COPD exacerbation      Severe pulmonary hypertension      Chronic kidney disease, unspecified CKD stage      Acute on chronic diastolic congestive heart failure      Pulmonary embolism      Anxiety and depression      GERD (gastroesophageal reflux disease)      Obstructive sleep apnea      Chronic atrial fibrillation      H/O pneumonectomy      Right leg weakness          FAMILY HISTORY:  No pertinent family history in parents        SOCIAL HISTORY:  Smoking: [ ] Never Smoked [x ] Former Smoker (__ packs x ___ years) [ ] Current Smoker  (__ packs x ___ years)  no current etoh    Allergies    nitrates (Unknown)    Intolerances        HOME MEDICATIONS:  Home Medications:  ambrisentan 10 mg oral tablet: 1 tab(s) orally once a day (2022 03:27)  apixaban 5 mg oral tablet: 1 tab(s) orally 2 times a day (2022 03:26)  buPROPion 100 mg oral tablet: 1 tab(s) orally 2 times a day (2022 03:31)  digoxin 125 mcg (0.125 mg) oral tablet: 1 tab(s) orally every other day (at bedtime) (2022 03:28)  dilTIAZem 120 mg/24 hours oral capsule, extended release: 1 cap(s) orally once a day (2022 03:31)  pantoprazole 40 mg oral delayed release tablet: 1 tab(s) orally once a day (2022 03:27)  rosuvastatin 5 mg oral tablet: 1 tab(s) orally once a day (2022 03:27)  spironolactone 50 mg oral tablet: 1 tab(s) orally once a day (2022 03:29)  tadalafil 20 mg oral tablet: 2 tab(s) orally once a day (2022 03:29)  torsemide 20 mg oral tablet: 2 tab(s) orally once a day (2022 03:30)      REVIEW OF SYSTEMS:  Constitutional: [x ] negative fevers or chills  [ ] fevers [ ] chills [ ] weight loss [ ] weight gain  HEENT: [ ] negative [ ] dry eyes [ ] eye irritation [ ] postnasal drip [ ] nasal congestion  CV: [ ] negative  [ ] chest pain [ ] orthopnea [ ] palpitations [ ] murmur  Resp: [ ] negative [ ] cough [x ] shortness of breath [ x] dyspnea [ ] wheezing [ ] sputum [ ] hemoptysis  GI: [ ] negative [ ] nausea [ ] vomiting [ ] diarrhea [ ] constipation [ ] abd pain [ ] dysphagia   : [ ] negative [ ] dysuria [ ] nocturia [ ] hematuria [ ] increased urinary frequency  Musculoskeletal: [ ] negative [ ] back pain [ ] myalgias [ ] arthralgias [ ] fracture  Skin: [ ] negative [ ] rash [ ] itch  Neurological: [ ] negative [ ] headache [ ] dizziness [ ] syncope [ ] weakness [ ] numbness  Psychiatric: [ ] negative [ ] anxiety [ ] depression  Endocrine: [ ] negative [ ] diabetes [ ] thyroid problem  Hematologic/Lymphatic: [ ] negative [ ] anemia [ ] bleeding problem  Allergic/Immunologic: [ ] negative [ ] itchy eyes [ ] nasal discharge [ ] hives [ ] angioedema  [ x] All other systems negative  [ ] Unable to assess ROS because ________    OBJECTIVE:  ICU Vital Signs Last 24 Hrs  T(C): 36.6 (2022 08:21), Max: 36.6 (2022 08:21)  T(F): 97.8 (2022 08:21), Max: 97.8 (2022 08:21)  HR: 78 (2022 08:21) (71 - 78)  BP: 114/64 (2022 08:21) (112/75 - 120/69)  BP(mean): --  ABP: --  ABP(mean): --  RR: 20 (2022 08:21) (18 - 22)  SpO2: 93% (2022 08:21) (92% - 100%)        06-13 @ 07:01  -  06-14 @ 07:00  --------------------------------------------------------  IN: 240 mL / OUT: 400 mL / NET: -160 mL      CAPILLARY BLOOD GLUCOSE      POCT Blood Glucose.: 114 mg/dL (2022 07:22)      PHYSICAL EXAM:  General:  no acute distress   HEENT: atraumatic, normocephalic   Neck:  thick neck   Respiratory:  no crackles, no cough/wheezes, no use of accessory muscles of respiration    Cardiovascular:  RRR, no rubs, gallops, m urmurs   Abdomen: obese, non-tender   Extremities: bilateral edema, no significant cyanosis   Skin: no rash   Neurological: no gross/focal deficits appreciated    Psychiatry: appropriate     LINES:     HOSPITAL MEDICATIONS:  Standing Meds:  albuterol/ipratropium for Nebulization 3 milliLiter(s) Nebulizer every 6 hours  apixaban 5 milliGRAM(s) Oral two times a day  budesonide 160 MICROgram(s)/formoterol 4.5 MICROgram(s) Inhaler 2 Puff(s) Inhalation two times a day  buPROPion XL (24-Hour) . 150 milliGRAM(s) Oral daily  digoxin     Tablet 125 MICROGram(s) Oral daily  diltiazem    milliGRAM(s) Oral daily  furosemide   Injectable 40 milliGRAM(s) IV Push two times a day  pantoprazole    Tablet 40 milliGRAM(s) Oral before breakfast  rosuvastatin 5 milliGRAM(s) Oral at bedtime  spironolactone 50 milliGRAM(s) Oral daily  tadalafil 40 milliGRAM(s) Oral daily  tiotropium 2.5 MICROgram(s)/olodaterol 2.5 MICROgram(s) (STIOLTO) Inhaler 2 Puff(s) Inhalation daily      PRN Meds:  acetaminophen     Tablet .. 650 milliGRAM(s) Oral every 6 hours PRN      LABS:                        7.4    8.44  )-----------( 205      ( 2022 02:45 )             24.5     Hgb Trend: 7.4<--      133<L>  |  94<L>  |  63<H>  ----------------------------<  115<H>  3.9   |  27  |  1.29    Ca    9.3      2022 02:45  Phos  3.2     -  Mg     1.7         TPro  7.0  /  Alb  3.8  /  TBili  0.3  /  DBili  x   /  AST  14  /  ALT  10  /  AlkPhos  105  -14    Creatinine Trend: 1.29<--  PT/INR - ( 2022 02:45 )   PT: 27.0 sec;   INR: 2.31 ratio         PTT - ( 2022 02:45 )  PTT:31.3 sec  Urinalysis Basic - ( 2022 02:31 )    Color: Light Yellow / Appearance: Clear / S.011 / pH: x  Gluc: x / Ketone: Negative  / Bili: Negative / Urobili: Negative   Blood: x / Protein: Negative / Nitrite: Negative   Leuk Esterase: Negative / RBC: x / WBC x   Sq Epi: x / Non Sq Epi: x / Bacteria: x      Arterial Blood Gas:   @ 02:39  7.44/44/178/30/97.6/5.0  ABG lactate: --        MICROBIOLOGY:       RADIOLOGY:  [ ] Reviewed and interpreted by me    PULMONARY FUNCTION TESTS:    EKG:

## 2022-06-20 NOTE — PROGRESS NOTE ADULT - ATTENDING COMMENTS
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     # acute hypoxemic respiratory failure   # acute on chronic decompensated diastolic HF  # acute on chronic RV failure  # pulm htn  - clinically hypervolemic still  - c/w HFNC  - continue to diurese aggressively to keep net negative  - TTE reviewed, planning on RHC today  - c/w tadalafil, okay to hold ambrisentan    #COPD  - not in an exacerbation  - c/w inhalers, no need for systemic steroids  - chronic hypercapnic respiratory failure  - c/w AVAPS qhs and prn (home settings:  PEEP 5 inspiratory pressure range 6-20, RR 12)    # community acquired pneumonia  - started on treatment for consolidation seen on prior CT chest at [facility] on 6/10; recommended giving five days but was not started on abx here  - afebrile now, monitor off abx  - will eventually need repeat CT chest as part of lung transplant workup; had lymphadenopathy on prior CT that was never worked up, as per patient; if still present once she is more euvolemic, she will likely need EBUS as part of transplant workup

## 2022-06-20 NOTE — PROGRESS NOTE ADULT - PROBLEM SELECTOR PLAN 1
-back to her home O2 requirement of 8 L  -will get RHC and Cardiomems today. Will adjust diuretics following results of RHC.   -Appreciate pulmonary team input  -She will eventually need consideration for lung transplant   -reportedly on ambresentan and tadalafil at home, on tadalafil while inpatient

## 2022-06-20 NOTE — PROGRESS NOTE ADULT - SUBJECTIVE AND OBJECTIVE BOX
HPI:  67 y/o F w/ pmh of pulmonary hypertension, HFpEF (echo 60% PASP 79mmhg), COPD (8L), Afib (in digoxin, diltiazem), HLD, prior spontaneous pneumothorax, and CKD who presented from [facility] with fatigue, lethargy, worsening RINALDI.  She was fluid overloaded with suspicion of pneumonia, treated with diuresis and antibiotics.  Patient was transferred to [facility] for further management.    Patient seen at bedside, sitting up, eating lunch.  Currently on 10L N[place], in NAD. She states her breathing has improved, and feels better since admission to the hospital.  going for Cardimems procedure this afternoon.       Review Of Systems:  Constitutional: [ ] Fever [ ] Chills [ ] Fatigue [ ] Weight change   HEENT: [ ] Blurred vision [ ] Eye Pain [ ] Headache [ ] Runny nose [ ] Sore Throat   Respiratory: [ ] Cough [ ] Wheezing [ ] Shortness of breath  Cardiovascular: [ ] Chest Pain [ ] Palpitations [ ] RINALDI [ ] PND [ ] Orthopnea  Gastrointestinal: [ ] Abdominal Pain [ ] Diarrhea [ ] Constipation [ ] Hemorrhoids [ ] Nausea [ ] Vomiting  Genitourinary: [ ] Nocturia [ ] Dysuria [ ] Incontinence  Extremities: [ ] Swelling [ ] Joint Pain  Neurologic: [ ] Focal deficit [ ] Paresthesias [ ] Syncope  Lymphatic: [ ] Swelling [ ] Lymphadenopathy   Skin: [ ] Rash [ ] Ecchymoses [ ] Wounds [ ] Lesions  Psychiatry: [ ] Depression [ ] Suicidal/Homicidal Ideation [ ] Anxiety [ ] Sleep Disturbances  [x] 10 point review of systems is otherwise negative except as mentioned above            [ ]Unable to obtain    Medications:  acetaminophen     Tablet .. 650 milliGRAM(s) Oral every 6 hours PRN  albuterol/ipratropium for Nebulization 3 milliLiter(s) Nebulizer every 6 hours  budesonide 160 MICROgram(s)/formoterol 4.5 MICROgram(s) Inhaler 2 Puff(s) Inhalation two times a day  buMETAnide Injectable 2 milliGRAM(s) IV Push two times a day  buPROPion XL (24-Hour) . 150 milliGRAM(s) Oral daily  chlorhexidine 2% Cloths 1 Application(s) Topical <User Schedule>  digoxin     Tablet 125 MICROGram(s) Oral daily  diltiazem    milliGRAM(s) Oral daily  fluticasone propionate 50 MICROgram(s)/spray Nasal Spray 1 Spray(s) Both Nostrils two times a day PRN  pantoprazole    Tablet 40 milliGRAM(s) Oral before breakfast  rosuvastatin 5 milliGRAM(s) Oral at bedtime  spironolactone 50 milliGRAM(s) Oral daily  tadalafil 40 milliGRAM(s) Oral daily  tiotropium 2.5 MICROgram(s)/olodaterol 2.5 MICROgram(s) (STIOLTO) Inhaler 2 Puff(s) Inhalation daily    PMH/PSH/FH/SH: [x] Unchanged  Vitals:  T(C): 36.7 (22 @ 14:53), Max: 37 (22 @ 04:55)  HR: 85 (22 @ 14:53) (62 - 86)  BP: 122/58 (22 @ 14:53) (112/57 - 122/58)  BP(mean): --  RR: 18 (22 @ 14:53) (18 - 20)  SpO2: 98% (22 @ 14:53) (91% - 98%)  Daily Weight in k.8 (2022 08:00)  I&O's Summary    2022 07:  -  2022 07:00  --------------------------------------------------------  IN: 300 mL / OUT: 2300 mL / NET: -2000 mL    2022 07:01  -  2022 14:58  --------------------------------------------------------  IN: 600 mL / OUT: 1300 mL / NET: -700 mL    Physical Exam:  Appearance: on 10L NAD  Cardiovascular: RRR  Respiratory: Coarse breath sounds bilaterally   Gastrointestinal: obese abdomen, Soft, Non-tender, Non-distended  Extremities: warm and well perfused, ++lower extremity edema (improving)  Neuro: AAO x 3     -20    133<L>  |  90<L>  |  62<H>  ----------------------------<  75  4.4   |  31  |  1.41<H>    Ca    9.5      2022 06:24  Phos  3.4     06-  Mg     2.0         TPro  6.9  /  Alb  3.5  /  TBili  0.4  /  DBili  x   /  AST  18  /  ALT  11  /  AlkPhos  137<H>  -    Serum Pro-Brain Natriuretic Peptide: 39942 pg/mL ( @ 02:45)

## 2022-06-20 NOTE — PROGRESS NOTE ADULT - SUBJECTIVE AND OBJECTIVE BOX
DATE OF SERVICE: 06-20-22 @ 07:03    Patient is a 66y old  Female who presents with a chief complaint of shortness of breath and hypoxia (19 Jun 2022 11:27)      SUBJECTIVE / OVERNIGHT EVENTS: NAEO. Patient afebrile o/n. Patient feels     MEDICATIONS  (STANDING):  albuterol/ipratropium for Nebulization 3 milliLiter(s) Nebulizer every 6 hours  budesonide 160 MICROgram(s)/formoterol 4.5 MICROgram(s) Inhaler 2 Puff(s) Inhalation two times a day  buMETAnide Injectable 2 milliGRAM(s) IV Push two times a day  buPROPion XL (24-Hour) . 150 milliGRAM(s) Oral daily  chlorhexidine 2% Cloths 1 Application(s) Topical <User Schedule>  digoxin     Tablet 125 MICROGram(s) Oral daily  diltiazem    milliGRAM(s) Oral daily  magnesium oxide 400 milliGRAM(s) Oral two times a day with meals  mupirocin 2% Ointment 1 Application(s) Both Nostrils two times a day  pantoprazole    Tablet 40 milliGRAM(s) Oral before breakfast  rosuvastatin 5 milliGRAM(s) Oral at bedtime  spironolactone 50 milliGRAM(s) Oral daily  tadalafil 40 milliGRAM(s) Oral daily  tiotropium 2.5 MICROgram(s)/olodaterol 2.5 MICROgram(s) (STIOLTO) Inhaler 2 Puff(s) Inhalation daily    MEDICATIONS  (PRN):  acetaminophen     Tablet .. 650 milliGRAM(s) Oral every 6 hours PRN Temp greater or equal to 38C (100.4F), Mild Pain (1 - 3), Moderate Pain (4 - 6)  fluticasone propionate 50 MICROgram(s)/spray Nasal Spray 1 Spray(s) Both Nostrils two times a day PRN Congestion      Vital Signs Last 24 Hrs  T(C): 37 (20 Jun 2022 04:55), Max: 37 (20 Jun 2022 04:55)  T(F): 98.6 (20 Jun 2022 04:55), Max: 98.6 (20 Jun 2022 04:55)  HR: 80 (20 Jun 2022 05:46) (62 - 89)  BP: 117/83 (20 Jun 2022 04:55) (105/65 - 120/75)  BP(mean): --  RR: 18 (20 Jun 2022 04:55) (18 - 18)  SpO2: 91% (20 Jun 2022 05:46) (91% - 97%)  CAPILLARY BLOOD GLUCOSE        I&O's Summary    19 Jun 2022 07:01  -  20 Jun 2022 07:00  --------------------------------------------------------  IN: 300 mL / OUT: 2300 mL / NET: -2000 mL        PHYSICAL EXAM:  GENERAL: NAD, 8LNC  HEAD:  Atraumatic, Normocephalic  EYES: EOMI, PERRLA, conjunctiva and sclera clear  ENT: Moist mucous membranes  NECK: Supple, No JVD  CHEST/LUNG: Bilateral decrease in breath sounds, no wheezing, rales or rhonchi. Unlabored respirations  HEART: Irregularly irregular rate and rhythm; Normal s1 and s2, No murmurs, rubs, or gallops  ABDOMEN: Bowel sounds present; Soft, Nontender, Nondistended. No hepatomegaly  EXTREMITIES:  2+ Peripheral Pulses, brisk capillary refill. No clubbing, cyanosis, Non pitting edema, Scar showing where skin graft was taken   NERVOUS SYSTEM:  Alert & Oriented X3, speech clear. No deficits   MSK: FROM all 4 extremities, full and equal strength  SKIN: No rashes or lesions    LABS:                        8.5    4.94  )-----------( 247      ( 20 Jun 2022 06:26 )             29.3     06-19    130<L>  |  88<L>  |  72<H>  ----------------------------<  78  4.2   |  29  |  1.63<H>    Ca    9.4      19 Jun 2022 06:40  Phos  3.6     06-19  Mg     1.9     06-19    TPro  6.7  /  Alb  3.5  /  TBili  0.4  /  DBili  x   /  AST  14  /  ALT  9<L>  /  AlkPhos  120  06-19              RADIOLOGY & ADDITIONAL TESTS:    Imaging Personally Reviewed:    Consultant(s) Notes Reviewed:      Care Discussed with Consultants/Other Providers:   DATE OF SERVICE: 06-20-22 @ 07:03    Patient is a 66y old  Female who presents with a chief complaint of shortness of breath and hypoxia (19 Jun 2022 11:27)      SUBJECTIVE / OVERNIGHT EVENTS: NAEO. Patient afebrile o/n. Patient feels "great" this AM. Says she feels close to baseline, wishes to ambulate more frequently w/ PT, denies CP, SOB on 8LNC at rest, fevers/chills, N/V/D. Plan for RHC w/ cardiomems this AM.     MEDICATIONS  (STANDING):  albuterol/ipratropium for Nebulization 3 milliLiter(s) Nebulizer every 6 hours  budesonide 160 MICROgram(s)/formoterol 4.5 MICROgram(s) Inhaler 2 Puff(s) Inhalation two times a day  buMETAnide Injectable 2 milliGRAM(s) IV Push two times a day  buPROPion XL (24-Hour) . 150 milliGRAM(s) Oral daily  chlorhexidine 2% Cloths 1 Application(s) Topical <User Schedule>  digoxin     Tablet 125 MICROGram(s) Oral daily  diltiazem    milliGRAM(s) Oral daily  magnesium oxide 400 milliGRAM(s) Oral two times a day with meals  mupirocin 2% Ointment 1 Application(s) Both Nostrils two times a day  pantoprazole    Tablet 40 milliGRAM(s) Oral before breakfast  rosuvastatin 5 milliGRAM(s) Oral at bedtime  spironolactone 50 milliGRAM(s) Oral daily  tadalafil 40 milliGRAM(s) Oral daily  tiotropium 2.5 MICROgram(s)/olodaterol 2.5 MICROgram(s) (STIOLTO) Inhaler 2 Puff(s) Inhalation daily    MEDICATIONS  (PRN):  acetaminophen     Tablet .. 650 milliGRAM(s) Oral every 6 hours PRN Temp greater or equal to 38C (100.4F), Mild Pain (1 - 3), Moderate Pain (4 - 6)  fluticasone propionate 50 MICROgram(s)/spray Nasal Spray 1 Spray(s) Both Nostrils two times a day PRN Congestion      Vital Signs Last 24 Hrs  T(C): 37 (20 Jun 2022 04:55), Max: 37 (20 Jun 2022 04:55)  T(F): 98.6 (20 Jun 2022 04:55), Max: 98.6 (20 Jun 2022 04:55)  HR: 80 (20 Jun 2022 05:46) (62 - 89)  BP: 117/83 (20 Jun 2022 04:55) (105/65 - 120/75)  BP(mean): --  RR: 18 (20 Jun 2022 04:55) (18 - 18)  SpO2: 91% (20 Jun 2022 05:46) (91% - 97%)  CAPILLARY BLOOD GLUCOSE        I&O's Summary    19 Jun 2022 07:01  -  20 Jun 2022 07:00  --------------------------------------------------------  IN: 300 mL / OUT: 2300 mL / NET: -2000 mL        PHYSICAL EXAM:  GENERAL: NAD, 8LNC  HEAD:  Atraumatic, Normocephalic  EYES: EOMI, PERRLA, conjunctiva and sclera clear  ENT: Moist mucous membranes  NECK: Supple, No JVD  CHEST/LUNG: Bilateral decrease in breath sounds, no wheezing, rales or rhonchi. Unlabored respirations  HEART: Irregularly irregular rate and rhythm; Normal s1 and s2, No murmurs, rubs, or gallops  ABDOMEN: Bowel sounds present; Soft, Nontender, Nondistended. No hepatomegaly  EXTREMITIES:  2+ Peripheral Pulses, brisk capillary refill. No clubbing, cyanosis, Non pitting edema, Scar showing where skin graft was taken   NERVOUS SYSTEM:  Alert & Oriented X3, speech clear. No deficits   MSK: FROM all 4 extremities, full and equal strength  SKIN: No rashes or lesions    LABS:                        8.5    4.94  )-----------( 247      ( 20 Jun 2022 06:26 )             29.3     06-19    130<L>  |  88<L>  |  72<H>  ----------------------------<  78  4.2   |  29  |  1.63<H>    Ca    9.4      19 Jun 2022 06:40  Phos  3.6     06-19  Mg     1.9     06-19    TPro  6.7  /  Alb  3.5  /  TBili  0.4  /  DBili  x   /  AST  14  /  ALT  9<L>  /  AlkPhos  120  06-19              RADIOLOGY & ADDITIONAL TESTS:    Imaging Personally Reviewed:    Consultant(s) Notes Reviewed:      Care Discussed with Consultants/Other Providers:

## 2022-06-20 NOTE — PROGRESS NOTE ADULT - PROBLEM SELECTOR PLAN 1
Most likely 2/2 to worsening pulm HTN and acute on chronic HFpEF w/ possible COPD exacerbation w/ superimposed pneumonia        Plan:   - Completed 5 day course of Abx  - Avoid over oxygenation to prevent decreasing respiratory drive   - Currently on 8LNC (home settings)   - C/w nocturnal AVAPs (Patient's own)   - Switched to Bumex 2mg BID  - C/w Duonebs q6  - CXR shows improving pulm edema  - Patient will need repeat CT scan when euvolemic.

## 2022-06-20 NOTE — PROGRESS NOTE ADULT - ASSESSMENT
67 y/o F w/ pmh of pulmonary hypertension, HTN, HFpEF (echo 60% PASP 79mmhg), COPD (8L), Afib (in digoxin, diltiazem), HLD, prior spontaneous pneumothorax, and CKD who presented from [facility] with fatigue, lethargy, worsening RINALDI.  She was fluid overloaded with suspicion of pneumonia, treated with diuresis and antibiotics.  Patient was transferred to [facility] for further management. Transplant pulmonology consulted lung transplant evaluation.    #pre lung transplant  #fluid overload  #PAH  -aggressive diuresis needed prior to launching eval  -management of PH per cardiology/primary team  -Cardimems scheduled for today 6/20  -Needs to lose weight   -motivated for lung transplant eval  -Rest of care per primary team     Above discussed with Dr. Pérez

## 2022-06-20 NOTE — PROGRESS NOTE ADULT - ATTENDING COMMENTS
This is a 66F w/ pmh of pulmonary hypertension, HTN, HFpEF (echo 60% PASP 79mmhg), COPD  (8L), Afib (in digoxin, diltiazem), HLD, prior spont pneumothorax, and CKD who presents as transfer from [facility] with 1-2 weeks of fatigue, lethargy, and progressively worsening RINALDI concerning for progression of pulm HTN/COPD vs. acute on chronic HFpEF, TTE revealed normal LV systolic fxn and severe pulm HTN. HF team, Pulmonary evaluated.        1. Acute on chronic hypoxemic respiratory failure - multifactorial from HF, pulm HTN, PNA (resolved). Continue Bumex 2mg IV bid, on home O2 8L    2. Acute on chronic HF -   - Has been well diuresed. HF team plans for RHC/CardioMems today  - c/w Bumex 2mg IV bid, Aldactone 50mg/d, continue to hold eliquis  - Daily I/O, weight    3. Severe Pulm HTN (groups 2/3) -   - c/w bronchodilators, inhaled steroid, tadalafil  - Pulmonary f/u noted    4. . Chronic Afib - Rate controlled  - c/w Cardizem, Digoxin  - Held DOAC for RHC/CardioMems today    5. COPD/ROLANDA - AVAPS, inhalers    6. OOB to chair, PT in progress

## 2022-06-20 NOTE — PROGRESS NOTE ADULT - ASSESSMENT
Victoria Domingo is a 66 year old female w/pulmonary HTN, HTN, HFpEF, COPD (on 8 L at home), afib, HLD, prior spontaneous pneumothorax, and CKD who presents to [facility] as a transfer from [facility] with reported 1-2 weeks of fatigue, lethargy, and progressively worsening shortness of breath.  She has reportedly been hospitalized 4 times in the past year.  Per chart review, she was initially diuresed at OSH and started on ceftriaxone as CT scan showed RUL consolidation concerning for pneumonia.  She was requiring HFNC to maintain normal O2 saturations and was subsequently transferred to [facility] for further management.      #Acute hypoxic respiratory failure--improving  - etiology likely multifactorial and related to volume overload given physical exam findings, significantly elevated BNP  - patient afebrile, without white count, but previously treated at OSH with ceftriaxone given concern for pneumonia  - O2 requirements improving w/diuresis, patient now on home O2 (8L)    #Pulm HTN  - patient w/multiple reported hospitalizations in past year requiring diuresis, also states she has had previous RHC  - reportedly on ambresentan and tadalafil at home, on tadalafil while inaptient   - currently on bumex 2mg IV BID for diuresis  - heart failure following, to undergo RHC w/cardiomems placement today (6/20)  - TTE demonstrating RV enlargement, decreased RV systolic function, and PASP 85 mm Hg    #COPD  - patient reportedly on 8 L at home, now w/improving O2 requirements as above   - presentation not consistent with exacerbation    Recommendations:  - agree w/bumex 2 mg IV BID for now, monitor strict Is/Os and diurese patient to net negative fluid balance   - f/u results of RHC   - continue home AVAPs qhs  - please provide and encourage use of incentive spirometry while inpatient  - ensure patient has PT/OT  - f/u any pulmonary transplant recommendations  - f/u heart failure recommendations    Note incomplete    To be discussed w/Dr. Trinidad Knott PGY4  [id number]

## 2022-06-20 NOTE — PROGRESS NOTE ADULT - PROBLEM SELECTOR PLAN 2
Reported as HFpEF although unclear exact etiology. Most likely 2/2 to pulmonary HTN and WHO class III. Differential includes ICM vs. NICM vs. RHF from Pulm HTN.    Plan:   - CXR shows improving Pulm edema  - TTE showed severe pulm HTN, normal LV systolic fxn, RV enlargement w/ decreased systolic fxn  - No PFO on limited TTE w/ bubble study   - Switched to Bumex 2mg BID  - C/w home Spironolactone  - BNP 12,525 at OSH, 15,334 on admission   - Strict ins and outs   - Daily standing weights  - Replete K > 4, Mg > 2, and Phos > 3  - Eventual RHC/Cardio mems per cards/HF, plan for Monday 6/20

## 2022-06-20 NOTE — PROGRESS NOTE ADULT - ASSESSMENT
This is a 66 year old female with history of severe pulmonary HTN (Group 1, 2 & 3), HFpEF, Afib, COPD on home O2 (5L NC), CKD, ROLANDA on CPAP, morbid obesity ( s/p bariatric surgery in 2012 with subsequent lap band removal due to GI bleed), PE, CVA (MCA infarct in 2012) who comes in to establish care with heart failure due to her pulmonary HTN who who presented as a transfer from [place] for 1-2 weeks of fatigue, lethargy, progressive dyspnea on exertion.     She's been symptomatically improving with diuresis. Her Cr is downtrending. She's normotensive and her a fib is rate controlled. Appears mildly volume overloaded still on exam with plan for RHC/cardiomems today.     Studies:   6/2022 TTE:  Normal LVEF, dilated LA, decreased RV function with EV enlargement, moderate TR, PASP 85, severe pulm HTN   5/2013 Cardiac Cath/PCI: Cardiac Cath: right dominant circulation, left main arises normally from the left coronary sinus of Valsalva, bifurcates into LAD and circumflex, left main normal, LAD arises normally from the left main normal, left circumflex arises normally from the left main and terminates in the AV groove normal, RCA arises normally from the right sinus of Valsalva, RCA is normal,     Hemodynamics:  RHC (6/2020): RA 2, RV 60/5, PAP 58/18/33, CO/CI: 6.5/3.3

## 2022-06-20 NOTE — PROGRESS NOTE ADULT - SUBJECTIVE AND OBJECTIVE BOX
Subjective:  - notes improvement with diuresis although still feels she has some additional fluid on board. Reports feeling LE edema and some SOB  - OOB in chair.   - plan for RHC/cardiomems this afternoon    Medications:  acetaminophen     Tablet .. 650 milliGRAM(s) Oral every 6 hours PRN  albuterol/ipratropium for Nebulization 3 milliLiter(s) Nebulizer every 6 hours  budesonide 160 MICROgram(s)/formoterol 4.5 MICROgram(s) Inhaler 2 Puff(s) Inhalation two times a day  buMETAnide Injectable 2 milliGRAM(s) IV Push two times a day  buPROPion XL (24-Hour) . 150 milliGRAM(s) Oral daily  chlorhexidine 2% Cloths 1 Application(s) Topical <User Schedule>  digoxin     Tablet 125 MICROGram(s) Oral daily  diltiazem    milliGRAM(s) Oral daily  fluticasone propionate 50 MICROgram(s)/spray Nasal Spray 1 Spray(s) Both Nostrils two times a day PRN  pantoprazole    Tablet 40 milliGRAM(s) Oral before breakfast  rosuvastatin 5 milliGRAM(s) Oral at bedtime  spironolactone 50 milliGRAM(s) Oral daily  tadalafil 40 milliGRAM(s) Oral daily  tiotropium 2.5 MICROgram(s)/olodaterol 2.5 MICROgram(s) (STIOLTO) Inhaler 2 Puff(s) Inhalation daily      Physical Exam:    Vitals:  Vital Signs Last 24 Hours  T(C): 36.6 (22 @ 12:02), Max: 37 (22 @ 04:55)  HR: 76 (22 @ 12:02) (62 - 86)  BP: 113/64 (22 @ 12:02) (112/57 - 120/75)  RR: 20 (22 @ 12:02) (18 - 20)  SpO2: 92% (22 @ 12:02) (91% - 97%)    Weight in k.8 ( @ 08:00)    I&O's Summary    2022 07:01  -  2022 07:00  --------------------------------------------------------  IN: 300 mL / OUT: 2300 mL / NET: -2000 mL    2022 07:01  -  2022 14:09  --------------------------------------------------------  IN: 600 mL / OUT: 1300 mL / NET: -700 mL    Tele: afib 60-90s, PVCs    General: No distress. Comfortable.  HEENT: EOM intact.  Neck: Neck supple. JVP ~12 cm H2O. No masses  Chest: coarse crackles in bilateral bases to mid lung fields  CV: Normal S1 and S2. No murmurs, rub, or gallops. Radial pulses normal. +1 BLE edema.   Abdomen: Soft, non-distended, non-tender  Skin: No rashes or skin breakdown  Neurology: Alert and oriented times three. Sensation intact  Psych: Affect normal    Labs:                        8.5    4.94  )-----------( 247      ( 2022 06:26 )             29.3     06-20    133<L>  |  90<L>  |  62<H>  ----------------------------<  75  4.4   |  31  |  1.41<H>    Ca    9.5      2022 06:24  Phos  3.4       Mg     2.0         TPro  6.9  /  Alb  3.5  /  TBili  0.4  /  DBili  x   /  AST  18  /  ALT  11  /  AlkPhos  137<H>      Serum Pro-Brain Natriuretic Peptide: [id number] pg/mL ( @ 02:45)

## 2022-06-21 NOTE — PROGRESS NOTE ADULT - ASSESSMENT
65 y/o F w/ pmh of pulmonary hypertension, HTN, HFpEF (echo 60% PASP 79mmhg), COPD (8L), Afib (in digoxin, diltiazem), HLD, prior spontaneous pneumothorax, and CKD who presented from [facility] with fatigue, lethargy, worsening RINALDI.  She was fluid overloaded with suspicion of pneumonia, treated with diuresis and antibiotics.  Patient was transferred to [facility] for further management. Transplant pulmonology consulted lung transplant evaluation.    #pre lung transplant  #fluid overload  #PAH  -S/p Cardimems, Heart Failure team following for aggressive diuresis  -Needs weight loss and aggressive PT  -COPD being managed by pulmonary team   -would optimize medically prior to launching lung transplant eval: pHTN, weight loss, PT  -Rest of care per primary team     Above discussed with Dr. Pérez

## 2022-06-21 NOTE — PROGRESS NOTE ADULT - PROBLEM SELECTOR PLAN 2
Reported as HFpEF although unclear exact etiology. Most likely 2/2 to pulmonary HTN and WHO class III. Differential includes ICM vs. NICM vs. RHF from Pulm HTN.    Plan:   - CXR shows improving Pulm edema  - TTE showed severe pulm HTN, normal LV systolic fxn, RV enlargement w/ decreased systolic fxn  - No PFO on limited TTE w/ bubble study   - Switched to Bumex 2mg BID  - C/w home Spironolactone  - BNP 12,525 at OSH, 15,334 on admission   - Strict ins and outs   - Daily standing weights  - Replete K > 4, Mg > 2, and Phos > 3  - S/p RHC/Cardio mems 6/20 Reported as HFpEF although unclear exact etiology. Most likely 2/2 to pulmonary HTN and WHO class III. Differential includes ICM vs. NICM vs. RHF from Pulm HTN.    Plan:   - CXR shows improving Pulm edema  - TTE showed severe pulm HTN, normal LV systolic fxn, RV enlargement w/ decreased systolic fxn  - No PFO on limited TTE w/ bubble study   - Switched to Bumex 2mg BID  - C/w home Spironolactone  - BNP 12,525 at OSH, 15,334 on admission   - Strict ins and outs   - Daily standing weights  - Replete K > 4, Mg > 2, and Phos > 3  - S/p RHC/Cardio mems 6/20  - Patient will need ASA 81 for 1 month post cardiomems 6/21

## 2022-06-21 NOTE — PROGRESS NOTE ADULT - SUBJECTIVE AND OBJECTIVE BOX
CHIEF COMPLAINT:  shortness of breath, dyspnea on exertion     Interval events:  - patient s/p RHC 22,   -       PAST MEDICAL & SURGICAL HISTORY:  COPD exacerbation      Severe pulmonary hypertension      Chronic kidney disease, unspecified CKD stage      Acute on chronic diastolic congestive heart failure      Pulmonary embolism      Anxiety and depression      GERD (gastroesophageal reflux disease)      Obstructive sleep apnea      Chronic atrial fibrillation      H/O pneumonectomy      Right leg weakness          FAMILY HISTORY:  No pertinent family history in parents        SOCIAL HISTORY:  Smoking: [ ] Never Smoked [x ] Former Smoker (__ packs x ___ years) [ ] Current Smoker  (__ packs x ___ years)  no current etoh    Allergies    nitrates (Unknown)    Intolerances        HOME MEDICATIONS:  Home Medications:  ambrisentan 10 mg oral tablet: 1 tab(s) orally once a day (2022 03:27)  apixaban 5 mg oral tablet: 1 tab(s) orally 2 times a day (2022 03:26)  buPROPion 100 mg oral tablet: 1 tab(s) orally 2 times a day (2022 03:31)  digoxin 125 mcg (0.125 mg) oral tablet: 1 tab(s) orally every other day (at bedtime) (2022 03:28)  dilTIAZem 120 mg/24 hours oral capsule, extended release: 1 cap(s) orally once a day (2022 03:31)  pantoprazole 40 mg oral delayed release tablet: 1 tab(s) orally once a day (2022 03:27)  rosuvastatin 5 mg oral tablet: 1 tab(s) orally once a day (2022 03:27)  spironolactone 50 mg oral tablet: 1 tab(s) orally once a day (2022 03:29)  tadalafil 20 mg oral tablet: 2 tab(s) orally once a day (2022 03:29)  torsemide 20 mg oral tablet: 2 tab(s) orally once a day (2022 03:30)      REVIEW OF SYSTEMS:  Constitutional: [x ] negative fevers or chills  [ ] fevers [ ] chills [ ] weight loss [ ] weight gain  HEENT: [ ] negative [ ] dry eyes [ ] eye irritation [ ] postnasal drip [ ] nasal congestion  CV: [ ] negative  [ ] chest pain [ ] orthopnea [ ] palpitations [ ] murmur  Resp: [ ] negative [ ] cough [x ] shortness of breath [ x] dyspnea [ ] wheezing [ ] sputum [ ] hemoptysis  GI: [ ] negative [ ] nausea [ ] vomiting [ ] diarrhea [ ] constipation [ ] abd pain [ ] dysphagia   : [ ] negative [ ] dysuria [ ] nocturia [ ] hematuria [ ] increased urinary frequency  Musculoskeletal: [ ] negative [ ] back pain [ ] myalgias [ ] arthralgias [ ] fracture  Skin: [ ] negative [ ] rash [ ] itch  Neurological: [ ] negative [ ] headache [ ] dizziness [ ] syncope [ ] weakness [ ] numbness  Psychiatric: [ ] negative [ ] anxiety [ ] depression  Endocrine: [ ] negative [ ] diabetes [ ] thyroid problem  Hematologic/Lymphatic: [ ] negative [ ] anemia [ ] bleeding problem  Allergic/Immunologic: [ ] negative [ ] itchy eyes [ ] nasal discharge [ ] hives [ ] angioedema  [ x] All other systems negative  [ ] Unable to assess ROS because ________    OBJECTIVE:  ICU Vital Signs Last 24 Hrs  T(C): 36.6 (2022 08:21), Max: 36.6 (2022 08:21)  T(F): 97.8 (2022 08:21), Max: 97.8 (2022 08:21)  HR: 78 (2022 08:21) (71 - 78)  BP: 114/64 (2022 08:21) (112/75 - 120/69)  BP(mean): --  ABP: --  ABP(mean): --  RR: 20 (2022 08:21) (18 - 22)  SpO2: 93% (2022 08:21) (92% - 100%)        06-13 @ 07:01  -  06-14 @ 07:00  --------------------------------------------------------  IN: 240 mL / OUT: 400 mL / NET: -160 mL      CAPILLARY BLOOD GLUCOSE      POCT Blood Glucose.: 114 mg/dL (2022 07:22)      PHYSICAL EXAM:  General:  no acute distress   HEENT: atraumatic, normocephalic   Neck:  thick neck   Respiratory:  no crackles, no cough/wheezes, no use of accessory muscles of respiration    Cardiovascular:  RRR, no rubs, gallops, m urmurs   Abdomen: obese, non-tender   Extremities: bilateral edema, no significant cyanosis   Skin: no rash   Neurological: no gross/focal deficits appreciated    Psychiatry: appropriate     LINES:     HOSPITAL MEDICATIONS:  Standing Meds:  albuterol/ipratropium for Nebulization 3 milliLiter(s) Nebulizer every 6 hours  apixaban 5 milliGRAM(s) Oral two times a day  budesonide 160 MICROgram(s)/formoterol 4.5 MICROgram(s) Inhaler 2 Puff(s) Inhalation two times a day  buPROPion XL (24-Hour) . 150 milliGRAM(s) Oral daily  digoxin     Tablet 125 MICROGram(s) Oral daily  diltiazem    milliGRAM(s) Oral daily  furosemide   Injectable 40 milliGRAM(s) IV Push two times a day  pantoprazole    Tablet 40 milliGRAM(s) Oral before breakfast  rosuvastatin 5 milliGRAM(s) Oral at bedtime  spironolactone 50 milliGRAM(s) Oral daily  tadalafil 40 milliGRAM(s) Oral daily  tiotropium 2.5 MICROgram(s)/olodaterol 2.5 MICROgram(s) (STIOLTO) Inhaler 2 Puff(s) Inhalation daily      PRN Meds:  acetaminophen     Tablet .. 650 milliGRAM(s) Oral every 6 hours PRN      LABS:                        7.4    8.44  )-----------( 205      ( 2022 02:45 )             24.5     Hgb Trend: 7.4<--      133<L>  |  94<L>  |  63<H>  ----------------------------<  115<H>  3.9   |  27  |  1.29    Ca    9.3      2022 02:45  Phos  3.2       Mg     1.7         TPro  7.0  /  Alb  3.8  /  TBili  0.3  /  DBili  x   /  AST  14  /  ALT  10  /  AlkPhos  105      Creatinine Trend: 1.29<--  PT/INR - ( 2022 02:45 )   PT: 27.0 sec;   INR: 2.31 ratio         PTT - ( 2022 02:45 )  PTT:31.3 sec  Urinalysis Basic - ( 2022 02:31 )    Color: Light Yellow / Appearance: Clear / S.011 / pH: x  Gluc: x / Ketone: Negative  / Bili: Negative / Urobili: Negative   Blood: x / Protein: Negative / Nitrite: Negative   Leuk Esterase: Negative / RBC: x / WBC x   Sq Epi: x / Non Sq Epi: x / Bacteria: x      Arterial Blood Gas:  14 @ 02:39  7.44/44/178/30/97.6/5.0  ABG lactate: --        MICROBIOLOGY:       RADIOLOGY:  [ ] Reviewed and interpreted by me    PULMONARY FUNCTION TESTS:    EKG:   CHIEF COMPLAINT:  shortness of breath, dyspnea on exertion     Interval events:  - patient s/p RHC 22 and cardiomems placement   -       PAST MEDICAL & SURGICAL HISTORY:  COPD exacerbation      Severe pulmonary hypertension      Chronic kidney disease, unspecified CKD stage      Acute on chronic diastolic congestive heart failure      Pulmonary embolism      Anxiety and depression      GERD (gastroesophageal reflux disease)      Obstructive sleep apnea      Chronic atrial fibrillation      H/O pneumonectomy      Right leg weakness          FAMILY HISTORY:  No pertinent family history in parents        SOCIAL HISTORY:  Smoking: [ ] Never Smoked [x ] Former Smoker (__ packs x ___ years) [ ] Current Smoker  (__ packs x ___ years)  no current etoh    Allergies    nitrates (Unknown)    Intolerances        HOME MEDICATIONS:  Home Medications:  ambrisentan 10 mg oral tablet: 1 tab(s) orally once a day (2022 03:27)  apixaban 5 mg oral tablet: 1 tab(s) orally 2 times a day (2022 03:26)  buPROPion 100 mg oral tablet: 1 tab(s) orally 2 times a day (2022 03:31)  digoxin 125 mcg (0.125 mg) oral tablet: 1 tab(s) orally every other day (at bedtime) (2022 03:28)  dilTIAZem 120 mg/24 hours oral capsule, extended release: 1 cap(s) orally once a day (2022 03:31)  pantoprazole 40 mg oral delayed release tablet: 1 tab(s) orally once a day (2022 03:27)  rosuvastatin 5 mg oral tablet: 1 tab(s) orally once a day (2022 03:27)  spironolactone 50 mg oral tablet: 1 tab(s) orally once a day (2022 03:29)  tadalafil 20 mg oral tablet: 2 tab(s) orally once a day (2022 03:29)  torsemide 20 mg oral tablet: 2 tab(s) orally once a day (2022 03:30)      REVIEW OF SYSTEMS:  Constitutional: [x ] negative fevers or chills  [ ] fevers [ ] chills [ ] weight loss [ ] weight gain  HEENT: [ ] negative [ ] dry eyes [ ] eye irritation [ ] postnasal drip [ ] nasal congestion  CV: [ ] negative  [ ] chest pain [ ] orthopnea [ ] palpitations [ ] murmur  Resp: [ ] negative [ ] cough [x ] shortness of breath [ x] dyspnea [ ] wheezing [ ] sputum [ ] hemoptysis  GI: [ ] negative [ ] nausea [ ] vomiting [ ] diarrhea [ ] constipation [ ] abd pain [ ] dysphagia   : [ ] negative [ ] dysuria [ ] nocturia [ ] hematuria [ ] increased urinary frequency  Musculoskeletal: [ ] negative [ ] back pain [ ] myalgias [ ] arthralgias [ ] fracture  Skin: [ ] negative [ ] rash [ ] itch  Neurological: [ ] negative [ ] headache [ ] dizziness [ ] syncope [ ] weakness [ ] numbness  Psychiatric: [ ] negative [ ] anxiety [ ] depression  Endocrine: [ ] negative [ ] diabetes [ ] thyroid problem  Hematologic/Lymphatic: [ ] negative [ ] anemia [ ] bleeding problem  Allergic/Immunologic: [ ] negative [ ] itchy eyes [ ] nasal discharge [ ] hives [ ] angioedema  [ x] All other systems negative  [ ] Unable to assess ROS because ________    OBJECTIVE:  ICU Vital Signs Last 24 Hrs  T(C): 36.6 (2022 08:21), Max: 36.6 (2022 08:21)  T(F): 97.8 (2022 08:21), Max: 97.8 (2022 08:21)  HR: 78 (2022 08:21) (71 - 78)  BP: 114/64 (2022 08:21) (112/75 - 120/69)  BP(mean): --  ABP: --  ABP(mean): --  RR: 20 (2022 08:21) (18 - 22)  SpO2: 93% (2022 08:21) (92% - 100%)        06-13 @ 07:01  -  06-14 @ 07:00  --------------------------------------------------------  IN: 240 mL / OUT: 400 mL / NET: -160 mL      CAPILLARY BLOOD GLUCOSE      POCT Blood Glucose.: 114 mg/dL (2022 07:22)      PHYSICAL EXAM:  General:  no acute distress   HEENT: atraumatic, normocephalic   Neck:  thick neck   Respiratory:  no crackles, no cough/wheezes, no use of accessory muscles of respiration    Cardiovascular:  RRR, no rubs, gallops, m urmurs   Abdomen: obese, non-tender   Extremities: bilateral edema, no significant cyanosis   Skin: no rash   Neurological: no gross/focal deficits appreciated    Psychiatry: appropriate     LINES:     HOSPITAL MEDICATIONS:  Standing Meds:  albuterol/ipratropium for Nebulization 3 milliLiter(s) Nebulizer every 6 hours  apixaban 5 milliGRAM(s) Oral two times a day  budesonide 160 MICROgram(s)/formoterol 4.5 MICROgram(s) Inhaler 2 Puff(s) Inhalation two times a day  buPROPion XL (24-Hour) . 150 milliGRAM(s) Oral daily  digoxin     Tablet 125 MICROGram(s) Oral daily  diltiazem    milliGRAM(s) Oral daily  furosemide   Injectable 40 milliGRAM(s) IV Push two times a day  pantoprazole    Tablet 40 milliGRAM(s) Oral before breakfast  rosuvastatin 5 milliGRAM(s) Oral at bedtime  spironolactone 50 milliGRAM(s) Oral daily  tadalafil 40 milliGRAM(s) Oral daily  tiotropium 2.5 MICROgram(s)/olodaterol 2.5 MICROgram(s) (STIOLTO) Inhaler 2 Puff(s) Inhalation daily      PRN Meds:  acetaminophen     Tablet .. 650 milliGRAM(s) Oral every 6 hours PRN      LABS:                        7.4    8.44  )-----------( 205      ( 2022 02:45 )             24.5     Hgb Trend: 7.4<--      133<L>  |  94<L>  |  63<H>  ----------------------------<  115<H>  3.9   |  27  |  1.29    Ca    9.3      2022 02:45  Phos  3.2       Mg     1.7         TPro  7.0  /  Alb  3.8  /  TBili  0.3  /  DBili  x   /  AST  14  /  ALT  10  /  AlkPhos  105      Creatinine Trend: 1.29<--  PT/INR - ( 2022 02:45 )   PT: 27.0 sec;   INR: 2.31 ratio         PTT - ( 2022 02:45 )  PTT:31.3 sec  Urinalysis Basic - ( 2022 02:31 )    Color: Light Yellow / Appearance: Clear / S.011 / pH: x  Gluc: x / Ketone: Negative  / Bili: Negative / Urobili: Negative   Blood: x / Protein: Negative / Nitrite: Negative   Leuk Esterase: Negative / RBC: x / WBC x   Sq Epi: x / Non Sq Epi: x / Bacteria: x      Arterial Blood Gas:   @ 02:39  7.44/44/178/30/97.6/5.0  ABG lactate: --        MICROBIOLOGY:       RADIOLOGY:  [ ] Reviewed and interpreted by me    PULMONARY FUNCTION TESTS:    EKG:   CHIEF COMPLAINT:  shortness of breath, dyspnea on exertion     Interval events:  - patient s/p RHC 22 and cardiomems placement   - seen and examined this AM on home 8 L NC, appeared comfortable       PAST MEDICAL & SURGICAL HISTORY:  COPD exacerbation      Severe pulmonary hypertension      Chronic kidney disease, unspecified CKD stage      Acute on chronic diastolic congestive heart failure      Pulmonary embolism      Anxiety and depression      GERD (gastroesophageal reflux disease)      Obstructive sleep apnea      Chronic atrial fibrillation      H/O pneumonectomy      Right leg weakness          FAMILY HISTORY:  No pertinent family history in parents        SOCIAL HISTORY:  Smoking: [ ] Never Smoked [x ] Former Smoker (__ packs x ___ years) [ ] Current Smoker  (__ packs x ___ years)  no current etoh    Allergies    nitrates (Unknown)    Intolerances        HOME MEDICATIONS:  Home Medications:  ambrisentan 10 mg oral tablet: 1 tab(s) orally once a day (2022 03:27)  apixaban 5 mg oral tablet: 1 tab(s) orally 2 times a day (2022 03:26)  buPROPion 100 mg oral tablet: 1 tab(s) orally 2 times a day (2022 03:31)  digoxin 125 mcg (0.125 mg) oral tablet: 1 tab(s) orally every other day (at bedtime) (2022 03:28)  dilTIAZem 120 mg/24 hours oral capsule, extended release: 1 cap(s) orally once a day (2022 03:31)  pantoprazole 40 mg oral delayed release tablet: 1 tab(s) orally once a day (2022 03:27)  rosuvastatin 5 mg oral tablet: 1 tab(s) orally once a day (2022 03:27)  spironolactone 50 mg oral tablet: 1 tab(s) orally once a day (2022 03:29)  tadalafil 20 mg oral tablet: 2 tab(s) orally once a day (2022 03:29)  torsemide 20 mg oral tablet: 2 tab(s) orally once a day (2022 03:30)      REVIEW OF SYSTEMS:  Constitutional: [x ] negative fevers or chills  [ ] fevers [ ] chills [ ] weight loss [ ] weight gain  HEENT: [ ] negative [ ] dry eyes [ ] eye irritation [ ] postnasal drip [ ] nasal congestion  CV: [ ] negative  [ ] chest pain [ ] orthopnea [ ] palpitations [ ] murmur  Resp: [ ] negative [ ] cough [x ] shortness of breath [ x] dyspnea [ ] wheezing [ ] sputum [ ] hemoptysis  GI: [ ] negative [ ] nausea [ ] vomiting [ ] diarrhea [ ] constipation [ ] abd pain [ ] dysphagia   : [ ] negative [ ] dysuria [ ] nocturia [ ] hematuria [ ] increased urinary frequency  Musculoskeletal: [ ] negative [ ] back pain [ ] myalgias [ ] arthralgias [ ] fracture  Skin: [ ] negative [ ] rash [ ] itch  Neurological: [ ] negative [ ] headache [ ] dizziness [ ] syncope [ ] weakness [ ] numbness  Psychiatric: [ ] negative [ ] anxiety [ ] depression  Endocrine: [ ] negative [ ] diabetes [ ] thyroid problem  Hematologic/Lymphatic: [ ] negative [ ] anemia [ ] bleeding problem  Allergic/Immunologic: [ ] negative [ ] itchy eyes [ ] nasal discharge [ ] hives [ ] angioedema  [ x] All other systems negative  [ ] Unable to assess ROS because ________    OBJECTIVE:  ICU Vital Signs Last 24 Hrs  T(C): 36.6 (2022 08:21), Max: 36.6 (2022 08:21)  T(F): 97.8 (2022 08:21), Max: 97.8 (2022 08:21)  HR: 78 (2022 08:21) (71 - 78)  BP: 114/64 (2022 08:21) (112/75 - 120/69)  BP(mean): --  ABP: --  ABP(mean): --  RR: 20 (2022 08:21) (18 - 22)  SpO2: 93% (2022 08:21) (92% - 100%)        06-13 @ 07:01  -  06-14 @ 07:00  --------------------------------------------------------  IN: 240 mL / OUT: 400 mL / NET: -160 mL      CAPILLARY BLOOD GLUCOSE      POCT Blood Glucose.: 114 mg/dL (2022 07:22)      PHYSICAL EXAM:  General:  no acute distress   HEENT: atraumatic, normocephalic   Neck:  thick neck   Respiratory:  no crackles, no cough/wheezes, no use of accessory muscles of respiration    Cardiovascular:  RRR, no rubs, gallops, m urmurs   Abdomen: obese, non-tender   Extremities: bilateral edema, no significant cyanosis   Skin: no rash   Neurological: no gross/focal deficits appreciated    Psychiatry: appropriate     LINES:     HOSPITAL MEDICATIONS:  Standing Meds:  albuterol/ipratropium for Nebulization 3 milliLiter(s) Nebulizer every 6 hours  apixaban 5 milliGRAM(s) Oral two times a day  budesonide 160 MICROgram(s)/formoterol 4.5 MICROgram(s) Inhaler 2 Puff(s) Inhalation two times a day  buPROPion XL (24-Hour) . 150 milliGRAM(s) Oral daily  digoxin     Tablet 125 MICROGram(s) Oral daily  diltiazem    milliGRAM(s) Oral daily  furosemide   Injectable 40 milliGRAM(s) IV Push two times a day  pantoprazole    Tablet 40 milliGRAM(s) Oral before breakfast  rosuvastatin 5 milliGRAM(s) Oral at bedtime  spironolactone 50 milliGRAM(s) Oral daily  tadalafil 40 milliGRAM(s) Oral daily  tiotropium 2.5 MICROgram(s)/olodaterol 2.5 MICROgram(s) (STIOLTO) Inhaler 2 Puff(s) Inhalation daily      PRN Meds:  acetaminophen     Tablet .. 650 milliGRAM(s) Oral every 6 hours PRN      LABS:                        7.4    8.44  )-----------( 205      ( 2022 02:45 )             24.5     Hgb Trend: 7.4<--  06-14    133<L>  |  94<L>  |  63<H>  ----------------------------<  115<H>  3.9   |  27  |  1.29    Ca    9.3      2022 02:45  Phos  3.2     06-14  Mg     1.7     -14    TPro  7.0  /  Alb  3.8  /  TBili  0.3  /  DBili  x   /  AST  14  /  ALT  10  /  AlkPhos  105  06-14    Creatinine Trend: 1.29<--  PT/INR - ( 2022 02:45 )   PT: 27.0 sec;   INR: 2.31 ratio         PTT - ( 2022 02:45 )  PTT:31.3 sec  Urinalysis Basic - ( 2022 02:31 )    Color: Light Yellow / Appearance: Clear / S.011 / pH: x  Gluc: x / Ketone: Negative  / Bili: Negative / Urobili: Negative   Blood: x / Protein: Negative / Nitrite: Negative   Leuk Esterase: Negative / RBC: x / WBC x   Sq Epi: x / Non Sq Epi: x / Bacteria: x      Arterial Blood Gas:   @ 02:39  7.44/44/178/30/97.6/5.0  ABG lactate: --        MICROBIOLOGY:       RADIOLOGY:  [ ] Reviewed and interpreted by me    PULMONARY FUNCTION TESTS:    EKG:

## 2022-06-21 NOTE — CHART NOTE - NSCHARTNOTEFT_GEN_A_CORE
Vasacade closure Assessment of Access Site  s/p CardioMEMS    Vital signs are stable, neuro-vascular status of the lower extremities is intact, stable and there is no evidence of hematoma on the right lower extremities.     Complications:   [x] None      Comments:

## 2022-06-21 NOTE — PROGRESS NOTE ADULT - ATTENDING COMMENTS
Attending Attestation:    Patient seen and examined with resident/fellow.  Agree with above except as noted.    65 yo female with COPD diastolic heart failure, pulmonary HTN transferred from [place] for management of worsening respiratory failure. Pt has been c/o increase leg edema and sob 1-2 weeks before admitted to [facility]. Pt has been taking Tadanifil and ambresentan.  R heart cath 6/20/2022 revealed sPAP: 106, dPAP: 44 mPAP:65, PCWP: 16, PVR 7.32 [facility] unit. Patient still with leg edema.      Problem 1:Acute on chronic hypoxemic respiratory failure due to decompensated R heart failure  and pulmonary HTN.   Continue nasal canula 02.   Continue  aggressive diuresis with  IV Bumex.  Follow serum bicarbonate. If  continues to rise consider Diamox.     Problem 2: Chronic atrial fib.  Continue diltiazem and digoxin.  Continue apixaban.    problem 3  COPD. Continue bronchodilators.    Problem 4  ROLANDA. Continue night time AVAPS.    Problem 5 Pulmonary HTN'  Continue tadalafil, Start home ambrisentan  if pt can get it from home.       Problem 6  Lung transplant evaluation. in progress.

## 2022-06-21 NOTE — PROGRESS NOTE ADULT - PROBLEM SELECTOR PLAN 3
Severe advanced pulmonary hypertension with advanced COPD with potentially superimposed heart failure. Home on 8L high concentrate = 4L in hospital. Per patient saturates in the high 80s.     Plan;    - C/w home tadalafil 40mg qdaily   - ambrisentan 10mg ordered by pharmacy, patient will need proof of enrollment in clinical trial, Dr. Gallardo's (pulm) office called and left a message  - C/w Christopher q6

## 2022-06-21 NOTE — PROGRESS NOTE ADULT - PROBLEM SELECTOR PLAN 1
Most likely 2/2 to worsening pulm HTN and acute on chronic HFpEF w/ possible COPD exacerbation w/ superimposed pneumonia        Plan:   - Completed 5 day course of Abx  - Avoid over oxygenation to prevent decreasing respiratory drive   - Currently on 8LNC (home settings)   - C/w nocturnal AVAPs (Patient's own)   - Switched to Bumex 2mg BID  - C/w Duonebs q6  - CXR shows improving pulm edema  - Patient will need repeat CT scan when euvolemic. Most likely 2/2 to worsening pulm HTN and acute on chronic HFpEF w/ possible COPD exacerbation w/ superimposed pneumonia        Plan:   - Completed 5 day course of Abx  - Avoid over oxygenation to prevent decreasing respiratory drive   - Currently on 8LNC (home settings)   - C/w nocturnal AVAPs (Patient's own)   - Switched to Bumex 2mg BID  - Will do Diamox 250 x1 today per HF  - C/w Duonebs q6  - CXR shows improving pulm edema  - Patient will need repeat CT scan when euvolemic.

## 2022-06-21 NOTE — PROGRESS NOTE ADULT - ASSESSMENT
This is a 66 year old female with history of severe pulmonary HTN (Group 1, 2 & 3), HFpEF, Afib, COPD on home O2 (5L NC), CKD, ROLANDA on CPAP, morbid obesity ( s/p bariatric surgery in 2012 with subsequent lap band removal due to GI bleed), PE, CVA (MCA infarct in 2012) who comes in to establish care with heart failure due to her pulmonary HTN who who presented as a transfer from [place] for 1-2 weeks of fatigue, lethargy, progressive dyspnea on exertion.     She's been symptomatically improving with diuresis although not back to baseline. RHC and cardiomems done 6/20. RHC showed elevated right sided filling pressures with severe pulmonary hypertension with systemic PA pressures, slightly elevated PCWP and preserved cardiac output. She's responding well to current dose of IV diuretics although with slightly uptrending bicarb. Her renal function is stable. She's normotensive and her a fib is rate controlled.    Studies:   6/2022 TTE:  Normal LVEF, dilated LA, decreased RV function with EV enlargement, moderate TR, PASP 85, severe pulm HTN   5/2013 Cardiac Cath/PCI: Cardiac Cath: right dominant circulation, left main arises normally from the left coronary sinus of Valsalva, bifurcates into LAD and circumflex, left main normal, LAD arises normally from the left main normal, left circumflex arises normally from the left main and terminates in the AV groove normal, RCA arises normally from the right sinus of Valsalva, RCA is normal,     Hemodynamics:  RHC 6/20/2022: RA 18, /44/65, PCWP 16 v 18, PVR 7.32, TPG 49, /60/86, PA 58, Ra 95% on 8L NC, CO/CI 6.7/3.2 (cardiomems PAD 49)  RHC (6/2020): RA 2, RV 60/5, PAP 58/18/33, CO/CI: 6.5/3.3

## 2022-06-21 NOTE — PROGRESS NOTE ADULT - ASSESSMENT
Patient is a 67 y/o F w/ pmh of pulmonary hypertension, HTN, HFpEF (echo 60% PASP 79mmhg), COPD  (8L), Afib (in digoxin, diltiazem), HLD, prior spont pneumothorax, and CKD who presents as transfer from [facility] with 1-2 weeks of fatigue, lethargy, and progressively worsening RINALDI concerning for progression of pulm HTN/COPD vs. acute on chronic HFpEF, TTE revealed normal LV systolic fxn and severe pulm HTN, undergoing diuresis, s/p RHC/cardio mems and repeat imaging once euvolemic

## 2022-06-21 NOTE — PROGRESS NOTE ADULT - SUBJECTIVE AND OBJECTIVE BOX
HPI:  65 y/o F w/ pmh of pulmonary hypertension, HFpEF (echo 60% PASP 79mmhg), COPD (8L), Afib (in digoxin, diltiazem), HLD, prior spontaneous pneumothorax, and CKD who presented from [facility] with fatigue, lethargy, worsening RINALDI.  She was fluid overloaded with suspicion of pneumonia, treated with diuresis and antibiotics.  Patient was transferred to [facility] for further management.    Patient seen at bedside, sitting up in chair on 10L NC, in no acute distress.  S/p cardimems procedure 6/20 with no issues.  She states she was out of breath when walking from bed to chair, which was only a  few steps. Denies complaints, abdominal pain, nausea, vomiting.      Review Of Systems:  Constitutional: [ ] Fever [ ] Chills [ ] Fatigue [ ] Weight change   HEENT: [ ] Blurred vision [ ] Eye Pain [ ] Headache [ ] Runny nose [ ] Sore Throat   Respiratory: [ ] Cough [ ] Wheezing [x] Shortness of breath  Cardiovascular: [ ] Chest Pain [ ] Palpitations [ ] RINALDI [ ] PND [ ] Orthopnea  Gastrointestinal: [ ] Abdominal Pain [ ] Diarrhea [ ] Constipation [ ] Hemorrhoids [ ] Nausea [ ] Vomiting  Genitourinary: [ ] Nocturia [ ] Dysuria [ ] Incontinence  Extremities: [ ] Swelling [ ] Joint Pain  Neurologic: [ ] Focal deficit [ ] Paresthesias [ ] Syncope  Lymphatic: [ ] Swelling [ ] Lymphadenopathy   Skin: [ ] Rash [ ] Ecchymoses [ ] Wounds [ ] Lesions  Psychiatry: [ ] Depression [ ] Suicidal/Homicidal Ideation [ ] Anxiety [ ] Sleep Disturbances  [x] 10 point review of systems is otherwise negative except as mentioned above            [ ]Unable to obtain    Medications:  acetaminophen     Tablet .. 650 milliGRAM(s) Oral every 6 hours PRN  albuterol/ipratropium for Nebulization 3 milliLiter(s) Nebulizer every 6 hours  budesonide 160 MICROgram(s)/formoterol 4.5 MICROgram(s) Inhaler 2 Puff(s) Inhalation two times a day  buMETAnide Injectable 2 milliGRAM(s) IV Push two times a day  buPROPion XL (24-Hour) . 150 milliGRAM(s) Oral daily  chlorhexidine 2% Cloths 1 Application(s) Topical <User Schedule>  digoxin     Tablet 125 MICROGram(s) Oral daily  diltiazem    milliGRAM(s) Oral daily  fluticasone propionate 50 MICROgram(s)/spray Nasal Spray 1 Spray(s) Both Nostrils two times a day PRN  pantoprazole    Tablet 40 milliGRAM(s) Oral before breakfast  rosuvastatin 5 milliGRAM(s) Oral at bedtime  spironolactone 50 milliGRAM(s) Oral daily  tadalafil 40 milliGRAM(s) Oral daily  tiotropium 2.5 MICROgram(s)/olodaterol 2.5 MICROgram(s) (STIOLTO) Inhaler 2 Puff(s) Inhalation daily    PMH/PSH/FH/SH: [x] Unchanged    Vitals   T(C): 36.6 (21 Jun 2022 12:02), Max: 36.6 (21 Jun 2022 04:55)  T(F): 97.8 (21 Jun 2022 12:02), Max: 97.9 (21 Jun 2022 04:55)  HR: 72 (21 Jun 2022 14:54) (63 - 87)  BP: 114/73 (21 Jun 2022 12:02) (108/54 - 137/78)  RR: 18 (21 Jun 2022 14:54) (14 - 20)  SpO2: 96% (21 Jun 2022 14:54) (94% - 97%)    06-21    130<L>  |  88<L>  |  55<H>  ----------------------------<  74  4.5   |  32<H>  |  1.47<H>    Ca    9.7      21 Jun 2022 06:47  Phos  3.6     06-21  Mg     2.0     06-21    TPro  7.4  /  Alb  3.8  /  TBili  0.4  /  DBili  x   /  AST  19  /  ALT  10  /  AlkPhos  134<H>  06-21                          8.5    4.79  )-----------( 245      ( 21 Jun 2022 06:47 )             28.8       Physical Exam:  Appearance: on 10L NAD  Cardiovascular: irregular , + murmur  Respiratory: Coarse breath sounds bilaterally   Gastrointestinal: obese abdomen, Soft, Non-tender, Non-distended  Extremities: warm and well perfused, ++lower extremity edema (improving)  Neuro: AAO x 3     Right Heart Cath 6/20  Conclusions:     Elevated right atrial pressure (mRA 18mmHg)   Severe systemic pulmonary hypertension (sPAP 106mmHg, dPAP 44mmHg,  mPAP 65mmHg)  PCWP = 16mmHg with a V wave of 18mmHg   PVR = 7.32Wu   TPG = 49mmHg   DPG = 28mmHg   SBP = 123/60/86   PAsat = 58.4% // RAsat = 95% (8L NC)   Jerome CO // CI = 6.7l/min // 3.2l/min/m2     Status post placement of a pulmonary artery pressure sensor monitoring  device (CardioMems) through the right groin

## 2022-06-21 NOTE — PROGRESS NOTE ADULT - PROBLEM SELECTOR PLAN 1
-back to her home O2 requirement of 8 L  - ongoing diuresis  -Appreciate pulmonary team input  -She will eventually need consideration for lung transplant   -reportedly on ambresentan and tadalafil at home, on tadalafil while inpatient  - will check cardiomems tomorrow -back to her home O2 requirement of 8 L  - ongoing diuresis  -Appreciate pulmonary team input  -She will eventually need consideration for lung transplant   -reportedly on ambresentan and tadalafil at home, on tadalafil while inpatient.   -Maganement as per pulmonary team   - will check cardiomems tomorrow

## 2022-06-21 NOTE — PROGRESS NOTE ADULT - PROBLEM SELECTOR PLAN 12
DVT/PE ppx: Eliquis 5mg BID for Afib (restart today if Hgb stable)  Diet: DASH/TLC  Code: Full   Dispo: pending clinical course DVT/PE ppx: Eliquis 5mg BID for Afib  Diet: DASH/TLC  Code: Full   Dispo: pending clinical course

## 2022-06-21 NOTE — PROGRESS NOTE ADULT - SUBJECTIVE AND OBJECTIVE BOX
Subjective:  - s/p RHC yesterday  - reports SOB with minimal exertion such as getting OOB to chair    Medications:  acetaminophen     Tablet .. 650 milliGRAM(s) Oral every 6 hours PRN  albuterol/ipratropium for Nebulization 3 milliLiter(s) Nebulizer every 6 hours  apixaban 5 milliGRAM(s) Oral every 12 hours  budesonide 160 MICROgram(s)/formoterol 4.5 MICROgram(s) Inhaler 2 Puff(s) Inhalation two times a day  buMETAnide Injectable 2 milliGRAM(s) IV Push two times a day  buPROPion XL (24-Hour) . 150 milliGRAM(s) Oral daily  chlorhexidine 2% Cloths 1 Application(s) Topical <User Schedule>  digoxin     Tablet 125 MICROGram(s) Oral daily  diltiazem    milliGRAM(s) Oral daily  fluticasone propionate 50 MICROgram(s)/spray Nasal Spray 1 Spray(s) Both Nostrils two times a day PRN  pantoprazole    Tablet 40 milliGRAM(s) Oral before breakfast  rosuvastatin 5 milliGRAM(s) Oral at bedtime  spironolactone 50 milliGRAM(s) Oral daily  tadalafil 40 milliGRAM(s) Oral daily  tiotropium 2.5 MICROgram(s)/olodaterol 2.5 MICROgram(s) (STIOLTO) Inhaler 2 Puff(s) Inhalation daily      Physical Exam:    Vitals:  Vital Signs Last 24 Hours  T(C): 36.6 (22 @ 12:02), Max: 36.7 (22 @ 14:53)  HR: 83 (22 @ 12:02) (63 - 87)  BP: 114/73 (22 @ 12:02) (108/54 - 137/78)  RR: 18 (22 @ 12:02) (14 - 20)  SpO2: 94% (22 @ 12:02) (94% - 98%)    Weight in k.2 ( @ 08:00)    I&O's Summary    2022 07:01  -  2022 07:00  --------------------------------------------------------  IN: 1090 mL / OUT: 3100 mL / NET: - mL    2022 07:01  -  2022 13:59  --------------------------------------------------------  IN: 600 mL / OUT: 1200 mL / NET: -600 mL    Tele: afib PVCs HR 70-80s    General: No distress. Comfortable.  HEENT: EOM intact.  Neck: Neck supple. JVP moderately elevated. No masses  Chest: Fine crackles bilateral bases  CV: Irregular. Normal S1 and S2. II/VI SM. Radial pulses normal. +1 BLE edema, improving  Abdomen: Soft, non-distended, non-tender  Skin: No rashes or skin breakdown  Neurology: Alert and oriented times three. Sensation intact  Psych: Affect normal    Labs:                        8.5    4.79  )-----------( 245      ( 2022 06:47 )             28.8         130<L>  |  88<L>  |  55<H>  ----------------------------<  74  4.5   |  32<H>  |  1.47<H>    Ca    9.7      2022 06:47  Phos  3.6       Mg     2.0         TPro  7.4  /  Alb  3.8  /  TBili  0.4  /  DBili  x   /  AST  19  /  ALT  10  /  AlkPhos  134<H>

## 2022-06-21 NOTE — PROGRESS NOTE ADULT - SUBJECTIVE AND OBJECTIVE BOX
DATE OF SERVICE: 06-21-22 @ 06:57    Patient is a 66y old  Female who presents with a chief complaint of shortness of breath (20 Jun 2022 14:41)      SUBJECTIVE / OVERNIGHT EVENTS: NAEO. Patient afebrile o/n. S/p RHC w/ cardiomems 6/20. Patient feels     MEDICATIONS  (STANDING):  albuterol/ipratropium for Nebulization 3 milliLiter(s) Nebulizer every 6 hours  budesonide 160 MICROgram(s)/formoterol 4.5 MICROgram(s) Inhaler 2 Puff(s) Inhalation two times a day  buMETAnide Injectable 2 milliGRAM(s) IV Push two times a day  buPROPion XL (24-Hour) . 150 milliGRAM(s) Oral daily  chlorhexidine 2% Cloths 1 Application(s) Topical <User Schedule>  digoxin     Tablet 125 MICROGram(s) Oral daily  diltiazem    milliGRAM(s) Oral daily  pantoprazole    Tablet 40 milliGRAM(s) Oral before breakfast  rosuvastatin 5 milliGRAM(s) Oral at bedtime  spironolactone 50 milliGRAM(s) Oral daily  tadalafil 40 milliGRAM(s) Oral daily  tiotropium 2.5 MICROgram(s)/olodaterol 2.5 MICROgram(s) (STIOLTO) Inhaler 2 Puff(s) Inhalation daily    MEDICATIONS  (PRN):  acetaminophen     Tablet .. 650 milliGRAM(s) Oral every 6 hours PRN Temp greater or equal to 38C (100.4F), Mild Pain (1 - 3), Moderate Pain (4 - 6)  fluticasone propionate 50 MICROgram(s)/spray Nasal Spray 1 Spray(s) Both Nostrils two times a day PRN Congestion      Vital Signs Last 24 Hrs  T(C): 36.6 (21 Jun 2022 04:55), Max: 36.7 (20 Jun 2022 14:53)  T(F): 97.9 (21 Jun 2022 04:55), Max: 98.1 (20 Jun 2022 14:53)  HR: 68 (21 Jun 2022 05:51) (63 - 86)  BP: 137/78 (21 Jun 2022 04:55) (108/54 - 137/78)  BP(mean): --  RR: 18 (21 Jun 2022 04:55) (14 - 20)  SpO2: 95% (21 Jun 2022 05:51) (92% - 98%)  CAPILLARY BLOOD GLUCOSE        I&O's Summary    19 Jun 2022 07:01  -  20 Jun 2022 07:00  --------------------------------------------------------  IN: 300 mL / OUT: 2300 mL / NET: -2000 mL    20 Jun 2022 07:01  -  21 Jun 2022 06:57  --------------------------------------------------------  IN: 1090 mL / OUT: 3100 mL / NET: -2010 mL        PHYSICAL EXAM:  GENERAL: NAD, 8LNC  HEAD:  Atraumatic, Normocephalic  EYES: EOMI, PERRLA, conjunctiva and sclera clear  ENT: Moist mucous membranes  NECK: Supple, No JVD  CHEST/LUNG: Bilateral decrease in breath sounds, no wheezing, rales or rhonchi. Unlabored respirations  HEART: Irregularly irregular rate and rhythm; Normal s1 and s2, No murmurs, rubs, or gallops  ABDOMEN: Bowel sounds present; Soft, Nontender, Nondistended. No hepatomegaly  EXTREMITIES:  2+ Peripheral Pulses, brisk capillary refill. No clubbing, cyanosis, Non pitting edema, Scar showing where skin graft was taken   NERVOUS SYSTEM:  Alert & Oriented X3, speech clear. No deficits   MSK: FROM all 4 extremities, full and equal strength  SKIN: No rashes or lesions    LABS:                        8.5    4.94  )-----------( 247      ( 20 Jun 2022 06:26 )             29.3     06-20    133<L>  |  90<L>  |  62<H>  ----------------------------<  75  4.4   |  31  |  1.41<H>    Ca    9.5      20 Jun 2022 06:24  Phos  3.4     06-20  Mg     2.0     06-20    TPro  6.9  /  Alb  3.5  /  TBili  0.4  /  DBili  x   /  AST  18  /  ALT  11  /  AlkPhos  137<H>  06-20              RADIOLOGY & ADDITIONAL TESTS:    Imaging Personally Reviewed:    Consultant(s) Notes Reviewed:      Care Discussed with Consultants/Other Providers:   DATE OF SERVICE: 06-21-22 @ 06:57    Patient is a 66y old  Female who presents with a chief complaint of shortness of breath (20 Jun 2022 14:41)      SUBJECTIVE / OVERNIGHT EVENTS: NAEO. Patient afebrile o/n. S/p RHC w/ cardiomems 6/20. Patient feels well this AM, says she only get SOB w/ movements, working with PT. Denies CP, fevers/chills, N/V/D    MEDICATIONS  (STANDING):  albuterol/ipratropium for Nebulization 3 milliLiter(s) Nebulizer every 6 hours  budesonide 160 MICROgram(s)/formoterol 4.5 MICROgram(s) Inhaler 2 Puff(s) Inhalation two times a day  buMETAnide Injectable 2 milliGRAM(s) IV Push two times a day  buPROPion XL (24-Hour) . 150 milliGRAM(s) Oral daily  chlorhexidine 2% Cloths 1 Application(s) Topical <User Schedule>  digoxin     Tablet 125 MICROGram(s) Oral daily  diltiazem    milliGRAM(s) Oral daily  pantoprazole    Tablet 40 milliGRAM(s) Oral before breakfast  rosuvastatin 5 milliGRAM(s) Oral at bedtime  spironolactone 50 milliGRAM(s) Oral daily  tadalafil 40 milliGRAM(s) Oral daily  tiotropium 2.5 MICROgram(s)/olodaterol 2.5 MICROgram(s) (STIOLTO) Inhaler 2 Puff(s) Inhalation daily    MEDICATIONS  (PRN):  acetaminophen     Tablet .. 650 milliGRAM(s) Oral every 6 hours PRN Temp greater or equal to 38C (100.4F), Mild Pain (1 - 3), Moderate Pain (4 - 6)  fluticasone propionate 50 MICROgram(s)/spray Nasal Spray 1 Spray(s) Both Nostrils two times a day PRN Congestion      Vital Signs Last 24 Hrs  T(C): 36.6 (21 Jun 2022 04:55), Max: 36.7 (20 Jun 2022 14:53)  T(F): 97.9 (21 Jun 2022 04:55), Max: 98.1 (20 Jun 2022 14:53)  HR: 68 (21 Jun 2022 05:51) (63 - 86)  BP: 137/78 (21 Jun 2022 04:55) (108/54 - 137/78)  BP(mean): --  RR: 18 (21 Jun 2022 04:55) (14 - 20)  SpO2: 95% (21 Jun 2022 05:51) (92% - 98%)  CAPILLARY BLOOD GLUCOSE        I&O's Summary    19 Jun 2022 07:01  -  20 Jun 2022 07:00  --------------------------------------------------------  IN: 300 mL / OUT: 2300 mL / NET: -2000 mL    20 Jun 2022 07:01  -  21 Jun 2022 06:57  --------------------------------------------------------  IN: 1090 mL / OUT: 3100 mL / NET: -2010 mL        PHYSICAL EXAM:  GENERAL: NAD, 8LNC  HEAD:  Atraumatic, Normocephalic  EYES: EOMI, PERRLA, conjunctiva and sclera clear  ENT: Moist mucous membranes  NECK: Supple, No JVD  CHEST/LUNG: Bilateral decrease in breath sounds, no wheezing, rales or rhonchi. Unlabored respirations  HEART: Irregularly irregular rate and rhythm; Normal s1 and s2, No murmurs, rubs, or gallops  ABDOMEN: Bowel sounds present; Soft, Nontender, Nondistended. No hepatomegaly  EXTREMITIES:  2+ Peripheral Pulses, brisk capillary refill. No clubbing, cyanosis, Non pitting edema, Scar showing where skin graft was taken   NERVOUS SYSTEM:  Alert & Oriented X3, speech clear. No deficits   MSK: FROM all 4 extremities, full and equal strength  SKIN: No rashes or lesions    LABS:                        8.5    4.94  )-----------( 247      ( 20 Jun 2022 06:26 )             29.3     06-20    133<L>  |  90<L>  |  62<H>  ----------------------------<  75  4.4   |  31  |  1.41<H>    Ca    9.5      20 Jun 2022 06:24  Phos  3.4     06-20  Mg     2.0     06-20    TPro  6.9  /  Alb  3.5  /  TBili  0.4  /  DBili  x   /  AST  18  /  ALT  11  /  AlkPhos  137<H>  06-20              RADIOLOGY & ADDITIONAL TESTS:    Imaging Personally Reviewed:    Consultant(s) Notes Reviewed:      Care Discussed with Consultants/Other Providers:

## 2022-06-21 NOTE — PROGRESS NOTE ADULT - ASSESSMENT
Victoria Domingo is a 66 year old female w/pulmonary HTN, HTN, HFpEF, COPD (on 8 L at home), afib, HLD, prior spontaneous pneumothorax, and CKD who presents to [facility] as a transfer from [facility] with reported 1-2 weeks of fatigue, lethargy, and progressively worsening shortness of breath.  She has reportedly been hospitalized 4 times in the past year.  Per chart review, she was initially diuresed at OSH and started on ceftriaxone as CT scan showed RUL consolidation concerning for pneumonia.  She was requiring HFNC to maintain normal O2 saturations and was subsequently transferred to [facility] for further management.      #Acute hypoxic respiratory failure--improving  - etiology likely multifactorial and related to volume overload given physical exam findings, significantly elevated BNP  - patient afebrile, without white count, but previously treated at OSH with ceftriaxone given concern for pneumonia  - O2 requirements improving w/diuresis, patient now on home O2 (8L)    #Pulm HTN  - s/p RHC 6/20 demonstrating sPAP: 106, dPAP: 44 mPAP:65, PCWP: 16, PVR 7.32, consistent w/severe pulmonary HTN   - reportedly on ambresentan and tadalafil at home, on tadalafil while inaptient   - currently on bumex 2 mg IV BID   - TTE demonstrating RV enlargement, decreased RV systolic function, and PASP 85 mm Hg    #COPD  - patient reportedly on 8 L at home, now w/improving O2 requirements as above   - presentation not consistent with exacerbation    Recommendations:  - agree w/bumex 2 mg IV BID for now, monitor strict Is/Os and diurese patient to net negative fluid balance   - continue home AVAPs qhs  - please provide and encourage use of incentive spirometry while inpatient  - ensure patient has PT/OT  - f/u any pulmonary transplant recommendations  - f/u heart failure recommendations    Note incomplete    To be discussed w/Dr. Danica Knott PGY4  [id number] Victoria Domingo is a 66 year old female w/pulmonary HTN, HTN, HFpEF, COPD (on 8 L at home), afib, HLD, prior spontaneous pneumothorax, and CKD who presents to [facility] as a transfer from [facility] with reported 1-2 weeks of fatigue, lethargy, and progressively worsening shortness of breath.  She has reportedly been hospitalized 4 times in the past year.  Per chart review, she was initially diuresed at OSH and started on ceftriaxone as CT scan showed RUL consolidation concerning for pneumonia.  She was requiring HFNC to maintain normal O2 saturations and was subsequently transferred to [facility] for further management.      #Acute hypoxic respiratory failure--improving  - etiology likely multifactorial and related to volume overload given physical exam findings, significantly elevated BNP  - patient afebrile, without white count, but previously treated at OSH with ceftriaxone given concern for pneumonia  - O2 requirements improving w/diuresis, patient now on home O2 (8L)    #Pulm HTN  - s/p RHC 6/20 demonstrating sPAP: 106, dPAP: 44 mPAP:65, PCWP: 16, PVR 7.32, consistent w/severe pulmonary HTN   - reportedly on ambrisentan and tadalafil at home, on tadalafil while inaptient, patient unable to obtain ambrisentan while inpatient   - currently on bumex 2 mg IV BID   - TTE demonstrating RV enlargement, decreased RV systolic function, and PASP 85 mm Hg    #COPD  - patient reportedly on 8 L at home, now w/improving O2 requirements as above   - presentation not consistent with exacerbation    Recommendations:  - agree w/bumex 2 mg IV BID for now, monitor strict Is/Os and diurese patient to net negative fluid balance   - continue home tadalafil, would initiate home ambrisentan while inpatient if patient able to obtain  - continue home AVAPs qhs  - please provide and encourage use of incentive spirometry while inpatient  - ensure patient has PT/OT  - f/u any pulmonary transplant recommendations  - f/u heart failure recommendations    Patient seen and discussed w/Dr. Danica Knott PGY4  [id number]

## 2022-06-21 NOTE — PROGRESS NOTE ADULT - ATTENDING COMMENTS
This is a 66F w/ pmh of pulmonary hypertension, HTN, HFpEF (echo 60% PASP 79mmhg), COPD  (8L), Afib (in digoxin, diltiazem), HLD, prior spont pneumothorax, and CKD who presents as transfer from [facility] with 1-2 weeks of fatigue, lethargy, and progressively worsening RINALDI concerning for progression of pulm HTN/COPD vs. acute on chronic HFpEF, TTE revealed normal LV systolic fxn and severe pulm HTN. HF team, Pulmonary evaluated.        1. Acute on chronic hypoxemic respiratory failure - multifactorial from HF, pulm HTN, PNA (resolved).   - On, O2, Bumex 2mg IV bid, bronchodilators, inhaled steroid, tadalafil    2. Acute on chronic HF -   - Has been well diuresed. s/p RHC/CardioMems yesterday- /44/65  - c/w Bumex 2mg IV bid, Aldactone 50mg/d, continue, resumed Eliquis. ASA added for Mems  - 1 dose Diamox 250mg x1  - Daily I/O, weight    3. Severe Pulm HTN (groups 2/3) - s/p RHC, /44/65  - c/w bronchodilators, inhaled steroid, tadalafil  - Pulmonary f/u noted    4. . Chronic Afib - Rate controlled  - c/w Cardizem, Digoxin  - resumed Eliquis     5. COPD/ROLANDA - AVAPS, inhalers    6. OOB to chair, PT in progress . This is a 66F w/ pmh of pulmonary hypertension, HTN, HFpEF (echo 60% PASP 79mmhg), COPD  (8L), Afib (in digoxin, diltiazem), HLD, prior spont pneumothorax, and CKD who presents as transfer from [facility] with 1-2 weeks of fatigue, lethargy, and progressively worsening RINALDI concerning for progression of pulm HTN/COPD vs. acute on chronic HFpEF, TTE revealed normal LV systolic fxn and severe pulm HTN. HF team, Pulmonary evaluated.        1. Acute on chronic hypoxemic respiratory failure - multifactorial from HF, pulm HTN, PNA (resolved).   - On, O2, Bumex 2mg IV bid, bronchodilators, inhaled steroid, tadalafil    2. Acute on chronic HF -   - Has been well diuresed. s/p RHC/CardioMems yesterday- /44/65  - c/w Bumex 2mg IV bid, Aldactone 50mg/d, continue, resumed Eliquis. ASA added for Mems  - 1 dose Diamox 250mg x1  - Daily I/O, weight    3. Severe Pulm HTN (groups 2/3) - s/p RHC, /44/65  - c/w bronchodilators, inhaled steroid, tadalafil  - reportedly she is on ambrisentan and tadalafil at home, on tadalafil while inaptient,     patient unable to obtain ambrisentan while inpatient  - Pulmonary f/u noted    4. . Chronic Afib - Rate controlled  - c/w Cardizem, Digoxin  - resumed Eliquis     5. COPD/ROLANDA - AVAPS, inhalers    6. OOB to chair, PT in progress .

## 2022-06-21 NOTE — PROGRESS NOTE ADULT - PROBLEM SELECTOR PLAN 2
-continue with bumex to 2mg IV BID  - please give diamox 250mg IV x 1 today  - She will benefit from MRA/SGLT2i  - will check cardiomems tomorrow  - please start asa 81mg daily x 1 month s/p cardiomems (deferring plavix as she's on full AC)

## 2022-06-22 NOTE — PROGRESS NOTE ADULT - SUBJECTIVE AND OBJECTIVE BOX
Interval Events: Patient was seen and examined at bedside. No overnight events.    REVIEW OF SYSTEMS:  Constitutional: [ ] fevers [ ] chills [ ] weight loss [ ] weight gain  CV: [ ] chest pain [ ] orthopnea [ ] palpitations [ ] murmur  Resp: [ ] cough [ ] shortness of breath [ ] dyspnea [ ] wheezing [ ] sputum [ ] hemoptysis  [ ] All other systems negative  [ ] Unable to assess ROS because ________    OBJECTIVE:  ICU Vital Signs Last 24 Hrs  T(C): 36.6 (22 Jun 2022 08:00), Max: 36.7 (21 Jun 2022 16:05)  T(F): 97.9 (22 Jun 2022 08:00), Max: 98.1 (21 Jun 2022 16:05)  HR: 91 (22 Jun 2022 08:00) (53 - 91)  BP: 110/72 (22 Jun 2022 08:00) (104/56 - 114/73)  BP(mean): --  ABP: --  ABP(mean): --  RR: 18 (22 Jun 2022 08:00) (18 - 18)  SpO2: 94% (22 Jun 2022 08:00) (92% - 97%)        06-21 @ 07:01  -  06-22 @ 07:00  --------------------------------------------------------  IN: 1060 mL / OUT: 3000 mL / NET: -1940 mL    06-22 @ 07:01 - 06-22 @ 11:22  --------------------------------------------------------  IN: 0 mL / OUT: 900 mL / NET: -900 mL      CAPILLARY BLOOD GLUCOSE          PHYSICAL EXAM:        HOSPITAL MEDICATIONS:  MEDICATIONS  (STANDING):  albuterol/ipratropium for Nebulization 3 milliLiter(s) Nebulizer every 6 hours  apixaban 5 milliGRAM(s) Oral every 12 hours  aspirin  chewable 81 milliGRAM(s) Oral daily  budesonide 160 MICROgram(s)/formoterol 4.5 MICROgram(s) Inhaler 2 Puff(s) Inhalation two times a day  buMETAnide Injectable 2 milliGRAM(s) IV Push two times a day  buPROPion XL (24-Hour) . 150 milliGRAM(s) Oral daily  chlorhexidine 2% Cloths 1 Application(s) Topical <User Schedule>  digoxin     Tablet 125 MICROGram(s) Oral daily  diltiazem    milliGRAM(s) Oral daily  pantoprazole    Tablet 40 milliGRAM(s) Oral before breakfast  rosuvastatin 5 milliGRAM(s) Oral at bedtime  spironolactone 50 milliGRAM(s) Oral daily  tadalafil 40 milliGRAM(s) Oral daily  tiotropium 2.5 MICROgram(s)/olodaterol 2.5 MICROgram(s) (STIOLTO) Inhaler 2 Puff(s) Inhalation daily    MEDICATIONS  (PRN):  acetaminophen     Tablet .. 650 milliGRAM(s) Oral every 6 hours PRN Temp greater or equal to 38C (100.4F), Mild Pain (1 - 3), Moderate Pain (4 - 6)  fluticasone propionate 50 MICROgram(s)/spray Nasal Spray 1 Spray(s) Both Nostrils two times a day PRN Congestion      LABS:                        8.5    4.79  )-----------( 245      ( 21 Jun 2022 06:47 )             28.8     Hgb Trend: 8.5<--, 8.5<--, 7.7<--, 7.9<--, 8.0<--  06-22    132<L>  |  90<L>  |  58<H>  ----------------------------<  84  4.5   |  33<H>  |  1.50<H>    Ca    9.9      22 Jun 2022 04:52  Phos  3.7     06-22  Mg     2.2     06-22    TPro  7.3  /  Alb  4.0  /  TBili  0.4  /  DBili  x   /  AST  23  /  ALT  11  /  AlkPhos  140<H>  06-22    Creatinine Trend: 1.50<--, 1.47<--, 1.41<--, 1.63<--, 1.55<--, 1.47<--          MICROBIOLOGY:     RADIOLOGY:  [ ] Reviewed and interpreted by me   Interval Events: Patient was seen and examined at bedside, sitting up in chair on baseline 8L NC.  States she walked with PT yesterday, and was very excited with her improvement in mobilization.  She states she is still worried about her breathing and does not feel optimized to go home yet.     REVIEW OF SYSTEMS:  Constitutional: [ ] fevers [ ] chills [ ] weight loss [ ] weight gain  CV: [ ] chest pain [ ] orthopnea [ ] palpitations [ ] murmur  Resp: [ ] cough [ ] shortness of breath [ ] dyspnea [ ] wheezing [ ] sputum [ ] hemoptysis  [ ] All other systems negative  [x] Unable to assess ROS because ________    OBJECTIVE:  ICU Vital Signs Last 24 Hrs  T(C): 36.6 (22 Jun 2022 08:00), Max: 36.7 (21 Jun 2022 16:05)  T(F): 97.9 (22 Jun 2022 08:00), Max: 98.1 (21 Jun 2022 16:05)  HR: 91 (22 Jun 2022 08:00) (53 - 91)  BP: 110/72 (22 Jun 2022 08:00) (104/56 - 114/73)  BP(mean): --  ABP: --  ABP(mean): --  RR: 18 (22 Jun 2022 08:00) (18 - 18)  SpO2: 94% (22 Jun 2022 08:00) (92% - 97%)        06-21 @ 07:01  -  06-22 @ 07:00  --------------------------------------------------------  IN: 1060 mL / OUT: 3000 mL / NET: -1940 mL    06-22 @ 07:01  -  06-22 @ 11:22  --------------------------------------------------------  IN: 0 mL / OUT: 900 mL / NET: -900 mL      CAPILLARY BLOOD GLUCOSE    PHYSICAL EXAM:  General:  8L Nc, in NAD  Chest: appears normal  Cardiac: RRR, S1S2 normal  Respiratory: ctab, no wheezing/rales/rhonchi  Gastrointestinal: soft, non-distended, non-tender, +BS  Extremities: warm and well perfused, no peripheral edema        HOSPITAL MEDICATIONS:  MEDICATIONS  (STANDING):  albuterol/ipratropium for Nebulization 3 milliLiter(s) Nebulizer every 6 hours  apixaban 5 milliGRAM(s) Oral every 12 hours  aspirin  chewable 81 milliGRAM(s) Oral daily  budesonide 160 MICROgram(s)/formoterol 4.5 MICROgram(s) Inhaler 2 Puff(s) Inhalation two times a day  buMETAnide Injectable 2 milliGRAM(s) IV Push two times a day  buPROPion XL (24-Hour) . 150 milliGRAM(s) Oral daily  chlorhexidine 2% Cloths 1 Application(s) Topical <User Schedule>  digoxin     Tablet 125 MICROGram(s) Oral daily  diltiazem    milliGRAM(s) Oral daily  pantoprazole    Tablet 40 milliGRAM(s) Oral before breakfast  rosuvastatin 5 milliGRAM(s) Oral at bedtime  spironolactone 50 milliGRAM(s) Oral daily  tadalafil 40 milliGRAM(s) Oral daily  tiotropium 2.5 MICROgram(s)/olodaterol 2.5 MICROgram(s) (STIOLTO) Inhaler 2 Puff(s) Inhalation daily    MEDICATIONS  (PRN):  acetaminophen     Tablet .. 650 milliGRAM(s) Oral every 6 hours PRN Temp greater or equal to 38C (100.4F), Mild Pain (1 - 3), Moderate Pain (4 - 6)  fluticasone propionate 50 MICROgram(s)/spray Nasal Spray 1 Spray(s) Both Nostrils two times a day PRN Congestion      LABS:                        8.5    4.79  )-----------( 245      ( 21 Jun 2022 06:47 )             28.8     Hgb Trend: 8.5<--, 8.5<--, 7.7<--, 7.9<--, 8.0<--  06-22    132<L>  |  90<L>  |  58<H>  ----------------------------<  84  4.5   |  33<H>  |  1.50<H>    Ca    9.9      22 Jun 2022 04:52  Phos  3.7     06-22  Mg     2.2     06-22    TPro  7.3  /  Alb  4.0  /  TBili  0.4  /  DBili  x   /  AST  23  /  ALT  11  /  AlkPhos  140<H>  06-22    Creatinine Trend: 1.50<--, 1.47<--, 1.41<--, 1.63<--, 1.55<--, 1.47<--          MICROBIOLOGY:     RADIOLOGY:  [ ] Reviewed and interpreted by me   Interval Events: Patient was seen and examined at bedside, sitting up in chair on baseline 8L NC.  States she walked with PT yesterday, and was very excited with her improvement in mobilization.  She states she is still worried about her breathing and does not feel optimized to go home yet.     REVIEW OF SYSTEMS:  Constitutional: [ ] fevers [ ] chills [ ] weight loss [ ] weight gain  CV: [ ] chest pain [ ] orthopnea [ ] palpitations [ ] murmur  Resp: [ ] cough [ ] shortness of breath [ ] dyspnea [ ] wheezing [ ] sputum [ ] hemoptysis  [ ] All other systems negative  [x] Unable to assess ROS because ________    OBJECTIVE:  ICU Vital Signs Last 24 Hrs  T(C): 36.6 (22 Jun 2022 08:00), Max: 36.7 (21 Jun 2022 16:05)  T(F): 97.9 (22 Jun 2022 08:00), Max: 98.1 (21 Jun 2022 16:05)  HR: 91 (22 Jun 2022 08:00) (53 - 91)  BP: 110/72 (22 Jun 2022 08:00) (104/56 - 114/73)  BP(mean): --  ABP: --  ABP(mean): --  RR: 18 (22 Jun 2022 08:00) (18 - 18)  SpO2: 94% (22 Jun 2022 08:00) (92% - 97%)        06-21 @ 07:01  -  06-22 @ 07:00  --------------------------------------------------------  IN: 1060 mL / OUT: 3000 mL / NET: -1940 mL    06-22 @ 07:01  -  06-22 @ 11:22  --------------------------------------------------------  IN: 0 mL / OUT: 900 mL / NET: -900 mL      CAPILLARY BLOOD GLUCOSE    PHYSICAL EXAM:  General:  8L Nc, mildly labored  Chest: appears normal  Cardiac: +murmur  Respiratory: coarse breath sounds bilaterally   Gastrointestinal: obese, soft, non-distended, non-tender  Extremities: warm and well perfused, ++peripheral edema    HOSPITAL MEDICATIONS:  MEDICATIONS  (STANDING):  albuterol/ipratropium for Nebulization 3 milliLiter(s) Nebulizer every 6 hours  apixaban 5 milliGRAM(s) Oral every 12 hours  aspirin  chewable 81 milliGRAM(s) Oral daily  budesonide 160 MICROgram(s)/formoterol 4.5 MICROgram(s) Inhaler 2 Puff(s) Inhalation two times a day  buMETAnide Injectable 2 milliGRAM(s) IV Push two times a day  buPROPion XL (24-Hour) . 150 milliGRAM(s) Oral daily  chlorhexidine 2% Cloths 1 Application(s) Topical <User Schedule>  digoxin     Tablet 125 MICROGram(s) Oral daily  diltiazem    milliGRAM(s) Oral daily  pantoprazole    Tablet 40 milliGRAM(s) Oral before breakfast  rosuvastatin 5 milliGRAM(s) Oral at bedtime  spironolactone 50 milliGRAM(s) Oral daily  tadalafil 40 milliGRAM(s) Oral daily  tiotropium 2.5 MICROgram(s)/olodaterol 2.5 MICROgram(s) (STIOLTO) Inhaler 2 Puff(s) Inhalation daily    MEDICATIONS  (PRN):  acetaminophen     Tablet .. 650 milliGRAM(s) Oral every 6 hours PRN Temp greater or equal to 38C (100.4F), Mild Pain (1 - 3), Moderate Pain (4 - 6)  fluticasone propionate 50 MICROgram(s)/spray Nasal Spray 1 Spray(s) Both Nostrils two times a day PRN Congestion      LABS:                        8.5    4.79  )-----------( 245      ( 21 Jun 2022 06:47 )             28.8     Hgb Trend: 8.5<--, 8.5<--, 7.7<--, 7.9<--, 8.0<--  06-22    132<L>  |  90<L>  |  58<H>  ----------------------------<  84  4.5   |  33<H>  |  1.50<H>    Ca    9.9      22 Jun 2022 04:52  Phos  3.7     06-22  Mg     2.2     06-22    TPro  7.3  /  Alb  4.0  /  TBili  0.4  /  DBili  x   /  AST  23  /  ALT  11  /  AlkPhos  140<H>  06-22    Creatinine Trend: 1.50<--, 1.47<--, 1.41<--, 1.63<--, 1.55<--, 1.47<--

## 2022-06-22 NOTE — PROGRESS NOTE ADULT - ASSESSMENT
Victoria Domingo is a 66 year old female w/pulmonary HTN, HTN, HFpEF, COPD (on 8 L at home), afib, HLD, prior spontaneous pneumothorax, and CKD who presents to [facility] as a transfer from [facility] with reported 1-2 weeks of fatigue, lethargy, and progressively worsening shortness of breath.  She has reportedly been hospitalized 4 times in the past year.  Per chart review, she was initially diuresed at OSH and started on ceftriaxone as CT scan showed RUL consolidation concerning for pneumonia.  She was requiring HFNC to maintain normal O2 saturations and was subsequently transferred to [facility] for further management.      #Acute hypoxic respiratory failure--improving  - etiology likely multifactorial and related to volume overload given physical exam findings, significantly elevated BNP  - patient afebrile, without white count, but previously treated at OSH with ceftriaxone given concern for pneumonia  - O2 requirements improving w/diuresis, patient now on home O2 (8L)    #Pulm HTN  - s/p RHC 6/20 demonstrating sPAP: 106, dPAP: 44 mPAP:65, PCWP: 16, PVR 7.32, consistent w/severe pulmonary HTN   - reportedly on ambrisentan and tadalafil at home, on tadalafil while inaptient, patient unable to obtain ambrisentan while inpatient   - currently on bumex 2 mg IV BID, s/p diamox x1 per heart failure recommendations  - TTE demonstrating RV enlargement, decreased RV systolic function, and PASP 85 mm Hg    #COPD  - patient reportedly on 8 L at home, now w/improving O2 requirements as above   - presentation not consistent with exacerbation    Recommendations:  - agree w/bumex 2 mg IV BID for now, monitor strict Is/Os and diurese patient to net negative fluid balance   - continue home tadalafil, okay to hold home ambrisentan  - continue home AVAPs qhs  - please provide and encourage use of incentive spirometry while inpatient  - ensure patient has PT/OT  - f/u any pulmonary transplant recommendations  - f/u heart failure recommendations    Patient seen and discussed w/Dr. Danica Knott [id number]  [id number]

## 2022-06-22 NOTE — PROGRESS NOTE ADULT - PROBLEM SELECTOR PLAN 1
Most likely 2/2 to worsening pulm HTN and acute on chronic HFpEF w/ possible COPD exacerbation w/ superimposed pneumonia      Plan:   - Completed 5 day course of Abx  - Avoid over oxygenation to prevent decreasing respiratory drive   - Currently on 8LNC (home settings)   - C/w nocturnal AVAPs (Patient's own)   - Switched to Bumex 2mg BID  - s/p Diamox 250 x1 on 6/21  - C/w Duonebs q6  - CXR shows improving pulm edema  - Patient will need repeat CT scan when euvolemic.

## 2022-06-22 NOTE — PROGRESS NOTE ADULT - PROBLEM SELECTOR PLAN 2
Reported as HFpEF although unclear exact etiology. Most likely 2/2 to pulmonary HTN and WHO class III. Differential includes ICM vs. NICM vs. RHF from Pulm HTN.    Plan:   - CXR shows improving Pulm edema  - TTE showed severe pulm HTN, normal LV systolic fxn, RV enlargement w/ decreased systolic fxn  - No PFO on limited TTE w/ bubble study   - Switched to Bumex 2mg BID  - C/w home Spironolactone  - BNP 12,525 at OSH, 15,334 on admission   - Strict ins and outs   - Daily standing weights  - Replete K > 4, Mg > 2, and Phos > 3  - S/p RHC/Cardio mems 6/20  - Patient will need ASA 81 for 1 month post cardiomems 6/21

## 2022-06-22 NOTE — PROGRESS NOTE ADULT - PROBLEM SELECTOR PLAN 3
Severe advanced pulmonary hypertension with advanced COPD with potentially superimposed heart failure. Home on 8L high concentrate = 4L in hospital. Per patient saturates in the high 80s.     Plan;    - C/w home tadalafil 40mg qdaily   - ambrisentan 10mg ordered by pharmacy, patient will need proof of enrollment in clinical trial, Dr. Gallardo's (pulm) office called and left a message  - C/w Christopher q6 Severe advanced pulmonary hypertension with advanced COPD with potentially superimposed heart failure. Home on 8L high concentrate = 4L in hospital. Per patient saturates in the high 80s.     Plan;    - C/w home tadalafil 40mg qdaily   - ambrisentan 10mg ordered by pharmacy, patient will need proof of enrollment in clinical trial, Dr. Galladro's (pulm) office called and left a message  - C/w Christopher q6  - Appreciate pulm recs

## 2022-06-22 NOTE — CHART NOTE - NSCHARTNOTEFT_GEN_A_CORE
Cardiomems showed PAD of 54 mm Hg today. PAD 48-51 on day of implant, 6/22. She's responding well to current diuretic regimen. Cr stable. CO2 33 from 32. Weight downtrending. Discussed with Dr. Harvey. Will continue with current dose of IV bumex 2mg BID. Please redose with diamox 250mg IV again today.

## 2022-06-22 NOTE — PROGRESS NOTE ADULT - ATTENDING COMMENTS
This is a 66F w/ pmh of pulmonary hypertension, HTN, HFpEF (echo 60% PASP 79mmhg), COPD  (8L), Afib (in digoxin, diltiazem), HLD, prior spont pneumothorax, and CKD who presents as transfer from [facility] with 1-2 weeks of fatigue, lethargy, and progressively worsening RINALDI concerning for progression of pulm HTN/COPD vs. acute on chronic HFpEF, TTE revealed normal LV systolic fxn and severe pulm HTN. HF team, Pulmonary evaluated.        1. Acute on chronic hypoxemic respiratory failure - multifactorial from HF, pulm HTN, PNA (resolved).   - Doing better on current treatment, had PT this am  -  c/w O2, Bumex 2mg IV bid, bronchodilators, inhaled steroid, tadalafil    2. Acute on chronic HF -   - Has been well diuresed. s/p RHC/CardioMems yesterday- /44/65  - HF team f/u noted, severe pHTN predominant precapillary component with elevated filling pressures (R>L).   - c/w Bumex 2mg IV bid, Aldactone 50mg/d, continue, resumed Eliquis. ASA added for Mems  - 1 dose Diamox 250mg x1 yesterday  - Daily I/O, weight    3. Severe Pulm HTN (groups 2/3) - s/p RHC, /44/65  - c/w bronchodilators, inhaled steroid, Tadalafil  - reportedly she is on Ambrisentan and Tadalafil at home, on tadalafil while inpatient     per Pulmonary it's ok to hold Ambrisentan while inpatient  - HF rec  - Pulmonary Transplant f/u noted    4. . Chronic Afib - Rate controlled  - c/w Cardizem, Digoxin, Eliquis     5. COPD/ROLANDA - AVAPS qhs, inhalers & above    6. OOB to chair, PT in progress This is a 66F w/ pmh of pulmonary hypertension, HTN, HFpEF (echo 60% PASP 79mmhg), COPD  (8L), Afib (in digoxin, diltiazem), HLD, prior spont pneumothorax, and CKD who presents as transfer from [facility] with 1-2 weeks of fatigue, lethargy, and progressively worsening RINALDI concerning for progression of pulm HTN/COPD vs. acute on chronic HFpEF, TTE revealed normal LV systolic fxn and severe pulm HTN. HF team, Pulmonary evaluated.        1. Acute on chronic hypoxemic respiratory failure - multifactorial from HF, pulm HTN, PNA (resolved).   - Doing better on current treatment, had PT this am  -  c/w O2, Bumex 2mg IV bid, bronchodilators, inhaled steroid, tadalafil    2. Acute on chronic HF -   - Has been well diuresed. s/p RHC/CardioMems yesterday- /44/65  - HF team f/u noted, severe pHTN predominant precapillary component with elevated filling pressures (R>L).   - c/w Bumex 2mg IV bid, Aldactone 50mg/d, continue, resumed Eliquis. ASA added for Mems. Scr 1.5  - 1 dose Diamox 250mg x1 also today, HCO3 33   - Daily I/O, weight    3. Severe Pulm HTN (groups 2/3) - s/p RHC, /44/65  - c/w bronchodilators, inhaled steroid, Tadalafil  - reportedly she is on Ambrisentan and Tadalafil at home, on tadalafil while inpatient     per Pulmonary it's ok to hold Ambrisentan while inpatient  - HF rec  - Pulmonary Transplant f/u noted    4. . Chronic Afib - Rate controlled  - c/w Cardizem, Digoxin, Eliquis     5. COPD/ROLANDA - AVAPS qhs, inhalers & above    6. OOB to chair, PT in progress

## 2022-06-22 NOTE — PROGRESS NOTE ADULT - SUBJECTIVE AND OBJECTIVE BOX
CHIEF COMPLAINT:  shortness of breath, dyspnea on exertion     Interval events:  - seen and examined this AM on home 8 L NC, appeared comfortable       PAST MEDICAL & SURGICAL HISTORY:  COPD exacerbation      Severe pulmonary hypertension      Chronic kidney disease, unspecified CKD stage      Acute on chronic diastolic congestive heart failure      Pulmonary embolism      Anxiety and depression      GERD (gastroesophageal reflux disease)      Obstructive sleep apnea      Chronic atrial fibrillation      H/O pneumonectomy      Right leg weakness          FAMILY HISTORY:  No pertinent family history in parents        SOCIAL HISTORY:  Smoking: [ ] Never Smoked [x ] Former Smoker (__ packs x ___ years) [ ] Current Smoker  (__ packs x ___ years)  no current etoh    Allergies    nitrates (Unknown)    Intolerances        HOME MEDICATIONS:  Home Medications:  ambrisentan 10 mg oral tablet: 1 tab(s) orally once a day (2022 03:27)  apixaban 5 mg oral tablet: 1 tab(s) orally 2 times a day (2022 03:26)  buPROPion 100 mg oral tablet: 1 tab(s) orally 2 times a day (2022 03:31)  digoxin 125 mcg (0.125 mg) oral tablet: 1 tab(s) orally every other day (at bedtime) (2022 03:28)  dilTIAZem 120 mg/24 hours oral capsule, extended release: 1 cap(s) orally once a day (2022 03:31)  pantoprazole 40 mg oral delayed release tablet: 1 tab(s) orally once a day (2022 03:27)  rosuvastatin 5 mg oral tablet: 1 tab(s) orally once a day (2022 03:27)  spironolactone 50 mg oral tablet: 1 tab(s) orally once a day (2022 03:29)  tadalafil 20 mg oral tablet: 2 tab(s) orally once a day (2022 03:29)  torsemide 20 mg oral tablet: 2 tab(s) orally once a day (2022 03:30)      REVIEW OF SYSTEMS:  Constitutional: [x ] negative fevers or chills  [ ] fevers [ ] chills [ ] weight loss [ ] weight gain  HEENT: [ ] negative [ ] dry eyes [ ] eye irritation [ ] postnasal drip [ ] nasal congestion  CV: [ ] negative  [ ] chest pain [ ] orthopnea [ ] palpitations [ ] murmur  Resp: [ ] negative [ ] cough [x ] shortness of breath [ x] dyspnea [ ] wheezing [ ] sputum [ ] hemoptysis  GI: [ ] negative [ ] nausea [ ] vomiting [ ] diarrhea [ ] constipation [ ] abd pain [ ] dysphagia   : [ ] negative [ ] dysuria [ ] nocturia [ ] hematuria [ ] increased urinary frequency  Musculoskeletal: [ ] negative [ ] back pain [ ] myalgias [ ] arthralgias [ ] fracture  Skin: [ ] negative [ ] rash [ ] itch  Neurological: [ ] negative [ ] headache [ ] dizziness [ ] syncope [ ] weakness [ ] numbness  Psychiatric: [ ] negative [ ] anxiety [ ] depression  Endocrine: [ ] negative [ ] diabetes [ ] thyroid problem  Hematologic/Lymphatic: [ ] negative [ ] anemia [ ] bleeding problem  Allergic/Immunologic: [ ] negative [ ] itchy eyes [ ] nasal discharge [ ] hives [ ] angioedema  [ x] All other systems negative  [ ] Unable to assess ROS because ________    OBJECTIVE:  ICU Vital Signs Last 24 Hrs  T(C): 36.6 (2022 08:21), Max: 36.6 (2022 08:21)  T(F): 97.8 (2022 08:21), Max: 97.8 (2022 08:21)  HR: 78 (2022 08:21) (71 - 78)  BP: 114/64 (2022 08:21) (112/75 - 120/69)  BP(mean): --  ABP: --  ABP(mean): --  RR: 20 (2022 08:21) (18 - 22)  SpO2: 93% (2022 08:21) (92% - 100%)        06-13 @ 07:01  -  06-14 @ 07:00  --------------------------------------------------------  IN: 240 mL / OUT: 400 mL / NET: -160 mL      CAPILLARY BLOOD GLUCOSE      POCT Blood Glucose.: 114 mg/dL (2022 07:22)      PHYSICAL EXAM:  General:  no acute distress   HEENT: atraumatic, normocephalic   Neck:  thick neck   Respiratory:  no crackles, no cough/wheezes, no use of accessory muscles of respiration    Cardiovascular:  RRR, no rubs, gallops, m urmurs   Abdomen: obese, non-tender   Extremities: bilateral edema, no significant cyanosis   Skin: no rash   Neurological: no gross/focal deficits appreciated    Psychiatry: appropriate     LINES:     HOSPITAL MEDICATIONS:  Standing Meds:  albuterol/ipratropium for Nebulization 3 milliLiter(s) Nebulizer every 6 hours  apixaban 5 milliGRAM(s) Oral two times a day  budesonide 160 MICROgram(s)/formoterol 4.5 MICROgram(s) Inhaler 2 Puff(s) Inhalation two times a day  buPROPion XL (24-Hour) . 150 milliGRAM(s) Oral daily  digoxin     Tablet 125 MICROGram(s) Oral daily  diltiazem    milliGRAM(s) Oral daily  furosemide   Injectable 40 milliGRAM(s) IV Push two times a day  pantoprazole    Tablet 40 milliGRAM(s) Oral before breakfast  rosuvastatin 5 milliGRAM(s) Oral at bedtime  spironolactone 50 milliGRAM(s) Oral daily  tadalafil 40 milliGRAM(s) Oral daily  tiotropium 2.5 MICROgram(s)/olodaterol 2.5 MICROgram(s) (STIOLTO) Inhaler 2 Puff(s) Inhalation daily      PRN Meds:  acetaminophen     Tablet .. 650 milliGRAM(s) Oral every 6 hours PRN      LABS:                        7.4    8.44  )-----------( 205      ( 2022 02:45 )             24.5     Hgb Trend: 7.4<--      133<L>  |  94<L>  |  63<H>  ----------------------------<  115<H>  3.9   |  27  |  1.29    Ca    9.3      2022 02:45  Phos  3.2       Mg     1.7         TPro  7.0  /  Alb  3.8  /  TBili  0.3  /  DBili  x   /  AST  14  /  ALT  10  /  AlkPhos  105      Creatinine Trend: 1.29<--  PT/INR - ( 2022 02:45 )   PT: 27.0 sec;   INR: 2.31 ratio         PTT - ( 2022 02:45 )  PTT:31.3 sec  Urinalysis Basic - ( 2022 02:31 )    Color: Light Yellow / Appearance: Clear / S.011 / pH: x  Gluc: x / Ketone: Negative  / Bili: Negative / Urobili: Negative   Blood: x / Protein: Negative / Nitrite: Negative   Leuk Esterase: Negative / RBC: x / WBC x   Sq Epi: x / Non Sq Epi: x / Bacteria: x      Arterial Blood Gas:  14 @ 02:39  7.44/44/178/30/97.6/5.0  ABG lactate: --        MICROBIOLOGY:       RADIOLOGY:  [ ] Reviewed and interpreted by me    PULMONARY FUNCTION TESTS:    EKG:

## 2022-06-22 NOTE — PROGRESS NOTE ADULT - ASSESSMENT
Patient is a 67 y/o F w/ pmh of pulmonary hypertension, HTN, HFpEF (echo 60% PASP 79mmhg), COPD  (8L), Afib (in digoxin, diltiazem), HLD, prior spont pneumothorax, and CKD who presents as transfer from [facility] with 1-2 weeks of fatigue, lethargy, and progressively worsening RINALDI concerning for progression of pulm HTN/COPD vs. acute on chronic HFpEF, TTE revealed normal LV systolic fxn and severe pulm HTN, undergoing diuresis, s/p RHC/cardio mems, pending repeat imaging once euvolemic

## 2022-06-22 NOTE — PROGRESS NOTE ADULT - SUBJECTIVE AND OBJECTIVE BOX
DATE OF SERVICE: 06-22-22 @ 06:41    Patient is a 66y old  Female who presents with a chief complaint of shortness of breath (21 Jun 2022 15:02)      SUBJECTIVE / OVERNIGHT EVENTS: NAEO. Patient afebrile o/n. Patient feels.     MEDICATIONS  (STANDING):  albuterol/ipratropium for Nebulization 3 milliLiter(s) Nebulizer every 6 hours  apixaban 5 milliGRAM(s) Oral every 12 hours  aspirin  chewable 81 milliGRAM(s) Oral daily  budesonide 160 MICROgram(s)/formoterol 4.5 MICROgram(s) Inhaler 2 Puff(s) Inhalation two times a day  buMETAnide Injectable 2 milliGRAM(s) IV Push two times a day  buPROPion XL (24-Hour) . 150 milliGRAM(s) Oral daily  chlorhexidine 2% Cloths 1 Application(s) Topical <User Schedule>  digoxin     Tablet 125 MICROGram(s) Oral daily  diltiazem    milliGRAM(s) Oral daily  pantoprazole    Tablet 40 milliGRAM(s) Oral before breakfast  rosuvastatin 5 milliGRAM(s) Oral at bedtime  spironolactone 50 milliGRAM(s) Oral daily  tadalafil 40 milliGRAM(s) Oral daily  tiotropium 2.5 MICROgram(s)/olodaterol 2.5 MICROgram(s) (STIOLTO) Inhaler 2 Puff(s) Inhalation daily    MEDICATIONS  (PRN):  acetaminophen     Tablet .. 650 milliGRAM(s) Oral every 6 hours PRN Temp greater or equal to 38C (100.4F), Mild Pain (1 - 3), Moderate Pain (4 - 6)  fluticasone propionate 50 MICROgram(s)/spray Nasal Spray 1 Spray(s) Both Nostrils two times a day PRN Congestion      Vital Signs Last 24 Hrs  T(C): 36.3 (22 Jun 2022 04:36), Max: 36.7 (21 Jun 2022 16:05)  T(F): 97.3 (22 Jun 2022 04:36), Max: 98.1 (21 Jun 2022 16:05)  HR: 75 (22 Jun 2022 04:36) (53 - 87)  BP: 104/56 (22 Jun 2022 04:36) (104/56 - 114/73)  BP(mean): --  RR: 18 (22 Jun 2022 04:36) (18 - 18)  SpO2: 92% (22 Jun 2022 04:36) (92% - 97%)  CAPILLARY BLOOD GLUCOSE        I&O's Summary    20 Jun 2022 07:01  -  21 Jun 2022 07:00  --------------------------------------------------------  IN: 1090 mL / OUT: 3100 mL / NET: -2010 mL    21 Jun 2022 07:01  -  22 Jun 2022 06:41  --------------------------------------------------------  IN: 1060 mL / OUT: 3000 mL / NET: -1940 mL        PHYSICAL EXAM:  GENERAL: NAD, 8LNC  HEAD:  Atraumatic, Normocephalic  EYES: EOMI, PERRLA, conjunctiva and sclera clear  ENT: Moist mucous membranes  NECK: Supple, No JVD  CHEST/LUNG: Bilateral decrease in breath sounds, no wheezing, rales or rhonchi. Unlabored respirations  HEART: Irregularly irregular rate and rhythm; Normal s1 and s2, No murmurs, rubs, or gallops  ABDOMEN: Bowel sounds present; Soft, Nontender, Nondistended. No hepatomegaly  EXTREMITIES:  2+ Peripheral Pulses, brisk capillary refill. No clubbing, cyanosis, Non pitting edema, Scar showing where skin graft was taken   NERVOUS SYSTEM:  Alert & Oriented X3, speech clear. No deficits   MSK: FROM all 4 extremities, full and equal strength  SKIN: No rashes or lesions      LABS:                        8.5    4.79  )-----------( 245      ( 21 Jun 2022 06:47 )             28.8     06-22    132<L>  |  90<L>  |  58<H>  ----------------------------<  84  4.5   |  33<H>  |  1.50<H>    Ca    9.9      22 Jun 2022 04:52  Phos  3.7     06-22  Mg     2.2     06-22    TPro  7.3  /  Alb  4.0  /  TBili  0.4  /  DBili  x   /  AST  23  /  ALT  11  /  AlkPhos  140<H>  06-22              RADIOLOGY & ADDITIONAL TESTS:    Imaging Personally Reviewed:    Consultant(s) Notes Reviewed:      Care Discussed with Consultants/Other Providers:   DATE OF SERVICE: 06-22-22 @ 06:41    Patient is a 66y old  Female who presents with a chief complaint of shortness of breath (21 Jun 2022 15:02)      SUBJECTIVE / OVERNIGHT EVENTS: NAEO. Patient afebrile o/n. Patient feels closer to baseline, worked with PT yesterday, walked to nursing station. Denies CP, SOB at rest on 8LNC, fevers/chills, cough, N/V/D.     MEDICATIONS  (STANDING):  albuterol/ipratropium for Nebulization 3 milliLiter(s) Nebulizer every 6 hours  apixaban 5 milliGRAM(s) Oral every 12 hours  aspirin  chewable 81 milliGRAM(s) Oral daily  budesonide 160 MICROgram(s)/formoterol 4.5 MICROgram(s) Inhaler 2 Puff(s) Inhalation two times a day  buMETAnide Injectable 2 milliGRAM(s) IV Push two times a day  buPROPion XL (24-Hour) . 150 milliGRAM(s) Oral daily  chlorhexidine 2% Cloths 1 Application(s) Topical <User Schedule>  digoxin     Tablet 125 MICROGram(s) Oral daily  diltiazem    milliGRAM(s) Oral daily  pantoprazole    Tablet 40 milliGRAM(s) Oral before breakfast  rosuvastatin 5 milliGRAM(s) Oral at bedtime  spironolactone 50 milliGRAM(s) Oral daily  tadalafil 40 milliGRAM(s) Oral daily  tiotropium 2.5 MICROgram(s)/olodaterol 2.5 MICROgram(s) (STIOLTO) Inhaler 2 Puff(s) Inhalation daily    MEDICATIONS  (PRN):  acetaminophen     Tablet .. 650 milliGRAM(s) Oral every 6 hours PRN Temp greater or equal to 38C (100.4F), Mild Pain (1 - 3), Moderate Pain (4 - 6)  fluticasone propionate 50 MICROgram(s)/spray Nasal Spray 1 Spray(s) Both Nostrils two times a day PRN Congestion      Vital Signs Last 24 Hrs  T(C): 36.3 (22 Jun 2022 04:36), Max: 36.7 (21 Jun 2022 16:05)  T(F): 97.3 (22 Jun 2022 04:36), Max: 98.1 (21 Jun 2022 16:05)  HR: 75 (22 Jun 2022 04:36) (53 - 87)  BP: 104/56 (22 Jun 2022 04:36) (104/56 - 114/73)  BP(mean): --  RR: 18 (22 Jun 2022 04:36) (18 - 18)  SpO2: 92% (22 Jun 2022 04:36) (92% - 97%)  CAPILLARY BLOOD GLUCOSE        I&O's Summary    20 Jun 2022 07:01  -  21 Jun 2022 07:00  --------------------------------------------------------  IN: 1090 mL / OUT: 3100 mL / NET: -2010 mL    21 Jun 2022 07:01  -  22 Jun 2022 06:41  --------------------------------------------------------  IN: 1060 mL / OUT: 3000 mL / NET: -1940 mL        PHYSICAL EXAM:  GENERAL: NAD, 8LNC  HEAD:  Atraumatic, Normocephalic  EYES: EOMI, PERRLA, conjunctiva and sclera clear  ENT: Moist mucous membranes  NECK: Supple, No JVD  CHEST/LUNG: Bilateral decrease in breath sounds, no wheezing, rales or rhonchi. Unlabored respirations  HEART: Irregularly irregular rate and rhythm; Normal s1 and s2, No murmurs, rubs, or gallops  ABDOMEN: Bowel sounds present; Soft, Nontender, Nondistended. No hepatomegaly  EXTREMITIES:  2+ Peripheral Pulses, brisk capillary refill. No clubbing, cyanosis, Non pitting edema, Scar showing where skin graft was taken   NERVOUS SYSTEM:  Alert & Oriented X3, speech clear. No deficits   MSK: FROM all 4 extremities, full and equal strength  SKIN: No rashes or lesions      LABS:                        8.5    4.79  )-----------( 245      ( 21 Jun 2022 06:47 )             28.8     06-22    132<L>  |  90<L>  |  58<H>  ----------------------------<  84  4.5   |  33<H>  |  1.50<H>    Ca    9.9      22 Jun 2022 04:52  Phos  3.7     06-22  Mg     2.2     06-22    TPro  7.3  /  Alb  4.0  /  TBili  0.4  /  DBili  x   /  AST  23  /  ALT  11  /  AlkPhos  140<H>  06-22              RADIOLOGY & ADDITIONAL TESTS:    Imaging Personally Reviewed:    Consultant(s) Notes Reviewed:      Care Discussed with Consultants/Other Providers:

## 2022-06-22 NOTE — PROGRESS NOTE ADULT - ASSESSMENT
65 y/o F w/ pmh of pulmonary hypertension, HTN, HFpEF (echo 60% PASP 79mmhg), COPD (8L), Afib (in digoxin, diltiazem), HLD, prior spontaneous pneumothorax, and CKD who presented from [facility] with fatigue, lethargy, worsening RINALDI.  She was fluid overloaded with suspicion of pneumonia, treated with diuresis and antibiotics.  Patient was transferred to [facility] for further management. Transplant pulmonology consulted lung transplant evaluation.    #pre lung transplant  #fluid overload  #PAH  -S/p Cardimems, Heart Failure team following for aggressive diuresis  -Needs weight loss and aggressive PT  -COPD being managed by pulmonary team   -would optimize medically prior to launching lung transplant eval: pHTN, weight loss, PT  -Rest of care per primary team     Above discussed with Dr. Pérez 67 y/o F w/ pmh of pulmonary hypertension, HTN, HFpEF (echo 60% PASP 79mmhg), COPD (8L), Afib (in digoxin, diltiazem), HLD, prior spontaneous pneumothorax, and CKD who presented from [facility] with fatigue, lethargy, worsening RINALDI.  She was fluid overloaded with suspicion of pneumonia, treated with diuresis and antibiotics.  Patient was transferred to [facility] for further management. Transplant pulmonology consulted lung transplant evaluation.    #pre lung transplant  #fluid overload  #PAH  -S/p Cardimems 6/21:  Heart Failure following, diuresis continued   -Has been losing weight <200lbs, needs more weight loss and aggressive PT  -COPD being managed by pulmonary team   -would optimize medically prior to launching lung transplant eval: pHTN, weight loss, PT  -Rest of care per primary team     Above discussed with Dr. Pérez

## 2022-06-22 NOTE — PROGRESS NOTE ADULT - ATTENDING COMMENTS
Attending Attestation:    Patient seen and examined with resident/fellow.  Agree with above except as noted.    65 yo female with COPD diastolic heart failure, pulmonary HTN transferred from [place] for management of worsening respiratory failure. Pt has been c/o increase leg edema and sob 1-2 weeks before admitted to [place]. Pt has been taking Tadanifil and ambresentan.  R heart cath 6/20/2022 revealed sPAP: 106, dPAP: 44 mPAP:65, PCWP: 16, PVR 7.32 [facility] unit. Patient still with leg edema.      Problem 1:Acute on chronic hypoxemic respiratory failure due to decompensated R heart failure  and pulmonary HTN.   Continue nasal canula 02.   Continue  aggressive diuresis with  IV Bumex.  Follow serum bicarbonate. If  continues to rise consider Diamox.     Problem 2: Chronic atrial fib.  Continue diltiazem and digoxin.  Continue apixaban.    problem 3  COPD. Continue bronchodilators.    Problem 4  ROLANDA. Continue night time AVAPS.    Problem 5 Pulmonary HTN'  Continue tadalafil, Start home ambrisentan  if pt can get it from home.       Problem 6  Lung transplant evaluation. in progress.

## 2022-06-23 NOTE — PROGRESS NOTE ADULT - SUBJECTIVE AND OBJECTIVE BOX
Interval Events:  No events overnight  Dyspnea is improved    REVIEW OF SYSTEMS:  Constitutional:   Eyes:  ENT:  CV:  Resp:  GI:  :  MSK:  Integumentary:  Neurological:  Psychiatric:  Endocrine:  Hematologic/Lymphatic:  Allergic/Immunologic:  [x] All other systems negative  [ ] Unable to assess ROS because ________    OBJECTIVE:  ICU Vital Signs Last 24 Hrs  T(C): 36.6 (23 Jun 2022 12:00), Max: 36.6 (23 Jun 2022 08:00)  T(F): 97.9 (23 Jun 2022 12:00), Max: 97.9 (23 Jun 2022 08:00)  HR: 78 (23 Jun 2022 12:00) (66 - 100)  BP: 111/71 (23 Jun 2022 12:00) (99/62 - 111/71)  BP(mean): --  ABP: --  ABP(mean): --  RR: 20 (23 Jun 2022 12:00) (18 - 20)  SpO2: 98% (23 Jun 2022 12:00) (91% - 99%)        06-22 @ 07:01  -  06-23 @ 07:00  --------------------------------------------------------  IN: 960 mL / OUT: 1800 mL / NET: -840 mL    06-23 @ 07:01  - 06-23 @ 13:25  --------------------------------------------------------  IN: 840 mL / OUT: 1000 mL / NET: -160 mL      CAPILLARY BLOOD GLUCOSE          PHYSICAL EXAM:  General: AAOx3  HEENT: EOMI, PERRL  Lymph Nodes: No LAD  Neck: Supple  Respiratory:   Cardiovascular: RRR   Abdomen: soft, NT/ND  Extremities: no c/c/e    HOSPITAL MEDICATIONS:  MEDICATIONS  (STANDING):  albuterol/ipratropium for Nebulization 3 milliLiter(s) Nebulizer every 6 hours  apixaban 5 milliGRAM(s) Oral every 12 hours  aspirin  chewable 81 milliGRAM(s) Oral daily  bisacodyl 5 milliGRAM(s) Oral every 12 hours  budesonide 160 MICROgram(s)/formoterol 4.5 MICROgram(s) Inhaler 2 Puff(s) Inhalation two times a day  buMETAnide Injectable 2 milliGRAM(s) IV Push two times a day  buPROPion XL (24-Hour) . 150 milliGRAM(s) Oral daily  chlorhexidine 2% Cloths 1 Application(s) Topical <User Schedule>  digoxin     Tablet 125 MICROGram(s) Oral daily  diltiazem    milliGRAM(s) Oral daily  pantoprazole    Tablet 40 milliGRAM(s) Oral before breakfast  rosuvastatin 5 milliGRAM(s) Oral at bedtime  senna 2 Tablet(s) Oral at bedtime  spironolactone 50 milliGRAM(s) Oral daily  tadalafil 40 milliGRAM(s) Oral daily  tiotropium 2.5 MICROgram(s)/olodaterol 2.5 MICROgram(s) (STIOLTO) Inhaler 2 Puff(s) Inhalation daily    MEDICATIONS  (PRN):  acetaminophen     Tablet .. 650 milliGRAM(s) Oral every 6 hours PRN Temp greater or equal to 38C (100.4F), Mild Pain (1 - 3), Moderate Pain (4 - 6)  fluticasone propionate 50 MICROgram(s)/spray Nasal Spray 1 Spray(s) Both Nostrils two times a day PRN Congestion      LABS:                        8.5    4.87  )-----------( 258      ( 23 Jun 2022 06:32 )             29.8     06-23    132<L>  |  91<L>  |  58<H>  ----------------------------<  81  4.5   |  29  |  1.50<H>    Ca    9.9      23 Jun 2022 06:32  Phos  4.1     06-23  Mg     2.3     06-23    TPro  7.3  /  Alb  4.0  /  TBili  0.4  /  DBili  x   /  AST  23  /  ALT  11  /  AlkPhos  140<H>  06-22              RADIOLOGY:  [x] Reviewed and interpreted by me

## 2022-06-23 NOTE — PROGRESS NOTE ADULT - PROBLEM SELECTOR PLAN 12
POC reviewed with patient. Understanding verbalized. AAOX4. Tele monitor in place, NSR 90's-100's. VSS. Incision to R. Groin intact. Soft cast to R. Leg/foot in place. Pain reported at a level of a 8 on a 1-10 scale, decreased to a level of a 5 by end of shift. Pain to R. Ankle.  Prn, oxy, tramadol given. Diet advanced to full liquids tolerating well with no complaints of nausea. Pt. Did ambulate with Physical therapy and sat in chair. Voided per diaper related to urgency. 1 BM occurring. Call light within reach, frequent monitoring completed.         DVT/PE ppx: Eliquis 5mg BID for Afib  Diet: DASH/TLC  Code: Full   Dispo: pending clinical course

## 2022-06-23 NOTE — PROGRESS NOTE ADULT - SUBJECTIVE AND OBJECTIVE BOX
Patient is a 66y old  Female who presents with a chief complaint of shortness of breath (22 Jun 2022 11:21)      SUBJECTIVE / OVERNIGHT EVENTS: No acute events overnight.     MEDICATIONS  (STANDING):  albuterol/ipratropium for Nebulization 3 milliLiter(s) Nebulizer every 6 hours  apixaban 5 milliGRAM(s) Oral every 12 hours  aspirin  chewable 81 milliGRAM(s) Oral daily  budesonide 160 MICROgram(s)/formoterol 4.5 MICROgram(s) Inhaler 2 Puff(s) Inhalation two times a day  buMETAnide Injectable 2 milliGRAM(s) IV Push two times a day  buPROPion XL (24-Hour) . 150 milliGRAM(s) Oral daily  chlorhexidine 2% Cloths 1 Application(s) Topical <User Schedule>  digoxin     Tablet 125 MICROGram(s) Oral daily  diltiazem    milliGRAM(s) Oral daily  pantoprazole    Tablet 40 milliGRAM(s) Oral before breakfast  rosuvastatin 5 milliGRAM(s) Oral at bedtime  spironolactone 50 milliGRAM(s) Oral daily  tadalafil 40 milliGRAM(s) Oral daily  tiotropium 2.5 MICROgram(s)/olodaterol 2.5 MICROgram(s) (STIOLTO) Inhaler 2 Puff(s) Inhalation daily    MEDICATIONS  (PRN):  acetaminophen     Tablet .. 650 milliGRAM(s) Oral every 6 hours PRN Temp greater or equal to 38C (100.4F), Mild Pain (1 - 3), Moderate Pain (4 - 6)  fluticasone propionate 50 MICROgram(s)/spray Nasal Spray 1 Spray(s) Both Nostrils two times a day PRN Congestion      Vital Signs Last 24 Hrs  T(C): 36.5 (23 Jun 2022 04:39), Max: 36.6 (22 Jun 2022 12:03)  T(F): 97.7 (23 Jun 2022 04:39), Max: 97.8 (22 Jun 2022 12:03)  HR: 80 (23 Jun 2022 06:05) (66 - 100)  BP: 106/65 (23 Jun 2022 06:05) (99/62 - 106/65)  BP(mean): --  RR: 18 (23 Jun 2022 04:39) (18 - 20)  SpO2: 93% (23 Jun 2022 06:00) (91% - 99%)  CAPILLARY BLOOD GLUCOSE        I&O's Summary    22 Jun 2022 07:01  -  23 Jun 2022 07:00  --------------------------------------------------------  IN: 960 mL / OUT: 1800 mL / NET: -840 mL        PHYSICAL EXAM:  GENERAL: NAD, 8LNC  HEAD:  Atraumatic, Normocephalic  EYES: EOMI, PERRLA, conjunctiva and sclera clear  ENT: Moist mucous membranes  NECK: Supple, No JVD  CHEST/LUNG: Bilateral decrease in breath sounds, no wheezing, rales or rhonchi. Unlabored respirations  HEART: Irregularly irregular rate and rhythm; Normal s1 and s2, No murmurs, rubs, or gallops  ABDOMEN: Bowel sounds present; Soft, Nontender, Nondistended. No hepatomegaly  EXTREMITIES:  2+ Peripheral Pulses, brisk capillary refill. No clubbing, cyanosis, Non pitting edema, Scar showing where skin graft was taken   NERVOUS SYSTEM:  Alert & Oriented X3, speech clear. No deficits   MSK: FROM all 4 extremities, full and equal strength  SKIN: No rashes or lesions  LABS:                        8.5    4.87  )-----------( 258      ( 23 Jun 2022 06:32 )             29.8     06-23    132<L>  |  91<L>  |  58<H>  ----------------------------<  81  4.5   |  29  |  1.50<H>    Ca    9.9      23 Jun 2022 06:32  Phos  4.1     06-23  Mg     2.3     06-23    TPro  7.3  /  Alb  4.0  /  TBili  0.4  /  DBili  x   /  AST  23  /  ALT  11  /  AlkPhos  140<H>  06-22

## 2022-06-23 NOTE — PROGRESS NOTE ADULT - PROBLEM SELECTOR PLAN 3
Severe advanced pulmonary hypertension with advanced COPD with potentially superimposed heart failure. Home on 8L high concentrate = 4L in hospital. Per patient saturates in the high 80s.     Plan;    - C/w home tadalafil 40mg qdaily   - ambrisentan 10mg ordered by pharmacy, patient will need proof of enrollment in clinical trial, Dr. Gallardo's (pulm) office called and left a message  - C/w Christopher q6  - Appreciate pulm recs

## 2022-06-23 NOTE — PROGRESS NOTE ADULT - ASSESSMENT
Patient is a 65 y/o F w/ pmh of pulmonary hypertension, HTN, HFpEF (echo 60% PASP 79mmhg), COPD  (8L), Afib (in digoxin, diltiazem), HLD, prior spont pneumothorax, and CKD who presents as transfer from [facility] with 1-2 weeks of fatigue, lethargy, and progressively worsening RINALDI concerning for progression of pulm HTN/COPD vs. acute on chronic HFpEF, TTE revealed normal LV systolic fxn and severe pulm HTN, undergoing diuresis, s/p RHC/cardio mems, pending repeat imaging once euvolemic

## 2022-06-23 NOTE — PROGRESS NOTE ADULT - PROBLEM SELECTOR PLAN 1
Most likely 2/2 to worsening pulm HTN and acute on chronic HFpEF w/ possible COPD exacerbation w/ superimposed pneumonia      Plan:   - Completed 5 day course of Abx  - Avoid over oxygenation to prevent decreasing respiratory drive   - Currently on 8LNC (home settings)   - C/w nocturnal AVAPs (Patient's own)   - Switched to Bumex 2mg BID, HF wants to continue to bumex 2mg IV BID  - s/p Diamox 250 x1 on 6/21 and 6/22   - C/w Duonebs q6  - CXR shows improving pulm edema  - Patient will need repeat CT scan when euvolemic.

## 2022-06-23 NOTE — PROGRESS NOTE ADULT - ASSESSMENT
65 y/o F w/ pmh of pulmonary hypertension, HTN, HFpEF (echo 60% PASP 79mmhg), COPD (8L), Afib (in digoxin, diltiazem), HLD, prior spontaneous pneumothorax, and CKD who presented from [facility] with fatigue, lethargy, worsening RINALDI.  She was fluid overloaded with suspicion of pneumonia, treated with diuresis and antibiotics.  Patient was transferred to [facility] for further management. Transplant pulmonology consulted lung transplant evaluation.    #pre lung transplant  #fluid overload  #PAH  -Feels improved clinically, OOB to chair and ambulating with PT  -S/p Cardimems 6/21:  Heart Failure following, diuresis continued   -Has been losing weight <200lbs, needs more weight loss and aggressive PT  -COPD being managed by pulmonary team   -would optimize medically prior to launching lung transplant eval: pHTN, weight loss, PT  -Rest of care per primary team

## 2022-06-23 NOTE — PROGRESS NOTE ADULT - ATTENDING COMMENTS
This is a 66F w/ pmh of pulmonary hypertension, HTN, HFpEF (echo 60% PASP 79mmhg), COPD  (8L), Afib (in digoxin, diltiazem), HLD, prior spont pneumothorax, and CKD who presents as transfer from [facility] with 1-2 weeks of fatigue, lethargy, and progressively worsening RINALDI concerning for progression of pulm HTN/COPD vs. acute on chronic HFpEF, TTE revealed normal LV systolic fxn and severe pulm HTN. HF team, Pulmonary evaluated.      1. Acute on chronic hypoxemic respiratory failure - multifactorial from HF, pulm HTN, PNA (resolved).   - Doing better on current treatment, had PT this am  -  c/w O2, Bumex 2mg IV bid, bronchodilators, inhaled steroid, tadalafil    2. Acute on chronic HF -   - Has been well diuresed. s/p RHC/CardioMems yesterday- /44/65  - HF team f/u noted, severe pHTN predominant precapillary component with elevated filling pressures (R>L).   - c/w Bumex 2mg IV bid, Metolazone 5mg/d, Aldactone 50mg/d, continue, resumed Eliquis. ASA added for Mems. Scr 1.5  - 1 dose Diamox 250mg x1 also today, HCO3 33   - Daily I/O, weight    3. Severe Pulm HTN (groups 2/3) - s/p RHC, /44/65  - c/w bronchodilators, inhaled steroid, Tadalafil  - reportedly she is on Ambrisentan and Tadalafil at home, on tadalafil while inpatient     per Pulmonary it's ok to hold Ambrisentan while inpatient  - HF rec  - Pulmonary Transplant f/u noted    4. . Chronic Afib - Rate controlled  - c/w Cardizem, Digoxin, Eliquis     5. COPD/ROLANDA - AVAPS qhs, inhalers & above    6. OOB to chair, PT in progress.

## 2022-06-24 NOTE — PROGRESS NOTE ADULT - ATTENDING COMMENTS
This is a 66F w/ pmh of pulmonary hypertension, HTN, HFpEF (echo 60% PASP 79mmhg), COPD  (8L), Afib (in digoxin, diltiazem), HLD, prior spont pneumothorax, and CKD who presents as transfer from [facility] with 1-2 weeks of fatigue, lethargy, and progressively worsening RINALDI concerning for progression of pulm HTN/COPD vs. acute on chronic HFpEF, TTE revealed normal LV systolic fxn and severe pulm HTN. HF team, Pulmonary evaluated.      1. Acute on chronic hypoxemic respiratory failure - multifactorial from HF, pulm HTN, PNA (resolved).   - Lost weight, LE swelling down, no acute SOB, on 8L NC  -  c/w O2, Bumex 2mg IV bid, bronchodilators, inhaled steroid, tadalafil    2. Acute on chronic HF -   - Has been well diuresed. s/p RHC/CardioMems yesterday- /44/65  - HF team f/u noted, severe pHTN predominant precapillary component with elevated filling pressures (R>L).   - c/w Bumex 2mg IV bid, Metolazone 5mg x 1 yesterday, Aldactone 50mg/d, continue, resumed Eliquis. ASA added for Mems. Scr 1.5  - 1 dose Diamox 250mg x1 yesterday, HCO3 28  - Daily I/O, weight    3. Severe Pulm HTN (groups 2/3) - s/p RHC, /44/65  - c/w bronchodilators, inhaled steroid, Tadalafil and diuretic as above  - reportedly she is on Ambrisentan and Tadalafil at home, on tadalafil while inpatient     per Pulmonary it's ok to hold Ambrisentan while inpatient  - HF rec  - Pulmonary Transplant f/u noted    4. . Chronic Afib - Rate controlled  - c/w Cardizem, Digoxin, Eliquis     5. COPD/ROLANDA - AVAPS qhs, inhalers & above    6. OOB to chair, PT in progress.

## 2022-06-24 NOTE — PROGRESS NOTE ADULT - ASSESSMENT
Victoria Domingo is a 66 year old female w/pulmonary HTN, HTN, HFpEF, COPD (on 8 L at home), afib, HLD, prior spontaneous pneumothorax, and CKD who presents to [facility] as a transfer from [facility] with reported 1-2 weeks of fatigue, lethargy, and progressively worsening shortness of breath.  She has reportedly been hospitalized 4 times in the past year.  Per chart review, she was initially diuresed at OSH and started on ceftriaxone as CT scan showed RUL consolidation concerning for pneumonia.  She was requiring HFNC to maintain normal O2 saturations and was subsequently transferred to [facility] for further management.      #Acute hypoxic respiratory failure--stable  - etiology likely multifactorial and related to volume overload given physical exam findings, significantly elevated BNP  - patient afebrile, without white count, but previously treated at OSH with ceftriaxone given concern for pneumonia  - O2 requirements improving w/diuresis, patient now on home O2 (8L)    #Pulm HTN  - s/p RHC 6/20 demonstrating sPAP: 106, dPAP: 44 mPAP:65, PCWP: 16, PVR 7.32, consistent w/severe pulmonary HTN   - reportedly on ambrisentan and tadalafil at home, on tadalafil while inaptient, patient unable to obtain ambrisentan while inpatient   - currently on bumex 2 mg IV BID, continues to diurese well  - TTE demonstrating RV enlargement, decreased RV systolic function, and PASP 85 mm Hg    #COPD  - patient reportedly on 8 L at home, now w/improving O2 requirements as above   - presentation not consistent with exacerbation    Recommendations:  - agree w/bumex 2 mg IV BID for now, monitor strict Is/Os and diurese patient to net negative fluid balance   - continue home tadalafil, okay to hold home ambrisentan  - continue home AVAPs qhs  - please provide and encourage use of incentive spirometry while inpatient  - ensure patient has PT/OT  - f/u any pulmonary transplant recommendations  - f/u heart failure recommendations    Patient to be discussed w/Dr. Danica Knott PGY4  [id number] Victoria Domingo is a 66 year old female w/pulmonary HTN, HTN, HFpEF, COPD (on 8 L at home), afib, HLD, prior spontaneous pneumothorax, and CKD who presents to [facility] as a transfer from [facility] with reported 1-2 weeks of fatigue, lethargy, and progressively worsening shortness of breath.  She has reportedly been hospitalized 4 times in the past year.  Per chart review, she was initially diuresed at OSH and started on ceftriaxone as CT scan showed RUL consolidation concerning for pneumonia.  She was requiring HFNC to maintain normal O2 saturations and was subsequently transferred to [facility] for further management.      #Acute hypoxic respiratory failure--stable  - etiology likely multifactorial and related to volume overload given physical exam findings, significantly elevated BNP  - patient afebrile, without white count, but previously treated at OSH with ceftriaxone given concern for pneumonia  - O2 requirements improving w/diuresis, patient now on home O2 (8L)    #Pulm HTN  - s/p RHC 6/20 demonstrating sPAP: 106, dPAP: 44 mPAP:65, PCWP: 16, PVR 7.32, consistent w/severe pulmonary HTN   - reportedly on ambrisentan and tadalafil at home, on tadalafil while inaptient, patient unable to obtain ambrisentan while inpatient   - currently on bumex 2 mg IV BID, continues to diurese well  - TTE demonstrating RV enlargement, decreased RV systolic function, and PASP 85 mm Hg    #COPD  - patient reportedly on 8 L at home, now w/improving O2 requirements as above   - presentation not consistent with exacerbation    Recommendations:  - agree w/bumex 2 mg IV BID for now, monitor strict Is/Os and diurese patient to net negative fluid balance   - continue home tadalafil, okay to hold home ambrisentan  - continue home AVAPs qhs  - please provide and encourage use of incentive spirometry while inpatient  - ensure patient has PT/OT  - f/u any pulmonary transplant recommendations  - f/u heart failure recommendations    Patient to be discussed w/Dr. Trinidad Knott PGY4  [id number] Victoria Domingo is a 66 year old female w/pulmonary HTN, HTN, HFpEF, COPD (on 8 L at home), afib, HLD, prior spontaneous pneumothorax, and CKD who presents to [facility] as a transfer from [facility] with reported 1-2 weeks of fatigue, lethargy, and progressively worsening shortness of breath.  She has reportedly been hospitalized 4 times in the past year.  Per chart review, she was initially diuresed at OSH and started on ceftriaxone as CT scan showed RUL consolidation concerning for pneumonia.  She was requiring HFNC to maintain normal O2 saturations and was subsequently transferred to [facility] for further management.      #Acute hypoxic respiratory failure--stable  - etiology likely multifactorial and related to volume overload given physical exam findings, significantly elevated BNP  - patient afebrile, without white count, but previously treated at OSH with ceftriaxone given concern for pneumonia  - O2 requirements improving w/diuresis, patient now on home O2 (8L)    #Pulm HTN  - s/p RHC 6/20 demonstrating sPAP: 106, dPAP: 44 mPAP:65, PCWP: 16, PVR 7.32, consistent w/severe pulmonary HTN   - reportedly on ambrisentan and tadalafil at home, on tadalafil while inaptient, patient unable to obtain ambrisentan while inpatient   - currently on bumex 2 mg IV BID, continues to diurese well  - TTE demonstrating RV enlargement, decreased RV systolic function, and PASP 85 mm Hg    #COPD  - patient reportedly on 8 L at home, now w/improving O2 requirements as above   - presentation not consistent with exacerbation    Recommendations:  - agree w/bumex 2 mg IV BID for now, monitor strict Is/Os and diurese patient to net negative fluid balance   - continue home tadalafil, okay to hold home ambrisentan  - continue home AVAPs qhs  - please provide and encourage use of incentive spirometry while inpatient  - ensure patient has PT/OT  - f/u any pulmonary transplant recommendations  - f/u heart failure recommendations    Patient seen and discussed w/Dr. Trinidad Knott PGY4  [id number]

## 2022-06-24 NOTE — PROGRESS NOTE ADULT - PROBLEM SELECTOR PLAN 1
-back to her home O2 requirement of 8L  -Appreciate pulmonary team input  -She will eventually need consideration for lung transplant   -reportedly on ambresentan and tadalafil at home, on tadalafil while inpatient.   -Management per pulmonary team

## 2022-06-24 NOTE — PROGRESS NOTE ADULT - SUBJECTIVE AND OBJECTIVE BOX
CHIEF COMPLAINT:  shortness of breath, dyspnea on exertion     Interval events:  - seen and examined this AM on home 8 L NC, appeared comfortable, stated that she still felt like she needed additional volume removed      PAST MEDICAL & SURGICAL HISTORY:  COPD exacerbation      Severe pulmonary hypertension      Chronic kidney disease, unspecified CKD stage      Acute on chronic diastolic congestive heart failure      Pulmonary embolism      Anxiety and depression      GERD (gastroesophageal reflux disease)      Obstructive sleep apnea      Chronic atrial fibrillation      H/O pneumonectomy      Right leg weakness          FAMILY HISTORY:  No pertinent family history in parents        SOCIAL HISTORY:  Smoking: [ ] Never Smoked [x ] Former Smoker (__ packs x ___ years) [ ] Current Smoker  (__ packs x ___ years)  no current etoh    Allergies    nitrates (Unknown)    Intolerances        HOME MEDICATIONS:  Home Medications:  ambrisentan 10 mg oral tablet: 1 tab(s) orally once a day (2022 03:27)  apixaban 5 mg oral tablet: 1 tab(s) orally 2 times a day (2022 03:26)  buPROPion 100 mg oral tablet: 1 tab(s) orally 2 times a day (2022 03:31)  digoxin 125 mcg (0.125 mg) oral tablet: 1 tab(s) orally every other day (at bedtime) (2022 03:28)  dilTIAZem 120 mg/24 hours oral capsule, extended release: 1 cap(s) orally once a day (2022 03:31)  pantoprazole 40 mg oral delayed release tablet: 1 tab(s) orally once a day (2022 03:27)  rosuvastatin 5 mg oral tablet: 1 tab(s) orally once a day (2022 03:27)  spironolactone 50 mg oral tablet: 1 tab(s) orally once a day (2022 03:29)  tadalafil 20 mg oral tablet: 2 tab(s) orally once a day (2022 03:29)  torsemide 20 mg oral tablet: 2 tab(s) orally once a day (2022 03:30)      REVIEW OF SYSTEMS:  Constitutional: [x ] negative fevers or chills  [ ] fevers [ ] chills [ ] weight loss [ ] weight gain  HEENT: [ ] negative [ ] dry eyes [ ] eye irritation [ ] postnasal drip [ ] nasal congestion  CV: [ ] negative  [ ] chest pain [ ] orthopnea [ ] palpitations [ ] murmur  Resp: [ ] negative [ ] cough [x ] shortness of breath [ x] dyspnea [ ] wheezing [ ] sputum [ ] hemoptysis  GI: [ ] negative [ ] nausea [ ] vomiting [ ] diarrhea [ ] constipation [ ] abd pain [ ] dysphagia   : [ ] negative [ ] dysuria [ ] nocturia [ ] hematuria [ ] increased urinary frequency  Musculoskeletal: [ ] negative [ ] back pain [ ] myalgias [ ] arthralgias [ ] fracture  Skin: [ ] negative [ ] rash [ ] itch  Neurological: [ ] negative [ ] headache [ ] dizziness [ ] syncope [ ] weakness [ ] numbness  Psychiatric: [ ] negative [ ] anxiety [ ] depression  Endocrine: [ ] negative [ ] diabetes [ ] thyroid problem  Hematologic/Lymphatic: [ ] negative [ ] anemia [ ] bleeding problem  Allergic/Immunologic: [ ] negative [ ] itchy eyes [ ] nasal discharge [ ] hives [ ] angioedema  [ x] All other systems negative  [ ] Unable to assess ROS because ________    OBJECTIVE:  ICU Vital Signs Last 24 Hrs  T(C): 36.6 (2022 08:21), Max: 36.6 (2022 08:21)  T(F): 97.8 (2022 08:21), Max: 97.8 (2022 08:21)  HR: 78 (2022 08:21) (71 - 78)  BP: 114/64 (2022 08:21) (112/75 - 120/69)  BP(mean): --  ABP: --  ABP(mean): --  RR: 20 (2022 08:21) (18 - 22)  SpO2: 93% (2022 08:21) (92% - 100%)        06-13 @ 07:01  -  06-14 @ 07:00  --------------------------------------------------------  IN: 240 mL / OUT: 400 mL / NET: -160 mL      CAPILLARY BLOOD GLUCOSE      POCT Blood Glucose.: 114 mg/dL (2022 07:22)      PHYSICAL EXAM:  General:  no acute distress   HEENT: atraumatic, normocephalic   Neck:  thick neck   Respiratory:  no crackles, no cough/wheezes, no use of accessory muscles of respiration    Cardiovascular:  RRR, no rubs, gallops, m urmurs   Abdomen: obese, non-tender   Extremities: bilateral edema, no significant cyanosis   Skin: no rash   Neurological: no gross/focal deficits appreciated    Psychiatry: appropriate     LINES:     HOSPITAL MEDICATIONS:  Standing Meds:  albuterol/ipratropium for Nebulization 3 milliLiter(s) Nebulizer every 6 hours  apixaban 5 milliGRAM(s) Oral two times a day  budesonide 160 MICROgram(s)/formoterol 4.5 MICROgram(s) Inhaler 2 Puff(s) Inhalation two times a day  buPROPion XL (24-Hour) . 150 milliGRAM(s) Oral daily  digoxin     Tablet 125 MICROGram(s) Oral daily  diltiazem    milliGRAM(s) Oral daily  furosemide   Injectable 40 milliGRAM(s) IV Push two times a day  pantoprazole    Tablet 40 milliGRAM(s) Oral before breakfast  rosuvastatin 5 milliGRAM(s) Oral at bedtime  spironolactone 50 milliGRAM(s) Oral daily  tadalafil 40 milliGRAM(s) Oral daily  tiotropium 2.5 MICROgram(s)/olodaterol 2.5 MICROgram(s) (STIOLTO) Inhaler 2 Puff(s) Inhalation daily      PRN Meds:  acetaminophen     Tablet .. 650 milliGRAM(s) Oral every 6 hours PRN      LABS:                        7.4    8.44  )-----------( 205      ( 2022 02:45 )             24.5     Hgb Trend: 7.4<--  -14    133<L>  |  94<L>  |  63<H>  ----------------------------<  115<H>  3.9   |  27  |  1.29    Ca    9.3      2022 02:45  Phos  3.2     06-14  Mg     1.7     -    TPro  7.0  /  Alb  3.8  /  TBili  0.3  /  DBili  x   /  AST  14  /  ALT  10  /  AlkPhos  105  06-14    Creatinine Trend: 1.29<--  PT/INR - ( 2022 02:45 )   PT: 27.0 sec;   INR: 2.31 ratio         PTT - ( 2022 02:45 )  PTT:31.3 sec  Urinalysis Basic - ( 2022 02:31 )    Color: Light Yellow / Appearance: Clear / S.011 / pH: x  Gluc: x / Ketone: Negative  / Bili: Negative / Urobili: Negative   Blood: x / Protein: Negative / Nitrite: Negative   Leuk Esterase: Negative / RBC: x / WBC x   Sq Epi: x / Non Sq Epi: x / Bacteria: x      Arterial Blood Gas:   @ 02:39  7.44/44/178/30/97.6/5.0  ABG lactate: --        MICROBIOLOGY:       RADIOLOGY:  [ ] Reviewed and interpreted by me    PULMONARY FUNCTION TESTS:    EKG:

## 2022-06-24 NOTE — PROGRESS NOTE ADULT - ASSESSMENT
Patient is a 67 y/o F w/ pmh of pulmonary hypertension, HTN, HFpEF (echo 60% PASP 79mmhg), COPD  (8L), Afib (in digoxin, diltiazem), HLD, prior spont pneumothorax, and CKD who presents as transfer from [facility] with 1-2 weeks of fatigue, lethargy, and progressively worsening RINALDI concerning for progression of pulm HTN/COPD vs. acute on chronic HFpEF, TTE revealed normal LV systolic fxn and severe pulm HTN, undergoing diuresis, s/p RHC/cardio mems, pending repeat imaging once euvolemic

## 2022-06-24 NOTE — PROGRESS NOTE ADULT - PROBLEM SELECTOR PLAN 4
-increase in BUN/Cr, will hold diuretics this evening as above and switch her from IV to PO diuretics tomorrow

## 2022-06-24 NOTE — PROGRESS NOTE ADULT - PROBLEM SELECTOR PLAN 1
Most likely 2/2 to worsening pulm HTN and acute on chronic HFpEF w/ possible COPD exacerbation w/ superimposed pneumonia      Plan:   - Completed 5 day course of Abx  - Avoid over oxygenation to prevent decreasing respiratory drive   - Currently on 8LNC (home settings)   - C/w nocturnal AVAPs (Patient's own)   - Switched to Bumex 2mg BID, HF wants to continue to bumex 2mg IV BID  - s/p Diamox 250 x1 on 6/21 and 6/22   - C/w Duonebs q6  - CXR shows improving pulm edema  - Patient will need repeat CT scan when euvolemic. Most likely 2/2 to worsening pulm HTN and acute on chronic HFpEF w/ possible COPD exacerbation w/ superimposed pneumonia      Plan:   - Completed 5 day course of Abx  - Avoid over oxygenation to prevent decreasing respiratory drive   - Currently on 8LNC (home settings)   - C/w nocturnal AVAPs (Patient's own)   - Switched to Bumex 2mg BID, HF wants to continue to bumex 2mg IV BID  - s/p Diamox 250 x1 on 6/21 and 6/22   - S/p metolazone x 1 on 6/23  - C/w Duonebs q6  - CXR shows improving pulm edema  - Patient will need repeat CT scan when euvolemic.

## 2022-06-24 NOTE — PROGRESS NOTE ADULT - ASSESSMENT
65 y/o F w/ pmh of pulmonary hypertension, HTN, HFpEF (echo 60% PASP 79mmhg), COPD (8L), Afib (in digoxin, diltiazem), HLD, prior spontaneous pneumothorax, and CKD who presented from [facility] with fatigue, lethargy, worsening RINALDI.  She was fluid overloaded with suspicion of pneumonia, treated with diuresis and antibiotics.  Patient was transferred to [facility] for further management. Transplant pulmonology consulted lung transplant evaluation.    #pre lung transplant  #fluid overload  #PAH  -Feels improved clinically, OOB to chair and ambulating with PT  -S/p Cardimems 6/21:  Heart Failure following, diuresis continued   -Has been losing weight <200lbs, needs more weight loss and aggressive PT  -COPD being managed by pulmonary team   -would optimize medically prior to launching lung transplant eval: pHTN, weight loss, PT  -Follow up lung transplant as outpatient, has appointment for 7/15  -Rest of care per primary team

## 2022-06-24 NOTE — PROGRESS NOTE ADULT - SUBJECTIVE AND OBJECTIVE BOX
DATE OF SERVICE: 06-24-22 @ 07:02    Patient is a 66y old  Female who presents with a chief complaint of shortness of breath (23 Jun 2022 13:25)      SUBJECTIVE / OVERNIGHT EVENTS: NAEO. Patient afebrile. Patient feels     MEDICATIONS  (STANDING):  albuterol/ipratropium for Nebulization 3 milliLiter(s) Nebulizer every 6 hours  apixaban 5 milliGRAM(s) Oral every 12 hours  aspirin  chewable 81 milliGRAM(s) Oral daily  bisacodyl 5 milliGRAM(s) Oral every 12 hours  budesonide 160 MICROgram(s)/formoterol 4.5 MICROgram(s) Inhaler 2 Puff(s) Inhalation two times a day  buMETAnide Injectable 2 milliGRAM(s) IV Push two times a day  buPROPion XL (24-Hour) . 150 milliGRAM(s) Oral daily  chlorhexidine 2% Cloths 1 Application(s) Topical <User Schedule>  digoxin     Tablet 125 MICROGram(s) Oral daily  diltiazem    milliGRAM(s) Oral daily  pantoprazole    Tablet 40 milliGRAM(s) Oral before breakfast  rosuvastatin 5 milliGRAM(s) Oral at bedtime  senna 2 Tablet(s) Oral at bedtime  spironolactone 50 milliGRAM(s) Oral daily  tadalafil 40 milliGRAM(s) Oral daily  tiotropium 2.5 MICROgram(s)/olodaterol 2.5 MICROgram(s) (STIOLTO) Inhaler 2 Puff(s) Inhalation daily    MEDICATIONS  (PRN):  acetaminophen     Tablet .. 650 milliGRAM(s) Oral every 6 hours PRN Temp greater or equal to 38C (100.4F), Mild Pain (1 - 3), Moderate Pain (4 - 6)  fluticasone propionate 50 MICROgram(s)/spray Nasal Spray 1 Spray(s) Both Nostrils two times a day PRN Congestion      Vital Signs Last 24 Hrs  T(C): 36.7 (24 Jun 2022 04:25), Max: 36.7 (23 Jun 2022 17:00)  T(F): 98.1 (24 Jun 2022 04:25), Max: 98.1 (23 Jun 2022 20:06)  HR: 74 (24 Jun 2022 06:01) (66 - 89)  BP: 116/66 (24 Jun 2022 05:50) (95/54 - 126/74)  BP(mean): --  RR: 18 (24 Jun 2022 04:25) (17 - 20)  SpO2: 94% (24 Jun 2022 06:01) (93% - 100%)  CAPILLARY BLOOD GLUCOSE        I&O's Summary    23 Jun 2022 07:01  -  24 Jun 2022 07:00  --------------------------------------------------------  IN: 1200 mL / OUT: 2550 mL / NET: -1350 mL        PHYSICAL EXAM:  GENERAL: NAD, 8LNC  HEAD:  Atraumatic, Normocephalic  EYES: EOMI, PERRLA, conjunctiva and sclera clear  ENT: Moist mucous membranes  NECK: Supple, No JVD  CHEST/LUNG: Bilateral decrease in breath sounds, no wheezing, rales or rhonchi. Unlabored respirations  HEART: Irregularly irregular rate and rhythm; Normal s1 and s2, No murmurs, rubs, or gallops  ABDOMEN: Bowel sounds present; Soft, Nontender, Nondistended. No hepatomegaly  EXTREMITIES:  2+ Peripheral Pulses, brisk capillary refill. No clubbing, cyanosis, Non pitting edema, Scar showing where skin graft was taken   NERVOUS SYSTEM:  Alert & Oriented X3, speech clear. No deficits   MSK: FROM all 4 extremities, full and equal strength  SKIN: No rashes or lesions    LABS:                        8.5    4.87  )-----------( 258      ( 23 Jun 2022 06:32 )             29.8     06-23    132<L>  |  91<L>  |  58<H>  ----------------------------<  81  4.5   |  29  |  1.50<H>    Ca    9.9      23 Jun 2022 06:32  Phos  4.1     06-23  Mg     2.3     06-23                RADIOLOGY & ADDITIONAL TESTS:    Imaging Personally Reviewed:    Consultant(s) Notes Reviewed:      Care Discussed with Consultants/Other Providers:   DATE OF SERVICE: 06-24-22 @ 07:02    Patient is a 66y old  Female who presents with a chief complaint of shortness of breath (23 Jun 2022 13:25)      SUBJECTIVE / OVERNIGHT EVENTS: NAEO. Patient afebrile. Patient feels well this AM, wishes she could work with PT more often, but notices improvement with ambulation in the last few days. Denies CP, SOB on 8LNC at rest, fevers/chills, N/V/D.     MEDICATIONS  (STANDING):  albuterol/ipratropium for Nebulization 3 milliLiter(s) Nebulizer every 6 hours  apixaban 5 milliGRAM(s) Oral every 12 hours  aspirin  chewable 81 milliGRAM(s) Oral daily  bisacodyl 5 milliGRAM(s) Oral every 12 hours  budesonide 160 MICROgram(s)/formoterol 4.5 MICROgram(s) Inhaler 2 Puff(s) Inhalation two times a day  buMETAnide Injectable 2 milliGRAM(s) IV Push two times a day  buPROPion XL (24-Hour) . 150 milliGRAM(s) Oral daily  chlorhexidine 2% Cloths 1 Application(s) Topical <User Schedule>  digoxin     Tablet 125 MICROGram(s) Oral daily  diltiazem    milliGRAM(s) Oral daily  pantoprazole    Tablet 40 milliGRAM(s) Oral before breakfast  rosuvastatin 5 milliGRAM(s) Oral at bedtime  senna 2 Tablet(s) Oral at bedtime  spironolactone 50 milliGRAM(s) Oral daily  tadalafil 40 milliGRAM(s) Oral daily  tiotropium 2.5 MICROgram(s)/olodaterol 2.5 MICROgram(s) (STIOLTO) Inhaler 2 Puff(s) Inhalation daily    MEDICATIONS  (PRN):  acetaminophen     Tablet .. 650 milliGRAM(s) Oral every 6 hours PRN Temp greater or equal to 38C (100.4F), Mild Pain (1 - 3), Moderate Pain (4 - 6)  fluticasone propionate 50 MICROgram(s)/spray Nasal Spray 1 Spray(s) Both Nostrils two times a day PRN Congestion      Vital Signs Last 24 Hrs  T(C): 36.7 (24 Jun 2022 04:25), Max: 36.7 (23 Jun 2022 17:00)  T(F): 98.1 (24 Jun 2022 04:25), Max: 98.1 (23 Jun 2022 20:06)  HR: 74 (24 Jun 2022 06:01) (66 - 89)  BP: 116/66 (24 Jun 2022 05:50) (95/54 - 126/74)  BP(mean): --  RR: 18 (24 Jun 2022 04:25) (17 - 20)  SpO2: 94% (24 Jun 2022 06:01) (93% - 100%)  CAPILLARY BLOOD GLUCOSE        I&O's Summary    23 Jun 2022 07:01  -  24 Jun 2022 07:00  --------------------------------------------------------  IN: 1200 mL / OUT: 2550 mL / NET: -1350 mL        PHYSICAL EXAM:  GENERAL: NAD, 8LNC  HEAD:  Atraumatic, Normocephalic  EYES: EOMI, PERRLA, conjunctiva and sclera clear  ENT: Moist mucous membranes  NECK: Supple, No JVD  CHEST/LUNG: Bilateral decrease in breath sounds, no wheezing, rales or rhonchi. Unlabored respirations  HEART: Irregularly irregular rate and rhythm; Normal s1 and s2, No murmurs, rubs, or gallops  ABDOMEN: Bowel sounds present; Soft, Nontender, Nondistended. No hepatomegaly  EXTREMITIES:  2+ Peripheral Pulses, brisk capillary refill. No clubbing, cyanosis, Non pitting edema, Scar showing where skin graft was taken   NERVOUS SYSTEM:  Alert & Oriented X3, speech clear. No deficits   MSK: FROM all 4 extremities, full and equal strength  SKIN: No rashes or lesions    LABS:                        8.5    4.87  )-----------( 258      ( 23 Jun 2022 06:32 )             29.8     06-23    132<L>  |  91<L>  |  58<H>  ----------------------------<  81  4.5   |  29  |  1.50<H>    Ca    9.9      23 Jun 2022 06:32  Phos  4.1     06-23  Mg     2.3     06-23                RADIOLOGY & ADDITIONAL TESTS:    Imaging Personally Reviewed:    Consultant(s) Notes Reviewed:      Care Discussed with Consultants/Other Providers:

## 2022-06-24 NOTE — PROGRESS NOTE ADULT - SUBJECTIVE AND OBJECTIVE BOX
Interval Events:  No acute evetns overngiht  Dyspnea is about the same  OOB to chair  Ambulating with PT    REVIEW OF SYSTEMS:  Constitutional:   Eyes:  ENT:  CV:  Resp:  GI:  :  MSK:  Integumentary:  Neurological:  Psychiatric:  Endocrine:  Hematologic/Lymphatic:  Allergic/Immunologic:  [x] All other systems negative  [ ] Unable to assess ROS because ________    OBJECTIVE:  ICU Vital Signs Last 24 Hrs  T(C): 36.6 (24 Jun 2022 12:10), Max: 36.7 (23 Jun 2022 17:00)  T(F): 97.9 (24 Jun 2022 12:10), Max: 98.1 (23 Jun 2022 20:06)  HR: 77 (24 Jun 2022 12:10) (66 - 89)  BP: 123/72 (24 Jun 2022 12:10) (95/54 - 126/74)  BP(mean): --  ABP: --  ABP(mean): --  RR: 20 (24 Jun 2022 12:10) (17 - 20)  SpO2: 94% (24 Jun 2022 12:10) (93% - 100%)        06-23 @ 07:01  -  06-24 @ 07:00  --------------------------------------------------------  IN: 1200 mL / OUT: 2550 mL / NET: -1350 mL    06-24 @ 07:01 - 06-24 @ 16:06  --------------------------------------------------------  IN: 720 mL / OUT: 2500 mL / NET: -1780 mL      CAPILLARY BLOOD GLUCOSE          PHYSICAL EXAM:  General:  8L Nc, mildly labored  Chest: appears normal  Cardiac: +murmur  Respiratory: coarse breath sounds bilaterally   Gastrointestinal: obese, soft, non-distended, non-tender  Extremities: warm and well perfused, + peripheral edema    HOSPITAL MEDICATIONS:  MEDICATIONS  (STANDING):  albuterol/ipratropium for Nebulization 3 milliLiter(s) Nebulizer every 6 hours  apixaban 5 milliGRAM(s) Oral every 12 hours  aspirin  chewable 81 milliGRAM(s) Oral daily  bisacodyl 5 milliGRAM(s) Oral every 12 hours  budesonide 160 MICROgram(s)/formoterol 4.5 MICROgram(s) Inhaler 2 Puff(s) Inhalation two times a day  buMETAnide Injectable 2 milliGRAM(s) IV Push two times a day  buPROPion XL (24-Hour) . 150 milliGRAM(s) Oral daily  chlorhexidine 2% Cloths 1 Application(s) Topical <User Schedule>  digoxin     Tablet 125 MICROGram(s) Oral daily  diltiazem    milliGRAM(s) Oral daily  pantoprazole    Tablet 40 milliGRAM(s) Oral before breakfast  rosuvastatin 5 milliGRAM(s) Oral at bedtime  senna 2 Tablet(s) Oral at bedtime  spironolactone 50 milliGRAM(s) Oral daily  tadalafil 40 milliGRAM(s) Oral daily  tiotropium 2.5 MICROgram(s)/olodaterol 2.5 MICROgram(s) (STIOLTO) Inhaler 2 Puff(s) Inhalation daily    MEDICATIONS  (PRN):  acetaminophen     Tablet .. 650 milliGRAM(s) Oral every 6 hours PRN Temp greater or equal to 38C (100.4F), Mild Pain (1 - 3), Moderate Pain (4 - 6)  fluticasone propionate 50 MICROgram(s)/spray Nasal Spray 1 Spray(s) Both Nostrils two times a day PRN Congestion      LABS:                        9.3    8.21  )-----------( 284      ( 24 Jun 2022 10:22 )             31.1     06-24    130<L>  |  88<L>  |  64<H>  ----------------------------<  141<H>  4.0   |  28  |  2.02<H>    Ca    10.2      24 Jun 2022 10:22  Phos  4.1     06-23  Mg     2.4     06-24    TPro  8.2  /  Alb  4.5  /  TBili  0.5  /  DBili  x   /  AST  21  /  ALT  15  /  AlkPhos  162<H>  06-24              RADIOLOGY:  [x] Reviewed and interpreted by me

## 2022-06-24 NOTE — PROGRESS NOTE ADULT - ASSESSMENT
This is a 66 year old female with history of severe pulmonary HTN (Group 1, 2 & 3), HFpEF, Afib, COPD on home O2 (5L NC), CKD, ROLANDA on CPAP, morbid obesity ( s/p bariatric surgery in 2012 with subsequent lap band removal due to GI bleed), PE, CVA (MCA infarct in 2012) who comes in to establish care with heart failure due to her pulmonary HTN who who presented as a transfer from [place] for 1-2 weeks of fatigue, lethargy, progressive dyspnea on exertion.     RHC and cardiomems done 6/20. RHC showed elevated right sided filling pressures with severe pulmonary hypertension with systemic PA pressures, slightly elevated PCWP and preserved cardiac output. She has improved symptomatically with diuresis and appears to be back to her baseline, but her renal function is worsening today. She's normotensive and her a fib is rate controlled.    Studies:   6/2022 TTE:  Normal LVEF, dilated LA, decreased RV function with EV enlargement, moderate TR, PASP 85, severe pulm HTN   5/2013 Cardiac Cath/PCI: Cardiac Cath: right dominant circulation, left main arises normally from the left coronary sinus of Valsalva, bifurcates into LAD and circumflex, left main normal, LAD arises normally from the left main normal, left circumflex arises normally from the left main and terminates in the AV groove normal, RCA arises normally from the right sinus of Valsalva, RCA is normal,     Hemodynamics:  RHC 6/20/2022: RA 18, /44/65, PCWP 16 v 18, PVR 7.32, TPG 49, /60/86, PA 58, Ra 95% on 8L NC, CO/CI 6.7/3.2 (cardiomems PAD 49)  RHC (6/2020): RA 2, RV 60/5, PAP 58/18/33, CO/CI: 6.5/3.3

## 2022-06-24 NOTE — PROGRESS NOTE ADULT - SUBJECTIVE AND OBJECTIVE BOX
Subjective:  - NAEO  - resting comfortably in the chair this morning and denies any SOB at rest, but as ongoing RINALDI which is unchanged for her    Medications:  acetaminophen     Tablet .. 650 milliGRAM(s) Oral every 6 hours PRN  albuterol/ipratropium for Nebulization 3 milliLiter(s) Nebulizer every 6 hours  apixaban 5 milliGRAM(s) Oral every 12 hours  aspirin  chewable 81 milliGRAM(s) Oral daily  bisacodyl 5 milliGRAM(s) Oral every 12 hours  budesonide 160 MICROgram(s)/formoterol 4.5 MICROgram(s) Inhaler 2 Puff(s) Inhalation two times a day  buPROPion XL (24-Hour) . 150 milliGRAM(s) Oral daily  chlorhexidine 2% Cloths 1 Application(s) Topical <User Schedule>  digoxin     Tablet 125 MICROGram(s) Oral daily  diltiazem    milliGRAM(s) Oral daily  fluticasone propionate 50 MICROgram(s)/spray Nasal Spray 1 Spray(s) Both Nostrils two times a day PRN  pantoprazole    Tablet 40 milliGRAM(s) Oral before breakfast  rosuvastatin 5 milliGRAM(s) Oral at bedtime  senna 2 Tablet(s) Oral at bedtime  spironolactone 50 milliGRAM(s) Oral daily  tadalafil 40 milliGRAM(s) Oral daily  tiotropium 2.5 MICROgram(s)/olodaterol 2.5 MICROgram(s) (STIOLTO) Inhaler 2 Puff(s) Inhalation daily      ICU Vital Signs Last 24 Hrs  T(C): 36.6, Max: 36.7 ( @ 17:00)  HR: 77 (66 - 89)  BP: 123/72 (95/54 - 126/74)  BP(mean): --  ABP: --  ABP(mean): --  RR: 20 (17 - 20)  SpO2: 94% (93% - 100%)    Weight in k.9 (22)  Weight in k.1 (22)      I&O's Summary Last 24 Hrs    IN: 1200 mL / OUT: 2550 mL / NET: -1350 mL      Tele: AFib 60-100s    Physical Exam:    General: No distress. Comfortable.  HEENT: EOM intact.  Neck: JVP not elevated.  Respiratory: Clear to auscultation bilaterally  CV: Normal S1 and S2. No murmurs, rub, or gallops. Radial pulses normal.  Abdomen: Soft, non-distended, non-tender  Skin: No skin lesions  Extremities: Warm, no edema  Neurology: Non-focal, alert and oriented times three.   Psych: Affect normal    Labs:    ( 22 @ 10:22 )               9.3    8.21  )--------( 284                  31.1     ( 22 @ 10:22 )     130  |  88  |  64  ---------------------<  141  4.0  |  28  |  2.02    Ca 10.2  Phos x   Mg 2.4    ( 22 @ 10:22 )  TPro  8.2  /  Alb  4.5  /  TBili  0.5  /  DBili  x   /  AST  21  /  ALT  15  /  AlkPhos  162  ( 22 @ 04:52 )  TPro  7.3  /  Alb  4.0  /  TBili  0.4  /  DBili  x   /  AST  23  /  ALT  11  /  AlkPhos  140    Serum Pro-Brain Natriuretic Peptide: [id number] (22 @ 02:45)

## 2022-06-24 NOTE — PROGRESS NOTE ADULT - ATTENDING COMMENTS
Attending Attestation:    Patient seen and examined with resident/fellow.  Agree with above except as noted.    67 yo female with COPD diastolic heart failure, pulmonary HTN transferred from [place] for management of worsening respiratory failure. Pt has been c/o increase leg edema and sob 1-2 weeks before admitted to [facility]. Pt has been taking Tadanifil and ambresentan.  R heart cath 6/20/2022 revealed sPAP: 106, dPAP: 44 mPAP:65, PCWP: 16, PVR 7.32 [facility] unit. Patient still with leg edema.      Problem 1:Acute on chronic hypoxemic respiratory failure due to decompensated R heart failure  and pulmonary HTN.   Continue nasal canula 02.   Continue  aggressive diuresis with  IV Bumex.  Follow serum bicarbonate. If  continues to rise consider Diamox.     Problem 2: Chronic atrial fib.  Continue diltiazem and digoxin.  Continue apixaban.    problem 3  COPD. Continue bronchodilators.    Problem 4  ROLANDA. Continue night time AVAPS.    Problem 5 Pulmonary HTN'  Continue tadalafil, Start home ambrisentan  if pt can get it from home.       Problem 6  Lung transplant evaluation. in progress.

## 2022-06-24 NOTE — PROGRESS NOTE ADULT - PROBLEM SELECTOR PLAN 2
- Will stop IV Bumex and hold diuretics this evening. Tomorrow, will start torsemide 20 mg BID  - Continue spironolactone 50 mg daily, would also benefit from SGLT2i  - Will continue to follow her CardioMEMS readings as an outpatient  - Continue asa 81 mg daily x 1 month s/p cardiomems (deferring plavix as she's on full AC)  - Stable from HF perspective for discharge, she will follow-up with Dr. Melita Solomon as an outpatient in [facility], as well as Dr. Rushing.

## 2022-06-24 NOTE — DISCHARGE NOTE PROVIDER - NSDCCAREPROVSEEN_GEN_ALL_CORE_FT
[facility] Medicine Team 1  [facility] Heart failure  [facility] Pulmonology

## 2022-06-24 NOTE — DISCHARGE NOTE PROVIDER - CARE PROVIDER_API CALL
Melita Solomon)  Cardiovascular Disease; Internal Medicine  [address] [place] [address]  Phone: [phone]  Fax: [phone]  Scheduled Appointment: 07/05/2022 01:30 PM

## 2022-06-24 NOTE — DISCHARGE NOTE PROVIDER - NSDCMRMEDTOKEN_GEN_ALL_CORE_FT
ambrisentan 10 mg oral tablet: 1 tab(s) orally once a day  apixaban 5 mg oral tablet: 1 tab(s) orally 2 times a day  buPROPion 100 mg oral tablet: 1 tab(s) orally 2 times a day  digoxin 125 mcg (0.125 mg) oral tablet: 1 tab(s) orally every other day (at bedtime)  dilTIAZem 120 mg/24 hours oral capsule, extended release: 1 cap(s) orally once a day  pantoprazole 40 mg oral delayed release tablet: 1 tab(s) orally once a day  rosuvastatin 5 mg oral tablet: 1 tab(s) orally once a day  spironolactone 50 mg oral tablet: 1 tab(s) orally once a day  tadalafil 20 mg oral tablet: 2 tab(s) orally once a day  torsemide 20 mg oral tablet: 2 tab(s) orally once a day   ambrisentan 10 mg oral tablet: 1 tab(s) orally once a day  apixaban 5 mg oral tablet: 1 tab(s) orally 2 times a day  buPROPion 100 mg oral tablet: 1 tab(s) orally 2 times a day  digoxin 125 mcg (0.125 mg) oral tablet: 1 tab(s) orally every other day (at bedtime)  dilTIAZem 120 mg/24 hours oral capsule, extended release: 1 cap(s) orally once a day  Farxiga 10 mg oral tablet: Please perform price check. Please do not dispense, dosing not finalized. Other brand item are also acceptable. Please page [phone] with price  Jardiance 10 mg oral tablet: Please perform price check. Please do not dispense, dosing not finalized. Other brand item are also acceptable. Please page [phone] with price  pantoprazole 40 mg oral delayed release tablet: 1 tab(s) orally once a day  Rolling Walker: 1 Rolling Walker  ICD 10:R54  rosuvastatin 5 mg oral tablet: 1 tab(s) orally once a day  spironolactone 50 mg oral tablet: 1 tab(s) orally once a day  tadalafil 20 mg oral tablet: 2 tab(s) orally once a day  torsemide 20 mg oral tablet: 2 tab(s) orally once a day   ambrisentan 10 mg oral tablet: 1 tab(s) orally once a day  apixaban 5 mg oral tablet: 1 tab(s) orally 2 times a day  aspirin 81 mg oral tablet, chewable: 1 tab(s) orally once a day  budesonide-formoterol 160 mcg-4.5 mcg/inh inhalation aerosol: 2 puff(s) inhaled 2 times a day   buPROPion 100 mg oral tablet: 1 tab(s) orally 2 times a day  digoxin 125 mcg (0.125 mg) oral tablet: 1 tab(s) orally once a day  dilTIAZem 120 mg/24 hours oral capsule, extended release: 1 cap(s) orally once a day  ipratropium-albuterol 0.5 mg-2.5 mg/3 mL inhalation solution: 3 milliliter(s) inhaled every 6 hours  Jardiance 10 mg oral tablet: 1 tab(s) orally once a day   pantoprazole 40 mg oral delayed release tablet: 1 tab(s) orally once a day  Rolling Walker: 1 Rolling Walker  ICD 10:R54  rosuvastatin 5 mg oral tablet: 1 tab(s) orally once a day  spironolactone 50 mg oral tablet: 1 tab(s) orally once a day  tadalafil 20 mg oral tablet: 2 tab(s) orally once a day  tiotropium-olodaterol 2.5 mcg-2.5 mcg/inh inhalation aerosol: 2 puff(s) inhaled once a day   torsemide 20 mg oral tablet: 2 tab(s) orally once a day

## 2022-06-24 NOTE — DISCHARGE NOTE PROVIDER - HOSPITAL COURSE
Patient is a 67 y/o F w/ pmh of pulmonary hypertension, HTN, HFpEF (echo 60% PASP 79mmhg), COPD  (8L), Afib (in digoxin, diltiazem), HLD, prior spont pneumothorax, and CKD who presents as transfer from [facility] with 1-2 weeks of fatigue, lethargy, and progressively worsening RINALDI. Over the past year, the patient has had a long standing history of worsening SOB that has been attributed to her heart failure. She has been hospitalized 4 times in the past year and has been treated as an outpatient with aggressively with diuretics and vasodilators for COPD.     At the OSH, she was noted to have mil interstitial edema. She was given lasix and was admitted to medicine for diuresis. CT scan was noted to have a RUL consolidation concerning for active infection. She was started on Ceftriaxone. Pulmonary, Cardiology and Nephrology were consulted. She was found to have worsening hypoxia to the 70s then transitioned to HFNC. The Cardiology team at that point recommended transfer to [facility] and was accepted by Dr. Archer.     Hospital course:  Heart failure saw patient and recommended RHC w/ cardiomems and aggressive diuresis. Patient underwent RHC w/ Cardiomems on 6/19. Mila completed 5 day course of CTX for presumed PNA (CTX started at OSH).  Patient underwent aggressive diuresis with Bumex 2IV BID, with some doses of diamox. Pulm saw patient and agreed with plan. Transplant pulm saw patient and arranged outpatient appointment on 07/15.  Patient was transitioned to PO diruesis on ___________    On day of discharge, patient was clinically stable, ready to be discharged home w/ home PT. Patient will follow up with heart failure, pulmonology, transplant pulmonology. Patient is a 65 y/o F w/ pmh of pulmonary hypertension, HTN, HFpEF (echo 60% PASP 79mmhg), COPD  (8L), Afib (in digoxin, diltiazem), HLD, prior spont pneumothorax, and CKD who presents as transfer from [facility] with 1-2 weeks of fatigue, lethargy, and progressively worsening RINALDI. Over the past year, the patient has had a long standing history of worsening SOB that has been attributed to her heart failure. She has been hospitalized 4 times in the past year and has been treated as an outpatient with aggressively with diuretics and vasodilators for COPD.     At the OSH, she was noted to have mil interstitial edema. She was given lasix and was admitted to medicine for diuresis. CT scan was noted to have a RUL consolidation concerning for active infection. She was started on Ceftriaxone. Pulmonary, Cardiology and Nephrology were consulted. She was found to have worsening hypoxia to the 70s then transitioned to HFNC. The Cardiology team at that point recommended transfer to [facility] and was accepted by Dr. Archer.     Hospital course:  Heart failure saw patient and recommended RHC w/ cardiomems and aggressive diuresis. Patient underwent RHC w/ Cardiomems on 6/19. Mila completed 5 day course of CTX for presumed PNA (CTX started at OSH).  Patient underwent aggressive diuresis with Bumex 2IV BID, with some doses of diamox. Pulm saw patient and agreed with plan. Transplant pulm saw patient and arranged outpatient appointment on 07/15.  Patient was transitioned to PO diruesis on torsemide. Patient will be discharged home on torsemide 40mg BID. Continue spironolactone 50 mg daily. Patient also being discharged on jardiance 10mg.     On day of discharge, patient was clinically stable, ready to be discharged home w/ home PT. Patient will follow up with heart failure, pulmonology, transplant pulmonology.

## 2022-06-25 PROBLEM — F41.9 ANXIETY DISORDER, UNSPECIFIED: Chronic | Status: ACTIVE | Noted: 2022-01-01

## 2022-06-25 PROBLEM — G47.33 OBSTRUCTIVE SLEEP APNEA (ADULT) (PEDIATRIC): Chronic | Status: ACTIVE | Noted: 2022-01-01

## 2022-06-25 PROBLEM — J44.1 CHRONIC OBSTRUCTIVE PULMONARY DISEASE WITH (ACUTE) EXACERBATION: Chronic | Status: ACTIVE | Noted: 2022-01-01

## 2022-06-25 PROBLEM — I48.20 CHRONIC ATRIAL FIBRILLATION, UNSPECIFIED: Chronic | Status: ACTIVE | Noted: 2022-01-01

## 2022-06-25 PROBLEM — K21.9 GASTRO-ESOPHAGEAL REFLUX DISEASE WITHOUT ESOPHAGITIS: Chronic | Status: ACTIVE | Noted: 2022-01-01

## 2022-06-25 PROBLEM — I50.33 ACUTE ON CHRONIC DIASTOLIC (CONGESTIVE) HEART FAILURE: Chronic | Status: ACTIVE | Noted: 2022-01-01

## 2022-06-25 PROBLEM — N18.9 CHRONIC KIDNEY DISEASE, UNSPECIFIED: Chronic | Status: ACTIVE | Noted: 2022-01-01

## 2022-06-25 PROBLEM — I27.20 PULMONARY HYPERTENSION, UNSPECIFIED: Chronic | Status: ACTIVE | Noted: 2022-01-01

## 2022-06-25 PROBLEM — I26.99 OTHER PULMONARY EMBOLISM WITHOUT ACUTE COR PULMONALE: Chronic | Status: ACTIVE | Noted: 2022-01-01

## 2022-06-25 NOTE — PROGRESS NOTE ADULT - PROBLEM SELECTOR PLAN 1
Most likely 2/2 to worsening pulm HTN and acute on chronic HFpEF w/ possible COPD exacerbation w/ superimposed pneumonia      Plan:   - Completed 5 day course of Abx  - Avoid over oxygenation to prevent decreasing respiratory drive   - Currently on 8LNC (home settings)   - C/w nocturnal AVAPs (Patient's own)   - Switched to Bumex 2mg BID, HF wants to continue to bumex 2mg IV BID  - s/p Diamox 250 x1 on 6/21 and 6/22   - S/p metolazone x 1 on 6/23  - C/w Duonebs q6  - CXR shows improving pulm edema  - Patient will need repeat CT scan when euvolemic. Most likely 2/2 to worsening pulm HTN and acute on chronic HFpEF w/ possible COPD exacerbation w/ superimposed pneumonia      Plan:   - Completed 5 day course of Abx  - Avoid over oxygenation to prevent decreasing respiratory drive   - Currently on 8LNC (home settings)   - C/w nocturnal AVAPs (Patient's own)   - Switched to oral torsemide 20mg BID  - s/p Diamox 250 x1 on 6/21 and 6/22   - S/p metolazone x 1 on 6/23  - C/w Duonebs q6  - CXR shows improving pulm edema  - Patient will need repeat CT scan when euvolemic.

## 2022-06-25 NOTE — PROGRESS NOTE ADULT - PROBLEM SELECTOR PLAN 12
DVT/PE ppx: Eliquis 5mg BID for Afib  Diet: DASH/TLC  Code: Full   Dispo: pending clinical course DVT/PE ppx: Eliquis 5mg BID for Afib  Diet: DASH/TLC. Given suppository for constipation with successful bowel movement  Code: Full   Dispo: pending clinical course

## 2022-06-25 NOTE — PROGRESS NOTE ADULT - SUBJECTIVE AND OBJECTIVE BOX
PROGRESS NOTE:   Authored by Dr. Andrea Limon MD (PGY-1). Pager [facility] [phone] / [facility]     Patient is a 66y old  Female who presents with a chief complaint of shortness of breath (24 Jun 2022 16:05)        SUBJECTIVE / OVERNIGHT EVENTS:  No acute events overnight.     ADDITIONAL REVIEW OF SYSTEMS:  Patient denies fevers, chills, chest pain, shortness of breath, nausea, abdominal pain, diarrhea, constipation, dysuria, leg swelling, headache, light headedness.    MEDICATIONS  (STANDING):  albuterol/ipratropium for Nebulization 3 milliLiter(s) Nebulizer every 6 hours  apixaban 5 milliGRAM(s) Oral every 12 hours  aspirin  chewable 81 milliGRAM(s) Oral daily  bisacodyl 5 milliGRAM(s) Oral every 12 hours  budesonide 160 MICROgram(s)/formoterol 4.5 MICROgram(s) Inhaler 2 Puff(s) Inhalation two times a day  buPROPion XL (24-Hour) . 150 milliGRAM(s) Oral daily  chlorhexidine 2% Cloths 1 Application(s) Topical <User Schedule>  digoxin     Tablet 125 MICROGram(s) Oral daily  diltiazem    milliGRAM(s) Oral daily  pantoprazole    Tablet 40 milliGRAM(s) Oral before breakfast  rosuvastatin 5 milliGRAM(s) Oral at bedtime  senna 2 Tablet(s) Oral at bedtime  spironolactone 50 milliGRAM(s) Oral daily  tadalafil 40 milliGRAM(s) Oral daily  tiotropium 2.5 MICROgram(s)/olodaterol 2.5 MICROgram(s) (STIOLTO) Inhaler 2 Puff(s) Inhalation daily  torsemide 20 milliGRAM(s) Oral two times a day    MEDICATIONS  (PRN):  acetaminophen     Tablet .. 650 milliGRAM(s) Oral every 6 hours PRN Temp greater or equal to 38C (100.4F), Mild Pain (1 - 3), Moderate Pain (4 - 6)  fluticasone propionate 50 MICROgram(s)/spray Nasal Spray 1 Spray(s) Both Nostrils two times a day PRN Congestion      CAPILLARY BLOOD GLUCOSE        I&O's Summary    23 Jun 2022 07:01  -  24 Jun 2022 07:00  --------------------------------------------------------  IN: 1200 mL / OUT: 2550 mL / NET: -1350 mL    24 Jun 2022 07:01  -  25 Jun 2022 06:34  --------------------------------------------------------  IN: 1020 mL / OUT: 3250 mL / NET: -2230 mL        PHYSICAL EXAM:  Vital Signs Last 24 Hrs  T(C): 36.3 (25 Jun 2022 04:50), Max: 36.7 (24 Jun 2022 20:10)  T(F): 97.4 (25 Jun 2022 04:50), Max: 98.1 (24 Jun 2022 20:10)  HR: 68 (25 Jun 2022 05:59) (68 - 87)  BP: 100/64 (25 Jun 2022 04:50) (100/64 - 123/72)  BP(mean): --  RR: 18 (25 Jun 2022 04:50) (18 - 20)  SpO2: 98% (25 Jun 2022 05:59) (94% - 100%)    CONSTITUTIONAL: NAD, well-developed  RESPIRATORY: Normal respiratory effort; lungs are clear to auscultation bilaterally  CARDIOVASCULAR: Regular rate and rhythm, normal S1 and S2, no murmur/rub/gallop; No lower extremity edema; Peripheral pulses are 2+ bilaterally  ABDOMEN: Nontender to palpation, normoactive bowel sounds, no rebound/guarding; No hepatosplenomegaly  MUSCLOSKELETAL: no clubbing or cyanosis of digits; no joint swelling or tenderness to palpation  PSYCH: A+O to person, place, and time; affect appropriate    LABS:                        9.3    8.21  )-----------( 284      ( 24 Jun 2022 10:22 )             31.1     06-24    130<L>  |  88<L>  |  64<H>  ----------------------------<  141<H>  4.0   |  28  |  2.02<H>    Ca    10.2      24 Jun 2022 10:22  Mg     2.4     06-24    TPro  8.2  /  Alb  4.5  /  TBili  0.5  /  DBili  x   /  AST  21  /  ALT  15  /  AlkPhos  162<H>  06-24                Tele Reviewed:    RADIOLOGY & ADDITIONAL TESTS:  Results Reviewed:   Imaging Personally Reviewed:  Electrocardiogram Personally Reviewed:     PROGRESS NOTE:   Authored by Dr. Andrea Limon MD (PGY-1). Pager [facility] [phone] / [facility]     Patient is a 66y old  Female who presents with a chief complaint of shortness of breath (24 Jun 2022 16:05)        SUBJECTIVE / OVERNIGHT EVENTS:  No acute events overnight. Feeling well this am, though complaining of constipation. Last BM 4 days ago.    ADDITIONAL REVIEW OF SYSTEMS:  Patient denies fevers, chills, chest pain, shortness of breath, nausea, abdominal pain, diarrhea, dysuria, leg swelling, headache, light headedness.    MEDICATIONS  (STANDING):  albuterol/ipratropium for Nebulization 3 milliLiter(s) Nebulizer every 6 hours  apixaban 5 milliGRAM(s) Oral every 12 hours  aspirin  chewable 81 milliGRAM(s) Oral daily  bisacodyl 5 milliGRAM(s) Oral every 12 hours  budesonide 160 MICROgram(s)/formoterol 4.5 MICROgram(s) Inhaler 2 Puff(s) Inhalation two times a day  buPROPion XL (24-Hour) . 150 milliGRAM(s) Oral daily  chlorhexidine 2% Cloths 1 Application(s) Topical <User Schedule>  digoxin     Tablet 125 MICROGram(s) Oral daily  diltiazem    milliGRAM(s) Oral daily  pantoprazole    Tablet 40 milliGRAM(s) Oral before breakfast  rosuvastatin 5 milliGRAM(s) Oral at bedtime  senna 2 Tablet(s) Oral at bedtime  spironolactone 50 milliGRAM(s) Oral daily  tadalafil 40 milliGRAM(s) Oral daily  tiotropium 2.5 MICROgram(s)/olodaterol 2.5 MICROgram(s) (STIOLTO) Inhaler 2 Puff(s) Inhalation daily  torsemide 20 milliGRAM(s) Oral two times a day    MEDICATIONS  (PRN):  acetaminophen     Tablet .. 650 milliGRAM(s) Oral every 6 hours PRN Temp greater or equal to 38C (100.4F), Mild Pain (1 - 3), Moderate Pain (4 - 6)  fluticasone propionate 50 MICROgram(s)/spray Nasal Spray 1 Spray(s) Both Nostrils two times a day PRN Congestion      CAPILLARY BLOOD GLUCOSE        I&O's Summary    23 Jun 2022 07:01  -  24 Jun 2022 07:00  --------------------------------------------------------  IN: 1200 mL / OUT: 2550 mL / NET: -1350 mL    24 Jun 2022 07:01  -  25 Jun 2022 06:34  --------------------------------------------------------  IN: 1020 mL / OUT: 3250 mL / NET: -2230 mL        PHYSICAL EXAM:  Vital Signs Last 24 Hrs  T(C): 36.3 (25 Jun 2022 04:50), Max: 36.7 (24 Jun 2022 20:10)  T(F): 97.4 (25 Jun 2022 04:50), Max: 98.1 (24 Jun 2022 20:10)  HR: 68 (25 Jun 2022 05:59) (68 - 87)  BP: 100/64 (25 Jun 2022 04:50) (100/64 - 123/72)  BP(mean): --  RR: 18 (25 Jun 2022 04:50) (18 - 20)  SpO2: 98% (25 Jun 2022 05:59) (94% - 100%)    CONSTITUTIONAL: NAD, well-developed  RESPIRATORY: Normal respiratory effort; lungs are clear to auscultation bilaterally  CARDIOVASCULAR: Regular rate and rhythm, normal S1 and S2, no murmur/rub/gallop; No lower extremity edema; Peripheral pulses are 2+ bilaterally  ABDOMEN: Nontender to palpation, normoactive bowel sounds, no rebound/guarding; No hepatosplenomegaly  MUSCLOSKELETAL: no clubbing or cyanosis of digits; no joint swelling or tenderness to palpation  PSYCH: A+O to person, place, and time; affect appropriate    LABS:                        9.3    8.21  )-----------( 284      ( 24 Jun 2022 10:22 )             31.1     06-24    130<L>  |  88<L>  |  64<H>  ----------------------------<  141<H>  4.0   |  28  |  2.02<H>    Ca    10.2      24 Jun 2022 10:22  Mg     2.4     06-24    TPro  8.2  /  Alb  4.5  /  TBili  0.5  /  DBili  x   /  AST  21  /  ALT  15  /  AlkPhos  162<H>  06-24           PROGRESS NOTE:   Authored by Dr. Andrea Limon MD (PGY-1). Pager [facility] [phone] / [facility]     Patient is a 66y old  Female who presents with a chief complaint of shortness of breath (24 Jun 2022 16:05)      SUBJECTIVE / OVERNIGHT EVENTS:  No acute events overnight. Feeling well this am, though complaining of constipation. Last BM 4 days ago.    ADDITIONAL REVIEW OF SYSTEMS:  Patient denies fevers, chills, chest pain, shortness of breath, nausea, abdominal pain, diarrhea, dysuria, leg swelling, headache, light headedness.    MEDICATIONS  (STANDING):  albuterol/ipratropium for Nebulization 3 milliLiter(s) Nebulizer every 6 hours  apixaban 5 milliGRAM(s) Oral every 12 hours  aspirin  chewable 81 milliGRAM(s) Oral daily  bisacodyl 5 milliGRAM(s) Oral every 12 hours  budesonide 160 MICROgram(s)/formoterol 4.5 MICROgram(s) Inhaler 2 Puff(s) Inhalation two times a day  buPROPion XL (24-Hour) . 150 milliGRAM(s) Oral daily  chlorhexidine 2% Cloths 1 Application(s) Topical <User Schedule>  digoxin     Tablet 125 MICROGram(s) Oral daily  diltiazem    milliGRAM(s) Oral daily  pantoprazole    Tablet 40 milliGRAM(s) Oral before breakfast  rosuvastatin 5 milliGRAM(s) Oral at bedtime  senna 2 Tablet(s) Oral at bedtime  spironolactone 50 milliGRAM(s) Oral daily  tadalafil 40 milliGRAM(s) Oral daily  tiotropium 2.5 MICROgram(s)/olodaterol 2.5 MICROgram(s) (STIOLTO) Inhaler 2 Puff(s) Inhalation daily  torsemide 20 milliGRAM(s) Oral two times a day    MEDICATIONS  (PRN):  acetaminophen     Tablet .. 650 milliGRAM(s) Oral every 6 hours PRN Temp greater or equal to 38C (100.4F), Mild Pain (1 - 3), Moderate Pain (4 - 6)  fluticasone propionate 50 MICROgram(s)/spray Nasal Spray 1 Spray(s) Both Nostrils two times a day PRN Congestion      CAPILLARY BLOOD GLUCOSE        I&O's Summary    23 Jun 2022 07:01  -  24 Jun 2022 07:00  --------------------------------------------------------  IN: 1200 mL / OUT: 2550 mL / NET: -1350 mL    24 Jun 2022 07:01  -  25 Jun 2022 06:34  --------------------------------------------------------  IN: 1020 mL / OUT: 3250 mL / NET: -2230 mL        PHYSICAL EXAM:  Vital Signs Last 24 Hrs  T(C): 36.3 (25 Jun 2022 04:50), Max: 36.7 (24 Jun 2022 20:10)  T(F): 97.4 (25 Jun 2022 04:50), Max: 98.1 (24 Jun 2022 20:10)  HR: 68 (25 Jun 2022 05:59) (68 - 87)  BP: 100/64 (25 Jun 2022 04:50) (100/64 - 123/72)  BP(mean): --  RR: 18 (25 Jun 2022 04:50) (18 - 20)  SpO2: 98% (25 Jun 2022 05:59) (94% - 100%)    CONSTITUTIONAL: NAD, well-developed  RESPIRATORY: Normal respiratory effort; lungs are clear to auscultation bilaterally  CARDIOVASCULAR: Regular rate and rhythm, normal S1 and S2, no murmur/rub/gallop; No lower extremity edema; Peripheral pulses are 2+ bilaterally  ABDOMEN: Nontender to palpation, normoactive bowel sounds, no rebound/guarding; No hepatosplenomegaly  MUSCLOSKELETAL: no clubbing or cyanosis of digits; no joint swelling or tenderness to palpation  PSYCH: A+O to person, place, and time; affect appropriate    LABS:                        9.3    8.21  )-----------( 284      ( 24 Jun 2022 10:22 )             31.1     06-24    130<L>  |  88<L>  |  64<H>  ----------------------------<  141<H>  4.0   |  28  |  2.02<H>    Ca    10.2      24 Jun 2022 10:22  Mg     2.4     06-24    TPro  8.2  /  Alb  4.5  /  TBili  0.5  /  DBili  x   /  AST  21  /  ALT  15  /  AlkPhos  162<H>  06-24

## 2022-06-25 NOTE — PROGRESS NOTE ADULT - ASSESSMENT
Patient is a 65 y/o F w/ pmh of pulmonary hypertension, HTN, HFpEF (echo 60% PASP 79mmhg), COPD  (8L), Afib (in digoxin, diltiazem), HLD, prior spont pneumothorax, and CKD who presents as transfer from [facility] with 1-2 weeks of fatigue, lethargy, and progressively worsening RINALDI concerning for progression of pulm HTN/COPD vs. acute on chronic HFpEF, TTE revealed normal LV systolic fxn and severe pulm HTN, undergoing diuresis, s/p RHC/cardio mems, pending repeat imaging once euvolemic

## 2022-06-25 NOTE — PROGRESS NOTE ADULT - ATTENDING COMMENTS
This is a 66F w/ pmh of pulmonary hypertension, HTN, HFpEF (echo 60% PASP 79mmhg), COPD  (8L), Afib (in digoxin, diltiazem), HLD, prior spont pneumothorax, and CKD who presents as transfer from [facility] with 1-2 weeks of fatigue, lethargy, and progressively worsening RINALDI concerning for progression of pulm HTN/COPD vs. acute on chronic HFpEF, TTE revealed normal LV systolic fxn and severe pulm HTN. HF team, Pulmonary evaluated.      1. Acute on chronic hypoxemic respiratory failure - multifactorial from HF, pulm HTN, PNA (resolved).   - Lost weight, LE swelling down, no acute SOB, on 8L NC  -  c/w O2, Bumex 2mg IV bid, bronchodilators, inhaled steroid, tadalafil    2. Acute on chronic HF -   - Has been well diuresed. s/p RHC/CardioMems yesterday- /44/65  - HF team f/u noted, severe pHTN predominant precapillary component with elevated filling pressures (R>L).   - On Torsemide 20mg bid from IV Bumex, c/w Aldactone 50mg/d, continue, Eliquis. ASA   - Daily I/O, weight    3. Severe Pulm HTN (groups 2/3) - s/p RHC, /44/65  - c/w bronchodilators, inhaled steroid, Tadalafil and diuretic as above  - reportedly she is on Ambrisentan and Tadalafil at home, on tadalafil while inpatient     per Pulmonary it's ok to hold Ambrisentan while inpatient  - HF rec as above  - Pulmonary Transplant f/u noted    4. . Chronic Afib - Rate controlled  - c/w Cardizem, Digoxin, Eliquis     5. COPD/ROLANDA - AVAPS qhs, inhalers & above    6. OOB to chair, PT in progress.

## 2022-06-26 NOTE — PROGRESS NOTE ADULT - PROBLEM SELECTOR PLAN 6
C/w home tadalafil 40mg qdaily   - ambrisentan 10mg ordered by pharmacy, patient will need proof of enrollment in clinical trial, Dr. Gallardo's (pulm) office called and left a message  - F/u Pulm, transplant pulm. C/w home tadalafil 40mg qdaily   - ambrisentan 10mg not available in hospital. Per pulm its ok to hold. She lost her own bottle on the way here from [place] and is working with insurance to get a refill  - F/u Pulm, transplant pulm. C/w home tadalafil 40mg qdaily   - ambrisentan 10mg not available in hospital. Per pulm its ok to hold. She lost her own bottle on the way here from [place] and is working with insurance to get a refill  - Per transplant pulm: "She agrees with moving forward with this process and has consented to the evaluation.  Ms. Domingo will require social assistance to move the process forward as she lives quite a distance away in [facility] and it was explained that she could not care for herself under theses circumstances

## 2022-06-26 NOTE — PROGRESS NOTE ADULT - SUBJECTIVE AND OBJECTIVE BOX
Subjective:    Medications:  acetaminophen     Tablet .. 650 milliGRAM(s) Oral every 6 hours PRN  albuterol/ipratropium for Nebulization 3 milliLiter(s) Nebulizer every 6 hours  apixaban 5 milliGRAM(s) Oral every 12 hours  aspirin  chewable 81 milliGRAM(s) Oral daily  bisacodyl 5 milliGRAM(s) Oral every 12 hours  bisacodyl Suppository 10 milliGRAM(s) Rectal daily  budesonide 160 MICROgram(s)/formoterol 4.5 MICROgram(s) Inhaler 2 Puff(s) Inhalation two times a day  buPROPion XL (24-Hour) . 150 milliGRAM(s) Oral daily  chlorhexidine 2% Cloths 1 Application(s) Topical <User Schedule>  digoxin     Tablet 125 MICROGram(s) Oral daily  diltiazem    milliGRAM(s) Oral daily  fluticasone propionate 50 MICROgram(s)/spray Nasal Spray 1 Spray(s) Both Nostrils two times a day PRN  pantoprazole    Tablet 40 milliGRAM(s) Oral before breakfast  rosuvastatin 5 milliGRAM(s) Oral at bedtime  senna 2 Tablet(s) Oral at bedtime  spironolactone 50 milliGRAM(s) Oral daily  tadalafil 40 milliGRAM(s) Oral daily  tiotropium 2.5 MICROgram(s)/olodaterol 2.5 MICROgram(s) (STIOLTO) Inhaler 2 Puff(s) Inhalation daily  torsemide 20 milliGRAM(s) Oral two times a day      Physical Exam:    Vitals:  Vital Signs Last 24 Hours  T(C): 37.2 (22 @ 04:00), Max: 37.2 (22 @ 04:00)  HR: 72 (22 @ 05:17) (65 - 82)  BP: 106/67 (22 @ 04:55) (92/57 - 123/61)  RR: 18 (22 @ 04:00) (17 - 18)  SpO2: 97% (22 @ 05:17) (95% - 100%)    Weight in k.3 ( @ 08:00)    I&O's Summary    2022 07:01  -  2022 07:00  --------------------------------------------------------  IN: 1020 mL / OUT: 4000 mL / NET: -2980 mL        Tele:    General: No distress. Comfortable.  HEENT: EOM intact.  Neck: Neck supple. JVP not elevated. No masses  Chest: Clear to auscultation bilaterally  CV: Normal S1 and S2. No murmurs, rub, or gallops. Radial pulses normal.  Abdomen: Soft, non-distended, non-tender  Skin: No rashes or skin breakdown  Neurology: Alert and oriented times three. Sensation intact  Psych: Affect normal    Labs:                        9.3    7.82  )-----------( 313      ( 2022 05:43 )             29.9         128<L>  |  87<L>  |  73<H>  ----------------------------<  85  4.2   |  30  |  1.94<H>    Ca    9.7      2022 05:43  Phos  4.7       Mg     2.6         TPro  8.0  /  Alb  4.1  /  TBili  0.4  /  DBili  x   /  AST  21  /  ALT  15  /  AlkPhos  160<H>

## 2022-06-26 NOTE — PROGRESS NOTE ADULT - PROBLEM SELECTOR PLAN 4
H/H 7.4/24.5 i/s/o chronic kidney disease, AOCD. On eliquis R/o occult bleed   - Anemia panel - ferritin, TIBC, Transferrin  - FOBT  -  transfusion if Hb < 7  - Maintain active type and screen.  - Abdominal pain resolved, No active bleeding hemoglobin was between 7-8 at OSH.  - continue to trend CBC Improving. 9.3 on 6/26. H/H was 7.4/24.5 i/s/o chronic kidney disease, AOCD.  -  transfusion if Hb < 7  - Maintain active type and screen.  - Abdominal pain resolved, No active bleeding hemoglobin was between 7-8 at OSH.  - continue to trend CBC

## 2022-06-26 NOTE — PROGRESS NOTE ADULT - PROBLEM SELECTOR PLAN 1
Most likely 2/2 to worsening pulm HTN and acute on chronic HFpEF w/ possible COPD exacerbation w/ superimposed pneumonia      Plan:   - Completed 5 day course of Abx  - Avoid over oxygenation to prevent decreasing respiratory drive   - Currently on 8LNC (home settings)   - C/w nocturnal AVAPs (Patient's own)   - Switched to oral torsemide 20mg BID  - s/p Diamox 250 x1 on 6/21 and 6/22   - S/p metolazone x 1 on 6/23  - C/w Duonebs q6  - CXR shows improving pulm edema  - Patient will need repeat CT scan when euvolemic. Most likely 2/2 to worsening pulm HTN and acute on chronic HFpEF w/ possible COPD exacerbation w/ superimposed pneumonia      Plan:   - Completed 5 day course of Abx  - Avoid over oxygenation to prevent decreasing respiratory drive   - Currently on 8LNC (home settings)   - C/w nocturnal AVAPs (Patient's own)   - Switched to oral torsemide 20mg BID  - s/p Diamox 250 x1 on 6/21 and 6/22   - S/p metolazone x 1 on 6/23  - C/w Duonebs q6  - CXR shows improving pulm edema    - Patient will need repeat CT scan when euvolemic.

## 2022-06-26 NOTE — PROGRESS NOTE ADULT - PROBLEM SELECTOR PLAN 2
Reported as HFpEF although unclear exact etiology. Most likely 2/2 to pulmonary HTN and WHO class III. Differential includes ICM vs. NICM vs. RHF from Pulm HTN.    Plan:   - CXR shows improving Pulm edema  - TTE showed severe pulm HTN, normal LV systolic fxn, RV enlargement w/ decreased systolic fxn  - No PFO on limited TTE w/ bubble study   - Switched to Bumex 2mg BID  - C/w home Spironolactone  - BNP 12,525 at OSH, 15,334 on admission   - Strict ins and outs   - Daily standing weights  - Replete K > 4, Mg > 2, and Phos > 3  - S/p RHC/Cardio mems 6/20  - Patient will need ASA 81 for 1 month post cardiomems 6/21 Reported as HFpEF although unclear exact etiology. Most likely 2/2 to pulmonary HTN and WHO class III. Differential includes ICM vs. NICM vs. RHF from Pulm HTN.    Plan:   - CXR shows improving Pulm edema  - TTE showed severe pulm HTN, normal LV systolic fxn, RV enlargement w/ decreased systolic fxn  - No PFO on limited TTE w/ bubble study   - Switched to oral torsemide 20mg BID  - C/w home Spironolactone  - BNP 12,525 at OSH, 15,334 on admission   - Strict ins and outs   - Daily standing weights  - Replete K > 4, Mg > 2, and Phos > 3  - S/p RHC/Cardio mems 6/20  - Patient will need ASA 81 for 1 month post cardiomems 6/21

## 2022-06-26 NOTE — PROGRESS NOTE ADULT - PROBLEM SELECTOR PLAN 5
RUL consolidation on OSH CT scan.   - OSH CT scan uploaded  - Procal 0.28  - S/p ceftriaxone 1g qdaily (06/11 - 06/15)   - legionella Ag negative- S/p Azithromycin 6/14   - Afebrile non-toxic, w/o leukocytosis   - Will culture if febrile  - Will need repeat CT scan after diuresis. RUL consolidation on OSH CT scan.   - OSH CT scan uploaded  - Procal 0.28  - S/p ceftriaxone 1g qdaily (06/11 - 06/15)   - legionella Ag negative- S/p Azithromycin 6/14   - Afebrile non-toxic, w/o leukocytosis   - Will culture if febrile  - Will need repeat CT scan when euvolemic

## 2022-06-26 NOTE — PROGRESS NOTE ADULT - ASSESSMENT
Patient is a 67 y/o F w/ pmh of pulmonary hypertension, HTN, HFpEF (echo 60% PASP 79mmhg), COPD  (8L), Afib (in digoxin, diltiazem), HLD, prior spont pneumothorax, and CKD who presents as transfer from [facility] with 1-2 weeks of fatigue, lethargy, and progressively worsening RINALDI concerning for progression of pulm HTN/COPD vs. acute on chronic HFpEF, TTE revealed normal LV systolic fxn and severe pulm HTN, undergoing diuresis, s/p RHC/cardio mems, pending repeat imaging once euvolemic    #Lung transplant  Discussed details in reference to transplant outcomes and risks /benefits for 33 mins face to face  She agrees with moving forqward with thisprocess and has consented to the evaluation.  Ms. Domingo will require social assistance to move the process forward as she lives quite a distance away in [facility]   and it was explained that she could not care for herself under theses circumstances     \ambulation of up to 600 ft, pulm rehab and bmi < 31 critical to proceed  she understood requirements with verbal repetition

## 2022-06-26 NOTE — PROGRESS NOTE ADULT - PROBLEM SELECTOR PLAN 1
Most likely 2/2 to worsening pulm HTN and acute on chronic HFpEF w/ possible COPD exacerbation w/ superimposed pneumonia      Plan:   - Completed 5 day course of Abx  - Avoid over oxygenation to prevent decreasing respiratory drive   - Currently on 8LNC (home settings)   - C/w nocturnal AVAPs (Patient's own)   - Switched to oral torsemide 20mg BID  - s/p Diamox 250 x1 on 6/21 and 6/22   - S/p metolazone x 1 on 6/23  - C/w Duonebs q6  - CXR shows improving pulm edema  - Patient will need repeat CT scan when euvolemic.

## 2022-06-26 NOTE — PROGRESS NOTE ADULT - SUBJECTIVE AND OBJECTIVE BOX
PROGRESS NOTE:   Authored by Dr. Andrea Limon MD (PGY-1). Pager [facility] [phone] / [facility]     Patient is a 66y old  Female who presents with a chief complaint of shortness of breath (24 Jun 2022 16:05)      SUBJECTIVE / OVERNIGHT EVENTS:  No acute events overnight.     ADDITIONAL REVIEW OF SYSTEMS:  Patient denies fevers, chills, chest pain, shortness of breath, nausea, abdominal pain, diarrhea, constipation, dysuria, leg swelling, headache, light headedness.    MEDICATIONS  (STANDING):  albuterol/ipratropium for Nebulization 3 milliLiter(s) Nebulizer every 6 hours  apixaban 5 milliGRAM(s) Oral every 12 hours  aspirin  chewable 81 milliGRAM(s) Oral daily  bisacodyl 5 milliGRAM(s) Oral every 12 hours  bisacodyl Suppository 10 milliGRAM(s) Rectal daily  budesonide 160 MICROgram(s)/formoterol 4.5 MICROgram(s) Inhaler 2 Puff(s) Inhalation two times a day  buPROPion XL (24-Hour) . 150 milliGRAM(s) Oral daily  chlorhexidine 2% Cloths 1 Application(s) Topical <User Schedule>  digoxin     Tablet 125 MICROGram(s) Oral daily  diltiazem    milliGRAM(s) Oral daily  pantoprazole    Tablet 40 milliGRAM(s) Oral before breakfast  rosuvastatin 5 milliGRAM(s) Oral at bedtime  senna 2 Tablet(s) Oral at bedtime  spironolactone 50 milliGRAM(s) Oral daily  tadalafil 40 milliGRAM(s) Oral daily  tiotropium 2.5 MICROgram(s)/olodaterol 2.5 MICROgram(s) (STIOLTO) Inhaler 2 Puff(s) Inhalation daily  torsemide 20 milliGRAM(s) Oral two times a day    MEDICATIONS  (PRN):  acetaminophen     Tablet .. 650 milliGRAM(s) Oral every 6 hours PRN Temp greater or equal to 38C (100.4F), Mild Pain (1 - 3), Moderate Pain (4 - 6)  fluticasone propionate 50 MICROgram(s)/spray Nasal Spray 1 Spray(s) Both Nostrils two times a day PRN Congestion      CAPILLARY BLOOD GLUCOSE        I&O's Summary    24 Jun 2022 07:01  -  25 Jun 2022 07:00  --------------------------------------------------------  IN: 1020 mL / OUT: 3250 mL / NET: -2230 mL    25 Jun 2022 07:01  -  26 Jun 2022 06:40  --------------------------------------------------------  IN: 1020 mL / OUT: 4000 mL / NET: -2980 mL        PHYSICAL EXAM:  Vital Signs Last 24 Hrs  T(C): 37.2 (26 Jun 2022 04:00), Max: 37.2 (26 Jun 2022 04:00)  T(F): 98.9 (26 Jun 2022 04:00), Max: 98.9 (26 Jun 2022 04:00)  HR: 72 (26 Jun 2022 05:17) (65 - 84)  BP: 106/67 (26 Jun 2022 04:55) (92/57 - 123/61)  BP(mean): --  RR: 18 (26 Jun 2022 04:00) (17 - 18)  SpO2: 97% (26 Jun 2022 05:17) (95% - 100%)    CONSTITUTIONAL: NAD, well-developed  RESPIRATORY: Normal respiratory effort; lungs are clear to auscultation bilaterally  CARDIOVASCULAR: Regular rate and rhythm, normal S1 and S2, no murmur/rub/gallop; No lower extremity edema; Peripheral pulses are 2+ bilaterally  ABDOMEN: Nontender to palpation, normoactive bowel sounds, no rebound/guarding; No hepatosplenomegaly  MUSCLOSKELETAL: no clubbing or cyanosis of digits; no joint swelling or tenderness to palpation  PSYCH: A+O to person, place, and time; affect appropriate    LABS:                        9.3    7.82  )-----------( 313      ( 26 Jun 2022 05:43 )             29.9     06-26    128<L>  |  87<L>  |  73<H>  ----------------------------<  85  4.2   |  30  |  1.94<H>    Ca    9.7      26 Jun 2022 05:43  Phos  4.7     06-26  Mg     2.6     06-26    TPro  8.0  /  Alb  4.1  /  TBili  0.4  /  DBili  x   /  AST  21  /  ALT  15  /  AlkPhos  160<H>  06-26                Tele Reviewed:    RADIOLOGY & ADDITIONAL TESTS:  Results Reviewed:   Imaging Personally Reviewed:  Electrocardiogram Personally Reviewed:     PROGRESS NOTE:   Authored by Dr. Andrea Limon MD (PGY-1). Pager [facility] [phone] / [facility]     Patient is a 66y old  Female who presents with a chief complaint of shortness of breath (24 Jun 2022 16:05)      SUBJECTIVE / OVERNIGHT EVENTS:  No acute events overnight. Feels good this am. Thinks she is at baseline diuresis    ADDITIONAL REVIEW OF SYSTEMS:  Patient denies fevers, chills, chest pain, shortness of breath, nausea, abdominal pain, diarrhea, constipation, dysuria, leg swelling, headache, light headedness.    MEDICATIONS  (STANDING):  albuterol/ipratropium for Nebulization 3 milliLiter(s) Nebulizer every 6 hours  apixaban 5 milliGRAM(s) Oral every 12 hours  aspirin  chewable 81 milliGRAM(s) Oral daily  bisacodyl 5 milliGRAM(s) Oral every 12 hours  bisacodyl Suppository 10 milliGRAM(s) Rectal daily  budesonide 160 MICROgram(s)/formoterol 4.5 MICROgram(s) Inhaler 2 Puff(s) Inhalation two times a day  buPROPion XL (24-Hour) . 150 milliGRAM(s) Oral daily  chlorhexidine 2% Cloths 1 Application(s) Topical <User Schedule>  digoxin     Tablet 125 MICROGram(s) Oral daily  diltiazem    milliGRAM(s) Oral daily  pantoprazole    Tablet 40 milliGRAM(s) Oral before breakfast  rosuvastatin 5 milliGRAM(s) Oral at bedtime  senna 2 Tablet(s) Oral at bedtime  spironolactone 50 milliGRAM(s) Oral daily  tadalafil 40 milliGRAM(s) Oral daily  tiotropium 2.5 MICROgram(s)/olodaterol 2.5 MICROgram(s) (STIOLTO) Inhaler 2 Puff(s) Inhalation daily  torsemide 20 milliGRAM(s) Oral two times a day    MEDICATIONS  (PRN):  acetaminophen     Tablet .. 650 milliGRAM(s) Oral every 6 hours PRN Temp greater or equal to 38C (100.4F), Mild Pain (1 - 3), Moderate Pain (4 - 6)  fluticasone propionate 50 MICROgram(s)/spray Nasal Spray 1 Spray(s) Both Nostrils two times a day PRN Congestion      CAPILLARY BLOOD GLUCOSE        I&O's Summary    24 Jun 2022 07:01  -  25 Jun 2022 07:00  --------------------------------------------------------  IN: 1020 mL / OUT: 3250 mL / NET: -2230 mL    25 Jun 2022 07:01  -  26 Jun 2022 06:40  --------------------------------------------------------  IN: 1020 mL / OUT: 4000 mL / NET: -2980 mL        PHYSICAL EXAM:  Vital Signs Last 24 Hrs  T(C): 37.2 (26 Jun 2022 04:00), Max: 37.2 (26 Jun 2022 04:00)  T(F): 98.9 (26 Jun 2022 04:00), Max: 98.9 (26 Jun 2022 04:00)  HR: 72 (26 Jun 2022 05:17) (65 - 84)  BP: 106/67 (26 Jun 2022 04:55) (92/57 - 123/61)  BP(mean): --  RR: 18 (26 Jun 2022 04:00) (17 - 18)  SpO2: 97% (26 Jun 2022 05:17) (95% - 100%)    CONSTITUTIONAL: NAD  RESPIRATORY: Normal respiratory effort; lungs are clear to auscultation bilaterally  CARDIOVASCULAR: AFIB. Loud S2. Tender and swollen BLE  ABDOMEN: Nontender to palpation, normoactive bowel sounds, no rebound/guarding; No hepatosplenomegaly    LABS:                        9.3    7.82  )-----------( 313      ( 26 Jun 2022 05:43 )             29.9     06-26    128<L>  |  87<L>  |  73<H>  ----------------------------<  85  4.2   |  30  |  1.94<H>    Ca    9.7      26 Jun 2022 05:43  Phos  4.7     06-26  Mg     2.6     06-26    TPro  8.0  /  Alb  4.1  /  TBili  0.4  /  DBili  x   /  AST  21  /  ALT  15  /  AlkPhos  160<H>  06-26                Tele Reviewed: AFIB  with occasional PVCs

## 2022-06-26 NOTE — PROGRESS NOTE ADULT - PROBLEM SELECTOR PLAN 3
Severe advanced pulmonary hypertension with advanced COPD with potentially superimposed heart failure. Home on 8L high concentrate = 4L in hospital. Per patient saturates in the high 80s.     Plan;    - C/w home tadalafil 40mg qdaily   - ambrisentan 10mg ordered by pharmacy, patient will need proof of enrollment in clinical trial, Dr. Gallardo's (pulm) office called and left a message  - C/w Christopher q6  - Appreciate pulm recs Severe advanced pulmonary hypertension with advanced COPD with potentially superimposed heart failure. Home on 8L high concentrate = 4L in hospital. Per patient saturates in the high 80s.     Plan;    - C/w home tadalafil 40mg daily  - ambrisentan 10mg not available in hospital. Per pulm its ok to hold. She lost her own bottle on the way here from [place] and is working with insurance to get a refill  - C/w Duavi q6  - Appreciate pulm recs

## 2022-06-26 NOTE — PROGRESS NOTE ADULT - ASSESSMENT
Patient is a 65 y/o F w/ pmh of pulmonary hypertension, HTN, HFpEF (echo 60% PASP 79mmhg), COPD  (8L), Afib (in digoxin, diltiazem), HLD, prior spont pneumothorax, and CKD who presents as transfer from [facility] with 1-2 weeks of fatigue, lethargy, and progressively worsening RINALDI concerning for progression of pulm HTN/COPD vs. acute on chronic HFpEF, TTE revealed normal LV systolic fxn and severe pulm HTN, undergoing diuresis, s/p RHC/cardio mems, pending repeat imaging once euvolemic

## 2022-06-26 NOTE — PROGRESS NOTE ADULT - PROBLEM SELECTOR PLAN 12
DVT/PE ppx: Eliquis 5mg BID for Afib  Diet: DASH/TLC. Given suppository for constipation with successful bowel movement  Code: Full   Dispo: pending clinical course

## 2022-06-26 NOTE — PROGRESS NOTE ADULT - ATTENDING COMMENTS
66F w/ pmh of pulmonary hypertension, HTN, HFpEF (echo 60% PASP 79mmhg), COPD  (8L), Afib (in digoxin, diltiazem), HLD, prior spont pneumothorax, and CKD who presents as transfer from [facility] with 1-2 weeks of fatigue, lethargy, and progressively worsening RINALDI concerning for progression of pulm HTN/COPD vs. acute on chronic HFpEF, TTE revealed normal LV systolic fxn and severe pulm HTN. HF team, Pulmonary evaluated.      1. Acute on chronic hypoxemic respiratory failure - multifactorial from HF, pulm HTN, PNA (resolved).   - Lost weight, LE swelling down, no acute SOB, on 8L NC  -  on home O2, diuretics and tadalafil    2. Acute on chronic HF -   - well diuresed based on cardiomems  - HF team f/u noted, severe pHTN predominant precapillary component with elevated filling pressures (R>L).   - On Torsemide 20mg bid from IV Bumex, c/w Aldactone 50mg/d, continue, Eliquis. ASA   - Daily I/O, weight    3. Severe Pulm HTN (groups 2/3) - s/p RHC, /44/65  - c/w bronchodilators, inhaled steroid, Tadalafil and diuretic as above  - reportedly she is on Ambrisentan and Tadalafil at home, on tadalafil while inpatient     per Pulmonary it's ok to hold Ambrisentan while inpatient  - HF rec as above  - Pulmonary Transplant f/u noted    4. . Chronic Afib - Rate controlled  - c/w Cardizem, Digoxin, Eliquis     5. COPD/ROLANDA - AVAPS qhs, inhalers & above

## 2022-06-27 NOTE — PROGRESS NOTE ADULT - PROBLEM SELECTOR PLAN 2
- continue torsemide 20 mg BID  - Continue spironolactone 50 mg daily, would also benefit from SGLT2i  - Will continue to follow her CardioMEMS readings as an outpatient  - Continue asa 81 mg daily x 1 month s/p cardiomems (deferring plavix as she's on full AC)  - Stable from HF perspective for discharge, she will follow-up with Dr. Melita Solomon as an outpatient in [facility], as well as Dr. Rushing. - increase torsemide to 40 mg BID  - Continue spironolactone 50 mg daily, would also benefit from SGLT2i, please check is jardiace 10mg or farsiga 10mg is covered by insurance and start  - Will continue to follow her CardioMEMS readings as an outpatient  - Continue asa 81 mg daily x 1 month s/p cardiomems (deferring plavix as she's on full AC)  - Stable from HF perspective for discharge, she will follow-up with Dr. Melita Solomon as an outpatient in [facility], as well as Dr. Rushing.

## 2022-06-27 NOTE — PROGRESS NOTE ADULT - ASSESSMENT
Victoria Domingo is a 66 year old female w/pulmonary HTN, HTN, HFpEF, COPD (on 8 L at home), afib, HLD, prior spontaneous pneumothorax, and CKD who presents to [facility] as a transfer from [facility] with reported 1-2 weeks of fatigue, lethargy, and progressively worsening shortness of breath.  She has reportedly been hospitalized 4 times in the past year.  Per chart review, she was initially diuresed at OSH and started on ceftriaxone as CT scan showed RUL consolidation concerning for pneumonia.  She was requiring HFNC to maintain normal O2 saturations and was subsequently transferred to [facility] for further management.      #Acute hypoxic respiratory failure--stable  - etiology likely multifactorial and related to volume overload given physical exam findings, significantly elevated BNP  - patient afebrile, without white count, but previously treated at OSH with ceftriaxone given concern for pneumonia  - O2 requirements improving w/diuresis, patient now on home O2 (8L)    #Pulm HTN  - s/p RHC 6/20 demonstrating sPAP: 106, dPAP: 44 mPAP:65, PCWP: 16, PVR 7.32, consistent w/severe pulmonary HTN   - reportedly on ambrisentan and tadalafil at home, on tadalafil while inaptient, patient unable to obtain ambrisentan while inpatient   - initially diuresed w/bumex gtt w/subsequent transition to intermittent dosing, now on torsemide 20 mg PO BID per heart failure recommendations  - TTE demonstrating RV enlargement, decreased RV systolic function, and PASP 85 mm Hg    #COPD  - patient reportedly on 8 L at home, now w/improving O2 requirements as above   - presentation not consistent with exacerbation    Recommendations:  - agree w/torsemide 20 mg PO BID for now, will continue to monitor strict Is/Os and daily weights while on oral regimen to determine if appropriate for discharge   - continue home tadalafil, okay to hold home ambrisentan  - continue home AVAPs qhs  - please provide and encourage use of incentive spirometry while inpatient  - ensure patient has PT/OT  - f/u any pulmonary transplant recommendations  - f/u heart failure recommendations    Patient seen and discussed w/Dr. Dwight Knott PGY4  [phone]

## 2022-06-27 NOTE — PROGRESS NOTE ADULT - PROBLEM SELECTOR PLAN 5
No strenuous activity  No straddle toys  May remove bandage on Wednesday March 11  May resume normal bathing on Thursday March 12 RUL consolidation on OSH CT scan.   - OSH CT scan uploaded  - Procal 0.28  - S/p ceftriaxone 1g qdaily (06/11 - 06/15)   - legionella Ag negative- S/p Azithromycin 6/14   - Afebrile non-toxic, w/o leukocytosis   - Will culture if febrile  - Will need repeat CT scan after diuresis.

## 2022-06-27 NOTE — PROGRESS NOTE ADULT - NUTRITIONAL ASSESSMENT
65 y/o F w/ pmh of pulmonary hypertension, HTN, HFpEF (echo 60% PASP 79mmhg), COPD (8L), Afib (in digoxin, diltiazem), HLD, prior spontaneous pneumothorax, and CKD who presented from [facility] with fatigue, lethargy, worsening RINALDI.  She was fluid overloaded with suspicion of pneumonia, treated with diuresis and antibiotics.  Patient was transferred to [facility] for further management. Transplant pulmonology consulted lung transplant evaluation.    #pre lung transplant  #fluid overload  #PAH  -Feels improved clinically, OOB to chair and ambulating with PT  -S/p Cardimems 6/21:  Heart Failure following, diuresis continued   -Has been losing weight <200lbs, needs more weight loss and aggressive PT  -COPD being managed by pulmonary team   -Transplant SW and financial to see prior to discharge  -Follow up lung transplant as outpatient, has appointment for 7/15  -Rest of care per primary team

## 2022-06-27 NOTE — PROGRESS NOTE ADULT - PROBLEM SELECTOR PLAN 4
-increase in BUN/Cr, will hold diuretics this evening as above and switch her from IV to PO diuretics tomorrow -stable

## 2022-06-27 NOTE — PROGRESS NOTE ADULT - ATTENDING COMMENTS
Attending Attestation:    Patient seen and examined with resident/fellow.  Agree with above except as noted.    67 yo female with COPD diastolic heart failure, pulmonary HTN transferred from [place] for management of worsening respiratory failure. Pt has been c/o increase leg edema and sob 1-2 weeks before admitted to [place]. Pt has been taking Tadanifil and ambresentan.  R heart cath 6/20/2022 revealed sPAP: 106, dPAP: 44 mPAP:65, PCWP: 16, PVR 7.32 woods unit. Patient still with leg edema.      Problem 1:Acute on chronic hypoxemic respiratory failure due to decompensated R heart failure  and pulmonary HTN.   Continue nasal canula 02.   Continue  aggressive diuresis with  IV Bumex.  Follow serum bicarbonate. If  continues to rise consider Diamox.     Problem 2: Chronic atrial fib.  Continue diltiazem and digoxin.  Continue apixaban.    problem 3  COPD. Continue bronchodilators.    Problem 4  ROLANDA. Continue night time AVAPS.    Problem 5 Pulmonary HTN'  Continue tadalafil, Start home ambrisentan  if pt can get it from home.       Problem 6  Lung transplant evaluation. in progress.

## 2022-06-27 NOTE — PROGRESS NOTE ADULT - PROBLEM SELECTOR PLAN 1
Most likely 2/2 to worsening pulm HTN and acute on chronic HFpEF w/ possible COPD exacerbation w/ superimposed pneumonia      Plan:   - Completed 5 day course of Abx  - Avoid over oxygenation to prevent decreasing respiratory drive   - Currently on 8LNC (home settings)   - C/w nocturnal AVAPs (Patient's own)   - Switched to oral torsemide 20mg BID  - increased oral torsemide to 40 mg BID  - s/p Diamox 250 x1 on 6/21 and 6/22   - S/p metolazone x 1 on 6/23  - C/w Duonebs q6  - CXR shows improving pulm edema  - Patient will need repeat CT scan when euvolemic.

## 2022-06-27 NOTE — PROGRESS NOTE ADULT - ASSESSMENT
Patient is a 65 y/o F w/ pmh of pulmonary hypertension, HTN, HFpEF (echo 60% PASP 79mmhg), COPD  (8L), Afib (in digoxin, diltiazem), HLD, prior spont pneumothorax, and CKD who presents as transfer from [facility] with 1-2 weeks of fatigue, lethargy, and progressively worsening RINALDI concerning for progression of pulm HTN/COPD vs. acute on chronic HFpEF, TTE revealed normal LV systolic fxn and severe pulm HTN, undergoing diuresis, s/p RHC/cardio mems, pending repeat imaging once euvolemic. Torsemide was increased to 40 mg PO BID. Will start a sglt2 (jardiance 10 or farxiga 10, depending on which one her insurance covers)

## 2022-06-27 NOTE — PROGRESS NOTE ADULT - ASSESSMENT
Victoria Domingo is a 66 year old female w/pulmonary HTN, HTN, HFpEF, COPD (on 8 L at home), afib, HLD, prior spontaneous pneumothorax, and CKD who presents to [facility] as a transfer from [facility] with reported 1-2 weeks of fatigue, lethargy, and progressively worsening shortness of breath.  She has reportedly been hospitalized 4 times in the past year.  Per chart review, she was initially diuresed at OSH and started on ceftriaxone as CT scan showed RUL consolidation concerning for pneumonia.  She was requiring HFNC to maintain normal O2 saturations and was subsequently transferred to [facility] for further management.      #Acute hypoxic respiratory failure--improving  - etiology likely multifactorial and related to volume overload given physical exam findings, significantly elevated BNP  - patient afebrile, without white count, but previously treated at OSH with ceftriaxone given concern for pneumonia  - O2 requirements improving w/diuresis, patient now on home O2 (8L)    #Pulm HTN  - s/p RHC 6/20 demonstrating sPAP: 106, dPAP: 44 mPAP:65, PCWP: 16, PVR 7.32, consistent w/severe pulmonary HTN   - reportedly on ambrisentan and tadalafil at home, on tadalafil while inaptient, patient unable to obtain ambrisentan while inpatient   - currently on bumex 2 mg IV BID, s/p diamox x1 per heart failure recommendations  - TTE demonstrating RV enlargement, decreased RV systolic function, and PASP 85 mm Hg    #COPD  - patient reportedly on 8 L at home, now w/improving O2 requirements as above   - presentation not consistent with exacerbation    Recommendations:  - agree w/bumex 2 mg IV BID for now, monitor strict Is/Os and diurese patient to net negative fluid balance   - continue home tadalafil, okay to hold home ambrisentan  - continue home AVAPs qhs  - please provide and encourage use of incentive spirometry while inpatient  - ensure patient has PT/OT  - f/u any pulmonary transplant recommendations  - f/u heart failure recommendations    Patient seen and discussed w/Dr. Danica Knott PGY4  [phone]

## 2022-06-27 NOTE — PROGRESS NOTE ADULT - ASSESSMENT
This is a 66 year old female with history of severe pulmonary HTN (Group 1, 2 & 3), HFpEF, Afib, COPD on home O2 (5L NC), CKD, ROLANDA on CPAP, morbid obesity ( s/p bariatric surgery in 2012 with subsequent lap band removal due to GI bleed), PE, CVA (MCA infarct in 2012) who comes in to establish care with heart failure due to her pulmonary HTN who who presented as a transfer from [place] for 1-2 weeks of fatigue, lethargy, progressive dyspnea on exertion.     RHC and cardiomems done 6/20. RHC showed elevated right sided filling pressures with severe pulmonary hypertension with systemic PA pressures, slightly elevated PCWP and preserved cardiac output. She has improved symptomatically with diuresis and appears to be back to her baseline, but her renal function is worsening today. She's normotensive and her a fib is rate controlled.    Studies:   6/2022 TTE:  Normal LVEF, dilated LA, decreased RV function with EV enlargement, moderate TR, PASP 85, severe pulm HTN   5/2013 Cardiac Cath/PCI: Cardiac Cath: right dominant circulation, left main arises normally from the left coronary sinus of Valsalva, bifurcates into LAD and circumflex, left main normal, LAD arises normally from the left main normal, left circumflex arises normally from the left main and terminates in the AV groove normal, RCA arises normally from the right sinus of Valsalva, RCA is normal,     Hemodynamics:  RHC 6/20/2022: RA 18, /44/65, PCWP 16 v 18, PVR 7.32, TPG 49, /60/86, PA 58, Ra 95% on 8L NC, CO/CI 6.7/3.2 (cardiomems PAD 49)  RHC (6/2020): RA 2, RV 60/5, PAP 58/18/33, CO/CI: 6.5/3.3    This is a 66 year old female with history of severe pulmonary HTN (Group 1, 2 & 3), HFpEF, Afib, COPD on home O2 (5L NC), CKD, ROLNADA on CPAP, morbid obesity ( s/p bariatric surgery in 2012 with subsequent lap band removal due to GI bleed), PE, CVA (MCA infarct in 2012) who comes in to establish care with heart failure due to her pulmonary HTN who who presented as a transfer from [place] for 1-2 weeks of fatigue, lethargy, progressive dyspnea on exertion.     RHC and cardiomems done 6/20. RHC showed elevated right sided filling pressures with severe pulmonary hypertension with systemic PA pressures, slightly elevated PCWP and preserved cardiac output. She has improved symptomatically with diuresis and appears to be back to her baseline, but her renal function is worsening today. She's normotensive and her a fib is rate controlled.    Studies:   6/2022 TTE:  Normal LVEF, dilated LA, decreased RV function with EV enlargement, moderate TR, PASP 85, severe pulm HTN   5/2013 Cardiac Cath/PCI: Cardiac Cath: right dominant circulation, left main arises normally from the left coronary sinus of Valsalva, bifurcates into LAD and circumflex, left main normal, LAD arises normally from the left main normal, left circumflex arises normally from the left main and terminates in the AV groove normal, RCA arises normally from the right sinus of Valsalva, RCA is normal,     Hemodynamics:  RHC 6/20/2022: RA 18, /44/65, PCWP 16 v 18, PVR 7.32, TPG 49, /60/86, PA 58, Ra 95% on 8L NC, CO/CI 6.7/3.2 (cardiomems PAD 49)  RHC (6/2020): RA 2, RV 60/5, PAP 58/18/33, CO/CI: 6.5/3.3     CardioMEMS:  6/27: 49  6/24: 48  6/22 54

## 2022-06-27 NOTE — PROGRESS NOTE ADULT - ATTENDING COMMENTS
67 y/o F w/chronic hypoxemic respiratory failure likely multifactorial (COPD, HFpEF, pulmonary HTN) transferred from OSH for acute on chronic respiratory failure with hypoxia and hypercapnia and likely acute on chronic RV failure.  R heart cath 6/20/2022 revealed sPAP: 106, dPAP: 44 mPAP:65, PCWP: 16, PVR 7.32 woods unit.    - Supplemental O2 as needed goal O2 sat >= 90%  - Please repeat ABG  - Continue diuretics  - Continue pulmonary vasodilators  - Transplant eval as per transplant team

## 2022-06-27 NOTE — PROGRESS NOTE ADULT - SUBJECTIVE AND OBJECTIVE BOX
**************************************  Farzana Chen, PGY-1  Senior Resident: Gilberto Hayward  After 7PM, please contact night float at #[phone] or [phone]  **************************************    INTERVAL HPI/OVERNIGHT EVENTS:  Patient was seen and examined at bedside. As per nurse and patient, no o/n events, patient resting comfortably on 8L NC. No complaints at this time.  VITAL SIGNS:  T(F): 97.5 (06-27-22 @ 11:10)  HR: 77 (06-27-22 @ 12:08)  BP: 109/64 (06-27-22 @ 11:10)  RR: 17 (06-27-22 @ 11:10)  SpO2: 91% (06-27-22 @ 12:08)  Wt(kg): --    PHYSICAL EXAM:    Constitutional: WDWN, NAD  HEENT: PERRL, EOMI, sclera non-icteric, neck supple, trachea midline, no masses, no JVD, MMM, good dentition  Respiratory: CTA b/l, good air entry b/l, no wheezing, no rhonchi, no rales, without accessory muscle use and no intercostal retractions  Cardiovascular: RRR, normal S1S2, no M/R/G  Gastrointestinal: soft, NTND, no masses palpable, BS normal  Extremities: Warm, well perfused, pulses equal bilateral upper and lower extremities, no edema, no clubbing. Capillary refill <2 sec  Neurological: AAOx3, CN Grossly intact  Skin: Normal temperature, warm, dry    MEDICATIONS  (STANDING):  albuterol/ipratropium for Nebulization 3 milliLiter(s) Nebulizer every 6 hours  apixaban 5 milliGRAM(s) Oral every 12 hours  aspirin  chewable 81 milliGRAM(s) Oral daily  bisacodyl 5 milliGRAM(s) Oral every 12 hours  bisacodyl Suppository 10 milliGRAM(s) Rectal daily  budesonide 160 MICROgram(s)/formoterol 4.5 MICROgram(s) Inhaler 2 Puff(s) Inhalation two times a day  buPROPion XL (24-Hour) . 150 milliGRAM(s) Oral daily  chlorhexidine 2% Cloths 1 Application(s) Topical <User Schedule>  digoxin     Tablet 125 MICROGram(s) Oral daily  diltiazem    milliGRAM(s) Oral daily  pantoprazole    Tablet 40 milliGRAM(s) Oral before breakfast  rosuvastatin 5 milliGRAM(s) Oral at bedtime  senna 2 Tablet(s) Oral at bedtime  spironolactone 50 milliGRAM(s) Oral daily  tadalafil 40 milliGRAM(s) Oral daily  tiotropium 2.5 MICROgram(s)/olodaterol 2.5 MICROgram(s) (STIOLTO) Inhaler 2 Puff(s) Inhalation daily    MEDICATIONS  (PRN):  acetaminophen     Tablet .. 650 milliGRAM(s) Oral every 6 hours PRN Temp greater or equal to 38C (100.4F), Mild Pain (1 - 3), Moderate Pain (4 - 6)  fluticasone propionate 50 MICROgram(s)/spray Nasal Spray 1 Spray(s) Both Nostrils two times a day PRN Congestion      Allergies    nitrates (Unknown)    Intolerances        LABS:                        9.4    6.82  )-----------( 324      ( 27 Jun 2022 06:18 )             30.4     06-27    126<L>  |  86<L>  |  78<H>  ----------------------------<  71  3.8   |  26  |  1.99<H>    Ca    9.8      27 Jun 2022 06:18  Phos  4.3     06-27  Mg     2.6     06-27    TPro  7.9  /  Alb  4.1  /  TBili  0.4  /  DBili  x   /  AST  23  /  ALT  16  /  AlkPhos  159<H>  06-27          RADIOLOGY & ADDITIONAL TESTS:  Reviewed **************************************  Farzana Chen, PGY-1  Senior Resident: Gilberto Hayward  After 7PM, please contact night float at #[phone] or [phone]  **************************************    INTERVAL HPI/OVERNIGHT EVENTS:  Patient was seen and examined at bedside. As per nurse and patient, no o/n events, patient resting comfortably on 8L NC. No complaints at this time.  VITAL SIGNS:  T(F): 97.5 (06-27-22 @ 11:10)  HR: 77 (06-27-22 @ 12:08)  BP: 109/64 (06-27-22 @ 11:10)  RR: 17 (06-27-22 @ 11:10)  SpO2: 91% (06-27-22 @ 12:08)  Wt(kg): --    PHYSICAL EXAM:    CONSTITUTIONAL: NAD  RESPIRATORY: Normal respiratory effort; lungs are clear to auscultation bilaterally  CARDIOVASCULAR: AFIB. Loud S2. Tender and swollen BLE  ABDOMEN: Nontender to palpation, normoactive bowel sounds, no rebound/guarding; No hepatosplenomegaly    MEDICATIONS  (STANDING):  albuterol/ipratropium for Nebulization 3 milliLiter(s) Nebulizer every 6 hours  apixaban 5 milliGRAM(s) Oral every 12 hours  aspirin  chewable 81 milliGRAM(s) Oral daily  bisacodyl 5 milliGRAM(s) Oral every 12 hours  bisacodyl Suppository 10 milliGRAM(s) Rectal daily  budesonide 160 MICROgram(s)/formoterol 4.5 MICROgram(s) Inhaler 2 Puff(s) Inhalation two times a day  buPROPion XL (24-Hour) . 150 milliGRAM(s) Oral daily  chlorhexidine 2% Cloths 1 Application(s) Topical <User Schedule>  digoxin     Tablet 125 MICROGram(s) Oral daily  diltiazem    milliGRAM(s) Oral daily  pantoprazole    Tablet 40 milliGRAM(s) Oral before breakfast  rosuvastatin 5 milliGRAM(s) Oral at bedtime  senna 2 Tablet(s) Oral at bedtime  spironolactone 50 milliGRAM(s) Oral daily  tadalafil 40 milliGRAM(s) Oral daily  tiotropium 2.5 MICROgram(s)/olodaterol 2.5 MICROgram(s) (STIOLTO) Inhaler 2 Puff(s) Inhalation daily    MEDICATIONS  (PRN):  acetaminophen     Tablet .. 650 milliGRAM(s) Oral every 6 hours PRN Temp greater or equal to 38C (100.4F), Mild Pain (1 - 3), Moderate Pain (4 - 6)  fluticasone propionate 50 MICROgram(s)/spray Nasal Spray 1 Spray(s) Both Nostrils two times a day PRN Congestion      Allergies    nitrates (Unknown)    Intolerances        LABS:                        9.4    6.82  )-----------( 324      ( 27 Jun 2022 06:18 )             30.4     06-27    126<L>  |  86<L>  |  78<H>  ----------------------------<  71  3.8   |  26  |  1.99<H>    Ca    9.8      27 Jun 2022 06:18  Phos  4.3     06-27  Mg     2.6     06-27    TPro  7.9  /  Alb  4.1  /  TBili  0.4  /  DBili  x   /  AST  23  /  ALT  16  /  AlkPhos  159<H>  06-27          RADIOLOGY & ADDITIONAL TESTS:  Reviewed

## 2022-06-27 NOTE — PROGRESS NOTE ADULT - SUBJECTIVE AND OBJECTIVE BOX
Ashley Paz MD  Cardiology Fellow  [phone]  All Cardiology service information can be found 24/7 on [email or web address], password: [email or web address]      Overnight Events:     Review Of Systems: No chest pain, shortness of breath, or palpitations            Current Meds:  acetaminophen     Tablet .. 650 milliGRAM(s) Oral every 6 hours PRN  albuterol/ipratropium for Nebulization 3 milliLiter(s) Nebulizer every 6 hours  apixaban 5 milliGRAM(s) Oral every 12 hours  aspirin  chewable 81 milliGRAM(s) Oral daily  bisacodyl 5 milliGRAM(s) Oral every 12 hours  bisacodyl Suppository 10 milliGRAM(s) Rectal daily  budesonide 160 MICROgram(s)/formoterol 4.5 MICROgram(s) Inhaler 2 Puff(s) Inhalation two times a day  buPROPion XL (24-Hour) . 150 milliGRAM(s) Oral daily  chlorhexidine 2% Cloths 1 Application(s) Topical <User Schedule>  digoxin     Tablet 125 MICROGram(s) Oral daily  diltiazem    milliGRAM(s) Oral daily  fluticasone propionate 50 MICROgram(s)/spray Nasal Spray 1 Spray(s) Both Nostrils two times a day PRN  pantoprazole    Tablet 40 milliGRAM(s) Oral before breakfast  rosuvastatin 5 milliGRAM(s) Oral at bedtime  senna 2 Tablet(s) Oral at bedtime  spironolactone 50 milliGRAM(s) Oral daily  tadalafil 40 milliGRAM(s) Oral daily  tiotropium 2.5 MICROgram(s)/olodaterol 2.5 MICROgram(s) (STIOLTO) Inhaler 2 Puff(s) Inhalation daily  torsemide 20 milliGRAM(s) Oral two times a day      Vitals:  T(F): 97.7 (06-27), Max: 98.1 (06-26)  HR: 69 (06-27) (69 - 80)  BP: 111/67 (06-27) (94/59 - 115/74)  BP(mean): --  ABP: --  ABP(mean): --  RR: 17 (06-27)  SpO2: 93% (06-27)  CVP(mm Hg): --  CVP(cm H2O): --  CO: --  CI: --  PA: --  PA(mean): --  PA(direct): --  PCWP: --  I&O's Summary    26 Jun 2022 07:01  -  27 Jun 2022 07:00  --------------------------------------------------------  IN: 1020 mL / OUT: 2500 mL / NET: -1480 mL        Physical Exam:  Appearance: No acute distress; well appearing  Eyes: PERRL, EOMI, pink conjunctiva  HEENT: Normal oral mucosa  Cardiovascular: RRR, S1, S2, no murmurs, rubs, or gallops; no edema; no JVD  Respiratory: Clear to auscultation bilaterally  Gastrointestinal: soft, non-tender, non-distended with normal bowel sounds  Musculoskeletal: No clubbing; no joint deformity   Neurologic: Non-focal  Lymphatic: No lymphadenopathy  Psychiatry: AAOx3, mood & affect appropriate  Skin: No rashes, ecchymoses, or cyanosis                          9.4    6.82  )-----------( 324      ( 27 Jun 2022 06:18 )             30.4     06-27    126<L>  |  86<L>  |  78<H>  ----------------------------<  71  3.8   |  26  |  1.99<H>    Ca    9.8      27 Jun 2022 06:18  Phos  4.3     06-27  Mg     2.6     06-27    TPro  7.9  /  Alb  4.1  /  TBili  0.4  /  DBili  x   /  AST  23  /  ALT  16  /  AlkPhos  159<H>  06-27           Ashley Paz MD  Cardiology Fellow  [phone]  All Cardiology service information can be found 24/7 on [email or web address], password: [email or web address]      Overnight Events: patient is feeling better this morning. Improved breathing which is at her baseline now.     Review Of Systems: No chest pain, shortness of breath, or palpitations            Current Meds:  acetaminophen     Tablet .. 650 milliGRAM(s) Oral every 6 hours PRN  albuterol/ipratropium for Nebulization 3 milliLiter(s) Nebulizer every 6 hours  apixaban 5 milliGRAM(s) Oral every 12 hours  aspirin  chewable 81 milliGRAM(s) Oral daily  bisacodyl 5 milliGRAM(s) Oral every 12 hours  bisacodyl Suppository 10 milliGRAM(s) Rectal daily  budesonide 160 MICROgram(s)/formoterol 4.5 MICROgram(s) Inhaler 2 Puff(s) Inhalation two times a day  buPROPion XL (24-Hour) . 150 milliGRAM(s) Oral daily  chlorhexidine 2% Cloths 1 Application(s) Topical <User Schedule>  digoxin     Tablet 125 MICROGram(s) Oral daily  diltiazem    milliGRAM(s) Oral daily  fluticasone propionate 50 MICROgram(s)/spray Nasal Spray 1 Spray(s) Both Nostrils two times a day PRN  pantoprazole    Tablet 40 milliGRAM(s) Oral before breakfast  rosuvastatin 5 milliGRAM(s) Oral at bedtime  senna 2 Tablet(s) Oral at bedtime  spironolactone 50 milliGRAM(s) Oral daily  tadalafil 40 milliGRAM(s) Oral daily  tiotropium 2.5 MICROgram(s)/olodaterol 2.5 MICROgram(s) (STIOLTO) Inhaler 2 Puff(s) Inhalation daily  torsemide 20 milliGRAM(s) Oral two times a day      Vitals:  T(F): 97.7 (06-27), Max: 98.1 (06-26)  HR: 69 (06-27) (69 - 80)  BP: 111/67 (06-27) (94/59 - 115/74)  RR: 17 (06-27)  SpO2: 93% (06-27)  I&O's Summary    26 Jun 2022 07:01  -  27 Jun 2022 07:00  --------------------------------------------------------  IN: 1020 mL / OUT: 2500 mL / NET: -1480 mL        Physical Exam:  Appearance: No acute distress; well appearing  Eyes: PERRL, EOMI, pink conjunctiva  HEENT: Normal oral mucosa  Cardiovascular: RRR, S1, S2, no murmurs, rubs, or gallops; no edema; no JVD  Respiratory: Clear to auscultation bilaterally  Gastrointestinal: soft, non-tender, non-distended with normal bowel sounds  Musculoskeletal: No clubbing; no joint deformity   Neurologic: Non-focal  Lymphatic: No lymphadenopathy  Psychiatry: AAOx3, mood & affect appropriate  Skin: No rashes, ecchymoses, or cyanosis                          9.4    6.82  )-----------( 324      ( 27 Jun 2022 06:18 )             30.4     06-27    126<L>  |  86<L>  |  78<H>  ----------------------------<  71  3.8   |  26  |  1.99<H>    Ca    9.8      27 Jun 2022 06:18  Phos  4.3     06-27  Mg     2.6     06-27    TPro  7.9  /  Alb  4.1  /  TBili  0.4  /  DBili  x   /  AST  23  /  ALT  16  /  AlkPhos  159<H>  06-27

## 2022-06-27 NOTE — PROGRESS NOTE ADULT - SUBJECTIVE AND OBJECTIVE BOX
Interval Events: Patient was seen and examined at bedside. No overnight events.    REVIEW OF SYSTEMS:  Constitutional: [ ] fevers [ ] chills [ ] weight loss [ ] weight gain  CV: [ ] chest pain [ ] orthopnea [ ] palpitations [ ] murmur  Resp: [ ] cough [ ] shortness of breath [ ] dyspnea [ ] wheezing [ ] sputum [ ] hemoptysis  [ ] All other systems negative  [ ] Unable to assess ROS because ________    OBJECTIVE:  ICU Vital Signs Last 24 Hrs  T(C): 36.4 (27 Jun 2022 04:00), Max: 36.7 (26 Jun 2022 23:48)  T(F): 97.5 (27 Jun 2022 04:00), Max: 98.1 (26 Jun 2022 23:48)  HR: 71 (27 Jun 2022 05:46) (69 - 80)  BP: 106/66 (27 Jun 2022 05:46) (94/59 - 115/74)  BP(mean): --  ABP: --  ABP(mean): --  RR: 17 (27 Jun 2022 04:00) (17 - 18)  SpO2: 98% (27 Jun 2022 05:46) (94% - 100%)        06-26 @ 07:01  -  06-27 @ 07:00  --------------------------------------------------------  IN: 1020 mL / OUT: 2500 mL / NET: -1480 mL      CAPILLARY BLOOD GLUCOSE          PHYSICAL EXAM:  General:  ***, in NAD  Chest: clamshell incision c/d/i, +pravena present.  Right chest tubes x 2 ***cc/24hr serosanguinous drainage, *** AL.  Left chest tubes x 2 ***cc/24hr serosanguionous drainage, ***AL.   Cardiac: RRR, S1S2 normal  Respiratory: ctab, no wheezing/rales/rhonchi  Gastrointestinal: soft, non-distended, non-tender, +BS  Extremities: warm and well perfused, no peripheral edema      HOSPITAL MEDICATIONS:  MEDICATIONS  (STANDING):  albuterol/ipratropium for Nebulization 3 milliLiter(s) Nebulizer every 6 hours  apixaban 5 milliGRAM(s) Oral every 12 hours  aspirin  chewable 81 milliGRAM(s) Oral daily  bisacodyl 5 milliGRAM(s) Oral every 12 hours  bisacodyl Suppository 10 milliGRAM(s) Rectal daily  budesonide 160 MICROgram(s)/formoterol 4.5 MICROgram(s) Inhaler 2 Puff(s) Inhalation two times a day  buPROPion XL (24-Hour) . 150 milliGRAM(s) Oral daily  chlorhexidine 2% Cloths 1 Application(s) Topical <User Schedule>  digoxin     Tablet 125 MICROGram(s) Oral daily  diltiazem    milliGRAM(s) Oral daily  pantoprazole    Tablet 40 milliGRAM(s) Oral before breakfast  rosuvastatin 5 milliGRAM(s) Oral at bedtime  senna 2 Tablet(s) Oral at bedtime  spironolactone 50 milliGRAM(s) Oral daily  tadalafil 40 milliGRAM(s) Oral daily  tiotropium 2.5 MICROgram(s)/olodaterol 2.5 MICROgram(s) (STIOLTO) Inhaler 2 Puff(s) Inhalation daily  torsemide 20 milliGRAM(s) Oral two times a day    MEDICATIONS  (PRN):  acetaminophen     Tablet .. 650 milliGRAM(s) Oral every 6 hours PRN Temp greater or equal to 38C (100.4F), Mild Pain (1 - 3), Moderate Pain (4 - 6)  fluticasone propionate 50 MICROgram(s)/spray Nasal Spray 1 Spray(s) Both Nostrils two times a day PRN Congestion      LABS:                        9.4    6.82  )-----------( 324      ( 27 Jun 2022 06:18 )             30.4     Hgb Trend: 9.4<--, 9.3<--, 9.3<--, 9.3<--, 8.5<--  06-27    126<L>  |  86<L>  |  78<H>  ----------------------------<  71  3.8   |  26  |  1.99<H>    Ca    9.8      27 Jun 2022 06:18  Phos  4.3     06-27  Mg     2.6     06-27    TPro  7.9  /  Alb  4.1  /  TBili  0.4  /  DBili  x   /  AST  23  /  ALT  16  /  AlkPhos  159<H>  06-27    Creatinine Trend: 1.99<--, 1.94<--, 1.92<--, 2.02<--, 1.50<--, 1.50<--          MICROBIOLOGY:     RADIOLOGY:  [ ] Reviewed and interpreted by me   Interval Events: Patient was seen and examined at bedside with Dr. Pérez.  She remains on 8L NC, pulmonary pressures improving with diuresis.  Patient is able to walk to nurses station.    REVIEW OF SYSTEMS:  Constitutional: [ ] fevers [ ] chills [ ] weight loss [ ] weight gain  CV: [ ] chest pain [ ] orthopnea [ ] palpitations [ ] murmur  Resp: [ ] cough [ ] shortness of breath [ ] dyspnea [ ] wheezing [ ] sputum [ ] hemoptysis  [x] All other systems negative  [ ] Unable to assess ROS because ________    OBJECTIVE:  ICU Vital Signs Last 24 Hrs  T(C): 36.4 (27 Jun 2022 04:00), Max: 36.7 (26 Jun 2022 23:48)  T(F): 97.5 (27 Jun 2022 04:00), Max: 98.1 (26 Jun 2022 23:48)  HR: 71 (27 Jun 2022 05:46) (69 - 80)  BP: 106/66 (27 Jun 2022 05:46) (94/59 - 115/74)  BP(mean): --  ABP: --  ABP(mean): --  RR: 17 (27 Jun 2022 04:00) (17 - 18)  SpO2: 98% (27 Jun 2022 05:46) (94% - 100%)        06-26 @ 07:01  -  06-27 @ 07:00  --------------------------------------------------------  IN: 1020 mL / OUT: 2500 mL / NET: -1480 mL      CAPILLARY BLOOD GLUCOSE          PHYSICAL EXAM:  General:  8L, in NAD  Chest: appears normal  Cardiac: +murmur  Respiratory: bibasilar crackles  Gastrointestinal: obese, soft, non-distended, non-tender, +BS  Extremities: warm and well perfused, +++edema bilateral lower extremities       HOSPITAL MEDICATIONS:  MEDICATIONS  (STANDING):  albuterol/ipratropium for Nebulization 3 milliLiter(s) Nebulizer every 6 hours  apixaban 5 milliGRAM(s) Oral every 12 hours  aspirin  chewable 81 milliGRAM(s) Oral daily  bisacodyl 5 milliGRAM(s) Oral every 12 hours  bisacodyl Suppository 10 milliGRAM(s) Rectal daily  budesonide 160 MICROgram(s)/formoterol 4.5 MICROgram(s) Inhaler 2 Puff(s) Inhalation two times a day  buPROPion XL (24-Hour) . 150 milliGRAM(s) Oral daily  chlorhexidine 2% Cloths 1 Application(s) Topical <User Schedule>  digoxin     Tablet 125 MICROGram(s) Oral daily  diltiazem    milliGRAM(s) Oral daily  pantoprazole    Tablet 40 milliGRAM(s) Oral before breakfast  rosuvastatin 5 milliGRAM(s) Oral at bedtime  senna 2 Tablet(s) Oral at bedtime  spironolactone 50 milliGRAM(s) Oral daily  tadalafil 40 milliGRAM(s) Oral daily  tiotropium 2.5 MICROgram(s)/olodaterol 2.5 MICROgram(s) (STIOLTO) Inhaler 2 Puff(s) Inhalation daily  torsemide 20 milliGRAM(s) Oral two times a day    MEDICATIONS  (PRN):  acetaminophen     Tablet .. 650 milliGRAM(s) Oral every 6 hours PRN Temp greater or equal to 38C (100.4F), Mild Pain (1 - 3), Moderate Pain (4 - 6)  fluticasone propionate 50 MICROgram(s)/spray Nasal Spray 1 Spray(s) Both Nostrils two times a day PRN Congestion      LABS:                        9.4    6.82  )-----------( 324      ( 27 Jun 2022 06:18 )             30.4     Hgb Trend: 9.4<--, 9.3<--, 9.3<--, 9.3<--, 8.5<--  06-27    126<L>  |  86<L>  |  78<H>  ----------------------------<  71  3.8   |  26  |  1.99<H>    Ca    9.8      27 Jun 2022 06:18  Phos  4.3     06-27  Mg     2.6     06-27    TPro  7.9  /  Alb  4.1  /  TBili  0.4  /  DBili  x   /  AST  23  /  ALT  16  /  AlkPhos  159<H>  06-27    Creatinine Trend: 1.99<--, 1.94<--, 1.92<--, 2.02<--, 1.50<--, 1.50<--

## 2022-06-27 NOTE — PROGRESS NOTE ADULT - ATTENDING COMMENTS
66F w/ pmh of pulmonary hypertension, HTN, HFpEF (echo 60% PASP 79mmhg), COPD  (8L), Afib (in digoxin, diltiazem), HLD, prior spont pneumothorax, and CKD who presents as transfer from [facility] with 1-2 weeks of fatigue, lethargy, and progressively worsening RINALDI concerning for progression of pulm HTN/COPD vs. acute on chronic HFpEF, TTE revealed normal LV systolic fxn and severe pulm HTN. HF team, Pulmonary evaluated.      #Acute on chronic hypoxemic respiratory failure - multifactorial from HF, pulm HTN, PNA (resolved). continue home 8L NC  #Acute on chronic HF - diuresing per cardiomems, currently mariela 50mg, increase to torsemide 20mg BID  #Severe Pulm HTN (groups 2/3) -c/w bronchodilators, inhaled steroid, Tadalafil and diuretics, HF pulm pulm transplant following  #Chronic Afib - Rate controlled, c/w Cardizem, Digoxin, Eliquis   #COPD/ROLANDA - AVAPS qhs, inhalers & above .  #Hyponatremia - f/u urine studies

## 2022-06-28 NOTE — PROGRESS NOTE ADULT - ATTENDING COMMENTS
66F w/ pmh of pulmonary hypertension, HTN, HFpEF (echo 60% PASP 79mmhg), COPD  (8L), Afib (in digoxin, diltiazem), HLD, prior spont pneumothorax, and CKD who presents as transfer from [facility] with 1-2 weeks of fatigue, lethargy, and progressively worsening RINALDI concerning for progression of pulm HTN/COPD vs. acute on chronic HFpEF, TTE revealed normal LV systolic fxn and severe pulm HTN. HF team, Pulmonary evaluated.      #Acute on chronic hypoxemic respiratory failure - multifactorial from HF, pulm HTN, PNA (resolved). continue home 8L NC  #Acute on chronic HF - diuresing per cardiomems, currently mariela 50mg, torsemide 40mg BID  #Severe Pulm HTN (groups 2/3) -c/w bronchodilators, inhaled steroid, Tadalafil and diuretics  #Chronic Afib - Rate controlled, c/w Cardizem, Digoxin, Eliquis   #COPD/ROLANDA - AVAPS qhs, inhalers & above .  #Hyponatremia - improved  Ready for d/c, pending safe transport home tomorrow

## 2022-06-28 NOTE — PROGRESS NOTE ADULT - ASSESSMENT
Patient is a 65 y/o F w/ pmh of pulmonary hypertension, HTN, HFpEF (echo 60% PASP 79mmhg), COPD  (8L), Afib (in digoxin, diltiazem), HLD, prior spont pneumothorax, and CKD who presents as transfer from [facility] with 1-2 weeks of fatigue, lethargy, and progressively worsening RINALDI concerning for progression of pulm HTN/COPD vs. acute on chronic HFpEF, TTE revealed normal LV systolic fxn and severe pulm HTN, undergoing diuresis, s/p RHC/cardio mems, pending repeat imaging once euvolemic. Torsemide is 40 mg BID. Will discharge her on jardiance.

## 2022-06-28 NOTE — PROGRESS NOTE ADULT - SUBJECTIVE AND OBJECTIVE BOX
**************************************  Danielle Ponce, PGY-1  Senior Resident: Gilberto Hayward  After 7PM, please contact night float at #[phone] or #[phone]  **************************************    INTERVAL HPI/OVERNIGHT EVENTS:  Patient was seen and examined at bedside. Pt is in good spirits. As per nurse and patient, no o/n events, patient resting comfortably. No complaints at this time. Patient denies: fever, chills, dizziness, weakness.     VITAL SIGNS:  T(F): 97.5 (06-28-22 @ 16:04)  HR: 95 (06-28-22 @ 16:04)  BP: 117/75 (06-28-22 @ 16:04)  RR: 18 (06-28-22 @ 16:04)  SpO2: 95% (06-28-22 @ 16:04)  Wt(kg): --    PHYSICAL EXAM:    Constitutional: WDWN, NAD  HEENT: PERRL, EOMI, sclera non-icteric, neck supple, trachea midline, no masses, no JVD, MMM, good dentition  Respiratory: CTA b/l, good air entry b/l, no wheezing, no rhonchi, no rales, without accessory muscle use and no intercostal retractions  Cardiovascular: RRR, normal S1S2, no M/R/G  Gastrointestinal: soft, NTND, no masses palpable, BS normal  Extremities: diffuse hyperpigmentation in the bilateral upper extremities.   Neurological: AAOx3, CN Grossly intact  Skin: Normal temperature, warm, dry    MEDICATIONS  (STANDING):  albuterol/ipratropium for Nebulization 3 milliLiter(s) Nebulizer every 6 hours  apixaban 5 milliGRAM(s) Oral every 12 hours  aspirin  chewable 81 milliGRAM(s) Oral daily  bisacodyl 5 milliGRAM(s) Oral every 12 hours  bisacodyl Suppository 10 milliGRAM(s) Rectal daily  budesonide 160 MICROgram(s)/formoterol 4.5 MICROgram(s) Inhaler 2 Puff(s) Inhalation two times a day  buPROPion XL (24-Hour) . 150 milliGRAM(s) Oral daily  chlorhexidine 2% Cloths 1 Application(s) Topical <User Schedule>  digoxin     Tablet 125 MICROGram(s) Oral daily  diltiazem    milliGRAM(s) Oral daily  pantoprazole    Tablet 40 milliGRAM(s) Oral before breakfast  rosuvastatin 5 milliGRAM(s) Oral at bedtime  senna 2 Tablet(s) Oral at bedtime  spironolactone 50 milliGRAM(s) Oral daily  tadalafil 40 milliGRAM(s) Oral daily  tiotropium 2.5 MICROgram(s)/olodaterol 2.5 MICROgram(s) (STIOLTO) Inhaler 2 Puff(s) Inhalation daily  torsemide 40 milliGRAM(s) Oral two times a day    MEDICATIONS  (PRN):  acetaminophen     Tablet .. 650 milliGRAM(s) Oral every 6 hours PRN Temp greater or equal to 38C (100.4F), Mild Pain (1 - 3), Moderate Pain (4 - 6)  fluticasone propionate 50 MICROgram(s)/spray Nasal Spray 1 Spray(s) Both Nostrils two times a day PRN Congestion      Allergies    nitrates (Unknown)    Intolerances        LABS:                        9.4    6.65  )-----------( 315      ( 28 Jun 2022 06:42 )             31.2     06-28    128<L>  |  86<L>  |  77<H>  ----------------------------<  71  4.3   |  28  |  1.83<H>    Ca    9.9      28 Jun 2022 06:42  Phos  3.9     06-28  Mg     2.6     06-28    TPro  8.1  /  Alb  4.2  /  TBili  0.4  /  DBili  x   /  AST  26  /  ALT  16  /  AlkPhos  161<H>  06-28          RADIOLOGY & ADDITIONAL TESTS:  Reviewed

## 2022-06-28 NOTE — DISCHARGE NOTE NURSING/CASE MANAGEMENT/SOCIAL WORK - NSCORESITESY/N_GEN_A_CORE_RD
No Post-Care Instructions: I reviewed with the patient in detail post-care instructions. Patient understands to keep the injection sites clean and call the clinic if there is any redness, swelling or pain.

## 2022-06-28 NOTE — PROGRESS NOTE ADULT - PROBLEM SELECTOR PLAN 2
Reported as HFpEF although unclear exact etiology. Most likely 2/2 to pulmonary HTN and WHO class III. Differential includes ICM vs. NICM vs. RHF from Pulm HTN.    Plan:   - CXR shows improving Pulm edema  - TTE showed severe pulm HTN, normal LV systolic fxn, RV enlargement w/ decreased systolic fxn  - No PFO on limited TTE w/ bubble study   - torsemide 40 mg BID  - C/w home Spironolactone  - BNP 12,525 at OSH, 15,334 on admission   - Strict ins and outs   - Daily standing weights  - Replete K > 4, Mg > 2, and Phos > 3  - S/p RHC/Cardio mems 6/20  - Patient will need ASA 81 for 1 month post cardiomems 6/21 Reported as HFpEF although unclear exact etiology. Most likely 2/2 to pulmonary HTN and WHO class III. Differential includes ICM vs. NICM vs. RHF from Pulm HTN.    Plan:   - CXR shows improving Pulm edema  - TTE showed severe pulm HTN, normal LV systolic fxn, RV enlargement w/ decreased systolic fxn  - No PFO on limited TTE w/ bubble study   - torsemide 40 mg BID  - C/w home Spironolactone  - on digoxin 125 PO daily  - on diltiazem 120 PO daily   - BNP 12,525 at OSH, 15,334 on admission   - Strict ins and outs   - Daily standing weights  - Replete K > 4, Mg > 2, and Phos > 3  - S/p RHC/Cardio mems 6/20  - Patient will need ASA 81 for 1 month post cardiomems 6/21

## 2022-06-28 NOTE — DISCHARGE NOTE NURSING/CASE MANAGEMENT/SOCIAL WORK - NSDCPEFALRISK_GEN_ALL_CORE
For information on Fall & Injury Prevention, visit: [email or web address] OR  [email or web address] OR  https://www.cdc.gov/steadi/patient.html

## 2022-06-28 NOTE — DISCHARGE NOTE NURSING/CASE MANAGEMENT/SOCIAL WORK - PATIENT PORTAL LINK FT
You can access the FollowMyHealth Patient Portal offered by [facility] by registering at the following website: [email or web address]. By joining [email or web address]’s FollowMyHealth portal, you will also be able to view your health information using other applications (apps) compatible with our system.

## 2022-06-28 NOTE — PROGRESS NOTE ADULT - PROBLEM SELECTOR PLAN 1
Most likely 2/2 to worsening pulm HTN and acute on chronic HFpEF w/ possible COPD exacerbation w/ superimposed pneumonia      Plan:   - Completed 5 day course of Abx  - Avoid over oxygenation to prevent decreasing respiratory drive   - Currently on 8LNC (home settings)   - C/w nocturnal AVAPs (Patient's own)   -  oral torsemide to 40 mg BID  - s/p Diamox 250 x1 on 6/21 and 6/22   - S/p metolazone x 1 on 6/23  - C/w Duonebs q6  - CXR shows improving pulm edema  - Patient will need repeat CT scan when euvolemic.

## 2022-06-29 NOTE — PROGRESS NOTE ADULT - REASON FOR ADMISSION
66y old  Female who presents with a chief complaint of SOB
66y old  Female who presents with a chief complaint of SOB
lung transplant
shortness of breath and hypoxia
shortness of breath and hypoxia
shortness of breath
shortness of breath and hypoxia
shortness of breath
66y old  Female who presents with a chief complaint of SOB
SOB, COPD exacerbation
SOB

## 2022-06-29 NOTE — PROGRESS NOTE ADULT - PROBLEM SELECTOR PLAN 3
Severe advanced pulmonary hypertension with advanced COPD with potentially superimposed heart failure. Home on 8L high concentrate = 4L in hospital. Per patient saturates in the high 80s.     Plan;    - C/w home tadalafil 40mg qdaily   - ambrisentan 10mg ordered by pharmacy, patient will need proof of enrollment in clinical trial, Dr. Gallardo's (pulm) office called and left a message  - C/w Christpoher q6  - Appreciate pulm recs

## 2022-06-29 NOTE — PROGRESS NOTE ADULT - PROBLEM SELECTOR PROBLEM 8
Chronic atrial fibrillation
GERD (gastroesophageal reflux disease)
Chronic atrial fibrillation
GERD (gastroesophageal reflux disease)
Chronic atrial fibrillation
GERD (gastroesophageal reflux disease)

## 2022-06-29 NOTE — PROGRESS NOTE ADULT - PROBLEM SELECTOR PLAN 8
- c/w home pantoprazole 40mg qdaily
- c/w home pantoprazole 40mg qdaily
Home regimen: Digoxin 125mcg every other day, Diltiazem 120mg ER daily, AC with eliquis 5mg BID   - C/w home medications as above  - C/w eliquis 5mg BID   - No active bleeding hemoglobin was between 7-8 at OSH.
- c/w home pantoprazole 40mg qdaily
Home regimen: Digoxin 125mcg every other day, Diltiazem 120mg ER daily, AC with eliquis 5mg BID   - C/w home medications as above  - C/w eliquis 5mg BID   - No active bleeding hemoglobin was between 7-8 at OSH.
Home regimen: Digoxin 125mcg every other day, Diltiazem 120mg ER daily, AC with eliquis 5mg BID   - C/w home medications as above  - C/w eliquis 5mg BID   - will discharge patient on added medication jardiance 10 mg.   - No active bleeding hemoglobin was between 7-8 at OSH.

## 2022-06-29 NOTE — PROGRESS NOTE ADULT - PROBLEM SELECTOR PROBLEM 9
Obstructive sleep apnea

## 2022-06-29 NOTE — PROGRESS NOTE ADULT - ATTENDING SUPERVISION STATEMENT
Fellow
Resident

## 2022-06-29 NOTE — PROGRESS NOTE ADULT - PROBLEM SELECTOR PROBLEM 5
Pneumonia
Chronic atrial fibrillation
Chronic atrial fibrillation
Pneumonia
Chronic atrial fibrillation
Chronic kidney disease, unspecified CKD stage
Pneumonia
Chronic atrial fibrillation
Pneumonia
Chronic atrial fibrillation
Pneumonia
Chronic kidney disease, unspecified CKD stage
Pneumonia
Pneumonia
Chronic kidney disease, unspecified CKD stage
Pneumonia

## 2022-06-29 NOTE — PROGRESS NOTE ADULT - PROBLEM SELECTOR PLAN 12
DVT/PE ppx: Eliquis 5mg BID for Afib  Diet: DASH/TLC. Given suppository for constipation with successful bowel movement  Code: Full   Dispo: discharge home, son will pick her up this evening with pt's personal oxygen support device.

## 2022-06-29 NOTE — PROGRESS NOTE ADULT - PROBLEM SELECTOR PLAN 10
c/w home pantoprazole 40mg qdaily.
R/O GERD (gastroesophageal reflux disease).
- c/w home buproprion
- c/w home buproprion
c/w home pantoprazole 40mg qdaily.
- c/w home buproprion
c/w home pantoprazole 40mg qdaily.

## 2022-06-29 NOTE — PROGRESS NOTE ADULT - PROBLEM SELECTOR PROBLEM 11
Anxiety and depression
Prophylactic measure
Anxiety and depression
Prophylactic measure
Prophylactic measure
Anxiety and depression
Anxiety and depression

## 2022-06-29 NOTE — PROGRESS NOTE ADULT - TIME BILLING
review of labs, old  chart cardiac cath results , and organizing further care discussed with primary team.
reviewing chart and coordinating care with primary team/staff, as well as reviewing vitals, radiology, medication list, recent labs, and prior records.
review of labs, old  chart cardiac cath results , and organizing further care discussed with primary team.
reviewing chart and coordinating care with primary team/staff, as well as reviewing vitals, radiology, medication list, recent labs, and prior records.
review of labs, old  chart cardiac cath results , and organizing further care discussed with primary team.
reviewing chart and coordinating care with primary team/staff, as well as reviewing vitals, radiology, medication list, recent labs, and prior records.
review of labs, old  chart cardiac cath results , and organizing further care discussed with primary team.
reviewing chart and coordinating care with primary team/staff, as well as reviewing vitals, radiology, medication list, recent labs, and prior records.
I personally [id number] care for the transplant referral and care coordination
- Generation of cardiovascular treatment plan.  - Coordination of care with primary team.
- Review of notes/records, telemetry, vital signs and daily labs.   - General and cardiovascular physical examination.  - Generation of cardiovascular treatment plan.  - Coordination of care with primary team.
- Generation of cardiovascular treatment plan.  - Coordination of care with primary team.
- Review of notes/records, telemetry, vital signs and daily labs.   - General and cardiovascular physical examination.  - Generation of cardiovascular treatment plan.  - Coordination of care with primary team.
- Generation of cardiovascular treatment plan.  - Coordination of care with primary team.
- Review of notes/records, telemetry, vital signs and daily labs.   - General and cardiovascular physical examination.  - Generation of cardiovascular treatment plan.  - Coordination of care with primary team.

## 2022-06-29 NOTE — PROGRESS NOTE ADULT - PROBLEM SELECTOR PROBLEM 6
Severe pulmonary hypertension

## 2022-06-29 NOTE — PROGRESS NOTE ADULT - PROBLEM SELECTOR PROBLEM 2
Severe pulmonary hypertension
Acute on chronic diastolic congestive heart failure
Severe pulmonary hypertension
Severe pulmonary hypertension
Acute on chronic diastolic congestive heart failure
Severe pulmonary hypertension
Acute on chronic diastolic congestive heart failure
Severe pulmonary hypertension
Acute on chronic diastolic congestive heart failure
Acute on chronic diastolic congestive heart failure
Severe pulmonary hypertension
Acute on chronic diastolic congestive heart failure

## 2022-06-29 NOTE — PROGRESS NOTE ADULT - ASSESSMENT
67 y/o F w/ pmh of pulmonary hypertension, HTN, HFpEF (echo 60% PASP 79mmhg), COPD (8L), Afib (in digoxin, diltiazem), HLD, prior spontaneous pneumothorax, and CKD who presented from [facility] with fatigue, lethargy, worsening RINALDI.  She was fluid overloaded with suspicion of pneumonia, treated with diuresis and antibiotics.  Patient was transferred to [facility] for further management. Transplant pulmonology consulted lung transplant evaluation.    #pre lung transplant  #fluid overload  #PAH  -She is OOB to chair and ambulating with PT, dyspnea improved as well as exercise tolerance  -S/p Cardimems 6/21:  Heart Failure following, diuresis continued as per CHF  -Has been losing weight <200lbs, needs more weight loss and aggressive PT  -new BMI is 35 right at threshold for transplant, encourage patient to continue to exercise and see nutritionist   -COPD being managed by pulmonary team   -would optimize medically prior to launching lung transplant eval: pHTN, weight loss, PT  -Follow up lung transplant as outpatient, has appointment at our office for 7/15   65 y/o F w/ pmh of pulmonary hypertension, HTN, HFpEF (echo 60% PASP 79mmhg), COPD (8L), Afib (in digoxin, diltiazem), HLD, prior spontaneous pneumothorax, and CKD who presented from [facility] with fatigue, lethargy, worsening RINALDI.  She was fluid overloaded with suspicion of pneumonia, treated with diuresis and antibiotics.  Patient was transferred to [facility] for further management. Transplant pulmonology consulted lung transplant evaluation.    #pre lung transplant  #fluid overload  #PAH  -She is OOB to chair and ambulating with PT, dyspnea improved as well as exercise tolerance  -S/p Cardimems 6/21:  Heart Failure following, diuresis continued as per CHF  -Has been losing weight <200lbs, needs more weight loss and aggressive PT  -new BMI is 35 right at threshold for transplant, encourage patient to continue to exercise and see nutritionist   -COPD being managed by pulmonary team   -would optimize medically prior to launching lung transplant eval: renal consult for CKD, pHTN, weight loss, PT  -Follow up lung transplant as outpatient, has appointment at our office for 7/15

## 2022-06-29 NOTE — PROGRESS NOTE ADULT - TIME-BASED BILLING (NON-CRITICAL CARE)
Time-based billing (NON-critical care)

## 2022-06-29 NOTE — PROGRESS NOTE ADULT - ATTENDING COMMENTS
66F w/ pmh of pulmonary hypertension, HTN, HFpEF (echo 60% PASP 79mmhg), COPD  (8L), Afib (in digoxin, diltiazem), HLD, prior spont pneumothorax, and CKD who presents as transfer from [facility] with 1-2 weeks of fatigue, lethargy, and progressively worsening RINALDI concerning for progression of pulm HTN/COPD vs. acute on chronic HFpEF, TTE revealed normal LV systolic fxn and severe pulm HTN. HF team, Pulmonary evaluated.      #Acute on chronic hypoxemic respiratory failure - multifactorial from HF, pulm HTN, PNA (resolved). continue home 8L NC  #Acute on chronic HF - diuresing per cardiomems, currently mariela 50mg, torsemide 40mg BID  #Severe Pulm HTN (groups 2/3) -c/w bronchodilators, inhaled steroid, Tadalafil and diuretics  #Chronic Afib - Rate controlled, c/w Cardizem, Digoxin, Eliquis   #COPD/ROLANDA - AVAPS qhs, inhalers & above .  #Hyponatremia - improved  d/c home tonight, d/c time spent by provider 39 minutes

## 2022-06-29 NOTE — PROGRESS NOTE ADULT - ASSESSMENT
Patient is a 65 y/o F w/ pmh of pulmonary hypertension, HTN, HFpEF (echo 60% PASP 79mmhg), COPD  (8L), Afib (in digoxin, diltiazem), HLD, prior spont pneumothorax, and CKD who presents as transfer from [facility] with 1-2 weeks of fatigue, lethargy, and progressively worsening RINALDI concerning for progression of pulm HTN/COPD vs. acute on chronic HFpEF, TTE revealed normal LV systolic fxn and severe pulm HTN, undergoing diuresis, s/p RHC/cardio mems, pending repeat imaging once euvolemic. Torsemide is 40 mg BID. Will discharge her on jardiance.

## 2022-06-29 NOTE — PROGRESS NOTE ADULT - SUBJECTIVE AND OBJECTIVE BOX
**************************************  Danielle Ponce, PGY-1  Senior Resident: Gilberto Hayward  After 7PM, please contact night float at #[phone] or [phone]  **************************************    INTERVAL HPI/OVERNIGHT EVENTS:  Patient was seen and examined at bedside. Pt's torsemide was held in the AM due to low blood pressure. Pt is in good spirits and feeling well.  No complaints at this time. Patient denies: fever, chills, dizziness, weakness, HA, Changes in vision, CP, palpitations, SOB, cough, N/V/D/C, dysuria, changes in bowel movements.  ROS otherwise negative.    VITAL SIGNS:  T(F): 97.7 (06-29-22 @ 12:00)  HR: 70 (06-29-22 @ 15:57)  BP: 115/63 (06-29-22 @ 12:00)  RR: 20 (06-29-22 @ 15:57)  SpO2: 95% (06-29-22 @ 15:57)  Wt(kg): --    PHYSICAL EXAM:    Constitutional: WDWN, NAD  HEENT: PERRL, EOMI, sclera non-icteric, neck supple, trachea midline, no masses, no JVD, MMM, good dentition  Respiratory: CTA b/l, good air entry b/l, no wheezing, no rhonchi, no rales, without accessory muscle use and no intercostal retractions  Cardiovascular: RRR, normal S1S2, no M/R/G  Gastrointestinal: soft, NTND, no masses palpable, BS normal  Extremities: Warm, well perfused, pulses equal bilateral upper and lower extremities. Edema appreciated in the bilateral lower extremities. Hyperpigmentation appreciated in the bilateral upper extremities.   Neurological: AAOx3, CN Grossly intact  Skin: Normal temperature, warm, dry    MEDICATIONS  (STANDING):  albuterol/ipratropium for Nebulization 3 milliLiter(s) Nebulizer every 6 hours  apixaban 5 milliGRAM(s) Oral every 12 hours  aspirin  chewable 81 milliGRAM(s) Oral daily  bisacodyl 5 milliGRAM(s) Oral every 12 hours  bisacodyl Suppository 10 milliGRAM(s) Rectal daily  budesonide 160 MICROgram(s)/formoterol 4.5 MICROgram(s) Inhaler 2 Puff(s) Inhalation two times a day  buPROPion XL (24-Hour) . 150 milliGRAM(s) Oral daily  chlorhexidine 2% Cloths 1 Application(s) Topical <User Schedule>  digoxin     Tablet 125 MICROGram(s) Oral daily  diltiazem    milliGRAM(s) Oral daily  pantoprazole    Tablet 40 milliGRAM(s) Oral before breakfast  rosuvastatin 5 milliGRAM(s) Oral at bedtime  senna 2 Tablet(s) Oral at bedtime  spironolactone 50 milliGRAM(s) Oral daily  tadalafil 40 milliGRAM(s) Oral daily  tiotropium 2.5 MICROgram(s)/olodaterol 2.5 MICROgram(s) (STIOLTO) Inhaler 2 Puff(s) Inhalation daily  torsemide 40 milliGRAM(s) Oral two times a day    MEDICATIONS  (PRN):  acetaminophen     Tablet .. 650 milliGRAM(s) Oral every 6 hours PRN Temp greater or equal to 38C (100.4F), Mild Pain (1 - 3), Moderate Pain (4 - 6)  fluticasone propionate 50 MICROgram(s)/spray Nasal Spray 1 Spray(s) Both Nostrils two times a day PRN Congestion      Allergies    nitrates (Unknown)    Intolerances        LABS:                        9.4    6.65  )-----------( 315      ( 28 Jun 2022 06:42 )             31.2     06-28    128<L>  |  86<L>  |  77<H>  ----------------------------<  71  4.3   |  28  |  1.83<H>    Ca    9.9      28 Jun 2022 06:42  Phos  3.9     06-28  Mg     2.6     06-28    TPro  8.1  /  Alb  4.2  /  TBili  0.4  /  DBili  x   /  AST  26  /  ALT  16  /  AlkPhos  161<H>  06-28          RADIOLOGY & ADDITIONAL TESTS:  Reviewed

## 2022-06-29 NOTE — PROGRESS NOTE ADULT - SUBJECTIVE AND OBJECTIVE BOX
Lung Transplant Progress Note  =====================================================  HPI:  Patient is a 67 y/o F w/ pmh of pulmonary hypertension, HTN, HFpEF (echo 60% PASP 79mmhg), COPD  (8L), Afib (in digoxin, diltiazem), HLD, prior spont pneumothorax, and CKD who presents as transfer from [facility] with 1-2 weeks of fatigue, lethargy, and progressively worsening RINALDI. Over the past year, the patient has had a long standing history of worsening SOB that has been attributed to her heart failure. She has been hospitalized 4 times in the past year and has been treated as an outpatient with aggressively with diuretics and vasodilators for COPD.     At the OSH, she was noted to have mil interstitial edema. She was given lasix and was admitted to medicine for diuresis. CT scan was noted to have a RUL consolidation concerning for active infection. She was started on Ceftriaxone. Pulmonary, Cardiology and Nephrology were consulted. She was found to have worsening hypoxia to the 70s then transitioned to HFNC. The Cardiology team at that point recommended transfer to [facility] and was accepted by Dr. Archer.  (14 Jun 2022 03:38)      24 hour events:     PAST MEDICAL & SURGICAL HISTORY:  COPD exacerbation      Severe pulmonary hypertension      Chronic kidney disease, unspecified CKD stage      Acute on chronic diastolic congestive heart failure      Pulmonary embolism      Anxiety and depression      GERD (gastroesophageal reflux disease)      Obstructive sleep apnea      Chronic atrial fibrillation      H/O pneumonectomy      Right leg weakness        Home Meds: Home Medications:  ambrisentan 10 mg oral tablet: 1 tab(s) orally once a day (14 Jun 2022 03:27)  apixaban 5 mg oral tablet: 1 tab(s) orally 2 times a day (14 Jun 2022 03:26)  buPROPion 100 mg oral tablet: 1 tab(s) orally 2 times a day (14 Jun 2022 03:31)  digoxin 125 mcg (0.125 mg) oral tablet: 1 tab(s) orally once a day (29 Jun 2022 14:12)  dilTIAZem 120 mg/24 hours oral capsule, extended release: 1 cap(s) orally once a day (14 Jun 2022 03:31)  pantoprazole 40 mg oral delayed release tablet: 1 tab(s) orally once a day (14 Jun 2022 03:27)  rosuvastatin 5 mg oral tablet: 1 tab(s) orally once a day (14 Jun 2022 03:27)  spironolactone 50 mg oral tablet: 1 tab(s) orally once a day (14 Jun 2022 03:29)  tadalafil 20 mg oral tablet: 2 tab(s) orally once a day (14 Jun 2022 03:29)    Allergies: Allergies    nitrates (Unknown)    Intolerances      Soc:   Advanced Directives: Presumed Full Code     ROS:    REVIEW OF SYSTEMS    [ ] A ten-point review of systems was otherwise negative except as noted.  [ ] Due to altered mental status/intubation, subjective information were not able to be obtained from the patient. History was obtained, to the extent possible, from review of the chart and collateral sources of information.      CURRENT MEDICATIONS:   --------------------------------------------------------------------------------------  Neurologic Medications  acetaminophen     Tablet .. 650 milliGRAM(s) Oral every 6 hours PRN Temp greater or equal to 38C (100.4F), Mild Pain (1 - 3), Moderate Pain (4 - 6)  buPROPion XL (24-Hour) . 150 milliGRAM(s) Oral daily    Respiratory Medications  albuterol/ipratropium for Nebulization 3 milliLiter(s) Nebulizer every 6 hours  budesonide 160 MICROgram(s)/formoterol 4.5 MICROgram(s) Inhaler 2 Puff(s) Inhalation two times a day  tiotropium 2.5 MICROgram(s)/olodaterol 2.5 MICROgram(s) (STIOLTO) Inhaler 2 Puff(s) Inhalation daily    Cardiovascular Medications  digoxin     Tablet 125 MICROGram(s) Oral daily  diltiazem    milliGRAM(s) Oral daily  spironolactone 50 milliGRAM(s) Oral daily  tadalafil 40 milliGRAM(s) Oral daily  torsemide 40 milliGRAM(s) Oral two times a day    Gastrointestinal Medications  bisacodyl 5 milliGRAM(s) Oral every 12 hours  bisacodyl Suppository 10 milliGRAM(s) Rectal daily  pantoprazole    Tablet 40 milliGRAM(s) Oral before breakfast  senna 2 Tablet(s) Oral at bedtime    Genitourinary Medications    Hematologic/Oncologic Medications  apixaban 5 milliGRAM(s) Oral every 12 hours  aspirin  chewable 81 milliGRAM(s) Oral daily    Antimicrobial/Immunologic Medications    Endocrine/Metabolic Medications  rosuvastatin 5 milliGRAM(s) Oral at bedtime    Topical/Other Medications  chlorhexidine 2% Cloths 1 Application(s) Topical <User Schedule>  fluticasone propionate 50 MICROgram(s)/spray Nasal Spray 1 Spray(s) Both Nostrils two times a day PRN Congestion    --------------------------------------------------------------------------------------    VITAL SIGNS, INS/OUTS (last 24 hours):  --------------------------------------------------------------------------------------  ICU Vital Signs Last 24 Hrs  T(C): 36.5 (29 Jun 2022 12:00), Max: 36.7 (28 Jun 2022 20:16)  T(F): 97.7 (29 Jun 2022 12:00), Max: 98.1 (28 Jun 2022 20:16)  HR: 70 (29 Jun 2022 15:57) (68 - 80)  BP: 115/63 (29 Jun 2022 12:00) (102/66 - 130/78)  BP(mean): --  ABP: --  ABP(mean): --  RR: 20 (29 Jun 2022 15:57) (18 - 20)  SpO2: 95% (29 Jun 2022 15:57) (94% - 99%)    I&O's Summary    28 Jun 2022 07:01  -  29 Jun 2022 07:00  --------------------------------------------------------  IN: 840 mL / OUT: 2550 mL / NET: -1710 mL    29 Jun 2022 07:01  -  29 Jun 2022 17:32  --------------------------------------------------------  IN: 720 mL / OUT: 1400 mL / NET: -680 mL      --------------------------------------------------------------------------------------    EXAM:  General/Neuro  RASS:   GCS:   Exam: Normal, NAD, alert, oriented x 3, no focal deficits.     Respiratory  Exam: Lungs **auscultation, Normal expansion/effort.    [] Tracheostomy   [] Intubated  Mechanical Ventilation:     Cardiovascular  Exam: S1, S2.  Regular rate and rhythm. * Peripheral edema    Cardiac Rhythm: Normal Sinus Rhythm  ECHO:     GI  Exam: Abdomen soft, Non-tender, Non-distended.      Tubes/Lines/Drains    [x] Peripheral IV  [] Central Venous Line     	[] R	[] L	[] IJ	[] Fem	[] SC        Type:	    Date Placed:   [] Arterial Line		[] R	[] L	[] Fem	[] Rad	[] Ax	Date Placed:   [] PICC:         	[] Midline		[] Mediport           [] Urinary Catheter		    Extremities  Exam: Extremities warm, pink, well-perfused.      Derm:  Exam: Good skin turgor, no skin breakdown.      :   Exam:     LABS  --------------------------------------------------------------------------------------  Labs:  CAPILLARY BLOOD GLUCOSE                              9.4    6.65  )-----------( 315      ( 28 Jun 2022 06:42 )             31.2         06-28    128<L>  |  86<L>  |  77<H>  ----------------------------<  71  4.3   |  28  |  1.83<H>          LFTs:             8.1  | 0.4  | 26       ------------------[161     ( 28 Jun 2022 06:42 )  4.2  | x    | 16          Lipase:x      Amylase:x             Coags:                  --------------------------------------------------------------------------------------    OTHER LABS    IMAGING RESULTS     Lung Transplant Progress Note  =====================================================  HPI:  Patient is a 67 y/o F w/ pmh of pulmonary hypertension, HTN, HFpEF (echo 60% PASP 79mmhg), COPD  (8L), Afib (in digoxin, diltiazem), HLD, prior spont pneumothorax, and CKD who presents as transfer from [facility] with 1-2 weeks of fatigue, lethargy, and progressively worsening RINALDI. Over the past year, the patient has had a long standing history of worsening SOB that has been attributed to her heart failure. She has been hospitalized 4 times in the past year and has been treated as an outpatient with aggressively with diuretics and vasodilators for COPD.     At the OSH, she was noted to have mil interstitial edema. She was given lasix and was admitted to medicine for diuresis. CT scan was noted to have a RUL consolidation concerning for active infection. She was started on Ceftriaxone. Pulmonary, Cardiology and Nephrology were consulted. She was found to have worsening hypoxia to the 70s then transitioned to HFNC. The Cardiology team at that point recommended transfer to [facility] and was accepted by Dr. Archer.  (14 Jun 2022 03:38)      24 hour events: improving from resp standpoint, down to 6-8L at rest and on exertion, able to ambulate around unit    PAST MEDICAL & SURGICAL HISTORY:  COPD exacerbation      Severe pulmonary hypertension      Chronic kidney disease, unspecified CKD stage      Acute on chronic diastolic congestive heart failure      Pulmonary embolism      Anxiety and depression      GERD (gastroesophageal reflux disease)      Obstructive sleep apnea      Chronic atrial fibrillation      H/O pneumonectomy      Right leg weakness        Home Meds: Home Medications:  ambrisentan 10 mg oral tablet: 1 tab(s) orally once a day (14 Jun 2022 03:27)  apixaban 5 mg oral tablet: 1 tab(s) orally 2 times a day (14 Jun 2022 03:26)  buPROPion 100 mg oral tablet: 1 tab(s) orally 2 times a day (14 Jun 2022 03:31)  digoxin 125 mcg (0.125 mg) oral tablet: 1 tab(s) orally once a day (29 Jun 2022 14:12)  dilTIAZem 120 mg/24 hours oral capsule, extended release: 1 cap(s) orally once a day (14 Jun 2022 03:31)  pantoprazole 40 mg oral delayed release tablet: 1 tab(s) orally once a day (14 Jun 2022 03:27)  rosuvastatin 5 mg oral tablet: 1 tab(s) orally once a day (14 Jun 2022 03:27)  spironolactone 50 mg oral tablet: 1 tab(s) orally once a day (14 Jun 2022 03:29)  tadalafil 20 mg oral tablet: 2 tab(s) orally once a day (14 Jun 2022 03:29)    Allergies: Allergies    nitrates (Unknown)    Intolerances      Soc:   Advanced Directives: Presumed Full Code     ROS:    REVIEW OF SYSTEMS    [ x] A ten-point review of systems was otherwise negative except as noted.  [ ] Due to altered mental status/intubation, subjective information were not able to be obtained from the patient. History was obtained, to the extent possible, from review of the chart and collateral sources of information.      CURRENT MEDICATIONS:   --------------------------------------------------------------------------------------  Neurologic Medications  acetaminophen     Tablet .. 650 milliGRAM(s) Oral every 6 hours PRN Temp greater or equal to 38C (100.4F), Mild Pain (1 - 3), Moderate Pain (4 - 6)  buPROPion XL (24-Hour) . 150 milliGRAM(s) Oral daily    Respiratory Medications  albuterol/ipratropium for Nebulization 3 milliLiter(s) Nebulizer every 6 hours  budesonide 160 MICROgram(s)/formoterol 4.5 MICROgram(s) Inhaler 2 Puff(s) Inhalation two times a day  tiotropium 2.5 MICROgram(s)/olodaterol 2.5 MICROgram(s) (STIOLTO) Inhaler 2 Puff(s) Inhalation daily    Cardiovascular Medications  digoxin     Tablet 125 MICROGram(s) Oral daily  diltiazem    milliGRAM(s) Oral daily  spironolactone 50 milliGRAM(s) Oral daily  tadalafil 40 milliGRAM(s) Oral daily  torsemide 40 milliGRAM(s) Oral two times a day    Gastrointestinal Medications  bisacodyl 5 milliGRAM(s) Oral every 12 hours  bisacodyl Suppository 10 milliGRAM(s) Rectal daily  pantoprazole    Tablet 40 milliGRAM(s) Oral before breakfast  senna 2 Tablet(s) Oral at bedtime    Genitourinary Medications    Hematologic/Oncologic Medications  apixaban 5 milliGRAM(s) Oral every 12 hours  aspirin  chewable 81 milliGRAM(s) Oral daily    Antimicrobial/Immunologic Medications    Endocrine/Metabolic Medications  rosuvastatin 5 milliGRAM(s) Oral at bedtime    Topical/Other Medications  chlorhexidine 2% Cloths 1 Application(s) Topical <User Schedule>  fluticasone propionate 50 MICROgram(s)/spray Nasal Spray 1 Spray(s) Both Nostrils two times a day PRN Congestion    --------------------------------------------------------------------------------------    VITAL SIGNS, INS/OUTS (last 24 hours):  --------------------------------------------------------------------------------------  ICU Vital Signs Last 24 Hrs  T(C): 36.5 (29 Jun 2022 12:00), Max: 36.7 (28 Jun 2022 20:16)  T(F): 97.7 (29 Jun 2022 12:00), Max: 98.1 (28 Jun 2022 20:16)  HR: 70 (29 Jun 2022 15:57) (68 - 80)  BP: 115/63 (29 Jun 2022 12:00) (102/66 - 130/78)  BP(mean): --  ABP: --  ABP(mean): --  RR: 20 (29 Jun 2022 15:57) (18 - 20)  SpO2: 95% (29 Jun 2022 15:57) (94% - 99%)    I&O's Summary    28 Jun 2022 07:01  -  29 Jun 2022 07:00  --------------------------------------------------------  IN: 840 mL / OUT: 2550 mL / NET: -1710 mL    29 Jun 2022 07:01  -  29 Jun 2022 17:32  --------------------------------------------------------  IN: 720 mL / OUT: 1400 mL / NET: -680 mL      --------------------------------------------------------------------------------------    EXAM:  General/Neuro  Exam: Normal, NAD, alert, oriented x 3, no focal deficits.     Respiratory  Exam: diminished BS BL , diffuse insp fine crackles to auscultation, Normal expansion/effort.      Cardiovascular  Exam: S1, S2.  Regular rate and rhythm. ++ peripheral edema    GI  Exam: Abdomen soft, Non-tender, Non-distended.  	    Extremities  Exam: Extremities warm, pink, well-perfused.      LABS  --------------------------------------------------------------------------------------  Labs:  CAPILLARY BLOOD GLUCOSE                              9.4    6.65  )-----------( 315      ( 28 Jun 2022 06:42 )             31.2         06-28    128<L>  |  86<L>  |  77<H>  ----------------------------<  71  4.3   |  28  |  1.83<H>          LFTs:             8.1  | 0.4  | 26       ------------------[161     ( 28 Jun 2022 06:42 )  4.2  | x    | 16          Lipase:x      Amylase:x             Coags:                  --------------------------------------------------------------------------------------    OTHER LABS    IMAGING RESULTS

## 2022-06-29 NOTE — PROGRESS NOTE ADULT - PROBLEM SELECTOR PLAN 7
Admission Cr at OSH 1.2. Admission Cr: 1.29   - Uptrending Cr likely i/s/o diuresis  - CTM Cr qdaily.
Admission Cr at OSH 1.2. Admission Cr: 1.29   - Initial uptrending Cr likely i/s/o diuresis  - CTM Cr qdaily.
Admission Cr at OSH 1.2. Admission Cr: 1.29   - Initial uptrending Cr likely i/s/o diuresis  - CTM Cr qdaily.  -collect urinlytes + urea
Admission Cr at OSH 1.2. Admission Cr: 1.29   - Uptrending Cr likely i/s/o diuresis  - CTM Cr qdaily.
Admission Cr at OSH 1.2. Admission Cr: 1.29   - Initial uptrending Cr likely i/s/o diuresis  - CTM Cr qdaily.
Admission Cr at OSH 1.2. Current Cr: 1.29   - CTM Cr qdaily.
Admission Cr at OSH 1.2. Admission Cr: 1.29   - Initial uptrending Cr likely i/s/o diuresis  - CTM Cr qdaily.
Home regimen: Digoxin 125mcg every other day, Diltiazem 120mg ER daily, AC with eliquis 5mg BID   - C/w home medications as above  - C/w eliquis 5mg BID   - No active bleeding hemoglobin was between 7-8 at OSH
Home regimen: Digoxin 125mcg every other day, Diltiazem 120mg ER daily, AC with eliquis 5mg BID   - C/w home medications as above  - C/w eliquis 5mg BID   - No active bleeding hemoglobin was between 7-8 at OSH
Admission Cr at OSH 1.2. Admission Cr: 1.29   - Initial uptrending Cr likely i/s/o diuresis  - CTM Cr qdaily.
Home regimen: Digoxin 125mcg every other day, Diltiazem 120mg ER daily, AC with eliquis 5mg BID   - C/w home medications as above  - C/w eliquis 5mg BID   - No active bleeding hemoglobin was between 7-8 at OSH
Admission Cr at OSH 1.2. Admission Cr: 1.29   - Initial uptrending Cr likely i/s/o diuresis  - Cr is currently downtrending 6/28
Admission Cr at OSH 1.2. Admission Cr: 1.29   - Initial uptrending Cr likely i/s/o diuresis  - CTM Cr qdaily.
Admission Cr at OSH 1.2. Admission Cr: 1.29   - Initial uptrending Cr likely i/s/o diuresis  - CTM Cr qdaily.
Admission Cr at OSH 1.2. Admission Cr: 1.29   - Uptrending Cr likely i/s/o diuresis  - CTM Cr qdaily.
Admission Cr at OSH 1.2. Admission Cr: 1.29   - Initial uptrending Cr likely i/s/o diuresis  - CTM Cr qdaily.
Admission Cr at OSH 1.2. Admission Cr: 1.29   - Initial uptrending Cr likely i/s/o diuresis  - Cr is currently downtrending 6/28
Admission Cr at OSH 1.2. Admission Cr: 1.29   - Initial uptrending Cr likely i/s/o diuresis  - CTM Cr qdaily.

## 2022-06-29 NOTE — PROGRESS NOTE ADULT - PROBLEM SELECTOR PROBLEM 7
Chronic kidney disease, unspecified CKD stage
Chronic atrial fibrillation
Chronic kidney disease, unspecified CKD stage
Chronic atrial fibrillation
Chronic atrial fibrillation
Chronic kidney disease, unspecified CKD stage

## 2022-06-29 NOTE — PROGRESS NOTE ADULT - NS ATTEND AMEND GEN_ALL_CORE FT
67 y/o F w/ pmh of pulmonary hypertension, HTN, HFpEF (echo 60% PASP 79mmhg), COPD (8L), Afib (in digoxin, diltiazem), HLD, prior spontaneous pneumothorax, and CKD who presented from [facility] with fatigue, lethargy, worsening RINALDI.  She was fluid overloaded with suspicion of pneumonia, treated with diuresis and antibiotics.  Patient was transferred to [facility] for further management. Transplant pulmonology consulted lung transplant evaluation.    #pre lung transplant  #fluid overload  #PAH  -She is OOB to chair and ambulating with PT, dyspnea improved as well as exercise tolerance  -S/p Cardimems 6/21:  Heart Failure following, diuresis continued as per CHF  -Has been losing weight <200lbs, needs more weight loss and aggressive PT  -COPD being managed by pulmonary team   -would optimize medically prior to launching lung transplant eval: pHTN, weight loss, PT  -Follow up lung transplant as outpatient, has appointment for 7/15  -Rest of care per primary team.
Plan as above with my edits.
saw and examined the pt today on June 17 2022    Multidisciplinary rounds:  conducted today june 17  at 0830. Members of the team includes Transplant Pulmonologist, Transplant Surgeon, ICU team, Registered Dietician, Transplant Pharmacists, Transplant ACPs, Respiratory Therapist, Social Work and RNs. Discussion included review of pts history, systematic review of interval events and plan of care. All questions/concerns were addressed.
Plan as above with my edits.
65 y/o F w/ pmh of pulmonary hypertension, HTN, HFpEF (echo 60% PASP 79mmhg), COPD (8L), Afib (in digoxin, diltiazem), HLD, prior spontaneous pneumothorax, and CKD who presented from [facility] with fatigue, lethargy, worsening RINALDI.  She was fluid overloaded with suspicion of pneumonia, treated with diuresis and antibiotics.  Patient was transferred to [facility] for further management. Transplant pulmonology consulted lung transplant evaluation.    #pre lung transplant  #fluid overload  #PAH  -She is OOB to chair and ambulating with PT, able to ambulate to nurses station  -Dyspnea feels overall improved   -S/p Cardimems 6/21:  Heart Failure following, diuresis continued   -Has been losing weight <200lbs, needs more weight loss and aggressive PT  -COPD being managed by pulmonary team   -would optimize medically prior to launching lung transplant eval: pHTN, weight loss, PT  -Follow up lung transplant as outpatient, has appointment for 7/15  -Rest of care per primary team
RHC yesterday notable fr severe pHTN predominant precapillary component with elevated filling pressures (R>L).   Will continue further diuresis.  pHTN management as per pulmonary (mixed group 2 and 3).   Prior to consider lung tx will need BMI < 35 and optimization of volume status.   Continue IV diuresis.
67 y/o female with h/o severe pHTN (Mixed Group 1, 2 and 3), chornic HFpEF aCC/AHA stage C, Afib, COPD on home O2 (5 lpm via NC), morbid obesity s/p bariatric surgery with subsequent lap band removal due to GI bleed, MCA CVA '12 who presented with volume overload and RINALDI. She was started on IV diuretics with improvement of her symptoms. Lung tx team was consulted for consideration of lung tx.   Her LE edema has improved. However she remains mildly hypervolemic.   She is scheduled for RHC/MEMs implant today. Will continue medical therapy and adjust diuresis based on hemodynamics.   She will benefit from SGLT2i/MRA.
Responding to IV diuresis.  She feels better and is walking in the hallways (slow pace and taking breaks).   Will transition to oral diuretics.   DC plan Monday.  Outpt follow up with lung tx team.

## 2022-06-29 NOTE — PROGRESS NOTE ADULT - PROBLEM SELECTOR PROBLEM 1
Severe pulmonary hypertension
Severe pulmonary hypertension
Acute on chronic respiratory failure with hypoxia
COPD exacerbation
Acute on chronic respiratory failure with hypoxia
COPD exacerbation
Severe pulmonary hypertension
COPD exacerbation
COPD exacerbation
Severe pulmonary hypertension
Acute on chronic respiratory failure with hypoxia
COPD exacerbation
Severe pulmonary hypertension
Severe pulmonary hypertension
COPD exacerbation
Acute on chronic respiratory failure with hypoxia
Acute on chronic respiratory failure with hypoxia
Severe pulmonary hypertension
Acute on chronic respiratory failure with hypoxia

## 2022-06-29 NOTE — PROGRESS NOTE ADULT - PROBLEM SELECTOR PROBLEM 12
R/O GERD (gastroesophageal reflux disease)
Prophylactic measure

## 2022-06-29 NOTE — PROGRESS NOTE ADULT - PROBLEM SELECTOR PROBLEM 4
Anemia
Anemia
Chronic kidney disease, unspecified CKD stage
Pneumonia
Anemia
Chronic kidney disease, unspecified CKD stage
Anemia
Anemia
Chronic kidney disease, unspecified CKD stage
Anemia
Chronic kidney disease, unspecified CKD stage
Pneumonia
Chronic kidney disease, unspecified CKD stage
Anemia
Anemia
Pneumonia
Anemia

## 2022-06-29 NOTE — PROGRESS NOTE ADULT - PROBLEM SELECTOR PROBLEM 3
COPD exacerbation
Acute on chronic respiratory failure with hypoxia
COPD exacerbation
Acute on chronic respiratory failure with hypoxia
COPD exacerbation
Acute on chronic respiratory failure with hypoxia
COPD exacerbation
Acute on chronic respiratory failure with hypoxia
Acute on chronic respiratory failure with hypoxia
COPD exacerbation
Acute on chronic respiratory failure with hypoxia
COPD exacerbation
Acute on chronic respiratory failure with hypoxia
COPD exacerbation

## 2022-06-29 NOTE — PROGRESS NOTE ADULT - PROBLEM SELECTOR PLAN 2
Reported as HFpEF although unclear exact etiology. Most likely 2/2 to pulmonary HTN and WHO class III. Differential includes ICM vs. NICM vs. RHF from Pulm HTN.    Plan:   - CXR shows improving Pulm edema  - TTE showed severe pulm HTN, normal LV systolic fxn, RV enlargement w/ decreased systolic fxn  - No PFO on limited TTE w/ bubble study   - torsemide 40 mg BID  - C/w home Spironolactone  - on digoxin 125 PO daily  - on diltiazem 120 PO daily   - BNP 12,525 at OSH, 15,334 on admission   - Strict ins and outs   - Daily standing weights  - Replete K > 4, Mg > 2, and Phos > 3  - S/p RHC/Cardio mems 6/20  - Patient will need ASA 81 for 1 month post cardiomems 6/21

## 2022-07-21 PROBLEM — I48.20 CHRONIC A-FIB: Status: ACTIVE | Noted: 2017-12-19

## 2022-07-21 PROBLEM — J44.9 COPD (CHRONIC OBSTRUCTIVE PULMONARY DISEASE): Status: ACTIVE | Noted: 2018-02-02

## 2022-07-21 PROBLEM — I50.32 CHRONIC DIASTOLIC CONGESTIVE HEART FAILURE: Status: ACTIVE | Noted: 2021-11-10

## 2022-07-21 NOTE — HISTORY OF PRESENT ILLNESS
[FreeTextEntry1] : 66 year old female with history of severe pulmonary HTN (Group 1, 2 & 3), HFpEF, Afib, COPD on home O2 (5L NC), CKD, ROLANDA on CPAP, morbid obesity ( s/p bariatric surgery in 2012 with subsequent lap band removal due to GI bleed), PE,  CVA (MCA infarct in 2012) who comes in to establish care with heart failure due to her pulmonary HTN. \par In regards to her pulmonary HTN management, she follows with Dr. Gallardo at [place] who she recently saw in 5/2021. She is on letaris, tadalafil in addition to digoxin and diuretics. She has had prior RHCs which showed low cardiac output however her most recently RHC in 2020 showed normal RA, elevated mPAP with normal cardiac output. She was admitted in June 2021 to the hospital for SOB where she was found to be anemic. She was given blood and and placed on BIPAP. She was also sent home on higher oxygenation \par She endroses that she has SOB with walking across the room but states that this has been her baseline for a while but she notices an over decline over the years\par \par 7/21/22: Patient comes in for followup after a hospitalization. The patient was hospitalized for volume overload and worsening RINALDI. She was initially admitted to [facility] but then was transferred to [facility] hospital for evaluation of lung transplant. She was started on IV bumex drip and was adequately diuresed. She underwent a RHC which showed normal wedge but elevated right sided filling pressures. A Cardiomems was placed. She was evaluated by the lung transplant team and before she could be considered for a lung transplant she would need to  rehab more and lose weight to get to a BMI less than 35\par \par  \par \par  \par

## 2022-07-21 NOTE — ASSESSMENT
[FreeTextEntry1] : 66 year old female with history of severe pulmonary HTN (Group 1, 2 & 3), HFpEF, Afib, COPD on home O2 (5L NC), CKD, ROLANDA on CPAP, morbid obesity ( s/p bariatric surgery in 2012 with subsequent lap band removal due to GI bleed), PE,  CVA (MCA infarct in 2012) who comes in to establish care with heart failure due to her pulmonary HTN who comes in for followup\par \par #Pulmonary HTN/cor pulmonale: Patient has had a diagnosis of pulmonary HTN for years and follows with a pulmonary HTN specialist (Dr. Gallardo but is now transitioning to another pulmonologist). Her pulmonary HTN is from WHO group 1,2,3. Her RHC was significant for severe pulmonary HTN with preserved cardiac output\par - c/w tadalafil and letaris, follows with pulmonologist who manages these medications\par - Patient is planned for outpatient evaluation for lung transplant. Had some barriers previously including weight (BMI is now in acceptable range) and physical activity (which she is working on)\par -Spoke to patient that she should get her other health maintenance scheduled also such as dental eval, colonoscopy screening if indicated and pap smear if indicated\par \par #Chronic diastolic heart failure: Prior to the leaving the hospital her dry weight was 189 and today she is 199. She still endorses NYHA class 3b symptoms and has symptoms of RINALDI with minimal exertion. Her RHC was significant for severe pulmonary HTN with preserved cardiac output\par - Although PAD is 42 (goal of 44-46) her weight is up and still has significant symptoms, will increase her torsemide to 40mg BID\par - c.w spironolactone 50mg\par - will add jardiance on next visit\par \par #Afib \par - c/w diltiazem and eliquis\par \par #COPD\par - patient has followup with her pulmonologist coming up\par \par

## 2022-07-21 NOTE — PHYSICAL EXAM
[Normal S1, S2] : normal S1, S2 [No Murmur] : no murmur [Normal] : moves all extremities, no focal deficits, normal speech [de-identified] : SOB with talking [de-identified] : +JVD [de-identified] : mild wheezing [de-identified] : warm, 1+ edema bilaterally

## 2022-07-21 NOTE — CARDIOLOGY SUMMARY
[de-identified] : EKG: rate controlled Afib  [de-identified] : Echo: October 2021: Normal LVEF, dilated LA, decreased RV function with EV enlargement, moderate TR, PASP 85, severe pulm HTN  [de-identified] : Holter October 2021: rate controlled Afib, rare ventricular couplets and triplets  [de-identified] : [facility] 6/20/2022: RA 18, /44/65, PCWP 16 v 18, PVR 7.32, TPG 49, /60/86, PA 58, Ra 95% on 8L NC, CO/CI 6.7/3.2 (cardiomems PAD 49)\par \par Cardiac Cath: [facility] from May 2013, normal left ventricular function, right dominant circulation, left main arises normally from the left coronary sinus of Valsalva, bifurcates into LAD and circumflex, left main normal, LAD arises normally from the left main normal, left circumflex arises normally from the left main and terminates in the AV groove normal, RCA arises normally from the right sinus of Valsalva, RCA is normal, \par \par RH (6/2020): RA 2, RV 60/5, PAP 58/18/33, CO/CI: 6.5/3.3 \par \par  \par \par 6 Minute Walk Test (6/2020): 150m

## 2022-08-11 NOTE — PROGRESS NOTE ADULT - SUBJECTIVE AND OBJECTIVE BOX
Patient seen at bedside with Dr. Reaves, on baseline 8L O2, states she feels her breathing is worse.  Able to perform activities with some assistance. Able to walk 50ft without a walker, but walks more with a walker.    Height (cm): 160 (08-11-22 @ 00:40)  Weight (kg): 93 (08-11-22 @ 00:40)  BMI (kg/m2): 36.3 (08-11-22 @ 00:40)  BSA (m2): 1.95 (08-11-22 @ 00:40)    Performs activities of daily living with some assistance   Diabetic  Requires 8L NC at rest   Assisted ventilation with CPAP at night     ROS:    REVIEW OF SYSTEMS    [x] A ten-point review of systems was otherwise negative except as noted.  [ ] Due to altered mental status/intubation, subjective information were not able to be obtained from the patient. History was obtained, to the extent possible, from review of the chart and collateral sources of information.      CURRENT MEDICATIONS:   --------------------------------------------------------------------------------------  Neurologic Medications  acetaminophen     Tablet .. 650 milliGRAM(s) Oral every 6 hours PRN Temp greater or equal to 38C (100.4F), Mild Pain (1 - 3)  buPROPion XL (24-Hour) . 300 milliGRAM(s) Oral daily  melatonin 3 milliGRAM(s) Oral at bedtime PRN Insomnia    Respiratory Medications  ALBUTerol    90 MICROgram(s) HFA Inhaler 2 Puff(s) Inhalation every 6 hours PRN Shortness of Breath and/or Wheezing  budesonide  80 MICROgram(s)/formoterol 4.5 MICROgram(s) Inhaler 2 Puff(s) Inhalation two times a day  tiotropium 18 MICROgram(s) Capsule 1 Capsule(s) Inhalation daily    Cardiovascular Medications  ambrisentan 10 milliGRAM(s) Oral daily  digoxin     Tablet 125 MICROGram(s) Oral daily  diltiazem    milliGRAM(s) Oral daily  furosemide   Injectable 60 milliGRAM(s) IV Push two times a day  spironolactone 50 milliGRAM(s) Oral daily    Gastrointestinal Medications  pantoprazole    Tablet 40 milliGRAM(s) Oral before breakfast    Genitourinary Medication  Hematologic/Oncologic Medications  apixaban 5 milliGRAM(s) Oral two times a day  aspirin  chewable 81 milliGRAM(s) Oral daily    Antimicrobial/Immunologic Medications    Endocrine/Metabolic Medications  atorvastatin 20 milliGRAM(s) Oral at bedtime    Topical/Other Medications    --------------------------------------------------------------------------------------    VITAL SIGNS, INS/OUTS (last 24 hours):  --------------------------------------------------------------------------------------  ICU Vital Signs Last 24 Hrs  T(C): 36.5 (11 Aug 2022 04:48), Max: 36.5 (11 Aug 2022 00:40)  T(F): 97.7 (11 Aug 2022 04:48), Max: 97.7 (11 Aug 2022 00:40)  HR: 68 (11 Aug 2022 06:45) (67 - 80)  BP: 104/53 (11 Aug 2022 04:48) (104/53 - 117/59)  BP(mean): 70 (11 Aug 2022 04:48) (70 - 70)  ABP: --  ABP(mean): --  RR: 20 (11 Aug 2022 06:44) (18 - 20)  SpO2: 97% (11 Aug 2022 06:45) (86% - 100%)    O2 Parameters below as of 11 Aug 2022 06:44  Patient On (Oxygen Delivery Method): nasal cannula  O2 Flow (L/min): 6        I&O's Summary    10 Aug 2022 07:01  -  11 Aug 2022 07:00  --------------------------------------------------------  IN: 240 mL / OUT: 500 mL / NET: -260 mL    11 Aug 2022 07:01  -  11 Aug 2022 10:16  --------------------------------------------------------  IN: 360 mL / OUT: 0 mL / NET: 360 mL      --------------------------------------------------------------------------------------    EXAM:  General:  Chest:  Respiratory:  Cardiac:  Gastrointestinal:  Extremities:  Neuro:  --------------------------------------------------------------------------------------    OTHER LABS    IMAGING RESULTS       Patient seen at bedside with Dr. Reaves, on baseline 8L O2, states she feels her breathing is worse.  Able to perform activities with some assistance. Able to walk 50ft without a walker, but walks more with a walker.    Height (cm): 160 (08-11-22 @ 00:40)  Weight (kg): 93 (08-11-22 @ 00:40)  BMI (kg/m2): 36.3 (08-11-22 @ 00:40)  BSA (m2): 1.95 (08-11-22 @ 00:40)    Performs activities of daily living with some assistance   Diabetic  Requires 8L NC at rest   Assisted ventilation with CPAP at night     ROS:    REVIEW OF SYSTEMS    [x] A ten-point review of systems was otherwise negative except as noted.  [ ] Due to altered mental status/intubation, subjective information were not able to be obtained from the patient. History was obtained, to the extent possible, from review of the chart and collateral sources of information.      CURRENT MEDICATIONS:   --------------------------------------------------------------------------------------  Neurologic Medications  acetaminophen     Tablet .. 650 milliGRAM(s) Oral every 6 hours PRN Temp greater or equal to 38C (100.4F), Mild Pain (1 - 3)  buPROPion XL (24-Hour) . 300 milliGRAM(s) Oral daily  melatonin 3 milliGRAM(s) Oral at bedtime PRN Insomnia    Respiratory Medications  ALBUTerol    90 MICROgram(s) HFA Inhaler 2 Puff(s) Inhalation every 6 hours PRN Shortness of Breath and/or Wheezing  budesonide  80 MICROgram(s)/formoterol 4.5 MICROgram(s) Inhaler 2 Puff(s) Inhalation two times a day  tiotropium 18 MICROgram(s) Capsule 1 Capsule(s) Inhalation daily    Cardiovascular Medications  ambrisentan 10 milliGRAM(s) Oral daily  digoxin     Tablet 125 MICROGram(s) Oral daily  diltiazem    milliGRAM(s) Oral daily  furosemide   Injectable 60 milliGRAM(s) IV Push two times a day  spironolactone 50 milliGRAM(s) Oral daily    Gastrointestinal Medications  pantoprazole    Tablet 40 milliGRAM(s) Oral before breakfast    Genitourinary Medication  Hematologic/Oncologic Medications  apixaban 5 milliGRAM(s) Oral two times a day  aspirin  chewable 81 milliGRAM(s) Oral daily    Antimicrobial/Immunologic Medications    Endocrine/Metabolic Medications  atorvastatin 20 milliGRAM(s) Oral at bedtime    Topical/Other Medications    --------------------------------------------------------------------------------------    VITAL SIGNS, INS/OUTS (last 24 hours):  --------------------------------------------------------------------------------------  ICU Vital Signs Last 24 Hrs  T(C): 36.5 (11 Aug 2022 04:48), Max: 36.5 (11 Aug 2022 00:40)  T(F): 97.7 (11 Aug 2022 04:48), Max: 97.7 (11 Aug 2022 00:40)  HR: 68 (11 Aug 2022 06:45) (67 - 80)  BP: 104/53 (11 Aug 2022 04:48) (104/53 - 117/59)  BP(mean): 70 (11 Aug 2022 04:48) (70 - 70)  ABP: --  ABP(mean): --  RR: 20 (11 Aug 2022 06:44) (18 - 20)  SpO2: 97% (11 Aug 2022 06:45) (86% - 100%)    O2 Parameters below as of 11 Aug 2022 06:44  Patient On (Oxygen Delivery Method): nasal cannula  O2 Flow (L/min): 6        I&O's Summary    10 Aug 2022 07:01  -  11 Aug 2022 07:00  --------------------------------------------------------  IN: 240 mL / OUT: 500 mL / NET: -260 mL    11 Aug 2022 07:01  -  11 Aug 2022 10:16  --------------------------------------------------------  IN: 360 mL / OUT: 0 mL / NET: 360 mL      --------------------------------------------------------------------------------------    EXAM:  General: 8L NC, no acute distress  Chest: appears normal  Respiratory: Decreased breath sounds throughout  Cardiac: +murmur  Gastrointestinal: obese, no distended, non-tender, soft   Extremities: ++++ bilateral lower extremity edema throughout   Neuro: AAO x 3   --------------------------------------------------------------------------------------

## 2022-08-11 NOTE — PROGRESS NOTE ADULT - PROBLEM SELECTOR PLAN 4
Last echo 6/22 with EF 68%, normal LV systolic function w/o WMA. Flattening of interventricular septum c/w RV overload, w/ severe R atrial enlargement, RV enlargement w/ decreased RV systolic function, severely elevated pulmonary pressures.   - continue lasix 60mg IVP BID  - strict I/Os, daily standing weights  - continue spironolactone 50mg qd  - f/u cardiomems readings Last echo 6/22 with EF 68%, normal LV systolic function w/o WMA. Flattening of interventricular septum c/w RV overload, w/ severe R atrial enlargement, RV enlargement w/ decreased RV systolic function, severely elevated pulmonary pressures.   - continue lasix 60mg IVP BID  - strict I/Os, daily standing weights  - continue spironolactone 50mg qd  - f/u repeat echo   - f/u cardiomems readings

## 2022-08-11 NOTE — CONSULT NOTE ADULT - NS ATTEND AMEND GEN_ALL_CORE FT
65 YO F with HFpEF (primarily RV dysfunction), severe predominately pre-capillary pulmonary hypertension, CKD III, and obesity admitted with acute on chronic diastolic heart failure, primarily RV volume overload. Continue diuresis as above. She is being evaluated by lung transplant but suspect given age/BMI/comorbidities it is unlikely she would rule herself in for candidacy. Consider palliative care consultation.

## 2022-08-11 NOTE — PROGRESS NOTE ADULT - PROBLEM SELECTOR PLAN 1
Hypoxic to 80s at home while on baseline 8L NC. Patient gaining weight since hospital discharge early July, increased torsemide w/o significant results. Likely in s/o CHF exacerbation, given elevated BNP, limited exercise tolerance, worsening LE edema. Patient afebrile, non-toxic appearing, infection unlikely.   - continue lasix 60mg IVP BID  - standing daily weights, strict I/Os  - pulm transplant and HF consults in AM Hypoxic to 80s at home while on baseline 8L NC. Patient gaining weight since hospital discharge early July, increased torsemide w/o significant results. Likely in s/o CHF exacerbation, given elevated BNP, limited exercise tolerance, worsening LE edema. Patient afebrile, non-toxic appearing, infection unlikely.   - continue lasix 60mg IVP BID  - standing daily weights, strict I/Os  - pulm transplant and HF following

## 2022-08-11 NOTE — CONSULT NOTE ADULT - SUBJECTIVE AND OBJECTIVE BOX
HPI:    66 year old woman with HFpEF s/p CardioMEMS, severe pulmonary hypertension (Group 1, 2 & 3), COPD on home O2 8L, AF (on Eliquis), PE, CVA (MCA infarct 2012), CKD (b/l Cr 1.7-1.8), ROLANDA on CPAP and morbid obesity (s/p bariatric surgery 2012 with lap band removal d/t GIB) who was recently hospitalized 6/14-6/29/22 for volume overload where she met with lung transplant team with plan for evaluation pending improvement in BMI and deconditioned state. She presented with RINADLI, ABD bloating and LE swelling in the setting of gradual 25 lb weight gain despite escalation of diuretic regimen. She was started on intermittent IV Lasix with good response but remains markedly overloaded with predominate symptoms of elevated R sided filling pressure.       PAST MEDICAL & SURGICAL HISTORY:  COPD exacerbation  Severe pulmonary hypertension  Chronic kidney disease, unspecified CKD stage  Acute on chronic diastolic congestive heart failure  Pulmonary embolism  Anxiety and depression  GERD (gastroesophageal reflux disease)  Obstructive sleep apnea  Chronic atrial fibrillation  H/O pneumonectomy  Right leg weakness    REVIEW OF SYSTEMS  The remaining 14 point ROS is otherwise negative or noncontributory except as mentioned in HPI    MEDICATIONS  (STANDING):  ambrisentan 10 milliGRAM(s) Oral daily  apixaban 5 milliGRAM(s) Oral two times a day  aspirin  chewable 81 milliGRAM(s) Oral daily  atorvastatin 20 milliGRAM(s) Oral at bedtime  budesonide  80 MICROgram(s)/formoterol 4.5 MICROgram(s) Inhaler 2 Puff(s) Inhalation two times a day  buPROPion XL (24-Hour) . 300 milliGRAM(s) Oral daily  digoxin     Tablet 125 MICROGram(s) Oral daily  diltiazem    milliGRAM(s) Oral daily  furosemide   Injectable 60 milliGRAM(s) IV Push two times a day  pantoprazole    Tablet 40 milliGRAM(s) Oral before breakfast  spironolactone 50 milliGRAM(s) Oral daily  tadalafil 40 milliGRAM(s) Oral daily  tiotropium 18 MICROgram(s) Capsule 1 Capsule(s) Inhalation daily    MEDICATIONS  (PRN):  acetaminophen     Tablet .. 650 milliGRAM(s) Oral every 6 hours PRN Temp greater or equal to 38C (100.4F), Mild Pain (1 - 3)  ALBUTerol    90 MICROgram(s) HFA Inhaler 2 Puff(s) Inhalation every 6 hours PRN Shortness of Breath and/or Wheezing  melatonin 3 milliGRAM(s) Oral at bedtime PRN Insomnia    Allergies  nitrates (Unknown)    Intolerances    SOCIAL HISTORY:    FAMILY HISTORY:  No pertinent family history    Vital Signs Last 24 Hrs  T(C): 36.3 (11 Aug 2022 12:30), Max: 36.5 (11 Aug 2022 00:40)  T(F): 97.4 (11 Aug 2022 12:30), Max: 97.7 (11 Aug 2022 00:40)  HR: 67 (11 Aug 2022 15:15) (67 - 80)  BP: 110/69 (11 Aug 2022 15:15) (104/53 - 117/59)  BP(mean): 70 (11 Aug 2022 04:48) (70 - 70)  RR: 18 (11 Aug 2022 15:15) (18 - 20)  SpO2: 95% (11 Aug 2022 15:15) (86% - 100%)    Parameters below as of 11 Aug 2022 15:15  Patient On (Oxygen Delivery Method): nasal cannula  O2 Flow (L/min): 8    Tele: AF 50-70s    PHYSICAL EXAM:    General: NAD. Comfortable sitting up in chair  HEET: EOMI  Neck: JVP moderately elevated sitting upright, +HJR  CV: Irregularly irregular. S1, S2, no M/R/G  Chest: clear to auscultation bilaterally  ABD: Obese. Mildly distended, nontender  Extremities: warm peripherally. 3+ BLE to knees  Psych: appropriate mood and affect  Neuro: A&Ox4, nonfocal      LABS:                        8.1    5.26  )-----------( 218      ( 11 Aug 2022 05:53 )             25.5     08-11    129<L>  |  90<L>  |  95<H>  ----------------------------<  74  4.4   |  26  |  1.89<H>    Ca    9.2      11 Aug 2022 05:55  Phos  4.2     08-11  Mg     2.2     08-11    TPro  6.5  /  Alb  3.9  /  TBili  0.4  /  DBili  x   /  AST  17  /  ALT  8<L>  /  AlkPhos  101  08-11    PT/INR - ( 11 Aug 2022 05:55 )   PT: 13.3 sec;   INR: 1.15 ratio         PTT - ( 11 Aug 2022 05:55 )  PTT:30.4 sec      RADIOLOGY & ADDITIONAL STUDIES:

## 2022-08-11 NOTE — H&P ADULT - NSHPPHYSICALEXAM_GEN_ALL_CORE
LOS:     VITALS:   T(C): 36.5 (08-11-22 @ 00:40), Max: 36.5 (08-11-22 @ 00:40)  HR: 80 (08-11-22 @ 00:40) (80 - 80)  BP: 116/85 (08-11-22 @ 00:40) (116/85 - 116/85)  RR: 18 (08-11-22 @ 00:40) (18 - 18)  SpO2: 86% (08-11-22 @ 00:40) (86% - 86%)    GENERAL: NAD, lying in bed comfortably  HEAD:  Atraumatic, Normocephalic  EYES: EOMI, PERRLA, conjunctiva and sclera clear  ENT: Moist mucous membranes  NECK: Supple, No JVD  CHEST/LUNG: Clear to auscultation bilaterally; No rales, rhonchi, wheezing, or rubs. Unlabored respirations  HEART: Regular rate and rhythm; No murmurs, rubs, or gallops  ABDOMEN: BSx4; Soft, nontender, nondistended  EXTREMITIES:  2+ Peripheral Pulses, brisk capillary refill. No clubbing, cyanosis, or edema  NERVOUS SYSTEM:  A&Ox3, no focal deficits   SKIN: No rashes or lesions LOS:     VITALS:   T(C): 36.5 (08-11-22 @ 00:40), Max: 36.5 (08-11-22 @ 00:40)  HR: 80 (08-11-22 @ 00:40) (80 - 80)  BP: 116/85 (08-11-22 @ 00:40) (116/85 - 116/85)  RR: 18 (08-11-22 @ 00:40) (18 - 18)  SpO2: 86% (08-11-22 @ 00:40) (86% - 86%)    GENERAL: NAD, lying in bed comfortably, on HFNC  HEAD:  Atraumatic, Normocephalic  EYES: EOMI, PERRLA, conjunctiva and sclera clear  ENT: Moist mucous membranes  NECK: Supple, No JVD  CHEST/LUNG: coarse breath sounds, Unlabored respirations  HEART: Regular rate and rhythm; No murmurs, rubs, or gallops  ABDOMEN: BSx4; Soft, nontender, nondistended  EXTREMITIES:  1+ Peripheral Pulses, brisk capillary refill. No clubbing, cyanosis, or edema  NERVOUS SYSTEM:  A&Ox3, 4/5 strength b/l LEs   SKIN: No rashes or lesions

## 2022-08-11 NOTE — PATIENT PROFILE ADULT - FALL HARM RISK - HARM RISK INTERVENTIONS

## 2022-08-11 NOTE — PROGRESS NOTE ADULT - ATTENDING COMMENTS
Agree with note above. Patient with history of severe pulmonary HTN (groups I,II, III per heart failure), recent admission for volume overload and pulm transplant maryana presents from OSH for pulm transplant evaluation. Since discharge, patient adherent to medications, but still gained over 15 lbs with worsening RINALDI. currently improving on IV lasix.   Labs reviewed, Cre 1.8 (likely new baseline from last admission is 1.8-1.9, not prior baseline of 1.2), new anemia.    #acute decompensated biventricular failure  -patietn with biventricular failure but likely predominany right sided failure with JVD, LE edema.   -continue current diuresis with lasix 40mg IV BID  -cardiomems to be checked by heart failure.  -strict I/O, daily standing weights.     #severe pulm HTN  -continue current pulm HTN medications.   -given BMI >35, not candidate currently for pulm transplant and will not initiate transplant workup at this time.     #CKD III  -likely cardiorenal component vs. new baseline.   -continue to monitor, avoid nephrotoxic agents.     #Anemia  -my suspcion in setting of CKD, no evidence of bleeding.  -check iron studies, retic count.   -trend.

## 2022-08-11 NOTE — H&P ADULT - PROBLEM SELECTOR PLAN 4
Last echo 6/22 with EF 68%, normal LV systolic function w/o WMA. Flattening of interventricular septum c/w RV overload, w/ severe R atrial enlargement, RV enlargement w/ decreased RV systolic function, severely elevated pulmonary pressures.   - continue lasix 60mg IVP BID  - strict I/Os, daily standing weights  - continue spironolactone 50mg qd  - f/u cardiomems readings

## 2022-08-11 NOTE — CONSULT NOTE ADULT - PROBLEM SELECTOR RECOMMENDATION 9
- continue Lasix 60 mg IV BID, will reassess tomorrow need for adjustment (home regimen was Torsemide 40 BID)  - continue Spironolactone 50 mg QD  - eventual SGLT2-i prior to discharge, once on stable dose of oral diuretics  - CardioMEMS PAD today 43 mmHg, will read tomorrow  - daily standing weight. Was discharged in June at 189 lbs - continue Lasix 60 mg IV BID, will reassess tomorrow need for adjustment (home regimen was Torsemide 40 BID)  - continue Spironolactone 50 mg QD  - eventual SGLT2-i prior to discharge, once on stable dose of oral diuretics  - CardioMEMS PAD today 43 mmHg, will read tomorrow. goal ~40 but difficult to ascertain given RV dysfunction   - daily standing weight. Was discharged in June at 189 lbs

## 2022-08-11 NOTE — H&P ADULT - PROBLEM SELECTOR PLAN 5
On 8L baseline O2 at come, currently requiring HFNC. On trelegy and albuterol inhalers at home.   - continue trelegy therapeutic equivalent --> symbicort  - Duonebs q6h COPD protocol On 8L baseline O2 at come, currently requiring HFNC. On trelegy and albuterol inhalers at home.   - continue trelegy therapeutic equivalent --> symbicort  - Duonebs q6h COPD protocol  - continue AVAPS qhs (has home machine)

## 2022-08-11 NOTE — PROGRESS NOTE ADULT - PROBLEM SELECTOR PLAN 5
On 8L baseline O2 at come, currently requiring HFNC. On trelegy and albuterol inhalers at home.   - continue trelegy therapeutic equivalent --> symbicort  - Duonebs q6h COPD protocol  - continue AVAPS qhs (has home machine)

## 2022-08-11 NOTE — H&P ADULT - ATTENDING COMMENTS
66F w/ severe pulmonary HTN (Group 1, 2 & 3), HFpEF w/ Cardiomems, Afib on Eliquis, COPD on home O2 (5L NC), CKD, ROLANDA on CPAP, morbid obesity (s/p bariatric surgery in 2012 with subsequent lap band removal due to GI bleed), PE, CVA (MCA infarct in 2012) presenting as xfer from [facility] for lung transplant evaluation after admission for possible CHF exacerbation  -Can cont. prior IV Lasix dose BID  -HF consult in AM, cardiomems reading  -F/u Transplant surgery recommendations  -Cont. Eliquis  -Obtain routine lab work  -Telemetry  -Daily weight and strict I/Os  -Get dig lvl, cont. digoxin and cont. for now

## 2022-08-11 NOTE — PROGRESS NOTE ADULT - PROBLEM SELECTOR PLAN 2
Cr elevated to 2.42 on admission to  - baseline is 1.2-1.4. Likely in s/o CHF exacerbation, overall hypervolemic though likely intravascularly depleted. Likely contributing to sub-optimal response to torsemide at home.   - diuresis as above  - avoid nephrotoxic agents  - strict I/Os Cr elevated to 2.42 on admission to  - baseline is 1.7-1.8. Likely in s/o CHF exacerbation, overall hypervolemic though likely intravascularly depleted. Likely contributing to sub-optimal response to torsemide at home.   - diuresis as above  - avoid nephrotoxic agents  - strict I/Os  - 1.89 this AM, CTM

## 2022-08-11 NOTE — H&P ADULT - ASSESSMENT
66F w/ severe pulmonary HTN (Group 1, 2 & 3), HFpEF, Afib, COPD on home O2 (5L NC), CKD, ROLANDA on CPAP, morbid obesity presenting with acute hypoxic respiratory failure, admitted for CHF exacerbation and ongoing lung transplant evaluation.

## 2022-08-11 NOTE — PHYSICAL THERAPY INITIAL EVALUATION ADULT - NSPTDMEREC_GEN_A_CORE
pt up to restroom for ua. pt unable to provide sample at this time. discussion 
regarding having a female nurse straight cath for urine. at this time pt 
requests water and to make second attempt after consumption. water provided. rolling walker

## 2022-08-11 NOTE — PROGRESS NOTE ADULT - ASSESSMENT
66F w/ severe pulmonary HTN (Group 1, 2 & 3), HFpEF, Afib, COPD on home O2 (5L NC), CKD, ROLANDA on CPAP, morbid obesity (s/p bariatric surgery in 2012 with subsequent lap band removal due to GI bleed), PE, CVA (MCA infarct in 2012). Transferred from OSH for severe pulmonary hypertension, with possible launch for transplant evaluation.      Hf consult  Repeat ECHO  consider flolan   optimize kidney function  at present state, not a transplant candidate but if we can reduce edema, improve kidney function, and optimize physical therapy (walking distance 300-400ft minimum)  Ideally, BMI needs to be around ~30 for transplant candidacy  SW consult for support system      Above discussed with Dr. Reaves

## 2022-08-11 NOTE — H&P ADULT - PROBLEM SELECTOR PLAN 6
Hgb 6.9 at OSH, transfused 1U PRBC with improvement to 7.8. Patient w/o signs/symptoms of acute blood loss, HD stable. Denies changes in bowel/urinary habits, denies recent falls/trauma.   - maintain active T/S Hgb 6.9 at OSH, transfused 1U PRBC with improvement to 7.8. Patient w/o signs/symptoms of acute blood loss, HD stable. Denies changes in bowel/urinary habits, denies recent falls/trauma.   - maintain active T/S  - continue eliquis 2.5mg BID

## 2022-08-11 NOTE — CONSULT NOTE ADULT - PROBLEM SELECTOR RECOMMENDATION 2
- further guidance by pulmonary  - evaluation for lung transplant pending improved BMI closer to 30 (currently 37.6), improved mobility and improved renal function - further guidance by pulmonary hypertension team and lung transplant team  - evaluation for lung transplant pending improved BMI closer to 30 (currently 37.6), improved mobility and improved renal function  - suspect it will be difficult for her to become a transplant candidate, would consider palliative care input

## 2022-08-11 NOTE — PHYSICAL THERAPY INITIAL EVALUATION ADULT - PERTINENT HX OF CURRENT PROBLEM, REHAB EVAL
66F w/ severe pulmonary HTN (Group 1, 2 & 3), HFpEF, Afib, COPD on home O2 (5L NC), CKD, ROLANDA on CPAP, morbid obesity (s/p bariatric surgery in 2012 with subsequent lap band removal due to GI bleed), PE, CVA (MCA infarct in 2012). Transferred from OSH for severe pulmonary hypertension, with possible launch for transplant evaluation.

## 2022-08-11 NOTE — H&P ADULT - NSHPREVIEWOFSYSTEMS_GEN_ALL_CORE
REVIEW OF SYSTEMS:    CONSTITUTIONAL: No weakness, fevers or chills  EYES/ENT: No visual changes;  No vertigo or throat pain   NECK: No pain or stiffness  RESPIRATORY: No cough, wheezing, hemoptysis; No shortness of breath  CARDIOVASCULAR: No chest pain or palpitations  GASTROINTESTINAL: No abdominal or epigastric pain. No nausea, vomiting, or hematemesis; No diarrhea or constipation. No melena or hematochezia.  GENITOURINARY: No dysuria, frequency or hematuria  NEUROLOGICAL: No numbness or weakness  SKIN: No itching, rashes  ENDO: no heat, cold intolerance REVIEW OF SYSTEMS:    CONSTITUTIONAL: No weakness, fevers or chills  EYES/ENT: No visual changes;  No vertigo or throat pain   NECK: No pain or stiffness  RESPIRATORY: No cough, wheezing, hemoptysis; +shortness of breath  CARDIOVASCULAR: No chest pain or palpitations  GASTROINTESTINAL: No abdominal or epigastric pain. No nausea, vomiting, or hematemesis; No diarrhea or constipation. No melena or hematochezia.  GENITOURINARY: No dysuria, frequency or hematuria  NEUROLOGICAL: No numbness or weakness  SKIN: No itching, rashes  ENDO: no heat, cold intolerance

## 2022-08-11 NOTE — H&P ADULT - NSHPSOCIALHISTORY_GEN_ALL_CORE
Lives with sons, assist with all ADLs, walks around independently at home, uses walker when outside  Former smoker - quit 30 years ago, endorses social alcohol use, no drug use

## 2022-08-11 NOTE — PROGRESS NOTE ADULT - SUBJECTIVE AND OBJECTIVE BOX
ZANDER JEAN-BAPTISTE  66y  Female      Patient is a 66y old  Female who presents with a chief complaint of lung txp eval (11 Aug 2022 01:42)      INTERVAL HPI/OVERNIGHT EVENTS:  Patient w/ hx of severe pulmonary HTN and HF admitted to outside hospital for HF exacerbation, transferred here for transplant eval       REVIEW OF SYSTEMS:  Has SOB, no chest pain or cough     FAMILY HISTORY:  No pertinent family history      T(C): 36.5 (08-11-22 @ 04:48), Max: 36.5 (08-11-22 @ 00:40)  HR: 68 (08-11-22 @ 06:45) (67 - 80)  BP: 104/53 (08-11-22 @ 04:48) (104/53 - 117/59)  RR: 20 (08-11-22 @ 06:44) (18 - 20)  SpO2: 97% (08-11-22 @ 06:45) (86% - 100%)  Wt(kg): --Vital Signs Last 24 Hrs  T(C): 36.5 (11 Aug 2022 04:48), Max: 36.5 (11 Aug 2022 00:40)  T(F): 97.7 (11 Aug 2022 04:48), Max: 97.7 (11 Aug 2022 00:40)  HR: 68 (11 Aug 2022 06:45) (67 - 80)  BP: 104/53 (11 Aug 2022 04:48) (104/53 - 117/59)  BP(mean): 70 (11 Aug 2022 04:48) (70 - 70)  RR: 20 (11 Aug 2022 06:44) (18 - 20)  SpO2: 97% (11 Aug 2022 06:45) (86% - 100%)    Parameters below as of 11 Aug 2022 06:44  Patient On (Oxygen Delivery Method): nasal cannula  O2 Flow (L/min): 6    nitrates (Unknown)      PHYSICAL EXAM:  GENERAL: NAD, lying in bed comfortably, on HFNC  HEAD:  Atraumatic, Normocephalic  EYES: EOMI, PERRLA, conjunctiva and sclera clear  ENT: Moist mucous membranes  NECK: Supple, No JVD  CHEST/LUNG: coarse breath sounds, Unlabored respirations  HEART: Regular rate and rhythm; No murmurs, rubs, or gallops  ABDOMEN: BSx4; Soft, nontender, nondistended  EXTREMITIES:  1+ Peripheral Pulses, brisk capillary refill. No clubbing, cyanosis, or edema  NERVOUS SYSTEM:  A&Ox3, 4/5 strength b/l LEs   SKIN: No rashes or lesions    Consultant(s) Notes Reviewed:  [x ] YES  [ ] NO  Care Discussed with Consultants/Other Providers [ x] YES  [ ] NO    LABS:      RADIOLOGY & ADDITIONAL TESTS:    Imaging Personally Reviewed:  [ ] YES  [ ] NO  acetaminophen     Tablet .. 650 milliGRAM(s) Oral every 6 hours PRN  ALBUTerol    90 MICROgram(s) HFA Inhaler 2 Puff(s) Inhalation every 6 hours PRN  ambrisentan 10 milliGRAM(s) Oral daily  apixaban 5 milliGRAM(s) Oral two times a day  aspirin  chewable 81 milliGRAM(s) Oral daily  atorvastatin 20 milliGRAM(s) Oral at bedtime  budesonide  80 MICROgram(s)/formoterol 4.5 MICROgram(s) Inhaler 2 Puff(s) Inhalation two times a day  buPROPion XL (24-Hour) . 300 milliGRAM(s) Oral daily  digoxin     Tablet 125 MICROGram(s) Oral daily  diltiazem    milliGRAM(s) Oral daily  furosemide   Injectable 60 milliGRAM(s) IV Push two times a day  melatonin 3 milliGRAM(s) Oral at bedtime PRN  pantoprazole    Tablet 40 milliGRAM(s) Oral before breakfast  spironolactone 50 milliGRAM(s) Oral daily  tiotropium 18 MICROgram(s) Capsule 1 Capsule(s) Inhalation daily      HEALTH ISSUES - PROBLEM Dx:  Acute respiratory failure with hypoxia    JUANA (acute kidney injury)    Severe pulmonary hypertension    Acute on chronic diastolic congestive heart failure    Prophylactic measure    COPD, moderate    Chronic atrial fibrillation    Anemia             ZANDER JEAN-BAPTISTE  66y  Female      Patient is a 66y old  Female who presents with a chief complaint of lung txp eval (11 Aug 2022 01:42)      INTERVAL HPI/OVERNIGHT EVENTS:  Patient w/ hx of severe pulmonary HTN and HF admitted to outside hospital for HF exacerbation, transferred here for transplant eval     Patient feeling better this morning and urinating on IV lasix       REVIEW OF SYSTEMS:  Has SOB, no chest pain or cough     FAMILY HISTORY:  No pertinent family history      T(C): 36.5 (08-11-22 @ 04:48), Max: 36.5 (08-11-22 @ 00:40)  HR: 68 (08-11-22 @ 06:45) (67 - 80)  BP: 104/53 (08-11-22 @ 04:48) (104/53 - 117/59)  RR: 20 (08-11-22 @ 06:44) (18 - 20)  SpO2: 97% (08-11-22 @ 06:45) (86% - 100%)  Wt(kg): --Vital Signs Last 24 Hrs  T(C): 36.5 (11 Aug 2022 04:48), Max: 36.5 (11 Aug 2022 00:40)  T(F): 97.7 (11 Aug 2022 04:48), Max: 97.7 (11 Aug 2022 00:40)  HR: 68 (11 Aug 2022 06:45) (67 - 80)  BP: 104/53 (11 Aug 2022 04:48) (104/53 - 117/59)  BP(mean): 70 (11 Aug 2022 04:48) (70 - 70)  RR: 20 (11 Aug 2022 06:44) (18 - 20)  SpO2: 97% (11 Aug 2022 06:45) (86% - 100%)    Parameters below as of 11 Aug 2022 06:44  Patient On (Oxygen Delivery Method): nasal cannula  O2 Flow (L/min): 6    nitrates (Unknown)      PHYSICAL EXAM:  GENERAL: NAD, lying in bed comfortably, on HFNC  HEAD:  Atraumatic, Normocephalic  EYES: EOMI, PERRLA, conjunctiva and sclera clear  ENT: Moist mucous membranes  NECK: Supple, JVD present   CHEST/LUNG: coarse breath sounds, Unlabored respirations  HEART: Regular rate and rhythm; No murmurs, rubs, or gallops  ABDOMEN: BSx4; Soft, nontender, nondistended  EXTREMITIES: 3+ bilateral LE edema extending up to thighs   NERVOUS SYSTEM:  A&Ox3, 4/5 strength b/l LEs       Consultant(s) Notes Reviewed:  [x ] YES  [ ] NO  Care Discussed with Consultants/Other Providers [ x] YES  [ ] NO    LABS:                         8.1    5.26  )-----------( 218      ( 11 Aug 2022 05:53 )             25.5       08-11    129<L>  |  90<L>  |  95<H>  ----------------------------<  74  4.4   |  26  |  1.89<H>    Ca    9.2      11 Aug 2022 05:55  Phos  4.2     08-11  Mg     2.2     08-11    TPro  6.5  /  Alb  3.9  /  TBili  0.4  /  DBili  x   /  AST  17  /  ALT  8<L>  /  AlkPhos  101  08-11              PT/INR - ( 11 Aug 2022 05:55 )   PT: 13.3 sec;   INR: 1.15 ratio         PTT - ( 11 Aug 2022 05:55 )  PTT:30.4 sec            RADIOLOGY & ADDITIONAL TESTS:      Imaging Personally Reviewed:  [ ] YES  [ ] NO  acetaminophen     Tablet .. 650 milliGRAM(s) Oral every 6 hours PRN  ALBUTerol    90 MICROgram(s) HFA Inhaler 2 Puff(s) Inhalation every 6 hours PRN  ambrisentan 10 milliGRAM(s) Oral daily  apixaban 5 milliGRAM(s) Oral two times a day  aspirin  chewable 81 milliGRAM(s) Oral daily  atorvastatin 20 milliGRAM(s) Oral at bedtime  budesonide  80 MICROgram(s)/formoterol 4.5 MICROgram(s) Inhaler 2 Puff(s) Inhalation two times a day  buPROPion XL (24-Hour) . 300 milliGRAM(s) Oral daily  digoxin     Tablet 125 MICROGram(s) Oral daily  diltiazem    milliGRAM(s) Oral daily  furosemide   Injectable 60 milliGRAM(s) IV Push two times a day  melatonin 3 milliGRAM(s) Oral at bedtime PRN  pantoprazole    Tablet 40 milliGRAM(s) Oral before breakfast  spironolactone 50 milliGRAM(s) Oral daily  tiotropium 18 MICROgram(s) Capsule 1 Capsule(s) Inhalation daily      HEALTH ISSUES - PROBLEM Dx:  Acute respiratory failure with hypoxia    JUANA (acute kidney injury)    Severe pulmonary hypertension    Acute on chronic diastolic congestive heart failure    Prophylactic measure    COPD, moderate    Chronic atrial fibrillation    Anemia

## 2022-08-11 NOTE — PROGRESS NOTE ADULT - PROBLEM SELECTOR PLAN 7
History of Afib on eliquis 5mg BID (held briefly at OSH due to acute drop in Hgb from baseline approx 8).  Tele reviewed - patient currently in afib, rate controlled.  - continue digoxin 125  - continue diltiazem 120mg qd

## 2022-08-11 NOTE — H&P ADULT - PROBLEM SELECTOR PLAN 7
History of Afib on eliquis 5mg BID (held briefly at OSH due to acute drop in Hgb from baseline approx 8). History of Afib on eliquis 5mg BID (held briefly at OSH due to acute drop in Hgb from baseline approx 8).  Tele reviewed - patient currently in afib, rate controlled.  - continue digoxin 125  - home: diltiazem 120mg qd History of Afib on eliquis 5mg BID (held briefly at OSH due to acute drop in Hgb from baseline approx 8).  Tele reviewed - patient currently in afib, rate controlled.  - continue digoxin 125  - continue diltiazem 120mg qd

## 2022-08-11 NOTE — PROGRESS NOTE ADULT - PROBLEM SELECTOR PLAN 6
Hgb 6.9 at OSH, transfused 1U PRBC with improvement to 7.8. Patient w/o signs/symptoms of acute blood loss, HD stable. Denies changes in bowel/urinary habits, denies recent falls/trauma.   - maintain active T/S  - continue eliquis 2.5mg BID Hgb 6.9 at OSH, transfused 1U PRBC with improvement to 7.8. Patient w/o signs/symptoms of acute blood loss, HD stable. Denies changes in bowel/urinary habits, denies recent falls/trauma.   - maintain active T/S  - continue eliquis 2.5mg BID  - f/u iron studies

## 2022-08-11 NOTE — H&P ADULT - HISTORY OF PRESENT ILLNESS
66F w/ severe pulmonary HTN (Group 1, 2 & 3), HFpEF, Afib, COPD on home O2 (5L NC), CKD, ROLANDA on CPAP, morbid obesity (s/p bariatric surgery in 2012 with subsequent lap band removal due to GI bleed), PE, CVA (MCA infarct in 2012) presenting as xfer from [facility] for lung transplant evaluation.     OSH course:      66F w/ severe pulmonary HTN (Group 1, 2 & 3), HFpEF, Afib, COPD on home O2 (5L NC), CKD, ROLANDA on CPAP, morbid obesity (s/p bariatric surgery in 2012 with subsequent lap band removal due to GI bleed), PE, CVA (MCA infarct in 2012) presenting as xfer from [facility] for lung transplant evaluation. Patient initially presented to OSH ED for hypoxia to 80s at home while on baseline 8L NC. Patient endorses 1 week progressive SOB, limited exercise tolerance, though also experiencing dyspnea at rest. Denies PND, orthopnea, chest pain, diaphoresis, headache, abdominal pain, cough, URI symptoms, cough, recent infection, fever, sick contacts. Denies new medications, changes to existing medications. Also endorses worsening b/l LE edema up to level of thighs and 15lb weight gain since beginning of July. Endorses b/l leg cramping that she attributes to not ambulating regularly due to dyspnea. Patient instructed to increase torsemide dose if she notices weight gain, and she had been periodically taking 40mg torsemide (vs 20mg) w/ little response in urine output or change in weight. Felt she was not responding to torsemide as well as usual. Patient has also been using rescue inhalers more frequently (2x daily over the past week vs usual once daily). Taking all other medications as prescribed.     OSH course:   Given 80mg lasix IVP x1 in ED. BNP elevated to [address], Cr elevated to 2.42 (baseline 1.2-1.4), and Hgb 6.9 (baseline approx 8). Patient transfused 1U PRBC w improvement in Hgb to 7.8, and eliquis was held due to concern for bleed. Patient started on IV lasix 60mg BID. Transferred to [facility] for management of severe pulmonary hypertension and to continue lung transplant evaluation. Based on records for , appears eliquis was restarted upon dc.      66F w/ severe pulmonary HTN (Group 1, 2 & 3), HFpEF, Afib, COPD on home O2 (5L NC), CKD, ROLANDA on CPAP, morbid obesity (s/p bariatric surgery in 2012 with subsequent lap band removal due to GI bleed), PE, CVA (MCA infarct in 2012) presenting as xfer from [facility] for lung transplant evaluation. Patient initially presented to OSH ED for hypoxia to 80s at home while on baseline 8L NC. Patient endorses 1 week progressive SOB, limited exercise tolerance, though also experiencing dyspnea at rest. Denies PND, orthopnea, chest pain, diaphoresis, headache, abdominal pain, cough, URI symptoms, cough, recent infection, fever, sick contacts. Denies new medications, changes to existing medications. Also endorses worsening b/l LE edema up to level of thighs and 15lb weight gain since beginning of July. Endorses b/l leg cramping that she attributes to not ambulating regularly due to dyspnea. Patient instructed to increase torsemide dose if she notices weight gain, and she had been periodically taking 40mg torsemide (vs 20mg) w/ little response in urine output or change in weight. Felt she was not responding to torsemide as well as usual. Patient has also been using rescue inhalers more frequently (2x daily over the past week vs usual once daily). Taking all other medications as prescribed. Patient with multiple admissions over the past year for acute hypoxic respiratory failure 2/2 CHF exacerbation.     OSH course:   Given 80mg lasix IVP x1 in ED. [place]NP elevated to [address], Cr elevated to 2.42 (baseline 1.2-1.4), and Hgb 6.9 (baseline approx 8). Patient transfused 1U PRBC w improvement in Hgb to 7.8, and eliquis was held due to concern for bleed. Patient started on IV lasix 60mg BID. Transferred to [facility] for management of severe pulmonary hypertension and to continue lung transplant evaluation. Based on records for , appears eliquis was restarted upon dc.     Tele reviewed - Afib w frequent PVCs, trigeminy

## 2022-08-11 NOTE — H&P ADULT - PROBLEM SELECTOR PLAN 1
Hypoxic to 80s at home while on baseline 8L NC. Patient gaining weight since hospital discharge early July, increased torsemide w/o significant results. Likely in s/o CHF exacerbation, given elevated BNP, limited exercise tolerance, worsening LE edema. Patient afebrile, non-toxic appearing, infection unlikely.   - continue lasix 60mg IVP x1  - standing daily weights, strict I/Os  - pulm transplant and HF consults in AM Hypoxic to 80s at home while on baseline 8L NC. Patient gaining weight since hospital discharge early July, increased torsemide w/o significant results. Likely in s/o CHF exacerbation, given elevated BNP, limited exercise tolerance, worsening LE edema. Patient afebrile, non-toxic appearing, infection unlikely.   - continue lasix 60mg IVP BID  - standing daily weights, strict I/Os  - pulm transplant and HF consults in AM

## 2022-08-11 NOTE — PROGRESS NOTE ADULT - PROBLEM SELECTOR PLAN 3
RHC and cardiomems done 6/20. RHC showed elevated right sided filling pressures with severe pulmonary hypertension with systemic PA pressures, slightly elevated PCWP and preserved cardiac output. Follows with Dr. Rodriguez at [place] for PH.   - continue tadalafil 20mg BID  - continue letairis 10mg qd  - transplant pulmonology consult in AM to continue evaluation   - CT surgery aware of patient; f/u recs RHC and cardiomems done 6/20. RHC showed elevated right sided filling pressures with severe pulmonary hypertension with systemic PA pressures, slightly elevated PCWP and preserved cardiac output. Follows with Dr. Rodriguez at [place] for PH.   - continue tadalafil 40 mg daily   - continue letairis 10mg qd  - CT surgery aware of patient; f/u recs: at present state, not a transplant candidate but if we can reduce edema, improve kidney function, and optimize physical therapy (walking distance 300-400ft minimum); Ideally, BMI needs to be around ~30 for transplant candidacy

## 2022-08-11 NOTE — H&P ADULT - PROBLEM SELECTOR PLAN 3
- continue tadalafil 20mg BID  - continue letairis 10mg qd  - transplant pulmonology consult in AM  - CT surgery aware of patient; f/u recs RHC and cardiomems done 6/20. RHC showed elevated right sided filling pressures with severe pulmonary hypertension with systemic PA pressures, slightly elevated PCWP and preserved cardiac output. Follows with Dr. Rodriguez at [facility] for PH.   - continue tadalafil 20mg BID  - continue letairis 10mg qd  - transplant pulmonology consult in AM to continue evaluation   - CT surgery aware of patient; f/u recs

## 2022-08-11 NOTE — H&P ADULT - PROBLEM SELECTOR PLAN 2
Cr elevated to 2.42 on admission to  - baseline is 1.2-1.4. Likely in s/o CHF exacerbation, overall hypervolemic though likely intravascularly depleted. Likely contributing to sub-optimal response to torsemide at home.   - diuresis as above  - avoid nephrotoxic agents  - strict I/Os

## 2022-08-11 NOTE — CONSULT NOTE ADULT - ASSESSMENT
66 year old woman with HFpEF s/p CardioMEMS, severe pulmonary hypertension (Group 1, 2 & 3), COPD on home O2 8L, AF (on Eliquis), PE, CVA (MCA infarct 2012), CKD (b/l Cr 1.7-1.8), ROLANDA on CPAP and morbid obesity (s/p bariatric surgery 2012 with lap band removal d/t GIB) who was recently hospitalized 6/14-6/29/22 for volume overload where she met with lung transplant team with plan for evaluation pending improvement in BMI and deconditioned state. Had RHC in June with severely elevated PAP and preserved CO.     She presents with RINALDI, ABD bloating and LE swelling in the setting of gradual 25 lb weight gain despite escalation of diuretic regimen. She was started on intermittent IV Lasix with good response but remains markedly overloaded with predominate symptoms of elevated R sided filling pressure.       Cardiac Studies  RHC and CardioMEMS Implant 6/20/22: RA 18, /44/65, PCWP 16 (v to 18), RA 95% on 8L NC, PA 58%, CO/CI (F) 6.7/3.2, PVR 7.32 ryan, TPG 49, /60/86 (CardioMEMS PAD 49)  TTE 6/15/22: LVIDd 4.8 cm, LVEF 68%, flattening of interventricular septum consistent with RV overload, mild concentric LVH (septum 1.2 PWT 1.1), RVE with decreased systolic function, mild LAE, severe ROE, mod TR, est RVSP 61 mmHg       66 year old woman with HFpEF with primarily RV dysfunction s/p CardioMEMS, severe pulmonary hypertension (Group 1, 2 & 3), COPD on home O2 8L, AF (on Eliquis), PE, CVA (MCA infarct 2012), CKD (b/l Cr 1.7-1.8), ROLANDA on CPAP and morbid obesity (s/p bariatric surgery 2012 with lap band removal d/t GIB) who was recently hospitalized 6/14-6/29/22 for volume overload where she met with lung transplant team with plan for evaluation pending improvement in BMI and deconditioned state. Had RHC in June with severely elevated PAP and preserved CO.     She presents with RINALDI, ABD bloating and LE swelling in the setting of gradual 25 lb weight gain despite escalation of diuretic regimen. She was started on intermittent IV Lasix with good response but remains markedly overloaded with predominate symptoms of elevated R sided filling pressure.       Cardiac Studies  RHC and CardioMEMS Implant 6/20/22: RA 18, /44/65, PCWP 16 (v to 18), RA 95% on 8L NC, PA 58%, CO/CI (F) 6.7/3.2, PVR 7.32 ryan, TPG 49, /60/86 (CardioMEMS PAD 49)  TTE 6/15/22: LVIDd 4.8 cm, LVEF 68%, flattening of interventricular septum consistent with RV overload, mild concentric LVH (septum 1.2 PWT 1.1), RVE with decreased systolic function, mild LAE, severe ROE, mod TR, est RVSP 61 mmHg

## 2022-08-12 NOTE — PROGRESS NOTE ADULT - ATTENDING COMMENTS
Patient seen and evaluated. LE swelling improving, ambualted with PT with some SOB, but improved from prior to admission. Down 1.2kg.  #acute decompensated biventricular failure  -patient with biventricular failure but likely predominany right sided failure with JVD, LE edema.   -continue current diuresis with lasix 40mg IV BID  -cardiomems to be checked by heart failure.  -strict I/O, daily standing weights.     #severe pulm HTN  -continue current pulm HTN medications.   -given BMI >35, not candidate currently for pulm transplant and will not initiate transplant workup at this time.     #CKD III  -likely cardiorenal component vs. new baseline.   -continue to monitor, avoid nephrotoxic agents.     #Anemia  -iron studies denote iron deficiency.  -start iron   -will see when had last colonoscopy. At this time high risk for colonoscopy, so may require outpatient virtual colonography if patient amenable.     rest as above

## 2022-08-12 NOTE — PROGRESS NOTE ADULT - PROBLEM SELECTOR PLAN 1
Hypoxic to 80s at home while on baseline 8L NC. Patient gaining weight since hospital discharge early July, increased torsemide w/o significant results. Likely in s/o CHF exacerbation, given elevated BNP, limited exercise tolerance, worsening LE edema. Patient afebrile, non-toxic appearing, infection unlikely.   - continue lasix 60mg IVP BID  - standing daily weights, strict I/Os  - pulm transplant and HF following  - eventual SGLT2-i prior to discharge, once on stable dose of oral diuretics  - HF: CardioMEMS PAD yesterday 43 mmHg, will read today goal ~40 but difficult to ascertain given RV dysfunction   - daily standing weight. Was discharged in June at 189 lbs. Hypoxic to 80s at home while on baseline 8L NC. Patient gaining weight since hospital discharge early July, increased torsemide w/o significant results. Likely in s/o CHF exacerbation, given elevated BNP, limited exercise tolerance, worsening LE edema. Patient afebrile, non-toxic appearing, infection unlikely.   - continue lasix 60mg IVP BID  - standing daily weights, strict I/Os  - pulm transplant and HF following  - eventual SGLT2-i prior to discharge, once on stable dose of oral diuretics  - HF: CardioMEMS PAD yesterday 43 mmHg, will read today goal ~40 but difficult to ascertain given RV dysfunction   - daily standing weight. Was discharged in June at 189 lbs. Weight on admission 212, this morning 210.

## 2022-08-12 NOTE — PROGRESS NOTE ADULT - PROBLEM SELECTOR PLAN 6
Hgb 6.9 at OSH, transfused 1U PRBC with improvement to 7.8. Patient w/o signs/symptoms of acute blood loss, HD stable. Denies changes in bowel/urinary habits, denies recent falls/trauma.   - maintain active T/S  - continue eliquis 2.5mg BID  - f/u iron studies Hgb 6.9 at OSH, transfused 1U PRBC with improvement to 7.8. Patient w/o signs/symptoms of acute blood loss, HD stable. Denies changes in bowel/urinary habits, denies recent falls/trauma.   - maintain active T/S  - continue eliquis 2.5mg BID  - f/u iron studies: total iron and % saturation decreased, ferritin on lower side, transferrin and TIBC normal

## 2022-08-12 NOTE — DISCHARGE NOTE PROVIDER - PROVIDER TOKENS
PROVIDER:[TOKEN:[[id number]],FOLLOWUP:[1-3 days]],PROVIDER:[TOKEN:[[id number]],FOLLOWUP:[1-3 days]]

## 2022-08-12 NOTE — PROGRESS NOTE ADULT - ASSESSMENT
66 year old woman with HFpEF with primarily RV dysfunction s/p CardioMEMS, severe pulmonary hypertension (Group 1, 2 & 3), COPD on home O2 8L, AF (on Eliquis), PE, CVA (MCA infarct 2012), CKD (b/l Cr 1.7-1.8), RLOANDA on CPAP and morbid obesity (s/p bariatric surgery 2012 with lap band removal d/t GIB) who was recently hospitalized 6/14-6/29/22 for volume overload where she met with lung transplant team with plan for evaluation pending improvement in BMI and deconditioned state. Had RHC in June with severely elevated PAP and preserved CO.     She presents with RINALDI, ABD bloating and LE swelling in the setting of gradual 25 lb weight gain despite escalation of diuretic regimen. She was started on intermittent IV Lasix and is responding well with 2lb weight loss over the past 24 hours, but remains markedly overloaded with predominate symptoms of elevated R sided filling pressure. Mild uptrend in her Cr noted.       Cardiac Studies  RHC and CardioMEMS Implant 6/20/22: RA 18, /44/65, PCWP 16 (v to 18), RA 95% on 8L NC, PA 58%, CO/CI (F) 6.7/3.2, PVR 7.32 ryan, TPG 49, /60/86 (CardioMEMS PAD 49)  TTE 6/15/22: LVIDd 4.8 cm, LVEF 68%, flattening of interventricular septum consistent with RV overload, mild concentric LVH (septum 1.2 PWT 1.1), RVE with decreased systolic function, mild LAE, severe ROE, mod TR, est RVSP 61 mmHg

## 2022-08-12 NOTE — DISCHARGE NOTE PROVIDER - NSDCFUADDAPPT_GEN_ALL_CORE_FT
You can choose between following up with Dr. Jackson, Dr. Monroy, or Dr. Mosher pending on your personal preference.   You can choose between following up with Dr. Jackson, Dr. Monroy, or Dr. Mosher pending on your personal preference and ease of access.

## 2022-08-12 NOTE — DISCHARGE NOTE PROVIDER - CARE PROVIDER_API CALL
Bobby Jackson  CRITICAL CARE MEDICINE  [address] [place] [address]  Phone: [phone]  Fax: [phone]  Follow Up Time: 1-3 days    Jyoti Monroy (MD; MPH)  Critical Care Medicine; Internal Medicine; Pulmonary Disease  [facility], [facility] [address]  [place] [address]  Phone: [phone]  Fax: [phone]  Follow Up Time: 1-3 days

## 2022-08-12 NOTE — DISCHARGE NOTE PROVIDER - NSFOLLOWUPCLINICS_GEN_ALL_ED_FT
[facility] Pulmonolgy and Sleep Medicine  Pulmonology  [address] [place] [address], [place] [address]  Phone: [phone]  Fax:      [facility] Pulmonolgy and Sleep Medicine  Pulmonology  [address] [place] [address]  Phone: [phone]  Fax:   Scheduled Appointment: 9/22/2022 12:30 PM

## 2022-08-12 NOTE — PROGRESS NOTE ADULT - PROBLEM SELECTOR PLAN 3
RHC and cardiomems done 6/20. RHC showed elevated right sided filling pressures with severe pulmonary hypertension with systemic PA pressures, slightly elevated PCWP and preserved cardiac output. Follows with Dr. Rodriguez at [facility] for PH.   - continue tadalafil 40 mg daily   - continue letairis 10mg qd  - CT surgery aware of patient; f/u recs: at present state, not a transplant candidate but if we can reduce edema, improve kidney function, and optimize physical therapy (walking distance 300-400ft minimum); Ideally, BMI needs to be around ~30 for transplant candidacy RHC and cardiomems done 6/20. RHC showed elevated right sided filling pressures with severe pulmonary hypertension with systemic PA pressures, slightly elevated PCWP and preserved cardiac output. Follows with Dr. Rodriguez at [facility] for PH.   - continue tadalafil 40 mg daily   - continue letairis 10mg qd  - CT surgery aware of patient; f/u recs: at present state, not a transplant candidate but if we can reduce edema, improve kidney function, and optimize physical therapy (walking distance 300-400ft minimum); Ideally, BMI needs to be around ~30 for transplant candidacy  - f/u Pulm consult

## 2022-08-12 NOTE — PROGRESS NOTE ADULT - PROBLEM SELECTOR PLAN 2
- further guidance by pulmonary hypertension team and lung transplant team  - evaluation for lung transplant pending improved BMI closer to 30 (currently 37.6), improved mobility and improved renal function  - suspect it will be difficult for her to become a transplant candidate, would consider palliative care input.

## 2022-08-12 NOTE — DISCHARGE NOTE PROVIDER - NSDCMRMEDTOKEN_GEN_ALL_CORE_FT
ambrisentan 10 mg oral tablet: 1 tab(s) orally once a day  apixaban 5 mg oral tablet: 1 tab(s) orally 2 times a day  aspirin 81 mg oral tablet, chewable: 1 tab(s) orally once a day  buPROPion 100 mg oral tablet: 1 tab(s) orally 2 times a day  digoxin 125 mcg (0.125 mg) oral tablet: 1 tab(s) orally once a day  dilTIAZem 120 mg/24 hours oral capsule, extended release: 1 cap(s) orally once a day  levalbuterol 1.25 mg/3 mL inhalation solution: 3 milliliter(s) inhaled 3 times a day  pantoprazole 40 mg oral delayed release tablet: 1 tab(s) orally once a day  rosuvastatin 5 mg oral tablet: 1 tab(s) orally once a day  spironolactone 50 mg oral tablet: 1 tab(s) orally once a day  tadalafil 20 mg oral tablet: 2 tab(s) orally once a day  torsemide 20 mg oral tablet: 2 tab(s) orally once a day  Trelegy Ellipta 100 mcg-62.5 mcg-25 mcg/inh inhalation powder: 1 puff(s) inhaled once a day   ambrisentan 10 mg oral tablet: 1 tab(s) orally once a day  apixaban 5 mg oral tablet: 1 tab(s) orally 2 times a day  aspirin 81 mg oral tablet, chewable: 1 tab(s) orally once a day  buPROPion 100 mg oral tablet: 1 tab(s) orally 2 times a day  digoxin 125 mcg (0.125 mg) oral tablet: 1 tab(s) orally once a day  dilTIAZem 120 mg/24 hours oral capsule, extended release: 1 cap(s) orally once a day  ferrous sulfate 325 mg (65 mg elemental iron) oral tablet: 1 tab(s) orally   levalbuterol 1.25 mg/3 mL inhalation solution: 3 milliliter(s) inhaled 3 times a day  pantoprazole 40 mg oral delayed release tablet: 1 tab(s) orally once a day  polyethylene glycol 3350 oral powder for reconstitution: 17 gram(s) orally once a day  rosuvastatin 5 mg oral tablet: 1 tab(s) orally once a day  tadalafil 20 mg oral tablet: 2 tab(s) orally once a day  Trelegy Ellipta 100 mcg-62.5 mcg-25 mcg/inh inhalation powder: 1 puff(s) inhaled once a day   ambrisentan 10 mg oral tablet: 1 tab(s) orally once a day  apixaban 5 mg oral tablet: 1 tab(s) orally 2 times a day  aspirin 81 mg oral tablet, chewable: 1 tab(s) orally once a day  buPROPion 100 mg oral tablet: 1 tab(s) orally 2 times a day  digoxin 125 mcg (0.125 mg) oral tablet: 1 tab(s) orally every other day  dilTIAZem 120 mg/24 hours oral capsule, extended release: 1 cap(s) orally once a day  levalbuterol 1.25 mg/3 mL inhalation solution: 3 milliliter(s) inhaled 3 times a day  pantoprazole 40 mg oral delayed release tablet: 1 tab(s) orally once a day  polyethylene glycol 3350 oral powder for reconstitution: 17 gram(s) orally once a day  rosuvastatin 5 mg oral tablet: 1 tab(s) orally once a day  tadalafil 20 mg oral tablet: 2 tab(s) orally once a day  Trelegy Ellipta 100 mcg-62.5 mcg-25 mcg/inh inhalation powder: 1 puff(s) inhaled once a day   ambrisentan 10 mg oral tablet: 1 tab(s) orally once a day  apixaban 5 mg oral tablet: 1 tab(s) orally 2 times a day  aspirin 81 mg oral tablet, chewable: 1 tab(s) orally once a day  bumetanide 2 mg oral tablet: 2 tab(s) orally every 12 hours  buPROPion 100 mg oral tablet: 1 tab(s) orally 2 times a day  digoxin 125 mcg (0.125 mg) oral tablet: 1 tab(s) orally every other day  dilTIAZem 120 mg/24 hours oral capsule, extended release: 1 cap(s) orally once a day  ferrous sulfate 325 mg (65 mg elemental iron) oral tablet: 1 tab(s) orally every 48 hours   levalbuterol 1.25 mg/3 mL inhalation solution: 3 milliliter(s) inhaled 3 times a day  pantoprazole 40 mg oral delayed release tablet: 1 tab(s) orally once a day  polyethylene glycol 3350 oral powder for reconstitution: 17 gram(s) orally once a day  rosuvastatin 5 mg oral tablet: 1 tab(s) orally once a day  spironolactone 25 mg oral tablet: 1 tab(s) orally 2 times a day  tadalafil 20 mg oral tablet: 2 tab(s) orally once a day  Trelegy Ellipta 100 mcg-62.5 mcg-25 mcg/inh inhalation powder: 1 puff(s) inhaled once a day   ambrisentan 10 mg oral tablet: 1 tab(s) orally once a day   apixaban 5 mg oral tablet: 1 tab(s) orally 2 times a day  aspirin 81 mg oral tablet, chewable: 1 tab(s) orally once a day  bumetanide 2 mg oral tablet: 2 tab(s) orally every 12 hours  buPROPion 100 mg oral tablet: 1 tab(s) orally 2 times a day  digoxin 125 mcg (0.125 mg) oral tablet: 1 tab(s) orally every other day  dilTIAZem 120 mg/24 hours oral capsule, extended release: 1 cap(s) orally once a day  ferrous sulfate 325 mg (65 mg elemental iron) oral tablet: 1 tab(s) orally every 48 hours   levalbuterol 1.25 mg/3 mL inhalation solution: 3 milliliter(s) inhaled 3 times a day  pantoprazole 40 mg oral delayed release tablet: 1 tab(s) orally once a day  polyethylene glycol 3350 oral powder for reconstitution: 17 gram(s) orally once a day  rosuvastatin 5 mg oral tablet: 1 tab(s) orally once a day  spironolactone 25 mg oral tablet: 1 tab(s) orally 2 times a day  tadalafil 20 mg oral tablet: 2 tab(s) orally once a day  Trelegy Ellipta 100 mcg-62.5 mcg-25 mcg/inh inhalation powder: 1 puff(s) inhaled once a day

## 2022-08-12 NOTE — PROGRESS NOTE ADULT - PROBLEM SELECTOR PLAN 3
- recent Cr ranging 1.7-1.8, now 1.97, had been 1.4-1.6 in early June  - eventual SGLT2-i as above to delay further progression of CKD  - continue to monitor closely with diuresis

## 2022-08-12 NOTE — PROGRESS NOTE ADULT - SUBJECTIVE AND OBJECTIVE BOX
Patient seen at bedside, sitting in chair on 8L NC.  Stated she took a shower this AM, feels a little better with respiratory status, thinks she lost 1 pound since admission         Home Meds: Home Medications:  ambrisentan 10 mg oral tablet: 1 tab(s) orally once a day (11 Aug 2022 03:09)  apixaban 5 mg oral tablet: 1 tab(s) orally 2 times a day (11 Aug 2022 03:09)  buPROPion 100 mg oral tablet: 1 tab(s) orally 2 times a day (11 Aug 2022 03:09)  digoxin 125 mcg (0.125 mg) oral tablet: 1 tab(s) orally once a day (11 Aug 2022 03:09)  dilTIAZem 120 mg/24 hours oral capsule, extended release: 1 cap(s) orally once a day (11 Aug 2022 03:09)  levalbuterol 1.25 mg/3 mL inhalation solution: 3 milliliter(s) inhaled 3 times a day (11 Aug 2022 03:09)  pantoprazole 40 mg oral delayed release tablet: 1 tab(s) orally once a day (11 Aug 2022 03:09)  rosuvastatin 5 mg oral tablet: 1 tab(s) orally once a day (11 Aug 2022 03:09)  spironolactone 50 mg oral tablet: 1 tab(s) orally once a day (11 Aug 2022 03:09)  tadalafil 20 mg oral tablet: 2 tab(s) orally once a day (11 Aug 2022 03:09)  Trelegy Ellipta 100 mcg-62.5 mcg-25 mcg/inh inhalation powder: 1 puff(s) inhaled once a day (11 Aug 2022 03:09)      ROS:    REVIEW OF SYSTEMS    [x] A ten-point review of systems was otherwise negative except as noted.  [ ] Due to altered mental status/intubation, subjective information were not able to be obtained from the patient. History was obtained, to the extent possible, from review of the chart and collateral sources of information.      CURRENT MEDICATIONS:   --------------------------------------------------------------------------------------  Neurologic Medications  acetaminophen     Tablet .. 650 milliGRAM(s) Oral every 6 hours PRN Temp greater or equal to 38C (100.4F), Mild Pain (1 - 3)  buPROPion XL (24-Hour) . 300 milliGRAM(s) Oral daily  melatonin 3 milliGRAM(s) Oral at bedtime PRN Insomnia    Respiratory Medications  ALBUTerol    90 MICROgram(s) HFA Inhaler 2 Puff(s) Inhalation every 6 hours PRN Shortness of Breath and/or Wheezing  budesonide  80 MICROgram(s)/formoterol 4.5 MICROgram(s) Inhaler 2 Puff(s) Inhalation two times a day  tiotropium 18 MICROgram(s) Capsule 1 Capsule(s) Inhalation daily    Cardiovascular Medications  ambrisentan 10 milliGRAM(s) Oral daily  digoxin     Tablet 125 MICROGram(s) Oral daily  diltiazem    milliGRAM(s) Oral daily  furosemide   Injectable 60 milliGRAM(s) IV Push two times a day  spironolactone 50 milliGRAM(s) Oral daily  tadalafil 40 milliGRAM(s) Oral daily    Gastrointestinal Medications  pantoprazole    Tablet 40 milliGRAM(s) Oral before breakfast    Genitourinary Medications    Hematologic/Oncologic Medications  apixaban 5 milliGRAM(s) Oral two times a day  aspirin  chewable 81 milliGRAM(s) Oral daily    Antimicrobial/Immunologic Medications    Endocrine/Metabolic Medications  atorvastatin 20 milliGRAM(s) Oral at bedtime    Topical/Other Medications    --------------------------------------------------------------------------------------    VITAL SIGNS, INS/OUTS (last 24 hours):  --------------------------------------------------------------------------------------  ICU Vital Signs Last 24 Hrs  T(C): 36.4 (12 Aug 2022 04:40), Max: 36.4 (11 Aug 2022 11:00)  T(F): 97.5 (12 Aug 2022 04:40), Max: 97.5 (11 Aug 2022 11:00)  HR: 72 (12 Aug 2022 05:17) (60 - 78)  BP: 96/57 (12 Aug 2022 04:40) (96/57 - 113/60)  BP(mean): 66 (11 Aug 2022 20:11) (66 - 66)  ABP: --  ABP(mean): --  RR: 18 (12 Aug 2022 04:40) (18 - 18)  SpO2: 98% (12 Aug 2022 05:17) (95% - 100%)    O2 Parameters below as of 12 Aug 2022 04:40  Patient On (Oxygen Delivery Method): BiPAP/CPAP          I&O's Summary    11 Aug 2022 07:01  -  12 Aug 2022 07:00  --------------------------------------------------------  IN: 1550 mL / OUT: 2200 mL / NET: -650 mL    12 Aug 2022 07:01  -  12 Aug 2022 10:34  --------------------------------------------------------  IN: 360 mL / OUT: 0 mL / NET: 360 mL      --------------------------------------------------------------------------------------    EXAM:  General: 8L NC,   Chest: appears normal  Respiratory: decreased breath sounds bilaterally   Cardiac: +murmur   Gastrointestinal: overweight, soft, non distended, +BS  Extremities: +++bilateral lower extremity edema with venous stasis changes  Neuro: AAO x 3  --------------------------------------------------------------------------------------

## 2022-08-12 NOTE — DISCHARGE NOTE PROVIDER - CARE PROVIDERS DIRECT ADDRESSES
[email or web address] - - -

## 2022-08-12 NOTE — DISCHARGE NOTE PROVIDER - NSDCFUSCHEDAPPT_GEN_ALL_CORE_FT
[facility] Physician [facility]  PULED [address]  Scheduled Appointment: 08/24/2022    [facility] [address]  Scheduled Appointment: 08/24/2022    Emory Reaves  [facility] Physician [facility] [address]  Scheduled Appointment: 08/24/2022    Melita Solomon  [facility]  [facility] [address]  Scheduled Appointment: 10/28/2022     Melita Solomon  [facility] Physician [facility] [address]  Scheduled Appointment: 10/28/2022     Angel Mosher  [facility]  PULMMED [address]  Scheduled Appointment: 09/22/2022    Melita Solomon  [facility]  [facility] [address]  Scheduled Appointment: 10/28/2022     Melita Solomon  [facility] Physician [facility]  HEARTFAIL [address]  Scheduled Appointment: 09/16/2022    Angle Mosher  [facility] Physician [facility]  PULED [address]  Scheduled Appointment: 09/22/2022    Melita Solmoon  [facility]  HEARTFAIL [address]  Scheduled Appointment: 10/28/2022

## 2022-08-12 NOTE — CONSULT NOTE ADULT - ASSESSMENT
66F w/ severe pulmonary HTN (Group 1, 2 & 3), HFpEF, Afib, COPD on home O2, CKD, ROLANDA on CPAP, morbid obesity (s/p bariatric surgery in 2012 with subsequent lap band removal due to GI bleed), PE, CVA (MCA infarct in 2012) presenting as xfer from [facility] for lung transplant evaluation following initial presentation for dypsnea and hypoxemia to 80s on home O2. Pulmonary c/s for pHTN management. Last RHC #s 18, 106/44 (65), 16, 58%, 6.7/3.1, PVR ~7 RAI    #pulmonary HTN, grp 1, 2, 3   #ADHF     66F w/ severe pulmonary HTN (Group 1, 2 & 3), HFpEF, Afib, COPD on home O2, CKD, ROLANDA on CPAP, morbid obesity (s/p bariatric surgery in 2012 with subsequent lap band removal due to GI bleed), PE, CVA (MCA infarct in 2012) presenting as xfer from [facility] for lung transplant evaluation following initial presentation for dypsnea and hypoxemia to 80s on home O2. Pulmonary c/s for pHTN management. Last RHC #s 18, 106/44 (65), 16, 58%, 6.7/3.1, PVR ~7 RAI in June 2022    #pulmonary HTN, grp 1, 2, 3   #ADHF  Pt presenting with dyspnea and clinical volume overload. PAD on mems ~40, similar to prior RHC. Currently with significant BLE edema and JVP elevation, subjective improvement of symptoms with initial diuresis as well. Pt may come close to BMI goal for lung transplantation pending amount of retained fluid removed with diuretics. For now would continue with diuresis. Once close to dry weight will reassess need for additional pHTN specific therapies.  - Agree with ongoing diuresis, daily weights, strict I&O, trend lytes, maintain K>4 Mg>2  - c/w home AVAPS qHS and PRN during the day, would have tubing changed  - c/w home ambrisentan and tadalafil  - c/w home inhalers  - Case d/w Dr. Mosher

## 2022-08-12 NOTE — DISCHARGE NOTE PROVIDER - NSDCCPCAREPLAN_GEN_ALL_CORE_FT
PRINCIPAL DISCHARGE DIAGNOSIS  Diagnosis: Acute respiratory failure with hypoxia  Assessment and Plan of Treatment: You were admitted to [facility] and then transferred to [facility]  for worsening difficulty with breathing and shortness of breadth. This was a result of worsening symptoms from your pulmonary hypertension and heart failure. To treat these symptoms, your regular home lasix diuretic dose was inceased to 80 mg IV twice a day, which achieved adequete diuresis and helped relieve some of your symptoms. However, the medical therapy is unfortuantely not a cure to your worsenig pulmonary hypertension. A lung transplant would be the treatment option that is curative, but you were unfortunately not deemed to be a medically safe candidate for the procedure at this time. As a result, the goal moving forward would be top continue with your medical therapy to ensure that you suffer as little as possible from any disease progression.       PRINCIPAL DISCHARGE DIAGNOSIS  Diagnosis: Acute respiratory failure with hypoxia  Assessment and Plan of Treatment: You were admitted to [facility] and then transferred to [facility]  for worsening difficulty with breathing and shortness of breadth. This was a result of worsening symptoms from your pulmonary hypertension and heart failure. To treat these symptoms, your regular home lasix diuretic dose was inceased to 80 mg IV twice a day, followed by a lasix drip, which achieved adequete diuresis and helped relieve some of your symptoms. However, the medical therapy is unfortuantely not a cure to your worsenig pulmonary hypertension. A lung transplant would be the treatment option that is curative, but you were unfortunately not deemed to be a medically safe candidate for the procedure at this time. As a result, the goal moving forward would be to continue with your medical therapy to ensure that your symtpoms are under appropiate control to avoid repeated readmissions ot the hospital for medical managment.   Please follow up with your pulmonologist within 1 week of leaving the hospital.       PRINCIPAL DISCHARGE DIAGNOSIS  Diagnosis: Acute respiratory failure with hypoxia  Assessment and Plan of Treatment: You were admitted to [facility] and then transferred to [facility]  for worsening difficulty with breathing and shortness of breadth. This was a result of worsening symptoms from your pulmonary hypertension and heart failure. To treat these symptoms, your regular home lasix diuretic dose was inceased to 80 mg IV twice a day, followed by a lasix drip, then IV and oral bumetanide,, which achieved adequete diuresis and helped relieve some of your symptoms. However, the medical therapy is unfortuantely not a cure to your worsening pulmonary hypertension. A lung transplant would be the treatment option that is curative, but you were unfortunately not deemed to be a medically safe candidate for the procedure at this time. As a result, the goal moving forward would be to continue with your medical therapy to ensure that your symtpoms are under appropiate control to avoid repeated readmissions ot the hospital for medical managment.   Please follow up with your pulmonologist within 1 week of leaving the hospital.       PRINCIPAL DISCHARGE DIAGNOSIS  Diagnosis: Acute respiratory failure with hypoxia  Assessment and Plan of Treatment: You were admitted to [facility] and then transferred to [facility]  for worsening difficulty with breathing and shortness of breadth. This was a result of worsening symptoms from your pulmonary hypertension and heart failure. To treat these symptoms, your regular home lasix diuretic dose was inceased to 80 mg IV twice a day, followed by a lasix drip, then IV and oral bumetanide 4mg by mouth twice per day, which achieved adequete fluid removal and helped relieve some of your symptoms. However, the medical therapy is unfortuantely not a cure to your worsening pulmonary hypertension. The goal moving forward would be to continue with your medical therapy to ensure that your symtpoms are under appropiate control to avoid repeated readmissions to the hospital for medical managment.   Please follow up with your pulmonologist within 1 week of leaving the hospital. And consider the pulmonologists at [facility] and [place] for transplant evals. And consider seeing a pulmonologist local to you.       PRINCIPAL DISCHARGE DIAGNOSIS  Diagnosis: Acute respiratory failure with hypoxia  Assessment and Plan of Treatment: You were admitted to [facility] and then transferred to [facility]  for worsening difficulty with breathing and shortness of breadth. This was a result of worsening symptoms from your pulmonary hypertension and heart failure. To treat these symptoms, your regular home lasix diuretic dose was inceased to 80 mg IV twice a day, followed by a lasix drip, then IV and oral bumetanide 4mg by mouth twice per day, which achieved adequete fluid removal and helped relieve some of your symptoms. However, the medical therapy is unfortuantely not a cure to your worsening pulmonary hypertension. The goal moving forward would be to continue with your medical therapy to ensure that your symtpoms are under appropiate control to avoid repeated readmissions to the hospital for medical managment. You should continue to take oral Bumex 2x a day.  Please follow up with your pulmonologist within 1 week of leaving the hospital. And consider the pulmonologists at [facility] and [place] for transplant evals. And consider seeing a pulmonologist local to you. If you develop worsening shortness of breath, swelling, chest pain or feel like you unable to breath please return to the emergency room.

## 2022-08-12 NOTE — PROGRESS NOTE ADULT - ASSESSMENT
66F w/ severe pulmonary HTN (Group 1, 2 & 3), HFpEF, Afib, COPD on home O2 (5L NC), CKD, ROLANDA on CPAP, morbid obesity (s/p bariatric surgery in 2012 with subsequent lap band removal due to GI bleed), PE, CVA (MCA infarct in 2012). Transferred from OSH for severe pulmonary hypertension, with possible launch for transplant evaluation.    Dr. Mosher team for management of pHTN  Appreciate HF recs   Repeat ECHO  consider flolan   optimize kidney function  at present state, not a transplant candidate but if we can reduce edema, improve kidney function, and optimize physical therapy (walking distance 300-400ft minimum)  Ideally, BMI needs to be around ~30 for transplant candidacy  SW consult for support system      Above discussed with Dr. Reaves    66F w/ severe pulmonary HTN (Group 1, 2 & 3), HFpEF, Afib, COPD on home O2 (5L NC), CKD, ROLANDA on CPAP, morbid obesity (s/p bariatric surgery in 2012 with subsequent lap band removal due to GI bleed), PE, CVA (MCA infarct in 2012). Transferred from OSH for severe pulmonary hypertension, with possible launch for transplant evaluation.    Dr. Mosher team for management of pHTN  Appreciate HF recs   Repeat ECHO  consider flolan   optimize kidney function  at present state, not a transplant candidate but if we can reduce edema, improve kidney function, and optimize physical therapy (walking distance 300-400ft minimum)  Ideally, BMI needs to be around ~30 for transplant candidacy  SW consult for support system      Above discussed with Dr. Reaves and Dr. Reaves participated in care of these cases and saw the cases and organized rounds

## 2022-08-12 NOTE — PROGRESS NOTE ADULT - SUBJECTIVE AND OBJECTIVE BOX
ZANDER JEAN-BAPTISTE  66y  Female      Patient is a 66y old  Female who presents with a chief complaint of lung txp eval (11 Aug 2022 15:24)      INTERVAL HPI/OVERNIGHT EVENTS:  Provider handoff from yesterday - started on lasix IV BID, home meds for pulomnary htn, afib, COPD continued, CTM anemia and juana, ins and outs, daily weights, PT consult, f/u HF consult and cariomems      REVIEW OF SYSTEMS:  Shortness of breadth, improved   No chest pain, cough     FAMILY HISTORY:  No pertinent family history      T(C): 36.4 (08-12-22 @ 04:40), Max: 36.4 (08-11-22 @ 11:00)  HR: 72 (08-12-22 @ 05:17) (60 - 78)  BP: 96/57 (08-12-22 @ 04:40) (96/57 - 113/60)  RR: 18 (08-12-22 @ 04:40) (18 - 18)  SpO2: 98% (08-12-22 @ 05:17) (95% - 100%)  Wt(kg): --Vital Signs Last 24 Hrs  T(C): 36.4 (12 Aug 2022 04:40), Max: 36.4 (11 Aug 2022 11:00)  T(F): 97.5 (12 Aug 2022 04:40), Max: 97.5 (11 Aug 2022 11:00)  HR: 72 (12 Aug 2022 05:17) (60 - 78)  BP: 96/57 (12 Aug 2022 04:40) (96/57 - 113/60)  BP(mean): 66 (11 Aug 2022 20:11) (66 - 66)  RR: 18 (12 Aug 2022 04:40) (18 - 18)  SpO2: 98% (12 Aug 2022 05:17) (95% - 100%)    Parameters below as of 12 Aug 2022 04:40  Patient On (Oxygen Delivery Method): BiPAP/CPAP      nitrates (Unknown)      PHYSICAL EXAM:  GENERAL: NAD, lying in bed comfortably, on HFNC  HEAD:  Atraumatic, Normocephalic  EYES: EOMI, PERRLA, conjunctiva and sclera clear  ENT: Moist mucous membranes  NECK: Supple, JVD present   CHEST/LUNG: coarse breath sounds, Unlabored respirations  HEART: Regular rate and rhythm; No murmurs, rubs, or gallops  ABDOMEN: BSx4; Soft, nontender, nondistended  EXTREMITIES: 3+ bilateral LE edema extending up to thighs   NERVOUS SYSTEM:  A&Ox3, 4/5 strength b/l LEs     Consultant(s) Notes Reviewed:  [x ] YES  [ ] NO  Care Discussed with Consultants/Other Providers [ x] YES  [ ] NO    LABS:      RADIOLOGY & ADDITIONAL TESTS:    Imaging Personally Reviewed:  [ ] YES  [ ] NO  acetaminophen     Tablet .. 650 milliGRAM(s) Oral every 6 hours PRN  ALBUTerol    90 MICROgram(s) HFA Inhaler 2 Puff(s) Inhalation every 6 hours PRN  ambrisentan 10 milliGRAM(s) Oral daily  apixaban 5 milliGRAM(s) Oral two times a day  aspirin  chewable 81 milliGRAM(s) Oral daily  atorvastatin 20 milliGRAM(s) Oral at bedtime  budesonide  80 MICROgram(s)/formoterol 4.5 MICROgram(s) Inhaler 2 Puff(s) Inhalation two times a day  buPROPion XL (24-Hour) . 300 milliGRAM(s) Oral daily  digoxin     Tablet 125 MICROGram(s) Oral daily  diltiazem    milliGRAM(s) Oral daily  furosemide   Injectable 60 milliGRAM(s) IV Push two times a day  melatonin 3 milliGRAM(s) Oral at bedtime PRN  pantoprazole    Tablet 40 milliGRAM(s) Oral before breakfast  spironolactone 50 milliGRAM(s) Oral daily  tadalafil 40 milliGRAM(s) Oral daily  tiotropium 18 MICROgram(s) Capsule 1 Capsule(s) Inhalation daily      HEALTH ISSUES - PROBLEM Dx:  Acute respiratory failure with hypoxia    JUANA (acute kidney injury)    Severe pulmonary hypertension    Acute on chronic diastolic congestive heart failure    Prophylactic measure    COPD, moderate    Chronic atrial fibrillation    Anemia    Chronic kidney disease, unspecified CKD stage             ZANDER JEAN-BAPTISTE  66y  Female      Patient is a 66y old  Female who presents with a chief complaint of lung txp eval (11 Aug 2022 15:24)      INTERVAL HPI/OVERNIGHT EVENTS:  Provider handoff from yesterday - started on lasix IV BID, home meds for pulomnary htn, afib, COPD continued, CTM anemia and juana, ins and outs, daily weights, PT consult, f/u HF consult and cariomems    Patient doing well this morning, seen sitting on chair on 8L. Endorses feeling better and urinating.       REVIEW OF SYSTEMS:  Shortness of breadth, improved   No chest pain, cough     FAMILY HISTORY:  No pertinent family history      T(C): 36.4 (08-12-22 @ 04:40), Max: 36.4 (08-11-22 @ 11:00)  HR: 72 (08-12-22 @ 05:17) (60 - 78)  BP: 96/57 (08-12-22 @ 04:40) (96/57 - 113/60)  RR: 18 (08-12-22 @ 04:40) (18 - 18)  SpO2: 98% (08-12-22 @ 05:17) (95% - 100%)  Wt(kg): --Vital Signs Last 24 Hrs  T(C): 36.4 (12 Aug 2022 04:40), Max: 36.4 (11 Aug 2022 11:00)  T(F): 97.5 (12 Aug 2022 04:40), Max: 97.5 (11 Aug 2022 11:00)  HR: 72 (12 Aug 2022 05:17) (60 - 78)  BP: 96/57 (12 Aug 2022 04:40) (96/57 - 113/60)  BP(mean): 66 (11 Aug 2022 20:11) (66 - 66)  RR: 18 (12 Aug 2022 04:40) (18 - 18)  SpO2: 98% (12 Aug 2022 05:17) (95% - 100%)    Parameters below as of 12 Aug 2022 04:40  Patient On (Oxygen Delivery Method): BiPAP/CPAP      nitrates (Unknown)      PHYSICAL EXAM:  GENERAL: NAD, lying in bed comfortably, on HFNC  HEAD:  Atraumatic, Normocephalic  EYES: EOMI, PERRLA, conjunctiva and sclera clear  ENT: Moist mucous membranes  NECK: Supple, JVD present   CHEST/LUNG: coarse breath sounds, Unlabored respirations  HEART: Regular rate and rhythm; No murmurs, rubs, or gallops  ABDOMEN: BSx4; Soft, nontender, nondistended  EXTREMITIES: 3+ bilateral LE edema extending up to thighs   NERVOUS SYSTEM:  A&Ox3, 4/5 strength b/l LEs     Consultant(s) Notes Reviewed:  [x ] YES  [ ] NO  Care Discussed with Consultants/Other Providers [ x] YES  [ ] NO    LABS:                          7.7    5.01  )-----------( 234      ( 12 Aug 2022 06:51 )             24.7       08-12    130<L>  |  86<L>  |  93<H>  ----------------------------<  89  3.8   |  31  |  1.97<H>    Ca    9.0      12 Aug 2022 06:55  Phos  3.9     08-12  Mg     2.0     08-12    TPro  6.5  /  Alb  3.9  /  TBili  0.4  /  DBili  x   /  AST  15  /  ALT  7<L>  /  AlkPhos  113  08-12              PT/INR - ( 11 Aug 2022 05:55 )   PT: 13.3 sec;   INR: 1.15 ratio         PTT - ( 11 Aug 2022 05:55 )  PTT:30.4 sec            RADIOLOGY & ADDITIONAL TESTS:    Imaging Personally Reviewed:  [ ] YES  [ ] NO  acetaminophen     Tablet .. 650 milliGRAM(s) Oral every 6 hours PRN  ALBUTerol    90 MICROgram(s) HFA Inhaler 2 Puff(s) Inhalation every 6 hours PRN  ambrisentan 10 milliGRAM(s) Oral daily  apixaban 5 milliGRAM(s) Oral two times a day  aspirin  chewable 81 milliGRAM(s) Oral daily  atorvastatin 20 milliGRAM(s) Oral at bedtime  budesonide  80 MICROgram(s)/formoterol 4.5 MICROgram(s) Inhaler 2 Puff(s) Inhalation two times a day  buPROPion XL (24-Hour) . 300 milliGRAM(s) Oral daily  digoxin     Tablet 125 MICROGram(s) Oral daily  diltiazem    milliGRAM(s) Oral daily  furosemide   Injectable 60 milliGRAM(s) IV Push two times a day  melatonin 3 milliGRAM(s) Oral at bedtime PRN  pantoprazole    Tablet 40 milliGRAM(s) Oral before breakfast  spironolactone 50 milliGRAM(s) Oral daily  tadalafil 40 milliGRAM(s) Oral daily  tiotropium 18 MICROgram(s) Capsule 1 Capsule(s) Inhalation daily      HEALTH ISSUES - PROBLEM Dx:  Acute respiratory failure with hypoxia    JUANA (acute kidney injury)    Severe pulmonary hypertension    Acute on chronic diastolic congestive heart failure    Prophylactic measure    COPD, moderate    Chronic atrial fibrillation    Anemia    Chronic kidney disease, unspecified CKD stage

## 2022-08-12 NOTE — PROGRESS NOTE ADULT - PROBLEM SELECTOR PLAN 2
Cr elevated to 2.42 on admission to  - baseline is 1.7-1.8. Likely in s/o CHF exacerbation, overall hypervolemic though likely intravascularly depleted. Likely contributing to sub-optimal response to torsemide at home.   - diuresis as above  - avoid nephrotoxic agents  - strict I/Os  - 1.89 yesterday Cr elevated to 2.42 on admission to  - baseline is 1.7-1.8. Likely in s/o CHF exacerbation, overall hypervolemic though likely intravascularly depleted. Likely contributing to sub-optimal response to torsemide at home.   - diuresis as above  - avoid nephrotoxic agents  - strict I/Os  - 1.89 yesterday. This AM 1.97. CTM.

## 2022-08-12 NOTE — PROGRESS NOTE ADULT - PROBLEM SELECTOR PLAN 1
- continue Lasix 60 mg IV BID, will reassess tomorrow need for adjustment (home regimen was Torsemide 40 BID)  - continue Spironolactone 50 mg QD  - eventual SGLT2-i prior to discharge, once on stable dose of oral diuretics  - CardioMEMS PAD 8/11 43 mmHg, will check intermittently. goal ~40 but difficult to ascertain given RV dysfunction   - daily standing weight. Was discharged in June at 189 lbs.

## 2022-08-12 NOTE — CONSULT NOTE ADULT - SUBJECTIVE AND OBJECTIVE BOX
CHIEF COMPLAINT:Patient is a 66y old  Female who presents with a chief complaint of lung txp eval (12 Aug 2022 13:12)      HPI:  66F w/ severe pulmonary HTN (Group 1, 2 & 3), HFpEF, Afib, COPD on home O2 (5L NC), CKD, ROLANDA on CPAP, morbid obesity (s/p bariatric surgery in 2012 with subsequent lap band removal due to GI bleed), PE, CVA (MCA infarct in 2012) presenting as xfer from [facility] for lung transplant evaluation. Patient initially presented to OSH ED for hypoxia to 80s at home while on baseline 8L NC. Patient endorses 1 week progressive SOB, limited exercise tolerance, though also experiencing dyspnea at rest. Denies PND, orthopnea, chest pain, diaphoresis, headache, abdominal pain, cough, URI symptoms, cough, recent infection, fever, sick contacts. Denies new medications, changes to existing medications. Also endorses worsening b/l LE edema up to level of thighs and 15lb weight gain since beginning of July. Endorses b/l leg cramping that she attributes to not ambulating regularly due to dyspnea. Patient instructed to increase torsemide dose if she notices weight gain, and she had been periodically taking 40mg torsemide (vs 20mg) w/ little response in urine output or change in weight. Felt she was not responding to torsemide as well as usual. Patient has also been using rescue inhalers more frequently (2x daily over the past week vs usual once daily). Taking all other medications as prescribed. Patient with multiple admissions over the past year for acute hypoxic respiratory failure 2/2 CHF exacerbation.     OSH course:   Given 80mg lasix IVP x1 in ED[place] [address], Cr elevated to 2.42 (baseline 1.2-1.4), and Hgb 6.9 (baseline approx 8). Patient transfused 1U PRBC w improvement in Hgb to 7.8, and eliquis was held due to concern for bleed. Patient started on IV lasix 60mg BID. Transferred to [facility] for management of severe pulmonary hypertension and to continue lung transplant evaluation. Based on records for , appears eliquis was restarted upon dc.     Tele reviewed - Afib w frequent PVCs, trigeminy     (11 Aug 2022 01:42)      PAST MEDICAL & SURGICAL HISTORY:  COPD exacerbation      Severe pulmonary hypertension      Chronic kidney disease, unspecified CKD stage      Acute on chronic diastolic congestive heart failure      Pulmonary embolism      Anxiety and depression      GERD (gastroesophageal reflux disease)      Obstructive sleep apnea      Chronic atrial fibrillation      H/O pneumonectomy      Right leg weakness          FAMILY HISTORY:  No pertinent family history        SOCIAL HISTORY:  Smoking: [ ] Never Smoked [ ] Former Smoker (__ packs x ___ years) [ ] Current Smoker  (__ packs x ___ years)  Substance Use: [ ] Never Used [ ] Used ____  EtOH Use:  Marital Status: [ ] Single [ ]  [ ]  [ ]   Sexual History:   Occupation:  Recent Travel:  Country of Birth:  Advance Directives:    Allergies    nitrates (Unknown)    Intolerances        HOME MEDICATIONS:  Home Medications:  ambrisentan 10 mg oral tablet: 1 tab(s) orally once a day (11 Aug 2022 03:09)  apixaban 5 mg oral tablet: 1 tab(s) orally 2 times a day (11 Aug 2022 03:09)  buPROPion 100 mg oral tablet: 1 tab(s) orally 2 times a day (11 Aug 2022 03:09)  digoxin 125 mcg (0.125 mg) oral tablet: 1 tab(s) orally once a day (11 Aug 2022 03:09)  dilTIAZem 120 mg/24 hours oral capsule, extended release: 1 cap(s) orally once a day (11 Aug 2022 03:09)  levalbuterol 1.25 mg/3 mL inhalation solution: 3 milliliter(s) inhaled 3 times a day (11 Aug 2022 03:09)  pantoprazole 40 mg oral delayed release tablet: 1 tab(s) orally once a day (11 Aug 2022 03:09)  rosuvastatin 5 mg oral tablet: 1 tab(s) orally once a day (11 Aug 2022 03:09)  spironolactone 50 mg oral tablet: 1 tab(s) orally once a day (11 Aug 2022 03:09)  tadalafil 20 mg oral tablet: 2 tab(s) orally once a day (11 Aug 2022 03:09)  Trelegy Ellipta 100 mcg-62.5 mcg-25 mcg/inh inhalation powder: 1 puff(s) inhaled once a day (11 Aug 2022 03:09)      REVIEW OF SYSTEMS:  Constitutional: [ ] negative [ ] fevers [ ] chills [ ] weight loss [ ] weight gain  HEENT: [ ] negative [ ] dry eyes [ ] eye irritation [ ] postnasal drip [ ] nasal congestion  CV: [ ] negative  [ ] chest pain [ ] orthopnea [ ] palpitations [ ] murmur  Resp: [ ] negative [ ] cough [ ] shortness of breath [ ] dyspnea [ ] wheezing [ ] sputum [ ] hemoptysis  GI: [ ] negative [ ] nausea [ ] vomiting [ ] diarrhea [ ] constipation [ ] abd pain [ ] dysphagia   : [ ] negative [ ] dysuria [ ] nocturia [ ] hematuria [ ] increased urinary frequency  Musculoskeletal: [ ] negative [ ] back pain [ ] myalgias [ ] arthralgias [ ] fracture  Skin: [ ] negative [ ] rash [ ] itch  Neurological: [ ] negative [ ] headache [ ] dizziness [ ] syncope [ ] weakness [ ] numbness  Psychiatric: [ ] negative [ ] anxiety [ ] depression  Endocrine: [ ] negative [ ] diabetes [ ] thyroid problem  Hematologic/Lymphatic: [ ] negative [ ] anemia [ ] bleeding problem  Allergic/Immunologic: [ ] negative [ ] itchy eyes [ ] nasal discharge [ ] hives [ ] angioedema  [ ] All other systems negative  [ ] Unable to assess ROS because ________    OBJECTIVE:  ICU Vital Signs Last 24 Hrs  T(C): 36.3 (12 Aug 2022 11:17), Max: 36.4 (11 Aug 2022 20:11)  T(F): 97.4 (12 Aug 2022 11:17), Max: 97.5 (11 Aug 2022 20:11)  HR: 60 (12 Aug 2022 11:17) (60 - 72)  BP: 114/71 (12 Aug 2022 11:17) (96/57 - 114/71)  BP(mean): 66 (11 Aug 2022 20:11) (66 - 66)  ABP: --  ABP(mean): --  RR: 18 (12 Aug 2022 11:17) (18 - 18)  SpO2: 99% (12 Aug 2022 11:17) (97% - 100%)    O2 Parameters below as of 12 Aug 2022 11:17  Patient On (Oxygen Delivery Method): nasal cannula  O2 Flow (L/min): 8 08-11 @ 07:01  -  08-12 @ 07:00  --------------------------------------------------------  IN: 1550 mL / OUT: 2200 mL / NET: -650 mL    08-12 @ 07:01  -  08-12 @ 15:55  --------------------------------------------------------  IN: 600 mL / OUT: 1000 mL / NET: -400 mL      CAPILLARY BLOOD GLUCOSE          PHYSICAL EXAM:  GENERAL: NAD, well-groomed, well-developed  HEAD:  Atraumatic, Normocephalic  EYES: EOMI, PERRLA, conjunctiva and sclera clear  ENMT: No tonsillar erythema, exudates, or enlargement; Moist mucous membranes, Good dentition, No lesions  NECK: Supple, No JVD, Normal thyroid  CHEST/LUNG: Clear to auscultation bilaterally; No rales, rhonchi, wheezing, or rubs  HEART: Regular rate and rhythm; No murmurs, rubs, or gallops  ABDOMEN: Soft, Nontender, Nondistended; Bowel sounds present  VASCULAR:  2+ Peripheral Pulses, No clubbing, cyanosis, or edema  LYMPH: No lymphadenopathy noted  SKIN: No rashes or lesions  NERVOUS SYSTEM:  Alert & Oriented X3, Good concentration; Motor Strength 5/5 B/L upper and lower extremities; DTRs 2+ intact and symmetric    HOSPITAL MEDICATIONS:  Standing Meds:  ambrisentan 10 milliGRAM(s) Oral daily  apixaban 5 milliGRAM(s) Oral two times a day  aspirin  chewable 81 milliGRAM(s) Oral daily  atorvastatin 20 milliGRAM(s) Oral at bedtime  budesonide  80 MICROgram(s)/formoterol 4.5 MICROgram(s) Inhaler 2 Puff(s) Inhalation two times a day  buPROPion XL (24-Hour) . 300 milliGRAM(s) Oral daily  digoxin     Tablet 125 MICROGram(s) Oral daily  diltiazem    milliGRAM(s) Oral daily  furosemide   Injectable 60 milliGRAM(s) IV Push two times a day  iron sucrose IVPB 200 milliGRAM(s) IV Intermittent once  pantoprazole    Tablet 40 milliGRAM(s) Oral before breakfast  spironolactone 50 milliGRAM(s) Oral daily  tadalafil 40 milliGRAM(s) Oral daily  tiotropium 18 MICROgram(s) Capsule 1 Capsule(s) Inhalation daily      PRN Meds:  acetaminophen     Tablet .. 650 milliGRAM(s) Oral every 6 hours PRN  ALBUTerol    90 MICROgram(s) HFA Inhaler 2 Puff(s) Inhalation every 6 hours PRN  melatonin 3 milliGRAM(s) Oral at bedtime PRN      LABS:    The Labs were reviewed by me   The Radiology was reviewed by me    EKG tracing reviewed by me    08-12    130<L>  |  86<L>  |  93<H>  ----------------------------<  89  3.8   |  31  |  1.97<H>  08-11    129<L>  |  90<L>  |  95<H>  ----------------------------<  74  4.4   |  26  |  1.89<H>    Ca    9.0      12 Aug 2022 06:55  Ca    9.2      11 Aug 2022 05:55  Phos  3.9     08-12  Mg     2.0     08-12    TPro  6.5  /  Alb  3.9  /  TBili  0.4  /  DBili  x   /  AST  15  /  ALT  7<L>  /  AlkPhos  113  08-12  TPro  6.5  /  Alb  3.9  /  TBili  0.4  /  DBili  x   /  AST  17  /  ALT  8<L>  /  AlkPhos  101  08-11    Magnesium, Serum: 2.0 mg/dL (08-12-22 @ 06:55)  Magnesium, Serum: 2.2 mg/dL (08-11-22 @ 05:55)    Phosphorus Level, Serum: 3.9 mg/dL (08-12-22 @ 06:55)  Phosphorus Level, Serum: 4.2 mg/dL (08-11-22 @ 05:55)      PT/INR - ( 11 Aug 2022 05:55 )   PT: 13.3 sec;   INR: 1.15 ratio         PTT - ( 11 Aug 2022 05:55 )  PTT:30.4 sec                                        7.7    5.01  )-----------( 234      ( 12 Aug 2022 06:51 )             24.7                         8.1    5.26  )-----------( 218      ( 11 Aug 2022 05:53 )             25.5     CAPILLARY BLOOD GLUCOSE            MICROBIOLOGY:       RADIOLOGY:  [ ] Reviewed and interpreted by me    PULMONARY FUNCTION TESTS:    EKG:   CHIEF COMPLAINT:Patient is a 66y old  Female who presents with a chief complaint of lung txp eval (12 Aug 2022 13:12)      HPI:  66F w/ severe pulmonary HTN (Group 1, 2 & 3), HFpEF, Afib, COPD on home O2 (5L NC), CKD, ROLANDA on CPAP, morbid obesity (s/p bariatric surgery in 2012 with subsequent lap band removal due to GI bleed), PE, CVA (MCA infarct in 2012) presenting as xfer from [facility] for lung transplant evaluation. Patient initially presented to OSH ED for hypoxia to 80s at home while on baseline 8L NC. Patient endorses 1 week progressive SOB, limited exercise tolerance, though also experiencing dyspnea at rest. Denies PND, orthopnea, chest pain, diaphoresis, headache, abdominal pain, cough, URI symptoms, cough, recent infection, fever, sick contacts. Denies new medications, changes to existing medications. Also endorses worsening b/l LE edema up to level of thighs and 15lb weight gain since beginning of July. Endorses b/l leg cramping that she attributes to not ambulating regularly due to dyspnea. Patient instructed to increase torsemide dose if she notices weight gain, and she had been periodically taking 40mg torsemide (vs 20mg) w/ little response in urine output or change in weight. Felt she was not responding to torsemide as well as usual. Patient has also been using rescue inhalers more frequently (2x daily over the past week vs usual once daily). Taking all other medications as prescribed. Patient with multiple admissions over the past year for acute hypoxic respiratory failure 2/2 CHF exacerbation.     OSH course:   Given 80mg lasix IVP x1 in ED. [place] to [address], Cr elevated to 2.42 (baseline 1.2-1.4), and Hgb 6.9 (baseline approx 8). Patient transfused 1U PRBC w improvement in Hgb to 7.8, and eliquis was held due to concern for bleed. Patient started on IV lasix 60mg BID. Transferred to [facility] for management of severe pulmonary hypertension and to continue lung transplant evaluation. Based on records for , appears eliquis was restarted upon dc.     Tele reviewed - Afib w frequent PVCs, trigeminy     (11 Aug 2022 01:42)    Pulmonology consulted for pHTN management. Last RHC #s 18, 106/44 (65), 16, 6.7/3.1, PVR ~7 RAI  Underwent initial lung transplant evaluation, not a current candidate but with optimization could become one.  Following initial diuresis, the patient reports improvement of her symptoms although still very dyspneic with exertion.    PAST MEDICAL & SURGICAL HISTORY:  COPD exacerbation      Severe pulmonary hypertension      Chronic kidney disease, unspecified CKD stage      Acute on chronic diastolic congestive heart failure      Pulmonary embolism      Anxiety and depression      GERD (gastroesophageal reflux disease)      Obstructive sleep apnea      Chronic atrial fibrillation      H/O pneumonectomy      Right leg weakness          FAMILY HISTORY:  No pertinent family history        SOCIAL HISTORY:  Smoking: [ ] Never Smoked [ x] Former Smoker (_1.5_ packs x _~40__ years) [ ] Current Smoker  (__ packs x ___ years)  Substance Use: [ ] Never Used [ ] Used ____  EtOH Use:  Marital Status: [ ] Single [ ]  [ ]  [ ]   Sexual History:   Occupation:  Recent Travel:  Country of Birth:  Advance Directives:    Allergies    nitrates (Unknown)    Intolerances        HOME MEDICATIONS:  Home Medications:  ambrisentan 10 mg oral tablet: 1 tab(s) orally once a day (11 Aug 2022 03:09)  apixaban 5 mg oral tablet: 1 tab(s) orally 2 times a day (11 Aug 2022 03:09)  buPROPion 100 mg oral tablet: 1 tab(s) orally 2 times a day (11 Aug 2022 03:09)  digoxin 125 mcg (0.125 mg) oral tablet: 1 tab(s) orally once a day (11 Aug 2022 03:09)  dilTIAZem 120 mg/24 hours oral capsule, extended release: 1 cap(s) orally once a day (11 Aug 2022 03:09)  levalbuterol 1.25 mg/3 mL inhalation solution: 3 milliliter(s) inhaled 3 times a day (11 Aug 2022 03:09)  pantoprazole 40 mg oral delayed release tablet: 1 tab(s) orally once a day (11 Aug 2022 03:09)  rosuvastatin 5 mg oral tablet: 1 tab(s) orally once a day (11 Aug 2022 03:09)  spironolactone 50 mg oral tablet: 1 tab(s) orally once a day (11 Aug 2022 03:09)  tadalafil 20 mg oral tablet: 2 tab(s) orally once a day (11 Aug 2022 03:09)  Trelegy Ellipta 100 mcg-62.5 mcg-25 mcg/inh inhalation powder: 1 puff(s) inhaled once a day (11 Aug 2022 03:09)      REVIEW OF SYSTEMS:  Constitutional: [x ] negative [ ] fevers [ ] chills [ ] weight loss [ ] weight gain  HEENT: [x ] negative [ ] dry eyes [ ] eye irritation [ ] postnasal drip [ ] nasal congestion  CV: [x ] negative  [ ] chest pain [ ] orthopnea [ ] palpitations [ ] murmur  Resp: [ ] negative [ ] cough [x ] shortness of breath [ ] dyspnea [ ] wheezing [ ] sputum [ ] hemoptysis  GI: [ ] negative [ ] nausea [ ] vomiting [ ] diarrhea [ ] constipation [ ] abd pain [ ] dysphagia   : [ ] negative [ ] dysuria [ ] nocturia [ ] hematuria [ ] increased urinary frequency  Musculoskeletal: [ ] negative [ ] back pain [ ] myalgias [ ] arthralgias [ ] fracture [x ] leg swelling  Skin: [ ] negative [ ] rash [ ] itch  Neurological: [ ] negative [ ] headache [ ] dizziness [ ] syncope [ ] weakness [ ] numbness  Psychiatric: [ ] negative [ ] anxiety [ ] depression  Endocrine: [ ] negative [ ] diabetes [ ] thyroid problem  Hematologic/Lymphatic: [ ] negative [ ] anemia [ ] bleeding problem  Allergic/Immunologic: [ ] negative [ ] itchy eyes [ ] nasal discharge [ ] hives [ ] angioedema  [ x] All other systems negative  [ ] Unable to assess ROS because ________    OBJECTIVE:  ICU Vital Signs Last 24 Hrs  T(C): 36.3 (12 Aug 2022 11:17), Max: 36.4 (11 Aug 2022 20:11)  T(F): 97.4 (12 Aug 2022 11:17), Max: 97.5 (11 Aug 2022 20:11)  HR: 60 (12 Aug 2022 11:17) (60 - 72)  BP: 114/71 (12 Aug 2022 11:17) (96/57 - 114/71)  BP(mean): 66 (11 Aug 2022 20:11) (66 - 66)  ABP: --  ABP(mean): --  RR: 18 (12 Aug 2022 11:17) (18 - 18)  SpO2: 99% (12 Aug 2022 11:17) (97% - 100%)    O2 Parameters below as of 12 Aug 2022 11:17  Patient On (Oxygen Delivery Method): nasal cannula  O2 Flow (L/min): 8            08-11 @ 07:01  -  08-12 @ 07:00  --------------------------------------------------------  IN: 1550 mL / OUT: 2200 mL / NET: -650 mL    08-12 @ 07:01 - 08-12 @ 15:55  --------------------------------------------------------  IN: 600 mL / OUT: 1000 mL / NET: -400 mL      CAPILLARY BLOOD GLUCOSE          PHYSICAL EXAM:  GENERAL: NAD  HEAD:  Atraumatic, Normocephalic  EYES: EOMI, PERRLA, conjunctiva and sclera clear  ENMT: mmm  NECK: Supple, JVP ~9cm at 90 degrees  CHEST/LUNG: bibasilar rales, no wheezing  HEART: Regular rate and rhythm; No murmurs, rubs, or gallops  ABDOMEN: Soft, Nontender, Nondistended  VASCULAR:  2+ b/l pitting edema extending to the dependent areas in thighs  LYMPH: No lymphadenopathy noted  SKIN: No rashes or lesions  NERVOUS SYSTEM:  Alert & Oriented X3    HOSPITAL MEDICATIONS:  Standing Meds:  ambrisentan 10 milliGRAM(s) Oral daily  apixaban 5 milliGRAM(s) Oral two times a day  aspirin  chewable 81 milliGRAM(s) Oral daily  atorvastatin 20 milliGRAM(s) Oral at bedtime  budesonide  80 MICROgram(s)/formoterol 4.5 MICROgram(s) Inhaler 2 Puff(s) Inhalation two times a day  buPROPion XL (24-Hour) . 300 milliGRAM(s) Oral daily  digoxin     Tablet 125 MICROGram(s) Oral daily  diltiazem    milliGRAM(s) Oral daily  furosemide   Injectable 60 milliGRAM(s) IV Push two times a day  iron sucrose IVPB 200 milliGRAM(s) IV Intermittent once  pantoprazole    Tablet 40 milliGRAM(s) Oral before breakfast  spironolactone 50 milliGRAM(s) Oral daily  tadalafil 40 milliGRAM(s) Oral daily  tiotropium 18 MICROgram(s) Capsule 1 Capsule(s) Inhalation daily      PRN Meds:  acetaminophen     Tablet .. 650 milliGRAM(s) Oral every 6 hours PRN  ALBUTerol    90 MICROgram(s) HFA Inhaler 2 Puff(s) Inhalation every 6 hours PRN  melatonin 3 milliGRAM(s) Oral at bedtime PRN      LABS:    The Labs were reviewed by me   The Radiology was reviewed by me    EKG tracing reviewed by me    08-12    130<L>  |  86<L>  |  93<H>  ----------------------------<  89  3.8   |  31  |  1.97<H>  08-11    129<L>  |  90<L>  |  95<H>  ----------------------------<  74  4.4   |  26  |  1.89<H>    Ca    9.0      12 Aug 2022 06:55  Ca    9.2      11 Aug 2022 05:55  Phos  3.9     08-12  Mg     2.0     08-12    TPro  6.5  /  Alb  3.9  /  TBili  0.4  /  DBili  x   /  AST  15  /  ALT  7<L>  /  AlkPhos  113  08-12  TPro  6.5  /  Alb  3.9  /  TBili  0.4  /  DBili  x   /  AST  17  /  ALT  8<L>  /  AlkPhos  101  08-11    Magnesium, Serum: 2.0 mg/dL (08-12-22 @ 06:55)  Magnesium, Serum: 2.2 mg/dL (08-11-22 @ 05:55)    Phosphorus Level, Serum: 3.9 mg/dL (08-12-22 @ 06:55)  Phosphorus Level, Serum: 4.2 mg/dL (08-11-22 @ 05:55)      PT/INR - ( 11 Aug 2022 05:55 )   PT: 13.3 sec;   INR: 1.15 ratio         PTT - ( 11 Aug 2022 05:55 )  PTT:30.4 sec                                        7.7    5.01  )-----------( 234      ( 12 Aug 2022 06:51 )             24.7                         8.1    5.26  )-----------( 218      ( 11 Aug 2022 05:53 )             25.5     CAPILLARY BLOOD GLUCOSE            MICROBIOLOGY:       RADIOLOGY:  [ ] Reviewed and interpreted by me    PULMONARY FUNCTION TESTS:    EKG:

## 2022-08-12 NOTE — DISCHARGE NOTE PROVIDER - NSDCCPTREATMENT_GEN_ALL_CORE_FT
PRINCIPAL PROCEDURE  Procedure: 2D echo  Findings and Treatment: Conclusions:  1. Normal mitral valve. Minimal mitral regurgitation.  2. Aortic Root: 3.3 cm.Sinotubular Junction: 3 cm.Ascending  Aorta: 3 cm.  3. Endocardial visualization enhanced with intravenous  injection of Ultrasonic Enhancing Agent (Lumason). This was  a limited study due to poor image quality and off-axis  imaging. The LV function is probably normal but difficult  to determine the presence of wall motion abnormalities.  [place] is not visualized. Would reassess once the patient is  able to be positioned optimally. Septal flattening  consistent with right ventricular pressure and volume  overload.Unable to determine due to lack of data.  4. Severe right atrial enlargement. Right ventricular  enlargement with decreased right ventricular systolic  function. Normal tricuspid valve. Minimal tricuspid  regurgitation.Unable to determine RVSP due to insufficient  Doppler.  5. No pericardial effusion seen.  *** Compared with echocardiogram of 6/15/2022, unable to  compare equally to the previous study but overall, LV and  RV function probably are unchanged.

## 2022-08-12 NOTE — DISCHARGE NOTE PROVIDER - HOSPITAL COURSE
66F w/ severe pulmonary HTN (Group 1, 2 & 3), HFpEF, Afib, COPD on home O2 (5L NC), CKD, ROLANDA on CPAP, morbid obesity presenting with acute hypoxic respiratory failure, admitted for CHF exacerbation and ongoing lung transplant evaluation. Patient was transfer from [facility] for transplant eval. Initially presented there for hypoxia to 80s at home while on baseline 8L NC. Patient endorsed 1 week progressive SOB, limited exercise tolerance. Also had worsening b/l LE edema up to level of thighs and 15lb weight gain since beginning of July.     At [facility], given 80mg lasix IVP x1 in ED. [place] [address], Cr elevated to 2.42 (baseline 1/8), and Hgb 6.9 (baseline approx 8). Patient transfused 1U PRBC w improvement in Hgb to 7.8, and eliquis was held due to concern for bleed. Patient started on IV lasix 60mg BID.     Upon transfer to [facility], Hg stable and eliquis restarted. Patient continued on home medications for management pulmonary HTN, COPD, ROLANDA. Adequate diuresis was achieved with 60mg IV lasix BID with a (blank) weight loss.     Patient was followed by transplant, HF, and pulmonary teams.     At present state, deemed not to be a transplant candidate until reduce edema, improve kidney function, and optimize physical therapy (walking distance 300-400ft minimum); Ideally, BMI needs to be around ~30 for transplant candidacy.    66F w/ severe pulmonary HTN (Group 1, 2 & 3), HFpEF, Afib, COPD on home O2 (5L NC), CKD, ROLANDA on CPAP, morbid obesity presenting with acute hypoxic respiratory failure, admitted for CHF exacerbation and ongoing lung transplant evaluation. Patient was transfer from [facility] for transplant eval. Initially presented there for hypoxia to 80s at home while on baseline 8L NC. Patient endorsed 1 week progressive SOB, limited exercise tolerance. Also had worsening b/l LE edema up to level of thighs and 15lb weight gain since beginning of July.     At [facility], given 80mg lasix IVP x1 in ED. [place] elevated [place] 560[address], Cr elevated to 2.42 (baseline 1/8), and Hgb 6.9 (baseline approx 8). Patient transfused 1U PRBC w improvement in Hgb to 7.8, and eliquis was held due to concern for bleed. Patient started on IV lasix 60mg BID.     Upon transfer to [facility], Hg stable and eliquis restarted. Patient continued on home medications for management pulmonary HTN, COPD, ROLANDA. Adequate diuresis was achieved with 80 IV lasix BID with a (blank) weight loss. Patient's home spironolactone was held in the setting of hyperkalemia, and given limited benefits in improving symptoms in patient's w/ HFpEF, it was no restarted while on high dose lasix tx.     Patient had episode of epistaxis in which bilateral rhinorockets were placed for a 3 days period, after which they were removed w/ no persistent bleeding. However, she required a second transfusion when the bleeding first began, and her home elloquis was d/c'ed afterwards.     Patient was followed by transplant, HF, and pulmonary teams.     At present state, deemed not to be a transplant candidate given poor kidney function, extensive edema, and poor conditioning.     Palliative care was consulted and said...    Patient was medically stable upon the day of discharge.    66F w/ severe pulmonary HTN (Group 1, 2 & 3), HFpEF, Afib, COPD on home O2 (5L NC), CKD, ROLANDA on CPAP, morbid obesity presenting with acute hypoxic respiratory failure, admitted for CHF exacerbation and ongoing lung transplant evaluation. Patient was transfer from [facility] for transplant eval. Initially presented there for hypoxia to 80s at home while on baseline 8L NC. Patient endorsed 1 week progressive SOB, limited exercise tolerance. Also had worsening b/l LE edema up to level of thighs and 15lb weight gain since beginning of July.     At [facility], given 80mg lasix IVP x1 in ED. [place] to 08[address], Cr elevated to 2.42 (baseline 1/8), and Hgb 6.9 (baseline approx 8). Patient transfused 1U PRBC w improvement in Hgb to 7.8, and eliquis was held due to concern for bleed. Patient started on IV lasix 60mg BID.     Upon transfer to [facility], Hg stable and eliquis restarted. Patient continued on home medications for management pulmonary HTN, COPD, ROLANDA. Adequate diuresis was achieved with lasix ggt 10 mg/hr after lasix 80 BID was found to be insufficient. Patient's home spironolactone was held in the setting of hyperkalemia, and given limited benefits in improving symptoms in patient's w/ HFpEF, it was no restarted while on high dose lasix tx. Hypertonics were periodically administered to help     Patient had episode of epistaxis in which bilateral rhinorockets were placed for a 3 days period, after which they were removed w/ no persistent bleeding. However, she required a second transfusion when the bleeding first began, and her home elloquis was d/c'ed afterwards.     Patient was evaluated by transplant team, who deemed her to not be a candidate at this time given her medical comorbidities.     At present state, deemed not to be a transplant candidate given poor kidney function, extensive edema, and poor conditioning.     Palliative care was consulted, patient remained not DNR nor DNI.     Patient was medically stable upon the day of discharge.    66F w/ severe pulmonary HTN (Group 1, 2 & 3), HFpEF, Afib, COPD on home O2 (5L NC), CKD, ROLANDA on CPAP, morbid obesity presenting with acute hypoxic respiratory failure, admitted for CHF exacerbation and ongoing lung transplant evaluation. Patient was transfer from [facility] for transplant eval. Initially presented there for hypoxia to 80s at home while on baseline 8L NC. Patient endorsed 1 week progressive SOB, limited exercise tolerance. Also had worsening b/l LE edema up to level of thighs and 15lb weight gain since beginning of July.     At [facility], given 80mg lasix IVP x1 in ED. [place] to 33[address], Cr elevated to 2.42 (baseline 1/8), and Hgb 6.9 (baseline approx 8). Patient transfused 1U PRBC w improvement in Hgb to 7.8, and eliquis was held due to concern for bleed. Patient started on IV lasix 60mg BID.     Upon transfer to [facility], Hg stable and eliquis restarted. Patient continued on home medications for management pulmonary HTN, COPD, ROLANDA. Adequate diuresis was achieved with lasix ggt 10 mg/hr after lasix 80 BID was found to be insufficient. Patient's home spironolactone was held in the setting of hyperkalemia, and given limited benefits in improving symptoms in patient's w/ HFpEF, it was not restarted while on high dose lasix tx. Hypertonic saline pushes were periodically administered to help. The patient was transitioned to IV bumetanide pushes and then oral bumetanide to maintain adequate diuresis. Her oxygen requirements were stable at 7L.    Patient had episode of epistaxis in which bilateral rhinorockets were placed for a 3 days period, after which they were removed w/ no persistent bleeding. However, she required a second transfusion when the bleeding first began, and her home eliquis was d/c'ed afterwards. The patient was restarted on her home eliquis after a successful trial of heparin gtt.    Patient was evaluated by transplant team, who deemed her to not be a candidate at this time given her medical comorbidities.     At present state, deemed not to be a transplant candidate given poor kidney function, extensive edema, and poor conditioning.     Palliative care was consulted, patient remained not DNR nor DNI.     Patient was medically stable upon the day of discharge.    66F w/ severe pulmonary HTN (Group 1, 2 & 3), HFpEF, Afib, COPD on home O2 (5L NC), CKD, ROLANDA on CPAP, morbid obesity presenting with acute hypoxic respiratory failure, admitted for CHF exacerbation and ongoing lung transplant evaluation. Patient was transfer from [facility] for transplant eval. Initially presented there for hypoxia to 80s at home while on baseline 8L NC. Patient endorsed 1 week progressive SOB, limited exercise tolerance. Also had worsening b/l LE edema up to level of thighs and 15lb weight gain since beginning of July.     Upon transfer to [facility], Hg stable and eliquis restarted. Patient continued on home medications for management pulmonary HTN, COPD, ROLANDA. Adequate diuresis was achieved with lasix ggt 10 mg/hr after lasix 80 BID was found to be insufficient. Patient's home spironolactone was held in the setting of hyperkalemia, and given limited benefits in improving symptoms in patient's w/ HFpEF, it was not restarted while on high dose lasix tx. Hypertonic saline pushes were periodically administered to help. The patient was transitioned to IV bumetanide pushes and then oral bumetanide to maintain adequate diuresis. Her oxygen requirements were stable at 7L. However, the patient did not respond well to oral bumetanide and was restarted on a bumetanide drip. This was evidenced by increased pressures seen on cardioMEMS device readings. She was ultimately __________    Patient had episode of epistaxis in which bilateral rhinorockets were placed for a 3 days period, after which they were removed w/ no persistent bleeding. However, she required a second transfusion when the bleeding first began, and her home eliquis was d/c'ed afterwards. The patient was restarted on her home eliquis after a successful trial of heparin gtt.    Patient was evaluated by transplant team, who deemed her to not be a candidate at this time given her medical comorbidities. There was ongoing discussion with Nelsy for a second opinion, and she was ultimately deemed _________.    At present state, deemed not to be a transplant candidate given poor kidney function, extensive edema, and poor conditioning.     Palliative care was consulted, patient remained not DNR nor DNI.     Patient was medically stable upon the day of discharge. Should follow with 66F w/ severe pulmonary HTN (Group 1, 2 & 3), HFpEF, Afib, COPD on home O2 (5L NC), CKD, ROLANDA on CPAP, morbid obesity presenting with acute hypoxic respiratory failure, admitted for CHF exacerbation and ongoing lung transplant evaluation. Patient was transfer from [facility] for transplant eval. Initially presented there for hypoxia to 80s at home while on baseline 8L NC. Patient endorsed 1 week progressive SOB, limited exercise tolerance. Also had worsening b/l LE edema up to level of thighs and 15lb weight gain since beginning of July.     Upon transfer to [facility], Hg stable and eliquis restarted. Patient continued on home medications for management pulmonary HTN, COPD, ROLANDA. Adequate diuresis was achieved with lasix ggt 10 mg/hr after lasix 80 BID was found to be insufficient. Patient's home spironolactone was held in the setting of hyperkalemia, and given limited benefits in improving symptoms in patient's w/ HFpEF, it was not restarted while on high dose lasix tx. Hypertonic saline pushes were periodically administered to help. The patient was transitioned to IV bumetanide pushes and then oral bumetanide to maintain adequate diuresis. Her oxygen requirements were stable at 7L. However, the patient did not respond well to oral bumetanide and was restarted on a bumetanide drip. This was evidenced by increased pressures seen on cardioMEMS device readings. She had improvements of creatinine and CardioMEMS readings while on the bumex drip. She was transitioned to PO bumex and discharged.     Patient had episode of epistaxis in which bilateral rhinorockets were placed for a 3 days period, after which they were removed w/ no persistent bleeding. However, she required a second transfusion when the bleeding first began, and her home eliquis was d/c'ed afterwards. The patient was restarted on her home eliquis after a successful trial of heparin gtt.    Patient was evaluated by transplant team, who deemed her to not be a candidate at this time given her medical comorbidities. There was ongoing discussion with Karval for a second opinion, and she was not accepted for transplant eval, but advised to follow up outpatient.    At present state, deemed not to be a transplant candidate given poor kidney function, extensive edema, and poor conditioning.     Palliative care was consulted, patient remained not DNR nor DNI.     Patient was medically stable upon the day of discharge. Should follow with cardiology, pulmonology, and her primary care.

## 2022-08-12 NOTE — PROGRESS NOTE ADULT - PROBLEM SELECTOR PLAN 4
Last echo 6/22 with EF 68%, normal LV systolic function w/o WMA. Flattening of interventricular septum c/w RV overload, w/ severe R atrial enlargement, RV enlargement w/ decreased RV systolic function, severely elevated pulmonary pressures.   - continue lasix 60mg IVP BID  - strict I/Os, daily standing weights  - continue spironolactone 50mg qd  - f/u repeat echo   - f/u cardiomems readings Last echo 6/22 with EF 68%, normal LV systolic function w/o WMA. Flattening of interventricular septum c/w RV overload, w/ severe R atrial enlargement, RV enlargement w/ decreased RV systolic function, severely elevated pulmonary pressures.   - continue lasix 60mg IVP BID  - strict I/Os, daily standing weights  - continue spironolactone 50mg qd  - f/u repeat echo - poor study, but overall no sig changes from previous

## 2022-08-12 NOTE — PROGRESS NOTE ADULT - SUBJECTIVE AND OBJECTIVE BOX
Subjective:  - Continues to note SOB and LE edema, although feels both are gradually improving    Medications:  acetaminophen     Tablet .. 650 milliGRAM(s) Oral every 6 hours PRN  ALBUTerol    90 MICROgram(s) HFA Inhaler 2 Puff(s) Inhalation every 6 hours PRN  ambrisentan 10 milliGRAM(s) Oral daily  apixaban 5 milliGRAM(s) Oral two times a day  aspirin  chewable 81 milliGRAM(s) Oral daily  atorvastatin 20 milliGRAM(s) Oral at bedtime  budesonide  80 MICROgram(s)/formoterol 4.5 MICROgram(s) Inhaler 2 Puff(s) Inhalation two times a day  buPROPion XL (24-Hour) . 300 milliGRAM(s) Oral daily  digoxin     Tablet 125 MICROGram(s) Oral daily  diltiazem    milliGRAM(s) Oral daily  furosemide   Injectable 60 milliGRAM(s) IV Push two times a day  melatonin 3 milliGRAM(s) Oral at bedtime PRN  pantoprazole    Tablet 40 milliGRAM(s) Oral before breakfast  spironolactone 50 milliGRAM(s) Oral daily  tadalafil 40 milliGRAM(s) Oral daily  tiotropium 18 MICROgram(s) Capsule 1 Capsule(s) Inhalation daily      Physical Exam:    Vitals:  Vital Signs Last 24 Hours  T(C): 36.3 (22 @ 11:17), Max: 36.4 (22 @ 20:11)  HR: 60 (22 @ 11:17) (60 - 78)  BP: 114/71 (22 @ 11:17) (96/57 - 114/71)  RR: 18 (22 @ 11:17) (18 - 18)  SpO2: 99% (22 @ 11:17) (95% - 100%)    Weight in k.3 ( @ 07:56)    I&O's Summary    11 Aug 2022 07:01  -  12 Aug 2022 07:00  --------------------------------------------------------  IN: 1550 mL / OUT: 2200 mL / NET: -650 mL    12 Aug 2022 07:01  -  12 Aug 2022 13:14  --------------------------------------------------------  IN: 360 mL / OUT: 0 mL / NET: 360 mL    Tele: afib    General: OOB in chair. No distress. Comfortable.  HEENT: EOM intact.  Neck: Neck supple. JVP difficult to assess, but appears to be elevated. No masses  Chest: Unlabored, fine crackles bilateral bases.   CV: Normal S1 and S2. No murmurs, rub, or gallops. Radial pulses normal. +2 BLE edema.   Abdomen: Soft, non-distended, non-tender  Skin: No rashes or skin breakdown  Neurology: Alert and oriented times three. Sensation intact  Psych: Affect normal    Labs:                        7.7    5.01  )-----------( 234      ( 12 Aug 2022 06:51 )             24.7     08    130<L>  |  86<L>  |  93<H>  ----------------------------<  89  3.8   |  31  |  1.97<H>    Ca    9.0      12 Aug 2022 06:55  Phos  3.9       Mg     2.0         TPro  6.5  /  Alb  3.9  /  TBili  0.4  /  DBili  x   /  AST  15  /  ALT  7<L>  /  AlkPhos  113  12    PT/INR - ( 11 Aug 2022 05:55 )   PT: 13.3 sec;   INR: 1.15 ratio      PTT - ( 11 Aug 2022 05:55 )  PTT:30.4 sec    Serum Pro-Brain Natriuretic Peptide: 94009 pg/mL ( @ 06:55)  Serum Pro-Brain Natriuretic Peptide: 07477 pg/mL ( @ 06:15)

## 2022-08-13 NOTE — PROVIDER CONTACT NOTE (OTHER) - BACKGROUND
8/7 Admitted for fatigue, dyspnea , shortness of breath, transferred from [facility]. For Lung transplant Eval.  PMH: Severe Pulmonary Hypertension. , HTN, COPD on Home O2 8L, Afib ( Eliquis)

## 2022-08-13 NOTE — PROGRESS NOTE ADULT - ATTENDING COMMENTS
above plans discussed with Dr. Pompa    # sever pulmonary HTN  # acute on chronic respiratory failure with hypoxemia  # acute on chronic HFpEF exacerbation  # chronic Afib  # CKD  # epistaxis    - pt with episode of epistaxis this morning x hrs likely in setting of 8L NC -> change to face tent  - ENT consulted, s/p packing; to be remained x 3 days  - hold eliquis for now, monitor H/H closely (slight drop this morning)  - continue IV lasix 60mg BID  - appreciate HF/transplant team: at this time, she is not a candidate for lung transplant  - overall poor prognosis: HF recommends palliative care consult    Zhanna Velasquez MD  Division of Hospital Medicine  Contact via [employer] Teams  Office: [phone]

## 2022-08-13 NOTE — CONSULT NOTE ADULT - SUBJECTIVE AND OBJECTIVE BOX
CC: b/l epistaxis     HPI:  66F w/ severe pulmonary HTN (Group 1, 2 & 3), HFpEF, Afib, COPD on home O2 (5L NC), CKD, ROLANDA on CPAP, morbid obesity presenting with acute hypoxic respiratory failure, admitted for CHF exacerbation and ongoing lung transplant evaluation. Pt with recurrent small epistaxis at home, now with b/l epistaxis, more significant today that in the past. PT currently on eliquis which is being held. pts denies pnd, h/a, recent URI, congestion, facial pain, facial tenderness, rhinorrhea, or changes in vision .     PAST MEDICAL & SURGICAL HISTORY:  COPD exacerbation      Severe pulmonary hypertension      Chronic kidney disease, unspecified CKD stage      Acute on chronic diastolic congestive heart failure      Pulmonary embolism      Anxiety and depression      GERD (gastroesophageal reflux disease)      Obstructive sleep apnea      Chronic atrial fibrillation      H/O pneumonectomy      Right leg weakness        Allergies    nitrates (Unknown)    Intolerances      MEDICATIONS  (STANDING):  ambrisentan 10 milliGRAM(s) Oral daily  aspirin  chewable 81 milliGRAM(s) Oral daily  atorvastatin 20 milliGRAM(s) Oral at bedtime  budesonide  80 MICROgram(s)/formoterol 4.5 MICROgram(s) Inhaler 2 Puff(s) Inhalation two times a day  buPROPion XL (24-Hour) . 300 milliGRAM(s) Oral daily  cephalexin 500 milliGRAM(s) Oral every 12 hours  digoxin     Tablet 125 MICROGram(s) Oral daily  diltiazem    milliGRAM(s) Oral daily  furosemide   Injectable 60 milliGRAM(s) IV Push two times a day  pantoprazole    Tablet 40 milliGRAM(s) Oral before breakfast  spironolactone 50 milliGRAM(s) Oral daily  tadalafil 40 milliGRAM(s) Oral daily  tiotropium 18 MICROgram(s) Capsule 1 Capsule(s) Inhalation daily    MEDICATIONS  (PRN):  acetaminophen     Tablet .. 975 milliGRAM(s) Oral every 6 hours PRN Temp greater or equal to 38C (100.4F), Mild Pain (1 - 3), Moderate Pain (4 - 6)  ALBUTerol    90 MICROgram(s) HFA Inhaler 2 Puff(s) Inhalation every 6 hours PRN Shortness of Breath and/or Wheezing  melatonin 3 milliGRAM(s) Oral at bedtime PRN Insomnia      Social History: no tobacco, no etoh     Family history: Pt denies any sign FHx    ROS:   ENT: all negative except as noted in HPI   CV: denies palpitations  Pulm: denies SOB, cough, hemoptysis  GI: denies change in apetite, indigestion, n/v  : denies pertinent urinary symptoms, urgency  Neuro: denies numbness/tingling, loss of sensation  Psych: denies anxiety  MS: denies muscle weakness, instability  Heme: denies easy bruising or bleeding  Endo: denies heat/cold intolerance, excessive sweating  Vascular: denies LE edema    Vital Signs Last 24 Hrs  T(C): 36.4 (13 Aug 2022 04:46), Max: 36.4 (13 Aug 2022 04:46)  T(F): 97.5 (13 Aug 2022 04:46), Max: 97.5 (13 Aug 2022 04:46)  HR: 65 (13 Aug 2022 11:17) (59 - 77)  BP: 96/60 (13 Aug 2022 08:05) (90/50 - 96/60)  BP(mean): 72 (13 Aug 2022 04:46) (63 - 72)  RR: 20 (13 Aug 2022 10:25) (18 - 20)  SpO2: 99% (13 Aug 2022 11:17) (94% - 100%)    Parameters below as of 13 Aug 2022 10:25  Patient On (Oxygen Delivery Method): face tent    O2 Concentration (%): 50                          7.3    3.72  )-----------( 190      ( 13 Aug 2022 06:31 )             23.4    08-13    128<L>  |  86<L>  |  96<H>  ----------------------------<  87  4.3   |  29  |  2.06<H>    Ca    9.0      13 Aug 2022 06:31  Phos  3.9     08-13  Mg     2.1     08-13    TPro  6.5  /  Alb  3.7  /  TBili  0.3  /  DBili  x   /  AST  18  /  ALT  10  /  AlkPhos  99  08-13       PHYSICAL EXAM:  Gen: NAD  Skin: No rashes, bruises, or lesions  Head: Normocephalic, Atraumatic  Face: no edema, erythema, or fluctuance. Parotid glands soft without mass  Eyes: no scleral injection  Nose: L>R epistaxis, 7.5 RR placed with 4cc of air, nasopore reinforced, left controlled with surgicel wrapped nasopore, no further bleeding   Mouth: No Stridor / Drooling / Trismus.  Mucosa moist, tongue/uvula midline, oropharynx coughed up clot otherwise clear  Neck: Flat, supple, no lymphadenopathy, trachea midline, no masses  Lymphatic: No lymphadenopathy  Resp: breathing easily, no stridor  CV: no peripheral edema/cyanosis  GI: nondistended   Peripheral vascular: no JVD or edema  Neuro: facial nerve intact, no facial droop

## 2022-08-13 NOTE — PROGRESS NOTE ADULT - ASSESSMENT
Pt is a 66F with PMHx COPD, CHFpEF, AFib, obesity (Type III, hx bariatric sx 2012), and severe pulmonary HTN with chronic combined hypercapnic and hypoxemic respiratory failure on ATC O2 supplementation (dependent on 8L NC at rest) with qhs/prn AVAPS (Trelegy Antonio via nasal prong interface) presenting for progressive dyspnea with minimal exertion admitted for acute decompensated right heart failure. RHC 6/20/2022 revealed sPAP: 106, dPAP: 44 mPAP:65, PCWP: 16, PVR 7.32W, CO/CI 6.7/3.2. Pt clinical hypervolemic today, tolerating diuresis with net output >2L/24 hrs and hemodynamically stable.     -Would c/w current diuresis with furosemide and spironolactone---NEEED TO KEEP HER EUVOLEMIC--  please obtain NEPHROLOGY OPINION  in view of ckd -----with strict I/Os and daily weights  -Would c/w home pulmonary vasodilators as above (tadalafil+ambrisentan). -----  ----WOULD LIKE TO START IV FLOLAN ONCE A LITTLE MORE EUVOLEMIC-------[PTS PULM HTN IS MUTIFACTORIAL WITH COMBINATION OF GROUP II [DIASTOLIC DYSFUNCTION] , COPD [GROUP III  AND may be GROUP I    ---IN THIS SETTING IV FLOLAN BENEFITS ARE NOT PREDICTABLE.----  -c/w O2 supplementation foal goal O2 saturation 88-94%.  --c/w home AVAPS [Trelegy ]  qHS and PRN during the day, would have tubing changed  --- Pt's home at bedside and approved for use by [employer]. Can use prn.   --   c/w home bronchodilator therapy  -WILL D/W  DR SANTIAGO Transplant Pulm evaluation

## 2022-08-13 NOTE — PROGRESS NOTE ADULT - PROBLEM SELECTOR PLAN 8
Diet: DASH  DVT: Eliquis 5mg BID  Dispo: pending medical course History of Afib on eliquis 5mg BID (held briefly at OSH due to acute drop in Hgb from baseline approx 8).  Tele reviewed - patient currently in afib, rate controlled.  - continue digoxin 125  - continue diltiazem 120mg qd  -Holding eliquis right now in setting of epistaxis as above.

## 2022-08-13 NOTE — PROVIDER CONTACT NOTE (CHANGE IN STATUS NOTIFICATION) - ASSESSMENT
Pt asymptomatic, resting comfortably in the chair. Nasal packing noted. No s/s of respiratory distress. No change in mental status. Oxygen levels as per flowsheet. MD notified immediately.

## 2022-08-13 NOTE — PROGRESS NOTE ADULT - PROBLEM SELECTOR PLAN 5
On 8L baseline O2 at come, currently requiring HFNC. On trelegy and albuterol inhalers at home.   - continue trelegy therapeutic equivalent --> symbicort  - Duonebs q6h COPD protocol  - continue AVAPS qhs (has home machine) Last echo 6/22 with EF 68%, normal LV systolic function w/o WMA. Flattening of interventricular septum c/w RV overload, w/ severe R atrial enlargement, RV enlargement w/ decreased RV systolic function, severely elevated pulmonary pressures.   - continue lasix 60mg IVP BID  - strict I/Os, daily standing weights  - continue spironolactone 50mg qd  - f/u repeat echo - poor study, but overall no sig changes from previous

## 2022-08-13 NOTE — PROVIDER CONTACT NOTE (OTHER) - ACTION/TREATMENT ORDERED:
Patient seen by EENT, Afrin nose spray given . Nasal packing applied to both nostril. O2 change to Face Tent with FIO2 50%. POX= 91% . Continue telemetry and continuous POX.

## 2022-08-13 NOTE — PROGRESS NOTE ADULT - PROBLEM SELECTOR PLAN 4
Last echo 6/22 with EF 68%, normal LV systolic function w/o WMA. Flattening of interventricular septum c/w RV overload, w/ severe R atrial enlargement, RV enlargement w/ decreased RV systolic function, severely elevated pulmonary pressures.   - continue lasix 60mg IVP BID  - strict I/Os, daily standing weights  - continue spironolactone 50mg qd  - f/u repeat echo - poor study, but overall no sig changes from previous RHC and cardiomems done 6/20. RHC showed elevated right sided filling pressures with severe pulmonary hypertension with systemic PA pressures, slightly elevated PCWP and preserved cardiac output. Follows with Dr. Rodriguez at [facility] for PH.   - continue tadalafil 40 mg daily   - continue letairis 10mg qd  - CT surgery aware of patient; f/u recs: at present state, not a transplant candidate but if we can reduce edema, improve kidney function, and optimize physical therapy (walking distance 300-400ft minimum); Ideally, BMI needs to be around ~30 for transplant candidacy  - f/u Pulm consult

## 2022-08-13 NOTE — PROGRESS NOTE ADULT - PROBLEM SELECTOR PLAN 3
RHC and cardiomems done 6/20. RHC showed elevated right sided filling pressures with severe pulmonary hypertension with systemic PA pressures, slightly elevated PCWP and preserved cardiac output. Follows with Dr. Rodriguez at [facility] for PH.   - continue tadalafil 40 mg daily   - continue letairis 10mg qd  - CT surgery aware of patient; f/u recs: at present state, not a transplant candidate but if we can reduce edema, improve kidney function, and optimize physical therapy (walking distance 300-400ft minimum); Ideally, BMI needs to be around ~30 for transplant candidacy  - f/u Pulm consult Cr elevated to 2.42 on admission to  - baseline is 1.7-1.8. Likely in s/o CHF exacerbation, overall hypervolemic though likely intravascularly depleted. Likely contributing to sub-optimal response to torsemide at home.   - diuresis as above  - avoid nephrotoxic agents  - strict I/Os  -Nephrology consulted, f/u recs.

## 2022-08-13 NOTE — PROGRESS NOTE ADULT - SUBJECTIVE AND OBJECTIVE BOX
PROGRESS NOTE:   Authored by Pawel Pompa MD, PGY2     Patient is a 66y old  Female who presents with a chief complaint of lung txp eval (12 Aug 2022 18:32)      SUBJECTIVE / OVERNIGHT EVENTS:     REVIEW OF SYSTEMS:    CONSTITUTIONAL: No weakness, fevers or chills  EYES/ENT: No visual changes;  No vertigo or throat pain   NECK: No pain or stiffness  RESPIRATORY: No cough, wheezing, hemoptysis; No shortness of breath  CARDIOVASCULAR: No chest pain or palpitations  GASTROINTESTINAL: No abdominal or epigastric pain. No nausea, vomiting, or hematemesis; No diarrhea or constipation. No melena or hematochezia.  GENITOURINARY: No dysuria, frequency or hematuria  NEUROLOGICAL: No numbness or weakness  SKIN: No itching, rashes    MEDICATIONS  (STANDING):  ambrisentan 10 milliGRAM(s) Oral daily  apixaban 5 milliGRAM(s) Oral two times a day  aspirin  chewable 81 milliGRAM(s) Oral daily  atorvastatin 20 milliGRAM(s) Oral at bedtime  budesonide  80 MICROgram(s)/formoterol 4.5 MICROgram(s) Inhaler 2 Puff(s) Inhalation two times a day  buPROPion XL (24-Hour) . 300 milliGRAM(s) Oral daily  digoxin     Tablet 125 MICROGram(s) Oral daily  diltiazem    milliGRAM(s) Oral daily  furosemide   Injectable 60 milliGRAM(s) IV Push two times a day  pantoprazole    Tablet 40 milliGRAM(s) Oral before breakfast  spironolactone 50 milliGRAM(s) Oral daily  tadalafil 40 milliGRAM(s) Oral daily  tiotropium 18 MICROgram(s) Capsule 1 Capsule(s) Inhalation daily    MEDICATIONS  (PRN):  acetaminophen     Tablet .. 650 milliGRAM(s) Oral every 6 hours PRN Temp greater or equal to 38C (100.4F), Mild Pain (1 - 3)  ALBUTerol    90 MICROgram(s) HFA Inhaler 2 Puff(s) Inhalation every 6 hours PRN Shortness of Breath and/or Wheezing  melatonin 3 milliGRAM(s) Oral at bedtime PRN Insomnia      CAPILLARY BLOOD GLUCOSE        I&O's Summary    12 Aug 2022 07:01  -  13 Aug 2022 07:00  --------------------------------------------------------  IN: 1300 mL / OUT: 1900 mL / NET: -600 mL        PHYSICAL EXAM:  Vital Signs Last 24 Hrs  T(C): 36.4 (13 Aug 2022 04:46), Max: 36.4 (13 Aug 2022 04:46)  T(F): 97.5 (13 Aug 2022 04:46), Max: 97.5 (13 Aug 2022 04:46)  HR: 77 (13 Aug 2022 08:05) (59 - 77)  BP: 96/60 (13 Aug 2022 08:05) (90/50 - 114/71)  BP(mean): 72 (13 Aug 2022 04:46) (63 - 72)  RR: 18 (13 Aug 2022 04:46) (18 - 18)  SpO2: 95% (13 Aug 2022 04:46) (95% - 100%)    Parameters below as of 13 Aug 2022 04:46  Patient On (Oxygen Delivery Method): BiPAP/CPAP        CONSTITUTIONAL: NAD, well-developed  RESPIRATORY: Normal respiratory effort; lungs are clear to auscultation bilaterally  CARDIOVASCULAR: Regular rate and rhythm, normal S1 and S2, no murmur/rub/gallop; No lower extremity edema; Peripheral pulses are 2+ bilaterally  ABDOMEN: Nontender to palpation, normoactive bowel sounds, no rebound/guarding; No hepatosplenomegaly  MUSCLOSKELETAL: no clubbing or cyanosis of digits; no joint swelling or tenderness to palpation  PSYCH: A+O to person, place, and time; affect appropriate  NEURO: Non-focal, no tremors  SKIN: No rashes    LABS:                        7.3    3.72  )-----------( 190      ( 13 Aug 2022 06:31 )             23.4     08-13    128<L>  |  86<L>  |  96<H>  ----------------------------<  87  4.3   |  29  |  2.06<H>    Ca    9.0      13 Aug 2022 06:31  Phos  3.9     08-13  Mg     2.1     08-13    TPro  6.5  /  Alb  3.7  /  TBili  0.3  /  DBili  x   /  AST  18  /  ALT  10  /  AlkPhos  99  08-13                RADIOLOGY & ADDITIONAL TESTS:  No new imaging or tests    COORDINATION OF CARE:  Care Discussed with Consultants/Other Providers [Y/N]:  Prior or Outpatient Records Reviewed [Y/N]:   PROGRESS NOTE:   Authored by Pawel Pompa MD, PGY2     Patient is a 66y old  Female who presents with a chief complaint of lung txp eval (12 Aug 2022 18:32)      SUBJECTIVE / OVERNIGHT EVENTS: Pt with large episode epistaxis this AM, still continuing for 3+ hours. Pt notes this has happened to her before but never this bad. Otherwise feels well, denies dizziness, light-headedness, SOB, chest pain, n/v/d or abdominal pain.     REVIEW OF SYSTEMS:    CONSTITUTIONAL: No weakness, fevers or chills  EYES/ENT: No visual changes;  No vertigo or throat pain. +epistaxis.   NECK: No pain or stiffness  RESPIRATORY: No cough, wheezing, hemoptysis; No shortness of breath  CARDIOVASCULAR: No chest pain or palpitations  GASTROINTESTINAL: No abdominal or epigastric pain. No nausea, vomiting, or hematemesis; No diarrhea or constipation. No melena or hematochezia.  GENITOURINARY: No dysuria, frequency or hematuria  NEUROLOGICAL: No numbness or weakness  SKIN: No itching, rashes    MEDICATIONS  (STANDING):  ambrisentan 10 milliGRAM(s) Oral daily  apixaban 5 milliGRAM(s) Oral two times a day  aspirin  chewable 81 milliGRAM(s) Oral daily  atorvastatin 20 milliGRAM(s) Oral at bedtime  budesonide  80 MICROgram(s)/formoterol 4.5 MICROgram(s) Inhaler 2 Puff(s) Inhalation two times a day  buPROPion XL (24-Hour) . 300 milliGRAM(s) Oral daily  digoxin     Tablet 125 MICROGram(s) Oral daily  diltiazem    milliGRAM(s) Oral daily  furosemide   Injectable 60 milliGRAM(s) IV Push two times a day  pantoprazole    Tablet 40 milliGRAM(s) Oral before breakfast  spironolactone 50 milliGRAM(s) Oral daily  tadalafil 40 milliGRAM(s) Oral daily  tiotropium 18 MICROgram(s) Capsule 1 Capsule(s) Inhalation daily    MEDICATIONS  (PRN):  acetaminophen     Tablet .. 650 milliGRAM(s) Oral every 6 hours PRN Temp greater or equal to 38C (100.4F), Mild Pain (1 - 3)  ALBUTerol    90 MICROgram(s) HFA Inhaler 2 Puff(s) Inhalation every 6 hours PRN Shortness of Breath and/or Wheezing  melatonin 3 milliGRAM(s) Oral at bedtime PRN Insomnia      CAPILLARY BLOOD GLUCOSE        I&O's Summary    12 Aug 2022 07:01  -  13 Aug 2022 07:00  --------------------------------------------------------  IN: 1300 mL / OUT: 1900 mL / NET: -600 mL        PHYSICAL EXAM:  Vital Signs Last 24 Hrs  T(C): 36.4 (13 Aug 2022 04:46), Max: 36.4 (13 Aug 2022 04:46)  T(F): 97.5 (13 Aug 2022 04:46), Max: 97.5 (13 Aug 2022 04:46)  HR: 77 (13 Aug 2022 08:05) (59 - 77)  BP: 96/60 (13 Aug 2022 08:05) (90/50 - 114/71)  BP(mean): 72 (13 Aug 2022 04:46) (63 - 72)  RR: 18 (13 Aug 2022 04:46) (18 - 18)  SpO2: 95% (13 Aug 2022 04:46) (95% - 100%)    Parameters below as of 13 Aug 2022 04:46  Patient On (Oxygen Delivery Method): BiPAP/CPAP        CONSTITUTIONAL: NAD, well-developed  HEENT: +epistaxis  RESPIRATORY: Normal respiratory effort; lungs are clear to auscultation bilaterally  CARDIOVASCULAR: 3+ LE edema. Regular rate and rhythm, normal S1 and S2, no murmur/rub/gallop; Peripheral pulses are 2+ bilaterally  ABDOMEN: Nontender to palpation, normoactive bowel sounds, no rebound/guarding; No hepatosplenomegaly  MUSCLOSKELETAL: no clubbing or cyanosis of digits; no joint swelling or tenderness to palpation  PSYCH: A+O to person, place, and time; affect appropriate  NEURO: Non-focal, no tremors  SKIN: No rashes    LABS:                        7.3    3.72  )-----------( 190      ( 13 Aug 2022 06:31 )             23.4     08-13    128<L>  |  86<L>  |  96<H>  ----------------------------<  87  4.3   |  29  |  2.06<H>    Ca    9.0      13 Aug 2022 06:31  Phos  3.9     08-13  Mg     2.1     08-13    TPro  6.5  /  Alb  3.7  /  TBili  0.3  /  DBili  x   /  AST  18  /  ALT  10  /  AlkPhos  99  08-13                RADIOLOGY & ADDITIONAL TESTS:  No new imaging or tests    COORDINATION OF CARE:  Care Discussed with Consultants/Other Providers [Y/N]:  Prior or Outpatient Records Reviewed [Y/N]:

## 2022-08-13 NOTE — PROGRESS NOTE ADULT - PROBLEM SELECTOR PLAN 7
History of Afib on eliquis 5mg BID (held briefly at OSH due to acute drop in Hgb from baseline approx 8).  Tele reviewed - patient currently in afib, rate controlled.  - continue digoxin 125  - continue diltiazem 120mg qd Hgb 6.9 at OSH, transfused 1U PRBC with improvement to 7.8. Patient w/o signs/symptoms of acute blood loss, HD stable. Denies changes in bowel/urinary habits, denies recent falls/trauma.   - maintain active T/S  - Holding eliquis as above.   - f/u iron studies: total iron and % saturation decreased, ferritin on lower side, transferrin and TIBC normal

## 2022-08-13 NOTE — PROGRESS NOTE ADULT - SUBJECTIVE AND OBJECTIVE BOX
CHIEF COMPLAINT:     Interval Events:    REVIEW OF SYSTEMS:  Constitutional:   Eyes:  ENT:  CV:  Resp:  GI:  :  MSK:  Integumentary:  Neurological:  Psychiatric:  Endocrine:  Hematologic/Lymphatic:  Allergic/Immunologic:  [ ] All other systems negative  [ ] Unable to assess ROS because ________    OBJECTIVE:  ICU Vital Signs Last 24 Hrs  T(C): 36.7 (13 Aug 2022 12:40), Max: 36.7 (13 Aug 2022 12:40)  T(F): 98 (13 Aug 2022 12:40), Max: 98 (13 Aug 2022 12:40)  HR: 68 (13 Aug 2022 12:40) (59 - 77)  BP: 113/66 (13 Aug 2022 12:40) (90/50 - 113/66)  BP(mean): 72 (13 Aug 2022 04:46) (63 - 72)  ABP: --  ABP(mean): --  RR: 18 (13 Aug 2022 12:40) (18 - 20)  SpO2: 95% (13 Aug 2022 12:40) (94% - 100%)    O2 Parameters below as of 13 Aug 2022 12:40  Patient On (Oxygen Delivery Method): face tent              08-12 @ 07:01 - 08-13 @ 07:00  --------------------------------------------------------  IN: 1300 mL / OUT: 1900 mL / NET: -600 mL    08-13 @ 07:01 - 08-13 @ 14:57  --------------------------------------------------------  IN: 600 mL / OUT: 1050 mL / NET: -450 mL      CAPILLARY BLOOD GLUCOSE          PHYSICAL EXAM:  General:   HEENT:   Lymph Nodes:  Neck:   Respiratory:   Cardiovascular:   Abdomen:   Extremities:   Skin:   Neurological:  Psychiatry:    HOSPITAL MEDICATIONS:  MEDICATIONS  (STANDING):  ambrisentan 10 milliGRAM(s) Oral daily  aspirin  chewable 81 milliGRAM(s) Oral daily  atorvastatin 20 milliGRAM(s) Oral at bedtime  budesonide  80 MICROgram(s)/formoterol 4.5 MICROgram(s) Inhaler 2 Puff(s) Inhalation two times a day  buPROPion XL (24-Hour) . 300 milliGRAM(s) Oral daily  cephalexin 500 milliGRAM(s) Oral every 12 hours  digoxin     Tablet 125 MICROGram(s) Oral daily  diltiazem    milliGRAM(s) Oral daily  furosemide   Injectable 60 milliGRAM(s) IV Push two times a day  pantoprazole    Tablet 40 milliGRAM(s) Oral before breakfast  spironolactone 50 milliGRAM(s) Oral daily  tadalafil 40 milliGRAM(s) Oral daily  tiotropium 18 MICROgram(s) Capsule 1 Capsule(s) Inhalation daily    MEDICATIONS  (PRN):  acetaminophen     Tablet .. 975 milliGRAM(s) Oral every 6 hours PRN Temp greater or equal to 38C (100.4F), Mild Pain (1 - 3), Moderate Pain (4 - 6)  ALBUTerol    90 MICROgram(s) HFA Inhaler 2 Puff(s) Inhalation every 6 hours PRN Shortness of Breath and/or Wheezing  HYDROmorphone  Injectable 0.5 milliGRAM(s) IV Push every 4 hours PRN Severe Pain (7 - 10)  melatonin 3 milliGRAM(s) Oral at bedtime PRN Insomnia      LABS:                        7.3    3.72  )-----------( 190      ( 13 Aug 2022 06:31 )             23.4     08-13    128<L>  |  86<L>  |  96<H>  ----------------------------<  87  4.3   |  29  |  2.06<H>    Ca    9.0      13 Aug 2022 06:31  Phos  3.9     08-13  Mg     2.1     08-13    TPro  6.5  /  Alb  3.7  /  TBili  0.3  /  DBili  x   /  AST  18  /  ALT  10  /  AlkPhos  99  08-13              MICROBIOLOGY:     RADIOLOGY:  [ ] Reviewed and interpreted by me    PULMONARY FUNCTION TESTS:    EKG: CHIEF COMPLAINT:  Patient is a 66y old  Female who presents with a chief complaint of lung txp eval       HPI:  66F w/ severe pulmonary HTN (Group 1, 2 & 3), HFpEF, Afib, COPD on home O2 (5L NC), CKD, ROLANDA on CPAP, morbid obesity (s/p bariatric surgery in 2012 with subsequent lap band removal due to GI bleed), PE, CVA (MCA infarct in 2012) presenting as xfer from [facility] for lung transplant evaluation. Patient initially presented to OSH ED for hypoxia to 80s at home while on baseline 8L NC. Patient endorses 1 week progressive SOB, limited exercise tolerance, though also experiencing dyspnea at rest. Denies PND, orthopnea, chest pain, diaphoresis, headache, abdominal pain, cough, URI symptoms, cough, recent infection, fever, sick contacts. Denies new medications, changes to existing medications. Also endorses worsening b/l LE edema up to level of thighs and 15lb weight gain since beginning of July. Endorses b/l leg cramping that she attributes to not ambulating regularly due to dyspnea. Patient instructed to increase torsemide dose if she notices weight gain, and she had been periodically taking 40mg torsemide (vs 20mg) w/ little response in urine output or change in weight. Felt she was not responding to torsemide as well as usual. Patient has also been using rescue inhalers more frequently (2x daily over the past week vs usual once daily). Taking all other medications as prescribed. Patient with multiple admissions over the past year for acute hypoxic respiratory failure 2/2 CHF exacerbation.     OSH course:   Given 80mg lasix IVP x1 in ED. BNP elevated to [address], Cr elevated to 2.42 (baseline 1.2-1.4), and Hgb 6.9 (baseline approx 8). Patient transfused 1U PRBC w improvement in Hgb to 7.8, and eliquis was held due to concern for bleed. Patient started on IV lasix 60mg BID. Transferred to [facility] for management of severe pulmonary hypertension and to continue lung transplant evaluation. Based on records for , appears eliquis was restarted upon dc.     Tele reviewed - Afib w frequent PVCs, trigeminy       Pulmonology INITIALLY   consulted for pHTN management. Last RHC #s 18, 106/44 (65), 16, 6.7/3.1, PVR ~7 RYAN  Underwent initial lung transplant evaluation, not a current candidate but with optimization could become one.  Following initial diuresis, the patient reports improvement of her symptoms although still very dyspneic with exertion.    aCrdiac Studies  RHC and CardioMEMS Implant 6/20/22: RA 18, /44/65, PCWP 16 (v to 18), RA 95% on 8L NC, PA 58%, CO/CI (F) 6.7/3.2, PVR 7.32 ryan, TPG 49, /60/86 (CardioMEMS PAD 49)  TTE 6/15/22: LVIDd 4.8 cm, LVEF 68%, flattening of interventricular septum consistent with RV overload, mild concentric LVH (septum 1.2 PWT 1.1), RVE with decreased systolic function, mild LAE, severe ROE, mod TR, est RVSP 61 mmHg              PHYSICAL EXAM:  GENERAL: NAD  HEAD:  Atraumatic, Normocephalic  EYES: EOMI, PERRLA, conjunctiva and sclera clear  ENMT: mmm  NECK: Supple, JVP ~9cm at 90 degrees  CHEST/LUNG: bibasilar rales, no wheezing  HEART: Regular rate and rhythm; No murmurs, rubs, or gallops  ABDOMEN: Soft, Nontender, Nondistended  VASCULAR:  2+ b/l pitting edema extending to the dependent areas in thighs  LYMPH: No lymphadenopathy noted  SKIN: No rashes or lesions  NERVOUS SYSTEM:  Alert & Oriented X3        OBJECTIVE:  ICU Vital Signs Last 24 Hrs  T(C): 36.7 (13 Aug 2022 12:40), Max: 36.7 (13 Aug 2022 12:40)  T(F): 98 (13 Aug 2022 12:40), Max: 98 (13 Aug 2022 12:40)  HR: 68 (13 Aug 2022 12:40) (59 - 77)  BP: 113/66 (13 Aug 2022 12:40) (90/50 - 113/66)  BP(mean): 72 (13 Aug 2022 04:46) (63 - 72)  ABP: --  ABP(mean): --  RR: 18 (13 Aug 2022 12:40) (18 - 20)  SpO2: 95% (13 Aug 2022 12:40) (94% - 100%)    O2 Parameters below as of 13 Aug 2022 12:40  Patient On (Oxygen Delivery Method): face tent              08-12 @ 07:01 - 08-13 @ 07:00  --------------------------------------------------------  IN: 1300 mL / OUT: 1900 mL / NET: -600 mL    08-13 @ 07:01 - 08-13 @ 14:57  --------------------------------------------------------  IN: 600 mL / OUT: 1050 mL / NET: -450 mL      CAPILLARY BLOOD GLUCOSE              HOSPITAL MEDICATIONS:  MEDICATIONS  (STANDING):  ambrisentan 10 milliGRAM(s) Oral daily  aspirin  chewable 81 milliGRAM(s) Oral daily  atorvastatin 20 milliGRAM(s) Oral at bedtime  budesonide  80 MICROgram(s)/formoterol 4.5 MICROgram(s) Inhaler 2 Puff(s) Inhalation two times a day  buPROPion XL (24-Hour) . 300 milliGRAM(s) Oral daily  cephalexin 500 milliGRAM(s) Oral every 12 hours  digoxin     Tablet 125 MICROGram(s) Oral daily  diltiazem    milliGRAM(s) Oral daily  furosemide   Injectable 60 milliGRAM(s) IV Push two times a day  pantoprazole    Tablet 40 milliGRAM(s) Oral before breakfast  spironolactone 50 milliGRAM(s) Oral daily  tadalafil 40 milliGRAM(s) Oral daily  tiotropium 18 MICROgram(s) Capsule 1 Capsule(s) Inhalation daily    MEDICATIONS  (PRN):  acetaminophen     Tablet .. 975 milliGRAM(s) Oral every 6 hours PRN Temp greater or equal to 38C (100.4F), Mild Pain (1 - 3), Moderate Pain (4 - 6)  ALBUTerol    90 MICROgram(s) HFA Inhaler 2 Puff(s) Inhalation every 6 hours PRN Shortness of Breath and/or Wheezing  HYDROmorphone  Injectable 0.5 milliGRAM(s) IV Push every 4 hours PRN Severe Pain (7 - 10)  melatonin 3 milliGRAM(s) Oral at bedtime PRN Insomnia      LABS:                        7.3    3.72  )-----------( 190      ( 13 Aug 2022 06:31 )             23.4     08-13    128<L>  |  86<L>  |  96<H>  ----------------------------<  87  4.3   |  29  |  2.06<H>    Ca    9.0      13 Aug 2022 06:31  Phos  3.9     08-13  Mg     2.1     08-13    TPro  6.5  /  Alb  3.7  /  TBili  0.3  /  DBili  x   /  AST  18  /  ALT  10  /  AlkPhos  99  08-13              MICROBIOLOGY:     RADIOLOGY:  [ ] Reviewed and interpreted by me    PULMONARY FUNCTION TESTS:    EKG:

## 2022-08-13 NOTE — CONSULT NOTE ADULT - ASSESSMENT
66y with to b/l epistaxis, L controlled with 7.5 RR with 4 cc reinforced with nasopore, Right controlled with surgicel and nasopore.- pt currently on humidified face tent, anticoagulation being held

## 2022-08-13 NOTE — PROGRESS NOTE ADULT - PROBLEM SELECTOR PLAN 1
Hypoxic to 80s at home while on baseline 8L NC. Patient gaining weight since hospital discharge early July, increased torsemide w/o significant results. Likely in s/o CHF exacerbation, given elevated BNP, limited exercise tolerance, worsening LE edema. Patient afebrile, non-toxic appearing, infection unlikely.   - continue lasix 60mg IVP BID  - standing daily weights, strict I/Os  - pulm transplant and HF following  - eventual SGLT2-i prior to discharge, once on stable dose of oral diuretics  - HF: CardioMEMS PAD yesterday 43 mmHg, will read today goal ~40 but difficult to ascertain given RV dysfunction   - daily standing weight. Was discharged in June at 189 lbs. Weight on admission 212, this morning 210. Hypoxic to 80s at home while on baseline 8L NC. Patient gaining weight since hospital discharge early July, increased torsemide w/o significant results. Likely in s/o CHF exacerbation, given elevated BNP, limited exercise tolerance, worsening LE edema. Patient afebrile, non-toxic appearing, infection unlikely.   - continue lasix 60mg IVP BID  - standing daily weights, strict I/Os  - pulm transplant and HF following  - eventual SGLT2-i prior to discharge, once on stable dose of oral diuretics  - HF: CardioMEMS PAD yesterday 43 mmHg, will read today goal ~40 but difficult to ascertain given RV dysfunction   - daily standing weight. Was discharged in June at 189 lbs. Weight on admission 212, 210 on 8/12.

## 2022-08-13 NOTE — PROGRESS NOTE ADULT - PROBLEM SELECTOR PLAN 9
Diet: DASH  DVT: Eliquis 5mg BID (on hold for now in setting of epistaxis, will place SCDs).   Dispo: pending medical course

## 2022-08-13 NOTE — PROGRESS NOTE ADULT - PROBLEM SELECTOR PLAN 6
Hgb 6.9 at OSH, transfused 1U PRBC with improvement to 7.8. Patient w/o signs/symptoms of acute blood loss, HD stable. Denies changes in bowel/urinary habits, denies recent falls/trauma.   - maintain active T/S  - continue eliquis 2.5mg BID  - f/u iron studies: total iron and % saturation decreased, ferritin on lower side, transferrin and TIBC normal On 8L baseline O2 at come, currently requiring HFNC. On trelegy and albuterol inhalers at home.   - continue trelegy therapeutic equivalent --> symbicort  - Duonebs q6h COPD protocol  - continue AVAPS qhs (has home machine)

## 2022-08-13 NOTE — CONSULT NOTE ADULT - PROBLEM SELECTOR RECOMMENDATION 9
- gram-positive abx coverage for duration of packing placement  - Strict blood pressure control.  - Nasal saline, 2 sprays to both nares 4 times a day  - Avoid nasal trauma; no nose rubbing, blowing or manipulating nasal packing.  Sneeze with mouth open and pinching nares.  - Avoid bending with head blow the waist.    -No heavy lifting

## 2022-08-13 NOTE — PROGRESS NOTE ADULT - SUBJECTIVE AND OBJECTIVE BOX
seen and examined by me on August 13 2022    now co epistaxis      Transplant Pulmonology Progress Note  =====================================================  HPI:  66F w/ severe pulmonary HTN (Group 1, 2 & 3), HFpEF, Afib, COPD on home O2 (5L NC), CKD, ROLANDA on CPAP, morbid obesity (s/p bariatric surgery in 2012 with subsequent lap band removal due to GI bleed), PE, CVA (MCA infarct in 2012) presenting as xfer from [facility] for lung transplant evaluation. Patient initially presented to OSH ED for hypoxia to 80s at home while on baseline 8L NC. Patient endorses 1 week progressive SOB, limited exercise tolerance, though also experiencing dyspnea at rest. Denies PND, orthopnea, chest pain, diaphoresis, headache, abdominal pain, cough, URI symptoms, cough, recent infection, fever, sick contacts. Denies new medications, changes to existing medications. Also endorses worsening b/l LE edema up to level of thighs and 15lb weight gain since beginning of July. Endorses b/l leg cramping that she attributes to not ambulating regularly due to dyspnea. Patient instructed to increase torsemide dose if she notices weight gain, and she had been periodically taking 40mg torsemide (vs 20mg) w/ little response in urine output or change in weight. Felt she was not responding to torsemide as well as usual. Patient has also been using rescue inhalers more frequently (2x daily over the past week vs usual once daily). Taking all other medications as prescribed. Patient with multiple admissions over the past year for acute hypoxic respiratory failure 2/2 CHF exacerbation.     OSH course:   Given 80mg lasix IVP x1 in ED[place] [address], Cr elevated to 2.42 (baseline 1.2-1.4), and Hgb 6.9 (baseline approx 8). Patient transfused 1U PRBC w improvement in Hgb to 7.8, and eliquis was held due to concern for bleed. Patient started on IV lasix 60mg BID. Transferred to [facility] for management of severe pulmonary hypertension and to continue lung transplant evaluation. Based on records for SH, appears eliquis was restarted upon dc.     Tele reviewed - Afib w frequent PVCs, trigeminy     (11 Aug 2022 01:42)      PAST MEDICAL & SURGICAL HISTORY:  COPD exacerbation      Severe pulmonary hypertension      Chronic kidney disease, unspecified CKD stage      Acute on chronic diastolic congestive heart failure      Pulmonary embolism      Anxiety and depression      GERD (gastroesophageal reflux disease)      Obstructive sleep apnea      Chronic atrial fibrillation      H/O pneumonectomy      Right leg weakness        Home Meds: Home Medications:  ambrisentan 10 mg oral tablet: 1 tab(s) orally once a day (11 Aug 2022 03:09)  apixaban 5 mg oral tablet: 1 tab(s) orally 2 times a day (11 Aug 2022 03:09)  buPROPion 100 mg oral tablet: 1 tab(s) orally 2 times a day (11 Aug 2022 03:09)  digoxin 125 mcg (0.125 mg) oral tablet: 1 tab(s) orally once a day (11 Aug 2022 03:09)  dilTIAZem 120 mg/24 hours oral capsule, extended release: 1 cap(s) orally once a day (11 Aug 2022 03:09)  levalbuterol 1.25 mg/3 mL inhalation solution: 3 milliliter(s) inhaled 3 times a day (11 Aug 2022 03:09)  pantoprazole 40 mg oral delayed release tablet: 1 tab(s) orally once a day (11 Aug 2022 03:09)  rosuvastatin 5 mg oral tablet: 1 tab(s) orally once a day (11 Aug 2022 03:09)  spironolactone 50 mg oral tablet: 1 tab(s) orally once a day (11 Aug 2022 03:09)  tadalafil 20 mg oral tablet: 2 tab(s) orally once a day (11 Aug 2022 03:09)  Trelegy Ellipta 100 mcg-62.5 mcg-25 mcg/inh inhalation powder: 1 puff(s) inhaled once a day (11 Aug 2022 03:09)    Allergies: Allergies    nitrates (Unknown)    Intolerances      Soc:   Advanced Directives: Presumed Full Code     ROS:    REVIEW OF SYSTEMS    [xxx ] A ten-point review of systems was otherwise negative except as noted.  [ ] Due to altered mental status/intubation, subjective information were not able to be obtained from the patient. History was obtained, to the extent possible, from review of the chart and collateral sources of information.      CURRENT MEDICATIONS:   --------------------------------------------------------------------------------------  Neurologic Medications  acetaminophen     Tablet .. 650 milliGRAM(s) Oral every 6 hours PRN Temp greater or equal to 38C (100.4F), Mild Pain (1 - 3)  buPROPion XL (24-Hour) . 300 milliGRAM(s) Oral daily  melatonin 3 milliGRAM(s) Oral at bedtime PRN Insomnia    Respiratory Medications  ALBUTerol    90 MICROgram(s) HFA Inhaler 2 Puff(s) Inhalation every 6 hours PRN Shortness of Breath and/or Wheezing  budesonide  80 MICROgram(s)/formoterol 4.5 MICROgram(s) Inhaler 2 Puff(s) Inhalation two times a day  tiotropium 18 MICROgram(s) Capsule 1 Capsule(s) Inhalation daily    Cardiovascular Medications  ambrisentan 10 milliGRAM(s) Oral daily  digoxin     Tablet 125 MICROGram(s) Oral daily  diltiazem    milliGRAM(s) Oral daily  furosemide   Injectable 60 milliGRAM(s) IV Push two times a day  spironolactone 50 milliGRAM(s) Oral daily  tadalafil 40 milliGRAM(s) Oral daily    Gastrointestinal Medications  pantoprazole    Tablet 40 milliGRAM(s) Oral before breakfast    Genitourinary Medications    Hematologic/Oncologic Medications  aspirin  chewable 81 milliGRAM(s) Oral daily    Antimicrobial/Immunologic Medications    Endocrine/Metabolic Medications  atorvastatin 20 milliGRAM(s) Oral at bedtime    Topical/Other Medications  oxymetazoline 0.05% Nasal Spray 1 Spray(s) Both Nostrils once    --------------------------------------------------------------------------------------    VITAL SIGNS, INS/OUTS (last 24 hours):  --------------------------------------------------------------------------------------  ICU Vital Signs Last 24 Hrs  T(C): 36.4 (13 Aug 2022 04:46), Max: 36.4 (13 Aug 2022 04:46)  T(F): 97.5 (13 Aug 2022 04:46), Max: 97.5 (13 Aug 2022 04:46)  HR: 77 (13 Aug 2022 08:05) (59 - 77)  BP: 96/60 (13 Aug 2022 08:05) (90/50 - 114/71)  BP(mean): 72 (13 Aug 2022 04:46) (63 - 72)  ABP: --  ABP(mean): --  RR: 18 (13 Aug 2022 04:46) (18 - 18)  SpO2: 95% (13 Aug 2022 04:46) (95% - 100%)    O2 Parameters below as of 13 Aug 2022 04:46  Patient On (Oxygen Delivery Method): BiPAP/CPAP          I&O's Summary    12 Aug 2022 07:01  -  13 Aug 2022 07:00  --------------------------------------------------------  IN: 1300 mL / OUT: 1900 mL / NET: -600 mL    13 Aug 2022 07:01  -  13 Aug 2022 10:04  --------------------------------------------------------  IN: 360 mL / OUT: 400 mL / NET: -40 mL      --------------------------------------------------------------------------------------    EXAM:  General/Neuro  Exam: Normal, NAD, alert, oriented x 3 active epistaxis     Respiratory  Exam:  reduced breath snds   [] Tracheostomy   [] Intubated  Mechanical Ventilation: 4-6 l/min     Cardiovascular  Exam: S1, S2.  Regular rate and rhythm. Increased P2   Peripheral edema  +++    GI  Exam: Abdomen soft, Non-tender, obese distended.      Extremities  Exam: Extremities warm, pink, well-perfused.      :   Exam:     LABS  --------------------------------------------------------------------------------------  Labs:  CAPILLARY BLOOD GLUCOSE                              7.3    3.72  )-----------( 190      ( 13 Aug 2022 06:31 )             23.4         08-13    128<L>  |  86<L>  |  96<H>  ----------------------------<  87  4.3   |  29  |  2.06<H>      Calcium, Total Serum: 9.0 mg/dL (08-13-22 @ 06:31)      LFTs:             6.5  | 0.3  | 18       ------------------[99      ( 13 Aug 2022 06:31 )  3.7  | x    | 10          Lipase:x      Amylase:x             Coags:        Serum Pro-Brain Natriuretic Peptide: 8947 pg/mL (08-13-22 @ 06:31)  Serum Pro-Brain Natriuretic Peptide: 40502 pg/mL (08-12-22 @ 06:55)  Serum Pro-Brain Natriuretic Peptide: 90523 pg/mL (08-11-22 @ 06:15)            --------------------------------------------------------------------------------------    OTHER LABS    IMAGING RESULTS

## 2022-08-13 NOTE — PROGRESS NOTE ADULT - ASSESSMENT
66F w/ severe pulmonary HTN (Group 1, 2 & 3), HFpEF, Afib, COPD on home O2 (5L NC), CKD, ROLANDA on CPAP, morbid obesity (s/p bariatric surgery in 2012 with subsequent lap band removal due to GI bleed), PE, CVA (MCA infarct in 2012). Transferred from OSH for severe pulmonary hypertension, with possible launch for transplant evaluation.    now with epistaxis  to address ac and call ent for nasal packing discussed with nurse at bedside     Dr. Mosher team for management of pHTN and hopefully we can make some headway wityh excercise and pulm rehab and not a candidate until more ambulatory and weight loss/  Pt ms. JONES is a red of pre requisites and leads a very poor quality of life at present and would like a trabnsplant option but minimal criteria need to be met to become a candidate   Appreciate HF recs   Repeat ECHO  consider flolan   optimize kidney function  at present state, not a transplant candidate but if we can reduce edema, improve kidney function, and optimize physical therapy (walking distance 300-400ft minimum)  Ideally, BMI needs to be around ~<<30 for transplant candidacy  SW consult for support system      A  [place] med director transplant [phone] please call with qiuestions or concerns

## 2022-08-13 NOTE — PROGRESS NOTE ADULT - PROBLEM SELECTOR PLAN 2
Cr elevated to 2.42 on admission to  - baseline is 1.7-1.8. Likely in s/o CHF exacerbation, overall hypervolemic though likely intravascularly depleted. Likely contributing to sub-optimal response to torsemide at home.   - diuresis as above  - avoid nephrotoxic agents  - strict I/Os  - 1.89 yesterday. This AM 1.97. CTM. Significant epistaxis this AM, likely in setting of use of 8L nc. Not new for pt, but worse than her usual nose bleeds  -S/P afrin x1  -C/W humidified face tent for now, can eventually transition back to nc  -ENT consulted, note epistaxis L nare without target to cauterize. Placed rhino rocket, will need for 3 days per ENT       -Pain regiment and ppx abx ordered while rhino rocket in place  -Monitor H/H  -Eliquis held, can likely restart in 24-48h if no further bleeding.

## 2022-08-14 NOTE — PROGRESS NOTE ADULT - SUBJECTIVE AND OBJECTIVE BOX
CHIEF COMPLAINT:     Interval Events:    REVIEW OF SYSTEMS:  Constitutional:   Eyes:  ENT:  CV:  Resp:  GI:  :  MSK:  Integumentary:  Neurological:  Psychiatric:  Endocrine:  Hematologic/Lymphatic:  Allergic/Immunologic:  [ ] All other systems negative  [ ] Unable to assess ROS because ________    OBJECTIVE:  ICU Vital Signs Last 24 Hrs  T(C): 36.7 (14 Aug 2022 04:46), Max: 36.7 (14 Aug 2022 04:46)  T(F): 98 (14 Aug 2022 04:46), Max: 98 (14 Aug 2022 04:46)  HR: 69 (14 Aug 2022 19:20) (59 - 75)  BP: 101/61 (14 Aug 2022 13:41) (101/61 - 121/74)  BP(mean): 90 (14 Aug 2022 04:46) (90 - 90)  ABP: --  ABP(mean): --  RR: 18 (14 Aug 2022 13:41) (18 - 18)  SpO2: 90% (14 Aug 2022 19:20) (90% - 97%)    O2 Parameters below as of 14 Aug 2022 12:32  Patient On (Oxygen Delivery Method): mask, aerosol              08-13 @ 07:01 - 08-14 @ 07:00  --------------------------------------------------------  IN: 1130 mL / OUT: 2450 mL / NET: -1320 mL    08-14 @ 07:01 - 08-14 @ 20:32  --------------------------------------------------------  IN: 300 mL / OUT: 1750 mL / NET: -1450 mL      CAPILLARY BLOOD GLUCOSE          PHYSICAL EXAM:  General:   HEENT:   Lymph Nodes:  Neck:   Respiratory:   Cardiovascular:   Abdomen:   Extremities:   Skin:   Neurological:  Psychiatry:    HOSPITAL MEDICATIONS:  MEDICATIONS  (STANDING):  ambrisentan 10 milliGRAM(s) Oral daily  aspirin  chewable 81 milliGRAM(s) Oral daily  atorvastatin 20 milliGRAM(s) Oral at bedtime  budesonide  80 MICROgram(s)/formoterol 4.5 MICROgram(s) Inhaler 2 Puff(s) Inhalation two times a day  buPROPion XL (24-Hour) . 300 milliGRAM(s) Oral daily  cephalexin 500 milliGRAM(s) Oral every 12 hours  digoxin     Tablet 125 MICROGram(s) Oral daily  diltiazem    milliGRAM(s) Oral daily  furosemide   Injectable 60 milliGRAM(s) IV Push two times a day  pantoprazole    Tablet 40 milliGRAM(s) Oral before breakfast  polyethylene glycol 3350 17 Gram(s) Oral daily  sodium chloride 0.65% Nasal 1 Spray(s) Both Nostrils four times a day  spironolactone 50 milliGRAM(s) Oral daily  tadalafil 40 milliGRAM(s) Oral daily  tiotropium 18 MICROgram(s) Capsule 1 Capsule(s) Inhalation daily    MEDICATIONS  (PRN):  acetaminophen     Tablet .. 975 milliGRAM(s) Oral every 6 hours PRN Temp greater or equal to 38C (100.4F), Mild Pain (1 - 3), Moderate Pain (4 - 6)  ALBUTerol    90 MICROgram(s) HFA Inhaler 2 Puff(s) Inhalation every 6 hours PRN Shortness of Breath and/or Wheezing  HYDROmorphone  Injectable 0.25 milliGRAM(s) IV Push every 4 hours PRN Severe Pain (7 - 10)  melatonin 3 milliGRAM(s) Oral at bedtime PRN Insomnia      LABS:                        7.4    4.40  )-----------( 209      ( 14 Aug 2022 06:45 )             24.1     08-14    129<L>  |  86<L>  |  96<H>  ----------------------------<  80  5.1   |  33<H>  |  1.94<H>    Ca    9.3      14 Aug 2022 06:45  Phos  3.9     08-14  Mg     2.1     08-14    TPro  6.7  /  Alb  3.9  /  TBili  0.3  /  DBili  x   /  AST  18  /  ALT  10  /  AlkPhos  104  08-14        Arterial Blood Gas:  08-13 @ 16:20  7.42/56/69/36/93.2/10.6  ABG lactate: --        MICROBIOLOGY:     RADIOLOGY:  [ ] Reviewed and interpreted by me    PULMONARY FUNCTION TESTS:    EKG:     CHIEF COMPLAINT:  Patient is a 66y old  Female who presents with a chief complaint of lung txp eval       HPI:  66F w/ severe pulmonary HTN (Group 1, 2 & 3), HFpEF, Afib, COPD on home O2 (5L NC), CKD, ROLANDA on CPAP, morbid obesity (s/p bariatric surgery in 2012 with subsequent lap band removal due to GI bleed), PE, CVA (MCA infarct in 2012) presenting as xfer from [facility] for lung transplant evaluation. Patient initially presented to OSH ED for hypoxia to 80s at home while on baseline 8L NC. Patient endorses 1 week progressive SOB, limited exercise tolerance, though also experiencing dyspnea at rest. Denies PND, orthopnea, chest pain, diaphoresis, headache, abdominal pain, cough, URI symptoms, cough, recent infection, fever, sick contacts. Denies new medications, changes to existing medications. Also endorses worsening b/l LE edema up to level of thighs and 15lb weight gain since beginning of July. Endorses b/l leg cramping that she attributes to not ambulating regularly due to dyspnea. Patient instructed to increase torsemide dose if she notices weight gain, and she had been periodically taking 40mg torsemide (vs 20mg) w/ little response in urine output or change in weight. Felt she was not responding to torsemide as well as usual. Patient has also been using rescue inhalers more frequently (2x daily over the past week vs usual once daily). Taking all other medications as prescribed. Patient with multiple admissions over the past year for acute hypoxic respiratory failure 2/2 CHF exacerbation.     OSH course:   Given 80mg lasix IVP x1 in ED. [place]NP elevated to [address], Cr elevated to 2.42 (baseline 1.2-1.4), and Hgb 6.9 (baseline approx 8). Patient transfused 1U PRBC w improvement in Hgb to 7.8, and eliquis was held due to concern for bleed. Patient started on IV lasix 60mg BID. Transferred to [facility] for management of severe pulmonary hypertension and to continue lung transplant evaluation. Based on records for , appears eliquis was restarted upon dc.     Tele reviewed - Afib w frequent PVCs, trigeminy       Pulmonology INITIALLY   consulted for pHTN management. Last RHC #s 18, 106/44 (65), 16, 6.7/3.1, PVR ~7 RYAN  Underwent initial lung transplant evaluation, not a current candidate but with optimization could become one.  Following initial diuresis, the patient reports improvement of her symptoms although still very dyspneic with exertion.    cardiac Studies  RHC and CardioMEMS Implant 6/20/22: RA 18, /44/65, PCWP 16 (v to 18), RA 95% on 8L NC, PA 58%, CO/CI (F) 6.7/3.2, PVR 7.32 ryan, TPG 49, /60/86 (CardioMEMS PAD 49)  TTE 6/15/22: LVIDd 4.8 cm, LVEF 68%, flattening of interventricular septum consistent with RV overload, mild concentric LVH (septum 1.2 PWT 1.1), RVE with decreased systolic function, mild LAE, severe ROE, mod TR, est RVSP 61 mmHg        PHYSICAL EXAM:  GENERAL: ----on supplemetal oxygen   HEAD:  Atraumatic, Normocephalic  EYES: EOMI, PERRLA, conjunctiva and sclera clear  ENMT: mmm  NECK: Supple, JVP ~9cm at 90 degrees  CHEST/LUNG: bibasilar rales, no wheezing  HEART: Regular rate and rhythm; No murmurs, rubs, or gallops  ABDOMEN: Soft, Nontender, Nondistended  VASCULAR:  2+ b/l pitting edema extending to the dependent areas in thighs  LYMPH: No lymphadenopathy noted  SKIN: No rashes or lesions  NERVOUS SYSTEM:  Alert & Oriented X3    OBJECTIVE:  ICU Vital Signs Last 24 Hrs  T(C): 36.7 (14 Aug 2022 04:46), Max: 36.7 (14 Aug 2022 04:46)  T(F): 98 (14 Aug 2022 04:46), Max: 98 (14 Aug 2022 04:46)  HR: 69 (14 Aug 2022 19:20) (59 - 75)  BP: 101/61 (14 Aug 2022 13:41) (101/61 - 121/74)  BP(mean): 90 (14 Aug 2022 04:46) (90 - 90)  ABP: --  ABP(mean): --  RR: 18 (14 Aug 2022 13:41) (18 - 18)  SpO2: 90% (14 Aug 2022 19:20) (90% - 97%)    O2 Parameters below as of 14 Aug 2022 12:32  Patient On (Oxygen Delivery Method): mask, aerosol              08-13 @ 07:01 - 08-14 @ 07:00  --------------------------------------------------------  IN: 1130 mL / OUT: 2450 mL / NET: -1320 mL    08-14 @ 07:01 - 08-14 @ 20:32  --------------------------------------------------------  IN: 300 mL / OUT: 1750 mL / NET: -1450 mL      CAPILLARY BLOOD GLUCOSE              HOSPITAL MEDICATIONS:  MEDICATIONS  (STANDING):  ambrisentan 10 milliGRAM(s) Oral daily  aspirin  chewable 81 milliGRAM(s) Oral daily  atorvastatin 20 milliGRAM(s) Oral at bedtime  budesonide  80 MICROgram(s)/formoterol 4.5 MICROgram(s) Inhaler 2 Puff(s) Inhalation two times a day  buPROPion XL (24-Hour) . 300 milliGRAM(s) Oral daily  cephalexin 500 milliGRAM(s) Oral every 12 hours  digoxin     Tablet 125 MICROGram(s) Oral daily  diltiazem    milliGRAM(s) Oral daily  furosemide   Injectable 60 milliGRAM(s) IV Push two times a day  pantoprazole    Tablet 40 milliGRAM(s) Oral before breakfast  polyethylene glycol 3350 17 Gram(s) Oral daily  sodium chloride 0.65% Nasal 1 Spray(s) Both Nostrils four times a day  spironolactone 50 milliGRAM(s) Oral daily  tadalafil 40 milliGRAM(s) Oral daily  tiotropium 18 MICROgram(s) Capsule 1 Capsule(s) Inhalation daily    MEDICATIONS  (PRN):  acetaminophen     Tablet .. 975 milliGRAM(s) Oral every 6 hours PRN Temp greater or equal to 38C (100.4F), Mild Pain (1 - 3), Moderate Pain (4 - 6)  ALBUTerol    90 MICROgram(s) HFA Inhaler 2 Puff(s) Inhalation every 6 hours PRN Shortness of Breath and/or Wheezing  HYDROmorphone  Injectable 0.25 milliGRAM(s) IV Push every 4 hours PRN Severe Pain (7 - 10)  melatonin 3 milliGRAM(s) Oral at bedtime PRN Insomnia      LABS:                        7.4    4.40  )-----------( 209      ( 14 Aug 2022 06:45 )             24.1     08-14    129<L>  |  86<L>  |  96<H>  ----------------------------<  80  5.1   |  33<H>  |  1.94<H>    Ca    9.3      14 Aug 2022 06:45  Phos  3.9     08-14  Mg     2.1     08-14    TPro  6.7  /  Alb  3.9  /  TBili  0.3  /  DBili  x   /  AST  18  /  ALT  10  /  AlkPhos  104  08-14        Arterial Blood Gas:  08-13 @ 16:20  7.42/56/69/36/93.2/10.6  ABG lactate: --        MICROBIOLOGY:     RADIOLOGY:  [ ] Reviewed and interpreted by me    PULMONARY FUNCTION TESTS:    EKG:

## 2022-08-14 NOTE — PROGRESS NOTE ADULT - ASSESSMENT
Pt is a 66F with PMHx COPD, CHFpEF, AFib, obesity (Type III, hx bariatric sx 2012),   and severe pulmonary HTN with chronic combined hypercapnic and hypoxemic respiratory failure on ATC O2 supplementation   (dependent on 8L NC at rest) with qhs/prn AVAPS (Trelegy Antonio via nasal prong interface)   presenting for progressive dyspnea with minimal exertion admitted for acute decompensated right heart failure.   RHC 6/20/2022 revealed sPAP: 106, dPAP: 44 mPAP:65, PCWP: 16, PVR 7.32W, CO/CI 6.7/3.2. Pt clinical hypervolemic today,   tolerating diuresis with net output >2L/24 hrs and hemodynamically stable.     -Would c/w current diuresis with furosemide and spironolactone---NEEED TO KEEP HER EUVOLEMIC--  please obtain NEPHROLOGY OPINION  in view of ckd -- ---with strict I/Os and daily weights--  ON IV LASIX  ----CONSIDER IV BUMEX AS SUGGESTED BY NEPHROLOGY   -Would c/w home pulmonary vasodilators as above (tadalafil+ambrisentan). -----  ----WOULD LIKE TO START IV FLOLAN ONCE A LITTLE MORE EUVOLEMIC-------[PTS PULM HTN IS MUTIFACTORIAL WITH COMBINATION OF GROUP II [DIASTOLIC DYSFUNCTION] , COPD [GROUP III  AND may be GROUP I    ---IN THIS SETTING IV FLOLAN BENEFITS ARE NOT PREDICTABLE.---ALSO NEED TO SEE IF HER SOCIAL SITUATION WILL ALLOW HER TO BE MAINTAINED ON IV FLOLAN AT HOME   --c/w O2 supplementation foal goal O2 saturation 88-94%.  --c/w home AVAPS [Trelegy ]  qHS and PRN during the day, would have tubing changed  --- Pt's home at bedside and approved for use by [employer]. Can use prn.   --   c/w home bronchodilator therapy  -- D/W  DR SANTIAGO   regarding Transplant option

## 2022-08-14 NOTE — PROGRESS NOTE ADULT - PROBLEM SELECTOR PLAN 1
- Pt discussed in detail with Dr. Medina, plan to gram-positive abx coverage for duration of packing placement  - Strict blood pressure control.  - Nasal saline, 2 sprays to both nares 4 times a day  - Avoid nasal trauma; no nose rubbing, blowing or manipulating nasal packing.  Sneeze with mouth open and pinching nares.  - Avoid bending with head blow the waist.    -No heavy lifting.

## 2022-08-14 NOTE — PROGRESS NOTE ADULT - PROBLEM SELECTOR PLAN 3
Cr elevated to 2.42 on admission to  - baseline is 1.7-1.8. Likely in s/o CHF exacerbation, overall hypervolemic though likely intravascularly depleted. Likely contributing to sub-optimal response to torsemide at home.   - diuresis as above  - avoid nephrotoxic agents  - strict I/Os  -Nephrology consulted, f/u recs.

## 2022-08-14 NOTE — PROGRESS NOTE ADULT - ASSESSMENT
66y with to L epistaxis, controlled with RR 7.4 w 4cc, right nare packing removed without any further bleeding, so pt could breathe better.

## 2022-08-14 NOTE — CONSULT NOTE ADULT - SUBJECTIVE AND OBJECTIVE BOX
[facility] DIVISION OF KIDNEY DISEASES AND HYPERTENSION -- INITIAL CONSULT NOTE  --------------------------------------------------------------------------------    --------------------------------------------------------------------------------  HPI:  This is a 66 year old with PMH of Pulmonary hypertension (Group 1,2,3) HFpEF, atrial fibrillation, COPD on home oxygen, ROLANDA on CPAP, morbid obesity with acute hypoxic respiratory failure. Nephrology consulted for JUANA.  baseline creatinine as of june appears to be 1.5-1.6 on august 11 patient creatinine was 1.89 and has continued to up trend most recently 2.06 8/13. Patient was initially started on lasix 60 mg IV BID on admission with marked improvement in pro-BNP since presentation 87877 (8/11) and most recently 8947 (8/13).       PAST HISTORY  --------------------------------------------------------------------------------  PAST MEDICAL & SURGICAL HISTORY:  COPD exacerbation      Severe pulmonary hypertension      Chronic kidney disease, unspecified CKD stage      Acute on chronic diastolic congestive heart failure      Pulmonary embolism      Anxiety and depression      GERD (gastroesophageal reflux disease)      Obstructive sleep apnea      Chronic atrial fibrillation      H/O pneumonectomy      Right leg weakness        FAMILY HISTORY:  No pertinent family history      PAST SOCIAL HISTORY:    ALLERGIES & MEDICATIONS  --------------------------------------------------------------------------------  Allergies    nitrates (Unknown)    Intolerances      Standing Inpatient Medications  ambrisentan 10 milliGRAM(s) Oral daily  aspirin  chewable 81 milliGRAM(s) Oral daily  atorvastatin 20 milliGRAM(s) Oral at bedtime  budesonide  80 MICROgram(s)/formoterol 4.5 MICROgram(s) Inhaler 2 Puff(s) Inhalation two times a day  buPROPion XL (24-Hour) . 300 milliGRAM(s) Oral daily  cephalexin 500 milliGRAM(s) Oral every 12 hours  digoxin     Tablet 125 MICROGram(s) Oral daily  diltiazem    milliGRAM(s) Oral daily  furosemide   Injectable 60 milliGRAM(s) IV Push two times a day  pantoprazole    Tablet 40 milliGRAM(s) Oral before breakfast  spironolactone 50 milliGRAM(s) Oral daily  tadalafil 40 milliGRAM(s) Oral daily  tiotropium 18 MICROgram(s) Capsule 1 Capsule(s) Inhalation daily    PRN Inpatient Medications  acetaminophen     Tablet .. 975 milliGRAM(s) Oral every 6 hours PRN  ALBUTerol    90 MICROgram(s) HFA Inhaler 2 Puff(s) Inhalation every 6 hours PRN  HYDROmorphone  Injectable 0.25 milliGRAM(s) IV Push every 4 hours PRN  melatonin 3 milliGRAM(s) Oral at bedtime PRN      REVIEW OF SYSTEMS  --------------------------------------------------------------------------------  Constitutional:No Fever,Chills , Fatigue , Weight change   HEENT:No Blurred vision,Eye Pain ,Headache, Runny nose,Sore Throat   Respiratory: No Cough, Wheezing ,Shortness of breath  Cardiovascular: No Chest Pain, Palpitations, RINALDI, PND, Orthopnea  Gastrointestinal:No Abdominal Pain, Diarrhea, Constipation, Hemorrhoids, Nausea,  Vomiting  Genitourinary: No Nocturia, Dysuria, Incontinence  Extremities: No Swelling, Joint Pain  Neurologic:No Focal deficit, Paresthesia,  Syncope  Lymphatic: No Swelling, Lymphadenopathy   Skin: No Rash, Ecchymoses, Wounds, Lesions  Psychiatry: No Depression ,  Suicidal/Homicidal Ideation, Anxiety, Sleep Disturbances        All other systems were reviewed and are negative, except as noted.    VITALS/PHYSICAL EXAM  --------------------------------------------------------------------------------  T(C): 36.7 (08-14-22 @ 04:46), Max: 36.7 (08-13-22 @ 12:40)  HR: 63 (08-14-22 @ 06:04) (59 - 92)  BP: 121/74 (08-14-22 @ 04:46) (96/60 - 121/74)  RR: 18 (08-14-22 @ 04:46) (18 - 20)  SpO2: 97% (08-14-22 @ 06:04) (77% - 99%)  Wt(kg): --      Daily     Daily   I&O's Summary    13 Aug 2022 07:01  -  14 Aug 2022 07:00  --------------------------------------------------------  IN: 1130 mL / OUT: 2450 mL / NET: -1320 mL          08-13-22 @ 07:01  -  08-14-22 @ 07:00  --------------------------------------------------------  IN: 1130 mL / OUT: 2450 mL / NET: -1320 mL        Physical Exam:  Gen: NAD   HEENT: anicteric  Pulm: CTA B/L  CV: RRR, Normal S1 S2  Abd: soft, nontender, nondistended  CNS: AAO x 3, No asterixis  : No Jonn  LE: Warm, no edwin  Skin: Warm, without rashes  Vascular access: none        LABS/STUDIES  --------------------------------------------------------------------------------              7.3    3.72  >-----------<  190      [08-13-22 @ 06:31]              23.4     128  |  86  |  96  ----------------------------<  87      [08-13-22 @ 06:31]  4.3   |  29  |  2.06        Ca     9.0     [08-13-22 @ 06:31]      Mg     2.1     [08-13-22 @ 06:31]      Phos  3.9     [08-13-22 @ 06:31]    TPro  6.5  /  Alb  3.7  /  TBili  0.3  /  DBili  x   /  AST  18  /  ALT  10  /  AlkPhos  99  [08-13-22 @ 06:31]          Creatinine Trend:  SCr 2.06 [08-13 @ 06:31]  SCr 1.97 [08-12 @ 06:55]  SCr 1.89 [08-11 @ 05:55]    Urinalysis - [06-14-22 @ 02:31]      Color Light Yellow / Appearance Clear / SG 1.011 / pH 5.5      Gluc Negative / Ketone Negative  / Bili Negative / Urobili Negative       Blood Negative / Protein Negative / Leuk Est Negative / Nitrite Negative      RBC  / WBC  / Hyaline  / Gran  / Sq Epi  / Non Sq Epi  / Bacteria     Urine Sodium 90      [08-12-22 @ 12:36]  Urine Urea Nitrogen 298      [08-12-22 @ 12:36]    Iron 27, TIBC 350, %sat 8      [08-12-22 @ 06:56]  Ferritin 26      [08-12-22 @ 06:56]  Lipid: chol 103, TG 48, HDL 64, LDL --      [06-14-22 @ 02:45]    HCV 0.19, Nonreact      [06-15-22 @ 06:35]        Radiology  --------------------------------------------------------------------------------    --------------------------------------------------------------------------------  Wes Hawkins  [phone]

## 2022-08-14 NOTE — PROGRESS NOTE ADULT - PROBLEM SELECTOR PLAN 8
History of Afib on eliquis 5mg BID (held briefly at OSH due to acute drop in Hgb from baseline approx 8).  Tele reviewed - patient currently in afib, rate controlled.  - continue digoxin 125  - continue diltiazem 120mg qd  -Holding eliquis right now in setting of epistaxis as above. History of Afib on eliquis 5mg BID (held briefly at OSH due to acute drop in Hgb from baseline approx 8).  Tele reviewed - patient currently in afib, rate controlled.  - continue digoxin 125  - continue diltiazem 120mg qd  - Holding eliquis right now in setting of epistaxis as above.

## 2022-08-14 NOTE — PROGRESS NOTE ADULT - PROBLEM SELECTOR PLAN 7
Hgb 6.9 at OSH, transfused 1U PRBC with improvement to 7.8. Patient w/o signs/symptoms of acute blood loss, HD stable. Denies changes in bowel/urinary habits, denies recent falls/trauma.   - maintain active T/S  - Holding eliquis as above.   - f/u iron studies: total iron and % saturation decreased, ferritin on lower side, transferrin and TIBC normal Hgb 6.9 at OSH, transfused 1U PRBC with improvement to 7.8. Patient w/o signs/symptoms of acute blood loss, HD stable. Denies changes in bowel/urinary habits, denies recent falls/trauma.   - maintain active T/S  - Holding eliquis as above.   - f/u iron studies: total iron and % saturation decreased, ferritin on lower side, transferrin and TIBC normal; s/p IV iron infusion

## 2022-08-14 NOTE — PROGRESS NOTE ADULT - SUBJECTIVE AND OBJECTIVE BOX
CC: b/l epistaxis     HPI:  66F w/ severe pulmonary HTN (Group 1, 2 & 3), HFpEF, Afib, COPD on home O2 (5L NC), CKD, ROLANDA on CPAP, morbid obesity presenting with acute hypoxic respiratory failure, admitted for CHF exacerbation and ongoing lung transplant evaluation. Pt with recurrent small epistaxis at home, now with b/l epistaxis, more significant today that in the past. PT currently on eliquis which is being held. pts denies pnd, h/a, recent URI, congestion, facial pain, facial tenderness, rhinorrhea, or changes in vision . pt controlled with left RR and R nasopore surgicel. small ooze from left     PAST MEDICAL & SURGICAL HISTORY:  COPD exacerbation      Severe pulmonary hypertension      Chronic kidney disease, unspecified CKD stage      Acute on chronic diastolic congestive heart failure      Pulmonary embolism      Anxiety and depression      GERD (gastroesophageal reflux disease)      Obstructive sleep apnea      Chronic atrial fibrillation      H/O pneumonectomy      Right leg weakness        Allergies    nitrates (Unknown)    Intolerances      MEDICATIONS  (STANDING):  ambrisentan 10 milliGRAM(s) Oral daily  aspirin  chewable 81 milliGRAM(s) Oral daily  atorvastatin 20 milliGRAM(s) Oral at bedtime  budesonide  80 MICROgram(s)/formoterol 4.5 MICROgram(s) Inhaler 2 Puff(s) Inhalation two times a day  buPROPion XL (24-Hour) . 300 milliGRAM(s) Oral daily  cephalexin 500 milliGRAM(s) Oral every 12 hours  digoxin     Tablet 125 MICROGram(s) Oral daily  diltiazem    milliGRAM(s) Oral daily  furosemide   Injectable 60 milliGRAM(s) IV Push two times a day  pantoprazole    Tablet 40 milliGRAM(s) Oral before breakfast  polyethylene glycol 3350 17 Gram(s) Oral daily  spironolactone 50 milliGRAM(s) Oral daily  tadalafil 40 milliGRAM(s) Oral daily  tiotropium 18 MICROgram(s) Capsule 1 Capsule(s) Inhalation daily    MEDICATIONS  (PRN):  acetaminophen     Tablet .. 975 milliGRAM(s) Oral every 6 hours PRN Temp greater or equal to 38C (100.4F), Mild Pain (1 - 3), Moderate Pain (4 - 6)  ALBUTerol    90 MICROgram(s) HFA Inhaler 2 Puff(s) Inhalation every 6 hours PRN Shortness of Breath and/or Wheezing  HYDROmorphone  Injectable 0.25 milliGRAM(s) IV Push every 4 hours PRN Severe Pain (7 - 10)  melatonin 3 milliGRAM(s) Oral at bedtime PRN Insomnia    social history: see consult     family history: see consult     ROS:   ENT: all negative except as noted in HPI   Pulm: denies SOB, cough, hemoptysis  Neuro: denies numbness/tingling, loss of sensation  Endo: denies heat/cold intolerance, excessive sweating      Vital Signs Last 24 Hrs  T(C): 36.7 (14 Aug 2022 04:46), Max: 36.7 (13 Aug 2022 12:40)  T(F): 98 (14 Aug 2022 04:46), Max: 98 (13 Aug 2022 12:40)  HR: 63 (14 Aug 2022 06:04) (59 - 92)  BP: 121/74 (14 Aug 2022 04:46) (107/53 - 121/74)  BP(mean): 90 (14 Aug 2022 04:46) (90 - 90)  RR: 18 (14 Aug 2022 07:21) (18 - 20)  SpO2: 95% (14 Aug 2022 07:21) (77% - 99%)    Parameters below as of 14 Aug 2022 07:21  Patient On (Oxygen Delivery Method): oxygen tent,face mask                              7.4    4.40  )-----------( 209      ( 14 Aug 2022 06:45 )             24.1    08-14    129<L>  |  86<L>  |  96<H>  ----------------------------<  80  5.1   |  33<H>  |  1.94<H>    Ca    9.3      14 Aug 2022 06:45  Phos  3.9     08-14  Mg     2.1     08-14    TPro  6.7  /  Alb  3.9  /  TBili  0.3  /  DBili  x   /  AST  18  /  ALT  10  /  AlkPhos  104  08-14         PHYSICAL EXAM:  Gen: NAD  Skin: No rashes, bruises, or lesions  Head: Normocephalic, Atraumatic  Face: no edema, erythema, or fluctuance. Parotid glands soft without mass  Eyes: no scleral injection  Nose: 7.5 RR placed with 4cc of air, nasopore out, dry blood outside of left nare, R nasopore surgicel removed for pt to breathe better no further bleed   Mouth: No Stridor / Drooling / Trismus.  Mucosa moist, tongue/uvula midline, oropharynx coughed up clot otherwise clear  Neck: Flat, supple, no lymphadenopathy, trachea midline, no masses  Lymphatic: No lymphadenopathy  Resp: breathing easily, no stridor  CV: no peripheral edema/cyanosis  GI: nondistended   Peripheral vascular: no JVD or edema  Neuro: facial nerve intact, no facial droop

## 2022-08-14 NOTE — CONSULT NOTE ADULT - ATTENDING COMMENTS
66 year old with PMH of Pulmonary hypertension (Group 1,2,3) HFpEF, atrial fibrillation, COPD on home oxygen, ROLANDA on CPAP, morbid obesity with acute on chronic hypoxic respiratory failure. Nephrology consulted for worsening renal function..   1.  ARF on CKD--cardiorenal etiology likely consequent to right heart related renal venous congestion.  Agree with intensive LOOP diuretic rx and could consider continuous infusion (bumex may be more effective).  IF VASODILATOR rx utilized would NOT employ ACEi, ARB.  NO metolazone, thiazide and could consider SGLT2i if max dose loop ineffective.  NON oliguric, no HD required at present.  DDAVP as noted dailla invasive procedure  2.  CHF, right heart failure--SEVERE PULMONARY HYPERTENSION( note right sided pressures nearly = left!).  Major initial approach is diuretic, O2 to keep sats >90.  Vasodilators and particularly CCBmay --> worsening V/Q match  3.  Hyponatremia--fluid restriction, loop diuretic., rx likely future hypokalemia    discussed with med team    Vern Patiño  contact me on TEAMS
Pt is a 66F with PMHx COPD, CHFpEF, AFib, obesity (Type III, hx bariatric sx 2012), and severe pulmonary HTN with chronic combined hypercapnic and hypoxemic respiratory failure on ATC O2 supplementation (dependent on 8L NC at rest) with qhs/prn AVAPS (Trelegy Antonio via nasal prong interface) presenting for progressive dyspnea with minimal exertion admitted for acute decompensated right heart failure. RHC 6/20/2022 revealed sPAP: 106, dPAP: 44 mPAP:65, PCWP: 16, PVR 7.32W, CO/CI 6.7/3.2. Pt clinical hypervolemic today, tolerating diuresis with net output >2L/24 hrs and hemodynamically stable.     -Would c/w current diuresis with furosemide with strict I/Os and daily weights  -Would c/w home pulmonary vasodilators as above (tadalafil+ambrisentan). Will hold off on initiation of flolan for now as pt is tolerating current regimen   -c/w O2 supplementation foal goal O2 saturation 88-94%. Pt's home Trelegy at bedside and approved for use by [employer]. Can use prn.   -c/w home bronchodilator therapy  -Appreciate Transplant Pulm evaluation  -Discussed with Dr. Mosher today. pulmHTN team will continue to follow

## 2022-08-14 NOTE — PROGRESS NOTE ADULT - PROBLEM SELECTOR PLAN 4
RHC and cardiomems done 6/20. RHC showed elevated right sided filling pressures with severe pulmonary hypertension with systemic PA pressures, slightly elevated PCWP and preserved cardiac output. Follows with Dr. Rodriguez at [facility] for PH.   - continue tadalafil 40 mg daily   - continue letairis 10mg qd  - CT surgery aware of patient; f/u recs: at present state, not a transplant candidate but if we can reduce edema, improve kidney function, and optimize physical therapy (walking distance 300-400ft minimum); Ideally, BMI needs to be around ~30 for transplant candidacy  - f/u Pulm consult

## 2022-08-14 NOTE — CONSULT NOTE ADULT - ASSESSMENT
This is a 66 year old with PMH of Pulmonary hypertension (Group 1,2,3) HFpEF, atrial fibrillation, COPD on home oxygen, ROLANDA on CPAP, morbid obesity with acute hypoxic respiratory failure. Nephrology consulted for uptrending creatinine. noted decrease in lasix dosing. however due to presence of hypoxic respiratory failure and increased infiltrate in left lung filed would continue to diuresis patient with close monitoring of serum creatinine.     JUANA (non oliguric)    Prerenal vs Cardiorenal syndrome  less likely in the setting of diuresis due to lack of evidence of contraction would continue diuresis of patient with monitoring of urine output and serum creatinine.   [] urea noted to also be uptrending 96 (8/13) 77 ( 6/28), could be in the setting of diuresis  [] please send for urine creatinine and urine urea from same sample   [] will calculate FeUrea given patient is on diuresis   [] please avoid nephrotoxic agents (NSAIDs, ACe inhibitor, contrast administration)   [] Please continue to monitor renal function (renal panel)   [] Can continue with IV loop diuretic (Lasix 60 mg BID) until Euvolemic  [] no urgent need for dialysis at this time (electrolytes wnl) volume status improving     Azotemia   [] Can consider DDAVP 20 mcg if patient is to undergo any procedures to prevent uremic platelet bleed  [] Continue to monitor renal panel for serum urea/ Creatinine

## 2022-08-14 NOTE — PROGRESS NOTE ADULT - ASSESSMENT
66F w/ severe pulmonary HTN (Group 1, 2 & 3), HFpEF, Afib, COPD on home O2 (5L NC), CKD, ROLANDA on CPAP, morbid obesity presenting with acute hypoxic respiratory failure, admitted for CHF exacerbation and ongoing lung transplant evaluation.  66F w/ severe pulmonary HTN (Group 1, 2 & 3), HFpEF, Afib, COPD on home O2 (5L NC), CKD, ROLANDA on CPAP, morbid obesity presenting with acute hypoxic respiratory failure, admitted for CHF exacerbation and ongoing lung transplant evaluation.   W/ epistaxis 8/13, seen by ENT, started on bilateral nasal packing/ rhino rocket, started on pain meds, abx ppx, w/ persistent epistaxis this AM , CBC stable

## 2022-08-14 NOTE — PROGRESS NOTE ADULT - SUBJECTIVE AND OBJECTIVE BOX
ZANDER JEAN-BAPTISTE  66y  Female      Patient is a 66y old  Female who presents with a chief complaint of lung txp eval (13 Aug 2022 14:57)      INTERVAL HPI/OVERNIGHT EVENTS:    provider handoff from yesterday - 8/13: Significant epistaxis in AM, plased on humidified nasal tent->face mask, gave afrin. ENT called, nowhere to cauterize, placed rhino rocket, needs in for 3 days during which time will need abx and pain meds for headache. Started keflex and increased tylenol to 975. Nephrology consulted per pulmonary request (dr Mosher). Ordered T&S with AM labs in case pt needs transfusion. Increasing O2 requirements, ?worsening volume status vs dilauded. CXR with questionably increased patchy opacities on left. Additional 40mg IVP lasix ordered.         REVIEW OF SYSTEMS:      FAMILY HISTORY:  No pertinent family history      T(C): 36.7 (08-14-22 @ 04:46), Max: 36.7 (08-13-22 @ 12:40)  HR: 63 (08-14-22 @ 06:04) (59 - 92)  BP: 121/74 (08-14-22 @ 04:46) (96/60 - 121/74)  RR: 18 (08-14-22 @ 04:46) (18 - 20)  SpO2: 97% (08-14-22 @ 06:04) (77% - 99%)  Wt(kg): --Vital Signs Last 24 Hrs  T(C): 36.7 (14 Aug 2022 04:46), Max: 36.7 (13 Aug 2022 12:40)  T(F): 98 (14 Aug 2022 04:46), Max: 98 (13 Aug 2022 12:40)  HR: 63 (14 Aug 2022 06:04) (59 - 92)  BP: 121/74 (14 Aug 2022 04:46) (96/60 - 121/74)  BP(mean): 90 (14 Aug 2022 04:46) (90 - 90)  RR: 18 (14 Aug 2022 04:46) (18 - 20)  SpO2: 97% (14 Aug 2022 06:04) (77% - 99%)    Parameters below as of 14 Aug 2022 04:46  Patient On (Oxygen Delivery Method): mask, aerosol      nitrates (Unknown)      PHYSICAL EXAM:  GENERAL: NAD, well-groomed, well-developed  HEAD:  Atraumatic, Normocephalic  EYES: EOMI, PERRLA, conjunctiva and sclera clear  ENMT: No tonsillar erythema, exudates, or enlargement; Moist mucous membranes, Good dentition, No lesions  NECK: Supple, No JVD, Normal thyroid  NERVOUS SYSTEM:  Alert & Oriented X3, Good concentration; Motor Strength 5/5 B/L upper and lower extremities; DTRs 2+ intact and symmetric  CHEST/LUNG: Clear to percussion bilaterally; No rales, rhonchi, wheezing, or rubs  HEART: Regular rate and rhythm; No murmurs, rubs, or gallops  ABDOMEN: Soft, Nontender, Nondistended; Bowel sounds present  EXTREMITIES:  2+ Peripheral Pulses, No clubbing, cyanosis, or edema  LYMPH: No lymphadenopathy noted  SKIN: No rashes or lesions    Consultant(s) Notes Reviewed:  [x ] YES  [ ] NO  Care Discussed with Consultants/Other Providers [ x] YES  [ ] NO    LABS:      RADIOLOGY & ADDITIONAL TESTS:    Imaging Personally Reviewed:  [ ] YES  [ ] NO  acetaminophen     Tablet .. 975 milliGRAM(s) Oral every 6 hours PRN  ALBUTerol    90 MICROgram(s) HFA Inhaler 2 Puff(s) Inhalation every 6 hours PRN  ambrisentan 10 milliGRAM(s) Oral daily  aspirin  chewable 81 milliGRAM(s) Oral daily  atorvastatin 20 milliGRAM(s) Oral at bedtime  budesonide  80 MICROgram(s)/formoterol 4.5 MICROgram(s) Inhaler 2 Puff(s) Inhalation two times a day  buPROPion XL (24-Hour) . 300 milliGRAM(s) Oral daily  cephalexin 500 milliGRAM(s) Oral every 12 hours  digoxin     Tablet 125 MICROGram(s) Oral daily  diltiazem    milliGRAM(s) Oral daily  furosemide   Injectable 60 milliGRAM(s) IV Push two times a day  HYDROmorphone  Injectable 0.25 milliGRAM(s) IV Push every 4 hours PRN  melatonin 3 milliGRAM(s) Oral at bedtime PRN  pantoprazole    Tablet 40 milliGRAM(s) Oral before breakfast  spironolactone 50 milliGRAM(s) Oral daily  tadalafil 40 milliGRAM(s) Oral daily  tiotropium 18 MICROgram(s) Capsule 1 Capsule(s) Inhalation daily      HEALTH ISSUES - PROBLEM Dx:  Acute respiratory failure with hypoxia    JUANA (acute kidney injury)    Severe pulmonary hypertension    Acute on chronic diastolic congestive heart failure    Prophylactic measure    COPD, moderate    Chronic atrial fibrillation    Anemia    Chronic kidney disease, unspecified CKD stage    Epistaxis             JERILYNZANDER  66y  Female      Patient is a 66y old  Female who presents with a chief complaint of lung txp eval (13 Aug 2022 14:57)      INTERVAL HPI/OVERNIGHT EVENTS:    provider handoff from yesterday - 8/13: Significant epistaxis in AM, placed on humidified nasal tent->face mask, gave afrin. ENT called, nowhere to cauterize, placed rhino rocket, needs in for 3 days during which time will need abx and pain meds for headache. Started keflex and increased tylenol to 975. Nephrology consulted per pulmonary request (dr Mosher). Ordered T&S with AM labs in case pt needs transfusion. Increasing O2 requirements, ?worsening volume status vs dilauded. CXR with questionably increased patchy opacities on left. Additional 40mg IVP lasix ordered.     Overnight/this morning: persistent epistaxis packing saturated with blood, breakthrough bleeding on left nostril. Patient in discomfort 2/2 to the nasal packing.         REVIEW OF SYSTEMS:  SOB  Discomfort 2/2 to nasal packing     FAMILY HISTORY:  No pertinent family history      T(C): 36.7 (08-14-22 @ 04:46), Max: 36.7 (08-13-22 @ 12:40)  HR: 63 (08-14-22 @ 06:04) (59 - 92)  BP: 121/74 (08-14-22 @ 04:46) (96/60 - 121/74)  RR: 18 (08-14-22 @ 04:46) (18 - 20)  SpO2: 97% (08-14-22 @ 06:04) (77% - 99%)  Wt(kg): --Vital Signs Last 24 Hrs  T(C): 36.7 (14 Aug 2022 04:46), Max: 36.7 (13 Aug 2022 12:40)  T(F): 98 (14 Aug 2022 04:46), Max: 98 (13 Aug 2022 12:40)  HR: 63 (14 Aug 2022 06:04) (59 - 92)  BP: 121/74 (14 Aug 2022 04:46) (96/60 - 121/74)  BP(mean): 90 (14 Aug 2022 04:46) (90 - 90)  RR: 18 (14 Aug 2022 04:46) (18 - 20)  SpO2: 97% (14 Aug 2022 06:04) (77% - 99%)    Parameters below as of 14 Aug 2022 04:46  Patient On (Oxygen Delivery Method): mask, aerosol      nitrates (Unknown)      PHYSICAL EXAM:  CONSTITUTIONAL: NAD, well-developed  HEENT: +epistaxis  RESPIRATORY: coarse breadth sounds, no crackles    CARDIOVASCULAR: 3+ LE edema, non-pitting. Regular rate and rhythm, normal S1 and S2, no murmur/rub/gallop; Peripheral pulses are 2+ bilaterally  ABDOMEN: Nontender to palpation, normoactive bowel sounds, no rebound/guarding  MUSCLOSKELETAL: no clubbing or cyanosis of digits; no joint swelling or tenderness to palpation  NEURO: Non-focal, no tremors  SKIN: No rashes      Consultant(s) Notes Reviewed:  [x ] YES  [ ] NO  Care Discussed with Consultants/Other Providers [ x] YES  [ ] NO    LABS:                          7.4    4.40  )-----------( 209      ( 14 Aug 2022 06:45 )             24.1       08-14    129<L>  |  86<L>  |  96<H>  ----------------------------<  80  5.1   |  33<H>  |  1.94<H>    Ca    9.3      14 Aug 2022 06:45  Phos  3.9     08-14  Mg     2.1     08-14    TPro  6.7  /  Alb  3.9  /  TBili  0.3  /  DBili  x   /  AST  18  /  ALT  10  /  AlkPhos  104  08-14          ABG - ( 13 Aug 2022 16:20 )  pH, Arterial: 7.42  pH, Blood: x     /  pCO2: 56    /  pO2: 69    / HCO3: 36    / Base Excess: 10.6  /  SaO2: 93.2        RADIOLOGY & ADDITIONAL TESTS:    Chest xray IMPRESSION:  Improving pulmonary edema.      Imaging Personally Reviewed:  [ ] YES  [ ] NO  acetaminophen     Tablet .. 975 milliGRAM(s) Oral every 6 hours PRN  ALBUTerol    90 MICROgram(s) HFA Inhaler 2 Puff(s) Inhalation every 6 hours PRN  ambrisentan 10 milliGRAM(s) Oral daily  aspirin  chewable 81 milliGRAM(s) Oral daily  atorvastatin 20 milliGRAM(s) Oral at bedtime  budesonide  80 MICROgram(s)/formoterol 4.5 MICROgram(s) Inhaler 2 Puff(s) Inhalation two times a day  buPROPion XL (24-Hour) . 300 milliGRAM(s) Oral daily  cephalexin 500 milliGRAM(s) Oral every 12 hours  digoxin     Tablet 125 MICROGram(s) Oral daily  diltiazem    milliGRAM(s) Oral daily  furosemide   Injectable 60 milliGRAM(s) IV Push two times a day  HYDROmorphone  Injectable 0.25 milliGRAM(s) IV Push every 4 hours PRN  melatonin 3 milliGRAM(s) Oral at bedtime PRN  pantoprazole    Tablet 40 milliGRAM(s) Oral before breakfast  spironolactone 50 milliGRAM(s) Oral daily  tadalafil 40 milliGRAM(s) Oral daily  tiotropium 18 MICROgram(s) Capsule 1 Capsule(s) Inhalation daily      HEALTH ISSUES - PROBLEM Dx:  Acute respiratory failure with hypoxia    JUANA (acute kidney injury)    Severe pulmonary hypertension    Acute on chronic diastolic congestive heart failure    Prophylactic measure    COPD, moderate    Chronic atrial fibrillation    Anemia    Chronic kidney disease, unspecified CKD stage    Epistaxis

## 2022-08-14 NOTE — PROGRESS NOTE ADULT - ATTENDING COMMENTS
above plans discussed with Dr. Swain    # sever pulmonary HTN  # acute on chronic respiratory failure with hypoxemia  # acute on chronic HFpEF exacerbation  # chronic Afib  # CKD  # epistaxis    - another episode of epistaxis overnight, H/H remains stable  - ENT consult appreciated, s/p packing; to be remained x 3 days along with ppx abx  - hold eliquis for now, monitor H/H closely (slight drop this morning)  - continue IV lasix 60mg BID  - nephrology consulted for optimization  - appreciate HF/transplant team: at this time, she is not a candidate for lung transplant  - overall poor prognosis: HF recommends palliative care consult    Zhanna Velasquez MD  Division of Hospital Medicine  Contact via [employer] Teams  Office: [phone]

## 2022-08-14 NOTE — PROGRESS NOTE ADULT - PROBLEM SELECTOR PLAN 2
Significant epistaxis this 8/13 AM, likely in setting of use of 8L nc. Not new for pt, but worse than her usual nose bleeds  -S/P afrin x1  -C/W humidified face tent for now, can eventually transition back to nc  -ENT consulted, note epistaxis L nare without target to cauterize. Placed rhino rocket, will need for 3 days per ENT       -Pain regiment and ppx abx ordered while rhino rocket in place  -Monitor H/H  -Eliquis held, can likely restart in 24-48h if no further bleeding. Significant epistaxis this 8/13 AM, likely in setting of use of 8L nc. Not new for pt, but worse than her usual nose bleeds; patient with persistent epistaxis this AM, ENT made aware, CBC stable   -S/P afrin x1  -C/W humidified face tent for now, can eventually transition back to nc  -ENT consulted, note epistaxis L nare without target to cauterize. Placed rhino rocket, will need for 3 days per ENT       -Pain regiment and ppx abx ordered while rhino rocket in place  -Monitor H/H  -Eliquis held, can likely restart in 24-48h if no further bleeding.

## 2022-08-14 NOTE — PROGRESS NOTE ADULT - PROBLEM SELECTOR PLAN 5
Last echo 6/22 with EF 68%, normal LV systolic function w/o WMA. Flattening of interventricular septum c/w RV overload, w/ severe R atrial enlargement, RV enlargement w/ decreased RV systolic function, severely elevated pulmonary pressures.   - continue lasix 60mg IVP BID  - strict I/Os, daily standing weights  - continue spironolactone 50mg qd  - f/u repeat echo - poor study, but overall no sig changes from previous

## 2022-08-14 NOTE — PROGRESS NOTE ADULT - PROBLEM SELECTOR PLAN 1
Hypoxic to 80s at home while on baseline 8L NC. Patient gaining weight since hospital discharge early July, increased torsemide w/o significant results. Likely in s/o CHF exacerbation, given elevated BNP, limited exercise tolerance, worsening LE edema. Patient afebrile, non-toxic appearing, infection unlikely.   - continue lasix 60mg IVP BID  - standing daily weights, strict I/Os  - pulm transplant and HF following  - eventual SGLT2-i prior to discharge, once on stable dose of oral diuretics  - HF: CardioMEMS PAD yesterday 43 mmHg, will read today goal ~40 but difficult to ascertain given RV dysfunction   - daily standing weight. Was discharged in June at 189 lbs. Weight on admission 212, 210 on 8/12. Hypoxic to 80s at home while on baseline 8L NC. Patient gaining weight since hospital discharge early July, increased torsemide w/o significant results. Likely in s/o CHF exacerbation, given elevated BNP, limited exercise tolerance, worsening LE edema. Patient afebrile, non-toxic appearing, infection unlikely.   - continue lasix 60mg IVP BID  - standing daily weights, strict I/Os  - pulm transplant and HF following  - eventual SGLT2-i prior to discharge, once on stable dose of oral diuretics  - HF: CardioMEMS PAD 43 mmHg, will read today goal ~40 but difficult to ascertain given RV dysfunction   - daily standing weight. Was discharged in June at 189 lbs. Weight on admission 212, 210 on 8/12.

## 2022-08-15 NOTE — PROGRESS NOTE ADULT - PROBLEM SELECTOR PLAN 1
- further guidance by pulmonary hypertension team and lung transplant team  - evaluation for lung transplant pending improved BMI closer to 30 (currently 37.6), improved mobility and improved renal function  - suspect it will be difficult for her to become a transplant candidate, would consider palliative care input  - transplant team wants pt to be more euvolemic before trialing her on pulm vasodilators

## 2022-08-15 NOTE — PROGRESS NOTE ADULT - PROBLEM SELECTOR PLAN 5
Last echo 6/22 with EF 68%, normal LV systolic function w/o WMA. Flattening of interventricular septum c/w RV overload, w/ severe R atrial enlargement, RV enlargement w/ decreased RV systolic function, severely elevated pulmonary pressures.   - continue lasix 60mg IVP BID  - strict I/Os, daily standing weights  - continue spironolactone 50mg qd  - f/u repeat echo - poor study, but overall no sig changes from previous Last echo 6/22 with EF 68%, normal LV systolic function w/o WMA. Flattening of interventricular septum c/w RV overload, w/ severe R atrial enlargement, RV enlargement w/ decreased RV systolic function, severely elevated pulmonary pressures.   - now on bumex 2mg BID   - strict I/Os, daily standing weights  - continue spironolactone 50mg qd

## 2022-08-15 NOTE — BH CONSULTATION LIAISON ASSESSMENT NOTE - NSBHCHARTREVIEWLAB_PSY_A_CORE FT
7.2    4.28  )-----------( 207      ( 15 Aug 2022 05:39 )             23.7     08-15    130<L>  |  87<L>  |  95<H>  ----------------------------<  73  5.3   |  32<H>  |  1.96<H>    Ca    9.4      15 Aug 2022 05:43  Phos  3.6     08-15  Mg     2.1     08-15    TPro  6.7  /  Alb  3.9  /  TBili  0.4  /  DBili  x   /  AST  21  /  ALT  8<L>  /  AlkPhos  105  08-15

## 2022-08-15 NOTE — PROGRESS NOTE ADULT - PROBLEM SELECTOR PLAN 2
- continue Lasix 60 mg IV BID (home regimen was Torsemide 40 BID)  - continue Spironolactone 50 mg QD  - eventual SGLT2-i prior to discharge, once on stable dose of oral diuretics  - CardioMEMS PAD 8/11 43 mmHg. goal ~40 but difficult to ascertain given RV dysfunction, will reach out today to have mems checked again  - daily standing weight. Was discharged in June at 189 lbs - continue Lasix 60 mg IV BID (home regimen was Torsemide 40 BID)  - continue Spironolactone 50 mg QD  - eventual SGLT2-i prior to discharge, once on stable dose of oral diuretics  - CardioMEMS PAD 8/11 43 mmHg. goal ~40 but difficult to ascertain given RV dysfunction  - cardiomems PAD 8/15 45 mmHg, 98/45/66  - daily standing weight. Was discharged in June at 189 lbs

## 2022-08-15 NOTE — PROGRESS NOTE ADULT - PROBLEM SELECTOR PLAN 2
Significant epistaxis this 8/13 AM, likely in setting of use of 8L nc. Not new for pt, but worse than her usual nose bleeds; patient with persistent epistaxis this AM, ENT made aware, CBC stable   -S/P afrin x1  -C/W humidified face tent for now, can eventually transition back to nc  -ENT consulted, note epistaxis L nare without target to cauterize. Placed rhino rocket, will need for 3 days per ENT       -Pain regiment and ppx abx ordered while rhino rocket in place  -Monitor H/H  -Eliquis held, can likely restart in 24-48h if no further bleeding. Significant epistaxis this 8/13 AM, likely in setting of use of 8L nc. Not new for pt, but worse than her usual nose bleeds; Rhino rocket now just in left nare, ballon to be deflated tomorrow for trial prior to removal   -S/P afrin x1  -C/W humidified face tent for now, can eventually transition back to nc  -ENT consulted, note epistaxis L nare without target to cauterize. Placed rhino rocket, will need for 3 days per ENT       -Pain regiment and ppx abx ordered while rhino rocket in place  -Monitor H/H  -Eliquis held, can likely restart in 24-48h if no further bleeding.

## 2022-08-15 NOTE — PROGRESS NOTE ADULT - SUBJECTIVE AND OBJECTIVE BOX
[facility] Division of Kidney Diseases & Hypertension  FOLLOW UP NOTE  [phone]--------------------------------------------------------------------------------  Chief Complaint:Pulmonary hypertension due to lung diseases and hypoxia    HPI: 66 year old with PMH of Pulmonary hypertension (Group 1,2,3) HFpEF, atrial fibrillation, COPD on home oxygen, ROLANDA on CPAP, morbid obesity with acute hypoxic respiratory failure. Nephrology consulted for JUANA. baseline creatinine as of june appears to be 1.5-1.6 on august 11 patient creatinine was 1.89 and has continued to up trend most recently 2.06 8/13. Patient was initially started on lasix 60 mg IV BID on admission with marked improvement in pro-BNP since presentation 88284 (8/11) and most recently 8947 (8/13).       24 hour events/subjective: Patient seen & examined. Labs & vitals reviewed. Remains on bumex 2mg IV bid.  Reports SOB about the same. UO in the last 24 hours  2.3L with net negative 1.7L        PAST HISTORY  --------------------------------------------------------------------------------  No significant changes to PMH, PSH, FHx, SHx, unless otherwise noted    ALLERGIES & MEDICATIONS  --------------------------------------------------------------------------------  Allergies    nitrates (Unknown)    Intolerances      Standing Inpatient Medications  ambrisentan 10 milliGRAM(s) Oral daily  aspirin  chewable 81 milliGRAM(s) Oral daily  atorvastatin 20 milliGRAM(s) Oral at bedtime  budesonide  80 MICROgram(s)/formoterol 4.5 MICROgram(s) Inhaler 2 Puff(s) Inhalation two times a day  buMETAnide Injectable 2 milliGRAM(s) IV Push every 12 hours  buPROPion XL (24-Hour) . 300 milliGRAM(s) Oral daily  cephalexin 500 milliGRAM(s) Oral every 12 hours  digoxin     Tablet 125 MICROGram(s) Oral daily  diltiazem    milliGRAM(s) Oral daily  pantoprazole    Tablet 40 milliGRAM(s) Oral before breakfast  polyethylene glycol 3350 17 Gram(s) Oral daily  sodium chloride 0.65% Nasal 1 Spray(s) Both Nostrils four times a day  spironolactone 50 milliGRAM(s) Oral daily  tadalafil 40 milliGRAM(s) Oral daily  tiotropium 18 MICROgram(s) Capsule 1 Capsule(s) Inhalation daily    PRN Inpatient Medications  acetaminophen     Tablet .. 975 milliGRAM(s) Oral every 6 hours PRN  ALBUTerol    90 MICROgram(s) HFA Inhaler 2 Puff(s) Inhalation every 6 hours PRN  HYDROmorphone  Injectable 0.25 milliGRAM(s) IV Push every 4 hours PRN  melatonin 3 milliGRAM(s) Oral at bedtime PRN      REVIEW OF SYSTEMS  --------------------------------------------------------------------------------  Gen: No chills  Respiratory: + dyspnea, cough  CV: No chest pain  GI: No abdominal pain, diarrhea,  nausea, vomiting  : No increased frequency, dysuria, hematuria  MSK:  + edema  Neuro: No dizziness/lightheadedness      All other systems were reviewed and are negative, except as noted.    VITALS/PHYSICAL EXAM  --------------------------------------------------------------------------------  T(C): 36.8 (08-15-22 @ 12:31), Max: 37 (08-14-22 @ 20:33)  HR: 96 (08-15-22 @ 12:31) (68 - 96)  BP: 126/69 (08-15-22 @ 12:31) (102/61 - 126/69)  RR: 18 (08-15-22 @ 12:31) (18 - 18)  SpO2: 92% (08-15-22 @ 12:31) (90% - 97%)  Wt(kg): --        08-14-22 @ 07:01  -  08-15-22 @ 07:00  --------------------------------------------------------  IN: 575 mL / OUT: 2350 mL / NET: -1775 mL    08-15-22 @ 07:01  -  08-15-22 @ 14:03  --------------------------------------------------------  IN: 240 mL / OUT: 300 mL / NET: -60 mL      Physical Exam:  Gen: NAD, aerosol mask  HEENT: anicteric  Pulm: bibasilar crackles  CV: RRR  Abd: soft, nontender, nondistended  CNS: awake, alert  : No Jonn  LE: +++ b/l LE edema   Skin: Warm, without rashes  Vascular access:       LABS/STUDIES  --------------------------------------------------------------------------------              7.2    4.28  >-----------<  207      [08-15-22 @ 05:39]              23.7     130  |  87  |  95  ----------------------------<  73      [08-15-22 @ 05:43]  5.3   |  32  |  1.96        Ca     9.4     [08-15-22 @ 05:43]      Mg     2.1     [08-15-22 @ 05:43]      Phos  3.6     [08-15-22 @ 05:43]    TPro  6.7  /  Alb  3.9  /  TBili  0.4  /  DBili  x   /  AST  21  /  ALT  8   /  AlkPhos  105  [08-15-22 @ 05:43]          Creatinine Trend:  SCr 1.96 [08-15 @ 05:43]  SCr 1.94 [08-14 @ 06:45]  SCr 2.06 [08-13 @ 06:31]  SCr 1.97 [08-12 @ 06:55]  SCr 1.89 [08-11 @ 05:55]      Urine Creatinine 33      [08-14-22 @ 08:40]  Urine Sodium 90      [08-12-22 @ 12:36]  Urine Urea Nitrogen 397      [08-14-22 @ 08:40]    Iron 27, TIBC 350, %sat 8      [08-12-22 @ 06:56]  Ferritin 26      [08-12-22 @ 06:56]

## 2022-08-15 NOTE — BH CONSULTATION LIAISON ASSESSMENT NOTE - NSBHCHARTREVIEWVS_PSY_A_CORE FT
Vital Signs Last 24 Hrs  T(C): 36.6 (15 Aug 2022 19:23), Max: 36.8 (15 Aug 2022 12:31)  T(F): 97.9 (15 Aug 2022 19:23), Max: 98.2 (15 Aug 2022 12:31)  HR: 74 (15 Aug 2022 19:23) (74 - 96)  BP: 110/68 (15 Aug 2022 19:23) (102/61 - 126/69)  BP(mean): 82 (15 Aug 2022 19:23) (75 - 82)  RR: 18 (15 Aug 2022 19:23) (18 - 18)  SpO2: 96% (15 Aug 2022 19:23) (92% - 96%)    Parameters below as of 15 Aug 2022 12:31  Patient On (Oxygen Delivery Method): nasal cannula

## 2022-08-15 NOTE — PROGRESS NOTE ADULT - PROBLEM SELECTOR PLAN 4
RHC and cardiomems done 6/20. RHC showed elevated right sided filling pressures with severe pulmonary hypertension with systemic PA pressures, slightly elevated PCWP and preserved cardiac output. Follows with Dr. Rodriguez at [facility] for PH.   - continue tadalafil 40 mg daily   - continue letairis 10mg qd  - CT surgery aware of patient; f/u recs: at present state, not a transplant candidate but if we can reduce edema, improve kidney function, and optimize physical therapy (walking distance 300-400ft minimum); Ideally, BMI needs to be around ~30 for transplant candidacy  - f/u Pulm consult RHC and cardiomems done 6/20. RHC showed elevated right sided filling pressures with severe pulmonary hypertension with systemic PA pressures, slightly elevated PCWP and preserved cardiac output. Follows with Dr. Rodriguez at [facility] for PH.   - continue tadalafil 40 mg daily   - continue letairis 10mg qd  - CT surgery aware of patient; f/u recs: at present state, not a transplant candidate but if we can reduce edema, improve kidney function, and optimize physical therapy (walking distance 300-400ft minimum); Ideally, BMI needs to be around ~30 for transplant candidacy  - follow Pulm recs

## 2022-08-15 NOTE — BH CONSULTATION LIAISON ASSESSMENT NOTE - CURRENT MEDICATION
MEDICATIONS  (STANDING):  ambrisentan 10 milliGRAM(s) Oral daily  aspirin  chewable 81 milliGRAM(s) Oral daily  atorvastatin 20 milliGRAM(s) Oral at bedtime  budesonide  80 MICROgram(s)/formoterol 4.5 MICROgram(s) Inhaler 2 Puff(s) Inhalation two times a day  buMETAnide IVPB 3 milliGRAM(s) IV Intermittent two times a day  buPROPion XL (24-Hour) . 300 milliGRAM(s) Oral daily  cephalexin 500 milliGRAM(s) Oral every 12 hours  digoxin     Tablet 125 MICROGram(s) Oral daily  diltiazem    milliGRAM(s) Oral daily  pantoprazole    Tablet 40 milliGRAM(s) Oral before breakfast  polyethylene glycol 3350 17 Gram(s) Oral daily  sodium chloride 0.65% Nasal 1 Spray(s) Both Nostrils four times a day  spironolactone 50 milliGRAM(s) Oral daily  tadalafil 40 milliGRAM(s) Oral daily  tiotropium 18 MICROgram(s) Capsule 1 Capsule(s) Inhalation daily    MEDICATIONS  (PRN):  acetaminophen     Tablet .. 975 milliGRAM(s) Oral every 6 hours PRN Temp greater or equal to 38C (100.4F), Mild Pain (1 - 3), Moderate Pain (4 - 6)  ALBUTerol    90 MICROgram(s) HFA Inhaler 2 Puff(s) Inhalation every 6 hours PRN Shortness of Breath and/or Wheezing  HYDROmorphone  Injectable 0.25 milliGRAM(s) IV Push every 4 hours PRN Severe Pain (7 - 10)  melatonin 3 milliGRAM(s) Oral at bedtime PRN Insomnia

## 2022-08-15 NOTE — PROGRESS NOTE ADULT - ASSESSMENT
66 year old with PMH of Pulmonary hypertension (Group 1,2,3) HFpEF, atrial fibrillation, COPD on home oxygen, ROLANDA on CPAP, morbid obesity with acute hypoxic respiratory failure. Nephrology consulted for uptrending creatinine. noted decrease in lasix dosing. however due to presence of hypoxic respiratory failure and increased infiltrate in left lung filed would continue to diuresis patient with close monitoring of serum creatinine.

## 2022-08-15 NOTE — PROGRESS NOTE ADULT - SUBJECTIVE AND OBJECTIVE BOX
Patient seen, evaluated, and examined at bedside with Dr. Préez.      ROS:    REVIEW OF SYSTEMS    [ ] A ten-point review of systems was otherwise negative except as noted.  [ ] Due to altered mental status/intubation, subjective information were not able to be obtained from the patient. History was obtained, to the extent possible, from review of the chart and collateral sources of information.      CURRENT MEDICATIONS:   --------------------------------------------------------------------------------------  Neurologic Medications  acetaminophen     Tablet .. 975 milliGRAM(s) Oral every 6 hours PRN Temp greater or equal to 38C (100.4F), Mild Pain (1 - 3), Moderate Pain (4 - 6)  buPROPion XL (24-Hour) . 300 milliGRAM(s) Oral daily  HYDROmorphone  Injectable 0.25 milliGRAM(s) IV Push every 4 hours PRN Severe Pain (7 - 10)  melatonin 3 milliGRAM(s) Oral at bedtime PRN Insomnia    Respiratory Medications  ALBUTerol    90 MICROgram(s) HFA Inhaler 2 Puff(s) Inhalation every 6 hours PRN Shortness of Breath and/or Wheezing  budesonide  80 MICROgram(s)/formoterol 4.5 MICROgram(s) Inhaler 2 Puff(s) Inhalation two times a day  tiotropium 18 MICROgram(s) Capsule 1 Capsule(s) Inhalation daily    Cardiovascular Medications  ambrisentan 10 milliGRAM(s) Oral daily  digoxin     Tablet 125 MICROGram(s) Oral daily  diltiazem    milliGRAM(s) Oral daily  furosemide   Injectable 60 milliGRAM(s) IV Push two times a day  spironolactone 50 milliGRAM(s) Oral daily  tadalafil 40 milliGRAM(s) Oral daily    Gastrointestinal Medications  pantoprazole    Tablet 40 milliGRAM(s) Oral before breakfast  polyethylene glycol 3350 17 Gram(s) Oral daily    Genitourinary Medications    Hematologic/Oncologic Medications  aspirin  chewable 81 milliGRAM(s) Oral daily    Antimicrobial/Immunologic Medications  cephalexin 500 milliGRAM(s) Oral every 12 hours    Endocrine/Metabolic Medications  atorvastatin 20 milliGRAM(s) Oral at bedtime    Topical/Other Medications  sodium chloride 0.65% Nasal 1 Spray(s) Both Nostrils four times a day    --------------------------------------------------------------------------------------    VITAL SIGNS, INS/OUTS (last 24 hours):  --------------------------------------------------------------------------------------  ICU Vital Signs Last 24 Hrs  T(C): 36.6 (15 Aug 2022 05:00), Max: 37 (14 Aug 2022 20:33)  T(F): 97.8 (15 Aug 2022 05:00), Max: 98.6 (14 Aug 2022 20:33)  HR: 88 (15 Aug 2022 06:10) (68 - 88)  BP: 102/61 (15 Aug 2022 05:00) (101/61 - 123/71)  BP(mean): 75 (15 Aug 2022 05:00) (75 - 88)  ABP: --  ABP(mean): --  RR: 18 (15 Aug 2022 05:00) (18 - 18)  SpO2: 92% (15 Aug 2022 06:10) (90% - 97%)    O2 Parameters below as of 15 Aug 2022 05:00  Patient On (Oxygen Delivery Method): mask, aerosol          I&O's Summary    14 Aug 2022 07:01  -  15 Aug 2022 07:00  --------------------------------------------------------  IN: 575 mL / OUT: 2350 mL / NET: -1775 mL      --------------------------------------------------------------------------------------    EXAM:  General:  Chest:  Respiratory:  Cardiac:  Gastrointestinal:  Extremities:  Neuro:  --------------------------------------------------------------------------------------    OTHER LABS    IMAGING RESULTS         Patient seen, evaluated, and examined at bedside with Dr. Pérez.    Patient seen at bedside on venti mask as she had nosebleed yesterday s/p ENT and rhinorocket placed (to be removed tomorrow).  States up until nosebleed she was sitting in a chair and able to ambulate with PT.        ROS:    REVIEW OF SYSTEMS    [x] A ten-point review of systems was otherwise negative except as noted.  [ ] Due to altered mental status/intubation, subjective information were not able to be obtained from the patient. History was obtained, to the extent possible, from review of the chart and collateral sources of information.      CURRENT MEDICATIONS:   --------------------------------------------------------------------------------------  Neurologic Medications  acetaminophen     Tablet .. 975 milliGRAM(s) Oral every 6 hours PRN Temp greater or equal to 38C (100.4F), Mild Pain (1 - 3), Moderate Pain (4 - 6)  buPROPion XL (24-Hour) . 300 milliGRAM(s) Oral daily  HYDROmorphone  Injectable 0.25 milliGRAM(s) IV Push every 4 hours PRN Severe Pain (7 - 10)  melatonin 3 milliGRAM(s) Oral at bedtime PRN Insomnia    Respiratory Medications  ALBUTerol    90 MICROgram(s) HFA Inhaler 2 Puff(s) Inhalation every 6 hours PRN Shortness of Breath and/or Wheezing  budesonide  80 MICROgram(s)/formoterol 4.5 MICROgram(s) Inhaler 2 Puff(s) Inhalation two times a day  tiotropium 18 MICROgram(s) Capsule 1 Capsule(s) Inhalation daily    Cardiovascular Medications  ambrisentan 10 milliGRAM(s) Oral daily  digoxin     Tablet 125 MICROGram(s) Oral daily  diltiazem    milliGRAM(s) Oral daily  furosemide   Injectable 60 milliGRAM(s) IV Push two times a day  spironolactone 50 milliGRAM(s) Oral daily  tadalafil 40 milliGRAM(s) Oral daily    Gastrointestinal Medications  pantoprazole    Tablet 40 milliGRAM(s) Oral before breakfast  polyethylene glycol 3350 17 Gram(s) Oral daily    Genitourinary Medications    Hematologic/Oncologic Medications  aspirin  chewable 81 milliGRAM(s) Oral daily    Antimicrobial/Immunologic Medications  cephalexin 500 milliGRAM(s) Oral every 12 hours    Endocrine/Metabolic Medications  atorvastatin 20 milliGRAM(s) Oral at bedtime    Topical/Other Medications  sodium chloride 0.65% Nasal 1 Spray(s) Both Nostrils four times a day    --------------------------------------------------------------------------------------    VITAL SIGNS, INS/OUTS (last 24 hours):  --------------------------------------------------------------------------------------  ICU Vital Signs Last 24 Hrs  T(C): 36.6 (15 Aug 2022 05:00), Max: 37 (14 Aug 2022 20:33)  T(F): 97.8 (15 Aug 2022 05:00), Max: 98.6 (14 Aug 2022 20:33)  HR: 88 (15 Aug 2022 06:10) (68 - 88)  BP: 102/61 (15 Aug 2022 05:00) (101/61 - 123/71)  BP(mean): 75 (15 Aug 2022 05:00) (75 - 88)  ABP: --  ABP(mean): --  RR: 18 (15 Aug 2022 05:00) (18 - 18)  SpO2: 92% (15 Aug 2022 06:10) (90% - 97%)    O2 Parameters below as of 15 Aug 2022 05:00  Patient On (Oxygen Delivery Method): mask, aerosol          I&O's Summary    14 Aug 2022 07:01  -  15 Aug 2022 07:00  --------------------------------------------------------  IN: 575 mL / OUT: 2350 mL / NET: -1775 mL      --------------------------------------------------------------------------------------    EXAM:  General: venti mask, NAD  Chest: appears normal   Respiratory: decreased breath sound bilaterally  Cardiac: +murmur  Gastrointestinal: overweight, soft, non-distended, non-tender, +BS  Extremities: ++++ bilateral lower extremity edema  Neuro: AAO x 3   --------------------------------------------------------------------------------------

## 2022-08-15 NOTE — PROGRESS NOTE ADULT - ATTENDING COMMENTS
Patient seen and evaluated. Respiratory status worse today than on Friday,unclear if in setting of worsening volume overload or inability to breath via nares 2/2 packing.     #HFpEF, predominant right sided   -patient with biventricular failure but likely predominany right sided failure with JVD, LE edema.   - to bumex. per heart failure 3mg BID.   -continue with spironolactone.   -if Cre stable, will d/w with heart failure re: added SGLT-2 as will also aide in diuresis.     #Epistaxis  -left nares packed, right nares packing removed.   -continue Abx proph  -may need cautery, per primary team, nothing seen to cauterize over the weekend.    #severe pulm HTN  -continue current pulm HTN medications (tadalafil,   -given BMI >35, not candidate currently for pulm transplant and will not initiate transplant workup at this time.     #CKD III  -likely cardiorenal component vs. new baseline.   -continue to monitor, avoid nephrotoxic agents.     #Anemia  -iron studies denote iron deficiency.  -start iron   -will see when had last colonoscopy. At this time high risk for colonoscopy, so may require outpatient virtual colonography if patient amenable.     Given weight, age, deconditioning, not a current candidate for transplantation. Palliative care consult placed for overal goals of care given high mortaility and morbidity of her end stage pulm HTN.

## 2022-08-15 NOTE — PROGRESS NOTE ADULT - ASSESSMENT
66 year old woman with HFpEF with primarily RV dysfunction s/p CardioMEMS, severe pulmonary hypertension (Group 1, 2 & 3), COPD on home O2 8L, AF (on Eliquis), PE, CVA (MCA infarct 2012), CKD (b/l Cr 1.7-1.8), ROLANDA on CPAP and morbid obesity (s/p bariatric surgery 2012 with lap band removal d/t GIB) who was recently hospitalized 6/14-6/29/22 for volume overload where she met with lung transplant team with plan for evaluation pending improvement in BMI and deconditioned state. Had RHC in June with severely elevated PAP and preserved CO.     She presents with RINALDI, ABD bloating and LE swelling in the setting of gradual 25 lb weight gain despite escalation of diuretic regimen. She was started on intermittent IV Lasix and is responding well with 2lb weight loss over the past 24 hours, but remains markedly overloaded with predominate symptoms of elevated R sided filling pressure. Mild uptrend in her Cr noted.       Cardiac Studies  RHC and CardioMEMS Implant 6/20/22: RA 18, /44/65, PCWP 16 (v to 18), RA 95% on 8L NC, PA 58%, CO/CI (F) 6.7/3.2, PVR 7.32 ryan, TPG 49, /60/86 (CardioMEMS PAD 49)  TTE 6/15/22: LVIDd 4.8 cm, LVEF 68%, flattening of interventricular septum consistent with RV overload, mild concentric LVH (septum 1.2 PWT 1.1), RVE with decreased systolic function, mild LAE, severe ROE, mod TR, est RVSP 61 mmHg

## 2022-08-15 NOTE — PROGRESS NOTE ADULT - SUBJECTIVE AND OBJECTIVE BOX
Patient seen and examined at bedside.    Overnight Events:     Review Of Systems: No chest pain, shortness of breath, or palpitations            Current Meds:  acetaminophen     Tablet .. 975 milliGRAM(s) Oral every 6 hours PRN  ALBUTerol    90 MICROgram(s) HFA Inhaler 2 Puff(s) Inhalation every 6 hours PRN  ambrisentan 10 milliGRAM(s) Oral daily  aspirin  chewable 81 milliGRAM(s) Oral daily  atorvastatin 20 milliGRAM(s) Oral at bedtime  budesonide  80 MICROgram(s)/formoterol 4.5 MICROgram(s) Inhaler 2 Puff(s) Inhalation two times a day  buMETAnide Injectable 2 milliGRAM(s) IV Push every 12 hours  buPROPion XL (24-Hour) . 300 milliGRAM(s) Oral daily  cephalexin 500 milliGRAM(s) Oral every 12 hours  digoxin     Tablet 125 MICROGram(s) Oral daily  diltiazem    milliGRAM(s) Oral daily  HYDROmorphone  Injectable 0.25 milliGRAM(s) IV Push every 4 hours PRN  melatonin 3 milliGRAM(s) Oral at bedtime PRN  pantoprazole    Tablet 40 milliGRAM(s) Oral before breakfast  polyethylene glycol 3350 17 Gram(s) Oral daily  sodium chloride 0.65% Nasal 1 Spray(s) Both Nostrils four times a day  spironolactone 50 milliGRAM(s) Oral daily  tadalafil 40 milliGRAM(s) Oral daily  tiotropium 18 MICROgram(s) Capsule 1 Capsule(s) Inhalation daily      Vitals:  T(F): 98.2 (08-15), Max: 98.6 (08-14)  HR: 96 (08-15) (68 - 96)  BP: 126/69 (08-15) (101/61 - 126/69)  RR: 18 (08-15)  SpO2: 92% (08-15)  I&O's Summary    14 Aug 2022 07:01  -  15 Aug 2022 07:00  --------------------------------------------------------  IN: 575 mL / OUT: 2350 mL / NET: -1775 mL    15 Aug 2022 07:01  -  15 Aug 2022 13:25  --------------------------------------------------------  IN: 240 mL / OUT: 300 mL / NET: -60 mL        Physical Exam:  Appearance: No acute distress; well appearing  Eyes: PERRL, EOMI, pink conjunctiva  HEENT: Normal oral mucosa  Cardiovascular: RRR, S1, S2, no murmurs, rubs, or gallops; no edema; no JVD  Respiratory: Clear to auscultation bilaterally  Gastrointestinal: soft, non-tender, non-distended with normal bowel sounds  Musculoskeletal: No clubbing; no joint deformity   Neurologic: Non-focal  Lymphatic: No lymphadenopathy  Psychiatry: AAOx3, mood & affect appropriate  Skin: No rashes, ecchymoses, or cyanosis                          7.2    4.28  )-----------( 207      ( 15 Aug 2022 05:39 )             23.7     08-15    130<L>  |  87<L>  |  95<H>  ----------------------------<  73  5.3   |  32<H>  |  1.96<H>    Ca    9.4      15 Aug 2022 05:43  Phos  3.6     08-15  Mg     2.1     08-15    TPro  6.7  /  Alb  3.9  /  TBili  0.4  /  DBili  x   /  AST  21  /  ALT  8<L>  /  AlkPhos  105  08-15          Serum Pro-Brain Natriuretic Peptide: 18721 pg/mL (08-15 @ 05:43)  Serum Pro-Brain Natriuretic Peptide: 13714 pg/mL (08-14 @ 06:45)  Serum Pro-Brain Natriuretic Peptide: 8947 pg/mL (08-13 @ 06:31)  Serum Pro-Brain Natriuretic Peptide: 23515 pg/mL (08-12 @ 06:55)  Serum Pro-Brain Natriuretic Peptide: 16560 pg/mL (08-11 @ 06:15)          New ECG(s): Personally reviewed    Echo:    Stress Testing:     Cath:    Imaging:    Interpretation of Telemetry:   Patient seen and examined at bedside.    Overnight Events: LISA    Review Of Systems: No chest pain, shortness of breath, or palpitations            Current Meds:  acetaminophen     Tablet .. 975 milliGRAM(s) Oral every 6 hours PRN  ALBUTerol    90 MICROgram(s) HFA Inhaler 2 Puff(s) Inhalation every 6 hours PRN  ambrisentan 10 milliGRAM(s) Oral daily  aspirin  chewable 81 milliGRAM(s) Oral daily  atorvastatin 20 milliGRAM(s) Oral at bedtime  budesonide  80 MICROgram(s)/formoterol 4.5 MICROgram(s) Inhaler 2 Puff(s) Inhalation two times a day  buMETAnide Injectable 2 milliGRAM(s) IV Push every 12 hours  buPROPion XL (24-Hour) . 300 milliGRAM(s) Oral daily  cephalexin 500 milliGRAM(s) Oral every 12 hours  digoxin     Tablet 125 MICROGram(s) Oral daily  diltiazem    milliGRAM(s) Oral daily  HYDROmorphone  Injectable 0.25 milliGRAM(s) IV Push every 4 hours PRN  melatonin 3 milliGRAM(s) Oral at bedtime PRN  pantoprazole    Tablet 40 milliGRAM(s) Oral before breakfast  polyethylene glycol 3350 17 Gram(s) Oral daily  sodium chloride 0.65% Nasal 1 Spray(s) Both Nostrils four times a day  spironolactone 50 milliGRAM(s) Oral daily  tadalafil 40 milliGRAM(s) Oral daily  tiotropium 18 MICROgram(s) Capsule 1 Capsule(s) Inhalation daily      Vitals:  T(F): 98.2 (08-15), Max: 98.6 (08-14)  HR: 96 (08-15) (68 - 96)  BP: 126/69 (08-15) (101/61 - 126/69)  RR: 18 (08-15)  SpO2: 92% (08-15)  I&O's Summary    14 Aug 2022 07:01  -  15 Aug 2022 07:00  --------------------------------------------------------  IN: 575 mL / OUT: 2350 mL / NET: -1775 mL    15 Aug 2022 07:01  -  15 Aug 2022 13:25  --------------------------------------------------------  IN: 240 mL / OUT: 300 mL / NET: -60 mL        Physical Exam:  General: No distress. Comfortable.  HEENT: EOM intact.  Neck: Neck supple. JVP difficult to assess, but appears to be elevated. No masses  Chest: Unlabored, fine crackles bilateral bases.   CV: Normal S1 and S2. No murmurs, rub, or gallops. Radial pulses normal. +2 BLE edema.   Abdomen: Soft, non-distended, non-tender  Skin: No rashes or skin breakdown  Neurology: Alert and oriented times three. Sensation intact  Psych: Affect normal                          7.2    4.28  )-----------( 207      ( 15 Aug 2022 05:39 )             23.7     08-15    130<L>  |  87<L>  |  95<H>  ----------------------------<  73  5.3   |  32<H>  |  1.96<H>    Ca    9.4      15 Aug 2022 05:43  Phos  3.6     08-15  Mg     2.1     08-15    TPro  6.7  /  Alb  3.9  /  TBili  0.4  /  DBili  x   /  AST  21  /  ALT  8<L>  /  AlkPhos  105  08-15          Serum Pro-Brain Natriuretic Peptide: 87633 pg/mL (08-15 @ 05:43)  Serum Pro-Brain Natriuretic Peptide: 77919 pg/mL (08-14 @ 06:45)  Serum Pro-Brain Natriuretic Peptide: 8947 pg/mL (08-13 @ 06:31)  Serum Pro-Brain Natriuretic Peptide: 58943 pg/mL (08-12 @ 06:55)  Serum Pro-Brain Natriuretic Peptide: 23190 pg/mL (08-11 @ 06:15)

## 2022-08-15 NOTE — PROGRESS NOTE ADULT - ASSESSMENT
66F w/ severe pulmonary HTN (Group 1, 2 & 3), HFpEF, Afib, COPD on home O2 (5L NC), CKD, ROLANDA on CPAP, morbid obesity presenting with acute hypoxic respiratory failure, admitted for CHF exacerbation and ongoing lung transplant evaluation.   W/ epistaxis 8/13, seen by ENT, started on bilateral nasal packing/ rhino rocket, started on pain meds, abx ppx, w/ persistent epistaxis this AM , CBC stable   66F w/ severe pulmonary HTN (Group 1, 2 & 3), HFpEF, Afib, COPD on home O2 (5L NC), CKD, ROLANDA on CPAP, morbid obesity presenting with acute hypoxic respiratory failure, admitted for CHF exacerbation and ongoing lung transplant evaluation.   W/ epistaxis 8/13, seen by ENT, started on bilateral nasal packing/ rhino rocket, started on pain meds, abx ppx, cbc stable

## 2022-08-15 NOTE — PROGRESS NOTE ADULT - SUBJECTIVE AND OBJECTIVE BOX
ZANDER JEAN-BAPTISTE  66y  Female      Patient is a 66y old  Female who presents with a chief complaint of lung txp eval (14 Aug 2022 20:31)      INTERVAL HPI/OVERNIGHT EVENTS:  provider handoff form yesterday - 8/14: patient w/ persistent epistaxis, ENT made aware, cbc stable, continuing to hold apixaban; started on bowel regimen while receiving hydromorphone for pain in setting of rhino rocket   right nare packing removed without any further bleeding, so pt could breathe better. Started on nasal spray. Revisit continuing apixiban once bleeding subsides, started on scds for now         REVIEW OF SYSTEMS:  discomfort in breathing w/ face tent  No chest pain    FAMILY HISTORY:  No pertinent family history      T(C): 36.6 (08-15-22 @ 05:00), Max: 37 (08-14-22 @ 20:33)  HR: 88 (08-15-22 @ 06:10) (68 - 88)  BP: 102/61 (08-15-22 @ 05:00) (101/61 - 123/71)  RR: 18 (08-15-22 @ 05:00) (18 - 18)  SpO2: 92% (08-15-22 @ 06:10) (90% - 97%)  Wt(kg): --Vital Signs Last 24 Hrs  T(C): 36.6 (15 Aug 2022 05:00), Max: 37 (14 Aug 2022 20:33)  T(F): 97.8 (15 Aug 2022 05:00), Max: 98.6 (14 Aug 2022 20:33)  HR: 88 (15 Aug 2022 06:10) (68 - 88)  BP: 102/61 (15 Aug 2022 05:00) (101/61 - 123/71)  BP(mean): 75 (15 Aug 2022 05:00) (75 - 88)  RR: 18 (15 Aug 2022 05:00) (18 - 18)  SpO2: 92% (15 Aug 2022 06:10) (90% - 97%)    Parameters below as of 15 Aug 2022 05:00  Patient On (Oxygen Delivery Method): mask, aerosol      nitrates (Unknown)      PHYSICAL EXAM:  CONSTITUTIONAL: NAD, well-developed  HEENT: +epistaxis  RESPIRATORY: coarse breadth sounds, no crackles    CARDIOVASCULAR: 3+ LE edema, non-pitting. Regular rate and rhythm, normal S1 and S2, no murmur/rub/gallop; Peripheral pulses are 2+ bilaterally  ABDOMEN: Nontender to palpation, normoactive bowel sounds, no rebound/guarding  MUSCLOSKELETAL: no clubbing or cyanosis of digits; no joint swelling or tenderness to palpation  NEURO: Non-focal, no tremors  SKIN: No rashes      Consultant(s) Notes Reviewed:  [x ] YES  [ ] NO  Care Discussed with Consultants/Other Providers [ x] YES  [ ] NO    LABS:                          7.2    4.28  )-----------( 207      ( 15 Aug 2022 05:39 )             23.7       08-15    130<L>  |  87<L>  |  95<H>  ----------------------------<  73  5.3   |  32<H>  |  1.96<H>    Ca    9.4      15 Aug 2022 05:43  Phos  3.6     08-15  Mg     2.1     08-15    TPro  6.7  /  Alb  3.9  /  TBili  0.4  /  DBili  x   /  AST  21  /  ALT  8<L>  /  AlkPhos  105  08-15          ABG - ( 13 Aug 2022 16:20 )  pH, Arterial: 7.42  pH, Blood: x     /  pCO2: 56    /  pO2: 69    / HCO3: 36    / Base Excess: 10.6  /  SaO2: 93.2        RADIOLOGY & ADDITIONAL TESTS:    Imaging Personally Reviewed:  [ ] YES  [ ] NO  acetaminophen     Tablet .. 975 milliGRAM(s) Oral every 6 hours PRN  ALBUTerol    90 MICROgram(s) HFA Inhaler 2 Puff(s) Inhalation every 6 hours PRN  ambrisentan 10 milliGRAM(s) Oral daily  aspirin  chewable 81 milliGRAM(s) Oral daily  atorvastatin 20 milliGRAM(s) Oral at bedtime  budesonide  80 MICROgram(s)/formoterol 4.5 MICROgram(s) Inhaler 2 Puff(s) Inhalation two times a day  buPROPion XL (24-Hour) . 300 milliGRAM(s) Oral daily  cephalexin 500 milliGRAM(s) Oral every 12 hours  digoxin     Tablet 125 MICROGram(s) Oral daily  diltiazem    milliGRAM(s) Oral daily  furosemide   Injectable 60 milliGRAM(s) IV Push two times a day  HYDROmorphone  Injectable 0.25 milliGRAM(s) IV Push every 4 hours PRN  melatonin 3 milliGRAM(s) Oral at bedtime PRN  pantoprazole    Tablet 40 milliGRAM(s) Oral before breakfast  polyethylene glycol 3350 17 Gram(s) Oral daily  sodium chloride 0.65% Nasal 1 Spray(s) Both Nostrils four times a day  spironolactone 50 milliGRAM(s) Oral daily  tadalafil 40 milliGRAM(s) Oral daily  tiotropium 18 MICROgram(s) Capsule 1 Capsule(s) Inhalation daily      HEALTH ISSUES - PROBLEM Dx:  Acute respiratory failure with hypoxia    JUANA (acute kidney injury)    Severe pulmonary hypertension    Acute on chronic diastolic congestive heart failure    Prophylactic measure    COPD, moderate    Chronic atrial fibrillation    Anemia    Chronic kidney disease, unspecified CKD stage    Epistaxis             ZANDER JEAN-BAPTISTE  66y  Female      Patient is a 66y old  Female who presents with a chief complaint of lung txp eval (14 Aug 2022 20:31)      INTERVAL HPI/OVERNIGHT EVENTS:  provider handoff form yesterday - 8/14: patient w/ persistent epistaxis, ENT made aware, cbc stable, continuing to hold apixaban; started on bowel regimen while receiving hydromorphone for pain in setting of rhino rocket   right nare packing removed without any further bleeding, so pt could breathe better. Started on nasal spray. Revisit continuing apixiban once bleeding subsides, started on scds for now     Overnight/this morning - patient seen and examined at bedside; endorses feeling better although still uncomfortable from face tent and w/ rhino rocket in left nare.         REVIEW OF SYSTEMS:  discomfort in breathing w/ face tent  No chest pain    FAMILY HISTORY:  No pertinent family history      T(C): 36.6 (08-15-22 @ 05:00), Max: 37 (08-14-22 @ 20:33)  HR: 88 (08-15-22 @ 06:10) (68 - 88)  BP: 102/61 (08-15-22 @ 05:00) (101/61 - 123/71)  RR: 18 (08-15-22 @ 05:00) (18 - 18)  SpO2: 92% (08-15-22 @ 06:10) (90% - 97%)  Wt(kg): --Vital Signs Last 24 Hrs  T(C): 36.6 (15 Aug 2022 05:00), Max: 37 (14 Aug 2022 20:33)  T(F): 97.8 (15 Aug 2022 05:00), Max: 98.6 (14 Aug 2022 20:33)  HR: 88 (15 Aug 2022 06:10) (68 - 88)  BP: 102/61 (15 Aug 2022 05:00) (101/61 - 123/71)  BP(mean): 75 (15 Aug 2022 05:00) (75 - 88)  RR: 18 (15 Aug 2022 05:00) (18 - 18)  SpO2: 92% (15 Aug 2022 06:10) (90% - 97%)    Parameters below as of 15 Aug 2022 05:00  Patient On (Oxygen Delivery Method): mask, aerosol      nitrates (Unknown)      PHYSICAL EXAM:  CONSTITUTIONAL: NAD, well-developed  HEENT: +epistaxis  RESPIRATORY: coarse breadth sounds, no crackles    CARDIOVASCULAR: 3+ LE edema, non-pitting. afib, normal S1 and S2, no murmur/rub/gallop; Peripheral pulses are 2+ bilaterally  ABDOMEN: Nontender to palpation, normoactive bowel sounds, no rebound/guarding  MUSCLOSKELETAL: no clubbing or cyanosis of digits; no joint swelling or tenderness to palpation  NEURO: Non-focal, no tremors  SKIN: No rashes      Consultant(s) Notes Reviewed:  [x ] YES  [ ] NO  Care Discussed with Consultants/Other Providers [ x] YES  [ ] NO    LABS:                          7.2    4.28  )-----------( 207      ( 15 Aug 2022 05:39 )             23.7       08-15    130<L>  |  87<L>  |  95<H>  ----------------------------<  73  5.3   |  32<H>  |  1.96<H>    Ca    9.4      15 Aug 2022 05:43  Phos  3.6     08-15  Mg     2.1     08-15    TPro  6.7  /  Alb  3.9  /  TBili  0.4  /  DBili  x   /  AST  21  /  ALT  8<L>  /  AlkPhos  105  08-15          ABG - ( 13 Aug 2022 16:20 )  pH, Arterial: 7.42  pH, Blood: x     /  pCO2: 56    /  pO2: 69    / HCO3: 36    / Base Excess: 10.6  /  SaO2: 93.2        RADIOLOGY & ADDITIONAL TESTS:    Imaging Personally Reviewed:  [ ] YES  [ ] NO  acetaminophen     Tablet .. 975 milliGRAM(s) Oral every 6 hours PRN  ALBUTerol    90 MICROgram(s) HFA Inhaler 2 Puff(s) Inhalation every 6 hours PRN  ambrisentan 10 milliGRAM(s) Oral daily  aspirin  chewable 81 milliGRAM(s) Oral daily  atorvastatin 20 milliGRAM(s) Oral at bedtime  budesonide  80 MICROgram(s)/formoterol 4.5 MICROgram(s) Inhaler 2 Puff(s) Inhalation two times a day  buPROPion XL (24-Hour) . 300 milliGRAM(s) Oral daily  cephalexin 500 milliGRAM(s) Oral every 12 hours  digoxin     Tablet 125 MICROGram(s) Oral daily  diltiazem    milliGRAM(s) Oral daily  furosemide   Injectable 60 milliGRAM(s) IV Push two times a day  HYDROmorphone  Injectable 0.25 milliGRAM(s) IV Push every 4 hours PRN  melatonin 3 milliGRAM(s) Oral at bedtime PRN  pantoprazole    Tablet 40 milliGRAM(s) Oral before breakfast  polyethylene glycol 3350 17 Gram(s) Oral daily  sodium chloride 0.65% Nasal 1 Spray(s) Both Nostrils four times a day  spironolactone 50 milliGRAM(s) Oral daily  tadalafil 40 milliGRAM(s) Oral daily  tiotropium 18 MICROgram(s) Capsule 1 Capsule(s) Inhalation daily      HEALTH ISSUES - PROBLEM Dx:  Acute respiratory failure with hypoxia    JUANA (acute kidney injury)    Severe pulmonary hypertension    Acute on chronic diastolic congestive heart failure    Prophylactic measure    COPD, moderate    Chronic atrial fibrillation    Anemia    Chronic kidney disease, unspecified CKD stage    Epistaxis

## 2022-08-15 NOTE — PROGRESS NOTE ADULT - ASSESSMENT
66F w/ severe pulmonary HTN (Group 1, 2 & 3), HFpEF, Afib, COPD on home O2 (5L NC), CKD, ROLANDA on CPAP, morbid obesity (s/p bariatric surgery in 2012 with subsequent lap band removal due to GI bleed), PE, CVA (MCA infarct in 2012). Transferred from OSH for severe pulmonary hypertension, with possible launch for transplant evaluation.    Seen by Dr. Mosher  Followed by HF  Renal consult   Financial clearance done  SW clearance done   Monitor strict I & O  Daily standing weights  At this time, requires medical optimization prior to launching transplant  Ideally BMI < 30     Note is incomplete, please call via teams with any questions    Above discussed with Dr. Pérez   66F w/ severe pulmonary HTN (Group 1, 2 & 3), HFpEF, Afib, COPD on home O2 (5L NC), CKD, ROLANDA on CPAP, morbid obesity (s/p bariatric surgery in 2012 with subsequent lap band removal due to GI bleed), PE, CVA (MCA infarct in 2012). Transferred from OSH for severe pulmonary hypertension, with possible launch for transplant evaluation.    Seen by Dr. Mosher, appreciate recs   Followed by HF  Renal consult for JUANA  Financial clearance done  SW clearance done   Monitor strict I & O  Daily standing weights  At this time, requires medical optimization prior to launching transplant  Ideally BMI < 30      Above discussed with Dr. Pérez

## 2022-08-15 NOTE — PROGRESS NOTE ADULT - PROBLEM SELECTOR PLAN 3
Cr elevated to 2.42 on admission to  - baseline is 1.7-1.8. Likely in s/o CHF exacerbation, overall hypervolemic though likely intravascularly depleted. Likely contributing to sub-optimal response to torsemide at home.   - diuresis as above  - avoid nephrotoxic agents  - strict I/Os  -Nephrology consulted, f/u recs. Cr elevated to 2.42 on admission to  - baseline is 1.7-1.8. Likely in s/o CHF exacerbation, overall hypervolemic though likely intravascularly depleted. Likely contributing to sub-optimal response to torsemide at home.   - diuresis as above  - avoid nephrotoxic agents  - strict I/Os  -Nephrology consulted, f/u recs.  - Cr elevation could also be new baseline for patient in setting of worsening kidney function 2/2 pulm htn/HF

## 2022-08-15 NOTE — PROGRESS NOTE ADULT - SUBJECTIVE AND OBJECTIVE BOX
CHIEF COMPLAINT:Patient is a 66y old  Female who presents with a chief complaint of lung txp eval (15 Aug 2022 07:04)      Interval Events:    REVIEW OF SYSTEMS:  [x] All other systems negative except per HPI   [ ] Unable to assess ROS because ________    OBJECTIVE:  ICU Vital Signs Last 24 Hrs  T(C): 36.6 (15 Aug 2022 05:00), Max: 37 (14 Aug 2022 20:33)  T(F): 97.8 (15 Aug 2022 05:00), Max: 98.6 (14 Aug 2022 20:33)  HR: 88 (15 Aug 2022 06:10) (68 - 88)  BP: 102/61 (15 Aug 2022 05:00) (101/61 - 123/71)  BP(mean): 75 (15 Aug 2022 05:00) (75 - 88)  ABP: --  ABP(mean): --  RR: 18 (15 Aug 2022 05:00) (18 - 18)  SpO2: 92% (15 Aug 2022 06:10) (90% - 97%)    O2 Parameters below as of 15 Aug 2022 05:00  Patient On (Oxygen Delivery Method): mask, aerosol              08-14 @ 07:01  -  08-15 @ 07:00  --------------------------------------------------------  IN: 575 mL / OUT: 2350 mL / NET: -1775 mL        PHYSICAL EXAM:  GENERAL: on supplemetal oxygen   HEAD:  Atraumatic, Normocephalic  EYES: EOMI, PERRLA, conjunctiva and sclera clear  ENMT: mmm  NECK: Supple, JVP ~9cm at 90 degrees  CHEST/LUNG: bibasilar rales, no wheezing  HEART: Regular rate and rhythm; No murmurs, rubs, or gallops  ABDOMEN: Soft, Nontender, Nondistended  VASCULAR:  2+ b/l pitting edema extending to the dependent areas in thighs  LYMPH: No lymphadenopathy noted  SKIN: No rashes or lesions  NERVOUS SYSTEM:  Alert & Oriented X3    HOSPITAL MEDICATIONS:  MEDICATIONS  (STANDING):  ambrisentan 10 milliGRAM(s) Oral daily  aspirin  chewable 81 milliGRAM(s) Oral daily  atorvastatin 20 milliGRAM(s) Oral at bedtime  budesonide  80 MICROgram(s)/formoterol 4.5 MICROgram(s) Inhaler 2 Puff(s) Inhalation two times a day  buPROPion XL (24-Hour) . 300 milliGRAM(s) Oral daily  cephalexin 500 milliGRAM(s) Oral every 12 hours  digoxin     Tablet 125 MICROGram(s) Oral daily  diltiazem    milliGRAM(s) Oral daily  furosemide   Injectable 60 milliGRAM(s) IV Push two times a day  pantoprazole    Tablet 40 milliGRAM(s) Oral before breakfast  polyethylene glycol 3350 17 Gram(s) Oral daily  sodium chloride 0.65% Nasal 1 Spray(s) Both Nostrils four times a day  spironolactone 50 milliGRAM(s) Oral daily  tadalafil 40 milliGRAM(s) Oral daily  tiotropium 18 MICROgram(s) Capsule 1 Capsule(s) Inhalation daily    MEDICATIONS  (PRN):  acetaminophen     Tablet .. 975 milliGRAM(s) Oral every 6 hours PRN Temp greater or equal to 38C (100.4F), Mild Pain (1 - 3), Moderate Pain (4 - 6)  ALBUTerol    90 MICROgram(s) HFA Inhaler 2 Puff(s) Inhalation every 6 hours PRN Shortness of Breath and/or Wheezing  HYDROmorphone  Injectable 0.25 milliGRAM(s) IV Push every 4 hours PRN Severe Pain (7 - 10)  melatonin 3 milliGRAM(s) Oral at bedtime PRN Insomnia      LABS:    The Labs were reviewed by me   The Radiology was reviewed by me    EKG tracing reviewed by me    08-15    130<L>  |  87<L>  |  95<H>  ----------------------------<  73  5.3   |  32<H>  |  1.96<H>  08-14    129<L>  |  86<L>  |  96<H>  ----------------------------<  80  5.1   |  33<H>  |  1.94<H>  08-13    128<L>  |  86<L>  |  96<H>  ----------------------------<  87  4.3   |  29  |  2.06<H>    Ca    9.4      15 Aug 2022 05:43  Ca    9.3      14 Aug 2022 06:45  Ca    9.0      13 Aug 2022 06:31  Phos  3.6     08-15  Mg     2.1     08-15    TPro  6.7  /  Alb  3.9  /  TBili  0.4  /  DBili  x   /  AST  21  /  ALT  8<L>  /  AlkPhos  105  08-15  TPro  6.7  /  Alb  3.9  /  TBili  0.3  /  DBili  x   /  AST  18  /  ALT  10  /  AlkPhos  104  08-14  TPro  6.5  /  Alb  3.7  /  TBili  0.3  /  DBili  x   /  AST  18  /  ALT  10  /  AlkPhos  99  08-13    Magnesium, Serum: 2.1 mg/dL (08-15-22 @ 05:43)  Magnesium, Serum: 2.1 mg/dL (08-14-22 @ 06:45)  Magnesium, Serum: 2.1 mg/dL (08-13-22 @ 06:31)    Phosphorus Level, Serum: 3.6 mg/dL (08-15-22 @ 05:43)  Phosphorus Level, Serum: 3.9 mg/dL (08-14-22 @ 06:45)  Phosphorus Level, Serum: 3.9 mg/dL (08-13-22 @ 06:31)                      ABG - ( 13 Aug 2022 16:20 )  pH, Arterial: 7.42  pH, Blood: x     /  pCO2: 56    /  pO2: 69    / HCO3: 36    / Base Excess: 10.6  /  SaO2: 93.2                                    7.2    4.28  )-----------( 207      ( 15 Aug 2022 05:39 )             23.7                         7.4    4.40  )-----------( 209      ( 14 Aug 2022 06:45 )             24.1                         7.3    3.72  )-----------( 190      ( 13 Aug 2022 06:31 )             23.4     CAPILLARY BLOOD GLUCOSE            MICROBIOLOGY:     RADIOLOGY:  [ ] Reviewed and interpreted by me    Point of Care Ultrasound Findings:    PFT:    EKG: CHIEF COMPLAINT:Patient is a 66y old  Female who presents with a chief complaint of lung txp eval (15 Aug 2022 07:04)      Interval Events:  Epistaxis over the weekend s/p rhinorocket Lt nares. Ongoing dyspnea on exertion. Has had good UOP with IV diuretics.    REVIEW OF SYSTEMS:  [x] All other systems negative except per HPI   [ ] Unable to assess ROS because ________    OBJECTIVE:  ICU Vital Signs Last 24 Hrs  T(C): 36.6 (15 Aug 2022 05:00), Max: 37 (14 Aug 2022 20:33)  T(F): 97.8 (15 Aug 2022 05:00), Max: 98.6 (14 Aug 2022 20:33)  HR: 88 (15 Aug 2022 06:10) (68 - 88)  BP: 102/61 (15 Aug 2022 05:00) (101/61 - 123/71)  BP(mean): 75 (15 Aug 2022 05:00) (75 - 88)  ABP: --  ABP(mean): --  RR: 18 (15 Aug 2022 05:00) (18 - 18)  SpO2: 92% (15 Aug 2022 06:10) (90% - 97%)    O2 Parameters below as of 15 Aug 2022 05:00  Patient On (Oxygen Delivery Method): mask, aerosol              08-14 @ 07:01  -  08-15 @ 07:00  --------------------------------------------------------  IN: 575 mL / OUT: 2350 mL / NET: -1775 mL        PHYSICAL EXAM:  GENERAL: on supplemetal oxygen   HEAD:  Atraumatic, Normocephalic  EYES: EOMI, PERRLA, conjunctiva and sclera clear  ENMT: mmm  CHEST/LUNG: bibasilar rales, no wheezing  HEART: Regular rate and rhythm; No murmurs, rubs, or gallops  ABDOMEN: Soft, Nontender, Nondistended  VASCULAR:  2+ BLE edema  LYMPH: No lymphadenopathy noted  SKIN: No rashes or lesions  NERVOUS SYSTEM:  Alert & Oriented X3    HOSPITAL MEDICATIONS:  MEDICATIONS  (STANDING):  ambrisentan 10 milliGRAM(s) Oral daily  aspirin  chewable 81 milliGRAM(s) Oral daily  atorvastatin 20 milliGRAM(s) Oral at bedtime  budesonide  80 MICROgram(s)/formoterol 4.5 MICROgram(s) Inhaler 2 Puff(s) Inhalation two times a day  buPROPion XL (24-Hour) . 300 milliGRAM(s) Oral daily  cephalexin 500 milliGRAM(s) Oral every 12 hours  digoxin     Tablet 125 MICROGram(s) Oral daily  diltiazem    milliGRAM(s) Oral daily  furosemide   Injectable 60 milliGRAM(s) IV Push two times a day  pantoprazole    Tablet 40 milliGRAM(s) Oral before breakfast  polyethylene glycol 3350 17 Gram(s) Oral daily  sodium chloride 0.65% Nasal 1 Spray(s) Both Nostrils four times a day  spironolactone 50 milliGRAM(s) Oral daily  tadalafil 40 milliGRAM(s) Oral daily  tiotropium 18 MICROgram(s) Capsule 1 Capsule(s) Inhalation daily    MEDICATIONS  (PRN):  acetaminophen     Tablet .. 975 milliGRAM(s) Oral every 6 hours PRN Temp greater or equal to 38C (100.4F), Mild Pain (1 - 3), Moderate Pain (4 - 6)  ALBUTerol    90 MICROgram(s) HFA Inhaler 2 Puff(s) Inhalation every 6 hours PRN Shortness of Breath and/or Wheezing  HYDROmorphone  Injectable 0.25 milliGRAM(s) IV Push every 4 hours PRN Severe Pain (7 - 10)  melatonin 3 milliGRAM(s) Oral at bedtime PRN Insomnia      LABS:    The Labs were reviewed by me   The Radiology was reviewed by me    EKG tracing reviewed by me    08-15    130<L>  |  87<L>  |  95<H>  ----------------------------<  73  5.3   |  32<H>  |  1.96<H>  08-14    129<L>  |  86<L>  |  96<H>  ----------------------------<  80  5.1   |  33<H>  |  1.94<H>  08-13    128<L>  |  86<L>  |  96<H>  ----------------------------<  87  4.3   |  29  |  2.06<H>    Ca    9.4      15 Aug 2022 05:43  Ca    9.3      14 Aug 2022 06:45  Ca    9.0      13 Aug 2022 06:31  Phos  3.6     08-15  Mg     2.1     08-15    TPro  6.7  /  Alb  3.9  /  TBili  0.4  /  DBili  x   /  AST  21  /  ALT  8<L>  /  AlkPhos  105  08-15  TPro  6.7  /  Alb  3.9  /  TBili  0.3  /  DBili  x   /  AST  18  /  ALT  10  /  AlkPhos  104  08-14  TPro  6.5  /  Alb  3.7  /  TBili  0.3  /  DBili  x   /  AST  18  /  ALT  10  /  AlkPhos  99  08-13    Magnesium, Serum: 2.1 mg/dL (08-15-22 @ 05:43)  Magnesium, Serum: 2.1 mg/dL (08-14-22 @ 06:45)  Magnesium, Serum: 2.1 mg/dL (08-13-22 @ 06:31)    Phosphorus Level, Serum: 3.6 mg/dL (08-15-22 @ 05:43)  Phosphorus Level, Serum: 3.9 mg/dL (08-14-22 @ 06:45)  Phosphorus Level, Serum: 3.9 mg/dL (08-13-22 @ 06:31)                      ABG - ( 13 Aug 2022 16:20 )  pH, Arterial: 7.42  pH, Blood: x     /  pCO2: 56    /  pO2: 69    / HCO3: 36    / Base Excess: 10.6  /  SaO2: 93.2                                    7.2    4.28  )-----------( 207      ( 15 Aug 2022 05:39 )             23.7                         7.4    4.40  )-----------( 209      ( 14 Aug 2022 06:45 )             24.1                         7.3    3.72  )-----------( 190      ( 13 Aug 2022 06:31 )             23.4     CAPILLARY BLOOD GLUCOSE

## 2022-08-15 NOTE — BH CONSULTATION LIAISON ASSESSMENT NOTE - NSBHMSEAFFQUAL_PSY_A_CORE
Pt admitted 3/20/17with CHF is seen by RN NADIA for continued diabetes education. A1C 7.0. (EaG = 154). H/o CHF, type 2 diabetes, cardiomyopathy, and A Fib. Pt says she rarely takes her amaryl at home. She says her blood glucose is usually 120s in the morning. Reviewed with pt and her daughter in room current Humalog sliding scale orders (changed from insulin resistant scale to normal sensitivity scale per NP order after blood glucose down from 246 pre-lunch to 65 post prandial after 6 units Humalog given per insulin resistant sliding scale). Pt and daughter verbalize understanding. Recheck blood glucose @ 2p.m. = 72. Pt given OJ and will be given snack as well due to only ate 1/4 of sandwich and some tomato soup for lunch. Creat 1.83 up from 1.70 yesterday. Pt given educational material, \"Survival Skills for Diabetes Management\", yesterday, which was reviewed with pt and her family. Explained basic physiology of diabetes, as well as causes, s/s, and treatments for hypoglycemia and hyperglycemia. Educated re: effects of carbohydrates on blood glucose. Discussed target goals for blood glucose. Pt and her daughter verbalize understanding. Pt has a working glucometer at home and checks blood glucose daily. Pt and family have no further questions at this time. Nursing staff will continue to monitor blood glucose. Euthymic

## 2022-08-15 NOTE — PROGRESS NOTE ADULT - PROBLEM SELECTOR PLAN 8
History of Afib on eliquis 5mg BID (held briefly at OSH due to acute drop in Hgb from baseline approx 8).  Tele reviewed - patient currently in afib, rate controlled.  - continue digoxin 125  - continue diltiazem 120mg qd  - Holding eliquis right now in setting of epistaxis as above.

## 2022-08-15 NOTE — PROGRESS NOTE ADULT - ASSESSMENT
67y/o male with to L epistaxis, controlled with RR 7.4 w 4cc, right nare packing removed without any further bleeding, so pt could breathe better. His packing from the L nare was removed today 8/15. There is no active bleed.         Plan:  - L rhino rocket removed at bedside  - continue strict blood pressure control  - Nasal saline, 2 sprays to both nares 4 times a day  - Avoid nasal trauma; no nose rubbing, blowing or manipulating nasal packing.  Sneeze with mouth open and pinching nares.  - Avoid bending with head blow the waist.    - No heavy lifting  -Can discontinue ppx antibiotics  -Call ENT if patient starts to rebleed    ENT [id number] 67y/o female with to L epistaxis, controlled with RR 7.4 w 4cc, right nare packing removed without any further bleeding, so pt could breathe better. She is feeling okay this am. She has no active nasal bleed. Her packing is still in place.       Plan:  - Packing will be removed 8/16  -plan to gram-positive abx coverage for duration of packing placement  - Strict blood pressure control.  - Nasal saline, 2 sprays to both nares 4 times a day  - Avoid nasal trauma; no nose rubbing, blowing or manipulating nasal packing.  Sneeze with mouth open and pinching nares.  - Avoid bending with head blow the waist.    - No heavy lifting.    ENT [id number]

## 2022-08-15 NOTE — PROGRESS NOTE ADULT - ATTENDING COMMENTS
good response to IV diuretics  cont LASIX IV and mariela  appreciated pulmonology follow up. will plan for IV pulmonary vasodilators  pulm transplant team following for possible lung transplant     epistaxis s/p balloon PACKING  f/u with ent

## 2022-08-15 NOTE — PROGRESS NOTE ADULT - PROBLEM SELECTOR PLAN 7
Hgb 6.9 at OSH, transfused 1U PRBC with improvement to 7.8. Patient w/o signs/symptoms of acute blood loss, HD stable. Denies changes in bowel/urinary habits, denies recent falls/trauma.   - maintain active T/S  - Holding eliquis as above.   - f/u iron studies: total iron and % saturation decreased, ferritin on lower side, transferrin and TIBC normal; s/p IV iron infusion Hgb 6.9 at OSH, transfused 1U PRBC with improvement to 7.8. Patient w/o signs/symptoms of acute blood loss, HD stable. Denies changes in bowel/urinary habits, denies recent falls/trauma.   - maintain active T/S  - Holding eliquis as above.   - f/u iron studies: total iron and % saturation decreased, ferritin on lower side, transferrin and TIBC normal; s/p IV iron infusion  - Hg this AM 7.2 i/s/o epistaxis, ctm

## 2022-08-15 NOTE — PROGRESS NOTE ADULT - ATTENDING COMMENTS
Patient seen and examined. Pt is a 66F with PMHx COPD, CHFpEF, AFib, obesity (Type III, hx bariatric sx 2012), and severe pulmonary HTN with chronic combined hypercapnic and hypoxemic respiratory failure on ATC O2 supplementation (dependent on 8L NC at rest) with qhs/prn AVAPS (Trelegy Antonio via nasal prong interface) presenting for progressive dyspnea with minimal exertion admitted for acute decompensated right heart failure. RHC 6/20/2022 revealed sPAP: 106, dPAP: 44 mPAP:65, PCWP: 16, PVR 7.32W, CO/CI 6.7/3.2.     - Patient is tolerating diuresis but remains hypervolemic. Transitioned to Bumex today with goal net negative  - Continue daily weights and strict I/O  - Patient has been on home pulmonary vasodilators (Tadalafil and Ambrisentan) which remain on at this point. Would continue this regimen for the time being as patient continues to receive diuresis. Would NOT start IV Flolan at this time. Unclear if patient is a transplant candidate and the risks of Flolan may outweigh the benefits. Patient was recently hospitalized at [facility] and was supposed to followup with Dr. Mosher but was not able to as it was felt the communte from her home, in [place], to [facility] was too difficult. She was previously followed up at [place] but her primary pulmonologist reportedly recently left.  - Continue supplemental O2 with goal O2 sat > 88% - patient is on 8L O2 NC at home with sats often 85-90% on exertion with O2. She is currently on ventimask and her O2 abilities are limited by nasal bleeding  - Patient has her home Trelegy for AVAPs use and should be used as able - currently held in setting of nasal packing. Her home machine has been approved by [employer]  - Continue bronchodilator therapy  - Patient needs ENT followup for her nasal bleeding. She has packing in the left nare but has some epistaxis from right nare today. The bleeding is likely related to the pulmonary vasodilator therapy and she should be evaluated by ENT for cautery to control this bleeding    Will need to have a more detailed discussion with the lung transplant team and Dr. Mosher. I am not sure that IV Flolan would be a safe long term therapy for this patient and am not sure this would be appropriate if she is not a candidate for transplant. Will need to better understand her social situation and support at home particularly with regards to delivery of IV medications and transport to/from physician office visits. IF she is not a candidate for transplant then a Palliative Care followup would be appropriate given her significant decondition, volume overload, and chronic hypoxemia.   - Please call with any questions or concerns. I have discussed the case and my concerns with Dr. Mosher today.

## 2022-08-15 NOTE — BH CONSULTATION LIAISON ASSESSMENT NOTE - SUMMARY
65 y/o female, , domiciled with sons, no pphx,  severe pulmonary HTN (Group 1, 2 & 3), HFpEF, Afib, COPD on home O2 (5L NC), CKD, ROLANDA on CPAP, morbid obesity presenting with acute hypoxic respiratory failure, admitted for CHF exacerbation and ongoing lung transplant evaluation, psych consulted for clearance.   pt is aware of risks/benefits of lung transplant, medications required, followup care. pt is agreeable to transplant. pt denies depression, anxiety, psychosis, freddy. no si/hi. pt reporst fair sleep, appetite. pt reports worsening sob. pt has capacity to consent to lung transpalnt

## 2022-08-15 NOTE — BH CONSULTATION LIAISON ASSESSMENT NOTE - HPI (INCLUDE ILLNESS QUALITY, SEVERITY, DURATION, TIMING, CONTEXT, MODIFYING FACTORS, ASSOCIATED SIGNS AND SYMPTOMS)
65 y/o female, , domiciled with sons, no pphx,  severe pulmonary HTN (Group 1, 2 & 3), HFpEF, Afib, COPD on home O2 (5L NC), CKD, ROLANDA on CPAP, morbid obesity presenting with acute hypoxic respiratory failure, admitted for CHF exacerbation and ongoing lung transplant evaluation, psych consulted for clearance.   pt is aware of risks/benefits of lung transplant, medications required, followup care. pt is agreeable to transplant. pt denies depression, anxiety, psychosis, freddy. no si/hi. pt reporst fair sleep, appetite. pt reports worsneing trouble breathing, fatigue, not being able to do the things she used to do. pt reports have some family support.

## 2022-08-15 NOTE — PROGRESS NOTE ADULT - PROBLEM SELECTOR PLAN 1
Hypoxic to 80s at home while on baseline 8L NC. Patient gaining weight since hospital discharge early July, increased torsemide w/o significant results. Likely in s/o CHF exacerbation, given elevated BNP, limited exercise tolerance, worsening LE edema. Patient afebrile, non-toxic appearing, infection unlikely.   - continue lasix 60mg IVP BID  - standing daily weights, strict I/Os  - pulm transplant and HF following  - eventual SGLT2-i prior to discharge, once on stable dose of oral diuretics  - HF: CardioMEMS PAD 43 mmHg, will read today goal ~40 but difficult to ascertain given RV dysfunction   - daily standing weight. Was discharged in June at 189 lbs. Weight on admission 212, 210 on 8/12. Hypoxic to 80s at home while on baseline 8L NC. Patient gaining weight since hospital discharge early July, increased torsemide w/o significant results. Likely in s/o CHF exacerbation, given elevated BNP, limited exercise tolerance, worsening LE edema. Patient afebrile, non-toxic appearing, infection unlikely.   - switched from lasix to bumex 2mg BID   - standing daily weights, strict I/Os  - pulm transplant and HF following  - eventual SGLT2-i prior to discharge, once on stable dose of oral diuretics  - HF: CardioMEMS PAD 43 mmHg, will read today goal ~40 but difficult to ascertain given RV dysfunction   - daily standing weight. Was discharged in June at 189 lbs. Weight on admission 212, 210 on 8/12.

## 2022-08-15 NOTE — PROGRESS NOTE ADULT - ASSESSMENT
Pt is a 66F with PMHx COPD, CHFpEF, AFib, obesity (Type III, hx bariatric sx 2012), and severe pulmonary HTN with chronic combined hypercapnic and hypoxemic respiratory failure on ATC O2 supplementation (dependent on 8L NC at rest) with qhs/prn AVAPS (Trelegy Antonio via nasal prong interface) presenting for progressive dyspnea with minimal exertion admitted for acute decompensated right heart failure. RHC 6/20/2022 revealed sPAP: 106, dPAP: 44 mPAP:65, PCWP: 16, PVR 7.32W, CO/CI 6.7/3.2. Pt clinical hypervolemic today, tolerating diuresis, HD stable.    #pHTN  #ADHF  -c/w current diuresis, agree with trialing bumex as per nephrology, c/w spironolactone  - f/u nephrology recommendations  - strict I/Os and daily weights  - c/w home pulmonary vasodilators as above (tadalafil+ambrisentan).  - Will consider flolan when more euvolemic, noting it's effects are unpredictable, social situation may also be a limiting factor to initiating flolan  - c/w O2 supplementation for goal O2 saturation 88-94%.  - c/w home AVAPS [Trelegy ]  qHS and PRN during the day, would have tubing changed  - Pt's home at bedside and approved for use by [employer]. Can use prn.   -  c/w home bronchodilator therapy  - f/u recommendations regarding Transplant option      Pt is a 66F with PMHx COPD, CHFpEF, AFib, obesity (Type III, hx bariatric sx 2012), and severe pulmonary HTN with chronic combined hypercapnic and hypoxemic respiratory failure on ATC O2 supplementation (dependent on 8L NC at rest) with qhs/prn AVAPS (Trelegy Antonio via nasal prong interface) presenting for progressive dyspnea with minimal exertion admitted for acute decompensated right heart failure. RHC 6/20/2022 revealed sPAP: 106, dPAP: 44 mPAP:65, PCWP: 16, PVR 7.32W, CO/CI 6.7/3.2. Pt clinical hypervolemic today, tolerating diuresis, HD stable.    #pHTN  #ADHF  -c/w current diuresis, agree with trialing bumex as per nephrology, c/w spironolactone  - f/u nephrology recommendations  - strict I/Os and daily weights  - c/w home pulmonary vasodilators as above (tadalafil+ambrisentan).  - Unclear if Flolan would be safe or appropriate in this setting, social situation may also be a limiting factor to initiating flolan - will d/w interdisciplinary team  - c/w O2 supplementation for goal O2 saturation 88-94%.  - c/w home AVAPS [Trelegy ]  qHS and PRN during the day, would have tubing changed  - Pt's home at bedside and approved for use by [employer]. Can use prn.   -  c/w home bronchodilator therapy  - f/u recommendations regarding Transplant option

## 2022-08-16 NOTE — PROGRESS NOTE ADULT - PROBLEM SELECTOR PLAN 2
in the setting of hypervolemia. Recommend to give HTS + lasix IV to augment diuresis (as outlined above). Monitor serum sodium.

## 2022-08-16 NOTE — PROGRESS NOTE ADULT - SUBJECTIVE AND OBJECTIVE BOX
[facility] DIVISION OF KIDNEY DISEASES AND HYPERTENSION -- PROGRESS NOTE    Chief complaint: JUANA, hypervolemia    24 hour events/subjective: aldactone held this AM        PAST HISTORY  --------------------------------------------------------------------------------  No significant changes to PMH, PSH, FHx, SHx, unless otherwise noted    ALLERGIES & MEDICATIONS  --------------------------------------------------------------------------------  Allergies    nitrates (Unknown)    Intolerances      Standing Inpatient Medications  ambrisentan 10 milliGRAM(s) Oral daily  aspirin  chewable 81 milliGRAM(s) Oral daily  atorvastatin 20 milliGRAM(s) Oral at bedtime  budesonide  80 MICROgram(s)/formoterol 4.5 MICROgram(s) Inhaler 2 Puff(s) Inhalation two times a day  buPROPion XL (24-Hour) . 300 milliGRAM(s) Oral daily  digoxin     Tablet 125 MICROGram(s) Oral daily  diltiazem    milliGRAM(s) Oral daily  furosemide   Injectable 80 milliGRAM(s) IV Push two times a day  pantoprazole    Tablet 40 milliGRAM(s) Oral before breakfast  polyethylene glycol 3350 17 Gram(s) Oral daily  sodium chloride 0.65% Nasal 1 Spray(s) Both Nostrils four times a day  sodium chloride 2% . 150 milliLiter(s) IV Continuous <Continuous>  tadalafil 40 milliGRAM(s) Oral daily  tiotropium 18 MICROgram(s) Capsule 1 Capsule(s) Inhalation daily    PRN Inpatient Medications  acetaminophen     Tablet .. 975 milliGRAM(s) Oral every 6 hours PRN  ALBUTerol    90 MICROgram(s) HFA Inhaler 2 Puff(s) Inhalation every 6 hours PRN  HYDROmorphone  Injectable 0.25 milliGRAM(s) IV Push every 4 hours PRN  melatonin 3 milliGRAM(s) Oral at bedtime PRN      REVIEW OF SYSTEMS : pt. states that she feels lasix works better for her as compared to bumex  --------------------------------------------------------------------------------  Constitutional: [ ] Fever [ ] Chills [ ] Fatigue [ ] Weight change   HEENT: [ ] Blurred vision [ ] Eye Pain [ ] Headache [ ] Runny nose [ ] Sore Throat   Respiratory: [ ] Cough [ ] Wheezing [ ] Shortness of breath  Cardiovascular: [ ] Chest Pain [ ] Palpitations [ ] RINALDI [ ] PND [ ] Orthopnea  Gastrointestinal: [ ] Abdominal Pain [ ] Diarrhea [ ] Constipation [ ] Hemorrhoids [ ] Nausea [ ] Vomiting  Genitourinary: [ ] Nocturia [ ] Dysuria [ ] Incontinence  Extremities: [ ] Swelling [ ] Joint Pain  Neurologic: [ ] Focal deficit [ ] Paresthesias [ ] Syncope  Lymphatic: [ ] Swelling [ ] Lymphadenopathy   Skin: [ ] Rash [ ] Ecchymoses [ ] Wounds [ ] Lesions  Psychiatry: [ ] Depression [ ] Suicidal/Homicidal Ideation [ ] Anxiety [ ] Sleep Disturbances  [x ] 10 point review of systems is otherwise negative except as mentioned above              [ ]Unable to obtain due to   All other systems were reviewed and are negative, except as noted.    VITALS/PHYSICAL EXAM  --------------------------------------------------------------------------------  T(C): 36.7 (08-16-22 @ 09:34), Max: 36.9 (08-16-22 @ 09:18)  HR: 82 (08-16-22 @ 09:34) (74 - 99)  BP: 96/53 (08-16-22 @ 09:34) (96/53 - 110/68)  RR: 20 (08-16-22 @ 09:34) (18 - 20)  SpO2: 93% (08-16-22 @ 09:34) (91% - 96%)  Wt(kg): --        08-15-22 @ 07:01  -  08-16-22 @ 07:00  --------------------------------------------------------  IN: 940 mL / OUT: 1500 mL / NET: -560 mL    08-16-22 @ 07:01  -  08-16-22 @ 13:40  --------------------------------------------------------  IN: 240 mL / OUT: 0 mL / NET: 240 mL      Physical Exam:  	Gen: NAD  	HEENT: on aerosol face mask  	Pulm: bibasilar crackles B/L  	CV: normal S1S2; no rub  	Abd: soft                      Back : No sacral edema  	: No zackery  	LE: bilateral LE pitting edema  	Skin: Warm      LABS/STUDIES  --------------------------------------------------------------------------------              8.1    6.27  >-----------<  185      [08-16-22 @ 12:35]              26.3     127  |  83  |  88  ----------------------------<  178      [08-16-22 @ 12:35]  5.0   |  29  |  2.08        Ca     9.6     [08-16-22 @ 12:35]      Mg     1.9     [08-16-22 @ 12:35]      Phos  4.0     [08-16-22 @ 12:35]    TPro  6.5  /  Alb  3.9  /  TBili  0.3  /  DBili  x   /  AST  21  /  ALT  11  /  AlkPhos  108  [08-16-22 @ 06:34]          Creatinine Trend:  SCr 2.08 [08-16 @ 12:35]  SCr 2.18 [08-16 @ 06:34]  SCr 1.96 [08-15 @ 05:43]  SCr 1.94 [08-14 @ 06:45]  SCr 2.06 [08-13 @ 06:31]      Urine Creatinine 33      [08-14-22 @ 08:40]  Urine Sodium 90      [08-12-22 @ 12:36]  Urine Urea Nitrogen 397      [08-14-22 @ 08:40]    Iron 27, TIBC 350, %sat 8      [08-12-22 @ 06:56]  Ferritin 26      [08-12-22 @ 06:56]  Lipid: chol 103, TG 48, HDL 64, LDL --      [06-14-22 @ 02:45]

## 2022-08-16 NOTE — PROGRESS NOTE ADULT - SUBJECTIVE AND OBJECTIVE BOX
ZANDER JEAN-BAPTISTE  66y  Female      Patient is a 66y old  Female who presents with a chief complaint of lung txp eval (16 Aug 2022 06:42)      INTERVAL HPI/OVERNIGHT EVENTS:  Provider handoff form yesterday - 8/15: switched from lasix 60mg IV BID to bumex 3mg; continuing to hold elliquis in setting of bleeding; palliative to reevaluate in 3-4 days pending transplant eval; rhino rocket ballon to be deflated khalida for trial of bleeding   Started on Bumex 2mg and then switched to 3mg per cards (equal to 60 lasix)   rhinorocket to be removed khalida           REVIEW OF SYSTEMS:  CONSTITUTIONAL: No fever, weight loss, or fatigue  EYES: No eye pain, visual disturbances, or discharge  ENMT:  No difficulty hearing, tinnitus, vertigo; No sinus or throat pain  NECK: No pain or stiffness  BREASTS: No pain, masses, or nipple discharge  RESPIRATORY: No cough, wheezing, chills or hemoptysis; No shortness of breath  CARDIOVASCULAR: No chest pain, palpitations, dizziness, or leg swelling  GASTROINTESTINAL: No abdominal or epigastric pain. No nausea, vomiting, or hematemesis; No diarrhea or constipation. No melena or hematochezia.  GENITOURINARY: No dysuria, frequency, hematuria, or incontinence  NEUROLOGICAL: No headaches, memory loss, loss of strength, numbness, or tremors  SKIN: No itching, burning, rashes, or lesions   LYMPH NODES: No enlarged glands  ENDOCRINE: No heat or cold intolerance; No hair loss  MUSCULOSKELETAL: No joint pain or swelling; No muscle, back, or extremity pain  PSYCHIATRIC: No depression, anxiety, mood swings, or difficulty sleeping  HEME/LYMPH: No easy bruising, or bleeding gums  ALLERY AND IMMUNOLOGIC: No hives or eczema  FAMILY HISTORY:  No pertinent family history      T(C): 36 (08-16-22 @ 05:49), Max: 36.8 (08-15-22 @ 12:31)  HR: 80 (08-16-22 @ 05:51) (74 - 96)  BP: 105/66 (08-16-22 @ 05:49) (105/66 - 126/69)  RR: 18 (08-16-22 @ 05:49) (18 - 18)  SpO2: 95% (08-16-22 @ 05:51) (91% - 96%)  Wt(kg): --Vital Signs Last 24 Hrs  T(C): 36 (16 Aug 2022 05:49), Max: 36.8 (15 Aug 2022 12:31)  T(F): 96.8 (16 Aug 2022 05:49), Max: 98.2 (15 Aug 2022 12:31)  HR: 80 (16 Aug 2022 05:51) (74 - 96)  BP: 105/66 (16 Aug 2022 05:49) (105/66 - 126/69)  BP(mean): 82 (15 Aug 2022 19:23) (82 - 82)  RR: 18 (16 Aug 2022 05:49) (18 - 18)  SpO2: 95% (16 Aug 2022 05:51) (91% - 96%)    Parameters below as of 16 Aug 2022 05:49  Patient On (Oxygen Delivery Method): nasal cannula      nitrates (Unknown)      PHYSICAL EXAM:  GENERAL: NAD, well-groomed, well-developed  HEAD:  Atraumatic, Normocephalic  EYES: EOMI, PERRLA, conjunctiva and sclera clear  ENMT: No tonsillar erythema, exudates, or enlargement; Moist mucous membranes, Good dentition, No lesions  NECK: Supple, No JVD, Normal thyroid  NERVOUS SYSTEM:  Alert & Oriented X3, Good concentration; Motor Strength 5/5 B/L upper and lower extremities; DTRs 2+ intact and symmetric  CHEST/LUNG: Clear to percussion bilaterally; No rales, rhonchi, wheezing, or rubs  HEART: Regular rate and rhythm; No murmurs, rubs, or gallops  ABDOMEN: Soft, Nontender, Nondistended; Bowel sounds present  EXTREMITIES:  2+ Peripheral Pulses, No clubbing, cyanosis, or edema  LYMPH: No lymphadenopathy noted  SKIN: No rashes or lesions    Consultant(s) Notes Reviewed:  [x ] YES  [ ] NO  Care Discussed with Consultants/Other Providers [ x] YES  [ ] NO    LABS:      RADIOLOGY & ADDITIONAL TESTS:    Imaging Personally Reviewed:  [ ] YES  [ ] NO  acetaminophen     Tablet .. 975 milliGRAM(s) Oral every 6 hours PRN  ALBUTerol    90 MICROgram(s) HFA Inhaler 2 Puff(s) Inhalation every 6 hours PRN  ambrisentan 10 milliGRAM(s) Oral daily  aspirin  chewable 81 milliGRAM(s) Oral daily  atorvastatin 20 milliGRAM(s) Oral at bedtime  budesonide  80 MICROgram(s)/formoterol 4.5 MICROgram(s) Inhaler 2 Puff(s) Inhalation two times a day  buMETAnide IVPB 3 milliGRAM(s) IV Intermittent two times a day  buPROPion XL (24-Hour) . 300 milliGRAM(s) Oral daily  digoxin     Tablet 125 MICROGram(s) Oral daily  diltiazem    milliGRAM(s) Oral daily  HYDROmorphone  Injectable 0.25 milliGRAM(s) IV Push every 4 hours PRN  melatonin 3 milliGRAM(s) Oral at bedtime PRN  pantoprazole    Tablet 40 milliGRAM(s) Oral before breakfast  polyethylene glycol 3350 17 Gram(s) Oral daily  sodium chloride 0.65% Nasal 1 Spray(s) Both Nostrils four times a day  spironolactone 50 milliGRAM(s) Oral daily  tadalafil 40 milliGRAM(s) Oral daily  tiotropium 18 MICROgram(s) Capsule 1 Capsule(s) Inhalation daily      HEALTH ISSUES - PROBLEM Dx:  Acute respiratory failure with hypoxia    JUANA (acute kidney injury)    Severe pulmonary hypertension    Acute on chronic diastolic congestive heart failure    Prophylactic measure    COPD, moderate    Chronic atrial fibrillation    Anemia    Chronic kidney disease, unspecified CKD stage    Epistaxis             ZANDER JEAN-BAPTISTE  66y  Female      Patient is a 66y old  Female who presents with a chief complaint of lung txp eval (16 Aug 2022 06:42)      INTERVAL HPI/OVERNIGHT EVENTS:  Provider handoff form yesterday - 8/15: switched from lasix 60mg IV BID to bumex 3mg; continuing to hold elliquis in setting of bleeding; palliative to reevaluate in 3-4 days pending transplant eval; rhino rocket ballon to be deflated khalida for trial of bleeding; rhinorocket to be removed khalida      Overnight/this morning:         REVIEW OF SYSTEMS:      FAMILY HISTORY:  No pertinent family history      T(C): 36 (08-16-22 @ 05:49), Max: 36.8 (08-15-22 @ 12:31)  HR: 80 (08-16-22 @ 05:51) (74 - 96)  BP: 105/66 (08-16-22 @ 05:49) (105/66 - 126/69)  RR: 18 (08-16-22 @ 05:49) (18 - 18)  SpO2: 95% (08-16-22 @ 05:51) (91% - 96%)  Wt(kg): --Vital Signs Last 24 Hrs  T(C): 36 (16 Aug 2022 05:49), Max: 36.8 (15 Aug 2022 12:31)  T(F): 96.8 (16 Aug 2022 05:49), Max: 98.2 (15 Aug 2022 12:31)  HR: 80 (16 Aug 2022 05:51) (74 - 96)  BP: 105/66 (16 Aug 2022 05:49) (105/66 - 126/69)  BP(mean): 82 (15 Aug 2022 19:23) (82 - 82)  RR: 18 (16 Aug 2022 05:49) (18 - 18)  SpO2: 95% (16 Aug 2022 05:51) (91% - 96%)    Parameters below as of 16 Aug 2022 05:49  Patient On (Oxygen Delivery Method): nasal cannula      nitrates (Unknown)      PHYSICAL EXAM:  CONSTITUTIONAL: NAD, well-developed  HEENT: +epistaxis  RESPIRATORY: coarse breadth sounds, no crackles    CARDIOVASCULAR: 3+ LE edema, non-pitting. afib, normal S1 and S2, no murmur/rub/gallop; Peripheral pulses are 2+ bilaterally  ABDOMEN: Nontender to palpation, normoactive bowel sounds, no rebound/guarding  MUSCLOSKELETAL: no clubbing or cyanosis of digits; no joint swelling or tenderness to palpation  NEURO: Non-focal, no tremors  SKIN: No rashes    Consultant(s) Notes Reviewed:  [x ] YES  [ ] NO  Care Discussed with Consultants/Other Providers [ x] YES  [ ] NO    LABS:        RADIOLOGY & ADDITIONAL TESTS:    Imaging Personally Reviewed:  [ ] YES  [ ] NO  acetaminophen     Tablet .. 975 milliGRAM(s) Oral every 6 hours PRN  ALBUTerol    90 MICROgram(s) HFA Inhaler 2 Puff(s) Inhalation every 6 hours PRN  ambrisentan 10 milliGRAM(s) Oral daily  aspirin  chewable 81 milliGRAM(s) Oral daily  atorvastatin 20 milliGRAM(s) Oral at bedtime  budesonide  80 MICROgram(s)/formoterol 4.5 MICROgram(s) Inhaler 2 Puff(s) Inhalation two times a day  buMETAnide IVPB 3 milliGRAM(s) IV Intermittent two times a day  buPROPion XL (24-Hour) . 300 milliGRAM(s) Oral daily  digoxin     Tablet 125 MICROGram(s) Oral daily  diltiazem    milliGRAM(s) Oral daily  HYDROmorphone  Injectable 0.25 milliGRAM(s) IV Push every 4 hours PRN  melatonin 3 milliGRAM(s) Oral at bedtime PRN  pantoprazole    Tablet 40 milliGRAM(s) Oral before breakfast  polyethylene glycol 3350 17 Gram(s) Oral daily  sodium chloride 0.65% Nasal 1 Spray(s) Both Nostrils four times a day  spironolactone 50 milliGRAM(s) Oral daily  tadalafil 40 milliGRAM(s) Oral daily  tiotropium 18 MICROgram(s) Capsule 1 Capsule(s) Inhalation daily      HEALTH ISSUES - PROBLEM Dx:  Acute respiratory failure with hypoxia    JUANA (acute kidney injury)    Severe pulmonary hypertension    Acute on chronic diastolic congestive heart failure    Prophylactic measure    COPD, moderate    Chronic atrial fibrillation    Anemia    Chronic kidney disease, unspecified CKD stage    Epistaxis             ZANDER JEAN-BAPTISTE  66y  Female      Patient is a 66y old  Female who presents with a chief complaint of lung txp eval (16 Aug 2022 06:42)      INTERVAL HPI/OVERNIGHT EVENTS:  Provider handoff form yesterday - 8/15: switched from lasix 60mg IV BID to bumex 3mg; continuing to hold elliquis in setting of bleeding; palliative to reevaluate in 3-4 days pending transplant eval; rhino rocket ballon to be deflated khalida for trial of bleeding; rhinorocket to be removed khalida      Overnight/this morning: patient in discomfort 2/2 face mask, nasal packing to be removed today for relief and w/ bleeding stable, hg 6.7 and receiving transfusion, given lokelma for k 5.5   Patient subjectively endorsing decreased urine output throughout yesterday. This was after receiving lasix in AM and prior to receiving equivalent dose of bumex in PM. Continue ot monitor I/O's and daily weights.         REVIEW OF SYSTEMS:  SOB, no chest pain, dysuria, nausea, vomiting       FAMILY HISTORY:  No pertinent family history      T(C): 36 (08-16-22 @ 05:49), Max: 36.8 (08-15-22 @ 12:31)  HR: 80 (08-16-22 @ 05:51) (74 - 96)  BP: 105/66 (08-16-22 @ 05:49) (105/66 - 126/69)  RR: 18 (08-16-22 @ 05:49) (18 - 18)  SpO2: 95% (08-16-22 @ 05:51) (91% - 96%)  Wt(kg): --Vital Signs Last 24 Hrs  T(C): 36 (16 Aug 2022 05:49), Max: 36.8 (15 Aug 2022 12:31)  T(F): 96.8 (16 Aug 2022 05:49), Max: 98.2 (15 Aug 2022 12:31)  HR: 80 (16 Aug 2022 05:51) (74 - 96)  BP: 105/66 (16 Aug 2022 05:49) (105/66 - 126/69)  BP(mean): 82 (15 Aug 2022 19:23) (82 - 82)  RR: 18 (16 Aug 2022 05:49) (18 - 18)  SpO2: 95% (16 Aug 2022 05:51) (91% - 96%)    Parameters below as of 16 Aug 2022 05:49  Patient On (Oxygen Delivery Method): nasal cannula      nitrates (Unknown)      PHYSICAL EXAM:  CONSTITUTIONAL: NAD, well-developed  HEENT: +epistaxis  RESPIRATORY: coarse breadth sounds, no crackles    CARDIOVASCULAR: 3+ LE edema, non-pitting. afib, normal S1 and S2, no murmur/rub/gallop; Peripheral pulses are 2+ bilaterally  ABDOMEN: Nontender to palpation, normoactive bowel sounds, no rebound/guarding  MUSCLOSKELETAL: no clubbing or cyanosis of digits; no joint swelling or tenderness to palpation  NEURO: Non-focal, no tremors  SKIN: No rashes    Consultant(s) Notes Reviewed:  [x ] YES  [ ] NO  Care Discussed with Consultants/Other Providers [ x] YES  [ ] NO    LABS:                          6.7    4.62  )-----------( 192      ( 16 Aug 2022 06:34 )             22.5       08-16    129<L>  |  85<L>  |  91<H>  ----------------------------<  89  5.5<H>   |  33<H>  |  2.18<H>    Ca    9.5      16 Aug 2022 06:34  Phos  3.8     08-16  Mg     2.1     08-16    TPro  6.5  /  Alb  3.9  /  TBili  0.3  /  DBili  x   /  AST  21  /  ALT  11  /  AlkPhos  108  08-16          RADIOLOGY & ADDITIONAL TESTS:    Imaging Personally Reviewed:  [ ] YES  [ ] NO  acetaminophen     Tablet .. 975 milliGRAM(s) Oral every 6 hours PRN  ALBUTerol    90 MICROgram(s) HFA Inhaler 2 Puff(s) Inhalation every 6 hours PRN  ambrisentan 10 milliGRAM(s) Oral daily  aspirin  chewable 81 milliGRAM(s) Oral daily  atorvastatin 20 milliGRAM(s) Oral at bedtime  budesonide  80 MICROgram(s)/formoterol 4.5 MICROgram(s) Inhaler 2 Puff(s) Inhalation two times a day  buMETAnide IVPB 3 milliGRAM(s) IV Intermittent two times a day  buPROPion XL (24-Hour) . 300 milliGRAM(s) Oral daily  digoxin     Tablet 125 MICROGram(s) Oral daily  diltiazem    milliGRAM(s) Oral daily  HYDROmorphone  Injectable 0.25 milliGRAM(s) IV Push every 4 hours PRN  melatonin 3 milliGRAM(s) Oral at bedtime PRN  pantoprazole    Tablet 40 milliGRAM(s) Oral before breakfast  polyethylene glycol 3350 17 Gram(s) Oral daily  sodium chloride 0.65% Nasal 1 Spray(s) Both Nostrils four times a day  spironolactone 50 milliGRAM(s) Oral daily  tadalafil 40 milliGRAM(s) Oral daily  tiotropium 18 MICROgram(s) Capsule 1 Capsule(s) Inhalation daily      HEALTH ISSUES - PROBLEM Dx:  Acute respiratory failure with hypoxia    JUANA (acute kidney injury)    Severe pulmonary hypertension    Acute on chronic diastolic congestive heart failure    Prophylactic measure    COPD, moderate    Chronic atrial fibrillation    Anemia    Chronic kidney disease, unspecified CKD stage    Epistaxis

## 2022-08-16 NOTE — PROGRESS NOTE ADULT - SUBJECTIVE AND OBJECTIVE BOX
Patient seen at bedside with Dr. Pérez,     Performs activities of daily living with *** assistance   Non-diabetic ***  Requires *** L NC at rest   *** assisted ventilation needed    ROS:    REVIEW OF SYSTEMS    [ ] A ten-point review of systems was otherwise negative except as noted.  [ ] Due to altered mental status/intubation, subjective information were not able to be obtained from the patient. History was obtained, to the extent possible, from review of the chart and collateral sources of information.      CURRENT MEDICATIONS:   --------------------------------------------------------------------------------------  Neurologic Medications  acetaminophen     Tablet .. 975 milliGRAM(s) Oral every 6 hours PRN Temp greater or equal to 38C (100.4F), Mild Pain (1 - 3), Moderate Pain (4 - 6)  buPROPion XL (24-Hour) . 300 milliGRAM(s) Oral daily  HYDROmorphone  Injectable 0.25 milliGRAM(s) IV Push every 4 hours PRN Severe Pain (7 - 10)  melatonin 3 milliGRAM(s) Oral at bedtime PRN Insomnia    Respiratory Medications  ALBUTerol    90 MICROgram(s) HFA Inhaler 2 Puff(s) Inhalation every 6 hours PRN Shortness of Breath and/or Wheezing  budesonide  80 MICROgram(s)/formoterol 4.5 MICROgram(s) Inhaler 2 Puff(s) Inhalation two times a day  tiotropium 18 MICROgram(s) Capsule 1 Capsule(s) Inhalation daily    Cardiovascular Medications  ambrisentan 10 milliGRAM(s) Oral daily  buMETAnide IVPB 3 milliGRAM(s) IV Intermittent two times a day  digoxin     Tablet 125 MICROGram(s) Oral daily  diltiazem    milliGRAM(s) Oral daily  tadalafil 40 milliGRAM(s) Oral daily    Gastrointestinal Medications  pantoprazole    Tablet 40 milliGRAM(s) Oral before breakfast  polyethylene glycol 3350 17 Gram(s) Oral daily    Genitourinary Medications    Hematologic/Oncologic Medications  aspirin  chewable 81 milliGRAM(s) Oral daily    Antimicrobial/Immunologic Medications    Endocrine/Metabolic Medications  atorvastatin 20 milliGRAM(s) Oral at bedtime    Topical/Other Medications  sodium chloride 0.65% Nasal 1 Spray(s) Both Nostrils four times a day  sodium zirconium cyclosilicate 10 Gram(s) Oral once    --------------------------------------------------------------------------------------    VITAL SIGNS, INS/OUTS (last 24 hours):  --------------------------------------------------------------------------------------  ICU Vital Signs Last 24 Hrs  T(C): 36 (16 Aug 2022 05:49), Max: 36.8 (15 Aug 2022 12:31)  T(F): 96.8 (16 Aug 2022 05:49), Max: 98.2 (15 Aug 2022 12:31)  HR: 80 (16 Aug 2022 05:51) (74 - 96)  BP: 105/66 (16 Aug 2022 05:49) (105/66 - 126/69)  BP(mean): 82 (15 Aug 2022 19:23) (82 - 82)  ABP: --  ABP(mean): --  RR: 18 (16 Aug 2022 05:49) (18 - 18)  SpO2: 95% (16 Aug 2022 05:51) (91% - 96%)    O2 Parameters below as of 16 Aug 2022 05:49  Patient On (Oxygen Delivery Method): nasal cannula          I&O's Summary    15 Aug 2022 07:01  -  16 Aug 2022 07:00  --------------------------------------------------------  IN: 940 mL / OUT: 1500 mL / NET: -560 mL    16 Aug 2022 07:01  -  16 Aug 2022 08:30  --------------------------------------------------------  IN: 240 mL / OUT: 0 mL / NET: 240 mL      --------------------------------------------------------------------------------------    EXAM:  General:  Chest:  Respiratory:  Cardiac:  Gastrointestinal:  Extremities:  Neuro:  --------------------------------------------------------------------------------------    OTHER LABS    IMAGING RESULTS       Patient seen at bedside with Dr. Pérez    Patient seen sitting up in chair, with venti mask.     Performs activities of daily living with *** assistance   Non-diabetic ***  Requires *** L NC at rest   *** assisted ventilation needed    ROS:    REVIEW OF SYSTEMS    [ ] A ten-point review of systems was otherwise negative except as noted.  [ ] Due to altered mental status/intubation, subjective information were not able to be obtained from the patient. History was obtained, to the extent possible, from review of the chart and collateral sources of information.      CURRENT MEDICATIONS:   --------------------------------------------------------------------------------------  Neurologic Medications  acetaminophen     Tablet .. 975 milliGRAM(s) Oral every 6 hours PRN Temp greater or equal to 38C (100.4F), Mild Pain (1 - 3), Moderate Pain (4 - 6)  buPROPion XL (24-Hour) . 300 milliGRAM(s) Oral daily  HYDROmorphone  Injectable 0.25 milliGRAM(s) IV Push every 4 hours PRN Severe Pain (7 - 10)  melatonin 3 milliGRAM(s) Oral at bedtime PRN Insomnia    Respiratory Medications  ALBUTerol    90 MICROgram(s) HFA Inhaler 2 Puff(s) Inhalation every 6 hours PRN Shortness of Breath and/or Wheezing  budesonide  80 MICROgram(s)/formoterol 4.5 MICROgram(s) Inhaler 2 Puff(s) Inhalation two times a day  tiotropium 18 MICROgram(s) Capsule 1 Capsule(s) Inhalation daily    Cardiovascular Medications  ambrisentan 10 milliGRAM(s) Oral daily  buMETAnide IVPB 3 milliGRAM(s) IV Intermittent two times a day  digoxin     Tablet 125 MICROGram(s) Oral daily  diltiazem    milliGRAM(s) Oral daily  tadalafil 40 milliGRAM(s) Oral daily    Gastrointestinal Medications  pantoprazole    Tablet 40 milliGRAM(s) Oral before breakfast  polyethylene glycol 3350 17 Gram(s) Oral daily    Genitourinary Medications    Hematologic/Oncologic Medications  aspirin  chewable 81 milliGRAM(s) Oral daily    Antimicrobial/Immunologic Medications    Endocrine/Metabolic Medications  atorvastatin 20 milliGRAM(s) Oral at bedtime    Topical/Other Medications  sodium chloride 0.65% Nasal 1 Spray(s) Both Nostrils four times a day  sodium zirconium cyclosilicate 10 Gram(s) Oral once    --------------------------------------------------------------------------------------    VITAL SIGNS, INS/OUTS (last 24 hours):  --------------------------------------------------------------------------------------  ICU Vital Signs Last 24 Hrs  T(C): 36 (16 Aug 2022 05:49), Max: 36.8 (15 Aug 2022 12:31)  T(F): 96.8 (16 Aug 2022 05:49), Max: 98.2 (15 Aug 2022 12:31)  HR: 80 (16 Aug 2022 05:51) (74 - 96)  BP: 105/66 (16 Aug 2022 05:49) (105/66 - 126/69)  BP(mean): 82 (15 Aug 2022 19:23) (82 - 82)  ABP: --  ABP(mean): --  RR: 18 (16 Aug 2022 05:49) (18 - 18)  SpO2: 95% (16 Aug 2022 05:51) (91% - 96%)    O2 Parameters below as of 16 Aug 2022 05:49  Patient On (Oxygen Delivery Method): nasal cannula          I&O's Summary    15 Aug 2022 07:01  -  16 Aug 2022 07:00  --------------------------------------------------------  IN: 940 mL / OUT: 1500 mL / NET: -560 mL    16 Aug 2022 07:01  -  16 Aug 2022 08:30  --------------------------------------------------------  IN: 240 mL / OUT: 0 mL / NET: 240 mL      --------------------------------------------------------------------------------------    EXAM:  General:  Chest:  Respiratory:  Cardiac:  Gastrointestinal:  Extremities:  Neuro:  --------------------------------------------------------------------------------------    OTHER LABS    IMAGING RESULTS       Patient seen at bedside with Dr. Pérez    Patient seen sitting up in chair, with venti mask.  ENT deflated rhino rocket, and her breathing feels better.   ROS:    REVIEW OF SYSTEMS    [x] A ten-point review of systems was otherwise negative except as noted.  [ ] Due to altered mental status/intubation, subjective information were not able to be obtained from the patient. History was obtained, to the extent possible, from review of the chart and collateral sources of information.      CURRENT MEDICATIONS:   --------------------------------------------------------------------------------------  Neurologic Medications  acetaminophen     Tablet .. 975 milliGRAM(s) Oral every 6 hours PRN Temp greater or equal to 38C (100.4F), Mild Pain (1 - 3), Moderate Pain (4 - 6)  buPROPion XL (24-Hour) . 300 milliGRAM(s) Oral daily  HYDROmorphone  Injectable 0.25 milliGRAM(s) IV Push every 4 hours PRN Severe Pain (7 - 10)  melatonin 3 milliGRAM(s) Oral at bedtime PRN Insomnia    Respiratory Medications  ALBUTerol    90 MICROgram(s) HFA Inhaler 2 Puff(s) Inhalation every 6 hours PRN Shortness of Breath and/or Wheezing  budesonide  80 MICROgram(s)/formoterol 4.5 MICROgram(s) Inhaler 2 Puff(s) Inhalation two times a day  tiotropium 18 MICROgram(s) Capsule 1 Capsule(s) Inhalation daily    Cardiovascular Medications  ambrisentan 10 milliGRAM(s) Oral daily  buMETAnide IVPB 3 milliGRAM(s) IV Intermittent two times a day  digoxin     Tablet 125 MICROGram(s) Oral daily  diltiazem    milliGRAM(s) Oral daily  tadalafil 40 milliGRAM(s) Oral daily    Gastrointestinal Medications  pantoprazole    Tablet 40 milliGRAM(s) Oral before breakfast  polyethylene glycol 3350 17 Gram(s) Oral daily    Genitourinary Medications    Hematologic/Oncologic Medications  aspirin  chewable 81 milliGRAM(s) Oral daily    Antimicrobial/Immunologic Medications    Endocrine/Metabolic Medications  atorvastatin 20 milliGRAM(s) Oral at bedtime    Topical/Other Medications  sodium chloride 0.65% Nasal 1 Spray(s) Both Nostrils four times a day  sodium zirconium cyclosilicate 10 Gram(s) Oral once    --------------------------------------------------------------------------------------    VITAL SIGNS, INS/OUTS (last 24 hours):  --------------------------------------------------------------------------------------  ICU Vital Signs Last 24 Hrs  T(C): 36 (16 Aug 2022 05:49), Max: 36.8 (15 Aug 2022 12:31)  T(F): 96.8 (16 Aug 2022 05:49), Max: 98.2 (15 Aug 2022 12:31)  HR: 80 (16 Aug 2022 05:51) (74 - 96)  BP: 105/66 (16 Aug 2022 05:49) (105/66 - 126/69)  BP(mean): 82 (15 Aug 2022 19:23) (82 - 82)  ABP: --  ABP(mean): --  RR: 18 (16 Aug 2022 05:49) (18 - 18)  SpO2: 95% (16 Aug 2022 05:51) (91% - 96%)    O2 Parameters below as of 16 Aug 2022 05:49  Patient On (Oxygen Delivery Method): nasal cannula          I&O's Summary    15 Aug 2022 07:01  -  16 Aug 2022 07:00  --------------------------------------------------------  IN: 940 mL / OUT: 1500 mL / NET: -560 mL    16 Aug 2022 07:01  -  16 Aug 2022 08:30  --------------------------------------------------------  IN: 240 mL / OUT: 0 mL / NET: 240 mL      --------------------------------------------------------------------------------------    EXAM:  General: 6L NC, NAD  Chest: appears normal  Respiratory: decreased breath sounds bilaterally   Cardiac: +murmur  Gastrointestinal: overweight, soft, non-distended, non-tender  Extremities: bilateral lower extremity edema  Neuro: AAO x 3   -------------------------------------------------------------------------------------- Patient seen at bedside with Dr. Pérez    Patient seen sitting up in chair, with venti mask.  ENT deflated rhino rocket, and her breathing feels better.   ROS:    REVIEW OF SYSTEMS    [x] A ten-point review of systems was otherwise negative except as noted.  [ ] Due to altered mental status/intubation, subjective information were not able to be obtained from the patient. History was obtained, to the extent possible, from review of the chart and collateral sources of information.      CURRENT MEDICATIONS:   --------------------------------------------------------------------------------------  Neurologic Medications  acetaminophen     Tablet .. 975 milliGRAM(s) Oral every 6 hours PRN Temp greater or equal to 38C (100.4F), Mild Pain (1 - 3), Moderate Pain (4 - 6)  buPROPion XL (24-Hour) . 300 milliGRAM(s) Oral daily  HYDROmorphone  Injectable 0.25 milliGRAM(s) IV Push every 4 hours PRN Severe Pain (7 - 10)  melatonin 3 milliGRAM(s) Oral at bedtime PRN Insomnia    Respiratory Medications  ALBUTerol    90 MICROgram(s) HFA Inhaler 2 Puff(s) Inhalation every 6 hours PRN Shortness of Breath and/or Wheezing  budesonide  80 MICROgram(s)/formoterol 4.5 MICROgram(s) Inhaler 2 Puff(s) Inhalation two times a day  tiotropium 18 MICROgram(s) Capsule 1 Capsule(s) Inhalation daily    Cardiovascular Medications  ambrisentan 10 milliGRAM(s) Oral daily  buMETAnide IVPB 3 milliGRAM(s) IV Intermittent two times a day  digoxin     Tablet 125 MICROGram(s) Oral daily  diltiazem    milliGRAM(s) Oral daily  tadalafil 40 milliGRAM(s) Oral daily    Gastrointestinal Medications  pantoprazole    Tablet 40 milliGRAM(s) Oral before breakfast  polyethylene glycol 3350 17 Gram(s) Oral daily    Genitourinary Medications    Hematologic/Oncologic Medications  aspirin  chewable 81 milliGRAM(s) Oral daily    Antimicrobial/Immunologic Medications    Endocrine/Metabolic Medications  atorvastatin 20 milliGRAM(s) Oral at bedtime    Topical/Other Medications  sodium chloride 0.65% Nasal 1 Spray(s) Both Nostrils four times a day  sodium zirconium cyclosilicate 10 Gram(s) Oral once    --------------------------------------------------------------------------------------    VITAL SIGNS, INS/OUTS (last 24 hours):  --------------------------------------------------------------------------------------  ICU Vital Signs Last 24 Hrs  T(C): 36 (16 Aug 2022 05:49), Max: 36.8 (15 Aug 2022 12:31)  T(F): 96.8 (16 Aug 2022 05:49), Max: 98.2 (15 Aug 2022 12:31)  HR: 80 (16 Aug 2022 05:51) (74 - 96)  BP: 105/66 (16 Aug 2022 05:49) (105/66 - 126/69)  BP(mean): 82 (15 Aug 2022 19:23) (82 - 82)  ABP: --  ABP(mean): --  RR: 18 (16 Aug 2022 05:49) (18 - 18)  SpO2: 95% (16 Aug 2022 05:51) (91% - 96%)    O2 Parameters below as of 16 Aug 2022 05:49  Patient On (Oxygen Delivery Method): nasal cannula          I&O's Summary    15 Aug 2022 07:01  -  16 Aug 2022 07:00  --------------------------------------------------------  IN: 940 mL / OUT: 1500 mL / NET: -560 mL    16 Aug 2022 07:01  -  16 Aug 2022 08:30  --------------------------------------------------------  IN: 240 mL / OUT: 0 mL / NET: 240 mL      --------------------------------------------------------------------------------------    EXAM:  General: 6L NC, NAD  Chest: appears normal  Respiratory: decreased breath sounds bilaterally   Cardiac: +murmur  Gastrointestinal: overweight, soft, non-distended, non-tender  Extremities: +++ bilateral lower extremity edema   Neuro: AAO x 3   --------------------------------------------------------------------------------------

## 2022-08-16 NOTE — PROGRESS NOTE ADULT - PROBLEM SELECTOR PLAN 4
RHC and cardiomems done 6/20. RHC showed elevated right sided filling pressures with severe pulmonary hypertension with systemic PA pressures, slightly elevated PCWP and preserved cardiac output. Follows with Dr. Rodriguez at [facility] for PH.   - continue tadalafil 40 mg daily   - continue letairis 10mg qd  - CT surgery aware of patient; f/u recs: at present state, not a transplant candidate but if we can reduce edema, improve kidney function, and optimize physical therapy (walking distance 300-400ft minimum); Ideally, BMI needs to be around ~30 for transplant candidacy  - follow Pulm recs

## 2022-08-16 NOTE — PROGRESS NOTE ADULT - PROBLEM SELECTOR PLAN 1
Pt. with CKD in the setting of chronic cardiorenal syndrome, now with JUANA (non oliguric) on CKD likely 2/2 renal venous congestion from hypervolemia due to HF in the setting of severe pulmonary HTN.   On review of [facility], noted that Baseline SCr ranges from 1.4-1.9 since March '22. SCr on arrival was 1.8 on 8/11; peaked to 2.02 on 8/13 & has remained stable. BUN elevated in the setting of diuresis, however without any uremic sxs.   Pt. continues to appear volume overloaded. Recommend to give 2% saline 150 cc bolus x 2, 12 hours apart followed by lasix 80 IV. Hold bumex.   No plan for HD at this time.   Check UA, spot urine TP/Cr, Hep panel, serum immunoglobulin free light chains, Serum immunofixation.  Check bladder scan & Renal US. Recommend Lunsford catheter placement if retaining.    Monitor labs and urine output. Avoid NSAIDs, ACEI/ARBS, RCA and nephrotoxins. Dose medications as per eGFR.

## 2022-08-16 NOTE — PROGRESS NOTE ADULT - ATTENDING COMMENTS
reports poor response to IV bumex.   will switch to lasix 80 IV BID  d/w renal and can try hypertonic saline.   strict i/o's and daily standing weight  has lost 5 lbs since 8/12  cont dig  pulm following for pulm HTN  cont po ambrisentan and tadalafil  will discuss with lung transplant team regarding the possibility of her candidacy    will cont follow

## 2022-08-16 NOTE — PROGRESS NOTE ADULT - PROBLEM SELECTOR PLAN 1
- further guidance by pulmonary hypertension team and lung transplant team  - evaluation for lung transplant pending improved BMI closer to 30 (currently 37.6), improved mobility and improved renal function  - suspect it will be difficult for her to become a transplant candidate, would consider palliative care input  - transplant team wants pt to be more euvolemic before launching her transplant eval  - if plan to start pulm vasodilator, would recommend transferring to MICU for monitoring

## 2022-08-16 NOTE — PROGRESS NOTE ADULT - PROBLEM SELECTOR PLAN 7
Hgb 6.9 at OSH, transfused 1U PRBC with improvement to 7.8. Patient w/o signs/symptoms of acute blood loss, HD stable. Denies changes in bowel/urinary habits, denies recent falls/trauma.   - maintain active T/S  - Holding eliquis as above.   - f/u iron studies: total iron and % saturation decreased, ferritin on lower side, transferrin and TIBC normal; s/p IV iron infusion  - Hg this AM 7.2 i/s/o epistaxis, ctm Hgb 6.9 at OSH, transfused 1U PRBC with improvement to 7.8. Patient w/o signs/symptoms of acute blood loss, HD stable. Denies changes in bowel/urinary habits, denies recent falls/trauma.   - maintain active T/S  - Holding eliquis as above.   - f/u iron studies: total iron and % saturation decreased, ferritin on lower side, transferrin and TIBC normal; s/p IV iron infusion  - 8/16: Hg this AM 6.7, receiving unit of pRBC, f/u repeat CBC

## 2022-08-16 NOTE — PROGRESS NOTE ADULT - ASSESSMENT
Pt is a 66F with PMHx COPD, CHFpEF, AFib, obesity (Type III, hx bariatric sx 2012), and severe pulmonary HTN with chronic combined hypercapnic and hypoxemic respiratory failure on ATC O2 supplementation (dependent on 8L NC at rest) with qhs/prn AVAPS (Trelegy Antonio via nasal prong interface) presenting for progressive dyspnea with minimal exertion admitted for acute decompensated right heart failure. RHC 6/20/2022 revealed sPAP: 106, dPAP: 44 mPAP:65, PCWP: 16, PVR 7.32W, CO/CI 6.7/3.2. Pt clinical hypervolemic today, tolerating diuresis, HD stable although noted bump in creatinine.    #pHTN  #ADHF  -c/w current diuresis as per HF  - f/u nephrology recommendations  - strict I/Os and daily weights  - c/w home pulmonary vasodilators as above (tadalafil+ambrisentan).  - Unclear if Flolan would be safe or appropriate in this setting, social situation may be a limiting factor to initiating flolan - will d/w interdisciplinary team  - c/w O2 supplementation for goal O2 saturation 88-94%.  - c/w home AVAPS [Trelegy]  qHS and PRN during the day  - Pt's home at bedside and approved for use by [employer]. Can use prn. Currently held d/t epistaxis  - c/w home bronchodilator therapy  - f/u recommendations regarding Transplant option

## 2022-08-16 NOTE — PROGRESS NOTE ADULT - PROBLEM SELECTOR PLAN 1
Hypoxic to 80s at home while on baseline 8L NC. Patient gaining weight since hospital discharge early July, increased torsemide w/o significant results. Likely in s/o CHF exacerbation, given elevated BNP, limited exercise tolerance, worsening LE edema. Patient afebrile, non-toxic appearing, infection unlikely.   - switched from lasix to bumex 3mg BID   - standing daily weights, strict I/Os  - pulm transplant and HF following  - eventual SGLT2-i prior to discharge, once on stable dose of oral diuretics  - HF: CardioMEMS PAD 43 mmHg, will read today goal ~40 but difficult to ascertain given RV dysfunction   - daily standing weight. Was discharged in June at 189 lbs. Weight on admission 212, 210 on 8/12. Hypoxic to 80s at home while on baseline 8L NC. Patient gaining weight since hospital discharge early July, increased torsemide w/o significant results. Likely in s/o CHF exacerbation, given elevated BNP, limited exercise tolerance, worsening LE edema. Patient afebrile, non-toxic appearing, infection unlikely.   Patient subjectively endorsing decreased urine output throughout yesterday after receiving lasix in AM. Swithced to bumex 3mg BID, first dose last night (8/15).   - switched from lasix to bumex 3mg BID   - standing daily weights, strict I/Os  - pulm transplant and HF following  - eventual SGLT2-i prior to discharge, once on stable dose of oral diuretics  - HF: CardioMEMS PAD 45 yesterday, repeat following removal of nasal packing

## 2022-08-16 NOTE — PROGRESS NOTE ADULT - ASSESSMENT
66 year old woman with HFpEF with primarily RV dysfunction s/p CardioMEMS, severe pulmonary hypertension (Group 1, 2 & 3), COPD on home O2 8L, AF (on Eliquis), PE, CVA (MCA infarct 2012), CKD (b/l Cr 1.7-1.8), ROLANDA on CPAP and morbid obesity (s/p bariatric surgery 2012 with lap band removal d/t GIB) who was recently hospitalized 6/14-6/29/22 for volume overload where she met with lung transplant team with plan for evaluation pending improvement in BMI and deconditioned state. Had RHC in June with severely elevated PAP and preserved CO.     She presents with RINALDI, ABD bloating and LE swelling in the setting of gradual 25 lb weight gain despite escalation of diuretic regimen. She was started on intermittent IV Lasix and was responding well, but remains markedly overloaded with predominate symptoms of elevated R sided filling pressure. She was switched to IV bumex and did not respond as well so plan to transition back to IV lasix. She is being evaluated for lung transplant after she is more medically optimized.      Cardiac Studies  CardioMEMS interrogation 8/16: PAP 98/45/66, HR 87  RHC and CardioMEMS Implant 6/20/22: RA 18, /44/65, PCWP 16 (v to 18), RA 95% on 8L NC, PA 58%, CO/CI (F) 6.7/3.2, PVR 7.32 ryan, TPG 49, /60/86 (CardioMEMS PAD 49)  TTE 6/15/22: LVIDd 4.8 cm, LVEF 68%, flattening of interventricular septum consistent with RV overload, mild concentric LVH (septum 1.2 PWT 1.1), RVE with decreased systolic function, mild LAE, severe ROE, mod TR, est RVSP 61 mmHg

## 2022-08-16 NOTE — PROGRESS NOTE ADULT - ASSESSMENT
66F w/ severe pulmonary HTN (Group 1, 2 & 3), HFpEF, Afib, COPD on home O2 (5L NC), CKD, ROLANDA on CPAP, morbid obesity (s/p bariatric surgery in 2012 with subsequent lap band removal due to GI bleed), PE, CVA (MCA infarct in 2012). Transferred from OSH for severe pulmonary hypertension, with possible launch for transplant evaluation.    Seen by Dr. Mosher, appreciate recs   Followed by HF  Renal consult for JUANA  Financial clearance done  SW clearance done   Monitor strict I & O  Daily standing weights  At this time, requires medical optimization prior to launching transplant  Ideally BMI < 30      Above discussed with Dr. Pérez   66F w/ severe pulmonary HTN (Group 1, 2 & 3), HFpEF, Afib, COPD on home O2 (5L NC), CKD, ROLANDA on CPAP, morbid obesity (s/p bariatric surgery in 2012 with subsequent lap band removal due to GI bleed), PE, CVA (MCA infarct in 2012). Transferred from OSH for severe pulmonary hypertension, with possible launch for transplant evaluation.    Seen by Dr. Mosher, appreciate recs  Followed by HF  Renal consult for JUANA  Monitor strict I & O  Daily standing weights  At this time, requires medical optimization prior to launching transplant  Ideally BMI < 30    Above discussed with Dr. Pérez

## 2022-08-16 NOTE — PROGRESS NOTE ADULT - PROBLEM SELECTOR PLAN 3
in the setting of JUANA and use of Aldactone. Agree with holding aldactone. pt. received lokelma this AM, follow up repeat potassium level. Give low potassium diet. Monitor serum potassium.

## 2022-08-16 NOTE — PROGRESS NOTE ADULT - PROBLEM SELECTOR PLAN 2
- bumex did not work as well for her, would change back to IV lasix 80 BID, if not net negative 1L by the pm, can increase to TID, and if still inadequate, would consider lasix gtt (home regimen was Torsemide 40 BID)  - hold spironolactone for hyperkalemia  - eventual SGLT2-i prior to discharge, once on stable dose of oral diuretics  - CardioMEMS PAD 8/11 43 mmHg. goal ~40 but difficult to ascertain given RV dysfunction  - cardiomems PAD 8/15 45 mmHg, 98/45/66  - daily standing weight. Was discharged in June at 189 lbs, now at 207, appears to be down-trending except for one measurement of 214 from yesterday - bumex did not work as well for her, would change back to IV lasix 80 BID, if not net negative 1L by the pm, can increase to TID, and if still inadequate, would consider lasix gtt (home regimen was Torsemide 40 BID)  - hold spironolactone for hyperkalemia  - eventual SGLT2-i prior to discharge, once on stable dose of oral diuretics  - CardioMEMS PAD 8/11 43 mmHg. goal ~40 but difficult to ascertain given RV dysfunction  - cardiomems PAD 8/15 45 mmHg, 98/45/66  - daily standing weight. Was discharged in June at 189 lbs, now at 207, appears to be down-trending except for one measurement of 214 from yesterday  - patient is fairly optimized from HF standpoint for pulmonary therapy eval as above

## 2022-08-16 NOTE — PROGRESS NOTE ADULT - ATTENDING COMMENTS
Patient seen and examined. Pt is a 66F with PMHx COPD, CHFpEF, AFib, obesity (Type III, hx bariatric sx 2012), and severe pulmonary HTN with chronic combined hypercapnic and hypoxemic respiratory failure on ATC O2 supplementation (dependent on 8L NC at rest) with qhs/prn AVAPS (Trelegy Antonio via nasal prong interface) presenting for progressive dyspnea with minimal exertion admitted for acute decompensated right heart failure. RHC 6/20/2022 revealed sPAP: 106, dPAP: 44 mPAP:65, PCWP: 16, PVR 7.32W, CO/CI 6.7/3.2.     - Inaccurate I/O but patient has lost weight this AM. Diuretics to be adjusted as per Nephrology and HF - plan for Lasix + hypertonic saline. Patient remains volume overloaded and now with worsening renal function. Hopefully this will improve with treatment and is related to a cardiorenal syndrome. If her renal function worsens and/or she becomes anuric this will complicate her clinical picture further  - Continue daily weights and strict I/O  - Patient has been on home pulmonary vasodilators (Tadalafil and Ambrisentan) which remain on at this point. Would continue this regimen for the time being as patient continues to receive diuresis. Would NOT start IV Flolan at this time. Unclear if patient is a transplant candidate and the risks of Flolan may outweigh the benefits. Patient was recently hospitalized at [facility] and was supposed to followup with Dr. Mosher but was not able to as it was felt the commute from her home, in [place], to [facility] was too difficult. In addition, she has shares some concerns related to the stability of power at her home to ensure refrigeration capabilities though she does have a clubhouse nearby which she can use in emergencies. She also tells me that after Labor Day her children may be more easily available to transport her to physician visits. She was previously followed up at [facility] but her primary pulmonologist reportedly recently left.  - Continue supplemental O2 with goal O2 sat > 88% - patient is on 8L O2 NC at home with sats often 85-90% on exertion with O2. She is currently on 70% ventimask and her O2 abilities are limited by nasal bleeding. Would wean down FiO2 as able and attempt NC when epistaxis resolved and rhinorocket removed.  - Patient has her home Trelegy for AVAPs use and should be used as able - currently held in setting of nasal packing. Her home machine has been approved by [employer]  - Continue bronchodilator therapy  - Patient needs ENT followup for her nasal bleeding. Overall improved.. The bleeding is likely related to the pulmonary vasodilator therapy and she should be evaluated by ENT for cautery to control this bleeding if it recurs    Will need to have a more detailed discussion with the lung transplant team, heart failure, and Dr. Mosher. I am not sure that IV Flolan would be a safe long term therapy for this patient and am not sure this would be appropriate if she is not a candidate for transplant. I suspect, given the severity of her pulmonary hypertension, she would need a dual organ (heart and lung) transplant if transplant remains an option. Will need to better understand her social situation and support at home particularly with regards to delivery of IV medications and transport to/from physician office visits. IF she is not a candidate for transplant then a Palliative Care followup would be appropriate given her significant decondition, volume overload, and chronic hypoxemia.   - Please call with any questions or concerns. I have discussed the case and my concerns with the consulting providers and the patient. Patient seen and examined. Pt is a 66F with PMHx COPD, CHFpEF, AFib, obesity (Type III, hx bariatric sx 2012), and severe pulmonary HTN with chronic combined hypercapnic and hypoxemic respiratory failure on ATC O2 supplementation (dependent on 8L NC at rest) with qhs/prn AVAPS (Trelegy Antonio via nasal prong interface) presenting for progressive dyspnea with minimal exertion admitted for acute decompensated right heart failure. RHC 6/20/2022 revealed sPAP: 106, dPAP: 44 mPAP:65, PCWP: 16, PVR 7.32W, CO/CI 6.7/3.2.     - Inaccurate I/O but patient has lost weight this AM. Diuretics to be adjusted as per Nephrology and HF - plan for Lasix + hypertonic saline. Patient remains volume overloaded and now with worsening renal function. Hopefully this will improve with treatment and is related to a cardiorenal syndrome. If her renal function worsens and/or she becomes anuric this will complicate her clinical picture further  - Continue daily weights and strict I/O  - Patient has been on home pulmonary vasodilators (Tadalafil and Ambrisentan) which remain on at this point. Would continue this regimen for the time being as patient continues to receive diuresis. Would NOT start IV Flolan at this time. Unclear if patient is a transplant candidate and the risks of Flolan may outweigh the benefits. Patient was recently hospitalized at [facility] and was supposed to followup with Dr. Mosher but was not able to as it was felt the commute from her home, in [place], to [facility] was too difficult and she did not have transportation. In addition, she has shares some concerns related to the stability of power at her home to ensure refrigeration capabilities though she does have a clubhouse nearby which she can use in emergencies. She also tells me that after Labor Day her children may be more easily available to transport her to physician visits. She was previously followed up at [place] but her primary pulmonologist reportedly recently left.  - Continue supplemental O2 with goal O2 sat > 88% - patient is on 8L O2 NC at home with sats often 85-90% on exertion with O2. She is currently on 70% ventimask and her O2 abilities are limited by nasal bleeding. Would wean down FiO2 as able and attempt NC when epistaxis resolved and rhinorocket removed.  - Patient has her home Trelegy for AVAPs use and should be used as able - currently held in setting of nasal packing. Her home machine has been approved by [employer]  - Continue bronchodilator therapy  - Patient needs ENT followup for her nasal bleeding. Overall improved.. The bleeding is likely related to the pulmonary vasodilator therapy and she should be evaluated by ENT for cautery to control this bleeding if it recurs    Will need to have a more detailed discussion with the lung transplant team, heart failure, and Dr. Mosher. I am not sure that IV Flolan would be a safe long term therapy for this patient and am not sure this would be appropriate if she is not a candidate for transplant. I suspect, given the severity of her pulmonary hypertension, she would need a dual organ (heart and lung) transplant if transplant remains an option. Will need to better understand her social situation and support at home particularly with regards to delivery of IV medications and transport to/from physician office visits. IF she is not a candidate for transplant then a Palliative Care followup would be appropriate given her significant decondition, volume overload, and chronic hypoxemia.   - Please call with any questions or concerns. I have discussed the case and my concerns with the consulting providers and the patient.

## 2022-08-16 NOTE — PROGRESS NOTE ADULT - PROBLEM SELECTOR PLAN 1
- Packing will be removed today  -plan to gram-positive abx coverage for duration of packing placement  - Strict blood pressure control.  - Nasal saline, 2 sprays to both nares 4 times a day  - Avoid nasal trauma; no nose rubbing, blowing or manipulating nasal packing.  Sneeze with mouth open and pinching nares.  - Avoid bending with head blow the waist.    - No heavy lifting.

## 2022-08-16 NOTE — PROGRESS NOTE ADULT - PROBLEM SELECTOR PLAN 2
Significant epistaxis this 8/13 AM, likely in setting of use of 8L nc. Not new for pt, but worse than her usual nose bleeds; Rhino rocket now just in left nare, ballon to be deflated tomorrow for trial prior to removal   -S/P afrin x1  -C/W humidified face tent for now, can eventually transition back to nc  -ENT consulted, note epistaxis L nare without target to cauterize. Placed rhino rocket, will need for 3 days per ENT       -Pain regiment and ppx abx ordered while rhino rocket in place  -Monitor H/H  -Eliquis held, can likely restart in 24-48h if no further bleeding. Significant epistaxis this 8/13 AM, likely in setting of use of 8L nc. Not new for pt, but worse than her usual nose bleeds; Rhino rocket now just in left nare, ballon to be deflated tomorrow for trial prior to removal   -S/P afrin x1  -C/W humidified face tent for now, can eventually transition back to nc  -ENT consulted, note epistaxis L nare without target to cauterize. Placed rhino rocket, will need for 3 days per ENT       -Pain regiment and ppx abx ordered while rhino rocket in place  -Monitor H/H  -Eliquis held, can likely restart if no bleeding after removal of packing

## 2022-08-16 NOTE — PROGRESS NOTE ADULT - PROBLEM SELECTOR PLAN 4
- recent Cr ranging 1.7-1.8, now up-trending slightly, had been 1.4-1.6 in early June  - eventual SGLT2-i as above to delay further progression of CKD  - continue to monitor closely with diuresis

## 2022-08-16 NOTE — PROGRESS NOTE ADULT - SUBJECTIVE AND OBJECTIVE BOX
ENT ISSUE/POD: L Epistaxis     Subjective: Pt seen and examined at bedside. There were no acute overnight events. The patient has no complaints this morning. She states she has not had any further bleeding from her nares since the packing was place. Her pain is controlled. She denies CP, SOB, fevers, chills, N/V/D, metallic taste or any dripping in the back of her throat.      PAST MEDICAL & SURGICAL HISTORY:  COPD exacerbation      Severe pulmonary hypertension      Chronic kidney disease, unspecified CKD stage      Acute on chronic diastolic congestive heart failure      Pulmonary embolism      Anxiety and depression      GERD (gastroesophageal reflux disease)      Obstructive sleep apnea      Chronic atrial fibrillation      H/O pneumonectomy      Right leg weakness        Allergies    nitrates (Unknown)    Intolerances      MEDICATIONS  (STANDING):  ambrisentan 10 milliGRAM(s) Oral daily  aspirin  chewable 81 milliGRAM(s) Oral daily  atorvastatin 20 milliGRAM(s) Oral at bedtime  budesonide  80 MICROgram(s)/formoterol 4.5 MICROgram(s) Inhaler 2 Puff(s) Inhalation two times a day  buMETAnide IVPB 3 milliGRAM(s) IV Intermittent two times a day  buPROPion XL (24-Hour) . 300 milliGRAM(s) Oral daily  digoxin     Tablet 125 MICROGram(s) Oral daily  diltiazem    milliGRAM(s) Oral daily  pantoprazole    Tablet 40 milliGRAM(s) Oral before breakfast  polyethylene glycol 3350 17 Gram(s) Oral daily  sodium chloride 0.65% Nasal 1 Spray(s) Both Nostrils four times a day  tadalafil 40 milliGRAM(s) Oral daily  tiotropium 18 MICROgram(s) Capsule 1 Capsule(s) Inhalation daily    MEDICATIONS  (PRN):  acetaminophen     Tablet .. 975 milliGRAM(s) Oral every 6 hours PRN Temp greater or equal to 38C (100.4F), Mild Pain (1 - 3), Moderate Pain (4 - 6)  ALBUTerol    90 MICROgram(s) HFA Inhaler 2 Puff(s) Inhalation every 6 hours PRN Shortness of Breath and/or Wheezing  HYDROmorphone  Injectable 0.25 milliGRAM(s) IV Push every 4 hours PRN Severe Pain (7 - 10)  melatonin 3 milliGRAM(s) Oral at bedtime PRN Insomnia      social history: see consult     family history: see consult   ROS:   ENT: all negative except as noted in HPI   Pulm: denies SOB, cough, hemoptysis  Neuro: denies numbness/tingling, loss of sensation  Endo: denies heat/cold intolerance, excessive sweating      Vital Signs Last 24 Hrs  T(C): 36 (16 Aug 2022 05:49), Max: 36.8 (15 Aug 2022 12:31)  T(F): 96.8 (16 Aug 2022 05:49), Max: 98.2 (15 Aug 2022 12:31)  HR: 80 (16 Aug 2022 05:51) (74 - 96)  BP: 105/66 (16 Aug 2022 05:49) (105/66 - 126/69)  BP(mean): 82 (15 Aug 2022 19:23) (82 - 82)  RR: 18 (16 Aug 2022 05:49) (18 - 18)  SpO2: 95% (16 Aug 2022 05:51) (91% - 96%)    Parameters below as of 16 Aug 2022 05:49  Patient On (Oxygen Delivery Method): nasal cannula                              6.7    4.62  )-----------( 192      ( 16 Aug 2022 06:34 )             22.5    08-16    129<L>  |  85<L>  |  91<H>  ----------------------------<  89  5.5<H>   |  33<H>  |  2.18<H>    Ca    9.5      16 Aug 2022 06:34  Phos  3.8     08-16  Mg     2.1     08-16    TPro  6.5  /  Alb  3.9  /  TBili  0.3  /  DBili  x   /  AST  21  /  ALT  11  /  AlkPhos  108  08-16       PHYSICAL EXAM:  Gen: NAD  Skin: No rashes, bruises, or lesions  Head: Normocephalic, Atraumatic  Face: no edema, erythema, or fluctuance. Parotid glands soft without mass  Eyes: no scleral injection  Nose: +L rhino Rocket in place with 4cc- deflated no further bleeding   Mouth: No Stridor / Drooling / Trismus.  Mucosa moist, tongue/uvula midline, oropharynx clear  Neck: Flat, supple, no lymphadenopathy, trachea midline, no masses  Resp: breathing easily, no stridor

## 2022-08-16 NOTE — PROGRESS NOTE ADULT - PROBLEM SELECTOR PLAN 5
Last echo 6/22 with EF 68%, normal LV systolic function w/o WMA. Flattening of interventricular septum c/w RV overload, w/ severe R atrial enlargement, RV enlargement w/ decreased RV systolic function, severely elevated pulmonary pressures.   - now on bumex 3mg BID   - strict I/Os, daily standing weights  - continue spironolactone 50mg qd Last echo 6/22 with EF 68%, normal LV systolic function w/o WMA. Flattening of interventricular septum c/w RV overload, w/ severe R atrial enlargement, RV enlargement w/ decreased RV systolic function, severely elevated pulmonary pressures.   - now on bumex 3mg BID   - strict I/Os, daily standing weights  - holding spironolactone in setting of hyperkalemia, consider restarting once normalized

## 2022-08-16 NOTE — PROGRESS NOTE ADULT - ASSESSMENT
66F w/ severe pulmonary HTN (Group 1, 2 & 3), HFpEF, Afib, COPD on home O2 (5L NC), CKD, ROLANDA on CPAP, morbid obesity presenting with acute hypoxic respiratory failure, admitted for CHF exacerbation and ongoing lung transplant evaluation.   W/ epistaxis 8/13, seen by ENT, started on bilateral nasal packing/ rhino rocket, started on pain meds, abx ppx, cbc stable

## 2022-08-16 NOTE — PROGRESS NOTE ADULT - ASSESSMENT
67y/o female with to L epistaxis, controlled with RR 7.4 w 4cc, deflated no further bleeding. She is feeling okay this am. She has no active nasal bleed. Her packing is still in place.

## 2022-08-16 NOTE — PROGRESS NOTE ADULT - PROBLEM SELECTOR PLAN 3
Cr elevated to 2.42 on admission to  - baseline is 1.7-1.8. Likely in s/o CHF exacerbation, overall hypervolemic though likely intravascularly depleted. Likely contributing to sub-optimal response to torsemide at home.   - diuresis as above  - avoid nephrotoxic agents  - strict I/Os  -Nephrology consulted, f/u recs.  - Cr elevation could also be new baseline for patient in setting of worsening kidney function 2/2 pulm htn/HF Cr elevated to 2.42 on admission to  - baseline is 1.7-1.8. Likely in s/o CHF exacerbation, overall hypervolemic though likely intravascularly depleted. Likely contributing to sub-optimal response to torsemide at home.   - diuresis as above  - avoid nephrotoxic agents  - strict I/Os  - Nephrology consulted, f/u recs.  - Cr elevation could also be new baseline for patient in setting of worsening kidney function 2/2 pulm htn/HF

## 2022-08-16 NOTE — PROGRESS NOTE ADULT - ATTENDING COMMENTS
Patient seen and evaluated. Weight is downtrending, though I/O without much output. Approxiamtely 600 in cannister when I saw her. Still difficulty breathing on venti-mask.  Packing to be removed later today.     #HFpEF, predominant right sided   -patient with biventricular failure but likely predominany right sided failure with JVD, LE edema.   -heart failure and renal appreciated, per renal hypertonic saline followed by lasix 80mg.   -continue with spironolactone.   -if Cre stable, will d/w with heart failure re: added SGLT-2 as will also aide in diuresis.     #Epistaxis  -left nares packed, right nares packing removed. Left packing to be removed 8/16  -continue Abx proph  -may need cautery, per primary team, nothing seen to cauterize over the weekend.    #severe pulm HTN  -continue current pulm HTN medication  -given BMI >35, not candidate currently for pulm transplant and will not initiate transplant workup at this time.     #CKD III  -likely cardiorenal component vs. new baseline.   -continue to monitor, avoid nephrotoxic agents.     #Anemia  -iron studies denote iron deficiency. This is complicated by epistaxis.   -iron supplementation  -will see when had last colonoscopy. At this time high risk for colonoscopy, so may require outpatient virtual colonography if patient amenable.     Given weight, age, deconditioning, not a current candidate for transplantation. Palliative care consult placed for overal goals of care given high mortaility and morbidity of her end stage pulm HTN.

## 2022-08-16 NOTE — PROGRESS NOTE ADULT - SUBJECTIVE AND OBJECTIVE BOX
Patient seen and examined at bedside.    Overnight Events: LISA    Review Of Systems: No chest pain, shortness of breath, or palpitations            Current Meds:  acetaminophen     Tablet .. 975 milliGRAM(s) Oral every 6 hours PRN  ALBUTerol    90 MICROgram(s) HFA Inhaler 2 Puff(s) Inhalation every 6 hours PRN  ambrisentan 10 milliGRAM(s) Oral daily  aspirin  chewable 81 milliGRAM(s) Oral daily  atorvastatin 20 milliGRAM(s) Oral at bedtime  budesonide  80 MICROgram(s)/formoterol 4.5 MICROgram(s) Inhaler 2 Puff(s) Inhalation two times a day  buMETAnide IVPB 3 milliGRAM(s) IV Intermittent two times a day  buPROPion XL (24-Hour) . 300 milliGRAM(s) Oral daily  digoxin     Tablet 125 MICROGram(s) Oral daily  diltiazem    milliGRAM(s) Oral daily  HYDROmorphone  Injectable 0.25 milliGRAM(s) IV Push every 4 hours PRN  melatonin 3 milliGRAM(s) Oral at bedtime PRN  pantoprazole    Tablet 40 milliGRAM(s) Oral before breakfast  polyethylene glycol 3350 17 Gram(s) Oral daily  sodium chloride 0.65% Nasal 1 Spray(s) Both Nostrils four times a day  tadalafil 40 milliGRAM(s) Oral daily  tiotropium 18 MICROgram(s) Capsule 1 Capsule(s) Inhalation daily      Vitals:  T(F): 98.1 (08-16), Max: 98.5 (08-16)  HR: 82 (08-16) (74 - 99)  BP: 96/53 (08-16) (96/53 - 126/69)  RR: 20 (08-16)  SpO2: 93% (08-16)  I&O's Summary    15 Aug 2022 07:01  -  16 Aug 2022 07:00  --------------------------------------------------------  IN: 940 mL / OUT: 1500 mL / NET: -560 mL    16 Aug 2022 07:01  -  16 Aug 2022 12:26  --------------------------------------------------------  IN: 240 mL / OUT: 0 mL / NET: 240 mL        Physical Exam:  General: No distress. Comfortable.  HEENT: EOM intact.  Neck: Neck supple. JVP difficult to assess, but appears to be elevated. No masses  Chest: Unlabored, fine crackles bilateral bases.   CV: Normal S1 and S2. No murmurs, rub, or gallops. Radial pulses normal. +2 BLE edema.   Abdomen: Soft, non-distended, non-tender  Skin: No rashes or skin breakdown  Neurology: Alert and oriented times three. Sensation intact  Psych: Affect normal                          6.7    4.62  )-----------( 192      ( 16 Aug 2022 06:34 )             22.5     08-16    129<L>  |  85<L>  |  91<H>  ----------------------------<  89  5.5<H>   |  33<H>  |  2.18<H>    Ca    9.5      16 Aug 2022 06:34  Phos  3.8     08-16  Mg     2.1     08-16    TPro  6.5  /  Alb  3.9  /  TBili  0.3  /  DBili  x   /  AST  21  /  ALT  11  /  AlkPhos  108  08-16          Serum Pro-Brain Natriuretic Peptide: 06728 pg/mL (08-16 @ 06:34)  Serum Pro-Brain Natriuretic Peptide: 42331 pg/mL (08-15 @ 05:43)  Serum Pro-Brain Natriuretic Peptide: 18132 pg/mL (08-14 @ 06:45)  Serum Pro-Brain Natriuretic Peptide: 8947 pg/mL (08-13 @ 06:31)  Serum Pro-Brain Natriuretic Peptide: 72965 pg/mL (08-12 @ 06:55)  Serum Pro-Brain Natriuretic Peptide: 36610 pg/mL (08-11 @ 06:15)

## 2022-08-16 NOTE — PROGRESS NOTE ADULT - SUBJECTIVE AND OBJECTIVE BOX
CHIEF COMPLAINT:Patient is a 66y old  Female who presents with a chief complaint of lung txp eval (15 Aug 2022 14:03)      Interval Events:  On bumex 3mg IV BID. recorded net negative 730    REVIEW OF SYSTEMS:  [x] All other systems negative except per HPI   [ ] Unable to assess ROS because ________    OBJECTIVE:  ICU Vital Signs Last 24 Hrs  T(C): 36 (16 Aug 2022 05:49), Max: 36.8 (15 Aug 2022 12:31)  T(F): 96.8 (16 Aug 2022 05:49), Max: 98.2 (15 Aug 2022 12:31)  HR: 80 (16 Aug 2022 05:51) (74 - 96)  BP: 105/66 (16 Aug 2022 05:49) (105/66 - 126/69)  BP(mean): 82 (15 Aug 2022 19:23) (82 - 82)  ABP: --  ABP(mean): --  RR: 18 (16 Aug 2022 05:49) (18 - 18)  SpO2: 95% (16 Aug 2022 05:51) (91% - 96%)    O2 Parameters below as of 16 Aug 2022 05:49  Patient On (Oxygen Delivery Method): nasal cannula              08-14 @ 07:01  -  08-15 @ 07:00  --------------------------------------------------------  IN: 575 mL / OUT: 2350 mL / NET: -1775 mL    08-15 @ 07:01  -  08-16 @ 06:44  --------------------------------------------------------  IN: 770 mL / OUT: 1500 mL / NET: -730 mL        PHYSICAL EXAM:  GENERAL: on supplemetal oxygen   HEAD:  Atraumatic, Normocephalic  EYES: EOMI, PERRLA, conjunctiva and sclera clear  ENMT: mmm  CHEST/LUNG: bibasilar rales, no wheezing  HEART: Regular rate and rhythm; No murmurs, rubs, or gallops  ABDOMEN: Soft, Nontender, Nondistended  VASCULAR:  2+ BLE edema  LYMPH: No lymphadenopathy noted  SKIN: No rashes or lesions  NERVOUS SYSTEM:  Alert & Oriented X3    HOSPITAL MEDICATIONS:  MEDICATIONS  (STANDING):  ambrisentan 10 milliGRAM(s) Oral daily  aspirin  chewable 81 milliGRAM(s) Oral daily  atorvastatin 20 milliGRAM(s) Oral at bedtime  budesonide  80 MICROgram(s)/formoterol 4.5 MICROgram(s) Inhaler 2 Puff(s) Inhalation two times a day  buMETAnide IVPB 3 milliGRAM(s) IV Intermittent two times a day  buPROPion XL (24-Hour) . 300 milliGRAM(s) Oral daily  digoxin     Tablet 125 MICROGram(s) Oral daily  diltiazem    milliGRAM(s) Oral daily  pantoprazole    Tablet 40 milliGRAM(s) Oral before breakfast  polyethylene glycol 3350 17 Gram(s) Oral daily  sodium chloride 0.65% Nasal 1 Spray(s) Both Nostrils four times a day  spironolactone 50 milliGRAM(s) Oral daily  tadalafil 40 milliGRAM(s) Oral daily  tiotropium 18 MICROgram(s) Capsule 1 Capsule(s) Inhalation daily    MEDICATIONS  (PRN):  acetaminophen     Tablet .. 975 milliGRAM(s) Oral every 6 hours PRN Temp greater or equal to 38C (100.4F), Mild Pain (1 - 3), Moderate Pain (4 - 6)  ALBUTerol    90 MICROgram(s) HFA Inhaler 2 Puff(s) Inhalation every 6 hours PRN Shortness of Breath and/or Wheezing  HYDROmorphone  Injectable 0.25 milliGRAM(s) IV Push every 4 hours PRN Severe Pain (7 - 10)  melatonin 3 milliGRAM(s) Oral at bedtime PRN Insomnia      LABS:    The Labs were reviewed by me   The Radiology was reviewed by me    EKG tracing reviewed by me    08-15    130<L>  |  87<L>  |  95<H>  ----------------------------<  73  5.3   |  32<H>  |  1.96<H>  08-14    129<L>  |  86<L>  |  96<H>  ----------------------------<  80  5.1   |  33<H>  |  1.94<H>    Ca    9.4      15 Aug 2022 05:43  Ca    9.3      14 Aug 2022 06:45  Phos  3.6     08-15  Mg     2.1     08-15    TPro  6.7  /  Alb  3.9  /  TBili  0.4  /  DBili  x   /  AST  21  /  ALT  8<L>  /  AlkPhos  105  08-15  TPro  6.7  /  Alb  3.9  /  TBili  0.3  /  DBili  x   /  AST  18  /  ALT  10  /  AlkPhos  104  08-14    Magnesium, Serum: 2.1 mg/dL (08-15-22 @ 05:43)  Magnesium, Serum: 2.1 mg/dL (08-14-22 @ 06:45)    Phosphorus Level, Serum: 3.6 mg/dL (08-15-22 @ 05:43)  Phosphorus Level, Serum: 3.9 mg/dL (08-14-22 @ 06:45)                                              7.2    4.28  )-----------( 207      ( 15 Aug 2022 05:39 )             23.7                         7.4    4.40  )-----------( 209      ( 14 Aug 2022 06:45 )             24.1     CAPILLARY BLOOD GLUCOSE            MICROBIOLOGY:     RADIOLOGY:  [ ] Reviewed and interpreted by me    Point of Care Ultrasound Findings:    PFT:    EKG:

## 2022-08-17 PROBLEM — Z01.812 ENCOUNTER FOR PREPROCEDURE SCREENING LABORATORY TESTING FOR COVID-19: Status: ACTIVE | Noted: 2022-01-01

## 2022-08-17 NOTE — PROGRESS NOTE ADULT - SUBJECTIVE AND OBJECTIVE BOX
Patient seen and examined at bedside.    Overnight Events: LISA    Review Of Systems: No chest pain, shortness of breath, or palpitations            Current Meds:  acetaminophen     Tablet .. 975 milliGRAM(s) Oral every 6 hours PRN  ALBUTerol    90 MICROgram(s) HFA Inhaler 2 Puff(s) Inhalation every 6 hours PRN  ambrisentan 10 milliGRAM(s) Oral daily  aspirin  chewable 81 milliGRAM(s) Oral daily  atorvastatin 20 milliGRAM(s) Oral at bedtime  budesonide  80 MICROgram(s)/formoterol 4.5 MICROgram(s) Inhaler 2 Puff(s) Inhalation two times a day  buPROPion XL (24-Hour) . 300 milliGRAM(s) Oral daily  digoxin     Tablet 125 MICROGram(s) Oral daily  diltiazem    milliGRAM(s) Oral daily  ferrous    sulfate 325 milliGRAM(s) Oral daily  furosemide   Injectable 80 milliGRAM(s) IV Push two times a day  melatonin 3 milliGRAM(s) Oral at bedtime PRN  pantoprazole    Tablet 40 milliGRAM(s) Oral before breakfast  polyethylene glycol 3350 17 Gram(s) Oral daily  sodium chloride 0.65% Nasal 1 Spray(s) Both Nostrils four times a day  tadalafil 40 milliGRAM(s) Oral daily  tiotropium 18 MICROgram(s) Capsule 1 Capsule(s) Inhalation daily      Vitals:  T(F): 97.9 (08-17), Max: 97.9 (08-17)  HR: 74 (08-17) (74 - 95)  BP: 97/55 (08-17) (97/55 - 115/70)  RR: 18 (08-17)  SpO2: 95% (08-17)  I&O's Summary    16 Aug 2022 07:01  -  17 Aug 2022 07:00  --------------------------------------------------------  IN: 1020 mL / OUT: 2550 mL / NET: -1530 mL    17 Aug 2022 07:01  -  17 Aug 2022 12:59  --------------------------------------------------------  IN: 720 mL / OUT: 600 mL / NET: 120 mL        Physical Exam:  General: No distress. Comfortable.  HEENT: EOM intact.  Neck: Neck supple. JVP difficult to assess, but appears to be elevated. No masses  Chest: Unlabored, fine crackles bilateral bases.   CV: Normal S1 and S2. No murmurs, rub, or gallops. Radial pulses normal. +2 BLE edema.   Abdomen: Soft, non-distended, non-tender  Skin: No rashes or skin breakdown  Neurology: Alert and oriented times three. Sensation intact  Psych: Affect normal                          7.4    4.16  )-----------( 163      ( 17 Aug 2022 06:50 )             24.1     08-17    130<L>  |  86<L>  |  86<H>  ----------------------------<  77  4.4   |  35<H>  |  1.90<H>    Ca    9.5      17 Aug 2022 06:50  Phos  3.7     08-17  Mg     2.0     08-17    TPro  6.5  /  Alb  3.8  /  TBili  0.5  /  DBili  x   /  AST  21  /  ALT  10  /  AlkPhos  105  08-17          Serum Pro-Brain Natriuretic Peptide: 56896 pg/mL (08-17 @ 06:50)  Serum Pro-Brain Natriuretic Peptide: 09821 pg/mL (08-16 @ 06:34)  Serum Pro-Brain Natriuretic Peptide: 91251 pg/mL (08-15 @ 05:43)  Serum Pro-Brain Natriuretic Peptide: 74104 pg/mL (08-14 @ 06:45)  Serum Pro-Brain Natriuretic Peptide: 8947 pg/mL (08-13 @ 06:31)  Serum Pro-Brain Natriuretic Peptide: 24990 pg/mL (08-12 @ 06:55)  Serum Pro-Brain Natriuretic Peptide: 76915 pg/mL (08-11 @ 06:15)

## 2022-08-17 NOTE — PROGRESS NOTE ADULT - PROBLEM SELECTOR PLAN 1
Hypoxic to 80s at home while on baseline 8L NC. Patient gaining weight since hospital discharge early July, increased torsemide w/o significant results. Likely in s/o CHF exacerbation, given elevated BNP, limited exercise tolerance, worsening LE edema. Patient afebrile, non-toxic appearing, infection unlikely.   Patient subjectively endorsing decreased urine output throughout yesterday after receiving lasix in AM. Switched to bumex 3mg BID, first dose last night (8/15). Switched back to lasix now 80mg IV BID as patient wasn't responsive to bumex.   - now on lasix 80 mg IV BID   - standing daily weights, strict I/Os  - pulm transplant and HF following  - eventual SGLT2-i prior to discharge, once on stable dose of oral diuretics  - HF: most recent CardioMEMS PAD 45, repeat following removal of nasal packing Hypoxic to 80s at home while on baseline 8L NC. Patient gaining weight since hospital discharge early July, increased torsemide w/o significant results. Likely in s/o CHF exacerbation, given elevated BNP, limited exercise tolerance, worsening LE edema. Patient afebrile, non-toxic appearing, infection unlikely.   Patient subjectively endorsing decreased urine output throughout yesterday after receiving lasix in AM. Switched to bumex 3mg BID, first dose last night (8/15). Switched back to lasix now 80mg IV BID as patient wasn't responsive to bumex.   - now on lasix 80 mg IV BID, holding spironolactone for now pending HF recs (utility in HFpEF?)   - standing daily weights, strict I/Os  - pulm transplant and HF following  - eventual SGLT2-i prior to discharge, once on stable dose of oral diuretics  - HF: most recent CardioMEMS PAD 45, repeat following removal of nasal packing

## 2022-08-17 NOTE — PROGRESS NOTE ADULT - PROBLEM SELECTOR PLAN 3
- rate controlled on Digoxin (Digoxin level 8/11 0.6) and Cardizem   - on Eliquis for AC, currently on hold for epistaxis and recurrent anemia

## 2022-08-17 NOTE — PROGRESS NOTE ADULT - PROBLEM SELECTOR PLAN 3
Cr elevated to 2.42 on admission to  - baseline is 1.7-1.8. Likely in s/o CHF exacerbation, overall hypervolemic though likely intravascularly depleted. Likely contributing to sub-optimal response to torsemide at home.   - diuresis as above  - avoid nephrotoxic agents  - strict I/Os  - Nephrology consulted, f/u recs.  - Cr elevation could also be new baseline for patient in setting of worsening kidney function 2/2 pulm htn/HF

## 2022-08-17 NOTE — PROGRESS NOTE ADULT - PROBLEM SELECTOR PLAN 1
Pt. with CKD in the setting of chronic cardiorenal syndrome, now with JUANA (non oliguric) on CKD likely 2/2 renal venous congestion from hypervolemia due to HF in the setting of severe pulmonary HTN.   On review of [facility], noted that Baseline SCr ranges from 1.4-1.9 since March '22. SCr on arrival was 1.8 on 8/11; peaked to 2.02 on 8/13 & has remained stable. BUN elevated in the setting of diuresis, however without any uremic sxs.   Pt. continues to appear volume overloaded. s/p 2% saline 150 cc bolus and continue by lasix 80 IV BID.   No plan for HD at this time.   Check UA, spot urine TP/Cr, Hep panel, serum immunoglobulin free light chains, Serum immunofixation.  Check bladder scan & Renal US. Recommend L[place] catheter placement if retaining.    Monitor labs and urine output. Avoid NSAIDs, ACEI/ARBS, RCA and nephrotoxins. Dose medications as per eGFR.

## 2022-08-17 NOTE — PROGRESS NOTE ADULT - PROBLEM SELECTOR PLAN 2
(RESOLVED) Significant epistaxis 8/13 AM, likely in setting of use of 8L nc. Not new for pt, but worse than her usual nose bleeds; Rhino rocket now just in left nare, ballon to be deflated tomorrow for trial prior to removal   -S/P afrin x1  -ENT consulted, note epistaxis L nare without target to cauterize. Placed rhino rocket, will need for 3 days per ENT       -Pain regiment and ppx abx ordered while rhino rocket in place  -Monitor H/H  -Eliquis held, can likely restart if no bleeding after removal of packing  - nasal packing removed 8/16 with no recurrent bleeding

## 2022-08-17 NOTE — PROGRESS NOTE ADULT - PROBLEM SELECTOR PLAN 1
- further guidance by pulmonary hypertension team and lung transplant team  - evaluation for lung transplant pending improved BMI closer to 30 (currently 37.6), improved mobility and improved renal function  - not a lung transplant candidate at this point  - please consult palliative care

## 2022-08-17 NOTE — PROGRESS NOTE ADULT - ATTENDING COMMENTS
Patient seen and examined. Pt is a 66F with PMHx COPD, CHFpEF, AFib, obesity (Type III, hx bariatric sx 2012), and severe pulmonary HTN with chronic combined hypercapnic and hypoxemic respiratory failure on ATC O2 supplementation (dependent on 8L NC at rest) with qhs/prn AVAPS (Trelegy Antonio via nasal prong interface) presenting for progressive dyspnea with minimal exertion admitted for acute decompensated right heart failure. RHC 6/20/2022 revealed sPAP: 106, dPAP: 44 mPAP:65, PCWP: 16, PVR 7.32W, CO/CI 6.7/3.2.     - Patient is not a candidate for lung transplant. She has had a long discussion with the transplant team and understands this. She is sad but accepting of this fact. She tells me she was previously referred for transplant in 2009.  - Continue diuresis as per Nephrology and Heart Failure - she has had improvement in her LE edema but still appears volume overloaded. Monitor sodium. Received hypertonic saline yesterday. Continue daily weights  - Continue home pulmonary vasodilators (Tadalafil and Ambrisentan). No plans to trial Flolan at this time as the risks outweigh the benefits. Discussed with multidisciplinary team including Dr. Mosher  --- Consistent outpatient pulmonary followup is necessary. Her primary pulmonologist from [place] recently left and she was referred to Dr. Mosher after a recent [facility] hospitalization but was unable to make an appointment as the commute from [place] (~ 3 hours) was too difficult and she could not find a day to secure transportation.  - Continue supplemental O2 with goal O2 sat > 88%. Patient is on 8L NC at home and in reality often sats in the mid 80s and this may need to be tolerated on exertion. She was transitioned to NC this AM and is currently on 10L O2 NC. She is in good spirits being on NC instead of ventimask  - Continue AVAPs at night - she has her home machine and was able to use it last night  - Epistaxis has resolved. This is likely in setting of pulmonary vasodilator therapy. ENT management appreciated.  - Continue bronchodilator therapy    Given that she is not a candidate for transplant Palliative Care evaluation would be appropriate. Will continue to optimize available, safe, medical therapies in hopes to allow patient functional ability and quality of life.  - Discussed with patient extensively at bedside this afternoon after she had time to process her discussion with Dr. Pérez and the transplant team. She was very appreciative of the transplant teams honesty and care.

## 2022-08-17 NOTE — PROGRESS NOTE ADULT - PROBLEM SELECTOR PLAN 7
Hgb 6.9 at OSH, transfused 1U PRBC with improvement to 7.8. Patient w/o signs/symptoms of acute blood loss, HD stable. Denies changes in bowel/urinary habits, denies recent falls/trauma.   - maintain active T/S  - Holding eliquis as above.   - f/u iron studies: total iron and % saturation decreased, ferritin on lower side, transferrin and TIBC normal; s/p IV iron infusion  - 8/16: Hg this AM 6.7, receiving unit of pRBC, f/u repeat CBC 8.1 Hgb 6.9 at OSH, transfused 1U PRBC with improvement to 7.8. Patient w/o signs/symptoms of acute blood loss, HD stable. Denies changes in bowel/urinary habits, denies recent falls/trauma.   - maintain active T/S  - Holding eliquis as above.   - f/u iron studies: total iron and % saturation decreased, ferritin on lower side, transferrin and TIBC normal; s/p IV iron infusion  - 8/16: Hg this AM 6.7, receiving unit of pRBC, f/u repeat CBC 8.1  - started on oral iron daily 325

## 2022-08-17 NOTE — PROGRESS NOTE ADULT - PROBLEM SELECTOR PLAN 2
- bumex did not work as well for her, now back on IV lasix 80 BID with good UOP, s/p hypertonic saline yesterday  - would continue with the lasix today and can consider another dose of hypertonic saline, if pt does not have adequate UOP, can consider adding metolazone 5 mg daily  - hold spironolactone for hyperkalemia for now  - eventual SGLT2-i prior to discharge, once on stable dose of oral diuretics  - CardioMEMS PAD 8/11 43 mmHg. goal ~40 but difficult to ascertain given RV dysfunction  - cardiomems PAD 8/15 45 mmHg, 98/45/66  - daily standing weight. Was discharged in June at 189 lbs, now at 204, appears to be down-trending except for one measurement of 214

## 2022-08-17 NOTE — PROGRESS NOTE ADULT - ATTENDING COMMENTS
Patient seen and evaluated. Happy is on NC, breathing feels improved. Urinating well.     #HFpEF, predominant right sided   -s/p hypertonic saline and lasix.   -continue lasix BID dosing. Net negative >1.5L yesterday weight downtrending.   -patient is NOT a pulmonary transplant candidate per patient's conversatoin with transplant pulmonology.   -will optimize from volume status. Palliative care consult.       #Epistaxis  -resolved.  -continue NC. If recurs, will need ENT to cauterize.   -nasal saline.      #severe pulm HTN  -continue current pulm HTN medication  -given BMI >35, not candidate currently for pulm transplant and will not initiate transplant workup at this time.     #CKD III  -likely cardiorenal component vs. new baseline.   -continue to monitor, avoid nephrotoxic agents.     #Anemia  -iron studies denote iron deficiency. This is complicated by epistaxis.   -iron supplementation  -will see when had last colonoscopy. At this time high risk for colonoscopy, so may require outpatient virtual colonography if patient amenable.     Given weight, age, deconditioning, not a current candidate for transplantation. Palliative care consult placed for overal goals of care given high mortaility and morbidity of her end stage pulm HTN.

## 2022-08-17 NOTE — PROGRESS NOTE ADULT - ASSESSMENT
66F with PMHx COPD, CHFpEF, AFib, obesity (Type III, hx bariatric sx 2012), and severe pulmonary HTN with chronic combined hypercapnic and hypoxemic respiratory failure on ATC O2 supplementation (dependent on 8L NC at rest) with qhs/prn AVAPS (Trelegy Antonio via nasal prong interface) presenting for progressive dyspnea with minimal exertion admitted for acute decompensated right heart failure. RHC 6/20/2022 revealed sPAP: 106, dPAP: 44 mPAP:65, PCWP: 16, PVR 7.32W, CO/CI 6.7/3.2. Pt clinical hypervolemic today, tolerating diuresis, HD stable although noted bump in creatinine.    #pHTN  #ADHF  -c/w current diuresis as per HF  - f/u nephrology recommendations  - strict I/Os and daily weights  - c/w home pulmonary vasodilators as above (tadalafil+ambrisentan).  - will defer initiation of flolan at this time due to concerns regarding increased risk from social factors/follow up; reports improving respiratory symptoms on current vasodilator regimen - not a transplant candidate at this time  - wean O2 supplementation as tolerated for goal O2 saturation 88-94%.  - c/w home AVAPS [Trelegy]  qHS and PRN during the day  - Pt's home device at bedside and approved for use by [address]. Can use if no further epistaxis  - c/w home bronchodilator therapy  - pulmonary will follow    Sarbjit Rubio MD  PGY-6  PCCM Fellow  Pager [phone]

## 2022-08-17 NOTE — PROGRESS NOTE ADULT - ASSESSMENT
66 year old woman with HFpEF with primarily RV dysfunction s/p CardioMEMS, severe pulmonary hypertension (Group 1, 2 & 3), COPD on home O2 8L, AF (on Eliquis), PE, CVA (MCA infarct 2012), CKD (b/l Cr 1.7-1.8), ROLANDA on CPAP and morbid obesity (s/p bariatric surgery 2012 with lap band removal d/t GIB) who was recently hospitalized 6/14-6/29/22 for volume overload where she met with lung transplant team with plan for evaluation pending improvement in BMI and deconditioned state. Had RHC in June with severely elevated PAP and preserved CO.     She presents with RINALDI, ABD bloating and LE swelling in the setting of gradual 25 lb weight gain despite escalation of diuretic regimen. She was started on intermittent IV Lasix and was responding well, but remains markedly overloaded with predominate symptoms of elevated R sided filling pressure. She was switched to IV bumex and did not respond as well so now back on IV lasix. She is likely not a lung transplant candidate.      Cardiac Studies  CardioMEMS interrogation 8/16: PAP 98/45/66, HR 87  RHC and CardioMEMS Implant 6/20/22: RA 18, /44/65, PCWP 16 (v to 18), RA 95% on 8L NC, PA 58%, CO/CI (F) 6.7/3.2, PVR 7.32 ryan, TPG 49, /60/86 (CardioMEMS PAD 49)  TTE 6/15/22: LVIDd 4.8 cm, LVEF 68%, flattening of interventricular septum consistent with RV overload, mild concentric LVH (septum 1.2 PWT 1.1), RVE with decreased systolic function, mild LAE, severe ROE, mod TR, est RVSP 61 mmHg

## 2022-08-17 NOTE — PROGRESS NOTE ADULT - SUBJECTIVE AND OBJECTIVE BOX
JERILYNMARTHAA  66y  Female      Patient is a 66y old  Female who presents with a chief complaint of lung txp eval (16 Aug 2022 13:40)      INTERVAL HPI/OVERNIGHT EVENTS:  provider handoff from yesterday - 8/16: spironolactone being held in setting of hyperkalemia, giving pRBC in setting of hg 6.7, fu cbc and BMP post transfusion,repeat CBC 8.1; repeat K 5 after lokelma; switching to IV lasix 80 MG BID and giving two doses of hypertonic saline prior to administration for tonight and khalida morning dose per HF and nephro  nasal packing removed, restarted back on NC 8 L, f/u whether can be restarted back on apixiban tomorrow   f/u whether to be started on IV vasodilator therapy w/ pulm  Patient not DNR nor DNI following discussion    Overnight/this morning -         REVIEW OF SYSTEMS:      ALLERY AND IMMUNOLOGIC: No hives or eczema  FAMILY HISTORY:  No pertinent family history      T(C): 36.6 (08-17-22 @ 04:38), Max: 36.9 (08-16-22 @ 09:18)  HR: 95 (08-17-22 @ 06:46) (81 - 99)  BP: 108/66 (08-17-22 @ 06:46) (96/53 - 115/70)  RR: 18 (08-17-22 @ 06:46) (18 - 20)  SpO2: 82% (08-17-22 @ 06:46) (82% - 96%)  Wt(kg): --Vital Signs Last 24 Hrs  T(C): 36.6 (17 Aug 2022 04:38), Max: 36.9 (16 Aug 2022 09:18)  T(F): 97.8 (17 Aug 2022 04:38), Max: 98.5 (16 Aug 2022 09:18)  HR: 95 (17 Aug 2022 06:46) (81 - 99)  BP: 108/66 (17 Aug 2022 06:46) (96/53 - 115/70)  BP(mean): --  RR: 18 (17 Aug 2022 06:46) (18 - 20)  SpO2: 82% (17 Aug 2022 06:46) (82% - 96%)    Parameters below as of 17 Aug 2022 06:46  Patient On (Oxygen Delivery Method): nasal cannula  O2 Flow (L/min): 8    nitrates (Unknown)      PHYSICAL EXAM:  CONSTITUTIONAL: NAD, well-developed  HEENT: no epistaxis, nasal packing removed   RESPIRATORY: coarse breadth sounds, no crackles    CARDIOVASCULAR: 3+ LE edema, non-pitting. afib, normal S1 and S2, no murmur/rub/gallop; Peripheral pulses are 2+ bilaterally  ABDOMEN: Nontender to palpation, normoactive bowel sounds, no rebound/guarding  MUSCLOSKELETAL: no clubbing or cyanosis of digits; no joint swelling or tenderness to palpation  NEURO: Non-focal, no tremors  SKIN: No rashes    Consultant(s) Notes Reviewed:  [x ] YES  [ ] NO  Care Discussed with Consultants/Other Providers [ x] YES  [ ] NO    LABS:          RADIOLOGY & ADDITIONAL TESTS:    Imaging Personally Reviewed:  [ ] YES  [ ] NO  acetaminophen     Tablet .. 975 milliGRAM(s) Oral every 6 hours PRN  ALBUTerol    90 MICROgram(s) HFA Inhaler 2 Puff(s) Inhalation every 6 hours PRN  ambrisentan 10 milliGRAM(s) Oral daily  aspirin  chewable 81 milliGRAM(s) Oral daily  atorvastatin 20 milliGRAM(s) Oral at bedtime  budesonide  80 MICROgram(s)/formoterol 4.5 MICROgram(s) Inhaler 2 Puff(s) Inhalation two times a day  buPROPion XL (24-Hour) . 300 milliGRAM(s) Oral daily  digoxin     Tablet 125 MICROGram(s) Oral daily  diltiazem    milliGRAM(s) Oral daily  furosemide   Injectable 80 milliGRAM(s) IV Push two times a day  melatonin 3 milliGRAM(s) Oral at bedtime PRN  pantoprazole    Tablet 40 milliGRAM(s) Oral before breakfast  polyethylene glycol 3350 17 Gram(s) Oral daily  sodium chloride 0.65% Nasal 1 Spray(s) Both Nostrils four times a day  sodium chloride 2% . 150 milliLiter(s) IV Continuous <Continuous>  tadalafil 40 milliGRAM(s) Oral daily  tiotropium 18 MICROgram(s) Capsule 1 Capsule(s) Inhalation daily      HEALTH ISSUES - PROBLEM Dx:  Acute respiratory failure with hypoxia    JUANA (acute kidney injury)    Severe pulmonary hypertension    Acute on chronic diastolic congestive heart failure    Prophylactic measure    COPD, moderate    Chronic atrial fibrillation    Anemia    Chronic kidney disease, unspecified CKD stage    Epistaxis    Hyponatremia    Hyperkalemia             JERILYNZANDER  66y  Female      Patient is a 66y old  Female who presents with a chief complaint of lung txp eval (16 Aug 2022 13:40)      INTERVAL HPI/OVERNIGHT EVENTS:  provider handoff from yesterday - 8/16: spironolactone being held in setting of hyperkalemia, giving pRBC in setting of hg 6.7, fu cbc and BMP post transfusion,repeat CBC 8.1; repeat K 5 after lokelma; switching to IV lasix 80 MG BID and giving two doses of hypertonic saline prior to administration for tonight and khalida morning dose per HF and nephro  nasal packing removed, restarted back on NC 8 L, f/u whether can be restarted back on apixiban tomorrow   f/u whether to be started on IV vasodilator therapy w/ pulm  Patient not DNR nor DNI following discussion    Overnight/this morning -   Patient doing well this morning, feeling better now back on NC; however, persistently desatting while eating, RT rec to consider switching to high flow, will reevaluate tomorrow     ALLERY AND IMMUNOLOGIC: No hives or eczema  FAMILY HISTORY:  No pertinent family history      T(C): 36.6 (08-17-22 @ 04:38), Max: 36.9 (08-16-22 @ 09:18)  HR: 95 (08-17-22 @ 06:46) (81 - 99)  BP: 108/66 (08-17-22 @ 06:46) (96/53 - 115/70)  RR: 18 (08-17-22 @ 06:46) (18 - 20)  SpO2: 82% (08-17-22 @ 06:46) (82% - 96%)  Wt(kg): --Vital Signs Last 24 Hrs  T(C): 36.6 (17 Aug 2022 04:38), Max: 36.9 (16 Aug 2022 09:18)  T(F): 97.8 (17 Aug 2022 04:38), Max: 98.5 (16 Aug 2022 09:18)  HR: 95 (17 Aug 2022 06:46) (81 - 99)  BP: 108/66 (17 Aug 2022 06:46) (96/53 - 115/70)  BP(mean): --  RR: 18 (17 Aug 2022 06:46) (18 - 20)  SpO2: 82% (17 Aug 2022 06:46) (82% - 96%)    Parameters below as of 17 Aug 2022 06:46  Patient On (Oxygen Delivery Method): nasal cannula  O2 Flow (L/min): 8    nitrates (Unknown)      PHYSICAL EXAM:  CONSTITUTIONAL: NAD, well-developed  HEENT: no epistaxis, nasal packing removed   RESPIRATORY: coarse breadth sounds, no crackles    CARDIOVASCULAR: 3+ LE edema, non-pitting. afib, normal S1 and S2, no murmur/rub/gallop; Peripheral pulses are 2+ bilaterally  ABDOMEN: Nontender to palpation, normoactive bowel sounds, no rebound/guarding  MUSCLOSKELETAL: no clubbing or cyanosis of digits; no joint swelling or tenderness to palpation  NEURO: Non-focal, no tremors  SKIN: No rashes    Consultant(s) Notes Reviewed:  [x ] YES  [ ] NO  Care Discussed with Consultants/Other Providers [ x] YES  [ ] NO    LABS:                          7.4    4.16  )-----------( 163      ( 17 Aug 2022 06:50 )             24.1     08-17    130<L>  |  86<L>  |  86<H>  ----------------------------<  77  4.4   |  35<H>  |  1.90<H>    Ca    9.5      17 Aug 2022 06:50  Phos  3.7     08-17  Mg     2.0     08-17    TPro  6.5  /  Alb  3.8  /  TBili  0.5  /  DBili  x   /  AST  21  /  ALT  10  /  AlkPhos  105  08-17          RADIOLOGY & ADDITIONAL TESTS:    Imaging Personally Reviewed:  [ ] YES  [ ] NO  acetaminophen     Tablet .. 975 milliGRAM(s) Oral every 6 hours PRN  ALBUTerol    90 MICROgram(s) HFA Inhaler 2 Puff(s) Inhalation every 6 hours PRN  ambrisentan 10 milliGRAM(s) Oral daily  aspirin  chewable 81 milliGRAM(s) Oral daily  atorvastatin 20 milliGRAM(s) Oral at bedtime  budesonide  80 MICROgram(s)/formoterol 4.5 MICROgram(s) Inhaler 2 Puff(s) Inhalation two times a day  buPROPion XL (24-Hour) . 300 milliGRAM(s) Oral daily  digoxin     Tablet 125 MICROGram(s) Oral daily  diltiazem    milliGRAM(s) Oral daily  furosemide   Injectable 80 milliGRAM(s) IV Push two times a day  melatonin 3 milliGRAM(s) Oral at bedtime PRN  pantoprazole    Tablet 40 milliGRAM(s) Oral before breakfast  polyethylene glycol 3350 17 Gram(s) Oral daily  sodium chloride 0.65% Nasal 1 Spray(s) Both Nostrils four times a day  sodium chloride 2% . 150 milliLiter(s) IV Continuous <Continuous>  tadalafil 40 milliGRAM(s) Oral daily  tiotropium 18 MICROgram(s) Capsule 1 Capsule(s) Inhalation daily      HEALTH ISSUES - PROBLEM Dx:  Acute respiratory failure with hypoxia    JUANA (acute kidney injury)    Severe pulmonary hypertension    Acute on chronic diastolic congestive heart failure    Prophylactic measure    COPD, moderate    Chronic atrial fibrillation    Anemia    Chronic kidney disease, unspecified CKD stage    Epistaxis    Hyponatremia    Hyperkalemia

## 2022-08-17 NOTE — PROGRESS NOTE ADULT - PROBLEM SELECTOR PLAN 5
Last echo 6/22 with EF 68%, normal LV systolic function w/o WMA. Flattening of interventricular septum c/w RV overload, w/ severe R atrial enlargement, RV enlargement w/ decreased RV systolic function, severely elevated pulmonary pressures.   - now on lasix 80 mg IV BID   - strict I/Os, daily standing weights  - holding spironolactone in setting of hyperkalemia, consider restarting once normalized

## 2022-08-17 NOTE — PROGRESS NOTE ADULT - ATTENDING COMMENTS
Sarai Calle MD  Off: [phone]  contact me on teams    (After 5 pm or on weekends please page the on-call fellow/attending, can check [email or web address] for schedule. Login is abhay means, schedule under [facility] medicine, psych, derm)

## 2022-08-17 NOTE — PROGRESS NOTE ADULT - ATTENDING COMMENTS
cont lasix IV 80mg BID  loosing weight  dry weight 188lbs  not a Lung transplant candidate  after extensive discussion with pulm not a candidate for IV therapy.   palliative care consult

## 2022-08-17 NOTE — PROGRESS NOTE ADULT - SUBJECTIVE AND OBJECTIVE BOX
CHIEF COMPLAINT: Respiratory failure    Interval Events:  S/p 1URBC for anemia with appropriate response. Remains afebrile and on 8LNC. Increased to 80mg IV lasix BID yesterday afternoon.     REVIEW OF SYSTEMS:  Constitutional: [ ] negative [ ] fevers [ ] chills [ ] weight loss [ ] weight gain  HEENT: [ ] negative [ ] dry eyes [ ] eye irritation [ ] postnasal drip [ ] nasal congestion  CV: [ ] negative  [ ] chest pain [ ] orthopnea [ ] palpitations [ ] murmur  Resp: [ ] negative [ ] cough [ ] shortness of breath [ ] dyspnea [ ] wheezing [ ] sputum [ ] hemoptysis  GI: [ ] negative [ ] nausea [ ] vomiting [ ] diarrhea [ ] constipation [ ] abd pain [ ] dysphagia   : [ ] negative [ ] dysuria [ ] nocturia [ ] hematuria [ ] increased urinary frequency  Musculoskeletal: [ ] negative [ ] back pain [ ] myalgias [ ] arthralgias [ ] fracture  Skin: [ ] negative [ ] rash [ ] itch  Neurological: [ ] negative [ ] headache [ ] dizziness [ ] syncope [ ] weakness [ ] numbness  Psychiatric: [ ] negative [ ] anxiety [ ] depression  Endocrine: [ ] negative [ ] diabetes [ ] thyroid problem  Hematologic/Lymphatic: [ ] negative [ ] anemia [ ] bleeding problem  Allergic/Immunologic: [ ] negative [ ] itchy eyes [ ] nasal discharge [ ] hives [ ] angioedema  [ ] All other systems negative  [ ] Unable to assess ROS because ________    OBJECTIVE:  ICU Vital Signs Last 24 Hrs  T(C): 36.6 (17 Aug 2022 04:38), Max: 36.9 (16 Aug 2022 09:18)  T(F): 97.8 (17 Aug 2022 04:38), Max: 98.5 (16 Aug 2022 09:18)  HR: 95 (17 Aug 2022 06:46) (81 - 99)  BP: 108/66 (17 Aug 2022 06:46) (96/53 - 115/70)  BP(mean): --  ABP: --  ABP(mean): --  RR: 18 (17 Aug 2022 06:46) (18 - 20)  SpO2: 82% (17 Aug 2022 06:46) (82% - 96%)    O2 Parameters below as of 17 Aug 2022 06:46  Patient On (Oxygen Delivery Method): nasal cannula  O2 Flow (L/min): 8            08-16 @ 07:01  -  08-17 @ 07:00  --------------------------------------------------------  IN: 1020 mL / OUT: 2550 mL / NET: -1530 mL      CAPILLARY BLOOD GLUCOSE          PHYSICAL EXAM:  General:   HEENT:   Lymph Nodes:  Neck:   Respiratory:   Cardiovascular:   Abdomen:   Extremities:   Skin:   Neurological:  Psychiatry:    HOSPITAL MEDICATIONS:  MEDICATIONS  (STANDING):  ambrisentan 10 milliGRAM(s) Oral daily  aspirin  chewable 81 milliGRAM(s) Oral daily  atorvastatin 20 milliGRAM(s) Oral at bedtime  budesonide  80 MICROgram(s)/formoterol 4.5 MICROgram(s) Inhaler 2 Puff(s) Inhalation two times a day  buPROPion XL (24-Hour) . 300 milliGRAM(s) Oral daily  digoxin     Tablet 125 MICROGram(s) Oral daily  diltiazem    milliGRAM(s) Oral daily  furosemide   Injectable 80 milliGRAM(s) IV Push two times a day  pantoprazole    Tablet 40 milliGRAM(s) Oral before breakfast  polyethylene glycol 3350 17 Gram(s) Oral daily  sodium chloride 0.65% Nasal 1 Spray(s) Both Nostrils four times a day  sodium chloride 2% . 150 milliLiter(s) (300 mL/Hr) IV Continuous <Continuous>  tadalafil 40 milliGRAM(s) Oral daily  tiotropium 18 MICROgram(s) Capsule 1 Capsule(s) Inhalation daily    MEDICATIONS  (PRN):  acetaminophen     Tablet .. 975 milliGRAM(s) Oral every 6 hours PRN Temp greater or equal to 38C (100.4F), Mild Pain (1 - 3), Moderate Pain (4 - 6)  ALBUTerol    90 MICROgram(s) HFA Inhaler 2 Puff(s) Inhalation every 6 hours PRN Shortness of Breath and/or Wheezing  melatonin 3 milliGRAM(s) Oral at bedtime PRN Insomnia      LABS:                        7.4    4.16  )-----------( 163      ( 17 Aug 2022 06:50 )             24.1     Hgb Trend: 7.4<--, 8.1<--, 6.7<--, 7.2<--, 7.4<--  08-17    130<L>  |  86<L>  |  86<H>  ----------------------------<  77  4.4   |  35<H>  |  1.90<H>    Ca    9.5      17 Aug 2022 06:50  Phos  3.7     08-17  Mg     2.0     08-17    TPro  6.5  /  Alb  3.8  /  TBili  0.5  /  DBili  x   /  AST  21  /  ALT  10  /  AlkPhos  105  08-17    Creatinine Trend: 1.90<--, 2.08<--, 2.18<--, 1.96<--, 1.94<--, 2.06<--            MICROBIOLOGY:       RADIOLOGY:  [ ] Reviewed and interpreted by me    PULMONARY FUNCTION TESTS:    EKG: CHIEF COMPLAINT: Respiratory failure    Interval Events:  S/p 1URBC for anemia with appropriate response. Remains afebrile, nasal packing removed, and on 8LNC. Increased to 80mg IV lasix BID yesterday afternoon.     REVIEW OF SYSTEMS:  Constitutional: [x] negative [ ] fevers [ ] chills [ ] weight loss [ ] weight gain  HEENT: [x] negative [ ] dry eyes [ ] eye irritation [ ] postnasal drip [ ] nasal congestion  CV: [x] negative  [ ] chest pain [ ] orthopnea [ ] palpitations [ ] murmur  Resp: [ ] negative [ ] cough [x] shortness of breath [ ] dyspnea [ ] wheezing [ ] sputum [ ] hemoptysis  GI: [x] negative [ ] nausea [ ] vomiting [ ] diarrhea [ ] constipation [ ] abd pain [ ] dysphagia   : [x] negative [ ] dysuria [ ] nocturia [ ] hematuria [ ] increased urinary frequency  Musculoskeletal: [x] negative [ ] back pain [ ] myalgias [ ] arthralgias [ ] fracture  Skin: [x] negative [ ] rash [ ] itch  Neurological: [x] negative [ ] headache [ ] dizziness [ ] syncope [ ] weakness [ ] numbness  Psychiatric: [x] negative [ ] anxiety [ ] depression  Endocrine: [x] negative [ ] diabetes [ ] thyroid problem  Hematologic/Lymphatic: [ ] negative [ ] anemia [ ] bleeding problem  Allergic/Immunologic: [ ] negative [ ] itchy eyes [ ] nasal discharge [ ] hives [ ] angioedema  [x] All other systems negative  [ ] Unable to assess ROS because ________    OBJECTIVE:  ICU Vital Signs Last 24 Hrs  T(C): 36.6 (17 Aug 2022 04:38), Max: 36.9 (16 Aug 2022 09:18)  T(F): 97.8 (17 Aug 2022 04:38), Max: 98.5 (16 Aug 2022 09:18)  HR: 95 (17 Aug 2022 06:46) (81 - 99)  BP: 108/66 (17 Aug 2022 06:46) (96/53 - 115/70)  BP(mean): --  ABP: --  ABP(mean): --  RR: 18 (17 Aug 2022 06:46) (18 - 20)  SpO2: 82% (17 Aug 2022 06:46) (82% - 96%)    O2 Parameters below as of 17 Aug 2022 06:46  Patient On (Oxygen Delivery Method): nasal cannula  O2 Flow (L/min): 8            08-16 @ 07:01  -  08-17 @ 07:00  --------------------------------------------------------  IN: 1020 mL / OUT: 2550 mL / NET: -1530 mL      CAPILLARY BLOOD GLUCOSE          PHYSICAL EXAM:  General: Female NAD  HEENT: EOMI, NC in place  Neck: Supple  Respiratory: No notable wheezes or increased wob/accessory muscle use  Cardiovascular: RR, +systolic murmur  Abdomen: Soft  Extremities: WWP, bilateral LE edema  Skin: Intact, ecchymoses  Neurological: AOx3  Psychiatry: Normal affect    HOSPITAL MEDICATIONS:  MEDICATIONS  (STANDING):  ambrisentan 10 milliGRAM(s) Oral daily  aspirin  chewable 81 milliGRAM(s) Oral daily  atorvastatin 20 milliGRAM(s) Oral at bedtime  budesonide  80 MICROgram(s)/formoterol 4.5 MICROgram(s) Inhaler 2 Puff(s) Inhalation two times a day  buPROPion XL (24-Hour) . 300 milliGRAM(s) Oral daily  digoxin     Tablet 125 MICROGram(s) Oral daily  diltiazem    milliGRAM(s) Oral daily  furosemide   Injectable 80 milliGRAM(s) IV Push two times a day  pantoprazole    Tablet 40 milliGRAM(s) Oral before breakfast  polyethylene glycol 3350 17 Gram(s) Oral daily  sodium chloride 0.65% Nasal 1 Spray(s) Both Nostrils four times a day  sodium chloride 2% . 150 milliLiter(s) (300 mL/Hr) IV Continuous <Continuous>  tadalafil 40 milliGRAM(s) Oral daily  tiotropium 18 MICROgram(s) Capsule 1 Capsule(s) Inhalation daily    MEDICATIONS  (PRN):  acetaminophen     Tablet .. 975 milliGRAM(s) Oral every 6 hours PRN Temp greater or equal to 38C (100.4F), Mild Pain (1 - 3), Moderate Pain (4 - 6)  ALBUTerol    90 MICROgram(s) HFA Inhaler 2 Puff(s) Inhalation every 6 hours PRN Shortness of Breath and/or Wheezing  melatonin 3 milliGRAM(s) Oral at bedtime PRN Insomnia      LABS:                        7.4    4.16  )-----------( 163      ( 17 Aug 2022 06:50 )             24.1     Hgb Trend: 7.4<--, 8.1<--, 6.7<--, 7.2<--, 7.4<--  08-17    130<L>  |  86<L>  |  86<H>  ----------------------------<  77  4.4   |  35<H>  |  1.90<H>    Ca    9.5      17 Aug 2022 06:50  Phos  3.7     08-17  Mg     2.0     08-17    TPro  6.5  /  Alb  3.8  /  TBili  0.5  /  DBili  x   /  AST  21  /  ALT  10  /  AlkPhos  105  08-17    Creatinine Trend: 1.90<--, 2.08<--, 2.18<--, 1.96<--, 1.94<--, 2.06<--            MICROBIOLOGY:       RADIOLOGY:  [ ] Reviewed and interpreted by me    PULMONARY FUNCTION TESTS:    EKG:

## 2022-08-17 NOTE — PROGRESS NOTE ADULT - ASSESSMENT
66F w/ severe pulmonary HTN (Group 1, 2 & 3), HFpEF, Afib, COPD on home O2 (5L NC), CKD, ROLANDA on CPAP, morbid obesity (s/p bariatric surgery in 2012 with subsequent lap band removal due to GI bleed), PE, CVA (MCA infarct in 2012). Transferred from OSH for severe pulmonary hypertension, with possible launch for transplant evaluation.    Will discuss transplant candidacy with patient    Note is incomplete, please call via teams with any questions    Above discussed with Dr. Pérez   66F w/ severe pulmonary HTN (Group 1, 2 & 3), HFpEF, Afib, COPD on home O2 (5L NC), CKD, ROLANDA on CPAP, morbid obesity (s/p bariatric surgery in 2012 with subsequent lap band removal due to GI bleed), PE, CVA (MCA infarct in 2012). Transferred from OSH for severe pulmonary hypertension, with possible launch for transplant evaluation.      Cased discussed in depth with MDT members.  She has poor functional status   Continue to medically optimize patient with pHTN and heart failure team    Above discussed with Dr. Pérez   66F w/ severe pulmonary HTN (Group 1, 2 & 3), HFpEF, Afib, COPD on home O2 (5L NC), CKD, ROLANDA on CPAP, morbid obesity (s/p bariatric surgery in 2012 with subsequent lap band removal due to GI bleed), PE, CVA (MCA infarct in 2012). Transferred from OSH for severe pulmonary hypertension, with possible launch for transplant evaluation.      Patient with multiorgan dysfunction, high BMI, and poor functional status. Case discussed with multidisciplinary team and decision was made that patient is too sick for lung transplant.  Continue to medically optimize patient with pHTN and heart failure team    Above discussed with Dr. Pérez

## 2022-08-17 NOTE — PROGRESS NOTE ADULT - ASSESSMENT
66F w/ severe pulmonary HTN (Group 1, 2 & 3), HFpEF, Afib, COPD on home O2 (5L NC), CKD, ROLANDA on CPAP, morbid obesity presenting with acute hypoxic respiratory failure, admitted for CHF exacerbation and ongoing lung transplant evaluation.   W/ epistaxis 8/13, seen by ENT, started on bilateral nasal packing/ rhino rocket, started on pain meds, abx ppx, cbc stable   8/17 nasal packing removed, started back on nasal cannula, increased diuretics to 80 mg IV lasix BID  66F w/ severe pulmonary HTN (Group 1, 2 & 3), HFpEF, Afib, COPD on home O2 (5L NC), CKD, ROLANDA on CPAP, morbid obesity presenting with acute hypoxic respiratory failure, admitted for CHF exacerbation and ongoing lung transplant evaluation.   W/ epistaxis 8/13, seen by ENT, started on bilateral nasal packing/ rhino rocket, started on pain meds, abx ppx, cbc stable   8/17 nasal packing removed, started back on nasal cannula, no further epistaxis, increased diuretics to 80 mg IV lasix BID

## 2022-08-17 NOTE — PROGRESS NOTE ADULT - SUBJECTIVE AND OBJECTIVE BOX
Patient seen, evaluated, and examined at bedside with Dr. Pérez       [place] Meds: Home Medications:  ambrisentan 10 mg oral tablet: 1 tab(s) orally once a day (11 Aug 2022 03:09)  apixaban 5 mg oral tablet: 1 tab(s) orally 2 times a day (11 Aug 2022 03:09)  buPROPion 100 mg oral tablet: 1 tab(s) orally 2 times a day (11 Aug 2022 03:09)  digoxin 125 mcg (0.125 mg) oral tablet: 1 tab(s) orally once a day (11 Aug 2022 03:09)  dilTIAZem 120 mg/24 hours oral capsule, extended release: 1 cap(s) orally once a day (11 Aug 2022 03:09)  levalbuterol 1.25 mg/3 mL inhalation solution: 3 milliliter(s) inhaled 3 times a day (11 Aug 2022 03:09)  pantoprazole 40 mg oral delayed release tablet: 1 tab(s) orally once a day (11 Aug 2022 03:09)  rosuvastatin 5 mg oral tablet: 1 tab(s) orally once a day (11 Aug 2022 03:09)  spironolactone 50 mg oral tablet: 1 tab(s) orally once a day (11 Aug 2022 03:09)  tadalafil 20 mg oral tablet: 2 tab(s) orally once a day (11 Aug 2022 03:09)  Trelegy Ellipta 100 mcg-62.5 mcg-25 mcg/inh inhalation powder: 1 puff(s) inhaled once a day (11 Aug 2022 03:09)    Allergies: Allergies    nitrates (Unknown)    Intolerances      Soc:   Advanced Directives: Presumed Full Code         Performs activitis of daily living with *** assistance   Non-diabetic ***  Requires *** L NC at rest   *** assisted ventilation needed    ROS:    REVIEW OF SYSTEMS    [ ] A ten-point review of systems was otherwise negative except as noted.  [ ] Due to altered mental status/intubation, subjective information were not able to be obtained from the patient. History was obtained, to the extent possible, from review of the chart and collateral sources of information.      CURRENT MEDICATIONS:   --------------------------------------------------------------------------------------  Neurologic Medications  acetaminophen     Tablet .. 975 milliGRAM(s) Oral every 6 hours PRN Temp greater or equal to 38C (100.4F), Mild Pain (1 - 3), Moderate Pain (4 - 6)  buPROPion XL (24-Hour) . 300 milliGRAM(s) Oral daily  melatonin 3 milliGRAM(s) Oral at bedtime PRN Insomnia    Respiratory Medications  ALBUTerol    90 MICROgram(s) HFA Inhaler 2 Puff(s) Inhalation every 6 hours PRN Shortness of Breath and/or Wheezing  budesonide  80 MICROgram(s)/formoterol 4.5 MICROgram(s) Inhaler 2 Puff(s) Inhalation two times a day  tiotropium 18 MICROgram(s) Capsule 1 Capsule(s) Inhalation daily    Cardiovascular Medications  ambrisentan 10 milliGRAM(s) Oral daily  digoxin     Tablet 125 MICROGram(s) Oral daily  diltiazem    milliGRAM(s) Oral daily  furosemide   Injectable 80 milliGRAM(s) IV Push two times a day  tadalafil 40 milliGRAM(s) Oral daily    Gastrointestinal Medications  pantoprazole    Tablet 40 milliGRAM(s) Oral before breakfast  polyethylene glycol 3350 17 Gram(s) Oral daily  sodium chloride 2% . 150 milliLiter(s) IV Continuous <Continuous>    Genitourinary Medications    Hematologic/Oncologic Medications  aspirin  chewable 81 milliGRAM(s) Oral daily    Antimicrobial/Immunologic Medications    Endocrine/Metabolic Medications  atorvastatin 20 milliGRAM(s) Oral at bedtime    Topical/Other Medications  sodium chloride 0.65% Nasal 1 Spray(s) Both Nostrils four times a day    --------------------------------------------------------------------------------------    VITAL SIGNS, INS/OUTS (last 24 hours):  --------------------------------------------------------------------------------------  ICU Vital Signs Last 24 Hrs  T(C): 36.6 (17 Aug 2022 04:38), Max: 36.9 (16 Aug 2022 09:18)  T(F): 97.8 (17 Aug 2022 04:38), Max: 98.5 (16 Aug 2022 09:18)  HR: 95 (17 Aug 2022 06:46) (81 - 99)  BP: 108/66 (17 Aug 2022 06:46) (96/53 - 115/70)  BP(mean): --  ABP: --  ABP(mean): --  RR: 18 (17 Aug 2022 06:46) (18 - 20)  SpO2: 82% (17 Aug 2022 06:46) (82% - 96%)    O2 Parameters below as of 17 Aug 2022 06:46  Patient On (Oxygen Delivery Method): nasal cannula  O2 Flow (L/min): 8        I&O's Summary    16 Aug 2022 07:01  -  17 Aug 2022 07:00  --------------------------------------------------------  IN: 1020 mL / OUT: 2550 mL / NET: -1530 mL      --------------------------------------------------------------------------------------    EXAM:  General:  Chest:  Respiratory:  Cardiac:  Gastrointestinal:  Extremities:  Neuro:  --------------------------------------------------------------------------------------    OTHER LABS    IMAGING RESULTS       Patient seen, evaluated, and examined at bedside with Dr. Pérez    Patient seen at bedside, on 8L NC. Dr. Pérez had a long discussion with the patient that she is not a transplant candidate.  All questions were answered.     ROS:    REVIEW OF SYSTEMS    [x] A ten-point review of systems was otherwise negative except as noted.  [ ] Due to altered mental status/intubation, subjective information were not able to be obtained from the patient. History was obtained, to the extent possible, from review of the chart and collateral sources of information.      CURRENT MEDICATIONS:   --------------------------------------------------------------------------------------  Neurologic Medications  acetaminophen     Tablet .. 975 milliGRAM(s) Oral every 6 hours PRN Temp greater or equal to 38C (100.4F), Mild Pain (1 - 3), Moderate Pain (4 - 6)  buPROPion XL (24-Hour) . 300 milliGRAM(s) Oral daily  melatonin 3 milliGRAM(s) Oral at bedtime PRN Insomnia    Respiratory Medications  ALBUTerol    90 MICROgram(s) HFA Inhaler 2 Puff(s) Inhalation every 6 hours PRN Shortness of Breath and/or Wheezing  budesonide  80 MICROgram(s)/formoterol 4.5 MICROgram(s) Inhaler 2 Puff(s) Inhalation two times a day  tiotropium 18 MICROgram(s) Capsule 1 Capsule(s) Inhalation daily    Cardiovascular Medications  ambrisentan 10 milliGRAM(s) Oral daily  digoxin     Tablet 125 MICROGram(s) Oral daily  diltiazem    milliGRAM(s) Oral daily  furosemide   Injectable 80 milliGRAM(s) IV Push two times a day  tadalafil 40 milliGRAM(s) Oral daily    Gastrointestinal Medications  pantoprazole    Tablet 40 milliGRAM(s) Oral before breakfast  polyethylene glycol 3350 17 Gram(s) Oral daily  sodium chloride 2% . 150 milliLiter(s) IV Continuous <Continuous>    Genitourinary Medications    Hematologic/Oncologic Medications  aspirin  chewable 81 milliGRAM(s) Oral daily    Antimicrobial/Immunologic Medications    Endocrine/Metabolic Medications  atorvastatin 20 milliGRAM(s) Oral at bedtime    Topical/Other Medications  sodium chloride 0.65% Nasal 1 Spray(s) Both Nostrils four times a day    --------------------------------------------------------------------------------------    VITAL SIGNS, INS/OUTS (last 24 hours):  --------------------------------------------------------------------------------------  ICU Vital Signs Last 24 Hrs  T(C): 36.6 (17 Aug 2022 04:38), Max: 36.9 (16 Aug 2022 09:18)  T(F): 97.8 (17 Aug 2022 04:38), Max: 98.5 (16 Aug 2022 09:18)  HR: 95 (17 Aug 2022 06:46) (81 - 99)  BP: 108/66 (17 Aug 2022 06:46) (96/53 - 115/70)  BP(mean): --  ABP: --  ABP(mean): --  RR: 18 (17 Aug 2022 06:46) (18 - 20)  SpO2: 82% (17 Aug 2022 06:46) (82% - 96%)    O2 Parameters below as of 17 Aug 2022 06:46  Patient On (Oxygen Delivery Method): nasal cannula  O2 Flow (L/min): 8        I&O's Summary    16 Aug 2022 07:01  -  17 Aug 2022 07:00  --------------------------------------------------------  IN: 1020 mL / OUT: 2550 mL / NET: -1530 mL      --------------------------------------------------------------------------------------    EXAM:  General: 8L NC, NAD  Chest: appears normal   Respiratory: decreased breath sounds throughout  Cardiac: +murmur   Gastrointestinal: overweight, non distended, non tender, +BS  Extremities: +++bilateral lower extremity edema  Neuro: AAO x 3   --------------------------------------------------------------------------------------

## 2022-08-17 NOTE — PROGRESS NOTE ADULT - PROBLEM SELECTOR PLAN 4
RHC and cardiomems done 6/20. RHC showed elevated right sided filling pressures with severe pulmonary hypertension with systemic PA pressures, slightly elevated PCWP and preserved cardiac output. Follows with Dr. Rodriguez at [place] for PH.   - continue tadalafil 40 mg daily   - continue letairis 10mg qd  - CT surgery aware of patient; f/u recs: at present state, not a transplant candidate but if we can reduce edema, improve kidney function, and optimize physical therapy (walking distance 300-400ft minimum); Ideally, BMI needs to be around ~30 for transplant candidacy  - follow Pulm recs

## 2022-08-17 NOTE — PROGRESS NOTE ADULT - PROBLEM SELECTOR PLAN 5
- s/p nasal packing with ENT, removed on 8/16 without recurrent bleeding  - agree with 1u RBC yesterday  - can hold home aspirin in the setting of anemia

## 2022-08-17 NOTE — PROGRESS NOTE ADULT - PROBLEM SELECTOR PLAN 2
in the setting of hypervolemia. s/p HTS + lasix IV to augment diuresis (as outlined above). Monitor serum sodium.

## 2022-08-17 NOTE — PROGRESS NOTE ADULT - SUBJECTIVE AND OBJECTIVE BOX
[facility] Division of Kidney Diseases & Hypertension  FOLLOW UP NOTE  --------------------------------------------------------------------------------  Chief Complaint:    24 hour events/subjective:    Pt is more comfortable today  On iv lasix     PAST HISTORY  --------------------------------------------------------------------------------  No significant changes to PMH, PSH, FHx, SHx, unless otherwise noted    ALLERGIES & MEDICATIONS  --------------------------------------------------------------------------------  Allergies    nitrates (Unknown)    Intolerances      Standing Inpatient Medications  ambrisentan 10 milliGRAM(s) Oral daily  aspirin  chewable 81 milliGRAM(s) Oral daily  atorvastatin 20 milliGRAM(s) Oral at bedtime  budesonide  80 MICROgram(s)/formoterol 4.5 MICROgram(s) Inhaler 2 Puff(s) Inhalation two times a day  buPROPion XL (24-Hour) . 300 milliGRAM(s) Oral daily  digoxin     Tablet 125 MICROGram(s) Oral daily  diltiazem    milliGRAM(s) Oral daily  ferrous    sulfate 325 milliGRAM(s) Oral daily  furosemide   Injectable 80 milliGRAM(s) IV Push two times a day  pantoprazole    Tablet 40 milliGRAM(s) Oral before breakfast  polyethylene glycol 3350 17 Gram(s) Oral daily  sodium chloride 0.65% Nasal 1 Spray(s) Both Nostrils four times a day  tadalafil 40 milliGRAM(s) Oral daily  tiotropium 18 MICROgram(s) Capsule 1 Capsule(s) Inhalation daily    PRN Inpatient Medications  acetaminophen     Tablet .. 975 milliGRAM(s) Oral every 6 hours PRN  ALBUTerol    90 MICROgram(s) HFA Inhaler 2 Puff(s) Inhalation every 6 hours PRN  melatonin 3 milliGRAM(s) Oral at bedtime PRN      REVIEW OF SYSTEMS  --------------------------------------------------------------------------------  All other systems were reviewed and are negative, except as noted.    VITALS/PHYSICAL EXAM  --------------------------------------------------------------------------------  T(C): 36.6 (08-17-22 @ 04:38), Max: 36.6 (08-17-22 @ 04:38)  HR: 75 (08-17-22 @ 09:50) (75 - 95)  BP: 108/66 (08-17-22 @ 06:46) (102/60 - 115/70)  RR: 20 (08-17-22 @ 09:50) (18 - 20)  SpO2: 97% (08-17-22 @ 09:50) (82% - 97%)  Wt(kg): --        08-16-22 @ 07:01  -  08-17-22 @ 07:00  --------------------------------------------------------  IN: 1020 mL / OUT: 2550 mL / NET: -1530 mL    08-17-22 @ 07:01  -  08-17-22 @ 11:34  --------------------------------------------------------  IN: 360 mL / OUT: 300 mL / NET: 60 mL      Physical Exam:  	Gen: NAD  	Pulm: decreased air entry  	CV: RRR, S1S2  	Abd: +BS, nontender/nondistended  	UE: Warm  	LE: Warm,3+ edema  	Psych: Normal affect and mood  	Skin: Warm, without rashes  	Vascular access:    LABS/STUDIES  --------------------------------------------------------------------------------              7.4    4.16  >-----------<  163      [08-17-22 @ 06:50]              24.1     130  |  86  |  86  ----------------------------<  77      [08-17-22 @ 06:50]  4.4   |  35  |  1.90        Ca     9.5     [08-17-22 @ 06:50]      Mg     2.0     [08-17-22 @ 06:50]      Phos  3.7     [08-17-22 @ 06:50]    TPro  6.5  /  Alb  3.8  /  TBili  0.5  /  DBili  x   /  AST  21  /  ALT  10  /  AlkPhos  105  [08-17-22 @ 06:50]          Creatinine Trend:  SCr 1.90 [08-17 @ 06:50]  SCr 2.08 [08-16 @ 12:35]  SCr 2.18 [08-16 @ 06:34]  SCr 1.96 [08-15 @ 05:43]  SCr 1.94 [08-14 @ 06:45]      Urine Creatinine 33      [08-14-22 @ 08:40]  Urine Sodium 90      [08-12-22 @ 12:36]  Urine Urea Nitrogen 397      [08-14-22 @ 08:40]    Iron 27, TIBC 350, %sat 8      [08-12-22 @ 06:56]  Ferritin 26      [08-12-22 @ 06:56]

## 2022-08-17 NOTE — PROGRESS NOTE ADULT - NS ATTEND AMEND GEN_ALL_CORE FT
Multidisciplinary rounds:  Iconducted rounds today on August 12  at 0830. Members of the team includes Transplant Pulmonologist, Transplant Surgeon, ICU team, Registered Dietician, Transplant Pharmacists, Transplant ACPs, Respiratory Therapist, Social Work and RNs. Discussion included review of pts history, systematic review of interval events and plan of care. All questions/concerns were addressed.     Requested pulmonary hypertensive group to see this case and recommend any additional therapies required to optimize for transplantation
Agree with note and plan above    HF consult  pulmonary group with Dr. Mosher consult   Will need aggressive diuresis  consider ICU for IV therapy of pHTN  Aggressive physical therpay, needs to walk minimum 300ft   to see patient   financial clearance     Willing to entertain lung transplant, if medically optimized, BMI ~30 and renal function recovery
Clinically, still remains volume overloaded  Cont diuresis as per primary team  Monitor I/Os and check daily weights  Consider alternative therapies as per pulmonary  Encouraged weight loss   Kidney dysfunction likely related to CRS, follow up work up  Social work evaluation completed   Concern over her adherence, distance to transplant center and BMI as barriers to transplantation   Will discuss case with multidisciplinary team  Will continue to follow
Agree with plan above  Cont volume optimization   Monitor I/Os and check daily weights  Consider alternative therapies as per pulmonary  Encouraged weight loss   Follow up renal evaluation  Social work evaluation completed   Concern over her adherence and BMI as barriers to transplantation   Will continue to follow
Multidisciplinary rounds:  Iconducted rounds today on August 13  at 0830.     Requested pulmonary hypertensive group and Dr NITESH Mosher to see this case when possible and contacted by electronic [facility] e and recommend any additional therapies, dietary and exercise  required to optimize for transplantation.  Social support needs optimized as from [place] quite a distance fri the transplant hospital
Case discussed with multidisciplinary team and given her poor functional status, elevated BMI and multiorgan dysfunction, she is not an optimal candidate for lung transplantation at this time.  All questions answered and patient understood. Rest of plan as above.
65 YO F with HFpEF (primarily RV dysfunction), severe predominately pre-capillary pulmonary hypertension, CKD III, and obesity admitted with acute on chronic diastolic heart failure, primarily RV volume overload. Continue diuresis as above. She is being evaluated by lung transplant but suspect given age/BMI/comorbidities it is unlikely she would rule herself in for candidacy. Consider palliative care consultation.

## 2022-08-18 NOTE — PROGRESS NOTE ADULT - SUBJECTIVE AND OBJECTIVE BOX
ZANDER JEAN-BAPTISTE  66y  Female      Patient is a 66y old  Female who presents with a chief complaint of lung txp eval (17 Aug 2022 12:58)      INTERVAL HPI/OVERNIGHT EVENTS:    provider handoff from yesterday - 8/17: oral iron started, 325 daily; still on 8L NC; diuresis improved; readdress spironolactone and elloquis khalida; Transplant reporting that patient is currently not a candidate following eval, given this, also not a candidate for IV Flolan per Pulm, pulm now recommending palliative consult given that not transplant candidate     Overnight/this morning - Patient seen and examined at bedside. Given another bolus of hypertonics prior to lasix administration to help with diuresis.       FAMILY HISTORY:  No pertinent family history      T(C): 36.6 (08-18-22 @ 04:54), Max: 36.6 (08-17-22 @ 12:41)  HR: 71 (08-18-22 @ 04:54) (55 - 95)  BP: 114/60 (08-18-22 @ 04:54) (97/55 - 114/60)  RR: 19 (08-18-22 @ 04:54) (18 - 22)  SpO2: 100% (08-18-22 @ 04:54) (82% - 100%)  Wt(kg): --Vital Signs Last 24 Hrs  T(C): 36.6 (18 Aug 2022 04:54), Max: 36.6 (17 Aug 2022 12:41)  T(F): 97.9 (18 Aug 2022 04:54), Max: 97.9 (17 Aug 2022 12:41)  HR: 71 (18 Aug 2022 04:54) (55 - 95)  BP: 114/60 (18 Aug 2022 04:54) (97/55 - 114/60)  BP(mean): 75 (17 Aug 2022 19:29) (75 - 75)  RR: 19 (18 Aug 2022 04:54) (18 - 22)  SpO2: 100% (18 Aug 2022 04:54) (82% - 100%)    Parameters below as of 18 Aug 2022 04:54  Patient On (Oxygen Delivery Method): Home Cpap  O2 Flow (L/min): 8    nitrates (Unknown)      PHYSICAL EXAM:  CONSTITUTIONAL: NAD, well-developed  HEENT: no epistaxis, nasal packing removed   RESPIRATORY: coarse breadth sounds, no crackles    CARDIOVASCULAR: 3+ LE edema, non-pitting. regular rate and rhythm  ABDOMEN: Nontender to palpation, normoactive bowel sounds, no rebound/guarding  MUSCLOSKELETAL: no clubbing or cyanosis of digits; no joint swelling or tenderness to palpation  NEURO: Non-focal, no tremors  SKIN: No rashes    Consultant(s) Notes Reviewed:  [x ] YES  [ ] NO  Care Discussed with Consultants/Other Providers [ x] YES  [ ] NO    LABS:      RADIOLOGY & ADDITIONAL TESTS:    Imaging Personally Reviewed:  [ ] YES  [ ] NO  acetaminophen     Tablet .. 975 milliGRAM(s) Oral every 6 hours PRN  ALBUTerol    90 MICROgram(s) HFA Inhaler 2 Puff(s) Inhalation every 6 hours PRN  ambrisentan 10 milliGRAM(s) Oral daily  aspirin  chewable 81 milliGRAM(s) Oral daily  atorvastatin 20 milliGRAM(s) Oral at bedtime  budesonide  80 MICROgram(s)/formoterol 4.5 MICROgram(s) Inhaler 2 Puff(s) Inhalation two times a day  buPROPion XL (24-Hour) . 300 milliGRAM(s) Oral daily  digoxin     Tablet 125 MICROGram(s) Oral daily  diltiazem    milliGRAM(s) Oral daily  ferrous    sulfate 325 milliGRAM(s) Oral daily  furosemide   Injectable 80 milliGRAM(s) IV Push two times a day  melatonin 3 milliGRAM(s) Oral at bedtime PRN  pantoprazole    Tablet 40 milliGRAM(s) Oral before breakfast  polyethylene glycol 3350 17 Gram(s) Oral daily  sodium chloride 0.65% Nasal 1 Spray(s) Both Nostrils four times a day  tadalafil 40 milliGRAM(s) Oral daily  tiotropium 18 MICROgram(s) Capsule 1 Capsule(s) Inhalation daily      HEALTH ISSUES - PROBLEM Dx:  JUANA (acute kidney injury)    Severe pulmonary hypertension    Acute on chronic diastolic congestive heart failure    Prophylactic measure    COPD, moderate    Chronic atrial fibrillation    Anemia    Chronic kidney disease, unspecified CKD stage    Epistaxis    Hyponatremia    Hyperkalemia    Acute respiratory failure with hypoxia             ZANDER JEAN-BAPTISTE  66y  Female      Patient is a 66y old  Female who presents with a chief complaint of lung txp eval (17 Aug 2022 12:58)      INTERVAL HPI/OVERNIGHT EVENTS:    provider handoff from yesterday - 8/17: oral iron started, 325 daily; still on 8L NC; diuresis improved; readdress spironolactone and elloquis khalida; Transplant reporting that patient is currently not a candidate following eval, given this, also not a candidate for IV Flolan per Pulm, pulm now recommending palliative consult given that not transplant candidate     Overnight/this morning - Patient seen and examined at bedside. Given another bolus of hypertonics prior to lasix administration to help with diuresis. Patient feeling well, endorses feeling better. Improving SOB, no cough or chest pain.       FAMILY HISTORY:  No pertinent family history      T(C): 36.6 (08-18-22 @ 04:54), Max: 36.6 (08-17-22 @ 12:41)  HR: 71 (08-18-22 @ 04:54) (55 - 95)  BP: 114/60 (08-18-22 @ 04:54) (97/55 - 114/60)  RR: 19 (08-18-22 @ 04:54) (18 - 22)  SpO2: 100% (08-18-22 @ 04:54) (82% - 100%)  Wt(kg): --Vital Signs Last 24 Hrs  T(C): 36.6 (18 Aug 2022 04:54), Max: 36.6 (17 Aug 2022 12:41)  T(F): 97.9 (18 Aug 2022 04:54), Max: 97.9 (17 Aug 2022 12:41)  HR: 71 (18 Aug 2022 04:54) (55 - 95)  BP: 114/60 (18 Aug 2022 04:54) (97/55 - 114/60)  BP(mean): 75 (17 Aug 2022 19:29) (75 - 75)  RR: 19 (18 Aug 2022 04:54) (18 - 22)  SpO2: 100% (18 Aug 2022 04:54) (82% - 100%)    Parameters below as of 18 Aug 2022 04:54  Patient On (Oxygen Delivery Method): Home Cpap  O2 Flow (L/min): 8    nitrates (Unknown)      PHYSICAL EXAM:  CONSTITUTIONAL: NAD, well-developed  HEENT: no epistaxis, nasal packing removed   RESPIRATORY: coarse breadth sounds, no crackles    CARDIOVASCULAR: 3+ LE edema, non-pitting. regular rate and rhythm  ABDOMEN: Nontender to palpation, normoactive bowel sounds, no rebound/guarding  MUSCLOSKELETAL: no clubbing or cyanosis of digits; no joint swelling or tenderness to palpation  NEURO: Non-focal, no tremors  SKIN: No rashes    Consultant(s) Notes Reviewed:  [x ] YES  [ ] NO  Care Discussed with Consultants/Other Providers [ x] YES  [ ] NO    LABS:                          7.3    3.72  )-----------( 175      ( 18 Aug 2022 06:17 )             24.7       08-18    131<L>  |  88<L>  |  80<H>  ----------------------------<  70  4.3   |  34<H>  |  1.78<H>    Ca    9.4      18 Aug 2022 06:20  Phos  3.9     08-18  Mg     2.0     08-18    TPro  6.6  /  Alb  3.8  /  TBili  0.3  /  DBili  x   /  AST  22  /  ALT  12  /  AlkPhos  106  08-18           RADIOLOGY & ADDITIONAL TESTS:    Imaging Personally Reviewed:  [ ] YES  [ ] NO  acetaminophen     Tablet .. 975 milliGRAM(s) Oral every 6 hours PRN  ALBUTerol    90 MICROgram(s) HFA Inhaler 2 Puff(s) Inhalation every 6 hours PRN  ambrisentan 10 milliGRAM(s) Oral daily  aspirin  chewable 81 milliGRAM(s) Oral daily  atorvastatin 20 milliGRAM(s) Oral at bedtime  budesonide  80 MICROgram(s)/formoterol 4.5 MICROgram(s) Inhaler 2 Puff(s) Inhalation two times a day  buPROPion XL (24-Hour) . 300 milliGRAM(s) Oral daily  digoxin     Tablet 125 MICROGram(s) Oral daily  diltiazem    milliGRAM(s) Oral daily  ferrous    sulfate 325 milliGRAM(s) Oral daily  furosemide   Injectable 80 milliGRAM(s) IV Push two times a day  melatonin 3 milliGRAM(s) Oral at bedtime PRN  pantoprazole    Tablet 40 milliGRAM(s) Oral before breakfast  polyethylene glycol 3350 17 Gram(s) Oral daily  sodium chloride 0.65% Nasal 1 Spray(s) Both Nostrils four times a day  tadalafil 40 milliGRAM(s) Oral daily  tiotropium 18 MICROgram(s) Capsule 1 Capsule(s) Inhalation daily      HEALTH ISSUES - PROBLEM Dx:  JUANA (acute kidney injury)    Severe pulmonary hypertension    Acute on chronic diastolic congestive heart failure    Prophylactic measure    COPD, moderate    Chronic atrial fibrillation    Anemia    Chronic kidney disease, unspecified CKD stage    Epistaxis    Hyponatremia    Hyperkalemia    Acute respiratory failure with hypoxia

## 2022-08-18 NOTE — PROGRESS NOTE ADULT - PROBLEM SELECTOR PLAN 5
Last echo 6/22 with EF 68%, normal LV systolic function w/o WMA. Flattening of interventricular septum c/w RV overload, w/ severe R atrial enlargement, RV enlargement w/ decreased RV systolic function, severely elevated pulmonary pressures.   - now on lasix 80 mg IV BID   - strict I/Os, daily standing weights  - holding spironolactone in setting of hyperkalemia, consider restarting once normalized Last echo 6/22 with EF 68%, normal LV systolic function w/o WMA. Flattening of interventricular septum c/w RV overload, w/ severe R atrial enlargement, RV enlargement w/ decreased RV systolic function, severely elevated pulmonary pressures.   - now on lasix 80 mg IV BID   - strict I/Os, daily standing weights  - holding spironolactone in setting of previous hyperkalemia, consider restarting once normalized

## 2022-08-18 NOTE — CONSULT NOTE ADULT - PROBLEM SELECTOR PROBLEM 1
Epistaxis
Acute on chronic diastolic congestive heart failure
Acute respiratory failure with hypoxia

## 2022-08-18 NOTE — CONSULT NOTE ADULT - CONVERSATION DETAILS
Met with patient at bedside with primary attending Dr. Jacobo for [facility] discussion. Palliative team introduced and role in patient's care explained. Patient understands that she is not a candidate for lung transplant at this time. We discussed with patient that without transplant it is anticipated that her pulmonary htn and heart failure will continue to progress and symptoms will continue to worsen. She understands there are no additional treatments that can be offered to reverse disease process. She shared that despite multiple hospitalizations she does feel better after each hospitalization. She stated that she is able to perform ADLs including bathing and preparing food. She stated that she lives with her 2 sons and their girlfriends who are able to help her with cleaning and house work and have been very supportive. She shared that her goal is to be able to leave her home but has been limited by her need for oxygen. We discussed that this may not be an attainable goal give how advanced her disease is, which she demonstrated understanding of. Patient feels at present her quality of life is acceptable but acknowledges that being in and out of the hospital is tiring. We discussed that if at any point pt does not find her quality of life to be acceptable there are alternatives including focusing on patient's comfort. Additionally, we discussed code status including CPR and intubation. Explained that given patient's advanced illness interventions including cpr/intubation may cause more suffering than benefits as patient's underlying diseases are irreversible. Pt demonstrated understanding of interventions and will think about it more. Pt requested that primary team update her son Matteo regarding her condition. Pt remains full code.   All questions answered and emotional support provided.

## 2022-08-18 NOTE — PROGRESS NOTE ADULT - SUBJECTIVE AND OBJECTIVE BOX
Patient seen and examined at bedside.    Overnight Events: LISA    Review Of Systems: No chest pain, shortness of breath, or palpitations            Current Meds:  acetaminophen     Tablet .. 975 milliGRAM(s) Oral every 6 hours PRN  ALBUTerol    90 MICROgram(s) HFA Inhaler 2 Puff(s) Inhalation every 6 hours PRN  ambrisentan 10 milliGRAM(s) Oral daily  aspirin  chewable 81 milliGRAM(s) Oral daily  atorvastatin 20 milliGRAM(s) Oral at bedtime  budesonide  80 MICROgram(s)/formoterol 4.5 MICROgram(s) Inhaler 2 Puff(s) Inhalation two times a day  buPROPion XL (24-Hour) . 300 milliGRAM(s) Oral daily  digoxin     Tablet 125 MICROGram(s) Oral daily  diltiazem    milliGRAM(s) Oral daily  ferrous    sulfate 325 milliGRAM(s) Oral daily  furosemide   Injectable 80 milliGRAM(s) IV Push two times a day  melatonin 3 milliGRAM(s) Oral at bedtime PRN  pantoprazole    Tablet 40 milliGRAM(s) Oral before breakfast  polyethylene glycol 3350 17 Gram(s) Oral daily  sodium chloride 0.65% Nasal 1 Spray(s) Both Nostrils four times a day  tadalafil 40 milliGRAM(s) Oral daily  tiotropium 18 MICROgram(s) Capsule 1 Capsule(s) Inhalation daily      Vitals:  T(F): 97.8 (08-18), Max: 97.9 (08-17)  HR: 75 (08-18) (55 - 79)  BP: 113/69 (08-18) (101/62 - 114/60)  RR: 18 (08-18)  SpO2: 97% (08-18)  I&O's Summary    17 Aug 2022 07:01  -  18 Aug 2022 07:00  --------------------------------------------------------  IN: 1100 mL / OUT: 2400 mL / NET: -1300 mL    18 Aug 2022 07:01  -  18 Aug 2022 14:04  --------------------------------------------------------  IN: 360 mL / OUT: 800 mL / NET: -440 mL        Physical Exam:  General: No distress. Comfortable.  HEENT: EOM intact.  Neck: Neck supple. JVP difficult to assess, but appears to be elevated. No masses  Chest: Unlabored, fine crackles bilateral bases.   CV: Normal S1 and S2. No murmurs, rub, or gallops. Radial pulses normal. +2 BLE edema.   Abdomen: Soft, non-distended, non-tender  Skin: No rashes or skin breakdown  Neurology: Alert and oriented times three. Sensation intact  Psych: Affect normal                          7.3    3.72  )-----------( 175      ( 18 Aug 2022 06:17 )             24.7     08-18    131<L>  |  88<L>  |  80<H>  ----------------------------<  70  4.3   |  34<H>  |  1.78<H>    Ca    9.4      18 Aug 2022 06:20  Phos  3.9     08-18  Mg     2.0     08-18    TPro  6.6  /  Alb  3.8  /  TBili  0.3  /  DBili  x   /  AST  22  /  ALT  12  /  AlkPhos  106  08-18          Serum Pro-Brain Natriuretic Peptide: 33345 pg/mL (08-17 @ 06:50)  Serum Pro-Brain Natriuretic Peptide: 41911 pg/mL (08-16 @ 06:34)  Serum Pro-Brain Natriuretic Peptide: 70530 pg/mL (08-15 @ 05:43)  Serum Pro-Brain Natriuretic Peptide: 81364 pg/mL (08-14 @ 06:45)  Serum Pro-Brain Natriuretic Peptide: 8947 pg/mL (08-13 @ 06:31)  Serum Pro-Brain Natriuretic Peptide: 53443 pg/mL (08-12 @ 06:55)

## 2022-08-18 NOTE — PROGRESS NOTE ADULT - ATTENDING COMMENTS
Patient seen and evaluated. On 8L at rest. Had GOC conversation with pallaitive care attending and patient. Patient understands not a transplant candidate. At this time wants to return to baseline quality of life, go home with posisbility of going to [place]. Wants sons to be involved in future goals of care.  Did not make decision on DNR/DNI.     #HFpEF, predominant right sided   -continue current diuretic regimen.   -patient is NOT a pulmonary transplant candidate per patient's conversatoin with transplant pulmonology.   -will optimize from volume status. Palliative care consult appreciated.       #Epistaxis  -resolved.  -continue NC. If recurs, will need ENT to cauterize.   -nasal saline.      #severe pulm HTN  -continue current pulm HTN medication  -given BMI >35, not candidate currently for pulm transplant and will not initiate transplant workup at this time.     #CKD III  -likely cardiorenal component vs. new baseline.   -continue to monitor, avoid nephrotoxic agents.     #Anemia  -iron studies denote iron deficiency. This is complicated by epistaxis.   -iron supplementation  -will see when had last colonoscopy. At this time high risk for colonoscopy, so may require outpatient virtual colonography if patient amenable.     Given weight, age, deconditioning, not a current candidate for transplantation.  Not a great hospice candidate given multiple appointments needed. Will attmept to coordinate care closer to her home.

## 2022-08-18 NOTE — PROGRESS NOTE ADULT - ATTENDING COMMENTS
Patient seen and examined. Pt is a 66F with PMHx COPD, CHFpEF, AFib, obesity (Type III, hx bariatric sx 2012), and severe pulmonary HTN with chronic combined hypercapnic and hypoxemic respiratory failure on ATC O2 supplementation (dependent on 8L NC at rest) with qhs/prn AVAPS (Trelegy Antonio via nasal prong interface) presenting for progressive dyspnea with minimal exertion admitted for acute decompensated right heart failure. RHC 6/20/2022 revealed sPAP: 106, dPAP: 44 mPAP:65, PCWP: 16, PVR 7.32W, CO/CI 6.7/3.2.     - Patient is not a candidate for lung transplant. She has had a long discussion with the transplant team on 8/17 and understands this. She is sad but accepting of this fact. She tells me she was previously referred for transplant in 2009 but deferred at that time  - Continue diuresis as per Nephrology and Heart Failure - she has had improvement in her LE edema but still appears volume overloaded. Monitor sodium. Hypertonic saline as per Nephro. Renal function improved today and was 1.3L negative last 24 hours.  - Continue home pulmonary vasodilators (Tadalafil and Ambrisentan). No plans to trial Flolan at this time as the risks outweigh the benefits.  --- Consistent outpatient pulmonary followup is necessary. Her primary pulmonologist from [place] recently left and she was referred to Dr. Mosher after a recent [facility] hospitalization but was unable to make an appointment as the commute from [place] (~ 3 hours) was too difficult and she could not find a day to secure transportation.  - Continue supplemental O2 with goal O2 sat > 88%. Patient is on 8L NC at home and in reality often sats in the mid 80s and this may need to be tolerated on exertion. She was transitioned to NC this AM and is currently on 8L O2 NC. She is in good spirits being on NC instead of ventimask  - Continue AVAPs at night - she has her home machine and has been able to use it the last two nights  - Epistaxis has resolved. This is likely in setting of pulmonary vasodilator therapy. ENT management appreciated.  - Continue bronchodilator therapy    Given that she is not a candidate for transplant Palliative Care evaluation appreciated. Will continue to optimize available, safe, medical therapies in hopes to allow patient functional ability and quality of life.

## 2022-08-18 NOTE — CONSULT NOTE ADULT - PROBLEM SELECTOR RECOMMENDATION 4
- rate controlled on Digoxin (Digoxin level 8/11 0.6) and Cardizem   - on Eliquis for AC
See GOC note above  Discussed code status and at this time pt remains full code  continue medical management

## 2022-08-18 NOTE — CONSULT NOTE ADULT - PROBLEM SELECTOR RECOMMENDATION 3
- recent Cr ranging 1.7-1.8 but had been 1.4-1.6 in early June  - eventual SGLT2-i as above to delay further progression of CKD  - decongestion with diuresis
IV diuretics per HF team

## 2022-08-18 NOTE — PROGRESS NOTE ADULT - PROBLEM SELECTOR PLAN 4
RHC and cardiomems done 6/20. RHC showed elevated right sided filling pressures with severe pulmonary hypertension with systemic PA pressures, slightly elevated PCWP and preserved cardiac output. Follows with Dr. Rodriguez at [facility] for PH.   - continue tadalafil 40 mg daily   - continue letairis 10mg qd  - follow Pulm recs  -Transplant reporting that patient is currently not a candidate following eval, given this, also not a candidate for IV Flolan per Pulm

## 2022-08-18 NOTE — PROGRESS NOTE ADULT - PROBLEM SELECTOR PLAN 1
Pt. with CKD in the setting of chronic cardiorenal syndrome, now with JUANA (non oliguric) on CKD likely 2/2 renal venous congestion from hypervolemia due to HF in the setting of severe pulmonary HTN.   On review of [facility], noted that Baseline SCr ranges from 1.4-1.9 since March '22. SCr on arrival was 1.8 on 8/11; peaked to 2.02 on 8/13 & has remained stable at 1.7. BUN elevated in the setting of diuresis, however without any uremic sxs.   Pt. continues to appear volume overloaded. Continue lasix 80 IV BID.   No plan for HD at this time.   Check UA, spot urine TP/Cr. Check Renal US.   Monitor labs and urine output. Avoid NSAIDs, ACEI/ARBS, RCA and nephrotoxins. Dose medications as per eGFR. Pt. with CKD in the setting of chronic cardiorenal syndrome, now with JUANA (non oliguric) on CKD likely 2/2 renal venous congestion from hypervolemia due to HF in the setting of severe pulmonary HTN.   On review of [facility] MARIN, noted that Baseline SCr ranges from 1.4-1.9 since March '22. SCr on arrival was 1.8 on 8/11; peaked to 2.02 on 8/13 & has remained stable at 1.7. BUN elevated in the setting of diuresis, however without any uremic sxs.   Pt. continues to appear volume overloaded. UO in the last 24 hours  2.4L with net negative 1.4L. Continue lasix 80 IV BID to achieve net negative 1-2L net negative. Would not add a thiazide in the setting of hyponatremia. Consider HTS once again today  No plan for HD at this time.   Check UA, spot urine TP/Cr. Check Renal US.   Monitor labs and urine output. Avoid NSAIDs, ACEI/ARBS, RCA and nephrotoxins. Dose medications as per eGFR.

## 2022-08-18 NOTE — PROGRESS NOTE ADULT - ASSESSMENT
66F with PMHx COPD, CHFpEF, AFib, obesity (Type III, hx bariatric sx 2012), and severe pulmonary HTN with chronic combined hypercapnic and hypoxemic respiratory failure on ATC O2 supplementation (dependent on 8L NC at rest) with qhs/prn AVAPS (Trelegy Antonio via nasal prong interface) presenting for progressive dyspnea with minimal exertion admitted for acute decompensated right heart failure. RHC 6/20/2022 revealed sPAP: 106, dPAP: 44 mPAP:65, PCWP: 16, PVR 7.32W, CO/CI 6.7/3.2. Pt clinical hypervolemic today, tolerating diuresis, HD stable, some improvement in GFR today.    #pHTN  #ADHF  -c/w current diuresis as per HF, agree with maintaining negative fluid balance  - f/u nephrology recommendations  - strict I/Os and daily weights  - c/w home pulmonary vasodilators as above (tadalafil+ambrisentan).  - will defer initiation of flolan at this time due to concerns regarding increased risk from social factors/follow up; reports improving respiratory symptoms on current vasodilator regimen - not a transplant candidate at this time  - She will benefit from having follow up with a pulmonologist, currently does not have a pulmonologist and would prefer to establish one closer to home  - wean O2 supplementation as tolerated for goal O2 saturation 88-94%  - c/w home AVAPS [Trelegy]  qHS and PRN during the day  - Pt's home device at bedside and approved for use by [employer]  - c/w home bronchodilator therapy  - pulmonary will follow

## 2022-08-18 NOTE — PROGRESS NOTE ADULT - ASSESSMENT
66F w/ severe pulmonary HTN (Group 1, 2 & 3), HFpEF, Afib, COPD on home O2 (5L NC), CKD, ROLANDA on CPAP, morbid obesity presenting with acute hypoxic respiratory failure, admitted for CHF exacerbation and ongoing lung transplant evaluation.   W/ epistaxis 8/13, seen by ENT, started on bilateral nasal packing/ rhino rocket, started on pain meds, abx ppx, cbc stable   8/17 nasal packing removed, started back on nasal cannula, no further epistaxis, increased diuretics to 80 mg IV lasix BID  66F w/ severe pulmonary HTN (Group 1, 2 & 3), HFpEF, Afib, COPD on home O2 (5L NC), CKD, ROLANDA on CPAP, morbid obesity presenting with acute hypoxic respiratory failure, admitted for CHF exacerbation and ongoing lung transplant evaluation.   W/ epistaxis 8/13, seen by ENT, started on bilateral nasal packing/ rhino rocket, started on pain meds, abx ppx, cbc stable   8/17 nasal packing removed, started back on nasal cannula, no further epistaxis, increased diuretics to 80 mg IV lasix BID   Requiring hypertonics to improve effect of diuretic. If UOP remains low today, consider starting metolazone 5 daily tomorrow.

## 2022-08-18 NOTE — PROGRESS NOTE ADULT - PROBLEM SELECTOR PLAN 4
- recent Cr ranging 1.7-1.8, today at 1.79, had been 1.4-1.6 in early June  - eventual SGLT2-i as above to delay further progression of CKD  - continue to monitor closely with diuresis.

## 2022-08-18 NOTE — PROGRESS NOTE ADULT - SUBJECTIVE AND OBJECTIVE BOX
CHIEF COMPLAINT:Patient is a 66y old  Female who presents with a chief complaint of lung txp eval (18 Aug 2022 06:22)      Interval Events:  continues to be net negative with fluid balance.    REVIEW OF SYSTEMS:  [x] All other systems negative except per HPI   [ ] Unable to assess ROS because ________    OBJECTIVE:  ICU Vital Signs Last 24 Hrs  T(C): 36.6 (18 Aug 2022 04:54), Max: 36.6 (17 Aug 2022 12:41)  T(F): 97.9 (18 Aug 2022 04:54), Max: 97.9 (17 Aug 2022 12:41)  HR: 79 (18 Aug 2022 08:07) (55 - 79)  BP: 114/60 (18 Aug 2022 04:54) (97/55 - 114/60)  BP(mean): 75 (17 Aug 2022 19:29) (75 - 75)  ABP: --  ABP(mean): --  RR: 22 (18 Aug 2022 06:38) (18 - 22)  SpO2: 94% (18 Aug 2022 08:07) (92% - 100%)    O2 Parameters below as of 18 Aug 2022 06:38  Patient On (Oxygen Delivery Method): nasal cannula  O2 Flow (L/min): 8            08-17 @ 07:01  -  08-18 @ 07:00  --------------------------------------------------------  IN: 1100 mL / OUT: 2400 mL / NET: -1300 mL    08-18 @ 07:01  -  08-18 @ 11:06  --------------------------------------------------------  IN: 360 mL / OUT: 500 mL / NET: -140 mL        PHYSICAL EXAM:  General: Female NAD  HEENT: EOMI, NC in place  Neck: Supple  Respiratory: No notable wheezes or increased wob/accessory muscle use  Cardiovascular: RR, +systolic murmur  Abdomen: Soft  Extremities: WWP, bilateral LE edema  Skin: Intact, ecchymoses  Neurological: AOx3  Psychiatry: Normal affect    HOSPITAL MEDICATIONS:  MEDICATIONS  (STANDING):  ambrisentan 10 milliGRAM(s) Oral daily  aspirin  chewable 81 milliGRAM(s) Oral daily  atorvastatin 20 milliGRAM(s) Oral at bedtime  budesonide  80 MICROgram(s)/formoterol 4.5 MICROgram(s) Inhaler 2 Puff(s) Inhalation two times a day  buPROPion XL (24-Hour) . 300 milliGRAM(s) Oral daily  digoxin     Tablet 125 MICROGram(s) Oral daily  diltiazem    milliGRAM(s) Oral daily  ferrous    sulfate 325 milliGRAM(s) Oral daily  furosemide   Injectable 80 milliGRAM(s) IV Push two times a day  pantoprazole    Tablet 40 milliGRAM(s) Oral before breakfast  polyethylene glycol 3350 17 Gram(s) Oral daily  sodium chloride 0.65% Nasal 1 Spray(s) Both Nostrils four times a day  tadalafil 40 milliGRAM(s) Oral daily  tiotropium 18 MICROgram(s) Capsule 1 Capsule(s) Inhalation daily    MEDICATIONS  (PRN):  acetaminophen     Tablet .. 975 milliGRAM(s) Oral every 6 hours PRN Temp greater or equal to 38C (100.4F), Mild Pain (1 - 3), Moderate Pain (4 - 6)  ALBUTerol    90 MICROgram(s) HFA Inhaler 2 Puff(s) Inhalation every 6 hours PRN Shortness of Breath and/or Wheezing  melatonin 3 milliGRAM(s) Oral at bedtime PRN Insomnia      LABS:    The Labs were reviewed by me   The Radiology was reviewed by me    EKG tracing reviewed by me    08-18    131<L>  |  88<L>  |  80<H>  ----------------------------<  70  4.3   |  34<H>  |  1.78<H>  08-17    130<L>  |  86<L>  |  86<H>  ----------------------------<  77  4.4   |  35<H>  |  1.90<H>  08-16    127<L>  |  83<L>  |  88<H>  ----------------------------<  178<H>  5.0   |  29  |  2.08<H>    Ca    9.4      18 Aug 2022 06:20  Ca    9.5      17 Aug 2022 06:50  Ca    9.6      16 Aug 2022 12:35  Phos  3.9     08-18  Mg     2.0     08-18    TPro  6.6  /  Alb  3.8  /  TBili  0.3  /  DBili  x   /  AST  22  /  ALT  12  /  AlkPhos  106  08-18  TPro  6.5  /  Alb  3.8  /  TBili  0.5  /  DBili  x   /  AST  21  /  ALT  10  /  AlkPhos  105  08-17  TPro  6.5  /  Alb  3.9  /  TBili  0.3  /  DBili  x   /  AST  21  /  ALT  11  /  AlkPhos  108  08-16    Magnesium, Serum: 2.0 mg/dL (08-18-22 @ 06:20)  Magnesium, Serum: 2.0 mg/dL (08-17-22 @ 06:50)  Magnesium, Serum: 1.9 mg/dL (08-16-22 @ 12:35)  Magnesium, Serum: 2.1 mg/dL (08-16-22 @ 06:34)    Phosphorus Level, Serum: 3.9 mg/dL (08-18-22 @ 06:20)  Phosphorus Level, Serum: 3.7 mg/dL (08-17-22 @ 06:50)  Phosphorus Level, Serum: 4.0 mg/dL (08-16-22 @ 12:35)  Phosphorus Level, Serum: 3.8 mg/dL (08-16-22 @ 06:34)                                              7.3    3.72  )-----------( 175      ( 18 Aug 2022 06:17 )             24.7                         7.4    4.16  )-----------( 163      ( 17 Aug 2022 06:50 )             24.1                         8.1    6.27  )-----------( 185      ( 16 Aug 2022 12:35 )             26.3     CAPILLARY BLOOD GLUCOSE            MICROBIOLOGY:     RADIOLOGY:  [ ] Reviewed and interpreted by me    Point of Care Ultrasound Findings:    PFT:    EKG: CHIEF COMPLAINT:Patient is a 66y old  Female who presents with a chief complaint of lung txp eval (18 Aug 2022 06:22)      Interval Events:  continues to be net negative with fluid balance. Feels similar to prior baseline. Notes that she has felt ok when ambulating with O2 saturation in the low 80s which she often will do at home and will stop and rest when she experiences dyspnea.    REVIEW OF SYSTEMS:  [x] All other systems negative except per HPI   [ ] Unable to assess ROS because ________    OBJECTIVE:  ICU Vital Signs Last 24 Hrs  T(C): 36.6 (18 Aug 2022 04:54), Max: 36.6 (17 Aug 2022 12:41)  T(F): 97.9 (18 Aug 2022 04:54), Max: 97.9 (17 Aug 2022 12:41)  HR: 79 (18 Aug 2022 08:07) (55 - 79)  BP: 114/60 (18 Aug 2022 04:54) (97/55 - 114/60)  BP(mean): 75 (17 Aug 2022 19:29) (75 - 75)  ABP: --  ABP(mean): --  RR: 22 (18 Aug 2022 06:38) (18 - 22)  SpO2: 94% (18 Aug 2022 08:07) (92% - 100%)    O2 Parameters below as of 18 Aug 2022 06:38  Patient On (Oxygen Delivery Method): nasal cannula  O2 Flow (L/min): 8            08-17 @ 07:01  -  08-18 @ 07:00  --------------------------------------------------------  IN: 1100 mL / OUT: 2400 mL / NET: -1300 mL    08-18 @ 07:01  -  08-18 @ 11:06  --------------------------------------------------------  IN: 360 mL / OUT: 500 mL / NET: -140 mL        PHYSICAL EXAM:  General: Female NAD  HEENT: EOMI, NC in place  Neck: Supple  Respiratory: No notable wheezes or increased wob/accessory muscle use  Cardiovascular: RR, +systolic murmur  Abdomen: Soft  Extremities: WWP, bilateral LE edema  Skin: Intact, ecchymoses  Neurological: AOx3  Psychiatry: Normal affect    HOSPITAL MEDICATIONS:  MEDICATIONS  (STANDING):  ambrisentan 10 milliGRAM(s) Oral daily  aspirin  chewable 81 milliGRAM(s) Oral daily  atorvastatin 20 milliGRAM(s) Oral at bedtime  budesonide  80 MICROgram(s)/formoterol 4.5 MICROgram(s) Inhaler 2 Puff(s) Inhalation two times a day  buPROPion XL (24-Hour) . 300 milliGRAM(s) Oral daily  digoxin     Tablet 125 MICROGram(s) Oral daily  diltiazem    milliGRAM(s) Oral daily  ferrous    sulfate 325 milliGRAM(s) Oral daily  furosemide   Injectable 80 milliGRAM(s) IV Push two times a day  pantoprazole    Tablet 40 milliGRAM(s) Oral before breakfast  polyethylene glycol 3350 17 Gram(s) Oral daily  sodium chloride 0.65% Nasal 1 Spray(s) Both Nostrils four times a day  tadalafil 40 milliGRAM(s) Oral daily  tiotropium 18 MICROgram(s) Capsule 1 Capsule(s) Inhalation daily    MEDICATIONS  (PRN):  acetaminophen     Tablet .. 975 milliGRAM(s) Oral every 6 hours PRN Temp greater or equal to 38C (100.4F), Mild Pain (1 - 3), Moderate Pain (4 - 6)  ALBUTerol    90 MICROgram(s) HFA Inhaler 2 Puff(s) Inhalation every 6 hours PRN Shortness of Breath and/or Wheezing  melatonin 3 milliGRAM(s) Oral at bedtime PRN Insomnia      LABS:    The Labs were reviewed by me   The Radiology was reviewed by me    EKG tracing reviewed by me    08-18    131<L>  |  88<L>  |  80<H>  ----------------------------<  70  4.3   |  34<H>  |  1.78<H>  08-17    130<L>  |  86<L>  |  86<H>  ----------------------------<  77  4.4   |  35<H>  |  1.90<H>  08-16    127<L>  |  83<L>  |  88<H>  ----------------------------<  178<H>  5.0   |  29  |  2.08<H>    Ca    9.4      18 Aug 2022 06:20  Ca    9.5      17 Aug 2022 06:50  Ca    9.6      16 Aug 2022 12:35  Phos  3.9     08-18  Mg     2.0     08-18    TPro  6.6  /  Alb  3.8  /  TBili  0.3  /  DBili  x   /  AST  22  /  ALT  12  /  AlkPhos  106  08-18  TPro  6.5  /  Alb  3.8  /  TBili  0.5  /  DBili  x   /  AST  21  /  ALT  10  /  AlkPhos  105  08-17  TPro  6.5  /  Alb  3.9  /  TBili  0.3  /  DBili  x   /  AST  21  /  ALT  11  /  AlkPhos  108  08-16    Magnesium, Serum: 2.0 mg/dL (08-18-22 @ 06:20)  Magnesium, Serum: 2.0 mg/dL (08-17-22 @ 06:50)  Magnesium, Serum: 1.9 mg/dL (08-16-22 @ 12:35)  Magnesium, Serum: 2.1 mg/dL (08-16-22 @ 06:34)    Phosphorus Level, Serum: 3.9 mg/dL (08-18-22 @ 06:20)  Phosphorus Level, Serum: 3.7 mg/dL (08-17-22 @ 06:50)  Phosphorus Level, Serum: 4.0 mg/dL (08-16-22 @ 12:35)  Phosphorus Level, Serum: 3.8 mg/dL (08-16-22 @ 06:34)                                              7.3    3.72  )-----------( 175      ( 18 Aug 2022 06:17 )             24.7                         7.4    4.16  )-----------( 163      ( 17 Aug 2022 06:50 )             24.1                         8.1    6.27  )-----------( 185      ( 16 Aug 2022 12:35 )             26.3     CAPILLARY BLOOD GLUCOSE            MICROBIOLOGY:     RADIOLOGY:  [ ] Reviewed and interpreted by me    Point of Care Ultrasound Findings:    PFT:    EKG:

## 2022-08-18 NOTE — PROGRESS NOTE ADULT - ATTENDING COMMENTS
Sarai Calle MD  Off: [phone]  contact me on teams    (After 5 pm or on weekends please page the on-call fellow/attending, can check [email or web address] for schedule. Login is [email or web address], schedule under [facility] medicine, psych, derm)

## 2022-08-18 NOTE — PROGRESS NOTE ADULT - PROBLEM SELECTOR PLAN 1
Hypoxic to 80s at home while on baseline 8L NC. Patient gaining weight since hospital discharge early July, increased torsemide w/o significant results. Likely in s/o CHF exacerbation, given elevated BNP, limited exercise tolerance, worsening LE edema. Patient afebrile, non-toxic appearing, infection unlikely.   Patient subjectively endorsing decreased urine output throughout yesterday after receiving lasix in AM. Switched to bumex 3mg BID, first dose last night (8/15). Switched back to lasix now 80mg IV BID as patient wasn't responsive to bumex.   - now on lasix 80 mg IV BID, holding spironolactone for now pending HF recs in setting of previous hyperkalemia   - standing daily weights, strict I/Os  - pulm transplant and HF following  - eventual SGLT2-i prior to discharge, once on stable dose of oral diuretics  - HF: most recent CardioMEMS PAD 45, repeat following removal of nasal packing

## 2022-08-18 NOTE — CONSULT NOTE ADULT - SUBJECTIVE AND OBJECTIVE BOX
HPI:  66F w/ severe pulmonary HTN (Group 1, 2 & 3), HFpEF, Afib, COPD on home O2 (5L NC), CKD, ROLANDA on CPAP, morbid obesity (s/p bariatric surgery in 2012 with subsequent lap band removal due to GI bleed), PE, CVA (MCA infarct in 2012) presenting as xfer from [facility] for lung transplant evaluation. Patient initially presented to OSH ED for hypoxia to 80s at home while on baseline 8L NC. Patient endorses 1 week progressive SOB, limited exercise tolerance, though also experiencing dyspnea at rest. Denies PND, orthopnea, chest pain, diaphoresis, headache, abdominal pain, cough, URI symptoms, cough, recent infection, fever, sick contacts. Denies new medications, changes to existing medications. Also endorses worsening b/l LE edema up to level of thighs and 15lb weight gain since beginning of July. Endorses b/l leg cramping that she attributes to not ambulating regularly due to dyspnea. Patient instructed to increase torsemide dose if she notices weight gain, and she had been periodically taking 40mg torsemide (vs 20mg) w/ little response in urine output or change in weight. Felt she was not responding to torsemide as well as usual. Patient has also been using rescue inhalers more frequently (2x daily over the past week vs usual once daily). Taking all other medications as prescribed. Patient with multiple admissions over the past year for acute hypoxic respiratory failure 2/2 CHF exacerbation.     OSH course:   Given 80mg lasix IVP x1 in ED. [place]NP elevated to [address], Cr elevated to 2.42 (baseline 1.2-1.4), and Hgb 6.9 (baseline approx 8). Patient transfused 1U PRBC w improvement in Hgb to 7.8, and eliquis was held due to concern for bleed. Patient started on IV lasix 60mg BID. Transferred to [facility] for management of severe pulmonary hypertension and to continue lung transplant evaluation. Based on records for , appears eliquis was restarted upon dc.     Tele reviewed - Afib w frequent PVCs, trigeminy     (11 Aug 2022 01:42)    PERTINENT PM/SXH:   COPD exacerbation    Severe pulmonary hypertension    Chronic kidney disease, unspecified CKD stage    Acute on chronic diastolic congestive heart failure    Pulmonary embolism    Anxiety and depression    GERD (gastroesophageal reflux disease)    Obstructive sleep apnea    Chronic atrial fibrillation      H/O pneumonectomy    Chronic atrial fibrillation    H/O hyperlipidemia    Major depression    Right leg weakness      FAMILY HISTORY:  No pertinent family history      ITEMS NOT CHECKED ARE NOT PRESENT    SOCIAL HISTORY:   Significant other/partner[ ]  Children[x ]  Cheondoism/Spirituality:  Substance hx:  [ ]   Tobacco hx:  [ ]   Alcohol hx: [ ]   Home Opioid hx:  [ ] I-Stop Reference No:  Living Situation: [x ]Home  [ ]Long term care  [ ]Rehab [ ]Other  Per  note she lives with her two sons Noble    ADVANCE DIRECTIVES:    DNR/MOLST  [ ]  Living Will  [ ]   DECISION MAKER(s): Self  [ ] Health Care Proxy(s)  [ ] Surrogate(s)  [ ] Guardian           Name(s): Phone Number(s):    BASELINE (I)ADL(s) (prior to admission):  [place]: [x]Total  [ ] Moderate [ ]Dependent    Allergies    nitrates (Unknown)    Intolerances    MEDICATIONS  (STANDING):  ambrisentan 10 milliGRAM(s) Oral daily  aspirin  chewable 81 milliGRAM(s) Oral daily  atorvastatin 20 milliGRAM(s) Oral at bedtime  budesonide  80 MICROgram(s)/formoterol 4.5 MICROgram(s) Inhaler 2 Puff(s) Inhalation two times a day  buPROPion XL (24-Hour) . 300 milliGRAM(s) Oral daily  digoxin     Tablet 125 MICROGram(s) Oral daily  diltiazem    milliGRAM(s) Oral daily  ferrous    sulfate 325 milliGRAM(s) Oral daily  furosemide   Injectable 80 milliGRAM(s) IV Push two times a day  pantoprazole    Tablet 40 milliGRAM(s) Oral before breakfast  polyethylene glycol 3350 17 Gram(s) Oral daily  sodium chloride 0.65% Nasal 1 Spray(s) Both Nostrils four times a day  tadalafil 40 milliGRAM(s) Oral daily  tiotropium 18 MICROgram(s) Capsule 1 Capsule(s) Inhalation daily    MEDICATIONS  (PRN):  acetaminophen     Tablet .. 975 milliGRAM(s) Oral every 6 hours PRN Temp greater or equal to 38C (100.4F), Mild Pain (1 - 3), Moderate Pain (4 - 6)  ALBUTerol    90 MICROgram(s) HFA Inhaler 2 Puff(s) Inhalation every 6 hours PRN Shortness of Breath and/or Wheezing  melatonin 3 milliGRAM(s) Oral at bedtime PRN Insomnia    PRESENT SYMPTOMS: [ ]Unable to self-report  [ ] CPOT [ ] PAINADs [ ] RDOS  Source if other than patient:  [ ]Family   [ ]Team     Pain: [ ]yes [ ]no  QOL impact -   Location -                    Aggravating factors -  Quality -  Radiation -  Timing-  Severity (0-10 scale):  Minimal acceptable level (0-10 scale):     CPOT:    https://www.[facility].org/getattachment/[email or web address]-[email or web address]/Critical-Care-Pain-Observation-Tool-(CPOT)    PAIN AD Score:   http://geriatrictoolkit.[facility]/cog/painad.pdf (press ctrl +  left click to view)    Dyspnea:                           [ ]Mild [ ]Moderate [ ]Severe      RDOS:  0 to 2  minimal or no respiratory distress   3  mild distress  4 to 6 moderate distress  >7 severe distress  https://homecareinformation.net/handouts/hen/Respiratory_Distress_Observation_Scale.pdf (Ctrl +  left click to view)     Anxiety:                             [ ]Mild [ ]Moderate [ ]Severe  Fatigue:                             [ ]Mild [ ]Moderate [ ]Severe  Nausea:                             [ ]Mild [ ]Moderate [ ]Severe  Loss of appetite:              [ ]Mild [ ]Moderate [ ]Severe  Constipation:                    [ ]Mild [ ]Moderate [ ]Severe    PCSSQ[Palliative Care Spiritual Screening Question]   Severity (0-10):  Score of 4 or > indicate consideration of Chaplaincy referral.  Chaplaincy Referral: [ ] yes [ ] refused [ ] following    Other Symptoms:  [ ]All other review of systems negative     Palliative Performance Status Version 2:         %    http://npcrc.org/files/news/palliative_performance_scale_ppsv2.pdf  PHYSICAL EXAM:  Vital Signs Last 24 Hrs  T(C): 36.6 (18 Aug 2022 04:54), Max: 36.6 (17 Aug 2022 12:41)  T(F): 97.9 (18 Aug 2022 04:54), Max: 97.9 (17 Aug 2022 12:41)  HR: 79 (18 Aug 2022 08:07) (55 - 79)  BP: 114/60 (18 Aug 2022 04:54) (97/55 - 114/60)  BP(mean): 75 (17 Aug 2022 19:29) (75 - 75)  RR: 22 (18 Aug 2022 06:38) (18 - 22)  SpO2: 94% (18 Aug 2022 08:07) (92% - 100%)    Parameters below as of 18 Aug 2022 06:38  Patient On (Oxygen Delivery Method): nasal cannula  O2 Flow (L/min): 8   I&O's Summary    17 Aug 2022 07:01  -  18 Aug 2022 07:00  --------------------------------------------------------  IN: 1100 mL / OUT: 2400 mL / NET: -1300 mL    18 Aug 2022 07:01  -  18 Aug 2022 11:42  --------------------------------------------------------  IN: 360 mL / OUT: 500 mL / NET: -140 mL      GENERAL:  [x]Alert  [x]Oriented x 3  [ ]Lethargic  [ ]Cachexia  [ ]Unarousable  [x]Verbal  [ ]Non-Verbal  Behavioral:   [ ]Anxiety  [ ]Delirium [ ]Agitation [ ]Other  HEENT:  [x]Normal   [ ]Dry mouth   [ ]ET Tube/Trach  [ ]Oral lesions  PULMONARY:   [x]Clear [ ]Tachypnea  [ ]Audible excessive secretions   [ ]Rhonchi        [ ]Right [ ]Left [ ]Bilateral  [ ]Crackles        [ ]Right [ ]Left [ ]Bilateral  [ ]Wheezing     [ ]Right [ ]Left [ ]Bilateral  [ ]Diminished BS [ ] Right [ ]Left [ ]Bilateral  CARDIOVASCULAR:    [x]Regular [ ]Irregular [ ]Tachy  [ ]Thanh [ ]Murmur [ ]Other  GASTROINTESTINAL:  [x]Soft  [ ]Distended   [x]+BS  [x]Non tender [ ]Tender  [ ]PEG [ ]OGT/ NGT   Last BM:    GENITOURINARY:  [x]Normal [ ]Incontinent   [ ]Oliguria/Anuria   [ ][place]  MUSCULOSKELETAL:   [ ]Normal   [x]Weakness  [ ]Bed/Wheelchair bound [ ]Edema  NEUROLOGIC:   [x]No focal deficits  [ ] Cognitive impairment  [ ] Dysphagia [ ]Dysarthria [ ] Paresis [ ]Other   SKIN:   [x]Normal  [ ]Rash   [ ]Pressure ulcer(s) [ ]y [ ]n present on admission    CRITICAL CARE:  [ ] Shock Present  [ ]Septic [ ]Cardiogenic [ ]Neurologic [ ]Hypovolemic  [ ]  Vasopressors [ ]  Inotropes   [ ]Respiratory failure present [ ]Mechanical ventilation [ ]Non-invasive ventilatory support [ ]High flow    [ ]Acute  [ ]Chronic [ ]Hypoxic  [ ]Hypercarbic [ ]Other  [ ]Other organ failure     LABS:                        7.3    3.72  )-----------( 175      ( 18 Aug 2022 06:17 )             24.7   08-18    131<L>  |  88<L>  |  80<H>  ----------------------------<  70  4.3   |  34<H>  |  1.78<H>    Ca    9.4      18 Aug 2022 06:20  Phos  3.9     08-18  Mg     2.0     08-18    TPro  6.6  /  Alb  3.8  /  TBili  0.3  /  DBili  x   /  AST  22  /  ALT  12  /  AlkPhos  106  08-18        RADIOLOGY & ADDITIONAL STUDIES:    PROTEIN CALORIE MALNUTRITION PRESENT: [ ]mild [ ]moderate [ ]severe [ ]underweight [ ]morbid obesity  https://www.andeal.org/vault/2440/web/files/ONC/Table_Clinical%20Characteristics%20to%20Document%20Malnutrition-White%20JV%20et%20al%202012.pdf    Height (cm): 160 (08-11-22 @ 00:40), 160 (06-20-22 @ 15:00)  Weight (kg): 93 (08-11-22 @ 00:40), 98.9 (06-20-22 @ 15:00)  BMI (kg/m2): 36.3 (08-11-22 @ 00:40), 38.6 (06-20-22 @ 15:00)    [ ]PPSV2 < or = to 30% [ ]significant weight loss  [ ]poor nutritional intake  [ ]anasarca[ ]Artificial Nutrition      Other REFERRALS:  [ ]Hospice  [ ]Child Life  [ ]Social Work  [ ]Case management [ ]Holistic Therapy     Goals of Care Document:  HPI:  66F w/ severe pulmonary HTN (Group 1, 2 & 3), HFpEF, Afib, COPD on home O2 (5L NC), CKD, ROLANDA on CPAP, morbid obesity (s/p bariatric surgery in 2012 with subsequent lap band removal due to GI bleed), PE, CVA (MCA infarct in 2012) presenting as xfer from [facility] for lung transplant evaluation. Patient initially presented to OSH ED for hypoxia to 80s at home while on baseline 8L NC. Patient endorses 1 week progressive SOB, limited exercise tolerance, though also experiencing dyspnea at rest. Denies PND, orthopnea, chest pain, diaphoresis, headache, abdominal pain, cough, URI symptoms, cough, recent infection, fever, sick contacts. Denies new medications, changes to existing medications. Also endorses worsening b/l LE edema up to level of thighs and 15lb weight gain since beginning of July. Endorses b/l leg cramping that she attributes to not ambulating regularly due to dyspnea. Patient instructed to increase torsemide dose if she notices weight gain, and she had been periodically taking 40mg torsemide (vs 20mg) w/ little response in urine output or change in weight. Felt she was not responding to torsemide as well as usual. Patient has also been using rescue inhalers more frequently (2x daily over the past week vs usual once daily). Taking all other medications as prescribed. Patient with multiple admissions over the past year for acute hypoxic respiratory failure 2/2 CHF exacerbation.     OSH course:   Given 80mg lasix IVP x1 in ED. [place] elevated to [address], Cr elevated to 2.42 (baseline 1.2-1.4), and Hgb 6.9 (baseline approx 8). Patient transfused 1U PRBC w improvement in Hgb to 7.8, and eliquis was held due to concern for bleed. Patient started on IV lasix 60mg BID. Transferred to [facility] for management of severe pulmonary hypertension and to continue lung transplant evaluation. Based on records for , appears eliquis was restarted upon dc.     Tele reviewed - Afib w frequent PVCs, trigeminy     (11 Aug 2022 01:42)    PERTINENT PM/SXH:   COPD exacerbation    Severe pulmonary hypertension    Chronic kidney disease, unspecified CKD stage    Acute on chronic diastolic congestive heart failure    Pulmonary embolism    Anxiety and depression    GERD (gastroesophageal reflux disease)    Obstructive sleep apnea    Chronic atrial fibrillation    H/O pneumonectomy    Chronic atrial fibrillation    H/O hyperlipidemia    Major depression    Right leg weakness      FAMILY HISTORY:  No pertinent family history in first degree relatives      ITEMS NOT CHECKED ARE NOT PRESENT    SOCIAL HISTORY:   Significant other/partner[ ]  Children[x ]  Yazidi/Spirituality:  Substance hx:  [ ]   Tobacco hx:  [ ]   Alcohol hx: [ ]   Home Opioid hx:  [ ] I-Stop Reference No:  Living Situation: [x ]Home  [ ]Long term care  [ ]Rehab [ ]Other  Lives with her two sons Dereje    ADVANCE DIRECTIVES:    DNR/MOLST  [ ]  Living Will  [ ]   DECISION MAKER(s): Self  [ ] Health Care Proxy(s)  [ ] Surrogate(s)  [ ] Guardian           Name(s): Phone Number(s):    BASELINE (I)ADL(s) (prior to admission):  [place]: [x]Total  [ ] Moderate [ ]Dependent    Allergies    nitrates (Unknown)    Intolerances    MEDICATIONS  (STANDING):  ambrisentan 10 milliGRAM(s) Oral daily  aspirin  chewable 81 milliGRAM(s) Oral daily  atorvastatin 20 milliGRAM(s) Oral at bedtime  budesonide  80 MICROgram(s)/formoterol 4.5 MICROgram(s) Inhaler 2 Puff(s) Inhalation two times a day  buPROPion XL (24-Hour) . 300 milliGRAM(s) Oral daily  digoxin     Tablet 125 MICROGram(s) Oral daily  diltiazem    milliGRAM(s) Oral daily  ferrous    sulfate 325 milliGRAM(s) Oral daily  furosemide   Injectable 80 milliGRAM(s) IV Push two times a day  pantoprazole    Tablet 40 milliGRAM(s) Oral before breakfast  polyethylene glycol 3350 17 Gram(s) Oral daily  sodium chloride 0.65% Nasal 1 Spray(s) Both Nostrils four times a day  tadalafil 40 milliGRAM(s) Oral daily  tiotropium 18 MICROgram(s) Capsule 1 Capsule(s) Inhalation daily    MEDICATIONS  (PRN):  acetaminophen     Tablet .. 975 milliGRAM(s) Oral every 6 hours PRN Temp greater or equal to 38C (100.4F), Mild Pain (1 - 3), Moderate Pain (4 - 6)  ALBUTerol    90 MICROgram(s) HFA Inhaler 2 Puff(s) Inhalation every 6 hours PRN Shortness of Breath and/or Wheezing  melatonin 3 milliGRAM(s) Oral at bedtime PRN Insomnia    PRESENT SYMPTOMS: [ ]Unable to self-report  [ ] CPOT [ ] PAINADs [ ] RDOS  Source if other than patient:  [ ]Family   [ ]Team     Pain: [ ]yes [x ]no  QOL impact -   Location -                    Aggravating factors -  Quality -  Radiation -  Timing-  Severity (0-10 scale):  Minimal acceptable level (0-10 scale):     CPOT:    https://www.[facility].org/getattachment/[email or web address]/Critical-Care-Pain-Observation-Tool-(CPOT)    PAIN AD Score:   http://geriatrictoolkit.[facility]/cog/painad.pdf (press ctrl +  left click to view)    Dyspnea:                           [x ]Mild [ ]Moderate [ ]Severe      RDOS:  0 to 2  minimal or no respiratory distress   3  mild distress  4 to 6 moderate distress  >7 severe distress  https://homecareinformation.net/handouts/hen/Respiratory_Distress_Observation_Scale.pdf (Ctrl +  left click to view)     Anxiety:                             [ ]Mild [ ]Moderate [ ]Severe  Fatigue:                             [ ]Mild [ ]Moderate [ ]Severe  Nausea:                             [ ]Mild [ ]Moderate [ ]Severe  Loss of appetite:              [ ]Mild [ ]Moderate [ ]Severe  Constipation:                    [ ]Mild [ ]Moderate [ ]Severe    PCSSQ[Palliative Care Spiritual Screening Question]   Severity (0-10):  Score of 4 or > indicate consideration of Chaplaincy referral.  Chaplaincy Referral: [ ] yes [ ] refused [ ] following    Other Symptoms:  [x]All other review of systems negative     PHYSICAL EXAM:  Vital Signs Last 24 Hrs  T(C): 36.6 (18 Aug 2022 04:54), Max: 36.6 (17 Aug 2022 12:41)  T(F): 97.9 (18 Aug 2022 04:54), Max: 97.9 (17 Aug 2022 12:41)  HR: 79 (18 Aug 2022 08:07) (55 - 79)  BP: 114/60 (18 Aug 2022 04:54) (97/55 - 114/60)  BP(mean): 75 (17 Aug 2022 19:29) (75 - 75)  RR: 22 (18 Aug 2022 06:38) (18 - 22)  SpO2: 94% (18 Aug 2022 08:07) (92% - 100%)    Parameters below as of 18 Aug 2022 06:38  Patient On (Oxygen Delivery Method): nasal cannula  O2 Flow (L/min): 8   I&O's Summary    17 Aug 2022 07:01  -  18 Aug 2022 07:00  --------------------------------------------------------  IN: 1100 mL / OUT: 2400 mL / NET: -1300 mL    18 Aug 2022 07:01  -  18 Aug 2022 11:42  --------------------------------------------------------  IN: 360 mL / OUT: 500 mL / NET: -140 mL      GENERAL:  [x]Alert  [x]Oriented x 3  [ ]Lethargic  [ ]Cachexia  [ ]Unarousable  [x]Verbal  [ ]Non-Verbal  Behavioral:   [ ]Anxiety  [ ]Delirium [ ]Agitation [ ]Other  HEENT:  [x]Normal   [ ]Dry mouth   [ ]ET Tube/Trach  [ ]Oral lesions  PULMONARY:   [x]Clear [ ]Tachypnea  [ ]Audible excessive secretions   [ ]Rhonchi        [ ]Right [ ]Left [ ]Bilateral  [ ]Crackles        [ ]Right [ ]Left [ ]Bilateral  [ ]Wheezing     [ ]Right [ ]Left [ ]Bilateral  [ ]Diminished BS [ ] Right [ ]Left [ ]Bilateral  CARDIOVASCULAR:    [x]Regular [ ]Irregular [ ]Tachy  [ ]Thanh [ ]Murmur [ ]Other  GASTROINTESTINAL:  [x]Soft  [ ]Distended   [x]+BS  [x]Non tender [ ]Tender  [ ]PEG [ ]OGT/ NGT   Last BM:    GENITOURINARY:  [x]Normal [ ]Incontinent   [ ]Oliguria/Anuria   [ ][place]  MUSCULOSKELETAL:   [ ]Normal   [x]Weakness  [ ]Bed/Wheelchair bound [ ]Edema  NEUROLOGIC:   [x]No focal deficits  [ ] Cognitive impairment  [ ] Dysphagia [ ]Dysarthria [ ] Paresis [ ]Other   SKIN:   [x]Normal  [ ]Rash   [ ]Pressure ulcer(s) [ ]y [ ]n present on admission    CRITICAL CARE:  [ ] Shock Present  [ ]Septic [ ]Cardiogenic [ ]Neurologic [ ]Hypovolemic  [ ]  Vasopressors [ ]  Inotropes   [ ]Respiratory failure present [ ]Mechanical ventilation [ ]Non-invasive ventilatory support [ ]High flow    [ ]Acute  [ ]Chronic [ ]Hypoxic  [ ]Hypercarbic [ ]Other  [ ]Other organ failure     LABS:                        7.3    3.72  )-----------( 175      ( 18 Aug 2022 06:17 )             24.7   08-18    131<L>  |  88<L>  |  80<H>  ----------------------------<  70  4.3   |  34<H>  |  1.78<H>    Ca    9.4      18 Aug 2022 06:20  Phos  3.9     08-18  Mg     2.0     08-18    TPro  6.6  /  Alb  3.8  /  TBili  0.3  /  DBili  x   /  AST  22  /  ALT  12  /  AlkPhos  106  08-18        RADIOLOGY & ADDITIONAL STUDIES:    PROTEIN CALORIE MALNUTRITION PRESENT: [ ]mild [ ]moderate [ ]severe [ ]underweight [ ]morbid obesity  https://www.andeal.org/vault/2440/web/files/ONC/Table_Clinical%20Characteristics%20to%20Document%20Malnutrition-White%20JV%20et%20al%202012.pdf    Height (cm): 160 (08-11-22 @ 00:40), 160 (06-20-22 @ 15:00)  Weight (kg): 93 (08-11-22 @ 00:40), 98.9 (06-20-22 @ 15:00)  BMI (kg/m2): 36.3 (08-11-22 @ 00:40), 38.6 (06-20-22 @ 15:00)    [ ]PPSV2 < or = to 30% [ ]significant weight loss  [ ]poor nutritional intake  [ ]anasarca[ ]Artificial Nutrition      Other REFERRALS:  [ ]Hospice  [ ]Child Life  [ ]Social Work  [ ]Case management [ ]Holistic Therapy     Goals of Care Document:

## 2022-08-18 NOTE — CONSULT NOTE ADULT - ASSESSMENT
66F w/ severe pulmonary HTN (Group 1, 2 & 3), HFpEF, Afib, COPD on home O2 (5L NC), CKD, ROLANDA on CPAP, morbid obesity presenting with acute hypoxic respiratory failure, admitted for CHF exacerbation and lung transplant evaluation. Per pulmonology transplant team patient is not a candidate for lung transplant. Palliative consulted for GOC.

## 2022-08-18 NOTE — PROGRESS NOTE ADULT - ATTENDING COMMENTS
reports feeling well, urinated 2.4 lits  wt trending down    Plan:  will cont lasix 80mg BID IV and Hypertonic saline  daily standing wt and I/O's  PT eval  cont current meds

## 2022-08-18 NOTE — PROGRESS NOTE ADULT - PROBLEM SELECTOR PLAN 1
- further guidance by pulmonary hypertension team and lung transplant team  - not a lung transplant candidate at this point, also not a candidate for IV therapy for pulmonary dilators  - palliative care following

## 2022-08-18 NOTE — PROGRESS NOTE ADULT - SUBJECTIVE AND OBJECTIVE BOX
[facility] Division of Kidney Diseases & Hypertension  FOLLOW UP NOTE  [phone]--------------------------------------------------------------------------------  Chief Complaint:Pulmonary hypertension due to lung diseases and hypoxia      HPI: 66 year old with PMH of Pulmonary hypertension (Group 1,2,3) HFpEF, atrial fibrillation, COPD on home oxygen, ROLANDA on CPAP, morbid obesity with acute hypoxic respiratory failure. Nephrology consulted for JUANA. baseline creatinine as of june appears to be 1.5-1.6 on august 11 patient creatinine was 1.89 and has continued to up trend most recently 2.06 8/13. Patient was initially started on lasix 60 mg IV BID on admission with marked improvement in pro-BNP since presentation 29904 (8/11) and most recently 8947 (8/13).       24 hour events/subjective: Patient seen & examined. Labs & vitals reviewed. Remains on lasix 80mg IV bid.  Reports some improvement in SOB & leg edema. UO in the last 24 hours  2.4L with net negative 1.4L. Remains on 8L NC. 4Kg down since admission      PAST HISTORY  --------------------------------------------------------------------------------  No significant changes to PMH, PSH, FHx, SHx, unless otherwise noted    ALLERGIES & MEDICATIONS  --------------------------------------------------------------------------------  Allergies    nitrates (Unknown)    Intolerances      Standing Inpatient Medications  ambrisentan 10 milliGRAM(s) Oral daily  aspirin  chewable 81 milliGRAM(s) Oral daily  atorvastatin 20 milliGRAM(s) Oral at bedtime  budesonide  80 MICROgram(s)/formoterol 4.5 MICROgram(s) Inhaler 2 Puff(s) Inhalation two times a day  buPROPion XL (24-Hour) . 300 milliGRAM(s) Oral daily  digoxin     Tablet 125 MICROGram(s) Oral daily  diltiazem    milliGRAM(s) Oral daily  ferrous    sulfate 325 milliGRAM(s) Oral daily  furosemide   Injectable 80 milliGRAM(s) IV Push two times a day  pantoprazole    Tablet 40 milliGRAM(s) Oral before breakfast  polyethylene glycol 3350 17 Gram(s) Oral daily  sodium chloride 0.65% Nasal 1 Spray(s) Both Nostrils four times a day  tadalafil 40 milliGRAM(s) Oral daily  tiotropium 18 MICROgram(s) Capsule 1 Capsule(s) Inhalation daily    PRN Inpatient Medications  acetaminophen     Tablet .. 975 milliGRAM(s) Oral every 6 hours PRN  ALBUTerol    90 MICROgram(s) HFA Inhaler 2 Puff(s) Inhalation every 6 hours PRN  melatonin 3 milliGRAM(s) Oral at bedtime PRN      REVIEW OF SYSTEMS  --------------------------------------------------------------------------------  Gen: No chills  Respiratory: + dyspnea, cough  CV: No chest pain  GI: No abdominal pain, diarrhea,  nausea, vomiting  : No increased frequency, dysuria, hematuria  MSK:  +edema  Neuro: No dizziness/lightheadedness      All other systems were reviewed and are negative, except as noted.    VITALS/PHYSICAL EXAM  --------------------------------------------------------------------------------  T(C): 36.6 (08-18-22 @ 12:19), Max: 36.6 (08-17-22 @ 19:29)  HR: 75 (08-18-22 @ 12:19) (55 - 79)  BP: 113/69 (08-18-22 @ 12:19) (101/62 - 114/60)  RR: 18 (08-18-22 @ 12:19) (18 - 22)  SpO2: 97% (08-18-22 @ 12:19) (92% - 100%)  Wt(kg): --        08-17-22 @ 07:01  -  08-18-22 @ 07:00  --------------------------------------------------------  IN: 1100 mL / OUT: 2400 mL / NET: -1300 mL    08-18-22 @ 07:01  -  08-18-22 @ 14:12  --------------------------------------------------------  IN: 360 mL / OUT: 800 mL / NET: -440 mL      Physical Exam:  Gen: NAD, on 8L NC  HEENT: anicteric  Pulm: bibasilar crackles  CV: RRR  Abd: soft, nontender, nondistended  CNS: awake, alert  : Norma Lunsford  LE: +++ b/l LE edema (improving)  Skin: Warm, without rashes  Vascular access:   LABS/STUDIES  --------------------------------------------------------------------------------              7.3    3.72  >-----------<  175      [08-18-22 @ 06:17]              24.7     131  |  88  |  80  ----------------------------<  70      [08-18-22 @ 06:20]  4.3   |  34  |  1.78        Ca     9.4     [08-18-22 @ 06:20]      Mg     2.0     [08-18-22 @ 06:20]      Phos  3.9     [08-18-22 @ 06:20]    TPro  6.6  /  Alb  3.8  /  TBili  0.3  /  DBili  x   /  AST  22  /  ALT  12  /  AlkPhos  106  [08-18-22 @ 06:20]          Creatinine Trend:  SCr 1.78 [08-18 @ 06:20]  SCr 1.90 [08-17 @ 06:50]  SCr 2.08 [08-16 @ 12:35]  SCr 2.18 [08-16 @ 06:34]  SCr 1.96 [08-15 @ 05:43]      Urine Creatinine 33      [08-14-22 @ 08:40]  Urine Sodium 90      [08-12-22 @ 12:36]  Urine Urea Nitrogen 397      [08-14-22 @ 08:40]    Iron 27, TIBC 350, %sat 8      [08-12-22 @ 06:56]  Ferritin 26      [08-12-22 @ 06:56]

## 2022-08-18 NOTE — PROGRESS NOTE ADULT - PROBLEM SELECTOR PLAN 3
- rate controlled on Digoxin (Digoxin level 8/11 0.6) and Cardizem   - on Eliquis for AC, currently on hold for epistaxis and recurrent anemia.

## 2022-08-18 NOTE — PROGRESS NOTE ADULT - PROBLEM SELECTOR PLAN 7
Hgb 6.9 at OSH, transfused 1U PRBC with improvement to 7.8. Patient w/o signs/symptoms of acute blood loss, HD stable. Denies changes in bowel/urinary habits, denies recent falls/trauma.   - maintain active T/S  - Holding eliquis as above.   - f/u iron studies: total iron and % saturation decreased, ferritin on lower side, transferrin and TIBC normal; s/p IV iron infusion  - 8/16: Hg this AM 6.7, receiving unit of pRBC, f/u repeat CBC 8.1  - started on oral iron daily 325

## 2022-08-18 NOTE — PROGRESS NOTE ADULT - ASSESSMENT
66 year old woman with HFpEF with primarily RV dysfunction s/p CardioMEMS, severe pulmonary hypertension (Group 1, 2 & 3), COPD on home O2 8L, AF (on Eliquis), PE, CVA (MCA infarct 2012), CKD (b/l Cr 1.7-1.8), ROLANDA on CPAP and morbid obesity (s/p bariatric surgery 2012 with lap band removal d/t GIB) who was recently hospitalized 6/14-6/29/22 for volume overload where she met with lung transplant team with plan for evaluation pending improvement in BMI and deconditioned state. Had RHC in June with severely elevated PAP and preserved CO.     She presents with RINALDI, ABD bloating and LE swelling in the setting of gradual 25 lb weight gain despite escalation of diuretic regimen. She was started on intermittent IV Lasix and was responding well, but remains markedly overloaded with predominate symptoms of elevated R sided filling pressure. She was switched to IV bumex and did not respond as well so now back on IV lasix with as needed hypertonic saline. She is likely not a lung transplant candidate and also not a candidate for IV therapy pulmonary dilators. Plan is for continued diuresis and palliative care was consulted.       Cardiac Studies  CardioMEMS interrogation 8/16: PAP 98/45/66, HR 87  RHC and CardioMEMS Implant 6/20/22: RA 18, /44/65, PCWP 16 (v to 18), RA 95% on 8L NC, PA 58%, CO/CI (F) 6.7/3.2, PVR 7.32 ryan, TPG 49, /60/86 (CardioMEMS PAD 49)  TTE 6/15/22: LVIDd 4.8 cm, LVEF 68%, flattening of interventricular septum consistent with RV overload, mild concentric LVH (septum 1.2 PWT 1.1), RVE with decreased systolic function, mild LAE, severe ROE, mod TR, est RVSP 61 mmHg

## 2022-08-18 NOTE — CONSULT NOTE ADULT - PROBLEM SELECTOR RECOMMENDATION 5
Case discussed with Dr. Jacobo.   For acute issues please page palliative team.    Eva Wallace MD  Geriatrics and Palliative Medicine Attending  [facility] pager: [phone]

## 2022-08-18 NOTE — PROGRESS NOTE ADULT - PROBLEM SELECTOR PLAN 5
- s/p nasal packing with ENT, removed on 8/16 without recurrent bleeding  - can hold home aspirin in the setting of anemia if necessary

## 2022-08-18 NOTE — PROGRESS NOTE ADULT - PROBLEM SELECTOR PLAN 2
- bumex did not work as well for her, now back on IV lasix 80 BID with good UOP, s/p hypertonic saline yesterday  - c/w IV lasix 80 BID, consider another dose of hypertonic saline today, if not urinating adequately, can add metolazone 5 mg x1	  - hold spironolactone for hyperkalemia for now  - eventual SGLT2-i prior to discharge, once on stable dose of oral diuretics  - CardioMEMS PAD 8/11 43 mmHg. goal ~40 but difficult to ascertain given RV dysfunction  - cardiomems PAD 8/15 45 mmHg, 98/45/66  - daily standing weight. Was discharged in June at 189 lbs, now at 204, appears to be down-trending overall

## 2022-08-19 NOTE — PROGRESS NOTE ADULT - SUBJECTIVE AND OBJECTIVE BOX
Patient seen and examined at bedside.    Overnight Events: LISA    Review Of Systems: No chest pain, shortness of breath, or palpitations            Current Meds:  acetaminophen     Tablet .. 975 milliGRAM(s) Oral every 6 hours PRN  ALBUTerol    90 MICROgram(s) HFA Inhaler 2 Puff(s) Inhalation every 6 hours PRN  ambrisentan 10 milliGRAM(s) Oral daily  aspirin  chewable 81 milliGRAM(s) Oral daily  atorvastatin 20 milliGRAM(s) Oral at bedtime  budesonide  80 MICROgram(s)/formoterol 4.5 MICROgram(s) Inhaler 2 Puff(s) Inhalation two times a day  buPROPion XL (24-Hour) . 300 milliGRAM(s) Oral daily  digoxin     Tablet 125 MICROGram(s) Oral daily  diltiazem    milliGRAM(s) Oral daily  ferrous    sulfate 325 milliGRAM(s) Oral daily  furosemide   Injectable 80 milliGRAM(s) IV Push two times a day  melatonin 3 milliGRAM(s) Oral at bedtime PRN  pantoprazole    Tablet 40 milliGRAM(s) Oral before breakfast  polyethylene glycol 3350 17 Gram(s) Oral daily  sodium chloride 0.65% Nasal 1 Spray(s) Both Nostrils four times a day  tadalafil 40 milliGRAM(s) Oral daily  tiotropium 18 MICROgram(s) Capsule 1 Capsule(s) Inhalation daily      Vitals:  T(F): 98 (08-19), Max: 98.1 (08-19)  HR: 68 (08-19) (68 - 80)  BP: 93/54 (08-19) (93/54 - 107/56)  RR: 18 (08-19)  SpO2: 99% (08-19)  I&O's Summary    18 Aug 2022 07:01  -  19 Aug 2022 07:00  --------------------------------------------------------  IN: 1320 mL / OUT: 1650 mL / NET: -330 mL    19 Aug 2022 07:01  -  19 Aug 2022 13:07  --------------------------------------------------------  IN: 240 mL / OUT: 400 mL / NET: -160 mL        Physical Exam:  General: No distress. Comfortable.  HEENT: EOM intact.  Neck: Neck supple. JVP difficult to assess, but appears to be elevated. No masses  Chest: Unlabored, fine crackles bilateral bases.   CV: Normal S1 and S2. No murmurs, rub, or gallops. Radial pulses normal. +1-2 BLE edema.   Abdomen: Soft, non-distended, non-tender  Skin: No rashes or skin breakdown  Neurology: Alert and oriented times three. Sensation intact  Psych: Affect normal                          7.2    3.96  )-----------( 198      ( 19 Aug 2022 06:24 )             24.3     08-19    131<L>  |  88<L>  |  78<H>  ----------------------------<  68<L>  4.2   |  32<H>  |  2.34<H>    Ca    9.4      19 Aug 2022 06:24  Phos  3.7     08-19  Mg     2.0     08-19    TPro  6.4  /  Alb  3.7  /  TBili  0.3  /  DBili  x   /  AST  22  /  ALT  10  /  AlkPhos  108  08-19          Serum Pro-Brain Natriuretic Peptide: 37611 pg/mL (08-17 @ 06:50)  Serum Pro-Brain Natriuretic Peptide: 94722 pg/mL (08-16 @ 06:34)  Serum Pro-Brain Natriuretic Peptide: 09052 pg/mL (08-15 @ 05:43)  Serum Pro-Brain Natriuretic Peptide: 71542 pg/mL (08-14 @ 06:45)  Serum Pro-Brain Natriuretic Peptide: 8947 pg/mL (08-13 @ 06:31)

## 2022-08-19 NOTE — PROGRESS NOTE ADULT - ATTENDING COMMENTS
Patient seen and evaluated. Comfortable at rest. Noted decreased UOP today.  Per patient's request, I spoke to son, Matteo. Told of disease process, severity, lack of transplant option. Answered all questions.   #HFpEF, predominant right sided   -continue current diuretic regimen.   -patient is NOT a pulmonary transplant candidate per patient's conversation with transplant pulmonology.   -will optimize from volume status. Palliative care consult appreciated.   -hypertonic saline 8/19 with lasix to help augment diuresis   -team to discuss with heart failure re: center closer to home for follow up.       #Epistaxis  -resolved.  -continue NC. If recurs, will need ENT to cauterize.   -nasal saline.      #severe pulm HTN  -continue current pulm HTN medication  -given BMI >35, not candidate currently for pulm transplant and will not initiate transplant workup at this time.     #CKD III  -likely cardiorenal component vs. new baseline.   -continue to monitor, avoid nephrotoxic agents.     #Anemia  -iron studies denote iron deficiency. This is complicated by epistaxis.   -iron supplementation  -will see when had last colonoscopy. At this time high risk for colonoscopy, so may require outpatient virtual colonography if patient amenable.     Given weight, age, deconditioning, not a current candidate for transplantation.  Not a great hospice candidate given multiple appointments needed. Will attmept to coordinate care closer to her home.

## 2022-08-19 NOTE — PROGRESS NOTE ADULT - PROBLEM SELECTOR PLAN 5
Last echo 6/22 with EF 68%, normal LV systolic function w/o WMA. Flattening of interventricular septum c/w RV overload, w/ severe R atrial enlargement, RV enlargement w/ decreased RV systolic function, severely elevated pulmonary pressures.   - now on lasix 80 mg IV BID   - strict I/Os, daily standing weights  - holding spironolactone in setting of previous hyperkalemia, consider restarting once normalized

## 2022-08-19 NOTE — PROGRESS NOTE ADULT - ASSESSMENT
66F with PMHx COPD, CHFpEF, AFib, obesity (Type III, hx bariatric sx 2012), and severe pulmonary HTN with chronic combined hypercapnic and hypoxemic respiratory failure on ATC O2 supplementation (dependent on 8L NC at rest) with qhs/prn AVAPS (Trelegy Antonio via nasal prong interface) presenting for progressive dyspnea with minimal exertion admitted for acute decompensated right heart failure. RHC 6/20/2022 revealed sPAP: 106, dPAP: 44 mPAP:65, PCWP: 16, PVR 7.32W, CO/CI 6.7/3.2. Pt clinical hypervolemic today, tolerating diuresis, HD stable, noted rise in Cr without rise in BUN.    #pHTN  #ADHF  -c/w current diuresis as per HF, agree with maintaining negative fluid balance  - f/u nephrology recommendations  - strict I/Os and daily weights  - c/w home pulmonary vasodilators as above (tadalafil+ambrisentan).  - will defer initiation of flolan at this time due to concerns regarding increased risk from social factors/follow up; reports improving respiratory symptoms on current vasodilator regimen - not a transplant candidate at this time  - She will benefit from having follow up with a pulmonologist, currently does not have a pulmonologist and would prefer to establish one closer to home  - wean O2 supplementation as tolerated for goal O2 saturation 88-94%  - c/w home AVAPS [Trelegy]  qHS and PRN during the day  - Pt's home device at bedside and approved for use by [employer]  - c/w home bronchodilator therapy

## 2022-08-19 NOTE — PROGRESS NOTE ADULT - SUBJECTIVE AND OBJECTIVE BOX
CHIEF COMPLAINT:Patient is a 66y old  Female who presents with a chief complaint of lung txp eval (19 Aug 2022 13:07)      Interval Events:    REVIEW OF SYSTEMS:  [x] All other systems negative except per HPI   [ ] Unable to assess ROS because ________    OBJECTIVE:  ICU Vital Signs Last 24 Hrs  T(C): 36.7 (19 Aug 2022 12:37), Max: 36.7 (19 Aug 2022 04:12)  T(F): 98 (19 Aug 2022 12:37), Max: 98.1 (19 Aug 2022 04:12)  HR: 68 (19 Aug 2022 12:37) (68 - 80)  BP: 93/54 (19 Aug 2022 12:37) (93/54 - 107/56)  BP(mean): 67 (19 Aug 2022 12:37) (67 - 67)  ABP: --  ABP(mean): --  RR: 18 (19 Aug 2022 12:37) (16 - 18)  SpO2: 99% (19 Aug 2022 12:37) (90% - 100%)    O2 Parameters below as of 19 Aug 2022 12:37  Patient On (Oxygen Delivery Method): nasal cannula              08-18 @ 07:01  -  08-19 @ 07:00  --------------------------------------------------------  IN: 1320 mL / OUT: 1650 mL / NET: -330 mL    08-19 @ 07:01 - 08-19 @ 13:20  --------------------------------------------------------  IN: 240 mL / OUT: 400 mL / NET: -160 mL        PHYSICAL EXAM:  GENERAL: NAD, well-groomed, well-developed  HEAD:  Atraumatic, Normocephalic  EYES: EOMI, PERRLA, conjunctiva and sclera clear  ENMT: No tonsillar erythema, exudates, or enlargement; Moist mucous membranes, Good dentition, No lesions  NECK: Supple, No JVD, Normal thyroid  CHEST/LUNG: Clear to auscultation bilaterally; No rales, rhonchi, wheezing, or rubs  HEART: Regular rate and rhythm; No murmurs, rubs, or gallops  ABDOMEN: Soft, Nontender, Nondistended; Bowel sounds present  VASCULAR:  2+ Peripheral Pulses, No clubbing, cyanosis, or edema  LYMPH: No lymphadenopathy noted  SKIN: No rashes or lesions  NERVOUS SYSTEM:  Alert & Oriented X3, Good concentration; Motor Strength 5/5 B/L upper and lower extremities; DTRs 2+ intact and symmetric    HOSPITAL MEDICATIONS:  MEDICATIONS  (STANDING):  ambrisentan 10 milliGRAM(s) Oral daily  aspirin  chewable 81 milliGRAM(s) Oral daily  atorvastatin 20 milliGRAM(s) Oral at bedtime  budesonide  80 MICROgram(s)/formoterol 4.5 MICROgram(s) Inhaler 2 Puff(s) Inhalation two times a day  buPROPion XL (24-Hour) . 300 milliGRAM(s) Oral daily  digoxin     Tablet 125 MICROGram(s) Oral daily  diltiazem    milliGRAM(s) Oral daily  ferrous    sulfate 325 milliGRAM(s) Oral daily  furosemide   Injectable 80 milliGRAM(s) IV Push two times a day  pantoprazole    Tablet 40 milliGRAM(s) Oral before breakfast  polyethylene glycol 3350 17 Gram(s) Oral daily  sodium chloride 0.65% Nasal 1 Spray(s) Both Nostrils four times a day  tadalafil 40 milliGRAM(s) Oral daily  tiotropium 18 MICROgram(s) Capsule 1 Capsule(s) Inhalation daily    MEDICATIONS  (PRN):  acetaminophen     Tablet .. 975 milliGRAM(s) Oral every 6 hours PRN Temp greater or equal to 38C (100.4F), Mild Pain (1 - 3), Moderate Pain (4 - 6)  ALBUTerol    90 MICROgram(s) HFA Inhaler 2 Puff(s) Inhalation every 6 hours PRN Shortness of Breath and/or Wheezing  melatonin 3 milliGRAM(s) Oral at bedtime PRN Insomnia      LABS:    The Labs were reviewed by me   The Radiology was reviewed by me    EKG tracing reviewed by me    08-19    131<L>  |  88<L>  |  78<H>  ----------------------------<  68<L>  4.2   |  32<H>  |  2.34<H>  08-18    131<L>  |  88<L>  |  80<H>  ----------------------------<  70  4.3   |  34<H>  |  1.78<H>  08-17    130<L>  |  86<L>  |  86<H>  ----------------------------<  77  4.4   |  35<H>  |  1.90<H>    Ca    9.4      19 Aug 2022 06:24  Ca    9.4      18 Aug 2022 06:20  Ca    9.5      17 Aug 2022 06:50  Phos  3.7     08-19  Mg     2.0     08-19    TPro  6.4  /  Alb  3.7  /  TBili  0.3  /  DBili  x   /  AST  22  /  ALT  10  /  AlkPhos  108  08-19  TPro  6.6  /  Alb  3.8  /  TBili  0.3  /  DBili  x   /  AST  22  /  ALT  12  /  AlkPhos  106  08-18  TPro  6.5  /  Alb  3.8  /  TBili  0.5  /  DBili  x   /  AST  21  /  ALT  10  /  AlkPhos  105  08-17    Magnesium, Serum: 2.0 mg/dL (08-19-22 @ 06:24)  Magnesium, Serum: 2.0 mg/dL (08-18-22 @ 06:20)  Magnesium, Serum: 2.0 mg/dL (08-17-22 @ 06:50)    Phosphorus Level, Serum: 3.7 mg/dL (08-19-22 @ 06:24)  Phosphorus Level, Serum: 3.9 mg/dL (08-18-22 @ 06:20)  Phosphorus Level, Serum: 3.7 mg/dL (08-17-22 @ 06:50)                                              7.2    3.96  )-----------( 198      ( 19 Aug 2022 06:24 )             24.3                         7.3    3.72  )-----------( 175      ( 18 Aug 2022 06:17 )             24.7                         7.4    4.16  )-----------( 163      ( 17 Aug 2022 06:50 )             24.1     CAPILLARY BLOOD GLUCOSE            MICROBIOLOGY:     RADIOLOGY:  [ ] Reviewed and interpreted by me    Point of Care Ultrasound Findings:    PFT:    EKG: CHIEF COMPLAINT:Patient is a 66y old  Female who presents with a chief complaint of lung txp eval (19 Aug 2022 13:07)      Interval Events:  Remains on NC, no further epistaxis.    REVIEW OF SYSTEMS:  [x] All other systems negative except per HPI   [ ] Unable to assess ROS because ________    OBJECTIVE:  ICU Vital Signs Last 24 Hrs  T(C): 36.7 (19 Aug 2022 12:37), Max: 36.7 (19 Aug 2022 04:12)  T(F): 98 (19 Aug 2022 12:37), Max: 98.1 (19 Aug 2022 04:12)  HR: 68 (19 Aug 2022 12:37) (68 - 80)  BP: 93/54 (19 Aug 2022 12:37) (93/54 - 107/56)  BP(mean): 67 (19 Aug 2022 12:37) (67 - 67)  ABP: --  ABP(mean): --  RR: 18 (19 Aug 2022 12:37) (16 - 18)  SpO2: 99% (19 Aug 2022 12:37) (90% - 100%)    O2 Parameters below as of 19 Aug 2022 12:37  Patient On (Oxygen Delivery Method): nasal cannula              08-18 @ 07:01  -  08-19 @ 07:00  --------------------------------------------------------  IN: 1320 mL / OUT: 1650 mL / NET: -330 mL    08-19 @ 07:01  -  08-19 @ 13:20  --------------------------------------------------------  IN: 240 mL / OUT: 400 mL / NET: -160 mL        PHYSICAL EXAM:  GENERAL: NAD, well-groomed, well-developed  HEAD:  Atraumatic, Normocephalic  EYES: EOMI, PERRLA, conjunctiva and sclera clear  ENMT: No tonsillar erythema, exudates, or enlargement; Moist mucous membranes, Good dentition, No lesions  NECK: Supple, No JVD, Normal thyroid  CHEST/LUNG: Clear to auscultation bilaterally; No rales, rhonchi, wheezing, or rubs  HEART: Regular rate and rhythm; No murmurs, rubs, or gallops  ABDOMEN: Soft, Nontender, Nondistended; Bowel sounds present  VASCULAR:  2+ Peripheral Pulses, No clubbing, cyanosis, or edema  LYMPH: No lymphadenopathy noted  SKIN: No rashes or lesions  NERVOUS SYSTEM:  Alert & Oriented X3, Good concentration; Motor Strength 5/5 B/L upper and lower extremities; DTRs 2+ intact and symmetric    HOSPITAL MEDICATIONS:  MEDICATIONS  (STANDING):  ambrisentan 10 milliGRAM(s) Oral daily  aspirin  chewable 81 milliGRAM(s) Oral daily  atorvastatin 20 milliGRAM(s) Oral at bedtime  budesonide  80 MICROgram(s)/formoterol 4.5 MICROgram(s) Inhaler 2 Puff(s) Inhalation two times a day  buPROPion XL (24-Hour) . 300 milliGRAM(s) Oral daily  digoxin     Tablet 125 MICROGram(s) Oral daily  diltiazem    milliGRAM(s) Oral daily  ferrous    sulfate 325 milliGRAM(s) Oral daily  furosemide   Injectable 80 milliGRAM(s) IV Push two times a day  pantoprazole    Tablet 40 milliGRAM(s) Oral before breakfast  polyethylene glycol 3350 17 Gram(s) Oral daily  sodium chloride 0.65% Nasal 1 Spray(s) Both Nostrils four times a day  tadalafil 40 milliGRAM(s) Oral daily  tiotropium 18 MICROgram(s) Capsule 1 Capsule(s) Inhalation daily    MEDICATIONS  (PRN):  acetaminophen     Tablet .. 975 milliGRAM(s) Oral every 6 hours PRN Temp greater or equal to 38C (100.4F), Mild Pain (1 - 3), Moderate Pain (4 - 6)  ALBUTerol    90 MICROgram(s) HFA Inhaler 2 Puff(s) Inhalation every 6 hours PRN Shortness of Breath and/or Wheezing  melatonin 3 milliGRAM(s) Oral at bedtime PRN Insomnia      LABS:    The Labs were reviewed by me   The Radiology was reviewed by me    EKG tracing reviewed by me    08-19    131<L>  |  88<L>  |  78<H>  ----------------------------<  68<L>  4.2   |  32<H>  |  2.34<H>  08-18    131<L>  |  88<L>  |  80<H>  ----------------------------<  70  4.3   |  34<H>  |  1.78<H>  08-17    130<L>  |  86<L>  |  86<H>  ----------------------------<  77  4.4   |  35<H>  |  1.90<H>    Ca    9.4      19 Aug 2022 06:24  Ca    9.4      18 Aug 2022 06:20  Ca    9.5      17 Aug 2022 06:50  Phos  3.7     08-19  Mg     2.0     08-19    TPro  6.4  /  Alb  3.7  /  TBili  0.3  /  DBili  x   /  AST  22  /  ALT  10  /  AlkPhos  108  08-19  TPro  6.6  /  Alb  3.8  /  TBili  0.3  /  DBili  x   /  AST  22  /  ALT  12  /  AlkPhos  106  08-18  TPro  6.5  /  Alb  3.8  /  TBili  0.5  /  DBili  x   /  AST  21  /  ALT  10  /  AlkPhos  105  08-17    Magnesium, Serum: 2.0 mg/dL (08-19-22 @ 06:24)  Magnesium, Serum: 2.0 mg/dL (08-18-22 @ 06:20)  Magnesium, Serum: 2.0 mg/dL (08-17-22 @ 06:50)    Phosphorus Level, Serum: 3.7 mg/dL (08-19-22 @ 06:24)  Phosphorus Level, Serum: 3.9 mg/dL (08-18-22 @ 06:20)  Phosphorus Level, Serum: 3.7 mg/dL (08-17-22 @ 06:50)                                              7.2    3.96  )-----------( 198      ( 19 Aug 2022 06:24 )             24.3                         7.3    3.72  )-----------( 175      ( 18 Aug 2022 06:17 )             24.7                         7.4    4.16  )-----------( 163      ( 17 Aug 2022 06:50 )             24.1     CAPILLARY BLOOD GLUCOSE            MICROBIOLOGY:     RADIOLOGY:  [ ] Reviewed and interpreted by me    Point of Care Ultrasound Findings:    PFT:    EKG: CHIEF COMPLAINT:Patient is a 66y old  Female who presents with a chief complaint of lung txp eval (19 Aug 2022 13:07)      Interval Events:  Remains on NC, no further epistaxis.    REVIEW OF SYSTEMS:  [x] All other systems negative except per HPI   [ ] Unable to assess ROS because ________    OBJECTIVE:  ICU Vital Signs Last 24 Hrs  T(C): 36.7 (19 Aug 2022 12:37), Max: 36.7 (19 Aug 2022 04:12)  T(F): 98 (19 Aug 2022 12:37), Max: 98.1 (19 Aug 2022 04:12)  HR: 68 (19 Aug 2022 12:37) (68 - 80)  BP: 93/54 (19 Aug 2022 12:37) (93/54 - 107/56)  BP(mean): 67 (19 Aug 2022 12:37) (67 - 67)  ABP: --  ABP(mean): --  RR: 18 (19 Aug 2022 12:37) (16 - 18)  SpO2: 99% (19 Aug 2022 12:37) (90% - 100%)    O2 Parameters below as of 19 Aug 2022 12:37  Patient On (Oxygen Delivery Method): nasal cannula              08-18 @ 07:01  -  08-19 @ 07:00  --------------------------------------------------------  IN: 1320 mL / OUT: 1650 mL / NET: -330 mL    08-19 @ 07:01  -  08-19 @ 13:20  --------------------------------------------------------  IN: 240 mL / OUT: 400 mL / NET: -160 mL        PHYSICAL EXAM:  General: Female NAD  HEENT: EOMI, NC in place  Neck: Supple  Respiratory: No notable wheezes or increased wob/accessory muscle use  Cardiovascular: RR, +systolic murmur  Abdomen: Soft  Extremities: WWP, bilateral LE edema  Skin: Intact, ecchymoses  Neurological: AOx3  Psychiatry: Normal affect    HOSPITAL MEDICATIONS:  MEDICATIONS  (STANDING):  ambrisentan 10 milliGRAM(s) Oral daily  aspirin  chewable 81 milliGRAM(s) Oral daily  atorvastatin 20 milliGRAM(s) Oral at bedtime  budesonide  80 MICROgram(s)/formoterol 4.5 MICROgram(s) Inhaler 2 Puff(s) Inhalation two times a day  buPROPion XL (24-Hour) . 300 milliGRAM(s) Oral daily  digoxin     Tablet 125 MICROGram(s) Oral daily  diltiazem    milliGRAM(s) Oral daily  ferrous    sulfate 325 milliGRAM(s) Oral daily  furosemide   Injectable 80 milliGRAM(s) IV Push two times a day  pantoprazole    Tablet 40 milliGRAM(s) Oral before breakfast  polyethylene glycol 3350 17 Gram(s) Oral daily  sodium chloride 0.65% Nasal 1 Spray(s) Both Nostrils four times a day  tadalafil 40 milliGRAM(s) Oral daily  tiotropium 18 MICROgram(s) Capsule 1 Capsule(s) Inhalation daily    MEDICATIONS  (PRN):  acetaminophen     Tablet .. 975 milliGRAM(s) Oral every 6 hours PRN Temp greater or equal to 38C (100.4F), Mild Pain (1 - 3), Moderate Pain (4 - 6)  ALBUTerol    90 MICROgram(s) HFA Inhaler 2 Puff(s) Inhalation every 6 hours PRN Shortness of Breath and/or Wheezing  melatonin 3 milliGRAM(s) Oral at bedtime PRN Insomnia      LABS:    The Labs were reviewed by me   The Radiology was reviewed by me    EKG tracing reviewed by me    08-19    131<L>  |  88<L>  |  78<H>  ----------------------------<  68<L>  4.2   |  32<H>  |  2.34<H>  08-18    131<L>  |  88<L>  |  80<H>  ----------------------------<  70  4.3   |  34<H>  |  1.78<H>  08-17    130<L>  |  86<L>  |  86<H>  ----------------------------<  77  4.4   |  35<H>  |  1.90<H>    Ca    9.4      19 Aug 2022 06:24  Ca    9.4      18 Aug 2022 06:20  Ca    9.5      17 Aug 2022 06:50  Phos  3.7     08-19  Mg     2.0     08-19    TPro  6.4  /  Alb  3.7  /  TBili  0.3  /  DBili  x   /  AST  22  /  ALT  10  /  AlkPhos  108  08-19  TPro  6.6  /  Alb  3.8  /  TBili  0.3  /  DBili  x   /  AST  22  /  ALT  12  /  AlkPhos  106  08-18  TPro  6.5  /  Alb  3.8  /  TBili  0.5  /  DBili  x   /  AST  21  /  ALT  10  /  AlkPhos  105  08-17    Magnesium, Serum: 2.0 mg/dL (08-19-22 @ 06:24)  Magnesium, Serum: 2.0 mg/dL (08-18-22 @ 06:20)  Magnesium, Serum: 2.0 mg/dL (08-17-22 @ 06:50)    Phosphorus Level, Serum: 3.7 mg/dL (08-19-22 @ 06:24)  Phosphorus Level, Serum: 3.9 mg/dL (08-18-22 @ 06:20)  Phosphorus Level, Serum: 3.7 mg/dL (08-17-22 @ 06:50)                                              7.2    3.96  )-----------( 198      ( 19 Aug 2022 06:24 )             24.3                         7.3    3.72  )-----------( 175      ( 18 Aug 2022 06:17 )             24.7                         7.4    4.16  )-----------( 163      ( 17 Aug 2022 06:50 )             24.1     CAPILLARY BLOOD GLUCOSE            MICROBIOLOGY:     RADIOLOGY:  [ ] Reviewed and interpreted by me    Point of Care Ultrasound Findings:    PFT:    EKG:

## 2022-08-19 NOTE — PROGRESS NOTE ADULT - ASSESSMENT
66F w/ severe pulmonary HTN (Group 1, 2 & 3), HFpEF, Afib, COPD on home O2 (5L NC), CKD, ROLANDA on CPAP, morbid obesity presenting with acute hypoxic respiratory failure, admitted for CHF exacerbation and ongoing lung transplant evaluation.   W/ epistaxis 8/13, seen by ENT, started on bilateral nasal packing/ rhino rocket, started on pain meds, abx ppx, cbc stable   8/17 nasal packing removed, started back on nasal cannula, no further epistaxis, increased diuretics to 80 mg IV lasix BID     Patient no longer a transplant candidate, seen by palliative, not DNR nor DNI.

## 2022-08-19 NOTE — PROGRESS NOTE ADULT - ATTENDING COMMENTS
Sarai Calle MD  Off: [phone]  contact me on teams    (After 5 pm or on weekends please page the on-call fellow/attending, can check [email or web address] for schedule. Login is abhay means, schedule under [facility] medicine, psych, derm)

## 2022-08-19 NOTE — PROGRESS NOTE ADULT - ASSESSMENT
66 year old woman with HFpEF with primarily RV dysfunction s/p CardioMEMS, severe pulmonary hypertension (Group 1, 2 & 3), COPD on home O2 8L, AF (on Eliquis), PE, CVA (MCA infarct 2012), CKD (b/l Cr 1.7-1.8), ROLANDA on CPAP and morbid obesity (s/p bariatric surgery 2012 with lap band removal d/t GIB) who was recently hospitalized 6/14-6/29/22 for volume overload where she met with lung transplant team with plan for evaluation pending improvement in BMI and deconditioned state. Had RHC in June with severely elevated PAP and preserved CO.     She presents with RIANLDI, ABD bloating and LE swelling in the setting of gradual 25 lb weight gain despite escalation of diuretic regimen. She was started on intermittent IV Lasix and was responding well, but remains markedly overloaded with predominate symptoms of elevated R sided filling pressure. She was switched to IV bumex and did not respond as well so now back on IV lasix with as needed hypertonic saline. She is likely not a lung transplant candidate and also not a candidate for IV therapy pulmonary dilators. Plan is for continued diuresis and palliative care was consulted.       Cardiac Studies  CardioMEMS interrogation 8/16: PAP 98/45/66, HR 87  RHC and CardioMEMS Implant 6/20/22: RA 18, /44/65, PCWP 16 (v to 18), RA 95% on 8L NC, PA 58%, CO/CI (F) 6.7/3.2, PVR 7.32 ryan, TPG 49, /60/86 (CardioMEMS PAD 49)  TTE 6/15/22: LVIDd 4.8 cm, LVEF 68%, flattening of interventricular septum consistent with RV overload, mild concentric LVH (septum 1.2 PWT 1.1), RVE with decreased systolic function, mild LAE, severe ROE, mod TR, est RVSP 61 mmHg

## 2022-08-19 NOTE — PROGRESS NOTE ADULT - SUBJECTIVE AND OBJECTIVE BOX
ZANDER JEAN-BAPTISTE  66y  Female      Patient is a 66y old  Female who presents with a chief complaint of lung txp eval (18 Aug 2022 14:11)      INTERVAL HPI/OVERNIGHT EVENTS:  provider handoff form yesterday - 8/18: if w/o adequete uop today, consider add on metolazone 5 tomorrow; consider restarting elloquis tomorrow in setting of now resolved epistaxis or DVT ppx   touch base with HF about whether there are any clinics available for periodic IV diuresis  update sons      Overnight/this AM: seen by palliative yesterday, understands not candidate for transplant any longer and disease will continue to progress; will optimize medical management in outpatient setting to avoid recurrent readmissions       FAMILY HISTORY:  No pertinent family history      T(C): 36.7 (08-19-22 @ 04:12), Max: 36.7 (08-19-22 @ 04:12)  HR: 80 (08-19-22 @ 04:12) (70 - 80)  BP: 101/62 (08-19-22 @ 04:12) (101/62 - 113/69)  RR: 18 (08-19-22 @ 04:12) (18 - 22)  SpO2: 100% (08-19-22 @ 04:12) (90% - 100%)  Wt(kg): --Vital Signs Last 24 Hrs  T(C): 36.7 (19 Aug 2022 04:12), Max: 36.7 (19 Aug 2022 04:12)  T(F): 98.1 (19 Aug 2022 04:12), Max: 98.1 (19 Aug 2022 04:12)  HR: 80 (19 Aug 2022 04:12) (70 - 80)  BP: 101/62 (19 Aug 2022 04:12) (101/62 - 113/69)  BP(mean): --  RR: 18 (19 Aug 2022 04:12) (18 - 22)  SpO2: 100% (19 Aug 2022 04:12) (90% - 100%)    Parameters below as of 19 Aug 2022 04:12  Patient On (Oxygen Delivery Method): BiPAP/CPAP      nitrates (Unknown)      PHYSICAL EXAM:  CONSTITUTIONAL: NAD, well-developed  RESPIRATORY: coarse breadth sounds, no crackles    CARDIOVASCULAR: 3+ LE edema, non-pitting. regular rate and rhythm  ABDOMEN: Nontender to palpation, normoactive bowel sounds, no rebound/guarding  MUSCLOSKELETAL: no clubbing or cyanosis of digits; no joint swelling or tenderness to palpation  NEURO: Non-focal, AO4  SKIN: No rashes    Consultant(s) Notes Reviewed:  [x ] YES  [ ] NO  Care Discussed with Consultants/Other Providers [ x] YES  [ ] NO    LABS:      RADIOLOGY & ADDITIONAL TESTS:    Imaging Personally Reviewed:  [ ] YES  [ ] NO  acetaminophen     Tablet .. 975 milliGRAM(s) Oral every 6 hours PRN  ALBUTerol    90 MICROgram(s) HFA Inhaler 2 Puff(s) Inhalation every 6 hours PRN  ambrisentan 10 milliGRAM(s) Oral daily  aspirin  chewable 81 milliGRAM(s) Oral daily  atorvastatin 20 milliGRAM(s) Oral at bedtime  budesonide  80 MICROgram(s)/formoterol 4.5 MICROgram(s) Inhaler 2 Puff(s) Inhalation two times a day  buPROPion XL (24-Hour) . 300 milliGRAM(s) Oral daily  digoxin     Tablet 125 MICROGram(s) Oral daily  diltiazem    milliGRAM(s) Oral daily  ferrous    sulfate 325 milliGRAM(s) Oral daily  furosemide   Injectable 80 milliGRAM(s) IV Push two times a day  melatonin 3 milliGRAM(s) Oral at bedtime PRN  pantoprazole    Tablet 40 milliGRAM(s) Oral before breakfast  polyethylene glycol 3350 17 Gram(s) Oral daily  sodium chloride 0.65% Nasal 1 Spray(s) Both Nostrils four times a day  tadalafil 40 milliGRAM(s) Oral daily  tiotropium 18 MICROgram(s) Capsule 1 Capsule(s) Inhalation daily      HEALTH ISSUES - PROBLEM Dx:  JUANA (acute kidney injury)    Severe pulmonary hypertension    Acute on chronic diastolic congestive heart failure    Prophylactic measure    COPD, moderate    Chronic atrial fibrillation    Anemia    Chronic kidney disease, unspecified CKD stage    Epistaxis    Hyponatremia    Hyperkalemia    Acute respiratory failure with hypoxia    ACP (advance care planning)    Palliative care encounter             ZANDER JEAN-BAPTISTE  66y  Female      Patient is a 66y old  Female who presents with a chief complaint of lung txp eval (18 Aug 2022 14:11)      INTERVAL HPI/OVERNIGHT EVENTS:  provider handoff form yesterday - 8/18: if w/o adequete uop today, consider add on metolazone 5 tomorrow; consider restarting elloquis tomorrow in setting of now resolved epistaxis or DVT ppx   touch base with HF about whether there are any clinics available for periodic IV diuresis  update sons      Overnight/this AM: seen by palliative yesterday, understands not candidate for transplant any longer and disease will continue to progress; will optimize medical management in outpatient setting to avoid recurrent readmissions       FAMILY HISTORY:  No pertinent family history      T(C): 36.7 (08-19-22 @ 04:12), Max: 36.7 (08-19-22 @ 04:12)  HR: 80 (08-19-22 @ 04:12) (70 - 80)  BP: 101/62 (08-19-22 @ 04:12) (101/62 - 113/69)  RR: 18 (08-19-22 @ 04:12) (18 - 22)  SpO2: 100% (08-19-22 @ 04:12) (90% - 100%)  Wt(kg): --Vital Signs Last 24 Hrs  T(C): 36.7 (19 Aug 2022 04:12), Max: 36.7 (19 Aug 2022 04:12)  T(F): 98.1 (19 Aug 2022 04:12), Max: 98.1 (19 Aug 2022 04:12)  HR: 80 (19 Aug 2022 04:12) (70 - 80)  BP: 101/62 (19 Aug 2022 04:12) (101/62 - 113/69)  BP(mean): --  RR: 18 (19 Aug 2022 04:12) (18 - 22)  SpO2: 100% (19 Aug 2022 04:12) (90% - 100%)    Parameters below as of 19 Aug 2022 04:12  Patient On (Oxygen Delivery Method): BiPAP/CPAP      nitrates (Unknown)      PHYSICAL EXAM:  CONSTITUTIONAL: NAD, well-developed  RESPIRATORY: coarse breadth sounds, no crackles    CARDIOVASCULAR: 3+ LE edema, non-pitting. regular rate and rhythm  ABDOMEN: Nontender to palpation, normoactive bowel sounds, no rebound/guarding  MUSCLOSKELETAL: no clubbing or cyanosis of digits; no joint swelling or tenderness to palpation  NEURO: Non-focal, AO4  SKIN: No rashes    Consultant(s) Notes Reviewed:  [x ] YES  [ ] NO  Care Discussed with Consultants/Other Providers [ x] YES  [ ] NO    LABS:                          7.2    3.96  )-----------( 198      ( 19 Aug 2022 06:24 )             24.3       08-19    131<L>  |  88<L>  |  78<H>  ----------------------------<  68<L>  4.2   |  32<H>  |  2.34<H>    Ca    9.4      19 Aug 2022 06:24  Phos  3.7     08-19  Mg     2.0     08-19    TPro  6.4  /  Alb  3.7  /  TBili  0.3  /  DBili  x   /  AST  22  /  ALT  10  /  AlkPhos  108  08-19      RADIOLOGY & ADDITIONAL TESTS:    Imaging Personally Reviewed:  [ ] YES  [ ] NO  acetaminophen     Tablet .. 975 milliGRAM(s) Oral every 6 hours PRN  ALBUTerol    90 MICROgram(s) HFA Inhaler 2 Puff(s) Inhalation every 6 hours PRN  ambrisentan 10 milliGRAM(s) Oral daily  aspirin  chewable 81 milliGRAM(s) Oral daily  atorvastatin 20 milliGRAM(s) Oral at bedtime  budesonide  80 MICROgram(s)/formoterol 4.5 MICROgram(s) Inhaler 2 Puff(s) Inhalation two times a day  buPROPion XL (24-Hour) . 300 milliGRAM(s) Oral daily  digoxin     Tablet 125 MICROGram(s) Oral daily  diltiazem    milliGRAM(s) Oral daily  ferrous    sulfate 325 milliGRAM(s) Oral daily  furosemide   Injectable 80 milliGRAM(s) IV Push two times a day  melatonin 3 milliGRAM(s) Oral at bedtime PRN  pantoprazole    Tablet 40 milliGRAM(s) Oral before breakfast  polyethylene glycol 3350 17 Gram(s) Oral daily  sodium chloride 0.65% Nasal 1 Spray(s) Both Nostrils four times a day  tadalafil 40 milliGRAM(s) Oral daily  tiotropium 18 MICROgram(s) Capsule 1 Capsule(s) Inhalation daily      HEALTH ISSUES - PROBLEM Dx:  JUANA (acute kidney injury)    Severe pulmonary hypertension    Acute on chronic diastolic congestive heart failure    Prophylactic measure    COPD, moderate    Chronic atrial fibrillation    Anemia    Chronic kidney disease, unspecified CKD stage    Epistaxis    Hyponatremia    Hyperkalemia    Acute respiratory failure with hypoxia    ACP (advance care planning)    Palliative care encounter

## 2022-08-19 NOTE — PROGRESS NOTE ADULT - PROBLEM SELECTOR PLAN 1
Pt. with CKD in the setting of chronic cardiorenal syndrome, now with JAUNA (non oliguric) on CKD likely 2/2 renal venous congestion from hypervolemia due to HF in the setting of severe pulmonary HTN.   On review of [facility], noted that Baseline SCr ranges from 1.4-1.9 since March '22. SCr on arrival was 1.8 on 8/11; peaked to 2.02 on 8/13   Cr trending back up today  Pt. continues to appear volume overloaded. UO in the last 24 hours  2.4L with net negative 1.4L. Continue lasix 80 IV Consider HTS once again today  No plan for HD at this time.   Check UA, spot urine TP/Cr. Check Renal US.   Monitor labs and urine output. Avoid NSAIDs, ACEI/ARBS, RCA and nephrotoxins. Dose medications as per eGFR.

## 2022-08-19 NOTE — PROGRESS NOTE ADULT - PROBLEM SELECTOR PLAN 2
- bumex did not work as well for her, now back on IV lasix 80 BID with good UOP, s/p several doses of hypertonic saline with good response, yesterday did not get any and UOP was only net -330cc  - would start lasix gtt at 5 mg/hr for goal of net -1L at least today  - would consider another dose of hypertonic saline today  - hold spironolactone for hyperkalemia for now  - eventual SGLT2-i prior to discharge, once on stable dose of oral diuretics  - CardioMEMS PAD 8/11 43 mmHg. goal ~40 but difficult to ascertain given RV dysfunction  - cardiomems PAD 8/15 45 mmHg, 98/45/66  - daily standing weight. Was discharged in June at 189 lbs, today at 205, slightly uptrended from yesterday - bumex did not work as well for her, now back on IV lasix 80 BID with good UOP, s/p several doses of hypertonic saline with good response, yesterday did not get any and UOP was only net -330cc  - would start lasix gtt at 10 mg/hr for goal of net -1L at least today  - would consider another dose of hypertonic saline today  - hold spironolactone for hyperkalemia for now  - eventual SGLT2-i prior to discharge, once on stable dose of oral diuretics  - CardioMEMS PAD 8/11 43 mmHg. goal ~40 but difficult to ascertain given RV dysfunction  - cardiomems PAD 8/15 45 mmHg, 98/45/66  - daily standing weight. Was discharged in June at 189 lbs, today at 205, slightly uptrended from yesterday

## 2022-08-19 NOTE — PROGRESS NOTE ADULT - PROBLEM SELECTOR PLAN 4
- recent Cr ranging 1.7-1.8, today worsening again in the setting of inadequate UOP, had been 1.4-1.6 in early June  - eventual SGLT2-i as above to delay further progression of CKD  - continue to monitor closely with diuresis

## 2022-08-19 NOTE — PROGRESS NOTE ADULT - SUBJECTIVE AND OBJECTIVE BOX
[facility] Division of Kidney Diseases & Hypertension  FOLLOW UP NOTE  --------------------------------------------------------------------------------  Chief Complaint:    24 hour events/subjective:    feels she is urinating less. net neg 330cc only    PAST HISTORY  --------------------------------------------------------------------------------  No significant changes to PMH, PSH, FHx, SHx, unless otherwise noted    ALLERGIES & MEDICATIONS  --------------------------------------------------------------------------------  Allergies    nitrates (Unknown)    Intolerances      Standing Inpatient Medications  ambrisentan 10 milliGRAM(s) Oral daily  aspirin  chewable 81 milliGRAM(s) Oral daily  atorvastatin 20 milliGRAM(s) Oral at bedtime  budesonide  80 MICROgram(s)/formoterol 4.5 MICROgram(s) Inhaler 2 Puff(s) Inhalation two times a day  buPROPion XL (24-Hour) . 300 milliGRAM(s) Oral daily  digoxin     Tablet 125 MICROGram(s) Oral daily  diltiazem    milliGRAM(s) Oral daily  ferrous    sulfate 325 milliGRAM(s) Oral daily  furosemide   Injectable 80 milliGRAM(s) IV Push two times a day  pantoprazole    Tablet 40 milliGRAM(s) Oral before breakfast  polyethylene glycol 3350 17 Gram(s) Oral daily  sodium chloride 0.65% Nasal 1 Spray(s) Both Nostrils four times a day  tadalafil 40 milliGRAM(s) Oral daily  tiotropium 18 MICROgram(s) Capsule 1 Capsule(s) Inhalation daily    PRN Inpatient Medications  acetaminophen     Tablet .. 975 milliGRAM(s) Oral every 6 hours PRN  ALBUTerol    90 MICROgram(s) HFA Inhaler 2 Puff(s) Inhalation every 6 hours PRN  melatonin 3 milliGRAM(s) Oral at bedtime PRN      REVIEW OF SYSTEMS  --------------------------------------------------------------------------------    All other systems were reviewed and are negative, except as noted.    VITALS/PHYSICAL EXAM  --------------------------------------------------------------------------------  T(C): 36.7 (08-19-22 @ 04:12), Max: 36.7 (08-19-22 @ 04:12)  HR: 74 (08-19-22 @ 09:59) (70 - 80)  BP: 101/62 (08-19-22 @ 04:12) (101/62 - 107/56)  RR: 16 (08-19-22 @ 09:59) (16 - 18)  SpO2: 99% (08-19-22 @ 09:59) (90% - 100%)  Wt(kg): --        08-18-22 @ 07:01  -  08-19-22 @ 07:00  --------------------------------------------------------  IN: 1320 mL / OUT: 1650 mL / NET: -330 mL      Physical Exam:  	Gen: NAD,  	Pulm: decreased air entry  	CV: RRR, S1S2  	Abd: +BS, soft,   	UE: Warm, FROM,   	LE: Warm, FROM,  edema  	Neuro: No focal deficits  	Psych: Normal affect and mood  	Skin: Warm, without rashes  	Vascular access:    LABS/STUDIES  --------------------------------------------------------------------------------              7.2    3.96  >-----------<  198      [08-19-22 @ 06:24]              24.3     131  |  88  |  78  ----------------------------<  68      [08-19-22 @ 06:24]  4.2   |  32  |  2.34        Ca     9.4     [08-19-22 @ 06:24]      Mg     2.0     [08-19-22 @ 06:24]      Phos  3.7     [08-19-22 @ 06:24]    TPro  6.4  /  Alb  3.7  /  TBili  0.3  /  DBili  x   /  AST  22  /  ALT  10  /  AlkPhos  108  [08-19-22 @ 06:24]          Creatinine Trend:  SCr 2.34 [08-19 @ 06:24]  SCr 1.78 [08-18 @ 06:20]  SCr 1.90 [08-17 @ 06:50]  SCr 2.08 [08-16 @ 12:35]  SCr 2.18 [08-16 @ 06:34]      Urine Creatinine 33      [08-14-22 @ 08:40]  Urine Sodium 90      [08-12-22 @ 12:36]  Urine Urea Nitrogen 397      [08-14-22 @ 08:40]

## 2022-08-19 NOTE — PROGRESS NOTE ADULT - PROBLEM SELECTOR PLAN 4
RHC and cardiomems done 6/20. RHC showed elevated right sided filling pressures with severe pulmonary hypertension with systemic PA pressures, slightly elevated PCWP and preserved cardiac output. Follows with Dr. Rodriguez at [place] for PH.   - continue tadalafil 40 mg daily   - continue letairis 10mg qd  - follow Pulm recs  -Transplant reporting that patient is currently not a candidate following eval, given this, also not a candidate for IV Flolan per Pulm

## 2022-08-19 NOTE — PROGRESS NOTE ADULT - PROBLEM SELECTOR PLAN 2
in the setting of hypervolemia. s/p HTS + lasix IV to augment diuresis (as outlined above). Monitor serum sodium.  Recommend HTS again today

## 2022-08-19 NOTE — PROGRESS NOTE ADULT - ATTENDING COMMENTS
u/o slowed down yesterday, walked with PT  seen by palliative care, ongoing GOC discussion    Plan:  will give another dose of HTS and switch lasix to 10mg/hr drip for further diuresis  daily standing wt and I/O's  PT eval  cont current meds

## 2022-08-19 NOTE — PROGRESS NOTE ADULT - ATTENDING COMMENTS
Patient seen and examined. Pt is a 66F with PMHx COPD, CHFpEF, AFib, obesity (Type III, hx bariatric sx 2012), and severe pulmonary HTN with chronic combined hypercapnic and hypoxemic respiratory failure on ATC O2 supplementation (dependent on 8L NC at rest) with qhs/prn AVAPS (Trelegy Antonio via nasal prong interface) presenting for progressive dyspnea with minimal exertion admitted for acute decompensated right heart failure. RHC 6/20/2022 revealed sPAP: 106, dPAP: 44 mPAP:65, PCWP: 16, PVR 7.32W, CO/CI 6.7/3.2.     - Patient is not a candidate for lung transplant. She has had a long discussion with the transplant team on 8/17 and understands this. She is sad but accepting of this fact. She tells me she was previously referred for transplant in 2009 but deferred at that time.  - Continue diuresis as per Nephrology and Heart Failure - she has had improvement in her LE edema but still appears volume overloaded. Monitor sodium. Hypertonic saline as per Nephro. Monitor renal function which is worse today.  - Continue home pulmonary vasodilators (Tadalafil and Ambrisentan). She will need to have her family bring in her medications from home and we will work with pharmacy to see whether Ambrisentan is available if patient does not have further supply. No plans to trial Flolan at this time as the risks outweigh the benefits.  --- Consistent outpatient pulmonary followup is necessary. Her primary pulmonologist from [place] recently left and she was referred to Dr. Mosher after a recent [facility] hospitalization but was unable to make an appointment as the commute from [place] (~ 3 hours) was too difficult and she could not find a day to secure transportation.  - Continue supplemental O2 with goal O2 sat > 88%. Patient is on 8L NC at home and in reality often sats in the mid 80s and this may need to be tolerated on exertion. She was transitioned to NC this AM and is currently on 8L O2 NC. She is in good spirits being on NC instead of ventimask  - Continue AVAPs at night - she has her home machine and has been able to use it the last few nights  - Epistaxis has resolved. This is likely in setting of pulmonary vasodilator therapy. ENT management appreciated.  - Continue bronchodilator therapy    Will continue to optimize medical therapies in hopes to allow patient functional ability and quality of life.

## 2022-08-20 NOTE — PROGRESS NOTE ADULT - SUBJECTIVE AND OBJECTIVE BOX
Denise Contreras MD    Internal Medicine Resident (PGY-2)  ___________________________________________________________________________________________________      ZANDER JEAN-BAPTISTE 66y Female    Overnight events/subjective: No acute overnight events. Patient seen and examined at bedside. No complaints. Denies fever, chills, chest pain, shortness of breath, abdominal pain, nausea, vomiting, changes in bowel habits, or urinary symptoms.    Vital Signs Last 24 Hrs  T(C): 36.7 (20 Aug 2022 05:03), Max: 36.8 (19 Aug 2022 19:44)  T(F): 98.1 (20 Aug 2022 05:03), Max: 98.2 (19 Aug 2022 19:44)  HR: 73 (20 Aug 2022 05:12) (68 - 87)  BP: 101/62 (20 Aug 2022 05:03) (93/54 - 108/68)  BP(mean): 67 (19 Aug 2022 12:37) (67 - 67)  RR: 18 (20 Aug 2022 05:03) (16 - 20)  SpO2: 98% (20 Aug 2022 05:12) (98% - 100%)    Parameters below as of 20 Aug 2022 05:03  Patient On (Oxygen Delivery Method): nasal cannula  O2 Flow (L/min): 8      PHYSICAL EXAM:  GENERAL: no acute distress  HEENT - atraumatic, normocephalic, extraocular muscles intact  CV: regular rate and rhythym; normal S1/S2; nor murmurs, rubs, or gallops  PULM: course breath sounds  ABDOMEN: soft, nontender, nondistended; bowel sounds present  MSK: extremities atraumatic; ny cyanosis or clubbing  Extremities: 3+BLE edema  SKIN: warm, dry, no rashes or lesions  NEURO:  no focal deficits, sensory and motor grossly intact  PSYCH: alert and oriented x3; appropriate mood and affect    HOSPITAL MEDICATIONS:  MEDICATIONS  (STANDING):  ambrisentan 10 milliGRAM(s) Oral daily  aspirin  chewable 81 milliGRAM(s) Oral daily  atorvastatin 20 milliGRAM(s) Oral at bedtime  budesonide  80 MICROgram(s)/formoterol 4.5 MICROgram(s) Inhaler 2 Puff(s) Inhalation two times a day  buPROPion XL (24-Hour) . 300 milliGRAM(s) Oral daily  digoxin     Tablet 125 MICROGram(s) Oral daily  diltiazem    milliGRAM(s) Oral daily  ferrous    sulfate 325 milliGRAM(s) Oral daily  furosemide Infusion 10 mG/Hr (5 mL/Hr) IV Continuous <Continuous>  pantoprazole    Tablet 40 milliGRAM(s) Oral before breakfast  polyethylene glycol 3350 17 Gram(s) Oral daily  sodium chloride 0.65% Nasal 1 Spray(s) Both Nostrils four times a day  tadalafil 40 milliGRAM(s) Oral daily  tiotropium 18 MICROgram(s) Capsule 1 Capsule(s) Inhalation daily    MEDICATIONS  (PRN):  acetaminophen     Tablet .. 975 milliGRAM(s) Oral every 6 hours PRN Temp greater or equal to 38C (100.4F), Mild Pain (1 - 3), Moderate Pain (4 - 6)  ALBUTerol    90 MICROgram(s) HFA Inhaler 2 Puff(s) Inhalation every 6 hours PRN Shortness of Breath and/or Wheezing  melatonin 3 milliGRAM(s) Oral at bedtime PRN Insomnia      LABS:                        7.1    4.09  )-----------( 192      ( 20 Aug 2022 05:59 )             23.5     08-20    133<L>  |  89<L>  |  78<H>  ----------------------------<  82  4.2   |  31  |  2.09<H>    Ca    9.5      20 Aug 2022 05:59  Phos  3.5     08-20  Mg     2.0     08-20    TPro  6.5  /  Alb  3.9  /  TBili  0.3  /  DBili  x   /  AST  23  /  ALT  12  /  AlkPhos  112  08-20           Denise Contreras MD    Internal Medicine Resident (PGY-2)  ___________________________________________________________________________________________________      ZANDER JEAN-BAPTISTE 66y Female    Overnight events/subjective: No acute overnight events. Patient seen and examined at bedside. Endorses one episode of coughing up phlegm with bright red blood tinge and one episode of dark stool this AM. Feels a dripping sensation on the back of her throat. No further episodes of epistaxis. Denies CP, abdominal pain, increased SOB from baseline.    Vital Signs Last 24 Hrs  T(C): 36.7 (20 Aug 2022 05:03), Max: 36.8 (19 Aug 2022 19:44)  T(F): 98.1 (20 Aug 2022 05:03), Max: 98.2 (19 Aug 2022 19:44)  HR: 73 (20 Aug 2022 05:12) (68 - 87)  BP: 101/62 (20 Aug 2022 05:03) (93/54 - 108/68)  BP(mean): 67 (19 Aug 2022 12:37) (67 - 67)  RR: 18 (20 Aug 2022 05:03) (16 - 20)  SpO2: 98% (20 Aug 2022 05:12) (98% - 100%)    Parameters below as of 20 Aug 2022 05:03  Patient On (Oxygen Delivery Method): nasal cannula  O2 Flow (L/min): 8      PHYSICAL EXAM:  GENERAL: no acute distress  HEENT - atraumatic, normocephalic, extraocular muscles intact  CV: regular rate and rhythym; normal S1/S2; nor murmurs, rubs, or gallops  PULM: course breath sounds  ABDOMEN: soft, nontender, nondistended; bowel sounds present  MSK: extremities atraumatic; ny cyanosis or clubbing  Extremities: 3+BLE edema  SKIN: warm, dry, no rashes or lesions  NEURO:  no focal deficits, sensory and motor grossly intact  PSYCH: alert and oriented x3; appropriate mood and affect    HOSPITAL MEDICATIONS:  MEDICATIONS  (STANDING):  ambrisentan 10 milliGRAM(s) Oral daily  aspirin  chewable 81 milliGRAM(s) Oral daily  atorvastatin 20 milliGRAM(s) Oral at bedtime  budesonide  80 MICROgram(s)/formoterol 4.5 MICROgram(s) Inhaler 2 Puff(s) Inhalation two times a day  buPROPion XL (24-Hour) . 300 milliGRAM(s) Oral daily  digoxin     Tablet 125 MICROGram(s) Oral daily  diltiazem    milliGRAM(s) Oral daily  ferrous    sulfate 325 milliGRAM(s) Oral daily  furosemide Infusion 10 mG/Hr (5 mL/Hr) IV Continuous <Continuous>  pantoprazole    Tablet 40 milliGRAM(s) Oral before breakfast  polyethylene glycol 3350 17 Gram(s) Oral daily  sodium chloride 0.65% Nasal 1 Spray(s) Both Nostrils four times a day  tadalafil 40 milliGRAM(s) Oral daily  tiotropium 18 MICROgram(s) Capsule 1 Capsule(s) Inhalation daily    MEDICATIONS  (PRN):  acetaminophen     Tablet .. 975 milliGRAM(s) Oral every 6 hours PRN Temp greater or equal to 38C (100.4F), Mild Pain (1 - 3), Moderate Pain (4 - 6)  ALBUTerol    90 MICROgram(s) HFA Inhaler 2 Puff(s) Inhalation every 6 hours PRN Shortness of Breath and/or Wheezing  melatonin 3 milliGRAM(s) Oral at bedtime PRN Insomnia      LABS:                        7.1    4.09  )-----------( 192      ( 20 Aug 2022 05:59 )             23.5     08-20    133<L>  |  89<L>  |  78<H>  ----------------------------<  82  4.2   |  31  |  2.09<H>    Ca    9.5      20 Aug 2022 05:59  Phos  3.5     08-20  Mg     2.0     08-20    TPro  6.5  /  Alb  3.9  /  TBili  0.3  /  DBili  x   /  AST  23  /  ALT  12  /  AlkPhos  112  08-20

## 2022-08-20 NOTE — PROGRESS NOTE ADULT - ASSESSMENT
66 F with h/o COPD, CHFpEF, AFib, obesity (Type III, hx bariatric sx 2012), and severe pulmonary HTN with chronic combined hypercapnic and hypoxemic respiratory failure on ATC O2 supplementation (dependent on 8L NC at rest) with qhs/prn AVAPS (Trelegy Antonio via nasal prong interface) presenting for progressive dyspnea with minimal exertion admitted for acute decompensated right heart failure.   RHC 6/20/2022 revealed sPAP: 106, dPAP: 44 mPAP:65, PCWP: 16, PVR 7.32W, CO/CI 6.7/3.2.     - Patient is not a candidate for lung transplant. She has had a long discussion with the transplant team on 8/17 and understands this. She is sad but accepting of this fact. She tells me she was previously referred for transplant in 2009 but deferred at that time.  - c/w diuresis as per Nephrology and Heart Failure - she has had improvement in her LE edema but still appears volume overloaded. Monitor sodium. Hypertonic saline as per Nephro. Monitor renal function which is worse today.  - c/w pulmonary vasodilators - Tadalafil and Ambrisentan. She will need to have her family bring in her medications from home and we will work with pharmacy to see whether Ambrisentan is available if patient does not have further supply.   -No plans to trial Flolan at this time as the risks outweigh the benefits.  - Consistent outpatient pulmonary followup is necessary. Her primary pulmonologist from [place] recently left and she was referred to Dr. Mosher after a recent [facility] hospitalization but was unable to make an appointment as the commute from [place] (~ 3 hours) was too difficult and she could not find a day to secure transportation.  - Continue supplemental O2 with goal O2 sat > 88%. Patient is on 8L NC at home and in reality often sats in the mid 80s and this may need to be tolerated on exertion. She was transitioned to NC this AM and is currently on 8L O2 NC. She is in good spirits being on NC instead of ventimask  - Continue AVAPs at night - she has her home machine and has been able to use it the last few nights  - Epistaxis has resolved. This is likely in setting of pulmonary vasodilator therapy. ENT management appreciated.  - c/w bronchodilators  -DVT ppx, ambulate as tolerated, incentive spirometry    NOTE INCOMPLETE      Time-based billing (NON-critical care).     35 minutes spent on total encounter; more than 50% of the visit was spent counseling and / or coordinating care by the attending physician.  The necessity of the time spent during the encounter on this date of service was due to:     Review of patient records, including history, laboratory data, and imaging. Patient evaluation and assessment. Coordination of care.   66 F with h/o COPD, CHFpEF, AFib, obesity (Type III, hx bariatric sx 2012), and severe pulmonary HTN with chronic combined hypercapnic and hypoxemic respiratory failure on ATC O2 supplementation (dependent on 8L NC at rest) with qhs/prn AVAPS (Trelegy Antonio via nasal prong interface) presenting for progressive dyspnea with minimal exertion admitted for acute decompensated right heart failure.   RHC 6/20/2022 revealed sPAP: 106, dPAP: 44 mPAP:65, PCWP: 16, PVR 7.32W, CO/CI 6.7/3.2.     - Patient is not a candidate for lung transplant. She has had a long discussion with the transplant team on 8/17 and understands this. She is sad but accepting of this fact. She tells me she was previously referred for transplant in 2009 but deferred at that time.  - c/w diuresis as per Nephrology and Heart Failure - currently on Lasix drip with some improvement in her LE edema but still appears volume overloaded. Monitor sodium. Hypertonic saline as per Nephro. Monitor renal function   - c/w pulmonary vasodilators - Tadalafil and Ambrisentan. She will need to have her family bring in her medications from home and we will work with pharmacy to see whether Ambrisentan is available if patient does not have further supply.   -No plans to trial Flolan at this time as the risks outweigh the benefits.  - Needs consistent outpatient pulmonary followup. Her primary pulmonologist from [place] recently left and she was referred to Dr. Mosher after a recent [facility] hospitalization but was unable to make an appointment as the commute from [place] (~ 3 hours) was too difficult and she could not find a day to secure transportation.  - Continue supplemental O2 with goal O2 sat > 88%. Patient is on 8L NC at home and in reality often sats in the mid 80s and this may need to be tolerated on exertion. She was transitioned to NC this AM and is currently on 8L O2 NC. She is in good spirits being on NC instead of ventimask  - Continue AVAPs at night - she has her home machine and has been able to use it the last few nights  - Epistaxis has resolved. This is likely in setting of pulmonary vasodilator therapy. ENT management appreciated.  - c/w bronchodilators  -DVT ppx, ambulate as tolerated (spoke with patient and nurse and stressed the importance of this), incentive spirometry    d/w patient at bedside.       Time-based billing (NON-critical care).     35 minutes spent on total encounter; more than 50% of the visit was spent counseling and / or coordinating care by the attending physician.  The necessity of the time spent during the encounter on this date of service was due to:     Review of patient records, including history, laboratory data, and imaging. Patient evaluation and assessment. Coordination of care.

## 2022-08-20 NOTE — PROGRESS NOTE ADULT - SUBJECTIVE AND OBJECTIVE BOX
ENT ISSUE/POD: epistaxis     HPI: 66F w/ severe pulmonary HTN (Group 1, 2 & 3), HFpEF, Afib, COPD on home O2 (5L NC), CKD, ROLANDA on CPAP, morbid obesity presenting with acute hypoxic respiratory failure, admitted for CHF exacerbation and ongoing lung transplant evaluation. Pt with recurrent small epistaxis at home, subsequently with b/l epistaxis and packed with a rapid rhino at that time was has since been removed. Pt now complaining of minimal blood tinged sputum. No active bleeding from b/l nares. Pt denies h/a, recent URI, congestion, facial pain, facial tenderness, rhinorrhea, PND or changes in vision.       PAST MEDICAL & SURGICAL HISTORY:  COPD exacerbation      Severe pulmonary hypertension      Chronic kidney disease, unspecified CKD stage      Acute on chronic diastolic congestive heart failure      Pulmonary embolism      Anxiety and depression      GERD (gastroesophageal reflux disease)      Obstructive sleep apnea      Chronic atrial fibrillation      H/O pneumonectomy      Right leg weakness        Allergies    nitrates (Unknown)    Intolerances      MEDICATIONS  (STANDING):  ambrisentan 10 milliGRAM(s) Oral daily  aspirin  chewable 81 milliGRAM(s) Oral daily  atorvastatin 20 milliGRAM(s) Oral at bedtime  budesonide  80 MICROgram(s)/formoterol 4.5 MICROgram(s) Inhaler 2 Puff(s) Inhalation two times a day  buPROPion XL (24-Hour) . 300 milliGRAM(s) Oral daily  digoxin     Tablet 125 MICROGram(s) Oral daily  diltiazem    milliGRAM(s) Oral daily  ferrous    sulfate 325 milliGRAM(s) Oral daily  furosemide Infusion 10 mG/Hr (5 mL/Hr) IV Continuous <Continuous>  pantoprazole    Tablet 40 milliGRAM(s) Oral before breakfast  polyethylene glycol 3350 17 Gram(s) Oral daily  sodium chloride 0.65% Nasal 1 Spray(s) Both Nostrils four times a day  tadalafil 40 milliGRAM(s) Oral daily  tiotropium 18 MICROgram(s) Capsule 1 Capsule(s) Inhalation daily    MEDICATIONS  (PRN):  acetaminophen     Tablet .. 975 milliGRAM(s) Oral every 6 hours PRN Temp greater or equal to 38C (100.4F), Mild Pain (1 - 3), Moderate Pain (4 - 6)  ALBUTerol    90 MICROgram(s) HFA Inhaler 2 Puff(s) Inhalation every 6 hours PRN Shortness of Breath and/or Wheezing  melatonin 3 milliGRAM(s) Oral at bedtime PRN Insomnia      Social History: see consult    Family history: see consult    ROS:   ENT: all negative except as noted in HPI   Pulm: denies SOB, cough, hemoptysis  Neuro: denies numbness/tingling, loss of sensation  Endo: denies heat/cold intolerance, excessive sweating      Vital Signs Last 24 Hrs  T(C): 36.7 (20 Aug 2022 12:53), Max: 36.8 (19 Aug 2022 19:44)  T(F): 98.1 (20 Aug 2022 12:53), Max: 98.2 (19 Aug 2022 19:44)  HR: 74 (20 Aug 2022 12:53) (72 - 87)  BP: 110/69 (20 Aug 2022 12:53) (101/62 - 110/69)  BP(mean): --  RR: 18 (20 Aug 2022 12:53) (18 - 20)  SpO2: 99% (20 Aug 2022 12:53) (98% - 100%)    Parameters below as of 20 Aug 2022 12:53  Patient On (Oxygen Delivery Method): room air                              7.1    4.09  )-----------( 192      ( 20 Aug 2022 05:59 )             23.5    08-20    133<L>  |  89<L>  |  78<H>  ----------------------------<  82  4.2   |  31  |  2.09<H>    Ca    9.5      20 Aug 2022 05:59  Phos  3.5     08-20  Mg     2.0     08-20    TPro  6.5  /  Alb  3.9  /  TBili  0.3  /  DBili  x   /  AST  23  /  ALT  12  /  AlkPhos  112  08-20       PHYSICAL EXAM:  Gen: NAD  Skin: No rashes, bruises, or lesions  Head: Normocephalic, Atraumatic  Face: no edema, erythema, or fluctuance. Parotid glands soft without mass  Eyes: no scleral injection  Nose: Nares bilaterally patent, no discharge  Mouth: No Stridor / Drooling / Trismus.  Mucosa moist, tongue/uvula midline, oropharynx clear  Neck: Flat, supple, no lymphadenopathy, trachea midline, no masses  Lymphatic: No lymphadenopathy  Resp: breathing easily on humidified NC, no stridor  Neuro: facial nerve intact, no facial droop         ENT ISSUE/POD: epistaxis     HPI: 66F w/ severe pulmonary HTN (Group 1, 2 & 3), HFpEF, Afib, COPD on home O2 (5L NC), CKD, ROLANDA on CPAP, morbid obesity presenting with acute hypoxic respiratory failure, admitted for CHF exacerbation and ongoing lung transplant evaluation. Pt with recurrent small epistaxis at home, subsequently with b/l epistaxis and packed with a rapid rhino at that time which has since been removed. Pt now complaining of minimal blood tinged sputum. No active bleeding from b/l nares. Pt denies h/a, recent URI, congestion, facial pain, facial tenderness, rhinorrhea, PND or changes in vision.       PAST MEDICAL & SURGICAL HISTORY:  COPD exacerbation      Severe pulmonary hypertension      Chronic kidney disease, unspecified CKD stage      Acute on chronic diastolic congestive heart failure      Pulmonary embolism      Anxiety and depression      GERD (gastroesophageal reflux disease)      Obstructive sleep apnea      Chronic atrial fibrillation      H/O pneumonectomy      Right leg weakness        Allergies    nitrates (Unknown)    Intolerances      MEDICATIONS  (STANDING):  ambrisentan 10 milliGRAM(s) Oral daily  aspirin  chewable 81 milliGRAM(s) Oral daily  atorvastatin 20 milliGRAM(s) Oral at bedtime  budesonide  80 MICROgram(s)/formoterol 4.5 MICROgram(s) Inhaler 2 Puff(s) Inhalation two times a day  buPROPion XL (24-Hour) . 300 milliGRAM(s) Oral daily  digoxin     Tablet 125 MICROGram(s) Oral daily  diltiazem    milliGRAM(s) Oral daily  ferrous    sulfate 325 milliGRAM(s) Oral daily  furosemide Infusion 10 mG/Hr (5 mL/Hr) IV Continuous <Continuous>  pantoprazole    Tablet 40 milliGRAM(s) Oral before breakfast  polyethylene glycol 3350 17 Gram(s) Oral daily  sodium chloride 0.65% Nasal 1 Spray(s) Both Nostrils four times a day  tadalafil 40 milliGRAM(s) Oral daily  tiotropium 18 MICROgram(s) Capsule 1 Capsule(s) Inhalation daily    MEDICATIONS  (PRN):  acetaminophen     Tablet .. 975 milliGRAM(s) Oral every 6 hours PRN Temp greater or equal to 38C (100.4F), Mild Pain (1 - 3), Moderate Pain (4 - 6)  ALBUTerol    90 MICROgram(s) HFA Inhaler 2 Puff(s) Inhalation every 6 hours PRN Shortness of Breath and/or Wheezing  melatonin 3 milliGRAM(s) Oral at bedtime PRN Insomnia      Social History: see consult    Family history: see consult    ROS:   ENT: all negative except as noted in HPI   Pulm: denies SOB, cough, hemoptysis  Neuro: denies numbness/tingling, loss of sensation  Endo: denies heat/cold intolerance, excessive sweating      Vital Signs Last 24 Hrs  T(C): 36.7 (20 Aug 2022 12:53), Max: 36.8 (19 Aug 2022 19:44)  T(F): 98.1 (20 Aug 2022 12:53), Max: 98.2 (19 Aug 2022 19:44)  HR: 74 (20 Aug 2022 12:53) (72 - 87)  BP: 110/69 (20 Aug 2022 12:53) (101/62 - 110/69)  BP(mean): --  RR: 18 (20 Aug 2022 12:53) (18 - 20)  SpO2: 99% (20 Aug 2022 12:53) (98% - 100%)    Parameters below as of 20 Aug 2022 12:53  Patient On (Oxygen Delivery Method): room air                              7.1    4.09  )-----------( 192      ( 20 Aug 2022 05:59 )             23.5    08-20    133<L>  |  89<L>  |  78<H>  ----------------------------<  82  4.2   |  31  |  2.09<H>    Ca    9.5      20 Aug 2022 05:59  Phos  3.5     08-20  Mg     2.0     08-20    TPro  6.5  /  Alb  3.9  /  TBili  0.3  /  DBili  x   /  AST  23  /  ALT  12  /  AlkPhos  112  08-20       PHYSICAL EXAM:  Gen: NAD  Skin: No rashes, bruises, or lesions  Head: Normocephalic, Atraumatic  Face: no edema, erythema, or fluctuance. Parotid glands soft without mass  Eyes: no scleral injection  Nose: Nares bilaterally patent, no discharge  Mouth: No Stridor / Drooling / Trismus.  Mucosa moist, tongue/uvula midline, oropharynx clear  Neck: Flat, supple, no lymphadenopathy, trachea midline, no masses  Lymphatic: No lymphadenopathy  Resp: breathing easily on humidified NC, no stridor  Neuro: facial nerve intact, no facial droop

## 2022-08-20 NOTE — PROGRESS NOTE ADULT - ASSESSMENT
66F w/ significant cardiac/pulmonary history previously seen by ENT for epistaxis, now complaining of minimal blood tinged sputum. On exam, no active bleeding from b/l nares, no blood noted in posterior pharynx.

## 2022-08-20 NOTE — PROGRESS NOTE ADULT - PROBLEM SELECTOR PLAN 5
- s/p nasal packing with ENT, removed on 8/16 without recurrent bleeding  - can hold home aspirin in the setting of anemia if necessary    Raphael Holden MD  Chief Cardiology Fellow PGY-6  [facility]    Notes are not final until signed by attending  For all consults and questions:  [email or web address]   Login: [employer]

## 2022-08-20 NOTE — PROGRESS NOTE ADULT - ATTENDING COMMENTS
wt remains same, and reports u/o is simialr    Plan:  cont lasix drip at 10mg/hr  add metolazone 5mg x 1 PO  can add HTS 2% 100ml x 1 over 1 hr  BID BMP  daily standing wt and I/O's  PT eval  cont current meds wt remains same, and reports u/o is simialr    Plan:  cont lasix drip at 10mg/hr  add metolazone 5mg x 1 PO  can add HTS 2% 150ml x 1 over 1 hr  Resume Spironolactone 50mg po daily  BID BMP  daily standing wt and I/O's  PT eval  cont current meds wt remains same, and reports u/o is simialr    Plan:  cont lasix drip at 10mg/hr  add metolazone 5mg x 1 PO  can add HTS 2% 150ml x 1 over 1 hr  BID BMP  daily standing wt and I/O's  PT eval  cont current meds

## 2022-08-20 NOTE — PROGRESS NOTE ADULT - PROBLEM SELECTOR PLAN 1
Hypoxic to 80s at home while on baseline 8L NC. Patient gaining weight since hospital discharge early July, increased torsemide w/o significant results. Likely in s/o CHF exacerbation, given elevated BNP, limited exercise tolerance, worsening LE edema. Patient afebrile, non-toxic appearing, infection unlikely.   Patient subjectively endorsing decreased urine output throughout yesterday after receiving lasix in AM. Switched to bumex 3mg BID, first dose last night (8/15). Switched back to lasix now 80mg IV BID as patient wasn't responsive to bumex.   - now on lasix gtt, HTS as needed, holding spironolactone for now pending HF recs in setting of previous hyperkalemia   - standing daily weights, strict I/Os  - pulm transplant and HF following  - eventual SGLT2-i prior to discharge, once on stable dose of oral diuretics  - HF: most recent CardioMEMS PAD 45, repeat following removal of nasal packing Hypoxic to 80s at home while on baseline 8L NC. Patient gaining weight since hospital discharge early July, increased torsemide w/o significant results. Likely in s/o CHF exacerbation, given elevated BNP, limited exercise tolerance, worsening LE edema. Patient afebrile, non-toxic appearing, infection unlikely.   Patient subjectively endorsing decreased urine output throughout yesterday after receiving lasix in AM. Switched to bumex 3mg BID, first dose last night (8/15). Switched back to lasix now 80mg IV BID as patient wasn't responsive to bumex.   - now on lasix gtt, will give metolazone 5mg x1 today, f/u HF recs  - holding spironolactone for hyperkalemia  - standing daily weights, strict I/Os  - pulm transplant and HF following  - eventual SGLT2-i prior to discharge, once on stable dose of oral diuretics  - HF: most recent CardioMEMS PAD 45, repeat following removal of nasal packing

## 2022-08-20 NOTE — PROGRESS NOTE ADULT - PROBLEM SELECTOR PLAN 2
(RESOLVED) Significant epistaxis 8/13 AM, likely in setting of use of 8L nc. Not new for pt, but worse than her usual nose bleeds; Rhino rocket now just in left nare, ballon to be deflated tomorrow for trial prior to removal   -S/P afrin x1  -ENT consulted, note epistaxis L nare without target to cauterize. Placed rhino rocket, will need for 3 days per ENT       -Pain regiment and ppx abx ordered while rhino rocket in place  -Monitor H/H  -Eliquis held, can likely restart if no bleeding after removal of packing  - nasal packing removed 8/16 with no recurrent bleeding Significant epistaxis 8/13 AM, likely in setting of use of 8L nc. Not new for pt, but worse than her usual nose bleeds; Rhino rocket now just in left nare, ballon to be deflated tomorrow for trial prior to removal   -S/P afrin x1  -ENT consulted, note epistaxis L nare without target to cauterize. Placed rhino rocket, will need for 3 days per ENT       -Pain regiment and ppx abx ordered while rhino rocket in place  -Monitor H/H  -Eliquis held, can likely restart if no bleeding after removal of packing  - nasal packing removed 8/16 with no recurrent bleeding  - ENT reconsulted 8/20 given concern for posterior bleeding

## 2022-08-20 NOTE — PROGRESS NOTE ADULT - PROBLEM SELECTOR PLAN 2
- CardioMEMS PAD 8/11 43 mmHg. goal ~40 but difficult to ascertain given RV dysfunction  - cardiomems PAD 8/15 45 mmHg, 98/45/66  - daily standing weight. Was discharged in June at 189 lbs  - bumex did not work well for her, IV lasix 80 BID resulted in adequate UOP, s/p several doses of hypertonic saline with good response  - would con't lasix gtt at 10 mg/hr for goal of net -1L   - metolazone 5mg x 1 today  - hold spironolactone for hyperkalemia for now  - eventual SGLT2-i prior to discharge, once on stable dose of oral diuretics

## 2022-08-20 NOTE — PROGRESS NOTE ADULT - ASSESSMENT
66 year old with PMH of Pulmonary hypertension (Group 1,2,3) HFpEF, atrial fibrillation, COPD on home oxygen, ROLANDA on CPAP, morbid obesity with acute hypoxic respiratory failure and acute kidney injury     - remains volume overloaded   - agree with addition of metolazone to Lasix   - if no response to the above, would give an additional bolus of hypertonic saline.     elizabeth redmond  nephrology attending   please contact me on TEAMS   Office- [phone]

## 2022-08-20 NOTE — PROGRESS NOTE ADULT - SUBJECTIVE AND OBJECTIVE BOX
[facility] Division of Kidney Diseases & Hypertension  FOLLOW UP NOTE  --------------------------------------------------------------------------------  Chief Complaint:    24 hour events/subjective:    feels okay  received metolazone today in addition to Lasix drip         PAST HISTORY  --------------------------------------------------------------------------------  No significant changes to PMH, PSH, FHx, SHx, unless otherwise noted    ALLERGIES & MEDICATIONS  --------------------------------------------------------------------------------  Allergies    nitrates (Unknown)    Intolerances      Standing Inpatient Medications  ambrisentan 10 milliGRAM(s) Oral daily  aspirin  chewable 81 milliGRAM(s) Oral daily  atorvastatin 20 milliGRAM(s) Oral at bedtime  budesonide  80 MICROgram(s)/formoterol 4.5 MICROgram(s) Inhaler 2 Puff(s) Inhalation two times a day  buPROPion XL (24-Hour) . 300 milliGRAM(s) Oral daily  digoxin     Tablet 125 MICROGram(s) Oral daily  diltiazem    milliGRAM(s) Oral daily  ferrous    sulfate 325 milliGRAM(s) Oral daily  furosemide Infusion 10 mG/Hr IV Continuous <Continuous>  pantoprazole    Tablet 40 milliGRAM(s) Oral before breakfast  polyethylene glycol 3350 17 Gram(s) Oral daily  sodium chloride 0.65% Nasal 1 Spray(s) Both Nostrils four times a day  tadalafil 40 milliGRAM(s) Oral daily  tiotropium 18 MICROgram(s) Capsule 1 Capsule(s) Inhalation daily    PRN Inpatient Medications  acetaminophen     Tablet .. 975 milliGRAM(s) Oral every 6 hours PRN  ALBUTerol    90 MICROgram(s) HFA Inhaler 2 Puff(s) Inhalation every 6 hours PRN  melatonin 3 milliGRAM(s) Oral at bedtime PRN      REVIEW OF SYSTEMS  --------------------------------------------------------------------------------  Gen: No weight changes, fatigue, fevers/chills, weakness  Skin: No rashes  Head/Eyes/Ears/Mouth: No headache; Normal hearing; Normal vision w/o blurriness; No sinus pain/discomfort, sore throat  Respiratory: No dyspnea, cough, wheezing, hemoptysis  CV: No chest pain, PND, orthopnea  GI: No abdominal pain, diarrhea, constipation, nausea, vomiting, melena, hematochezia  : No increased frequency, dysuria, hematuria, nocturia  MSK: No joint pain/swelling; no back pain; no edema  Neuro: No dizziness/lightheadedness, weakness, seizures, numbness, tingling  Heme: No easy bruising or bleeding  Endo: No heat/cold intolerance  Psych: No significant nervousness, anxiety, stress, depression    All other systems were reviewed and are negative, except as noted.    VITALS/PHYSICAL EXAM  --------------------------------------------------------------------------------  T(C): 36.7 (08-20-22 @ 12:53), Max: 36.8 (08-19-22 @ 19:44)  HR: 74 (08-20-22 @ 12:53) (72 - 87)  BP: 110/69 (08-20-22 @ 12:53) (101/62 - 110/69)  RR: 18 (08-20-22 @ 12:53) (18 - 20)  SpO2: 99% (08-20-22 @ 12:53) (98% - 100%)  Wt(kg): --        08-19-22 @ 07:01  -  08-20-22 @ 07:00  --------------------------------------------------------  IN: 617 mL / OUT: 1400 mL / NET: -783 mL    08-20-22 @ 07:01  -  08-20-22 @ 13:48  --------------------------------------------------------  IN: 560 mL / OUT: 800 mL / NET: -240 mL      Physical Exam:  	Gen: NAD, well-appearing  	HEENT: supple neck, clear oropharynx  	Pulm: CTA B/L  	CV: RRR, S1S2; no rub  	Back: No spinal or CVA tenderness; no sacral edema  	Abd: +BS, soft, nontender/nondistended  	: No suprapubic tenderness  	LE: Warm, FROM, no clubbing, intact strength; + edema   	    LABS/STUDIES  --------------------------------------------------------------------------------              7.1    4.09  >-----------<  192      [08-20-22 @ 05:59]              23.5     133  |  89  |  78  ----------------------------<  82      [08-20-22 @ 05:59]  4.2   |  31  |  2.09        Ca     9.5     [08-20-22 @ 05:59]      Mg     2.0     [08-20-22 @ 05:59]      Phos  3.5     [08-20-22 @ 05:59]    TPro  6.5  /  Alb  3.9  /  TBili  0.3  /  DBili  x   /  AST  23  /  ALT  12  /  AlkPhos  112  [08-20-22 @ 05:59]          Creatinine Trend:  SCr 2.09 [08-20 @ 05:59]  SCr 2.34 [08-19 @ 06:24]  SCr 1.78 [08-18 @ 06:20]  SCr 1.90 [08-17 @ 06:50]  SCr 2.08 [08-16 @ 12:35]      Urine Creatinine 33      [08-14-22 @ 08:40]  Urine Urea Nitrogen 397      [08-14-22 @ 08:40]

## 2022-08-20 NOTE — PROGRESS NOTE ADULT - PROBLEM SELECTOR PLAN 7
Hgb 6.9 at OSH, transfused 1U PRBC with improvement to 7.8. Patient w/o signs/symptoms of acute blood loss, HD stable. Denies changes in bowel/urinary habits, denies recent falls/trauma.   - maintain active T/S  - Holding eliquis as above.   - f/u iron studies: total iron and % saturation decreased, ferritin on lower side, transferrin and TIBC normal; s/p IV iron infusion  - 8/16: Hg 6.7, receiving unit of pRBC, f/u repeat CBC 8.1  - started on oral iron daily 325

## 2022-08-20 NOTE — PROGRESS NOTE ADULT - PROBLEM SELECTOR PLAN 4
- recent Cr ranging 1.7-1.8,   - eventual SGLT2-i as above to delay further progression of CKD  - continue to monitor closely with diuresis

## 2022-08-20 NOTE — PROGRESS NOTE ADULT - PROBLEM SELECTOR PLAN 1
- Strict blood pressure control.  - Nasal saline, 2 sprays to both nares 4 times a day  - Avoid nasal trauma; no nose rubbing or blowing. Sneeze with mouth open and pinching nares.  - Avoid bending with head blow the waist.    - No heavy lifting   - No further ENT intervention at this time   - Call with questions or concerns

## 2022-08-20 NOTE — PROGRESS NOTE ADULT - PROBLEM SELECTOR PLAN 4
RHC and cardiomems done 6/20. RHC showed elevated right sided filling pressures with severe pulmonary hypertension with systemic PA pressures, slightly elevated PCWP and preserved cardiac output. Follows with Dr. Rodriguez at [place] for PH.   - continue tadalafil 40 mg daily   - continue letairis 10mg qd  - follow Pulm recs  -Transplant reporting that patient is currently not a candidate following eval, given this, also not a candidate for IV Flolan per Pulm

## 2022-08-20 NOTE — PROGRESS NOTE ADULT - SUBJECTIVE AND OBJECTIVE BOX
Patient seen and examined at bedside.    Overnight Events: no events, issues or complaints    Review of Systems:  REVIEW OF SYSTEMS:  CONSTITUTIONAL: No weakness, fevers or chills  EYES/ENT: No visual changes;  No dysphagia  NECK: No pain or stiffness  RESPIRATORY: No cough, wheezing, hemoptysis; mild SOB  CARDIOVASCULAR: No chest pain or palpitations; lower extremity edema  GASTROINTESTINAL: No abdominal or epigastric pain. No nausea, vomiting, or hematemesis; No diarrhea or constipation. No melena or hematochezia.  BACK: No back pain  GENITOURINARY: No dysuria, frequency or hematuria  NEUROLOGICAL: No numbness or weakness  SKIN: No itching, burning, rashes, or lesions   All other review of systems is negative unless indicated above.    [ ] All other systems negative  [ ] Unable to assess ROS due to    Current Meds:  acetaminophen     Tablet .. 975 milliGRAM(s) Oral every 6 hours PRN  ALBUTerol    90 MICROgram(s) HFA Inhaler 2 Puff(s) Inhalation every 6 hours PRN  ambrisentan 10 milliGRAM(s) Oral daily  aspirin  chewable 81 milliGRAM(s) Oral daily  atorvastatin 20 milliGRAM(s) Oral at bedtime  budesonide  80 MICROgram(s)/formoterol 4.5 MICROgram(s) Inhaler 2 Puff(s) Inhalation two times a day  buPROPion XL (24-Hour) . 300 milliGRAM(s) Oral daily  digoxin     Tablet 125 MICROGram(s) Oral daily  diltiazem    milliGRAM(s) Oral daily  ferrous    sulfate 325 milliGRAM(s) Oral daily  furosemide Infusion 10 mG/Hr IV Continuous <Continuous>  melatonin 3 milliGRAM(s) Oral at bedtime PRN  pantoprazole    Tablet 40 milliGRAM(s) Oral before breakfast  polyethylene glycol 3350 17 Gram(s) Oral daily  sodium chloride 0.65% Nasal 1 Spray(s) Both Nostrils four times a day  tadalafil 40 milliGRAM(s) Oral daily  tiotropium 18 MICROgram(s) Capsule 1 Capsule(s) Inhalation daily      PAST MEDICAL & SURGICAL HISTORY:  COPD exacerbation      Severe pulmonary hypertension      Chronic kidney disease, unspecified CKD stage      Acute on chronic diastolic congestive heart failure      Pulmonary embolism      Anxiety and depression      GERD (gastroesophageal reflux disease)      Obstructive sleep apnea      Chronic atrial fibrillation      H/O pneumonectomy      Right leg weakness          Vitals:  T(F): 98.1 (08-20), Max: 98.2 (08-19)  HR: 73 (08-20) (72 - 87)  BP: 101/62 (08-20) (101/62 - 108/68)  RR: 18 (08-20)  SpO2: 98% (08-20)  I&O's Summary    19 Aug 2022 07:01  -  20 Aug 2022 07:00  --------------------------------------------------------  IN: 617 mL / OUT: 1400 mL / NET: -783 mL    20 Aug 2022 07:01  -  20 Aug 2022 12:37  --------------------------------------------------------  IN: 200 mL / OUT: 300 mL / NET: -100 mL        Physical Exam:  General: No distress. Comfortable.  HEENT: EOM intact.  Neck: Neck supple. JVP difficult to assess, but appears to be elevated. No masses  Chest: Unlabored, fine crackles bilateral bases.   CV: Normal S1 and S2. No murmurs, rub, or gallops. Radial pulses normal. 2+ BLE edema.   Abdomen: Soft, non-distended, non-tender  Skin: No rashes or skin breakdown  Neurology: Alert and oriented times three. Sensation intact  Psych: Affect normal                            7.1    4.09  )-----------( 192      ( 20 Aug 2022 05:59 )             23.5     08-20    133<L>  |  89<L>  |  78<H>  ----------------------------<  82  4.2   |  31  |  2.09<H>    Ca    9.5      20 Aug 2022 05:59  Phos  3.5     08-20  Mg     2.0     08-20    TPro  6.5  /  Alb  3.9  /  TBili  0.3  /  DBili  x   /  AST  23  /  ALT  12  /  AlkPhos  112  08-20          Serum Pro-Brain Natriuretic Peptide: 85159 pg/mL (08-17 @ 06:50)  Serum Pro-Brain Natriuretic Peptide: 39010 pg/mL (08-16 @ 06:34)  Serum Pro-Brain Natriuretic Peptide: 46281 pg/mL (08-15 @ 05:43)  Serum Pro-Brain Natriuretic Peptide: 21234 pg/mL (08-14 @ 06:45)

## 2022-08-20 NOTE — PROGRESS NOTE ADULT - SUBJECTIVE AND OBJECTIVE BOX
[facility] - Division of Pulmonary, Critical Care and Sleep Medicine   Please call [phone] between 8am-pm weekdays, [phone] after hours and weekends    Interval Events:    REVIEW OF SYSTEMS:  CV: [ ] chest pain   Resp: [ ] cough [ ] shortness of breath   [ ] All other systems negative  [ ] Unable to assess ROS because ________    OBJECTIVE:  ICU Vital Signs Last 24 Hrs  T(C): 36.7 (20 Aug 2022 05:03), Max: 36.8 (19 Aug 2022 19:44)  T(F): 98.1 (20 Aug 2022 05:03), Max: 98.2 (19 Aug 2022 19:44)  HR: 73 (20 Aug 2022 05:12) (68 - 87)  BP: 101/62 (20 Aug 2022 05:03) (93/54 - 108/68)  BP(mean): 67 (19 Aug 2022 12:37) (67 - 67)  ABP: --  ABP(mean): --  RR: 18 (20 Aug 2022 05:03) (16 - 20)  SpO2: 98% (20 Aug 2022 05:12) (98% - 100%)    O2 Parameters below as of 20 Aug 2022 05:03  Patient On (Oxygen Delivery Method): nasal cannula  O2 Flow (L/min): 8            08-19 @ 07:01  -  08-20 @ 07:00  --------------------------------------------------------  IN: 617 mL / OUT: 1400 mL / NET: -783 mL    08-20 @ 07:01  -  08-20 @ 08:27  --------------------------------------------------------  IN: 200 mL / OUT: 300 mL / NET: -100 mL      CAPILLARY BLOOD GLUCOSE          PHYSICAL EXAM:  General: NAD  HEENT: NC/AT  Lymph Nodes: no cervical or supraclavicular lymphadenopathy  Neck: supple  Respiratory:  CTA b/l, no wheezes, crackles or rhonchi  Cardiovascular:  RRR, no m/r/g  Abdomen: soft, NT/ND, +BS  Extremities: no clubbing, cyanosis or edema, warm  Skin: no rash  Neurological: AAOx3, non focal exam  Psychiatry: not anxious appearing, normal affect and mood    HOSPITAL MEDICATIONS:  MEDICATIONS  (STANDING):  ambrisentan 10 milliGRAM(s) Oral daily  aspirin  chewable 81 milliGRAM(s) Oral daily  atorvastatin 20 milliGRAM(s) Oral at bedtime  budesonide  80 MICROgram(s)/formoterol 4.5 MICROgram(s) Inhaler 2 Puff(s) Inhalation two times a day  buPROPion XL (24-Hour) . 300 milliGRAM(s) Oral daily  digoxin     Tablet 125 MICROGram(s) Oral daily  diltiazem    milliGRAM(s) Oral daily  ferrous    sulfate 325 milliGRAM(s) Oral daily  furosemide Infusion 10 mG/Hr (5 mL/Hr) IV Continuous <Continuous>  pantoprazole    Tablet 40 milliGRAM(s) Oral before breakfast  polyethylene glycol 3350 17 Gram(s) Oral daily  sodium chloride 0.65% Nasal 1 Spray(s) Both Nostrils four times a day  tadalafil 40 milliGRAM(s) Oral daily  tiotropium 18 MICROgram(s) Capsule 1 Capsule(s) Inhalation daily    MEDICATIONS  (PRN):  acetaminophen     Tablet .. 975 milliGRAM(s) Oral every 6 hours PRN Temp greater or equal to 38C (100.4F), Mild Pain (1 - 3), Moderate Pain (4 - 6)  ALBUTerol    90 MICROgram(s) HFA Inhaler 2 Puff(s) Inhalation every 6 hours PRN Shortness of Breath and/or Wheezing  melatonin 3 milliGRAM(s) Oral at bedtime PRN Insomnia      LABS:                        7.1    4.09  )-----------( 192      ( 20 Aug 2022 05:59 )             23.5     Hgb Trend: 7.1<--, 7.2<--, 7.3<--, 7.4<--, 8.1<--  08-20    133<L>  |  89<L>  |  78<H>  ----------------------------<  82  4.2   |  31  |  2.09<H>    Ca    9.5      20 Aug 2022 05:59  Phos  3.5     08-20  Mg     2.0     08-20    TPro  6.5  /  Alb  3.9  /  TBili  0.3  /  DBili  x   /  AST  23  /  ALT  12  /  AlkPhos  112  08-20    Creatinine Trend: 2.09<--, 2.34<--, 1.78<--, 1.90<--, 2.08<--, 2.18<--            MICROBIOLOGY:     RADIOLOGY:  [ ] Reviewed and interpreted by me   [facility] - Division of Pulmonary, Critical Care and Sleep Medicine   Please call [phone] between 8am-pm weekdays, [phone] after hours and weekends    Interval Events: No acute events overnight, SOB with exertion at baseline. On Lasix drip since yesterday. With throat clearing, producing blood streaked mucus, denies epistaxis    REVIEW OF SYSTEMS:  CV: [ ] chest pain   Resp: [ ] cough [ ] shortness of breath   [x ] All other systems negative  [ ] Unable to assess ROS because ________    OBJECTIVE:  ICU Vital Signs Last 24 Hrs  T(C): 36.7 (20 Aug 2022 05:03), Max: 36.8 (19 Aug 2022 19:44)  T(F): 98.1 (20 Aug 2022 05:03), Max: 98.2 (19 Aug 2022 19:44)  HR: 73 (20 Aug 2022 05:12) (68 - 87)  BP: 101/62 (20 Aug 2022 05:03) (93/54 - 108/68)  BP(mean): 67 (19 Aug 2022 12:37) (67 - 67)  ABP: --  ABP(mean): --  RR: 18 (20 Aug 2022 05:03) (16 - 20)  SpO2: 98% (20 Aug 2022 05:12) (98% - 100%)    O2 Parameters below as of 20 Aug 2022 05:03  Patient On (Oxygen Delivery Method): nasal cannula  O2 Flow (L/min): 8            08-19 @ 07:01 - 08-20 @ 07:00  --------------------------------------------------------  IN: 617 mL / OUT: 1400 mL / NET: -783 mL    08-20 @ 07:01 - 08-20 @ 08:27  --------------------------------------------------------  IN: 200 mL / OUT: 300 mL / NET: -100 mL      CAPILLARY BLOOD GLUCOSE          PHYSICAL EXAM:  General: NAD  HEENT: NC/AT  Neck: supple  Respiratory:  CTA b/l, no wheezes, crackles or rhonchi  Cardiovascular:  RRR, no m/r/g  Abdomen: soft, NT/ND, +BS  Extremities: no clubbing, bipedal edema  Skin: no rash  Neurological: AAOx3, non focal exam  Psychiatry: not anxious appearing, normal affect and mood    HOSPITAL MEDICATIONS:  MEDICATIONS  (STANDING):  ambrisentan 10 milliGRAM(s) Oral daily  aspirin  chewable 81 milliGRAM(s) Oral daily  atorvastatin 20 milliGRAM(s) Oral at bedtime  budesonide  80 MICROgram(s)/formoterol 4.5 MICROgram(s) Inhaler 2 Puff(s) Inhalation two times a day  buPROPion XL (24-Hour) . 300 milliGRAM(s) Oral daily  digoxin     Tablet 125 MICROGram(s) Oral daily  diltiazem    milliGRAM(s) Oral daily  ferrous    sulfate 325 milliGRAM(s) Oral daily  furosemide Infusion 10 mG/Hr (5 mL/Hr) IV Continuous <Continuous>  pantoprazole    Tablet 40 milliGRAM(s) Oral before breakfast  polyethylene glycol 3350 17 Gram(s) Oral daily  sodium chloride 0.65% Nasal 1 Spray(s) Both Nostrils four times a day  tadalafil 40 milliGRAM(s) Oral daily  tiotropium 18 MICROgram(s) Capsule 1 Capsule(s) Inhalation daily    MEDICATIONS  (PRN):  acetaminophen     Tablet .. 975 milliGRAM(s) Oral every 6 hours PRN Temp greater or equal to 38C (100.4F), Mild Pain (1 - 3), Moderate Pain (4 - 6)  ALBUTerol    90 MICROgram(s) HFA Inhaler 2 Puff(s) Inhalation every 6 hours PRN Shortness of Breath and/or Wheezing  melatonin 3 milliGRAM(s) Oral at bedtime PRN Insomnia      LABS:                        7.1    4.09  )-----------( 192      ( 20 Aug 2022 05:59 )             23.5     Hgb Trend: 7.1<--, 7.2<--, 7.3<--, 7.4<--, 8.1<--  08-20    133<L>  |  89<L>  |  78<H>  ----------------------------<  82  4.2   |  31  |  2.09<H>    Ca    9.5      20 Aug 2022 05:59  Phos  3.5     08-20  Mg     2.0     08-20    TPro  6.5  /  Alb  3.9  /  TBili  0.3  /  DBili  x   /  AST  23  /  ALT  12  /  AlkPhos  112  08-20    Creatinine Trend: 2.09<--, 2.34<--, 1.78<--, 1.90<--, 2.08<--, 2.18<--            MICROBIOLOGY:     RADIOLOGY:  [ ] Reviewed and interpreted by me

## 2022-08-20 NOTE — PROGRESS NOTE ADULT - ATTENDING COMMENTS
Patient seen and evaluated. Comfortable at rest. Noted decreased UOP today.  In AM with feeling of blood in back of throat, not when I saw patient in early afternoon.    #HFpEF, predominant right sided   -continue current diuretic regimen.   -patient is NOT a pulmonary transplant candidate per patient's conversation with transplant pulmonology.   -will optimize from volume status. Palliative care consult appreciated.   -lasix gtt @ 10, metolazone  -team to discuss with heart failure re: center closer to home for follow up.       #Epistaxis  -posisble posterior nares bleed today, will ask ENT to re-eval.   -nasal saline.      #severe pulm HTN  -continue current pulm HTN medication  -given BMI >35, not candidate currently for pulm transplant and will not initiate transplant workup at this time.     #CKD III  -likely cardiorenal component vs. new baseline.   -continue to monitor, avoid nephrotoxic agents.     #Anemia  -iron studies denote iron deficiency. This is complicated by epistaxis.   -iron supplementation  -will see when had last colonoscopy. At this time high risk for colonoscopy, so may require outpatient virtual colonography if patient amenable.     Given weight, age, deconditioning, not a current candidate for transplantation.  Not a great hospice candidate given multiple appointments needed. Will attmept to coordinate care closer to her home.

## 2022-08-20 NOTE — PROGRESS NOTE ADULT - PROBLEM SELECTOR PLAN 5
Last echo 6/22 with EF 68%, normal LV systolic function w/o WMA. Flattening of interventricular septum c/w RV overload, w/ severe R atrial enlargement, RV enlargement w/ decreased RV systolic function, severely elevated pulmonary pressures.   - diuretics as above  - strict I/Os, daily standing weights  - holding spironolactone in setting of previous hyperkalemia, consider restarting once normalized

## 2022-08-21 NOTE — PROGRESS NOTE ADULT - PROBLEM SELECTOR PLAN 5
Last echo 6/22 with EF 68%, normal LV systolic function w/o WMA. Flattening of interventricular septum c/w RV overload, w/ severe R atrial enlargement, RV enlargement w/ decreased RV systolic function, severely elevated pulmonary pressures.   - diuretics as above  - strict I/Os, daily standing weights  - holding spironolactone in setting of previous hyperkalemia, consider restarting once normalized Last echo 6/22 with EF 68%, normal LV systolic function w/o WMA. Flattening of interventricular septum c/w RV overload, w/ severe R atrial enlargement, RV enlargement w/ decreased RV systolic function, severely elevated pulmonary pressures.   - diuretics as above  - strict I/Os, daily standing weights  - holding spironolactone in setting of previous hyperkalemia, consider restarting pending HF recommendations

## 2022-08-21 NOTE — PROGRESS NOTE ADULT - ATTENDING COMMENTS
Patient seen and evaluated. Comfortable at rest. no further epistaxis. Better UOP.      #HFpEF, predominant right sided   -continue current diuretic regimen.   -patient is NOT a pulmonary transplant candidate per patient's conversation with transplant pulmonology.   -will optimize from volume status. Palliative care consult appreciated.   -lasix gtt @ 10, net negative 1-1.5L  -team to discuss with heart failure re: center closer to home for follow up.       #Epistaxis  -resolved with stable Hb 8/21.       #severe pulm HTN  -continue current pulm HTN medication  -given BMI >35, not candidate currently for pulm transplant and will not initiate transplant workup at this time.     #CKD III  -likely cardiorenal component vs. new baseline.   -continue to monitor, avoid nephrotoxic agents.     #Anemia  -iron studies denote iron deficiency. This is complicated by epistaxis.   -iron supplementation  -will see when had last colonoscopy. At this time high risk for colonoscopy, so may require outpatient virtual colonography if patient amenable.     Given weight, age, deconditioning, not a current candidate for transplantation.  Not a great hospice candidate given multiple appointments needed. Will attempt to coordinate care closer to her home.

## 2022-08-21 NOTE — PROGRESS NOTE ADULT - PROBLEM SELECTOR PLAN 1
Hypoxic to 80s at home while on baseline 8L NC. Patient gaining weight since hospital discharge early July, increased torsemide w/o significant results. Likely in s/o CHF exacerbation, given elevated BNP, limited exercise tolerance, worsening LE edema. Patient afebrile, non-toxic appearing, infection unlikely.   Patient subjectively endorsing decreased urine output throughout yesterday after receiving lasix in AM. Switched to bumex 3mg BID, first dose last night (8/15). Switched back to lasix now 80mg IV BID as patient wasn't responsive to bumex.   - now on lasix gtt, receieved metolazone 5mg x1 yesterday, f/u HF recs  - holding spironolactone for hyperkalemia  - standing daily weights, strict I/Os  - pulm transplant and HF following  - eventual SGLT2-i prior to discharge, once on stable dose of oral diuretics  - HF: most recent CardioMEMS PAD 45, repeat following removal of nasal packing Hypoxic to 80s at home while on baseline 8L NC. Patient gaining weight since hospital discharge early July, increased torsemide w/o significant results. Likely in s/o CHF exacerbation, given elevated BNP, limited exercise tolerance, worsening LE edema. Patient afebrile, non-toxic appearing, infection unlikely.   Patient subjectively endorsing decreased urine output throughout yesterday after receiving lasix in AM. Switched to bumex 3mg BID, first dose last night (8/15). Switched back to lasix now 80mg IV BID as patient wasn't responsive to bumex.   - now on lasix gtt, receieved metolazone 5mg x1 yesterday, f/u HF recs for further diuresis   - holding spironolactone for hyperkalemia  - standing daily weights, strict I/Os  - pulm and HF following   - eventual SGLT2-i prior to discharge, once on stable dose of oral diuretics  - HF: most recent CardioMEMS PAD 45, goal PAD 40

## 2022-08-21 NOTE — PROGRESS NOTE ADULT - PROBLEM SELECTOR PLAN 6
[facility] SURGERY  [address] [place] [facility] [address] [facility]  [address]        9/2/2020      Dear Moe Schmid NP,     Your patient, Mily Linton 1963 has been sent two reminders from our office to schedule a follow up appointment and test with Rich Gonzalez MD, FACS. This letter is to inform you that this patient has not contacted our office and we will be closing out his chart. If you have any questions, please do not hesitate to call.     Sincerely,           Paul Trent LPN On 8L baseline O2 at come, currently requiring HFNC. On trelegy and albuterol inhalers at home.   - continue trelegy therapeutic equivalent --> symbicort  - Duonebs q6h COPD protocol  - continue AVAPS qhs (has home machine)

## 2022-08-21 NOTE — PROGRESS NOTE ADULT - SUBJECTIVE AND OBJECTIVE BOX
JERILYNZANDER  66y  Female      Patient is a 66y old  Female who presents with a chief complaint of lung txp eval (20 Aug 2022 15:07)      INTERVAL HPI/OVERNIGHT EVENTS:  provider handoff from yesterday - 8/20: C/w gtt, give metolazone 5mg x1 per HF. ENT reconsulted, pt c/o melena, coughing up blood tinged sputum. Son is mailing ambrisentan on Monday.    Overnight/this morning: ENT reconsulted yesterday to r/o posterior nasopharyngeal bleed, none found. Nasal spray increased to 2 QID.     FAMILY HISTORY:  No pertinent family history      T(C): 36.6 (08-21-22 @ 04:43), Max: 36.7 (08-20-22 @ 12:53)  HR: 69 (08-21-22 @ 04:43) (65 - 78)  BP: 107/61 (08-21-22 @ 04:43) (101/61 - 110/69)  RR: 18 (08-21-22 @ 04:43) (18 - 18)  SpO2: 98% (08-21-22 @ 04:43) (96% - 99%)  Wt(kg): --Vital Signs Last 24 Hrs  T(C): 36.6 (21 Aug 2022 04:43), Max: 36.7 (20 Aug 2022 12:53)  T(F): 97.9 (21 Aug 2022 04:43), Max: 98.1 (20 Aug 2022 12:53)  HR: 69 (21 Aug 2022 04:43) (65 - 78)  BP: 107/61 (21 Aug 2022 04:43) (101/61 - 110/69)  BP(mean): 75 (20 Aug 2022 19:04) (75 - 75)  RR: 18 (21 Aug 2022 04:43) (18 - 18)  SpO2: 98% (21 Aug 2022 04:43) (96% - 99%)    Parameters below as of 21 Aug 2022 04:43  Patient On (Oxygen Delivery Method): BiPAP/CPAP      nitrates (Unknown)      PHYSICAL EXAM:  GENERAL: no acute distress  HEENT - atraumatic, normocephalic, extraocular muscles intact  CV: regular rate and rhythym; normal S1/S2; nor murmurs, rubs, or gallops  PULM: course breath sounds  ABDOMEN: soft, nontender, nondistended; bowel sounds present  MSK: extremities atraumatic; ny cyanosis or clubbing  Extremities: 3+BLE edema  SKIN: warm, dry, no rashes or lesions  NEURO:  no focal deficits, sensory and motor grossly intact  PSYCH: alert and oriented x3; appropriate mood and affect    Consultant(s) Notes Reviewed:  [x ] YES  [ ] NO  Care Discussed with Consultants/Other Providers [ x] YES  [ ] NO    LABS:      RADIOLOGY & ADDITIONAL TESTS:    Imaging Personally Reviewed:  [ ] YES  [ ] NO  acetaminophen     Tablet .. 975 milliGRAM(s) Oral every 6 hours PRN  ALBUTerol    90 MICROgram(s) HFA Inhaler 2 Puff(s) Inhalation every 6 hours PRN  ambrisentan 10 milliGRAM(s) Oral daily  aspirin  chewable 81 milliGRAM(s) Oral daily  atorvastatin 20 milliGRAM(s) Oral at bedtime  budesonide  80 MICROgram(s)/formoterol 4.5 MICROgram(s) Inhaler 2 Puff(s) Inhalation two times a day  buPROPion XL (24-Hour) . 300 milliGRAM(s) Oral daily  digoxin     Tablet 125 MICROGram(s) Oral daily  diltiazem    milliGRAM(s) Oral daily  ferrous    sulfate 325 milliGRAM(s) Oral daily  furosemide Infusion 10 mG/Hr IV Continuous <Continuous>  furosemide Infusion 10 mG/Hr IV Continuous <Continuous>  melatonin 3 milliGRAM(s) Oral at bedtime PRN  pantoprazole    Tablet 40 milliGRAM(s) Oral before breakfast  polyethylene glycol 3350 17 Gram(s) Oral daily  sodium chloride 0.65% Nasal 2 Spray(s) Both Nostrils four times a day  tadalafil 40 milliGRAM(s) Oral daily  tiotropium 18 MICROgram(s) Capsule 1 Capsule(s) Inhalation daily      HEALTH ISSUES - PROBLEM Dx:  JUANA (acute kidney injury)    Severe pulmonary hypertension    Acute on chronic diastolic congestive heart failure    Prophylactic measure    COPD, moderate    Chronic atrial fibrillation    Anemia    Chronic kidney disease, unspecified CKD stage    Epistaxis    Hyponatremia    Hyperkalemia    Acute respiratory failure with hypoxia    ACP (advance care planning)    Palliative care encounter             ZANDER JEAN-BAPTISTE  66y  Female      Patient is a 66y old  Female who presents with a chief complaint of lung txp eval (20 Aug 2022 15:07)      INTERVAL HPI/OVERNIGHT EVENTS:  provider handoff from yesterday - 8/20: C/w gtt, give metolazone 5mg x1 per HF. ENT reconsulted, pt c/o melena, coughing up blood tinged sputum. Son is mailing ambrisentan on Monday.    Overnight/this morning: ENT reconsulted yesterday to r/o posterior nasopharyngeal bleed, none found. Nasal spray increased to 2 QID. Patient endorsing no further breathing. Endorses extensive diuresis overnight, on lasix ggt and received metolazone yesterday.     FAMILY HISTORY:  No pertinent family history      T(C): 36.6 (08-21-22 @ 04:43), Max: 36.7 (08-20-22 @ 12:53)  HR: 69 (08-21-22 @ 04:43) (65 - 78)  BP: 107/61 (08-21-22 @ 04:43) (101/61 - 110/69)  RR: 18 (08-21-22 @ 04:43) (18 - 18)  SpO2: 98% (08-21-22 @ 04:43) (96% - 99%)  Wt(kg): --Vital Signs Last 24 Hrs  T(C): 36.6 (21 Aug 2022 04:43), Max: 36.7 (20 Aug 2022 12:53)  T(F): 97.9 (21 Aug 2022 04:43), Max: 98.1 (20 Aug 2022 12:53)  HR: 69 (21 Aug 2022 04:43) (65 - 78)  BP: 107/61 (21 Aug 2022 04:43) (101/61 - 110/69)  BP(mean): 75 (20 Aug 2022 19:04) (75 - 75)  RR: 18 (21 Aug 2022 04:43) (18 - 18)  SpO2: 98% (21 Aug 2022 04:43) (96% - 99%)    Parameters below as of 21 Aug 2022 04:43  Patient On (Oxygen Delivery Method): BiPAP/CPAP      nitrates (Unknown)      PHYSICAL EXAM:  GENERAL: no acute distress  HEENT - atraumatic, normocephalic, extraocular muscles intact  CV: regular rate and rhythym; normal S1/S2; nor murmurs, rubs, or gallops  PULM: course breath sounds  ABDOMEN: soft, nontender, nondistended; bowel sounds present  MSK: extremities atraumatic; ny cyanosis or clubbing  Extremities: 3+BLE edema  SKIN: warm, dry, no rashes or lesions  NEURO:  no focal deficits, sensory and motor grossly intact  PSYCH: alert and oriented x3; appropriate mood and affect    Consultant(s) Notes Reviewed:  [x ] YES  [ ] NO  Care Discussed with Consultants/Other Providers [ x] YES  [ ] NO    LABS:                          8.0    4.22  )-----------( 240      ( 21 Aug 2022 07:15 )             26.3       08-21    131<L>  |  86<L>  |  79<H>  ----------------------------<  76  3.6   |  31  |  2.19<H>    Ca    9.8      21 Aug 2022 07:15  Phos  4.2     08-21  Mg     2.0     08-21    TPro  6.5  /  Alb  3.9  /  TBili  0.3  /  DBili  x   /  AST  23  /  ALT  12  /  AlkPhos  112  08-20           RADIOLOGY & ADDITIONAL TESTS:    Imaging Personally Reviewed:  [ ] YES  [ ] NO  acetaminophen     Tablet .. 975 milliGRAM(s) Oral every 6 hours PRN  ALBUTerol    90 MICROgram(s) HFA Inhaler 2 Puff(s) Inhalation every 6 hours PRN  ambrisentan 10 milliGRAM(s) Oral daily  aspirin  chewable 81 milliGRAM(s) Oral daily  atorvastatin 20 milliGRAM(s) Oral at bedtime  budesonide  80 MICROgram(s)/formoterol 4.5 MICROgram(s) Inhaler 2 Puff(s) Inhalation two times a day  buPROPion XL (24-Hour) . 300 milliGRAM(s) Oral daily  digoxin     Tablet 125 MICROGram(s) Oral daily  diltiazem    milliGRAM(s) Oral daily  ferrous    sulfate 325 milliGRAM(s) Oral daily  furosemide Infusion 10 mG/Hr IV Continuous <Continuous>  furosemide Infusion 10 mG/Hr IV Continuous <Continuous>  melatonin 3 milliGRAM(s) Oral at bedtime PRN  pantoprazole    Tablet 40 milliGRAM(s) Oral before breakfast  polyethylene glycol 3350 17 Gram(s) Oral daily  sodium chloride 0.65% Nasal 2 Spray(s) Both Nostrils four times a day  tadalafil 40 milliGRAM(s) Oral daily  tiotropium 18 MICROgram(s) Capsule 1 Capsule(s) Inhalation daily      HEALTH ISSUES - PROBLEM Dx:  JUANA (acute kidney injury)    Severe pulmonary hypertension    Acute on chronic diastolic congestive heart failure    Prophylactic measure    COPD, moderate    Chronic atrial fibrillation    Anemia    Chronic kidney disease, unspecified CKD stage    Epistaxis    Hyponatremia    Hyperkalemia    Acute respiratory failure with hypoxia    ACP (advance care planning)    Palliative care encounter

## 2022-08-21 NOTE — PROGRESS NOTE ADULT - ASSESSMENT
66F w/ severe pulmonary HTN (Group 1, 2 & 3), HFpEF, Afib, COPD on home O2 (5L NC), CKD, ROLANDA on CPAP, morbid obesity presenting with acute hypoxic respiratory failure, admitted for CHF exacerbation and ongoing lung transplant evaluation.   W/ epistaxis 8/13, seen by ENT, started on bilateral nasal packing/ rhino rocket, started on pain meds, abx ppx, cbc stable   8/17 nasal packing removed, started back on nasal cannula, no further epistaxis, increased diuretics to 10 mg/hr lasix drip     Patient no longer a transplant candidate, seen by palliative, not DNR nor DNI.

## 2022-08-21 NOTE — PROGRESS NOTE ADULT - PROBLEM SELECTOR PLAN 4
RHC and cardiomems done 6/20. RHC showed elevated right sided filling pressures with severe pulmonary hypertension with systemic PA pressures, slightly elevated PCWP and preserved cardiac output. Follows with Dr. Rodriguez at [place] for PH.   - continue tadalafil 40 mg daily   - continue letairis 10mg qd (patient to receive home medication from son this coming Tuesday)   - follow Pulm recs  -Transplant reporting that patient is currently not a candidate following eval, given this, also not a candidate for IV Flolan per Pulm RHC and cardiomems done 6/20. RHC showed elevated right sided filling pressures with severe pulmonary hypertension with systemic PA pressures, slightly elevated PCWP and preserved cardiac output. Follows with Dr. Rodriguez at [place] for PH.   - continue tadalafil 40 mg daily   - continue letairis 10mg qd (patient to receive home medication from son this coming Tuesday)   - follow Pulm recs  -Transplant reporting that patient is currently not a candidate following eval, given this, also not a candidate for IV Flolan per Pulm  - f/u whether there is pulm htn expert that is nearer patient's home to make visits more convenient

## 2022-08-21 NOTE — PROGRESS NOTE ADULT - PROBLEM SELECTOR PLAN 2
Significant epistaxis 8/13 AM, likely in setting of use of 8L nc. Not new for pt, but worse than her usual nose bleeds; Rhino rocket now just in left nare, ballon to be deflated tomorrow for trial prior to removal   -S/P afrin x1  -ENT consulted, note epistaxis L nare without target to cauterize. Placed rhino rocket, will need for 3 days per ENT       -Pain regiment and ppx abx ordered while rhino rocket in place  -Monitor H/H  - Eliquis held for now   - nasal packing removed 8/16 with no recurrent bleeding  - ENT reconsulted 8/20 given concern for posterior bleeding, but none found, c/w nasal sprays x2 (resolved) Significant epistaxis 8/13 AM, likely in setting of use of 8L nc. Not new for pt, but worse than her usual nose bleeds; s/p rhinorockets removed 8/16 and no further bleeding episodes   -ENT consulted   - Monitor H/H  - Eliquis held for now   - nasal packing removed 8/16 with no recurrent bleeding  - ENT reconsulted 8/20 given concern for posterior bleeding, but none found, c/w nasal sprays x2

## 2022-08-22 NOTE — DIETITIAN INITIAL EVALUATION ADULT - OTHER INFO
- Per 8/22 Pulmonology note, pt no longer a lung transplant candidate secondary to multiorgan dysfunction, high BMI, and poor functional status  - Pt continues to be diuresed with lasix gtt in-house  - Noted no PMH of diabetes noted, HbA1c within normal limits   - Pt ordered for ferrous sulfate  - Noted K<3.2>L (8/22); noted pt ordered for low potassium diet; previous with hyperkalemia  - per 8/22 Nephrology note, pt continues to be volume overloaded, no plans for HD at this time.  - Pt with hyponatremia; Na<130> L (8/22); continue with IV lasix - Per 8/22 Pulmonology note, pt no longer a lung transplant candidate secondary to multiorgan dysfunction, high BMI, and poor functional status  - Pt continues to be diuresed with lasix gtt in-house  - Noted no PMH of diabetes noted, HbA1c within normal limits   - Pt ordered for ferrous sulfate  - Noted K<3.2>L (8/22)repleted with K chloride tab today, 8/22; noted pt ordered for low potassium diet; previous with hyperkalemia  - per 8/22 Nephrology note, pt continues to be volume overloaded, no plans for HD at this time.  - Pt with hyponatremia; Na<130> L (8/22); continue with IV lasix

## 2022-08-22 NOTE — PROGRESS NOTE ADULT - ATTENDING COMMENTS
Sarai Calle MD  Off: [phone]  contact me on teams    (After 5 pm or on weekends please page the on-call fellow/attending, can check [email or web address] for schedule. Login is abhay means, schedule under [facility] medicine, psych, derm)

## 2022-08-22 NOTE — PROGRESS NOTE ADULT - ASSESSMENT
66 year old woman with HFpEF with primarily RV dysfunction s/p CardioMEMS, severe pulmonary hypertension (Group 1, 2 & 3), COPD on home O2 8L, AF (on Eliquis), PE, CVA (MCA infarct 2012), CKD (b/l Cr 1.7-1.8), ROLANDA on CPAP and morbid obesity (s/p bariatric surgery 2012 with lap band removal d/t GIB) who was recently hospitalized 6/14-6/29/22 for volume overload where she met with lung transplant team with plan for evaluation pending improvement in BMI and deconditioned state. Had RHC in June with severely elevated PAP and preserved CO.     She presents with RINALDI, ABD bloating and LE swelling in the setting of gradual 25 lb weight gain despite escalation of diuretic regimen. She was started on intermittent IV Lasix and was responding well, but remains markedly overloaded with predominate symptoms of elevated R sided filling pressure. She was switched to IV bumex and did not respond as well so now back on IV lasix with as needed hypertonic saline. She is likely not a lung transplant candidate and also not a candidate for IV therapy pulmonary dilators. Plan is for continued diuresis and palliative care was consulted.       Cardiac Studies  CardioMEMS interrogation 8/16: PAP 98/45/66, HR 87  RHC and CardioMEMS Implant 6/20/22: RA 18, /44/65, PCWP 16 (v to 18), RA 95% on 8L NC, PA 58%, CO/CI (F) 6.7/3.2, PVR 7.32 ryan, TPG 49, /60/86 (CardioMEMS PAD 49)  TTE 6/15/22: LVIDd 4.8 cm, LVEF 68%, flattening of interventricular septum consistent with RV overload, mild concentric LVH (septum 1.2 PWT 1.1), RVE with decreased systolic function, mild LAE, severe ROE, mod TR, est RVSP 61 mmHg 66 year old woman with HFpEF with primarily RV dysfunction s/p CardioMEMS, severe pulmonary hypertension (Group 1, 2 & 3), COPD on home O2 8L, AF (on Eliquis), PE, CVA (MCA infarct 2012), CKD (b/l Cr 1.7-1.8), ROLANDA on CPAP and morbid obesity (s/p bariatric surgery 2012 with lap band removal d/t GIB) who was recently hospitalized 6/14-6/29/22 for volume overload where she met with lung transplant team with plan for evaluation pending improvement in BMI and deconditioned state. Had RHC in June with severely elevated PAP and preserved CO.     She presents with RINALDI, ABD bloating and LE swelling in the setting of gradual 25 lb weight gain despite escalation of diuretic regimen. She was started on intermittent IV Lasix and was responding well, but remains markedly overloaded with predominate symptoms of elevated R sided filling pressure. She was switched to IV bumex and did not respond as well so now back on IV lasix with as needed hypertonic saline. She is likely not a lung transplant candidate and also not a candidate for IV therapy pulmonary dilators. Plan is for continued diuresis and palliative care was consulted.     Cardiac Studies  CardioMEMS interrogation 8/16: PAP 98/45/66, HR 87  RHC and CardioMEMS Implant 6/20/22: RA 18, /44/65, PCWP 16 (v to 18), RA 95% on 8L NC, PA 58%, CO/CI (F) 6.7/3.2, PVR 7.32 ryan, TPG 49, /60/86 (CardioMEMS PAD 49)  TTE 6/15/22: LVIDd 4.8 cm, LVEF 68%, flattening of interventricular septum consistent with RV overload, mild concentric LVH (septum 1.2 PWT 1.1), RVE with decreased systolic function, mild LAE, severe ROE, mod TR, est RVSP 61 mmHg

## 2022-08-22 NOTE — DIETITIAN INITIAL EVALUATION ADULT - PERTINENT MEDS FT
MEDICATIONS  (STANDING):  ambrisentan 10 milliGRAM(s) Oral daily  aspirin  chewable 81 milliGRAM(s) Oral daily  atorvastatin 20 milliGRAM(s) Oral at bedtime  budesonide  80 MICROgram(s)/formoterol 4.5 MICROgram(s) Inhaler 2 Puff(s) Inhalation two times a day  buPROPion XL (24-Hour) . 300 milliGRAM(s) Oral daily  digoxin     Tablet 125 MICROGram(s) Oral daily  diltiazem    milliGRAM(s) Oral daily  ferrous    sulfate 325 milliGRAM(s) Oral daily  furosemide Infusion 10 mG/Hr (5 mL/Hr) IV Continuous <Continuous>  pantoprazole    Tablet 40 milliGRAM(s) Oral before breakfast  polyethylene glycol 3350 17 Gram(s) Oral daily  sodium chloride 0.65% Nasal 2 Spray(s) Both Nostrils four times a day  sodium chloride 2% . 150 milliLiter(s) (300 mL/Hr) IV Continuous <Continuous>  tadalafil 40 milliGRAM(s) Oral daily  tiotropium 18 MICROgram(s) Capsule 1 Capsule(s) Inhalation daily    MEDICATIONS  (PRN):  acetaminophen     Tablet .. 975 milliGRAM(s) Oral every 6 hours PRN Temp greater or equal to 38C (100.4F), Mild Pain (1 - 3), Moderate Pain (4 - 6)  ALBUTerol    90 MICROgram(s) HFA Inhaler 2 Puff(s) Inhalation every 6 hours PRN Shortness of Breath and/or Wheezing  melatonin 3 milliGRAM(s) Oral at bedtime PRN Insomnia

## 2022-08-22 NOTE — PROGRESS NOTE ADULT - PROBLEM SELECTOR PLAN 5
Last echo 6/22 with EF 68%, normal LV systolic function w/o WMA. Flattening of interventricular septum c/w RV overload, w/ severe R atrial enlargement, RV enlargement w/ decreased RV systolic function, severely elevated pulmonary pressures.   - diuretics as above  - strict I/Os, daily standing weights  - holding spironolactone in setting of previous hyperkalemia, consider restarting pending HF recommendations

## 2022-08-22 NOTE — PROGRESS NOTE ADULT - ASSESSMENT
66 F with h/o COPD, CHFpEF, AFib, obesity (Type III, hx bariatric sx 2012), and severe pulmonary HTN combined group 1,2,3 with chronic combined hypercapnic and hypoxemic respiratory failure on ATC O2 supplementation (dependent on 8L NC at rest) with qhs/prn AVAPS (Trelegy Antonio via nasal prong interface) presenting for progressive dyspnea with minimal exertion admitted for acute decompensated right heart failure.   RHC 6/20/2022 revealed sPAP: 106, dPAP: 44 mPAP:65, PCWP: 16, PVR 7.32W, CO/CI 6.7/3.2.     not a candidate for lung transplant due to multiorgan dysfxn, high BMI, and poor functional status  no plans to start flolan  continue diuresis as per Nephro and Heart Failure  should follow up with Dr. Mosher as outpatient   c/w pulmonary vasodilators Tadalafil and Ambrisentan.   Continue supplemental O2 with goal O2 sat > 88%. Patient is on 8L NC at home and would like to try herself on 7 L as feeling improved   Continue AVAPs at night   c/w bronchodilators  -DVT ppx, ambulate as tolerated, incentive spirometry

## 2022-08-22 NOTE — PROGRESS NOTE ADULT - PROBLEM SELECTOR PLAN 3
Cr elevated to 2.42 on admission- baseline is 1.7-1.8. Likely in s/o CHF exacerbation.  - Cr 2.29 8/22 from 2.02 yesterday.  - diuresis as above  - avoid nephrotoxic agents  - strict I/Os  - Nephrology following  - Cr elevation could also be new baseline for patient in setting of worsening kidney function 2/2 pulm htn/HF

## 2022-08-22 NOTE — PROGRESS NOTE ADULT - SUBJECTIVE AND OBJECTIVE BOX
Patient seen and examined at bedside.    Overnight Events: LISA    Review Of Systems: No chest pain, shortness of breath, or palpitations            Current Meds:  acetaminophen     Tablet .. 975 milliGRAM(s) Oral every 6 hours PRN  ALBUTerol    90 MICROgram(s) HFA Inhaler 2 Puff(s) Inhalation every 6 hours PRN  ambrisentan 10 milliGRAM(s) Oral daily  aspirin  chewable 81 milliGRAM(s) Oral daily  atorvastatin 20 milliGRAM(s) Oral at bedtime  budesonide  80 MICROgram(s)/formoterol 4.5 MICROgram(s) Inhaler 2 Puff(s) Inhalation two times a day  buPROPion XL (24-Hour) . 300 milliGRAM(s) Oral daily  digoxin     Tablet 125 MICROGram(s) Oral daily  diltiazem    milliGRAM(s) Oral daily  ferrous    sulfate 325 milliGRAM(s) Oral daily  furosemide Infusion 10 mG/Hr IV Continuous <Continuous>  melatonin 3 milliGRAM(s) Oral at bedtime PRN  pantoprazole    Tablet 40 milliGRAM(s) Oral before breakfast  polyethylene glycol 3350 17 Gram(s) Oral daily  sodium chloride 0.65% Nasal 2 Spray(s) Both Nostrils four times a day  sodium chloride 2% . 150 milliLiter(s) IV Continuous <Continuous>  tadalafil 40 milliGRAM(s) Oral daily  tiotropium 18 MICROgram(s) Capsule 1 Capsule(s) Inhalation daily      Vitals:  T(F): 97.6 (08-22), Max: 97.7 (08-22)  HR: 84 (08-22) (61 - 90)  BP: 99/62 (08-22) (91/52 - 102/63)  RR: 18 (08-22)  SpO2: 95% (08-22)  I&O's Summary    21 Aug 2022 07:01  -  22 Aug 2022 07:00  --------------------------------------------------------  IN: 605 mL / OUT: 2400 mL / NET: -1795 mL    22 Aug 2022 07:01  -  22 Aug 2022 12:32  --------------------------------------------------------  IN: 215 mL / OUT: 700 mL / NET: -485 mL        Physical Exam:  General: No distress. Comfortable.  HEENT: EOM intact.  Neck: Neck supple. JVP 8-10. No masses  Chest: Unlabored, fine crackles bilateral bases.   CV: Normal S1 and S2. No murmurs, rub, or gallops. Radial pulses normal. 2+ BLE edema.   Abdomen: Soft, non-distended, non-tender  Skin: No rashes or skin breakdown  Neurology: Alert and oriented times three. Sensation intact  Psych: Affect normal                            7.4    4.40  )-----------( 215      ( 22 Aug 2022 06:33 )             24.1     08-22    130<L>  |  84<L>  |  92<H>  ----------------------------<  78  3.2<L>   |  35<H>  |  2.29<H>    Ca    9.4      22 Aug 2022 06:34  Phos  4.5     08-22  Mg     1.9     08-22    TPro  6.8  /  Alb  4.1  /  TBili  0.3  /  DBili  x   /  AST  21  /  ALT  10  /  AlkPhos  114  08-22          Serum Pro-Brain Natriuretic Peptide: 99938 pg/mL (08-17 @ 06:50)  Serum Pro-Brain Natriuretic Peptide: 50617 pg/mL (08-16 @ 06:34)       HEART FAILURE PROGRESS NOTE    Patient seen and examined at bedside.    Overnight Events: NAEON    Review Of Systems: No chest pain, shortness of breath, or palpitations            Current Meds:  acetaminophen     Tablet .. 975 milliGRAM(s) Oral every 6 hours PRN  ALBUTerol    90 MICROgram(s) HFA Inhaler 2 Puff(s) Inhalation every 6 hours PRN  ambrisentan 10 milliGRAM(s) Oral daily  aspirin  chewable 81 milliGRAM(s) Oral daily  atorvastatin 20 milliGRAM(s) Oral at bedtime  budesonide  80 MICROgram(s)/formoterol 4.5 MICROgram(s) Inhaler 2 Puff(s) Inhalation two times a day  buPROPion XL (24-Hour) . 300 milliGRAM(s) Oral daily  digoxin     Tablet 125 MICROGram(s) Oral daily  diltiazem    milliGRAM(s) Oral daily  ferrous    sulfate 325 milliGRAM(s) Oral daily  furosemide Infusion 10 mG/Hr IV Continuous <Continuous>  melatonin 3 milliGRAM(s) Oral at bedtime PRN  pantoprazole    Tablet 40 milliGRAM(s) Oral before breakfast  polyethylene glycol 3350 17 Gram(s) Oral daily  sodium chloride 0.65% Nasal 2 Spray(s) Both Nostrils four times a day  sodium chloride 2% . 150 milliLiter(s) IV Continuous <Continuous>  tadalafil 40 milliGRAM(s) Oral daily  tiotropium 18 MICROgram(s) Capsule 1 Capsule(s) Inhalation daily      Vitals:  T(F): 97.6 (08-22), Max: 97.7 (08-22)  HR: 84 (08-22) (61 - 90)  BP: 99/62 (08-22) (91/52 - 102/63)  RR: 18 (08-22)  SpO2: 95% (08-22)  I&O's Summary    21 Aug 2022 07:01  -  22 Aug 2022 07:00  --------------------------------------------------------  IN: 605 mL / OUT: 2400 mL / NET: -1795 mL    22 Aug 2022 07:01  -  22 Aug 2022 12:32  --------------------------------------------------------  IN: 215 mL / OUT: 700 mL / NET: -485 mL      Physical Exam:  General: No distress. Comfortable.  HEENT: EOM intact.  Neck: Neck supple. JVP 10-12cm sitting upright. No masses  Chest: Unlabored, fine crackles bilateral bases.   CV: Normal S1 and S2. No murmurs, rub, or gallops. Radial pulses normal. 2+ BLE edema.   Abdomen: Soft, non-distended, non-tender  Skin: No rashes or skin breakdown  Neurology: Alert  Psych: Affect normal                            7.4    4.40  )-----------( 215      ( 22 Aug 2022 06:33 )             24.1     08-22    130<L>  |  84<L>  |  92<H>  ----------------------------<  78  3.2<L>   |  35<H>  |  2.29<H>    Ca    9.4      22 Aug 2022 06:34  Phos  4.5     08-22  Mg     1.9     08-22    TPro  6.8  /  Alb  4.1  /  TBili  0.3  /  DBili  x   /  AST  21  /  ALT  10  /  AlkPhos  114  08-22        Serum Pro-Brain Natriuretic Peptide: 37867 pg/mL (08-17 @ 06:50)  Serum Pro-Brain Natriuretic Peptide: 71519 pg/mL (08-16 @ 06:34)

## 2022-08-22 NOTE — PROGRESS NOTE ADULT - SUBJECTIVE AND OBJECTIVE BOX
[facility] Division of Kidney Diseases & Hypertension  FOLLOW UP NOTE  --------------------------------------------------------------------------------  Chief Complaint:    24 hour events/subjective:    pt started on lasix gtt    PAST HISTORY  --------------------------------------------------------------------------------  No significant changes to PMH, PSH, FHx, SHx, unless otherwise noted    ALLERGIES & MEDICATIONS  --------------------------------------------------------------------------------  Allergies    nitrates (Unknown)    Intolerances      Standing Inpatient Medications  ambrisentan 10 milliGRAM(s) Oral daily  aspirin  chewable 81 milliGRAM(s) Oral daily  atorvastatin 20 milliGRAM(s) Oral at bedtime  budesonide  80 MICROgram(s)/formoterol 4.5 MICROgram(s) Inhaler 2 Puff(s) Inhalation two times a day  buPROPion XL (24-Hour) . 300 milliGRAM(s) Oral daily  digoxin     Tablet 125 MICROGram(s) Oral daily  diltiazem    milliGRAM(s) Oral daily  ferrous    sulfate 325 milliGRAM(s) Oral daily  furosemide Infusion 10 mG/Hr IV Continuous <Continuous>  pantoprazole    Tablet 40 milliGRAM(s) Oral before breakfast  polyethylene glycol 3350 17 Gram(s) Oral daily  sodium chloride 0.65% Nasal 2 Spray(s) Both Nostrils four times a day  tadalafil 40 milliGRAM(s) Oral daily  tiotropium 18 MICROgram(s) Capsule 1 Capsule(s) Inhalation daily    PRN Inpatient Medications  acetaminophen     Tablet .. 975 milliGRAM(s) Oral every 6 hours PRN  ALBUTerol    90 MICROgram(s) HFA Inhaler 2 Puff(s) Inhalation every 6 hours PRN  melatonin 3 milliGRAM(s) Oral at bedtime PRN      REVIEW OF SYSTEMS  --------------------------------------------------------------------------------    All other systems were reviewed and are negative, except as noted.    VITALS/PHYSICAL EXAM  --------------------------------------------------------------------------------  T(C): 36.5 (08-22-22 @ 04:43), Max: 36.5 (08-22-22 @ 04:43)  HR: 90 (08-22-22 @ 09:20) (61 - 90)  BP: 102/63 (08-22-22 @ 04:43) (91/52 - 105/51)  RR: 21 (08-22-22 @ 06:21) (18 - 21)  SpO2: 92% (08-22-22 @ 09:20) (91% - 100%)  Wt(kg): --        08-21-22 @ 07:01  -  08-22-22 @ 07:00  --------------------------------------------------------  IN: 605 mL / OUT: 2400 mL / NET: -1795 mL    08-22-22 @ 07:01  -  08-22-22 @ 11:10  --------------------------------------------------------  IN: 15 mL / OUT: 700 mL / NET: -685 mL      Physical Exam:  	Gen: NAD  	Pulm: decreased air entry  	CV: RRR, S1S2  	Abd: +BS, soft  	UE: Warm  	LE: Warm, edema  	Neuro: No focal deficits  	Psych: Normal affect and mood  	Skin: Warm, without rashes    LABS/STUDIES  --------------------------------------------------------------------------------              7.4    4.40  >-----------<  215      [08-22-22 @ 06:33]              24.1     130  |  84  |  92  ----------------------------<  78      [08-22-22 @ 06:34]  3.2   |  35  |  2.29        Ca     9.4     [08-22-22 @ 06:34]      Mg     1.9     [08-22-22 @ 06:34]      Phos  4.5     [08-22-22 @ 06:34]    TPro  6.8  /  Alb  4.1  /  TBili  0.3  /  DBili  x   /  AST  21  /  ALT  10  /  AlkPhos  114  [08-22-22 @ 06:34]          Creatinine Trend:  SCr 2.29 [08-22 @ 06:34]  SCr 2.02 [08-21 @ 17:11]  SCr 2.19 [08-21 @ 07:15]  SCr 2.22 [08-20 @ 18:17]  SCr 2.09 [08-20 @ 05:59]

## 2022-08-22 NOTE — PROGRESS NOTE ADULT - PROBLEM SELECTOR PLAN 2
- CardioMEMS PAD 8/11 43 mmHg. goal ~40 but difficult to ascertain given RV dysfunction  - cardiomems PAD 8/15 45 mmHg, 98/45/66  - daily standing weight. so far down-trending but not at baseline. Was discharged in June at 189 lbs  - bumex did not work well for her  - would con't lasix gtt at 10 mg/hr for goal of net -1-2L today  - hold off on metolazone or hypertonic saline for today  - hold spironolactone for now  - get BID BMP, please replete K > 4, Mg > 2, consider starting standing PO K 10 mEq daily if she remains hypokalemic   - eventual SGLT2-i prior to discharge, once on stable dose of oral diuretics

## 2022-08-22 NOTE — PROGRESS NOTE ADULT - ASSESSMENT
66 year old with PMH of Pulmonary hypertension (Group 1,2,3) HFpEF, atrial fibrillation, COPD on home oxygen, ROLANDA on CPAP, morbid obesity with acute hypoxic respiratory failure. Nephrology consulted for uptrending creatinine

## 2022-08-22 NOTE — PROGRESS NOTE ADULT - PROBLEM SELECTOR PLAN 7
Hgb 6.9 at OSH, transfused 1U PRBC with improvement to 7.8. Patient w/o signs/symptoms of acute blood loss, HD stable. Denies changes in bowel/urinary habits, denies recent falls/trauma.  - maintain active T/S  - Holding eliquis as above.   - f/u iron studies: total iron and % saturation decreased, ferritin on lower side, transferrin and TIBC normal; s/p IV iron infusion  - 8/16: Hg 6.7, receiving unit of pRBC, f/u repeat CBC 8.1  - Continue oral iron daily 325  - Potential source of blood loss is bleeding into extremities from phlebotomy.

## 2022-08-22 NOTE — DIETITIAN INITIAL EVALUATION ADULT - PERTINENT LABORATORY DATA
08-22    130<L>  |  84<L>  |  92<H>  ----------------------------<  78  3.2<L>   |  35<H>  |  2.29<H>    Ca    9.4      22 Aug 2022 06:34  Phos  4.5     08-22  Mg     1.9     08-22    TPro  6.8  /  Alb  4.1  /  TBili  0.3  /  DBili  x   /  AST  21  /  ALT  10  /  AlkPhos  114  08-22  A1C with Estimated Average Glucose Result: 5.4 % (06-14-22 @ 02:45)

## 2022-08-22 NOTE — DIETITIAN INITIAL EVALUATION ADULT - NSFNSGIIOFT_GEN_A_CORE
Pt denies nausea, vomiting, constipation or diarrhea at this time. Last bowel movement this morning, 8/22 per pt reports. Pt ordered for miralax bowel regimen.

## 2022-08-22 NOTE — DIETITIAN INITIAL EVALUATION ADULT - CALCULATED TO (CAL/KG)
1.  OHTN OU (0.3/0.45)- Stable IOP OU. Normal OCT OU. Observe for changes/progression. 2.  Cataract OU: Observe for now without intervention. The patient was advised to contact us if any change or worsening of vision3. KRISTY w/ PEK OU- improved but plug out RLL plug intact LLL. Recommend pt use tears routinely OU QID vs. PRN. Ok to use tear NABIL OU QHS. 4. Narrow Angles OU- patent. S/p PI's OU5. Return for an appointment for a 10 check K/glare in 6 months with Dr. Lucina Anderson. 1823

## 2022-08-22 NOTE — DIETITIAN INITIAL EVALUATION ADULT - ORAL INTAKE PTA/DIET HISTORY
Pt was eating well with no changes in appetite. Pt reports she is "cautious" with foods she consumes; reports living at home and preparing meals for herself. Confirms no known food allergies though allergy to nitrates noted in chart. Denies Hx of chewing or swallowing issues.

## 2022-08-22 NOTE — PROGRESS NOTE ADULT - SUBJECTIVE AND OBJECTIVE BOX
CHIEF COMPLAINT:Patient is a 66y old  Female who presents with a chief complaint of lung txp eval (22 Aug 2022 11:09)      Interval Events: continues to make good urine output, feeling better, improved exertion, minimal cough, no pleuritic chest pain    REVIEW OF SYSTEMS:  [x] All other systems negative except per HPI   [ ] Unable to assess ROS because ________    OBJECTIVE:  ICU Vital Signs Last 24 Hrs  T(C): 36.4 (22 Aug 2022 12:20), Max: 36.5 (22 Aug 2022 04:43)  T(F): 97.6 (22 Aug 2022 12:20), Max: 97.7 (22 Aug 2022 04:43)  HR: 84 (22 Aug 2022 12:20) (61 - 90)  BP: 99/62 (22 Aug 2022 12:20) (91/52 - 102/63)  BP(mean): 65 (21 Aug 2022 19:25) (65 - 65)  ABP: --  ABP(mean): --  RR: 18 (22 Aug 2022 12:20) (18 - 21)  SpO2: 95% (22 Aug 2022 12:20) (91% - 100%)    O2 Parameters below as of 22 Aug 2022 12:20  Patient On (Oxygen Delivery Method): nasal cannula              08-21 @ 07:01 - 08-22 @ 07:00  --------------------------------------------------------  IN: 605 mL / OUT: 2400 mL / NET: -1795 mL    08-22 @ 07:01  -  08-22 @ 12:26  --------------------------------------------------------  IN: 215 mL / OUT: 700 mL / NET: -485 mL        PHYSICAL EXAM:  GENERAL: NAD, well-groomed, well-developed  HEAD:  Atraumatic, Normocephalic  EYES: EOMI, PERRLA, conjunctiva and sclera clear  ENMT: No tonsillar erythema, exudates, or enlargement; Moist mucous membranes, Good dentition, No lesions  NECK: Supple, JVD, Normal thyroid  CHEST/LUNG: Clear to auscultation bilaterally; No rales, rhonchi, wheezing, or rubs  HEART: Regular rate and rhythm; No murmurs, rubs, or gallops  ABDOMEN: Soft, Nontender, Nondistended; Bowel sounds present  VASCULAR:  2+ Peripheral Pulses, No clubbing, cyanosis. bilateral leg edema  LYMPH: No lymphadenopathy noted  SKIN: No rashes or lesions  NERVOUS SYSTEM:  Alert & Oriented X3, Good concentration; Motor Strength 5/5 B/L upper and lower extremities; DTRs 2+ intact and symmetric    HOSPITAL MEDICATIONS:  MEDICATIONS  (STANDING):  ambrisentan 10 milliGRAM(s) Oral daily  aspirin  chewable 81 milliGRAM(s) Oral daily  atorvastatin 20 milliGRAM(s) Oral at bedtime  budesonide  80 MICROgram(s)/formoterol 4.5 MICROgram(s) Inhaler 2 Puff(s) Inhalation two times a day  buPROPion XL (24-Hour) . 300 milliGRAM(s) Oral daily  digoxin     Tablet 125 MICROGram(s) Oral daily  diltiazem    milliGRAM(s) Oral daily  ferrous    sulfate 325 milliGRAM(s) Oral daily  furosemide Infusion 10 mG/Hr (5 mL/Hr) IV Continuous <Continuous>  pantoprazole    Tablet 40 milliGRAM(s) Oral before breakfast  polyethylene glycol 3350 17 Gram(s) Oral daily  sodium chloride 0.65% Nasal 2 Spray(s) Both Nostrils four times a day  sodium chloride 2% . 150 milliLiter(s) (300 mL/Hr) IV Continuous <Continuous>  tadalafil 40 milliGRAM(s) Oral daily  tiotropium 18 MICROgram(s) Capsule 1 Capsule(s) Inhalation daily    MEDICATIONS  (PRN):  acetaminophen     Tablet .. 975 milliGRAM(s) Oral every 6 hours PRN Temp greater or equal to 38C (100.4F), Mild Pain (1 - 3), Moderate Pain (4 - 6)  ALBUTerol    90 MICROgram(s) HFA Inhaler 2 Puff(s) Inhalation every 6 hours PRN Shortness of Breath and/or Wheezing  melatonin 3 milliGRAM(s) Oral at bedtime PRN Insomnia      LABS:    The Labs were reviewed by me   The Radiology was reviewed by me    EKG tracing reviewed by me    08-22    130<L>  |  84<L>  |  92<H>  ----------------------------<  78  3.2<L>   |  35<H>  |  2.29<H>  08-21    128<L>  |  83<L>  |  85<H>  ----------------------------<  91  3.8   |  32<H>  |  2.02<H>  08-21    131<L>  |  86<L>  |  79<H>  ----------------------------<  76  3.6   |  31  |  2.19<H>    Ca    9.4      22 Aug 2022 06:34  Ca    10.0      21 Aug 2022 17:11  Ca    9.8      21 Aug 2022 07:15  Phos  4.5     08-22  Mg     1.9     08-22    TPro  6.8  /  Alb  4.1  /  TBili  0.3  /  DBili  x   /  AST  21  /  ALT  10  /  AlkPhos  114  08-22  TPro  6.5  /  Alb  3.9  /  TBili  0.3  /  DBili  x   /  AST  23  /  ALT  12  /  AlkPhos  112  08-20    Magnesium, Serum: 1.9 mg/dL (08-22-22 @ 06:34)  Magnesium, Serum: 2.1 mg/dL (08-21-22 @ 17:11)  Magnesium, Serum: 2.0 mg/dL (08-21-22 @ 07:15)  Magnesium, Serum: 2.0 mg/dL (08-20-22 @ 18:17)  Magnesium, Serum: 2.0 mg/dL (08-20-22 @ 05:59)    Phosphorus Level, Serum: 4.5 mg/dL (08-22-22 @ 06:34)  Phosphorus Level, Serum: 4.3 mg/dL (08-21-22 @ 17:11)  Phosphorus Level, Serum: 4.2 mg/dL (08-21-22 @ 07:15)  Phosphorus Level, Serum: 3.8 mg/dL (08-20-22 @ 18:17)  Phosphorus Level, Serum: 3.5 mg/dL (08-20-22 @ 05:59)                                              7.4    4.40  )-----------( 215      ( 22 Aug 2022 06:33 )             24.1                         8.0    4.22  )-----------( 240      ( 21 Aug 2022 07:15 )             26.3                         7.1    4.09  )-----------( 192      ( 20 Aug 2022 05:59 )             23.5     CAPILLARY BLOOD GLUCOSE            MICROBIOLOGY:     RADIOLOGY:  [ ] Reviewed and interpreted by me    Point of Care Ultrasound Findings:    PFT:    EKG:

## 2022-08-22 NOTE — PROGRESS NOTE ADULT - PROBLEM SELECTOR PLAN 1
Pt. with CKD in the setting of chronic cardiorenal syndrome, now with JUANA (non oliguric) on CKD likely 2/2 renal venous congestion from hypervolemia due to HF in the setting of severe pulmonary HTN.   On review of [facility], noted that Baseline SCr ranges from 1.4-1.9 since March '22. SCr on arrival was 1.8 on 8/11; peaked to 2.02 on 8/13   Cr remains in 2.2  Pt. continues to appear volume overloaded. UO in the last 24 hours. Continue lasix gtt  Consider HTS once again today  No plan for HD at this time.   Avoid NSAIDs, ACEI/ARBS, RCA and nephrotoxins. Dose medications as per eGFR.

## 2022-08-22 NOTE — DIETITIAN INITIAL EVALUATION ADULT - NS FNS REASON FOR WEIGHT CHANG
Pt with dosing wt of 204 pounds (8/11/22). Of note, pt does state that last admission in June / July her weight was down to 180 pounds; noted pt with a ~25 pound weigh gain in past 2 months or o secondary to fluid retention. Pt does continue to be diuresed in-house.  Pt does report having a scale at home but was not weighing herself daily. Most recent daily wt from 8/22 of 203.2 pounds - standing wt. Wt's in-house have been around 202-210 pounds this admit./fluid retention

## 2022-08-22 NOTE — DIETITIAN INITIAL EVALUATION ADULT - REASON FOR ADMISSION
"66 year old female with HFpEF with primarily RV dysfunction s/p CardioMEMS, severe pulmonary hypertension (Group 1, 2 & 3), COPD on home O2 8L, AF (on Eliquis), PE, CVA (MCA infarct 2012), CKD (b/l Cr 1.7-1.8), ROLANDA on CPAP and morbid obesity (s/p bariatric surgery 2012 with lap band removal d/t GIB) who was recently hospitalized 6/14-6/29/22 for volume overload where she met with lung transplant team with plan for evaluation pending improvement in BMI and deconditioned state. Had RHC in June with severely elevated PAP and preserved CO. She is likely not a lung transplant candidate and also not a candidate for IV therapy pulmonary dilators. Plan is for continued diuresis and palliative care was consulted".

## 2022-08-22 NOTE — PROGRESS NOTE ADULT - PROBLEM SELECTOR PLAN 8
History of Afib on eliquis 5mg BID  - Tele reviewed - patient currently in afib, rate controlled.  - continue digoxin 125  - continue diltiazem 120mg qd  - Holding eliquis right now in setting of epistaxis as above/hemoglobin drop.

## 2022-08-22 NOTE — PROGRESS NOTE ADULT - PROBLEM SELECTOR PLAN 5
- iron level 27, iron sat 8%, TIBC 350 checked on 8/12, s/p IV iron sucrose 200 x1 and 1u RBC, c/w PO iron daily  - s/p nasal packing with ENT, removed on 8/16 without recurrent bleeding  - can hold home aspirin in the setting of anemia if necessary

## 2022-08-22 NOTE — DIETITIAN INITIAL EVALUATION ADULT - PROBLEM SELECTOR PROBLEM 7
no discharge, no irritation, no pain, no redness, and no visual changes.
Chronic atrial fibrillation

## 2022-08-22 NOTE — PROGRESS NOTE ADULT - PROBLEM SELECTOR PLAN 1
Pt feeling almost at baseline. Improving on lasix drip. Having okay urine output. Weight slowly decreasing but about 13 lbs above last discharge weight.  - Continue lasix drip 10/hr  - 2 doses of 2% hypertonic saline today as per nephro.  - Holding metolazone today  - Holding spironolactone for hyperkalemia  - standing daily weights, strict I/Os  - pulm and HF following   - eventual SGLT2-i prior to discharge, once on stable dose of oral diuretics  - HF: most recent CardioMEMS PAD 45, goal PAD 40 56 y/o M with CAD s/p 3 stents, HTN, HLD, eczema presents to the ED for chest pain.  Pt states he had an episode of chest pain today for 2 hours. Pt states the chest pain was midsternal, with no radiation, worse with exertion and better with rest. Pt states the chest pain is nonpleuritic. Pt also states he has bilateral LE swelling for about a week. Pt also states his last stress test was 3 months ago and the results were abnormal. Pt denies LOC, syncope, fever, chills, shortness of breath, palpitations, N/V/D/C, numbness, tingling, dysuria, urinary/bowel incontinence or any other complaints at this time.     Hospital course:  CEX 2: negative  EKG: NSR @ 71 bpm,      Pt seen by cardiologist, given recent abnormal stress test and symptomatic chest pain recommended cardiac angiogram. However pt refused and requested to sign out AMA. Risk and benefit explained to the patient and family member. Pt AMA. Of note  service offered for pt but he insisted that his son ( Olivas MD Cast) translate for him

## 2022-08-22 NOTE — DIETITIAN INITIAL EVALUATION ADULT - NS FNS DIET ORDER
Diet, DASH/TLC:   Sodium & Cholesterol Restricted  No Concentrated Potassium (08-16-22 @ 13:48) [Active]

## 2022-08-22 NOTE — DIETITIAN INITIAL EVALUATION ADULT - ADD RECOMMEND
Continue current DASH diet; fluid restriction per team as needed, RD remains available to continue to reinforced education PRN

## 2022-08-22 NOTE — DIETITIAN INITIAL EVALUATION ADULT - NSPROEDAREADYLEARN_GEN_A_NUR
RD provided pt with DASH diet and HF education. Reviewed foods high in Na and cholesterol to avoid as well as fluid restrictions per MD/medical team as well as monitoring weights. Reviewed ways to decrease Na in your diet, discussed meal and snack options, tips for eating out; provided written Heart Healthy Eating Nutrition Therapy as well as Heart Failure Nutrition Therapy handout to Pt. Pt verbalized understanding and accepted written materials./none

## 2022-08-22 NOTE — PROGRESS NOTE ADULT - ATTENDING COMMENTS
67 y/o obese F w/chronic respiratory failure with hypoxia and hypercapnia secondary to combination of COPD, HFpEF, and severe pulmonary hypertension w/cor pulmonale admitted for acute on chronic RV failure, currently slowly improving with diuresis.    - Supplemental O2 as needed goal O2 sat >= 90%  - AVAPS at night  - Continue lasix gtt  - Continue pulmonary vasodilators

## 2022-08-22 NOTE — PROGRESS NOTE ADULT - PROBLEM SELECTOR PLAN 2
(resolved) Significant epistaxis 8/13 AM, likely in setting of use of 8L nc. Not new for pt, but worse than her usual nose bleeds; s/p rhinorockets removed 8/16 and no further bleeding episodes   -ENT consulted   - Monitor H/H  - Eliquis held for now   - nasal packing removed 8/16 with no recurrent bleeding  - ENT reconsulted 8/20 given concern for posterior bleeding, but none found, c/w nasal sprays x2

## 2022-08-22 NOTE — PROGRESS NOTE ADULT - SUBJECTIVE AND OBJECTIVE BOX
PROGRESS NOTE:   Authored by: Andrea Limon M.D.   Internal Medicine PGY-1  Please Contact Via Teams    Patient is a 66y old  Female who presents with a chief complaint of lung txp eval (21 Aug 2022 06:40)      SUBJECTIVE / OVERNIGHT EVENTS:  No acute events overnight.     ADDITIONAL REVIEW OF SYSTEMS:  Patient denies fevers, chills, chest pain, shortness of breath, nausea, abdominal pain, diarrhea, constipation, dysuria, leg swelling, headache, light headedness.    MEDICATIONS  (STANDING):  ambrisentan 10 milliGRAM(s) Oral daily  aspirin  chewable 81 milliGRAM(s) Oral daily  atorvastatin 20 milliGRAM(s) Oral at bedtime  budesonide  80 MICROgram(s)/formoterol 4.5 MICROgram(s) Inhaler 2 Puff(s) Inhalation two times a day  buPROPion XL (24-Hour) . 300 milliGRAM(s) Oral daily  digoxin     Tablet 125 MICROGram(s) Oral daily  diltiazem    milliGRAM(s) Oral daily  ferrous    sulfate 325 milliGRAM(s) Oral daily  furosemide Infusion 10 mG/Hr (5 mL/Hr) IV Continuous <Continuous>  pantoprazole    Tablet 40 milliGRAM(s) Oral before breakfast  polyethylene glycol 3350 17 Gram(s) Oral daily  sodium chloride 0.65% Nasal 2 Spray(s) Both Nostrils four times a day  tadalafil 40 milliGRAM(s) Oral daily  tiotropium 18 MICROgram(s) Capsule 1 Capsule(s) Inhalation daily    MEDICATIONS  (PRN):  acetaminophen     Tablet .. 975 milliGRAM(s) Oral every 6 hours PRN Temp greater or equal to 38C (100.4F), Mild Pain (1 - 3), Moderate Pain (4 - 6)  ALBUTerol    90 MICROgram(s) HFA Inhaler 2 Puff(s) Inhalation every 6 hours PRN Shortness of Breath and/or Wheezing  melatonin 3 milliGRAM(s) Oral at bedtime PRN Insomnia      CAPILLARY BLOOD GLUCOSE        I&O's Summary    21 Aug 2022 07:01  -  22 Aug 2022 07:00  --------------------------------------------------------  IN: 605 mL / OUT: 2400 mL / NET: -1795 mL    22 Aug 2022 07:01  -  22 Aug 2022 10:21  --------------------------------------------------------  IN: 15 mL / OUT: 700 mL / NET: -685 mL        PHYSICAL EXAM:  Vital Signs Last 24 Hrs  T(C): 36.5 (22 Aug 2022 04:43), Max: 36.5 (22 Aug 2022 04:43)  T(F): 97.7 (22 Aug 2022 04:43), Max: 97.7 (22 Aug 2022 04:43)  HR: 88 (22 Aug 2022 06:23) (61 - 88)  BP: 102/63 (22 Aug 2022 04:43) (91/52 - 105/51)  BP(mean): 65 (21 Aug 2022 19:25) (65 - 65)  RR: 21 (22 Aug 2022 06:21) (18 - 21)  SpO2: 91% (22 Aug 2022 06:23) (91% - 100%)    Parameters below as of 22 Aug 2022 06:21  Patient On (Oxygen Delivery Method): BiPAP/CPAP        CONSTITUTIONAL: NAD, well-developed  RESPIRATORY: Normal respiratory effort; lungs are clear to auscultation bilaterally  CARDIOVASCULAR: Regular rate and rhythm, normal S1 and S2, no murmur/rub/gallop; No lower extremity edema; Peripheral pulses are 2+ bilaterally  ABDOMEN: Nontender to palpation, normoactive bowel sounds, no rebound/guarding; No hepatosplenomegaly  MUSCLOSKELETAL: no clubbing or cyanosis of digits; no joint swelling or tenderness to palpation  PSYCH: A+O to person, place, and time; affect appropriate    LABS:                        7.4    4.40  )-----------( 215      ( 22 Aug 2022 06:33 )             24.1     08-22    130<L>  |  84<L>  |  92<H>  ----------------------------<  78  3.2<L>   |  35<H>  |  2.29<H>    Ca    9.4      22 Aug 2022 06:34  Phos  4.5     08-22  Mg     1.9     08-22    TPro  6.8  /  Alb  4.1  /  TBili  0.3  /  DBili  x   /  AST  21  /  ALT  10  /  AlkPhos  114  08-22                Tele Reviewed:    RADIOLOGY & ADDITIONAL TESTS:  Results Reviewed:   Imaging Personally Reviewed:  Electrocardiogram Personally Reviewed:     PROGRESS NOTE:   Authored by: Andrea Limon M.D.   Internal Medicine PGY-1  Please Contact Via Teams    Patient is a 66y old  Female who presents with a chief complaint of lung txp eval (21 Aug 2022 06:40)      SUBJECTIVE / OVERNIGHT EVENTS:  No acute events overnight.  Tele: Afib in 60's-80's  This morning is feeling well, on 7L NC down from 8L. Was reading on her hubert at bedside.    MEDICATIONS  (STANDING):  ambrisentan 10 milliGRAM(s) Oral daily  aspirin  chewable 81 milliGRAM(s) Oral daily  atorvastatin 20 milliGRAM(s) Oral at bedtime  budesonide  80 MICROgram(s)/formoterol 4.5 MICROgram(s) Inhaler 2 Puff(s) Inhalation two times a day  buPROPion XL (24-Hour) . 300 milliGRAM(s) Oral daily  digoxin     Tablet 125 MICROGram(s) Oral daily  diltiazem    milliGRAM(s) Oral daily  ferrous    sulfate 325 milliGRAM(s) Oral daily  furosemide Infusion 10 mG/Hr (5 mL/Hr) IV Continuous <Continuous>  pantoprazole    Tablet 40 milliGRAM(s) Oral before breakfast  polyethylene glycol 3350 17 Gram(s) Oral daily  sodium chloride 0.65% Nasal 2 Spray(s) Both Nostrils four times a day  tadalafil 40 milliGRAM(s) Oral daily  tiotropium 18 MICROgram(s) Capsule 1 Capsule(s) Inhalation daily    MEDICATIONS  (PRN):  acetaminophen     Tablet .. 975 milliGRAM(s) Oral every 6 hours PRN Temp greater or equal to 38C (100.4F), Mild Pain (1 - 3), Moderate Pain (4 - 6)  ALBUTerol    90 MICROgram(s) HFA Inhaler 2 Puff(s) Inhalation every 6 hours PRN Shortness of Breath and/or Wheezing  melatonin 3 milliGRAM(s) Oral at bedtime PRN Insomnia      I&O's Summary    21 Aug 2022 07:01  -  22 Aug 2022 07:00  --------------------------------------------------------  IN: 605 mL / OUT: 2400 mL / NET: -1795 mL    22 Aug 2022 07:01  -  22 Aug 2022 10:21  --------------------------------------------------------  IN: 15 mL / OUT: 700 mL / NET: -685 mL        PHYSICAL EXAM:  Vital Signs Last 24 Hrs  T(C): 36.5 (22 Aug 2022 04:43), Max: 36.5 (22 Aug 2022 04:43)  T(F): 97.7 (22 Aug 2022 04:43), Max: 97.7 (22 Aug 2022 04:43)  HR: 88 (22 Aug 2022 06:23) (61 - 88)  BP: 102/63 (22 Aug 2022 04:43) (91/52 - 105/51)  BP(mean): 65 (21 Aug 2022 19:25) (65 - 65)  RR: 21 (22 Aug 2022 06:21) (18 - 21)  SpO2: 91% (22 Aug 2022 06:23) (91% - 100%)    Parameters below as of 22 Aug 2022 06:21  Patient On (Oxygen Delivery Method): BiPAP/CPAP      Physical Exam:  CONSTITUTIONAL: NAD, obese, on NC  RESPIRATORY: Normal respiratory effort; lungs with B/L crackles at bases.   CARDIOVASCULAR: AFIB, no murmurs appreciated  ABDOMEN: Nontender to palpation, normoactive bowel sounds, soft, non-tender.  MUSCLOSKELETAL: Diffuse bruising on upper extremities. Venous stasis changes on lower extremities.  PSYCH: A+O to person, place, and time; affect appropriate    LABS:                        7.4    4.40  )-----------( 215      ( 22 Aug 2022 06:33 )             24.1     08-22    130<L>  |  84<L>  |  92<H>  ----------------------------<  78  3.2<L>   |  35<H>  |  2.29<H>    Ca    9.4      22 Aug 2022 06:34  Phos  4.5     08-22  Mg     1.9     08-22    TPro  6.8  /  Alb  4.1  /  TBili  0.3  /  DBili  x   /  AST  21  /  ALT  10  /  AlkPhos  114  08-22

## 2022-08-22 NOTE — PROGRESS NOTE ADULT - ATTENDING COMMENTS
Patient was seen and examined with the fellow.  Ms. Domingo, still has an elevated JVP today and lower extremity tight edema.  Will continue with lasix drip at 10mg/hr for a goal net -1 to 2L daily.  Will hold off on metalozone today given low serum Na at 130.  Can consider hypertonic saline tomorrow.  Continue to hold spironolactone given eGFR < 35.  Replete K to keep level > 4.  She remains off anticoagulation given recent epistaxis. This will need to be resumed.  An option would be to discontinue ASA and start her on a heparin drip in the coming days as a rechallenge of anticoagulation.

## 2022-08-22 NOTE — PROGRESS NOTE ADULT - PROBLEM SELECTOR PLAN 4
RHC and cardiomems done 6/20. RHC showed elevated right sided filling pressures with severe pulmonary hypertension with systemic PA pressures, slightly elevated PCWP and preserved cardiac output.   - Followed with Dr. Rodriguez at [facility] for PH but he recently left the practice. Needs a pulmonary hypertension doctor near her home in [place].  - Continue tadalafil 40 mg daily   - continue Ambrisentan 10mg qd (patient to receive home medication from son this coming Tuesday)   - follow Pulm recs  -Transplant reporting that patient is currently not a candidate following eval, given this, also not a candidate for IV Flolan per Pulm

## 2022-08-22 NOTE — PROGRESS NOTE ADULT - PROBLEM SELECTOR PLAN 4
- recent baseline Cr ranging 1.7-1.8  - eventual SGLT2-i as above to delay further progression of CKD  - continue to monitor closely with diuresis

## 2022-08-22 NOTE — PROGRESS NOTE ADULT - PROBLEM SELECTOR PLAN 6
On 8L baseline O2 at come, currently on 7L NC and feeling well. On trelegy and albuterol inhalers at home.   - continue trelegy therapeutic equivalent --> symbicort  - Duonebs q6h COPD protocol  - continue AVAPS qhs (has home machine)

## 2022-08-22 NOTE — PROGRESS NOTE ADULT - ASSESSMENT
66F w/ severe pulmonary HTN (Group 1, 2 & 3), HFpEF, Afib, COPD on home O2 (5L NC), CKD, ROLANDA on CPAP, morbid obesity presenting with acute hypoxic respiratory failure, admitted for CHF exacerbation and ongoing lung transplant evaluation.   W/ epistaxis 8/13 now resolved, cbc stable 8/22, started back on nasal cannula during day ,AVAPS at night, continuing 10 mg/hr lasix drip     Patient no longer a transplant candidate, seen by palliative, not DNR nor DNI.

## 2022-08-22 NOTE — PROGRESS NOTE ADULT - PROBLEM SELECTOR PLAN 1
- further guidance by pulmonary hypertension team and lung transplant team  - not a lung transplant candidate at this point, also not a candidate for IV therapy for pulmonary dilators  - palliative care following  - please get PT eval - managed by pulmonary hypertension team on ambirsentan and tadalafil.    - not a lung transplant candidate at this point, also not a candidate for IV therapy for pulmonary dilators  - palliative care following  - please get PT eval

## 2022-08-22 NOTE — PROGRESS NOTE ADULT - ATTENDING COMMENTS
above plans discussed with Dr. Limon    # severe pulmonary HTN  # acute on chronic respiratory failure with hypoxemia  # acute on chronic HFpEF exacerbation  # chronic Afib  # JUANA on CKD III  # COPD  # epistaxis    - started on lasix gtt with improvement in overall volume status but still with elevated JVP and weight 203lbs (dc'ed in June at 189lbs)  - appreciate HF recs: continue aggressive diuresis, holding metolazone today given low Na; continue hypertonic saline  - appreciate pulm recs: not a candidate for lung transplant  - appreciate nephrology recs: JUANA likely in setting of cardiorenal, continue IV lasix gtt and hypertonic saline  - appreciate palliative care recs: pt wants full medical management and not interested in palliative care yet  - epistaxis now resolved and H/H stable; for now, eliquis has been on hold - will challenge with heparin gtt and transition to eliquis once stable  - overall poor prognosis    Zhanna Velasquez MD  Division of Hospital Medicine  Contact via [employer] Teams  Office: [phone].

## 2022-08-23 NOTE — PROGRESS NOTE ADULT - PROBLEM SELECTOR PROBLEM 6
Patient:   MARINE IBANEZ JR            MRN: [id number]            FIN: [id number]              Age:   62 years     Sex:  MALE     :  55   Associated Diagnoses:   None   Author:   ELODIA RADFORD     ASSESSMENT AND PLAN  Acute hypoxic respiratory failure–oxygen requirement improving, patient moved out of ICU  History of severe pulmonary fibrosis  Coronary artery disease status post MI–troponins downtrending  History of COPD  History of hypertension  Hyperlipidemia  History of BPH  Diabetes–monitor blood sugars  Chronic kidney disease stage II  History of congestive heart failure–probably diastolic, management per cardiology service  We will continue to follow  Start discharge planning  SUBJECTIVE  no new complaints, no acute events  NO VENTILATORS QUALIFIED  I & O between:  12-AUG-2019 13:59 TO 13-AUG-2019 13:59  Med Dosing Weight:  86  kg   10-AUG-2019  24 Hour Intake:   250.00  ( 2.91 mL/kg )  24 Hour Output:   1100.00           24 Hour Urine/Stool Output:   0.0  24 Hour Balance:   -850.00           24 Hour Urine Output:   1100.00  ( 0.53 mL/kg/hr )  Vitals between:   12-AUG-2019 13:59:03   TO   13-AUG-2019 13:59:03                   LAST RESULT MINIMUM MAXIMUM  Temperature 36.3 35.9 36.5  Heart Rate 78 66 87  Respiratory Rate 19 19 36  NISBP           115 104 130  NIDBP           66 58 83  NIMBP           84 79 84  SpO2                    95 92 99  FiO2                    0.40 0.40 0.50  ON EXAMINATION  pt. is alert and orientedx3  perrla, eomi  neck - supple, no tenderness  heart- s1 and s2 are normal, no murmur,normal rhythm,no rub  chest- b/l good entry, no wheezing or crackles, no rales  abdo- soft , nontender , no organomegaly, bs+  cns- no new focal neurological deficit, cn 2-12 normal,   ext. - no pedal edema, no skin breakdown  Medications (30) Active  Scheduled: (22)  Aspirin 81 mg chew tab  81 mg 1 tab, Oral, Daily  Atorvastatin 40 mg tab  80 mg 2 tab, Oral, Daily  Budesonide-formoterol  160-4.5 mcg oral MDI 6 gm  1 puff, Inhaled, BID  Clopidogrel 300 mg tab  300 mg 1 tab, Oral, Once (scheduled)  Clopidogrel 75 mg tab  75 mg 1 tab, Oral, Daily  doxycycline  200 mg, IVPB, Daily  Emtricitabine-rilpivirine-tenofovir alafenamide 200-25-25 mg tab  1 tab, Oral, Daily  Enoxaparin 40 mg/0.4 mL syringe  40 mg 0.4 mL, Subcutaneous, Daily  Famotidine 20 mg tab  20 mg 1 tab, Oral, Q12H  Furosemide 20 mg/2 mL inj  20 mg 2 mL, Slow IV Push, Once (scheduled)  Furosemide 40 mg tab  40 mg 1 tab, Oral, BID  Guaifenesin--20 mg/10 mL oral liquid UD  10 mL, Oral, Q8H  HydrALAZINE 50 mg tab  100 mg 2 tab, Oral, BID  insulin glargine  20 unit 0.2 mL, Subcutaneous, Q Bedtime  Insulin human lispro 100 unit/mL inj 3 mL BULK  4-12 unit, Subcutaneous, QID [with meals & HS]  Isosorbide mononitrate 60 mg ER tab  60 mg 1 tab, Oral, QAM  Metoprolol succinate 50 mg XL tab  50 mg 1 tab, Oral, Daily  Mirtazapine 15 mg tab  15 mg 1 tab, Oral, Q Bedtime  PredniSONE 10 mg tab  10 mg 1 tab, Oral, Daily [with breakfast]  Tamsulosin 0.4 mg cap  0.4 mg 1 cap, Oral, Daily [after dinner]  Ticagrelor 90 mg tab  90 mg 1 tab, Oral, BID  Umeclidinium 62.5 mcg inhalation powder 7s  1 puff, MDI/DPI, Daily  Continuous: (0)  PRN: (8)  Acetaminophen 325 mg tab  650 mg 2 tab, Oral, Q4H  Albuterol HFA 90 mcg oral MDI 8 gm  180 mcg 2 puff, MDI/DPI, Q6H  Albuterol-ipratropium 2.5-0.5 mg/3 mL nebulizer soln  3 mL, Nebulizer, Q6H Awake x3  Dextrose (glucose) 40% 15 gm/37.5 gm oral gel UD  15 gm, Oral, As Directed PRN  Dextrose (glucose) 50% 25 gm/50 mL syringe  12.5 gm 25 mL, IV Push, As Directed PRN  Furosemide 40 mg/4 mL inj  40 mg 4 mL, Slow IV Push, As Directed PRN  Glucagon 1 mg/1 mL emergency kit SDV  1 mg 1 mL, IM, As Directed PRN  LORazepam 0.5 mg tab  0.5 mg 1 tab, Oral, Q Bedtime  Labs between:  12-AUG-2019 13:59 to 13-AUG-2019 13:59  BMP:                 Na  Cl  BUN  Glu   13-AUG-2019 145  100  (H) 31  (H) 108                              K   CO2  Cr  Ca                              3.6  (H) 40  1.14  9.3   POC GLU:                 Latest Result  Latest Date  Minimum  Min Date  Maximum  Max Date                             (H) 245  13-AUG-2019 (H) 245  13-AUG-2019 (H) 209  12-AUG-2019   COPD, moderate

## 2022-08-23 NOTE — PROGRESS NOTE ADULT - PROBLEM SELECTOR PLAN 2
- CardioMEMS PAD 8/11 43 mmHg. goal ~40 but difficult to ascertain given RV dysfunction  - cardiomems PAD 8/15 45 mmHg, 98/45/66  - daily standing weight. so far down-trending but not at baseline. Was discharged in June at 189 lbs  - bumex did not work well for her  - would con't lasix gtt at 10 mg/hr for goal of net -1-2L today  - hold off on metolazone or hypertonic saline for today  - hold spironolactone for now  - get BID BMP, please replete K > 4, Mg > 2, consider starting standing PO K 10 mEq daily if she remains hypokalemic   - eventual SGLT2-i prior to discharge, once on stable dose of oral diuretics. - CardioMEMS PAD 8/11 43 mmHg. goal ~40 but difficult to ascertain given RV dysfunction  - cardiomems PAD 8/15 45 mmHg, 98/45/66  - daily standing weight. so far down-trending but not at baseline. Was discharged in June at 189 lbs  - bumex did not work well for her  - would con't lasix gtt at 10 mg/hr for goal of net -1-2L today  - can give a dose of hypertonic saline today, she received 2 doses yesterday  - hold spironolactone for now  - get BID BMP, please replete K > 4, Mg > 2, consider starting standing PO K 10 mEq daily if she remains hypokalemic   - eventual SGLT2-i prior to discharge, once on stable dose of oral diuretics.

## 2022-08-23 NOTE — PROGRESS NOTE ADULT - PROBLEM SELECTOR PLAN 1
Pt. with CKD in the setting of chronic cardiorenal syndrome, now with JUANA (non oliguric) on CKD likely 2/2 renal venous congestion from hypervolemia due to HF in the setting of severe pulmonary HTN.   On review of [facility]IN, noted that Baseline SCr ranges from 1.4-1.9 since March '22. SCr on arrival was 1.8 on 8/11; peaked to 2.02 on 8/13   Cr remains at 2.2 with BUN 97  Remains on lasix gtt with 3%  cc x 2 given yesterday with significant in UOP   Pt. continues to appear volume overloaded.  Continue lasix gtt  Consider HTS once again today  No plan for HD at this time.   No uremic symptoms  Avoid NSAIDs, ACEI/ARBS, RCA and nephrotoxins. Dose medications as per eGFR.

## 2022-08-23 NOTE — PROGRESS NOTE ADULT - PROBLEM SELECTOR PLAN 7
- HGB 7.6 8/23 on heparin drip. No obvious signs of bleeding.  - maintain active T/S  - iron studies: total iron and % saturation decreased, ferritin on lower side, transferrin and TIBC normal; s/p IV iron infusion  - Continue oral iron daily 325  - Potential source of blood loss is bleeding into extremities from phlebotomy.  - 8/16: s/p 2nd unit of PRBC. First unit at OSH

## 2022-08-23 NOTE — PROGRESS NOTE ADULT - PROBLEM SELECTOR PLAN 1
Pt feeling almost at baseline. Improving on lasix drip. Having okay urine output. Weight slowly decreasing but about 13 lbs above last discharge weight.  - Continue lasix drip 10/hr  - 2 doses of 2% hypertonic saline today. Had good urine output with it yesterday.   - Holding metolazone today  - Holding spironolactone  - standing daily weights, strict I/Os. Pt down to 197 lbs. Goal 189.  - pulm and HF following   - eventual SGLT2-i prior to discharge, once on stable dose of oral diuretics

## 2022-08-23 NOTE — PROGRESS NOTE ADULT - SUBJECTIVE AND OBJECTIVE BOX
PROGRESS NOTE:   Authored by: Andrea Limon M.D.   Internal Medicine PGY-1  Please Contact Via Teams    Patient is a 66y old  Female who presents with a chief complaint of lung txp eval (23 Aug 2022 13:05)      SUBJECTIVE / OVERNIGHT EVENTS:  No acute events overnight. Is seen sitting in chair at bedside. Feeling well on 7L NC.     ADDITIONAL REVIEW OF SYSTEMS:  Patient denies fevers, chills, chest pain, shortness of breath, nausea, abdominal pain, diarrhea, constipation, dysuria, leg swelling, headache, light headedness.    MEDICATIONS  (STANDING):  ambrisentan 10 milliGRAM(s) Oral daily  atorvastatin 20 milliGRAM(s) Oral at bedtime  budesonide  80 MICROgram(s)/formoterol 4.5 MICROgram(s) Inhaler 2 Puff(s) Inhalation two times a day  buPROPion XL (24-Hour) . 300 milliGRAM(s) Oral daily  digoxin     Tablet 125 MICROGram(s) Oral daily  diltiazem    milliGRAM(s) Oral daily  ferrous    sulfate 325 milliGRAM(s) Oral daily  furosemide Infusion 10 mG/Hr (5 mL/Hr) IV Continuous <Continuous>  heparin  Infusion.  Unit(s)/Hr (17 mL/Hr) IV Continuous <Continuous>  pantoprazole    Tablet 40 milliGRAM(s) Oral before breakfast  polyethylene glycol 3350 17 Gram(s) Oral daily  sodium chloride 0.65% Nasal 2 Spray(s) Both Nostrils four times a day  sodium chloride 2% . 150 milliLiter(s) (300 mL/Hr) IV Continuous <Continuous>  sodium chloride 2% . 150 milliLiter(s) (300 mL/Hr) IV Continuous <Continuous>  tadalafil 40 milliGRAM(s) Oral daily  tiotropium 18 MICROgram(s) Capsule 1 Capsule(s) Inhalation daily    MEDICATIONS  (PRN):  acetaminophen     Tablet .. 975 milliGRAM(s) Oral every 6 hours PRN Temp greater or equal to 38C (100.4F), Mild Pain (1 - 3), Moderate Pain (4 - 6)  ALBUTerol    90 MICROgram(s) HFA Inhaler 2 Puff(s) Inhalation every 6 hours PRN Shortness of Breath and/or Wheezing  heparin   Injectable 7500 Unit(s) IV Push every 6 hours PRN For aPTT less than 40  heparin   Injectable 3500 Unit(s) IV Push every 6 hours PRN For aPTT between 40 - 57  melatonin 3 milliGRAM(s) Oral at bedtime PRN Insomnia      CAPILLARY BLOOD GLUCOSE        I&O's Summary    22 Aug 2022 07:01  -  23 Aug 2022 07:00  --------------------------------------------------------  IN: 1530 mL / OUT: 3300 mL / NET: -1770 mL    23 Aug 2022 07:01  -  23 Aug 2022 14:09  --------------------------------------------------------  IN: 600 mL / OUT: 1250 mL / NET: -650 mL        PHYSICAL EXAM:  Vital Signs Last 24 Hrs  T(C): 36.5 (23 Aug 2022 11:26), Max: 36.6 (23 Aug 2022 04:30)  T(F): 97.7 (23 Aug 2022 11:26), Max: 97.8 (23 Aug 2022 04:30)  HR: 58 (23 Aug 2022 11:26) (58 - 92)  BP: 102/56 (23 Aug 2022 11:26) (102/56 - 107/57)  BP(mean): 78 (23 Aug 2022 04:30) (78 - 78)  RR: 20 (23 Aug 2022 11:26) (18 - 25)  SpO2: 97% (23 Aug 2022 11:26) (90% - 100%)    Parameters below as of 23 Aug 2022 11:26  Patient On (Oxygen Delivery Method): nasal cannula  O2 Flow (L/min): 7      CONSTITUTIONAL: NAD, well-developed  RESPIRATORY: Slightly SOB on 7LNC  CARDIOVASCULAR: Afib, no murmurs. Tense lower extremity edema  ABDOMEN: Nontender to palpation, normoactive bowel sounds  PSYCH: A+O to person, place, and time; affect appropriate    LABS:                        7.6    4.21  )-----------( 193      ( 23 Aug 2022 06:10 )             25.3     08-23    130<L>  |  84<L>  |  97<H>  ----------------------------<  73  3.7   |  31  |  2.29<H>    Ca    9.7      23 Aug 2022 06:10  Phos  4.6     08-23  Mg     2.1     08-23    TPro  6.8  /  Alb  4.1  /  TBili  0.3  /  DBili  x   /  AST  21  /  ALT  10  /  AlkPhos  114  08-22    PTT - ( 23 Aug 2022 06:10 )  PTT:159.6 sec

## 2022-08-23 NOTE — PROGRESS NOTE ADULT - SUBJECTIVE AND OBJECTIVE BOX
[facility] Division of Kidney Diseases & Hypertension  FOLLOW UP NOTE  [phone]--------------------------------------------------------------------------------  Chief Complaint:Pulmonary hypertension due to lung diseases and hypoxia          HPI: 66 year old with PMH of Pulmonary hypertension (Group 1,2,3) HFpEF, atrial fibrillation, COPD on home oxygen, ROLANDA on CPAP, morbid obesity with acute hypoxic respiratory failure. Nephrology consulted for JUANA. baseline creatinine as of june appears to be 1.5-1.6 on august 11 patient creatinine was 1.89 and has continued to up trend most recently 2.06 8/13. Patient was initially started on lasix 60 mg IV BID on admission with marked improvement in pro-BNP since presentation 07186 (8/11) and most recently 8947 (8/13).       24 hour events/subjective: Patient seen & examined. Labs & vitals reviewed. Remains on lasix gtt with 3%  cc x 2 given yesterday with significant in UOP at 3.3L in 24 hrs with net negative 1.7L.  Reports some improvement in SOB & leg edema. Remains on 7L NC.         PAST HISTORY  --------------------------------------------------------------------------------  No significant changes to PMH, PSH, FHx, SHx, unless otherwise noted    ALLERGIES & MEDICATIONS  --------------------------------------------------------------------------------  Allergies    nitrates (Unknown)    Intolerances      Standing Inpatient Medications  ambrisentan 10 milliGRAM(s) Oral daily  atorvastatin 20 milliGRAM(s) Oral at bedtime  budesonide  80 MICROgram(s)/formoterol 4.5 MICROgram(s) Inhaler 2 Puff(s) Inhalation two times a day  buPROPion XL (24-Hour) . 300 milliGRAM(s) Oral daily  digoxin     Tablet 125 MICROGram(s) Oral daily  diltiazem    milliGRAM(s) Oral daily  ferrous    sulfate 325 milliGRAM(s) Oral daily  furosemide Infusion 10 mG/Hr IV Continuous <Continuous>  heparin  Infusion.  Unit(s)/Hr IV Continuous <Continuous>  pantoprazole    Tablet 40 milliGRAM(s) Oral before breakfast  polyethylene glycol 3350 17 Gram(s) Oral daily  sodium chloride 0.65% Nasal 2 Spray(s) Both Nostrils four times a day  tadalafil 40 milliGRAM(s) Oral daily  tiotropium 18 MICROgram(s) Capsule 1 Capsule(s) Inhalation daily    PRN Inpatient Medications  acetaminophen     Tablet .. 975 milliGRAM(s) Oral every 6 hours PRN  ALBUTerol    90 MICROgram(s) HFA Inhaler 2 Puff(s) Inhalation every 6 hours PRN  heparin   Injectable 7500 Unit(s) IV Push every 6 hours PRN  heparin   Injectable 3500 Unit(s) IV Push every 6 hours PRN  melatonin 3 milliGRAM(s) Oral at bedtime PRN      REVIEW OF SYSTEMS  --------------------------------------------------------------------------------  Gen: No chills  Respiratory: + dyspnea, cough  CV: No chest pain  GI: No abdominal pain, diarrhea,  nausea, vomiting  : No increased frequency, dysuria, hematuria  MSK:  + edema  Neuro: No dizziness/lightheadedness      All other systems were reviewed and are negative, except as noted.    VITALS/PHYSICAL EXAM  --------------------------------------------------------------------------------  T(C): 36.5 (08-23-22 @ 11:26), Max: 36.6 (08-23-22 @ 04:30)  HR: 58 (08-23-22 @ 11:26) (58 - 92)  BP: 102/56 (08-23-22 @ 11:26) (102/56 - 107/57)  RR: 20 (08-23-22 @ 11:26) (18 - 25)  SpO2: 97% (08-23-22 @ 11:26) (90% - 100%)  Wt(kg): --        08-22-22 @ 07:01  -  08-23-22 @ 07:00  --------------------------------------------------------  IN: 1530 mL / OUT: 3300 mL / NET: -1770 mL    08-23-22 @ 07:01  -  08-23-22 @ 13:06  --------------------------------------------------------  IN: 360 mL / OUT: 1250 mL / NET: -890 mL      Physical Exam:  Gen: NAD, on 7L NC  HEENT: anicteric  Pulm: bibasilar crackles  CV: RRR  Abd: soft, nontender, nondistended  CNS: awake, alert  : No Jonn  LE: +++ b/l LE edema (improving)  Skin: Warm, without rashes  Vascular access:       LABS/STUDIES  --------------------------------------------------------------------------------              7.6    4.21  >-----------<  193      [08-23-22 @ 06:10]              25.3     130  |  84  |  97  ----------------------------<  73      [08-23-22 @ 06:10]  3.7   |  31  |  2.29        Ca     9.7     [08-23-22 @ 06:10]      Mg     2.1     [08-23-22 @ 06:10]      Phos  4.6     [08-23-22 @ 06:10]    TPro  6.8  /  Alb  4.1  /  TBili  0.3  /  DBili  x   /  AST  21  /  ALT  10  /  AlkPhos  114  [08-22-22 @ 06:34]      PTT: 159.6      [08-23-22 @ 06:10]      Creatinine Trend:  SCr 2.29 [08-23 @ 06:10]  SCr 2.09 [08-22 @ 17:34]  SCr 2.29 [08-22 @ 06:34]  SCr 2.02 [08-21 @ 17:11]  SCr 2.19 [08-21 @ 07:15]

## 2022-08-23 NOTE — PROGRESS NOTE ADULT - ATTENDING COMMENTS
Sarai Calle MD  Off: [phone]  contact me on teams    (After 5 pm or on weekends please page the on-call fellow/attending, can check [email or web address] for schedule. Login is abhay means, schedule under [facility] medicine, psych, derm)

## 2022-08-23 NOTE — PROGRESS NOTE ADULT - SUBJECTIVE AND OBJECTIVE BOX
Patient seen and examined at bedside.    Overnight Events: LISA    Review Of Systems: No chest pain, shortness of breath, or palpitations            Current Meds:  acetaminophen     Tablet .. 975 milliGRAM(s) Oral every 6 hours PRN  ALBUTerol    90 MICROgram(s) HFA Inhaler 2 Puff(s) Inhalation every 6 hours PRN  atorvastatin 20 milliGRAM(s) Oral at bedtime  budesonide  80 MICROgram(s)/formoterol 4.5 MICROgram(s) Inhaler 2 Puff(s) Inhalation two times a day  buPROPion XL (24-Hour) . 300 milliGRAM(s) Oral daily  digoxin     Tablet 125 MICROGram(s) Oral daily  diltiazem    milliGRAM(s) Oral daily  ferrous    sulfate 325 milliGRAM(s) Oral daily  furosemide Infusion 10 mG/Hr IV Continuous <Continuous>  heparin   Injectable 7500 Unit(s) IV Push every 6 hours PRN  heparin   Injectable 3500 Unit(s) IV Push every 6 hours PRN  heparin  Infusion.  Unit(s)/Hr IV Continuous <Continuous>  melatonin 3 milliGRAM(s) Oral at bedtime PRN  pantoprazole    Tablet 40 milliGRAM(s) Oral before breakfast  polyethylene glycol 3350 17 Gram(s) Oral daily  sodium chloride 0.65% Nasal 2 Spray(s) Both Nostrils four times a day  tadalafil 40 milliGRAM(s) Oral daily  tiotropium 18 MICROgram(s) Capsule 1 Capsule(s) Inhalation daily      Vitals:  T(F): 97.8 (08-23), Max: 97.8 (08-23)  HR: 74 (08-23) (66 - 92)  BP: 102/66 (08-23) (99/62 - 107/57)  RR: 20 (08-23)  SpO2: 93% (08-23)  I&O's Summary    22 Aug 2022 07:01  -  23 Aug 2022 07:00  --------------------------------------------------------  IN: 1530 mL / OUT: 3300 mL / NET: -1770 mL    23 Aug 2022 07:01  -  23 Aug 2022 11:16  --------------------------------------------------------  IN: 360 mL / OUT: 600 mL / NET: -240 mL        Physical Exam:  General: No distress. Comfortable.  HEENT: EOM intact.  Neck: Neck supple. JVP 10cm sitting upright. No masses  Chest: Unlabored, fine crackles bilateral bases.   CV: Normal S1 and S2. No murmurs, rub, or gallops. Radial pulses normal. 2+ BLE edema.   Abdomen: Soft, non-distended, non-tender  Skin: No rashes or skin breakdown  Neurology: Alert  Psych: Affect normal                        7.6    4.21  )-----------( 193      ( 23 Aug 2022 06:10 )             25.3     08-23    130<L>  |  84<L>  |  97<H>  ----------------------------<  73  3.7   |  31  |  2.29<H>    Ca    9.7      23 Aug 2022 06:10  Phos  4.6     08-23  Mg     2.1     08-23    TPro  6.8  /  Alb  4.1  /  TBili  0.3  /  DBili  x   /  AST  21  /  ALT  10  /  AlkPhos  114  08-22    PTT - ( 23 Aug 2022 06:10 )  PTT:159.6 sec      Serum Pro-Brain Natriuretic Peptide: 53823 pg/mL (08-17 @ 06:50)       HEART FAILURE PROGRESS NOTE    Patient seen and examined at bedside.    Overnight Events: NAEON.  Down to 7L of NC.      Review Of Systems: No chest pain, shortness of breath, or palpitations            Current Meds:  acetaminophen     Tablet .. 975 milliGRAM(s) Oral every 6 hours PRN  ALBUTerol    90 MICROgram(s) HFA Inhaler 2 Puff(s) Inhalation every 6 hours PRN  atorvastatin 20 milliGRAM(s) Oral at bedtime  budesonide  80 MICROgram(s)/formoterol 4.5 MICROgram(s) Inhaler 2 Puff(s) Inhalation two times a day  buPROPion XL (24-Hour) . 300 milliGRAM(s) Oral daily  digoxin     Tablet 125 MICROGram(s) Oral daily  diltiazem    milliGRAM(s) Oral daily  ferrous    sulfate 325 milliGRAM(s) Oral daily  furosemide Infusion 10 mG/Hr IV Continuous <Continuous>  heparin   Injectable 7500 Unit(s) IV Push every 6 hours PRN  heparin   Injectable 3500 Unit(s) IV Push every 6 hours PRN  heparin  Infusion.  Unit(s)/Hr IV Continuous <Continuous>  melatonin 3 milliGRAM(s) Oral at bedtime PRN  pantoprazole    Tablet 40 milliGRAM(s) Oral before breakfast  polyethylene glycol 3350 17 Gram(s) Oral daily  sodium chloride 0.65% Nasal 2 Spray(s) Both Nostrils four times a day  tadalafil 40 milliGRAM(s) Oral daily  tiotropium 18 MICROgram(s) Capsule 1 Capsule(s) Inhalation daily      Vitals:  T(F): 97.8 (08-23), Max: 97.8 (08-23)  HR: 74 (08-23) (66 - 92)  BP: 102/66 (08-23) (99/62 - 107/57)  RR: 20 (08-23)  SpO2: 93% (08-23)  I&O's Summary    22 Aug 2022 07:01  -  23 Aug 2022 07:00  --------------------------------------------------------  IN: 1530 mL / OUT: 3300 mL / NET: -1770 mL    23 Aug 2022 07:01  -  23 Aug 2022 11:16  --------------------------------------------------------  IN: 360 mL / OUT: 600 mL / NET: -240 mL        Physical Exam:  General: No distress. Comfortable.  HEENT: EOM intact.  Neck: Neck supple. JVP 10cm sitting upright.   Chest: Unlabored, fine crackles bilateral bases.   CV: Normal S1 and S2. No murmurs, rub, or gallops. Radial pulses normal. 2+ BLE edema.   Abdomen: Soft, non-distended, non-tender  Skin: No rashes or skin breakdown  Neurology: Alert  Psych: Affect normal                        7.6    4.21  )-----------( 193      ( 23 Aug 2022 06:10 )             25.3     08-23    130<L>  |  84<L>  |  97<H>  ----------------------------<  73  3.7   |  31  |  2.29<H>    Ca    9.7      23 Aug 2022 06:10  Phos  4.6     08-23  Mg     2.1     08-23    TPro  6.8  /  Alb  4.1  /  TBili  0.3  /  DBili  x   /  AST  21  /  ALT  10  /  AlkPhos  114  08-22    PTT - ( 23 Aug 2022 06:10 )  PTT:159.6 sec      Serum Pro-Brain Natriuretic Peptide: 45346 pg/mL (08-17 @ 06:50)

## 2022-08-23 NOTE — PROGRESS NOTE ADULT - PROBLEM SELECTOR PLAN 3
Cr elevated to 2.42 on admission- baseline is 1.7-1.8. Likely in s/o CHF exacerbation.  - Cr 2.29 8/23  - diuresis as above  - avoid nephrotoxic agents  - strict I/Os  - Nephrology following  - Cr elevation could also be new baseline for patient in setting of worsening kidney function 2/2 pulm htn/HF

## 2022-08-23 NOTE — PROGRESS NOTE ADULT - PROBLEM SELECTOR PLAN 4
RHC and cardiomems done 6/20. RHC showed elevated right sided filling pressures with severe pulmonary hypertension with systemic PA pressures, slightly elevated PCWP and preserved cardiac output.   - Followed with Dr. Rodriguez at [place] for PH but he recently left the practice. Needs a pulmonary hypertension doctor near her home in [place].  - Continue tadalafil 40 mg daily   - continue Ambrisentan 10mg qd  - follow Pulm recs  -Transplant reporting that patient is currently not a candidate following eval, given this, also not a candidate for IV Flolan per Pulm

## 2022-08-23 NOTE — PROGRESS NOTE ADULT - PROBLEM SELECTOR PLAN 8
History of Afib on eliquis 5mg BID  - Tele reviewed - patient currently in afib, rate controlled.  - continue digoxin 125  - continue diltiazem 120mg qd  - Continue heparin drip and consider transitioning to eliquis tomorrow if stable.

## 2022-08-23 NOTE — PROGRESS NOTE ADULT - PROBLEM SELECTOR PLAN 5
- iron level 27, iron sat 8%, TIBC 350 checked on 8/12, s/p IV iron sucrose 200 x1 and 1u RBC, c/w PO iron daily  - s/p nasal packing with ENT, removed on 8/16 without recurrent bleeding  - home aspirin on hold while being re-challenged with heparin

## 2022-08-23 NOTE — PROGRESS NOTE ADULT - PROBLEM SELECTOR PLAN 1
- managed by pulmonary hypertension team on ambirsentan and tadalafil.    - not a lung transplant candidate at this point, also not a candidate for IV therapy for pulmonary dilators  - palliative care following  - please get PT eval.

## 2022-08-23 NOTE — PROGRESS NOTE ADULT - PROBLEM SELECTOR PLAN 9
Diet: DASH  DVT: SCD's  Dispo: pending medical course Diet: DASH  DVT: SCD's  Dispo: pending medical course. Home PT with rolling walker.

## 2022-08-23 NOTE — PROGRESS NOTE ADULT - ASSESSMENT
66 year old woman with HFpEF with primarily RV dysfunction s/p CardioMEMS, severe pulmonary hypertension (Group 1, 2 & 3), COPD on home O2 8L, AF (on Eliquis), PE, CVA (MCA infarct 2012), CKD (b/l Cr 1.7-1.8), ROLANDA on CPAP and morbid obesity (s/p bariatric surgery 2012 with lap band removal d/t GIB) who was recently hospitalized 6/14-6/29/22 for volume overload where she met with lung transplant team with plan for evaluation pending improvement in BMI and deconditioned state. Had RHC in June with severely elevated PAP and preserved CO.     She presents with RINALDI, ABD bloating and LE swelling in the setting of gradual 25 lb weight gain despite escalation of diuretic regimen. She was started on intermittent IV Lasix and was responding well, but remains markedly overloaded with predominate symptoms of elevated R sided filling pressure. She was switched to IV bumex and did not respond as well so now back on IV lasix with as needed hypertonic saline. She is likely not a lung transplant candidate and also not a candidate for IV therapy pulmonary dilators. Plan is for continued diuresis and palliative care was consulted but signed off, pt remains full code at this time.     Cardiac Studies  CardioMEMS interrogation 8/16: PAP 98/45/66, HR 87  RHC and CardioMEMS Implant 6/20/22: RA 18, /44/65, PCWP 16 (v to 18), RA 95% on 8L NC, PA 58%, CO/CI (F) 6.7/3.2, PVR 7.32 ryan, TPG 49, /60/86 (CardioMEMS PAD 49)  TTE 6/15/22: LVIDd 4.8 cm, LVEF 68%, flattening of interventricular septum consistent with RV overload, mild concentric LVH (septum 1.2 PWT 1.1), RVE with decreased systolic function, mild LAE, severe ROE, mod TR, est RVSP 61 mmHg

## 2022-08-23 NOTE — PROGRESS NOTE ADULT - ASSESSMENT
66F w/ severe pulmonary HTN (Group 1, 2 & 3), HFpEF, Afib, COPD on home O2 (5L NC), CKD, ROLANDA on CPAP, morbid obesity presenting with acute hypoxic respiratory failure, admitted for CHF exacerbation and ongoing lung transplant evaluation.   W/ epistaxis 8/13 now resolved, cbc stable 8/22, started back on nasal cannula during day ,AVAPS at night, continuing 10 mg/hr lasix drip with HTS 2%.    Patient no longer a transplant candidate, seen by palliative, not DNR nor DNI.

## 2022-08-23 NOTE — PROGRESS NOTE ADULT - ATTENDING COMMENTS
Patient was seen and examined at bedside with the fellow.  JVP still elevated today however, making good progress with decreasing weights daily and oxygen requirement.  Wt down from 203 to 197lbs.  Diuresis will continue to be a slow process.  Will continue with lasix drip at 10mg/hr for a goal net -1 to 2L daily.  Would not augment with metalozone or hypertonic saline at this time.  Continue to hold spironolactone given eGFR < 35.  Replete K to keep level > 4.  Heparin drip resumed for rechallenge.   No active bleeding.  Keep off ASA. Patient was seen and examined at bedside with the fellow.  JVP still elevated today however, making good progress with decreasing weights daily and oxygen requirement.  Wt down from 203 to 197lbs.  Diuresis will continue to be a slow process.  Will continue with lasix drip at 10mg/hr for a goal net -1 to 2L daily.  Would not augment with metalozone.  Continue to hold spironolactone given eGFR < 35.  Replete K to keep level > 4.  Heparin drip resumed for rechallenge.   No active bleeding.  Keep off ASA.

## 2022-08-23 NOTE — PROGRESS NOTE ADULT - ATTENDING COMMENTS
above plans discussed with Dr. Limon    # severe pulmonary HTN  # acute on chronic respiratory failure with hypoxemia  # acute on chronic HFpEF exacerbation  # chronic Afib  # JUANA on CKD III  # COPD  # epistaxis    - responding well to lasix gtt with improvement in overall volume status but still with elevated JV; weight down to 197.5lbs this morning (dc'ed in June at 189lbs)  - appreciate HF recs: continue aggressive diuresis, holding metolazone today given low Na;   - appreciate pulm recs: not a candidate for lung transplant  - appreciate nephrology recs: JUANA likely in setting of cardiorenal, continue IV lasix gtt and hypertonic saline  - appreciate palliative care recs: pt wants full medical management and not interested in palliative care yet  - heparin challenge started and no further episodes of epistaxis and H/H stable; if remains stable, then will transition to eliquis tomorrow.   - overall poor prognosis    Zhanna Velasquez MD  Division of Hospital Medicine  Contact via [employer] Teams  Office: [phone].

## 2022-08-24 NOTE — PROGRESS NOTE ADULT - PROBLEM SELECTOR PLAN 2
in the setting of hypervolemia. s/p HTS + lasix IV to augment diuresis (as outlined above). Improving. Monitor serum sodium. Recommend HTS again today

## 2022-08-24 NOTE — PROGRESS NOTE ADULT - PROBLEM SELECTOR PLAN 1
Pt feeling at baseline. Improving on lasix drip. Having okay urine output. Weight slowly decreasing but about 8 lbs above last discharge weight. 197.7 lbs today, about the same as yesterday. Goal weight 189 lbs  - Continue lasix drip 10/hr  - 2 doses of 2% hypertonic saline today. Had good urine output with it yesterday.   - Holding metolazone  - Holding spironolactone  - standing daily weights, strict I/Os.  - pulm and HF following   - eventual SGLT2-i prior to discharge, once on stable dose of oral diuretics Pt feeling at baseline. Improving on lasix drip. Having okay urine output. Weight slowly decreasing but about 8 lbs above last discharge weight. 197.7 lbs today, about the same as yesterday. Goal weight 189 lbs  - Continue lasix drip 10/hr  - Continue with 2% HTS pushes.  - Holding metolazone  - Holding spironolactone  - standing daily weights, strict I/Os.  - pulm and HF following   - eventual SGLT2-i prior to discharge, once on stable dose of oral diuretics

## 2022-08-24 NOTE — PROGRESS NOTE ADULT - SUBJECTIVE AND OBJECTIVE BOX
[facility] Division of Kidney Diseases & Hypertension  FOLLOW UP NOTE  [phone]--------------------------------------------------------------------------------  Chief Complaint:Pulmonary hypertension due to lung diseases and hypoxia      HPI: 66 year old with PMH of Pulmonary hypertension (Group 1,2,3) HFpEF, atrial fibrillation, COPD on home oxygen, ROLANDA on CPAP, morbid obesity with acute hypoxic respiratory failure. Nephrology consulted for JUANA. baseline creatinine as of june appears to be 1.5-1.6 on august 11 patient creatinine was 1.89 and has continued to up trend most recently 2.06 8/13. Patient was initially started on lasix 60 mg IV BID on admission with marked improvement in pro-BNP since presentation 36654 (8/11) and most recently 8947 (8/13).       24 hour events/subjective: Patient seen & examined. Labs & vitals reviewed. Remains on lasix gtt with 3%  cc x 2 given yesterday with significant in UOP at 3.4L in 24 hrs with net negative 1.9L.  Reports some improvement in SOB & leg edema. Remains on 7L NC.             PAST HISTORY  --------------------------------------------------------------------------------  No significant changes to PMH, PSH, FHx, SHx, unless otherwise noted    ALLERGIES & MEDICATIONS  --------------------------------------------------------------------------------  Allergies    nitrates (Unknown)    Intolerances      Standing Inpatient Medications  ambrisentan 10 milliGRAM(s) Oral daily  atorvastatin 20 milliGRAM(s) Oral at bedtime  budesonide  80 MICROgram(s)/formoterol 4.5 MICROgram(s) Inhaler 2 Puff(s) Inhalation two times a day  buPROPion XL (24-Hour) . 300 milliGRAM(s) Oral daily  digoxin     Tablet 125 MICROGram(s) Oral daily  diltiazem    milliGRAM(s) Oral daily  ferrous    sulfate 325 milliGRAM(s) Oral daily  furosemide Infusion 10 mG/Hr IV Continuous <Continuous>  heparin  Infusion.  Unit(s)/Hr IV Continuous <Continuous>  pantoprazole    Tablet 40 milliGRAM(s) Oral before breakfast  polyethylene glycol 3350 17 Gram(s) Oral daily  sodium chloride 0.65% Nasal 2 Spray(s) Both Nostrils four times a day  sodium chloride 2% . 150 milliLiter(s) IV Continuous <Continuous>  tadalafil 40 milliGRAM(s) Oral daily  tiotropium 18 MICROgram(s) Capsule 1 Capsule(s) Inhalation daily    PRN Inpatient Medications  acetaminophen     Tablet .. 975 milliGRAM(s) Oral every 6 hours PRN  ALBUTerol    90 MICROgram(s) HFA Inhaler 2 Puff(s) Inhalation every 6 hours PRN  heparin   Injectable 7500 Unit(s) IV Push every 6 hours PRN  heparin   Injectable 3500 Unit(s) IV Push every 6 hours PRN  melatonin 3 milliGRAM(s) Oral at bedtime PRN      REVIEW OF SYSTEMS  --------------------------------------------------------------------------------        All other systems were reviewed and are negative, except as noted.    VITALS/PHYSICAL EXAM  --------------------------------------------------------------------------------  T(C): 36.4 (08-24-22 @ 11:15), Max: 36.5 (08-24-22 @ 04:45)  HR: 57 (08-24-22 @ 11:15) (57 - 81)  BP: 106/64 (08-24-22 @ 11:15) (93/44 - 106/64)  RR: 18 (08-24-22 @ 11:15) (18 - 19)  SpO2: 97% (08-24-22 @ 11:15) (92% - 100%)  Wt(kg): --        08-23-22 @ 07:01  -  08-24-22 @ 07:00  --------------------------------------------------------  IN: 1477 mL / OUT: 3450 mL / NET: -1973 mL    08-24-22 @ 07:01  -  08-24-22 @ 11:34  --------------------------------------------------------  IN: 416 mL / OUT: 700 mL / NET: -284 mL      Physical Exam:  Gen: NAD, on 7L NC  HEENT: anicteric  Pulm: bibasilar crackles  CV: RRR  Abd: soft, nontender, nondistended  CNS: awake, alert  : No Jonn  LE: +++ b/l LE edema (improving), +tremor but no asterexis  Skin: Warm, without rashes  Vascular access:       LABS/STUDIES  --------------------------------------------------------------------------------              8.0    4.22  >-----------<  218      [08-24-22 @ 06:13]              26.2     132  |  85  |  94  ----------------------------<  91      [08-24-22 @ 06:14]  4.2   |  36  |  2.28        Ca     9.7     [08-24-22 @ 06:14]      Mg     2.1     [08-24-22 @ 06:14]      Phos  4.1     [08-24-22 @ 06:14]      PT/INR: PT 12.0 , INR 1.03       [08-24-22 @ 06:12]  PTT: 128.7      [08-24-22 @ 06:12]      Creatinine Trend:  SCr 2.28 [08-24 @ 06:14]  SCr 2.32 [08-23 @ 17:52]  SCr 2.29 [08-23 @ 06:10]  SCr 2.09 [08-22 @ 17:34]  SCr 2.29 [08-22 @ 06:34]

## 2022-08-24 NOTE — PROGRESS NOTE ADULT - ASSESSMENT
66F w/ severe pulmonary HTN (Group 1, 2 & 3), HFpEF, Afib, COPD on home O2 (5L NC), CKD, ROLANDA on CPAP, morbid obesity presenting with acute hypoxic respiratory failure, admitted for CHF exacerbation and ongoing lung transplant evaluation.   W/ epistaxis 8/13 now resolved, cbc stable 8/24, started back on nasal cannula during day ,CPAP at night, continuing 10 mg/hr lasix drip with HTS 2% pushes as needed.    Patient no longer a transplant candidate, seen by palliative, not DNR nor DNI.

## 2022-08-24 NOTE — PROGRESS NOTE ADULT - PROBLEM SELECTOR PLAN 2
(resolved) Significant epistaxis 8/13 AM, likely in setting of use of 8L nc. Not new for pt, but worse than her usual nose bleeds; s/p rhinorockets removed 8/16 and no further bleeding episodes   -ENT consulted   - Monitor H/H. Stable on trial of heparin drip  - nasal packing removed 8/16 with no recurrent bleeding  - ENT reconsulted 8/20 given concern for posterior bleeding, but none found, c/w nasal sprays x2

## 2022-08-24 NOTE — PROGRESS NOTE ADULT - ATTENDING COMMENTS
Patient was seen and examined with the fellow.  JVP around 10cm today while sitting up right.  Would continue with IV diuresis.  Metolazone deferred for Na <135.  Continue with hypertonic saline.  Check digoxin level.  Can change heparin drip back to Eliquis now that she has tolerated heparin without bleeding.  She does not meet criteria for dose reduction of apixiban based on renal function alone.  Check liver panel with next blood work.

## 2022-08-24 NOTE — PROGRESS NOTE ADULT - PROBLEM SELECTOR PLAN 3
Cr elevated to 2.42 on admission- baseline is 1.7-1.8. Likely in s/o CHF exacerbation.  - Cr 2.28 8/24  - diuresis as above  - avoid nephrotoxic agents  - strict I/Os  - Nephrology following  - Cr elevation could also be new baseline for patient in setting of worsening kidney function 2/2 pulm htn/HF  - Pt with BUN 90's. No uremic symptoms current but may require dialysis in the future.

## 2022-08-24 NOTE — PROGRESS NOTE ADULT - PROBLEM SELECTOR PLAN 4
- recent baseline Cr ranging 1.7-1.8  - eventual SGLT2-i as above to delay further progression of CKD  - continue to monitor closely with diuresis.

## 2022-08-24 NOTE — PROGRESS NOTE ADULT - PROBLEM SELECTOR PLAN 3
- rate controlled on Digoxin (Digoxin level 8/11 0.6) and Cardizem   - please check digoxin level today given fluctuating renal function   - can re-start home Eliquis given hgb have been stable on heparin gtt for > 24 hrs

## 2022-08-24 NOTE — PROGRESS NOTE ADULT - PROBLEM SELECTOR PLAN 2
- CardioMEMS PAD 8/11 43 mmHg. goal ~40 but difficult to ascertain given RV dysfunction  - cardiomems PAD 8/15 45 mmHg, 98/45/66  - daily standing weight. so far down-trending but not at baseline. Was discharged in June at 189 lbs  - bumex did not work well for her  - would con't lasix gtt at 10 mg/hr for goal of net -1-2L today  - can cont with hypertonic saline with current regimen  - hold spironolactone for now  - get BID BMP, please replete K > 4, Mg > 2  - eventual SGLT2-i prior to discharge, once on stable dose of oral diuretics - CardioMEMS PAD 8/11 43 mmHg. goal ~40 but difficult to ascertain given RV dysfunction  - cardiomems PAD 8/15 45 mmHg, 98/45/66  - daily standing weight. so far down-trending but not at baseline. Was discharged in June at 189 lbs  - bumex did not work well for her  - would con't lasix gtt at 10 mg/hr for goal of net -1-2L today  - can cont with hypertonic saline with current regimen  - hold spironolactone for now  - get BID BMP, please replete K > 4, Mg > 2  - eventual SGLT2-i prior to discharge, once on stable dose of oral diuretics  - please add on LFTs for today

## 2022-08-24 NOTE — PROGRESS NOTE ADULT - SUBJECTIVE AND OBJECTIVE BOX
PROGRESS NOTE:   Authored by: Andrea Limon M.D.   Internal Medicine PGY-1  Please Contact Via Teams    Patient is a 66y old  Female who presents with a chief complaint of lung txp eval (23 Aug 2022 13:05)      SUBJECTIVE / OVERNIGHT EVENTS:  No acute events overnight.     ADDITIONAL REVIEW OF SYSTEMS:  Patient denies fevers, chills, chest pain, shortness of breath, nausea, abdominal pain, diarrhea, constipation, dysuria, leg swelling, headache, light headedness.    MEDICATIONS  (STANDING):  ambrisentan 10 milliGRAM(s) Oral daily  atorvastatin 20 milliGRAM(s) Oral at bedtime  budesonide  80 MICROgram(s)/formoterol 4.5 MICROgram(s) Inhaler 2 Puff(s) Inhalation two times a day  buPROPion XL (24-Hour) . 300 milliGRAM(s) Oral daily  digoxin     Tablet 125 MICROGram(s) Oral daily  diltiazem    milliGRAM(s) Oral daily  ferrous    sulfate 325 milliGRAM(s) Oral daily  furosemide Infusion 10 mG/Hr (5 mL/Hr) IV Continuous <Continuous>  heparin  Infusion.  Unit(s)/Hr (17 mL/Hr) IV Continuous <Continuous>  pantoprazole    Tablet 40 milliGRAM(s) Oral before breakfast  polyethylene glycol 3350 17 Gram(s) Oral daily  sodium chloride 0.65% Nasal 2 Spray(s) Both Nostrils four times a day  sodium chloride 2% . 150 milliLiter(s) (300 mL/Hr) IV Continuous <Continuous>  tadalafil 40 milliGRAM(s) Oral daily  tiotropium 18 MICROgram(s) Capsule 1 Capsule(s) Inhalation daily    MEDICATIONS  (PRN):  acetaminophen     Tablet .. 975 milliGRAM(s) Oral every 6 hours PRN Temp greater or equal to 38C (100.4F), Mild Pain (1 - 3), Moderate Pain (4 - 6)  ALBUTerol    90 MICROgram(s) HFA Inhaler 2 Puff(s) Inhalation every 6 hours PRN Shortness of Breath and/or Wheezing  heparin   Injectable 7500 Unit(s) IV Push every 6 hours PRN For aPTT less than 40  heparin   Injectable 3500 Unit(s) IV Push every 6 hours PRN For aPTT between 40 - 57  melatonin 3 milliGRAM(s) Oral at bedtime PRN Insomnia      CAPILLARY BLOOD GLUCOSE        I&O's Summary    22 Aug 2022 07:01  -  23 Aug 2022 07:00  --------------------------------------------------------  IN: 1530 mL / OUT: 3300 mL / NET: -1770 mL    23 Aug 2022 07:01  -  24 Aug 2022 06:44  --------------------------------------------------------  IN: 1267 mL / OUT: 2850 mL / NET: -1583 mL        PHYSICAL EXAM:  Vital Signs Last 24 Hrs  T(C): 36.5 (24 Aug 2022 04:45), Max: 36.5 (23 Aug 2022 11:26)  T(F): 97.7 (24 Aug 2022 04:45), Max: 97.7 (23 Aug 2022 11:26)  HR: 60 (24 Aug 2022 04:45) (58 - 81)  BP: 97/56 (24 Aug 2022 04:45) (97/56 - 102/56)  BP(mean): --  RR: 18 (24 Aug 2022 04:45) (18 - 20)  SpO2: 100% (24 Aug 2022 04:45) (90% - 100%)    Parameters below as of 24 Aug 2022 04:45  Patient On (Oxygen Delivery Method): BiPAP/CPAP        CONSTITUTIONAL: NAD, well-developed  RESPIRATORY: Normal respiratory effort; lungs are clear to auscultation bilaterally  CARDIOVASCULAR: Regular rate and rhythm, normal S1 and S2, no murmur/rub/gallop; No lower extremity edema; Peripheral pulses are 2+ bilaterally  ABDOMEN: Nontender to palpation, normoactive bowel sounds, no rebound/guarding; No hepatosplenomegaly  MUSCLOSKELETAL: no clubbing or cyanosis of digits; no joint swelling or tenderness to palpation  PSYCH: A+O to person, place, and time; affect appropriate    LABS:                        8.0    4.22  )-----------( 218      ( 24 Aug 2022 06:13 )             26.2     08-23    130<L>  |  84<L>  |  94<H>  ----------------------------<  168<H>  3.5   |  34<H>  |  2.32<H>    Ca    9.5      23 Aug 2022 17:52  Phos  3.9     08-23  Mg     2.0     08-23      PTT - ( 23 Aug 2022 21:53 )  PTT:90.9 sec            Tele Reviewed:    RADIOLOGY & ADDITIONAL TESTS:  Results Reviewed:   Imaging Personally Reviewed:  Electrocardiogram Personally Reviewed:     PROGRESS NOTE:   Authored by: Andrea Limon M.D.   Internal Medicine PGY-1  Please Contact Via Teams    Patient is a 66y old  Female who presents with a chief complaint of lung txp eval (23 Aug 2022 13:05)      SUBJECTIVE / OVERNIGHT EVENTS:  No acute events overnight. Is feeling "great" at bedside. Has no complaints and is eating breakfast. Tele with afib 50's-80's    ADDITIONAL REVIEW OF SYSTEMS:  Patient denies fevers, chills, chest pain, shortness of breath, nausea, abdominal pain, diarrhea, constipation, dysuria, leg swelling, headache, light headedness.    MEDICATIONS  (STANDING):  ambrisentan 10 milliGRAM(s) Oral daily  atorvastatin 20 milliGRAM(s) Oral at bedtime  budesonide  80 MICROgram(s)/formoterol 4.5 MICROgram(s) Inhaler 2 Puff(s) Inhalation two times a day  buPROPion XL (24-Hour) . 300 milliGRAM(s) Oral daily  digoxin     Tablet 125 MICROGram(s) Oral daily  diltiazem    milliGRAM(s) Oral daily  ferrous    sulfate 325 milliGRAM(s) Oral daily  furosemide Infusion 10 mG/Hr (5 mL/Hr) IV Continuous <Continuous>  heparin  Infusion.  Unit(s)/Hr (17 mL/Hr) IV Continuous <Continuous>  pantoprazole    Tablet 40 milliGRAM(s) Oral before breakfast  polyethylene glycol 3350 17 Gram(s) Oral daily  sodium chloride 0.65% Nasal 2 Spray(s) Both Nostrils four times a day  sodium chloride 2% . 150 milliLiter(s) (300 mL/Hr) IV Continuous <Continuous>  tadalafil 40 milliGRAM(s) Oral daily  tiotropium 18 MICROgram(s) Capsule 1 Capsule(s) Inhalation daily    MEDICATIONS  (PRN):  acetaminophen     Tablet .. 975 milliGRAM(s) Oral every 6 hours PRN Temp greater or equal to 38C (100.4F), Mild Pain (1 - 3), Moderate Pain (4 - 6)  ALBUTerol    90 MICROgram(s) HFA Inhaler 2 Puff(s) Inhalation every 6 hours PRN Shortness of Breath and/or Wheezing  heparin   Injectable 7500 Unit(s) IV Push every 6 hours PRN For aPTT less than 40  heparin   Injectable 3500 Unit(s) IV Push every 6 hours PRN For aPTT between 40 - 57  melatonin 3 milliGRAM(s) Oral at bedtime PRN Insomnia      CAPILLARY BLOOD GLUCOSE        I&O's Summary    22 Aug 2022 07:01  -  23 Aug 2022 07:00  --------------------------------------------------------  IN: 1530 mL / OUT: 3300 mL / NET: -1770 mL    23 Aug 2022 07:01  -  24 Aug 2022 06:44  --------------------------------------------------------  IN: 1267 mL / OUT: 2850 mL / NET: -1583 mL        PHYSICAL EXAM:  Vital Signs Last 24 Hrs  T(C): 36.5 (24 Aug 2022 04:45), Max: 36.5 (23 Aug 2022 11:26)  T(F): 97.7 (24 Aug 2022 04:45), Max: 97.7 (23 Aug 2022 11:26)  HR: 60 (24 Aug 2022 04:45) (58 - 81)  BP: 97/56 (24 Aug 2022 04:45) (97/56 - 102/56)  BP(mean): --  RR: 18 (24 Aug 2022 04:45) (18 - 20)  SpO2: 100% (24 Aug 2022 04:45) (90% - 100%)    Parameters below as of 24 Aug 2022 04:45  Patient On (Oxygen Delivery Method): BiPAP/CPAP      CONSTITUTIONAL: NAD, multiple bruises at phlebotomy sites.   RESPIRATORY: Slightly SOB on 7LNC. Crackles at lung bases b/l  CARDIOVASCULAR: Afib, no murmurs. Tense lower extremity edema  ABDOMEN: Nontender to palpation, normoactive bowel sounds  PSYCH: A+O to person, place, and time; affect appropriate    LABS:                        8.0    4.22  )-----------( 218      ( 24 Aug 2022 06:13 )             26.2     08-23    130<L>  |  84<L>  |  94<H>  ----------------------------<  168<H>  3.5   |  34<H>  |  2.32<H>    Ca    9.5      23 Aug 2022 17:52  Phos  3.9     08-23  Mg     2.0     08-23      PTT - ( 23 Aug 2022 21:53 )  PTT:90.9 sec

## 2022-08-24 NOTE — PROGRESS NOTE ADULT - PROBLEM SELECTOR PLAN 4
RHC and cardiomems done 6/20. RHC showed elevated right sided filling pressures with severe pulmonary hypertension with systemic PA pressures, slightly elevated PCWP and preserved cardiac output.   - Followed with Dr. Rodriguez at [place] for PH but he recently left the practice. Needs a pulmonary hypertension doctor near her home in [place].  - Continue tadalafil 40 mg daily   - continue Ambrisentan 10mg qd  - follow Pulm recs  -Transplant reporting that patient is currently not a candidate following eval, given this, also not a candidate for IV Flolan per Pulm

## 2022-08-24 NOTE — PROGRESS NOTE ADULT - PROBLEM SELECTOR PLAN 7
- HGB 8.0 on 8/24 on heparin drip. No obvious signs of bleeding.  - maintain active T/S  - iron studies: total iron and % saturation decreased, ferritin on lower side, transferrin and TIBC normal; s/p IV iron infusion  - Continue oral iron daily 325  - Potential source of blood loss is bleeding into extremities from phlebotomy.  - 8/16: s/p 2nd unit of PRBC. First unit at OSH

## 2022-08-24 NOTE — PROGRESS NOTE ADULT - PROBLEM SELECTOR PLAN 5
- iron level 27, iron sat 8%, TIBC 350 checked on 8/12, s/p IV iron sucrose 200 x1 and 1u RBC, c/w PO iron daily  - s/p nasal packing with ENT, removed on 8/16 without recurrent bleeding  - home aspirin on hold while being re-challenged with AC

## 2022-08-24 NOTE — PROGRESS NOTE ADULT - SUBJECTIVE AND OBJECTIVE BOX
CHIEF COMPLAINT:Patient is a 66y old  Female who presents with a chief complaint of lung txp eval (24 Aug 2022 06:44)      Interval Events:  continues to feel well. breathing is good on 7 L when at rest although has not exerted much, still making good urine, minimal cough, no fevers, chills     REVIEW OF SYSTEMS:  [x] All other systems negative except per HPI   [ ] Unable to assess ROS because ________    OBJECTIVE:  ICU Vital Signs Last 24 Hrs  T(C): 36.4 (24 Aug 2022 11:15), Max: 36.5 (24 Aug 2022 04:45)  T(F): 97.6 (24 Aug 2022 11:15), Max: 97.7 (24 Aug 2022 04:45)  HR: 57 (24 Aug 2022 11:15) (57 - 81)  BP: 106/64 (24 Aug 2022 11:15) (93/44 - 106/64)  BP(mean): --  ABP: --  ABP(mean): --  RR: 18 (24 Aug 2022 11:15) (18 - 19)  SpO2: 97% (24 Aug 2022 11:15) (92% - 100%)    O2 Parameters below as of 24 Aug 2022 11:15  Patient On (Oxygen Delivery Method): nasal cannula              08-23 @ 07:01 - 08-24 @ 07:00  --------------------------------------------------------  IN: 1477 mL / OUT: 3450 mL / NET: -1973 mL    08-24 @ 07:01 - 08-24 @ 11:26  --------------------------------------------------------  IN: 416 mL / OUT: 700 mL / NET: -284 mL        PHYSICAL EXAM:  GENERAL: NAD, well-groomed, well-developed  HEAD:  Atraumatic, Normocephalic  EYES: EOMI, PERRLA, conjunctiva and sclera clear  ENMT: No tonsillar erythema, exudates, or enlargement; Moist mucous membranes, Good dentition, No lesions  NECK: Supple, No JVD, Normal thyroid  CHEST/LUNG: bilateral crackles No rales, rhonchi, wheezing, or rubs  HEART: Regular rate and rhythm; No murmurs, rubs, or gallops  ABDOMEN: Soft, Nontender, Nondistended; Bowel sounds present  VASCULAR:  2+ Peripheral Pulses, No clubbing, cyanosis,. still 2+ pitting edema although improved  LYMPH: No lymphadenopathy noted  SKIN: No rashes or lesions  NERVOUS SYSTEM:  Alert & Oriented X3, Good concentration; Motor Strength 5/5 B/L upper and lower extremities; DTRs 2+ intact and symmetric    HOSPITAL MEDICATIONS:  MEDICATIONS  (STANDING):  ambrisentan 10 milliGRAM(s) Oral daily  atorvastatin 20 milliGRAM(s) Oral at bedtime  budesonide  80 MICROgram(s)/formoterol 4.5 MICROgram(s) Inhaler 2 Puff(s) Inhalation two times a day  buPROPion XL (24-Hour) . 300 milliGRAM(s) Oral daily  digoxin     Tablet 125 MICROGram(s) Oral daily  diltiazem    milliGRAM(s) Oral daily  ferrous    sulfate 325 milliGRAM(s) Oral daily  furosemide Infusion 10 mG/Hr (5 mL/Hr) IV Continuous <Continuous>  heparin  Infusion.  Unit(s)/Hr (17 mL/Hr) IV Continuous <Continuous>  pantoprazole    Tablet 40 milliGRAM(s) Oral before breakfast  polyethylene glycol 3350 17 Gram(s) Oral daily  sodium chloride 0.65% Nasal 2 Spray(s) Both Nostrils four times a day  sodium chloride 2% . 150 milliLiter(s) (300 mL/Hr) IV Continuous <Continuous>  tadalafil 40 milliGRAM(s) Oral daily  tiotropium 18 MICROgram(s) Capsule 1 Capsule(s) Inhalation daily    MEDICATIONS  (PRN):  acetaminophen     Tablet .. 975 milliGRAM(s) Oral every 6 hours PRN Temp greater or equal to 38C (100.4F), Mild Pain (1 - 3), Moderate Pain (4 - 6)  ALBUTerol    90 MICROgram(s) HFA Inhaler 2 Puff(s) Inhalation every 6 hours PRN Shortness of Breath and/or Wheezing  heparin   Injectable 7500 Unit(s) IV Push every 6 hours PRN For aPTT less than 40  heparin   Injectable 3500 Unit(s) IV Push every 6 hours PRN For aPTT between 40 - 57  melatonin 3 milliGRAM(s) Oral at bedtime PRN Insomnia      LABS:    The Labs were reviewed by me   The Radiology was reviewed by me    EKG tracing reviewed by me    08-24    132<L>  |  85<L>  |  94<H>  ----------------------------<  91  4.2   |  36<H>  |  2.28<H>  08-23    130<L>  |  84<L>  |  94<H>  ----------------------------<  168<H>  3.5   |  34<H>  |  2.32<H>  08-23    130<L>  |  84<L>  |  97<H>  ----------------------------<  73  3.7   |  31  |  2.29<H>    Ca    9.7      24 Aug 2022 06:14  Ca    9.5      23 Aug 2022 17:52  Ca    9.7      23 Aug 2022 06:10  Phos  4.1     08-24  Mg     2.1     08-24    TPro  6.8  /  Alb  4.1  /  TBili  0.3  /  DBili  x   /  AST  21  /  ALT  10  /  AlkPhos  114  08-22    Magnesium, Serum: 2.1 mg/dL (08-24-22 @ 06:14)  Magnesium, Serum: 2.0 mg/dL (08-23-22 @ 17:52)  Magnesium, Serum: 2.1 mg/dL (08-23-22 @ 06:10)  Magnesium, Serum: 2.0 mg/dL (08-22-22 @ 17:34)  Magnesium, Serum: 1.9 mg/dL (08-22-22 @ 06:34)    Phosphorus Level, Serum: 4.1 mg/dL (08-24-22 @ 06:14)  Phosphorus Level, Serum: 3.9 mg/dL (08-23-22 @ 17:52)  Phosphorus Level, Serum: 4.6 mg/dL (08-23-22 @ 06:10)  Phosphorus Level, Serum: 4.2 mg/dL (08-22-22 @ 17:34)  Phosphorus Level, Serum: 4.5 mg/dL (08-22-22 @ 06:34)      PT/INR - ( 24 Aug 2022 06:12 )   PT: 12.0 sec;   INR: 1.03 ratio         PTT - ( 24 Aug 2022 06:12 )  PTT:128.7 sec                                        8.0    4.22  )-----------( 218      ( 24 Aug 2022 06:13 )             26.2                         7.6    4.21  )-----------( 193      ( 23 Aug 2022 06:10 )             25.3                         7.4    4.40  )-----------( 215      ( 22 Aug 2022 06:33 )             24.1     CAPILLARY BLOOD GLUCOSE            MICROBIOLOGY:     RADIOLOGY:  [ ] Reviewed and interpreted by me    Point of Care Ultrasound Findings:    PFT:    EKG:

## 2022-08-24 NOTE — PROGRESS NOTE ADULT - ATTENDING COMMENTS
65 y/o obese F w/chronic respiratory failure with hypoxia and hypercapnia secondary to combination of COPD, HFpEF, and severe pulmonary hypertension w/cor pulmonale admitted for acute on chronic RV failure, currently slowly improving with diuresis.    - Supplemental O2 as needed goal O2 sat >= 90%  - AVAPS at night  - Continue lasix gtt  - Continue pulmonary vasodilators

## 2022-08-24 NOTE — PROGRESS NOTE ADULT - PROBLEM SELECTOR PLAN 6
On 8L baseline O2 at come, currently on 7L NC and feeling well. On trelegy and albuterol inhalers at home.   - continue trelegy therapeutic equivalent --> symbicort + tiotropium  - Has not needed albuterol PRN since 8/18  - continue CPAP qhs (has home machine)

## 2022-08-24 NOTE — PROGRESS NOTE ADULT - ATTENDING COMMENTS
above plans discussed with Dr. Limon    # severe pulmonary HTN  # acute on chronic respiratory failure with hypoxemia  # acute on chronic HFpEF exacerbation  # chronic Afib  # JUANA on CKD III  # COPD  # epistaxis    - responding well to lasix gtt with improvement in overall volume status but still with elevated JVP; weight stable at 197.5lbs this morning (dc'ed in June at 189lbs); pt otherwise feels much improved  - appreciate HF recs: continue aggressive diuresis  - appreciate pulm recs: not a candidate for lung transplant  - appreciate nephrology recs: JUANA likely in setting of cardiorenal, continue IV lasix gtt and hypertonic saline  - if uremia and kidney function worsens, she may need RRT - pt would want to pursue if indicated/offered  - appreciate palliative care recs: pt wants full medical management and not interested in palliative care yet  - heparin challenge started and no further episodes of epistaxis and H/H stable; if remains stable, then will transition to eliquis tonight  - overall poor prognosis    Zhanna Velasquez MD  Division of Hospital Medicine  Contact via Microsoft Teams  Office: [phone].

## 2022-08-24 NOTE — PROGRESS NOTE ADULT - ATTENDING COMMENTS
Responding well to HTS and lasix gtt  Continue for now same dosing   She is diuresing well with this regimen  Renal function stable    Sarai Calle MD  Off: [phone]  contact me on teams    (After 5 pm or on weekends please page the on-call fellow/attending, can check [email or web address] for schedule. Login is abhay means, schedule under [facility] medicine, psych, derm)

## 2022-08-24 NOTE — PROGRESS NOTE ADULT - ASSESSMENT
66 F with h/o COPD, CHFpEF, AFib, obesity (Type III, hx bariatric sx 2012), and severe pulmonary HTN combined group 1,2,3 with chronic combined hypercapnic and hypoxemic respiratory failure on ATC O2 supplementation (dependent on 8L NC at rest) with qhs/prn AVAPS (Trelegy Antonio via nasal prong interface) presenting for progressive dyspnea with minimal exertion admitted for acute decompensated right heart failure.   RHC 6/20/2022 revealed sPAP: 106, dPAP: 44 mPAP:65, PCWP: 16, PVR 7.32W, CO/CI 6.7/3.2.     not a candidate for lung transplant due to multiorgan dysfxn, high BMI, and poor functional status  no plans to start flolan  continue diuresis as per Nephro and Heart Failure  should follow up with Dr. Mosher as outpatient   c/w pulmonary vasodilators Tadalafil and Ambrisentan.   Continue supplemental O2 with goal O2 sat > 88%. Patient is on 8L NC at home , now tolerating 7 and can continue to wean down as tolerated  would strongly encourage ambulation to recondition patient as much as possible  Continue AVAPs at night   c/w bronchodilators  DVT ppx, ambulate as tolerated, incentive spirometry    Teofilo Riggs MD  [facility] PGY4  [facility] [address], [facility] [phone]

## 2022-08-24 NOTE — PROGRESS NOTE ADULT - PROBLEM SELECTOR PLAN 8
History of Afib on eliquis 5mg BID  - Tele reviewed - patient currently in afib, rate controlled.  - continue digoxin 125  - continue diltiazem 120mg qd  - Continue heparin drip and consider transitioning to eliquis tomorrow if stable. History of Afib on eliquis 5mg BID  - Tele reviewed - patient currently in afib, rate controlled.  - continue digoxin 125  - continue diltiazem 120mg qd  - Continue heparin drip and transition to eliquis 5m BID this evening

## 2022-08-24 NOTE — PROGRESS NOTE ADULT - SUBJECTIVE AND OBJECTIVE BOX
Patient seen and examined at bedside.    Overnight Events: NAEON. Cont to be on 7L NC during the daytime    Review Of Systems: No chest pain, shortness of breath, or palpitations            Current Meds:  acetaminophen     Tablet .. 975 milliGRAM(s) Oral every 6 hours PRN  ALBUTerol    90 MICROgram(s) HFA Inhaler 2 Puff(s) Inhalation every 6 hours PRN  ambrisentan 10 milliGRAM(s) Oral daily  apixaban 5 milliGRAM(s) Oral two times a day  atorvastatin 20 milliGRAM(s) Oral at bedtime  budesonide  80 MICROgram(s)/formoterol 4.5 MICROgram(s) Inhaler 2 Puff(s) Inhalation two times a day  buPROPion XL (24-Hour) . 300 milliGRAM(s) Oral daily  digoxin     Tablet 125 MICROGram(s) Oral daily  diltiazem    milliGRAM(s) Oral daily  ferrous    sulfate 325 milliGRAM(s) Oral daily  furosemide Infusion 10 mG/Hr IV Continuous <Continuous>  heparin   Injectable 7500 Unit(s) IV Push every 6 hours PRN  heparin   Injectable 3500 Unit(s) IV Push every 6 hours PRN  heparin  Infusion.  Unit(s)/Hr IV Continuous <Continuous>  melatonin 3 milliGRAM(s) Oral at bedtime PRN  pantoprazole    Tablet 40 milliGRAM(s) Oral before breakfast  polyethylene glycol 3350 17 Gram(s) Oral daily  sodium chloride 0.65% Nasal 2 Spray(s) Both Nostrils four times a day  sodium chloride 2% . 150 milliLiter(s) IV Continuous <Continuous>  sodium chloride 2% . 150 milliLiter(s) IV Continuous <Continuous>  tadalafil 40 milliGRAM(s) Oral daily  tiotropium 18 MICROgram(s) Capsule 1 Capsule(s) Inhalation daily      Vitals:  T(F): 97.6 (08-24), Max: 97.7 (08-24)  HR: 57 (08-24) (57 - 81)  BP: 106/64 (08-24) (93/44 - 106/64)  RR: 18 (08-24)  SpO2: 97% (08-24)  I&O's Summary    23 Aug 2022 07:01  -  24 Aug 2022 07:00  --------------------------------------------------------  IN: 1477 mL / OUT: 3450 mL / NET: -1973 mL    24 Aug 2022 07:01  -  24 Aug 2022 12:38  --------------------------------------------------------  IN: 416 mL / OUT: 700 mL / NET: -284 mL        Physical Exam:  General: No distress. Comfortable.  HEENT: EOM intact.  Neck: Neck supple. JVP 8-10cm sitting upright.   Chest: Unlabored, fine crackles bilateral bases.   CV: Normal S1 and S2. No murmurs, rub, or gallops. Radial pulses normal. 2+ BLE edema.   Abdomen: Soft, non-distended, non-tender  Skin: No rashes or skin breakdown  Neurology: Alert  Psych: Affect normal                          8.0    4.22  )-----------( 218      ( 24 Aug 2022 06:13 )             26.2     08-24    132<L>  |  85<L>  |  94<H>  ----------------------------<  91  4.2   |  36<H>  |  2.28<H>    Ca    9.7      24 Aug 2022 06:14  Phos  4.1     08-24  Mg     2.1     08-24      PT/INR - ( 24 Aug 2022 06:12 )   PT: 12.0 sec;   INR: 1.03 ratio         PTT - ( 24 Aug 2022 06:12 )  PTT:128.7 sec         HEART FAILURE PROGRESS NOTE    Patient seen and examined at bedside.    Overnight Events: NAEON. Cont to be on 7L NC during the daytime    Review Of Systems: No chest pain, shortness of breath, or palpitations            Current Meds:  acetaminophen     Tablet .. 975 milliGRAM(s) Oral every 6 hours PRN  ALBUTerol    90 MICROgram(s) HFA Inhaler 2 Puff(s) Inhalation every 6 hours PRN  ambrisentan 10 milliGRAM(s) Oral daily  apixaban 5 milliGRAM(s) Oral two times a day  atorvastatin 20 milliGRAM(s) Oral at bedtime  budesonide  80 MICROgram(s)/formoterol 4.5 MICROgram(s) Inhaler 2 Puff(s) Inhalation two times a day  buPROPion XL (24-Hour) . 300 milliGRAM(s) Oral daily  digoxin     Tablet 125 MICROGram(s) Oral daily  diltiazem    milliGRAM(s) Oral daily  ferrous    sulfate 325 milliGRAM(s) Oral daily  furosemide Infusion 10 mG/Hr IV Continuous <Continuous>  heparin   Injectable 7500 Unit(s) IV Push every 6 hours PRN  heparin   Injectable 3500 Unit(s) IV Push every 6 hours PRN  heparin  Infusion.  Unit(s)/Hr IV Continuous <Continuous>  melatonin 3 milliGRAM(s) Oral at bedtime PRN  pantoprazole    Tablet 40 milliGRAM(s) Oral before breakfast  polyethylene glycol 3350 17 Gram(s) Oral daily  sodium chloride 0.65% Nasal 2 Spray(s) Both Nostrils four times a day  sodium chloride 2% . 150 milliLiter(s) IV Continuous <Continuous>  sodium chloride 2% . 150 milliLiter(s) IV Continuous <Continuous>  tadalafil 40 milliGRAM(s) Oral daily  tiotropium 18 MICROgram(s) Capsule 1 Capsule(s) Inhalation daily      Vitals:  T(F): 97.6 (08-24), Max: 97.7 (08-24)  HR: 57 (08-24) (57 - 81)  BP: 106/64 (08-24) (93/44 - 106/64)  RR: 18 (08-24)  SpO2: 97% (08-24)  I&O's Summary    23 Aug 2022 07:01  -  24 Aug 2022 07:00  --------------------------------------------------------  IN: 1477 mL / OUT: 3450 mL / NET: -1973 mL    24 Aug 2022 07:01  -  24 Aug 2022 12:38  --------------------------------------------------------  IN: 416 mL / OUT: 700 mL / NET: -284 mL        Physical Exam:  General: No distress. Comfortable.  HEENT: EOM intact.  Neck: Neck supple. JVP ~10cm sitting upright.   Chest: Unlabored, fine crackles bilateral bases.   CV: Normal S1 and S2. No murmurs, rub, or gallops. Radial pulses normal. 2+ BLE edema.   Abdomen: Soft, non-distended, non-tender  Skin: No rashes or skin breakdown, however, diffuse echymosis  Neurology: Alert  Psych: Affect normal                          8.0    4.22  )-----------( 218      ( 24 Aug 2022 06:13 )             26.2     08-24    132<L>  |  85<L>  |  94<H>  ----------------------------<  91  4.2   |  36<H>  |  2.28<H>    Ca    9.7      24 Aug 2022 06:14  Phos  4.1     08-24  Mg     2.1     08-24      PT/INR - ( 24 Aug 2022 06:12 )   PT: 12.0 sec;   INR: 1.03 ratio         PTT - ( 24 Aug 2022 06:12 )  PTT:128.7 sec

## 2022-08-24 NOTE — PROGRESS NOTE ADULT - PROBLEM SELECTOR PLAN 1
Pt. with CKD in the setting of chronic cardiorenal syndrome, now with JUANA (non oliguric) on CKD likely 2/2 renal venous congestion from hypervolemia due to HF in the setting of severe pulmonary HTN.   On review of [facility], noted that Baseline SCr ranges from 1.4-1.9 since March '22. SCr on arrival was 1.8 on 8/11; peaked to 2.32 on 8/23  Cr trending down to 2.28 with BUN 94  Remains on lasix gtt with 3%  cc x 2 given yesterday with significant in UOP   Pt. continues to appear volume overloaded.  Continue lasix gtt  Would consider HTS once again today  No plan for HD at this time.   No uremic symptoms  Avoid NSAIDs, ACEI/ARBS, RCA and nephrotoxins. Dose medications as per eGFR.

## 2022-08-25 NOTE — PROGRESS NOTE ADULT - SUBJECTIVE AND OBJECTIVE BOX
PROGRESS NOTE:   Authored by: Andrea Limon M.D.   Internal Medicine PGY-1  Please Contact Via Teams    Patient is a 66y old  Female who presents with a chief complaint of lung txp eval (25 Aug 2022 06:51)      SUBJECTIVE / OVERNIGHT EVENTS:  No acute events overnight. Seen in chair at bedside on 7L NC. Feeling at baseline.Complaining of constipation - has been receiving ferrous sulfate since 8/17. There is an order for miralax but patient did not take from 8/18-8/23.  Took it 8/24. Last BM 3 days ago.    Tele with afib 50's-90's    MEDICATIONS  (STANDING):  ambrisentan 10 milliGRAM(s) Oral daily  apixaban 5 milliGRAM(s) Oral two times a day  atorvastatin 20 milliGRAM(s) Oral at bedtime  budesonide  80 MICROgram(s)/formoterol 4.5 MICROgram(s) Inhaler 2 Puff(s) Inhalation two times a day  buPROPion XL (24-Hour) . 300 milliGRAM(s) Oral daily  digoxin     Tablet 125 MICROGram(s) Oral daily  diltiazem    milliGRAM(s) Oral daily  ferrous    sulfate 325 milliGRAM(s) Oral daily  furosemide Infusion 10 mG/Hr (5 mL/Hr) IV Continuous <Continuous>  pantoprazole    Tablet 40 milliGRAM(s) Oral before breakfast  polyethylene glycol 3350 17 Gram(s) Oral daily  potassium chloride    Tablet ER 10 milliEquivalent(s) Oral once  sodium chloride 0.65% Nasal 2 Spray(s) Both Nostrils four times a day  tadalafil 40 milliGRAM(s) Oral daily  tiotropium 18 MICROgram(s) Capsule 1 Capsule(s) Inhalation daily    MEDICATIONS  (PRN):  acetaminophen     Tablet .. 975 milliGRAM(s) Oral every 6 hours PRN Temp greater or equal to 38C (100.4F), Mild Pain (1 - 3), Moderate Pain (4 - 6)  ALBUTerol    90 MICROgram(s) HFA Inhaler 2 Puff(s) Inhalation every 6 hours PRN Shortness of Breath and/or Wheezing  melatonin 3 milliGRAM(s) Oral at bedtime PRN Insomnia      CAPILLARY BLOOD GLUCOSE        I&O's Summary    24 Aug 2022 07:01  -  25 Aug 2022 07:00  --------------------------------------------------------  IN: 989 mL / OUT: 2750 mL / NET: -1761 mL        PHYSICAL EXAM:  Vital Signs Last 24 Hrs  T(C): 36.5 (25 Aug 2022 04:15), Max: 36.8 (24 Aug 2022 19:59)  T(F): 97.7 (25 Aug 2022 04:15), Max: 98.2 (24 Aug 2022 19:59)  HR: 64 (25 Aug 2022 05:08) (54 - 78)  BP: 101/61 (25 Aug 2022 04:15) (93/44 - 114/68)  BP(mean): --  RR: 18 (25 Aug 2022 04:15) (18 - 18)  SpO2: 98% (25 Aug 2022 05:08) (95% - 100%)    Parameters below as of 25 Aug 2022 04:15  Patient On (Oxygen Delivery Method): BiPAP/CPAP        CONSTITUTIONAL: NAD, multiple bruises at phlebotomy sites.   RESPIRATORY: Slightly SOB on 7LNC. Crackles at lung bases b/l  CARDIOVASCULAR: Afib, no murmurs. Tense lower extremity edema  ABDOMEN: Nontender to palpation, normoactive bowel sounds. Stool burden LLQ  PSYCH: A+O to person, place, and time; affect appropriate    LABS:                        8.1    3.91  )-----------( 218      ( 25 Aug 2022 07:29 )             27.1     08-25    132<L>  |  84<L>  |  91<H>  ----------------------------<  74  3.8   |  36<H>  |  2.09<H>    Ca    9.9      25 Aug 2022 07:29  Phos  3.8     08-25  Mg     2.2     08-25    TPro  7.4  /  Alb  4.3  /  TBili  0.4  /  DBili  x   /  AST  27  /  ALT  16  /  AlkPhos  125<H>  08-25    PT/INR - ( 25 Aug 2022 07:29 )   PT: 13.1 sec;   INR: 1.14 ratio         PTT - ( 25 Aug 2022 07:29 )  PTT:30.6 sec       PROGRESS NOTE:   Authored by: Andrea Limon M.D.   Internal Medicine PGY-1  Please Contact Via Teams    Patient is a 66y old  Female who presents with a chief complaint of lung txp eval (25 Aug 2022 06:51)      SUBJECTIVE / OVERNIGHT EVENTS:  No acute events overnight. Seen in chair at bedside on 7L NC. Feeling at baseline. Complaining of constipation - has been receiving ferrous sulfate since 8/17.    Tele with afib 50's-90's    MEDICATIONS  (STANDING):  ambrisentan 10 milliGRAM(s) Oral daily  apixaban 5 milliGRAM(s) Oral two times a day  atorvastatin 20 milliGRAM(s) Oral at bedtime  budesonide  80 MICROgram(s)/formoterol 4.5 MICROgram(s) Inhaler 2 Puff(s) Inhalation two times a day  buPROPion XL (24-Hour) . 300 milliGRAM(s) Oral daily  digoxin     Tablet 125 MICROGram(s) Oral daily  diltiazem    milliGRAM(s) Oral daily  ferrous    sulfate 325 milliGRAM(s) Oral daily  furosemide Infusion 10 mG/Hr (5 mL/Hr) IV Continuous <Continuous>  pantoprazole    Tablet 40 milliGRAM(s) Oral before breakfast  polyethylene glycol 3350 17 Gram(s) Oral daily  potassium chloride    Tablet ER 10 milliEquivalent(s) Oral once  sodium chloride 0.65% Nasal 2 Spray(s) Both Nostrils four times a day  tadalafil 40 milliGRAM(s) Oral daily  tiotropium 18 MICROgram(s) Capsule 1 Capsule(s) Inhalation daily    MEDICATIONS  (PRN):  acetaminophen     Tablet .. 975 milliGRAM(s) Oral every 6 hours PRN Temp greater or equal to 38C (100.4F), Mild Pain (1 - 3), Moderate Pain (4 - 6)  ALBUTerol    90 MICROgram(s) HFA Inhaler 2 Puff(s) Inhalation every 6 hours PRN Shortness of Breath and/or Wheezing  melatonin 3 milliGRAM(s) Oral at bedtime PRN Insomnia      CAPILLARY BLOOD GLUCOSE        I&O's Summary    24 Aug 2022 07:01  -  25 Aug 2022 07:00  --------------------------------------------------------  IN: 989 mL / OUT: 2750 mL / NET: -1761 mL        PHYSICAL EXAM:  Vital Signs Last 24 Hrs  T(C): 36.5 (25 Aug 2022 04:15), Max: 36.8 (24 Aug 2022 19:59)  T(F): 97.7 (25 Aug 2022 04:15), Max: 98.2 (24 Aug 2022 19:59)  HR: 64 (25 Aug 2022 05:08) (54 - 78)  BP: 101/61 (25 Aug 2022 04:15) (93/44 - 114/68)  BP(mean): --  RR: 18 (25 Aug 2022 04:15) (18 - 18)  SpO2: 98% (25 Aug 2022 05:08) (95% - 100%)    Parameters below as of 25 Aug 2022 04:15  Patient On (Oxygen Delivery Method): BiPAP/CPAP        CONSTITUTIONAL: NAD, multiple bruises at phlebotomy sites.   RESPIRATORY: Slightly SOB on 7LNC. Crackles at lung bases b/l  CARDIOVASCULAR: Afib, no murmurs. Tense lower extremity edema  ABDOMEN: Nontender to palpation, normoactive bowel sounds. Stool burden LLQ  PSYCH: A+O to person, place, and time; affect appropriate    LABS:                        8.1    3.91  )-----------( 218      ( 25 Aug 2022 07:29 )             27.1     08-25    132<L>  |  84<L>  |  91<H>  ----------------------------<  74  3.8   |  36<H>  |  2.09<H>    Ca    9.9      25 Aug 2022 07:29  Phos  3.8     08-25  Mg     2.2     08-25    TPro  7.4  /  Alb  4.3  /  TBili  0.4  /  DBili  x   /  AST  27  /  ALT  16  /  AlkPhos  125<H>  08-25    PT/INR - ( 25 Aug 2022 07:29 )   PT: 13.1 sec;   INR: 1.14 ratio         PTT - ( 25 Aug 2022 07:29 )  PTT:30.6 sec

## 2022-08-25 NOTE — PROGRESS NOTE ADULT - PROBLEM SELECTOR PLAN 3
- rate controlled on Digoxin (Digoxin level 8/11 0.6) and Cardizem   - decrease digoxin dose to 125 mcg every other day, repeat level in 1 week  - on home Eliquis

## 2022-08-25 NOTE — PROGRESS NOTE ADULT - PROBLEM SELECTOR PLAN 4
RHC showed elevated right sided filling pressures with severe pulmonary hypertension with systemic PA pressures, slightly elevated PCWP and preserved cardiac output.   - Followed with Dr. Rodriguez at [facility] for PH but he recently left the practice. Needs a pulmonary hypertension doctor near her home in [place].  - Continue tadalafil 40 mg daily   - continue Ambrisentan 10mg qd  - follow Pulm recs  - Transplant reporting that patient is currently not a candidate following eval, given this, also not a candidate for IV Flolan per Pulm

## 2022-08-25 NOTE — PROGRESS NOTE ADULT - PROBLEM SELECTOR PLAN 2
- CardioMEMS PAD 8/11 43 mmHg. goal ~40 but difficult to ascertain given RV dysfunction  - cardiomems PAD 8/15 45 mmHg, 98/45/66  - daily standing weight. so far down-trending but not at baseline. Was discharged in June at 189 lbs  - bumex did not work well for her  - c/w lasix gtt at 10 mg/hr for goal of net -1-2L/day  - c.w hypertonic saline with current regimen  - hold spironolactone for now  - please replete K > 4, Mg > 2  - eventual SGLT2-i prior to discharge, once on stable dose of oral diuretics

## 2022-08-25 NOTE — PROGRESS NOTE ADULT - PROBLEM SELECTOR PLAN 9
Diet: DASH  DVT: on eilquis  Dispo: pending medical course. Home PT with rolling walker. There is an order for miralax but patient did not take from 8/18-8/23. Took it 8/24. Last BM 3 days ago. Will continue to monitor. There is an order for miralax but patient did not take from 8/18-8/23. Took it 8/24. Last BM 3 days ago. Will continue to monitor. Ferrous sulfate switched to every other day

## 2022-08-25 NOTE — PROGRESS NOTE ADULT - SUBJECTIVE AND OBJECTIVE BOX
[facility] DIVISION OF KIDNEY DISEASES AND HYPERTENSION --    24 hour events/subjective:    Pt denies new issues  1.7 negative    PAST HISTORY  --------------------------------------------------------------------------------  No significant changes to PMH, PSH, FHx, SHx, unless otherwise noted    ALLERGIES & MEDICATIONS  --------------------------------------------------------------------------------  Allergies    nitrates (Unknown)    Intolerances      Standing Inpatient Medications  ambrisentan 10 milliGRAM(s) Oral daily  apixaban 5 milliGRAM(s) Oral two times a day  atorvastatin 20 milliGRAM(s) Oral at bedtime  budesonide  80 MICROgram(s)/formoterol 4.5 MICROgram(s) Inhaler 2 Puff(s) Inhalation two times a day  buPROPion XL (24-Hour) . 300 milliGRAM(s) Oral daily  digoxin     Tablet 125 MICROGram(s) Oral daily  diltiazem    milliGRAM(s) Oral daily  ferrous    sulfate 325 milliGRAM(s) Oral daily  furosemide Infusion 10 mG/Hr IV Continuous <Continuous>  pantoprazole    Tablet 40 milliGRAM(s) Oral before breakfast  polyethylene glycol 3350 17 Gram(s) Oral daily  sodium chloride 0.65% Nasal 2 Spray(s) Both Nostrils four times a day  tadalafil 40 milliGRAM(s) Oral daily  tiotropium 18 MICROgram(s) Capsule 1 Capsule(s) Inhalation daily    PRN Inpatient Medications  acetaminophen     Tablet .. 975 milliGRAM(s) Oral every 6 hours PRN  ALBUTerol    90 MICROgram(s) HFA Inhaler 2 Puff(s) Inhalation every 6 hours PRN  melatonin 3 milliGRAM(s) Oral at bedtime PRN      REVIEW OF SYSTEMS  --------------------------------------------------------------------------------  All other systems were reviewed and are negative, except as noted.    VITALS/PHYSICAL EXAM  --------------------------------------------------------------------------------  T(C): 36.5 (08-25-22 @ 04:15), Max: 36.8 (08-24-22 @ 19:59)  HR: 67 (08-25-22 @ 08:52) (54 - 67)  BP: 106/65 (08-25-22 @ 08:52) (101/61 - 114/68)  RR: 18 (08-25-22 @ 04:15) (18 - 18)  SpO2: 98% (08-25-22 @ 05:08) (95% - 100%)  Wt(kg): --        08-24-22 @ 07:01  -  08-25-22 @ 07:00  --------------------------------------------------------  IN: 989 mL / OUT: 2750 mL / NET: -1761 mL    08-25-22 @ 07:01  -  08-25-22 @ 11:18  --------------------------------------------------------  IN: 0 mL / OUT: 200 mL / NET: -200 mL      Physical Exam:  	Gen: NAD  	Pulm: decreased air entry  	CV: RRR, S1S2  	Abd: +BS, soft  	LE: Warm,3+ edema  	Skin: Warm, without rashes    LABS/STUDIES  --------------------------------------------------------------------------------              8.1    3.91  >-----------<  218      [08-25-22 @ 07:29]              27.1     132  |  84  |  91  ----------------------------<  74      [08-25-22 @ 07:29]  3.8   |  36  |  2.09        Ca     9.9     [08-25-22 @ 07:29]      Mg     2.2     [08-25-22 @ 07:29]      Phos  3.8     [08-25-22 @ 07:29]    TPro  7.4  /  Alb  4.3  /  TBili  0.4  /  DBili  x   /  AST  27  /  ALT  16  /  AlkPhos  125  [08-25-22 @ 07:29]    PT/INR: PT 13.1 , INR 1.14       [08-25-22 @ 07:29]  PTT: 30.6       [08-25-22 @ 07:29]      Creatinine Trend:  SCr 2.09 [08-25 @ 07:29]  SCr 2.28 [08-24 @ 06:14]  SCr 2.32 [08-23 @ 17:52]  SCr 2.29 [08-23 @ 06:10]  SCr 2.09 [08-22 @ 17:34]        Iron 27, TIBC 350, %sat 8      [08-12-22 @ 06:56]  Ferritin 26      [08-12-22 @ 06:56]  Lipid: chol 103, TG 48, HDL 64, LDL --      [06-14-22 @ 02:45]

## 2022-08-25 NOTE — PROGRESS NOTE ADULT - PROBLEM SELECTOR PLAN 1
Pt. with CKD in the setting of chronic cardiorenal syndrome, now with JUANA (non oliguric) on CKD likely 2/2 renal venous congestion from hypervolemia due to HF in the setting of severe pulmonary HTN.   On review of [facility], noted that Baseline SCr ranges from 1.4-1.9 since March '22. SCr on arrival was 1.8 on 8/11; peaked to 2.32 on 8/23  Cr trending down to 2.09   Remains on lasix gtt with 2% HTS     Would continue for now  No plan for HD at this time.   No uremic symptoms  Avoid NSAIDs, ACEI/ARBS, RCA and nephrotoxins. Dose medications as per eGFR.

## 2022-08-25 NOTE — PROGRESS NOTE ADULT - PROBLEM SELECTOR PLAN 1
- managed by pulmonary hypertension team on ambirsentan and tadalafil.    - not a lung transplant candidate at this point, also not a candidate for IV therapy for pulmonary dilators  - palliative care following  - please get PT eval

## 2022-08-25 NOTE — PROGRESS NOTE ADULT - SUBJECTIVE AND OBJECTIVE BOX
Patient seen and examined at bedside.    Overnight Events: LISA, weight still at 199, but having significant UOP with current regimen    Review Of Systems: No chest pain, shortness of breath, or palpitations            Current Meds:  acetaminophen     Tablet .. 975 milliGRAM(s) Oral every 6 hours PRN  ALBUTerol    90 MICROgram(s) HFA Inhaler 2 Puff(s) Inhalation every 6 hours PRN  ambrisentan 10 milliGRAM(s) Oral daily  apixaban 5 milliGRAM(s) Oral two times a day  atorvastatin 20 milliGRAM(s) Oral at bedtime  budesonide  80 MICROgram(s)/formoterol 4.5 MICROgram(s) Inhaler 2 Puff(s) Inhalation two times a day  buPROPion XL (24-Hour) . 300 milliGRAM(s) Oral daily  digoxin     Tablet 125 MICROGram(s) Oral daily  diltiazem    milliGRAM(s) Oral daily  ferrous    sulfate 325 milliGRAM(s) Oral <User Schedule>  furosemide Infusion 10 mG/Hr IV Continuous <Continuous>  melatonin 3 milliGRAM(s) Oral at bedtime PRN  pantoprazole    Tablet 40 milliGRAM(s) Oral before breakfast  polyethylene glycol 3350 17 Gram(s) Oral daily  sodium chloride 0.65% Nasal 2 Spray(s) Both Nostrils four times a day  sodium chloride 2% . 150 milliLiter(s) IV Continuous <Continuous>  tadalafil 40 milliGRAM(s) Oral daily  tiotropium 18 MICROgram(s) Capsule 1 Capsule(s) Inhalation daily      Vitals:  T(F): 97.3 (08-25), Max: 98.2 (08-24)  HR: 66 (08-25) (54 - 67)  BP: 105/61 (08-25) (101/61 - 114/68)  RR: 18 (08-25)  SpO2: 94% (08-25)  I&O's Summary    24 Aug 2022 07:01  -  25 Aug 2022 07:00  --------------------------------------------------------  IN: 989 mL / OUT: 2750 mL / NET: -1761 mL    25 Aug 2022 07:01  -  25 Aug 2022 12:23  --------------------------------------------------------  IN: 0 mL / OUT: 200 mL / NET: -200 mL        Physical Exam:  General: No distress. Comfortable.  HEENT: EOM intact.  Neck: Neck supple. JVP ~10cm sitting upright.   Chest: Unlabored, fine crackles bilateral bases.   CV: Normal S1 and S2. No murmurs, rub, or gallops. Radial pulses normal. 2+ BLE edema.   Abdomen: Soft, non-distended, non-tender  Skin: No rashes or skin breakdown, however, diffuse echymosis  Neurology: Alert  Psych: Affect normal                          8.1    3.91  )-----------( 218      ( 25 Aug 2022 07:29 )             27.1     08-25    132<L>  |  84<L>  |  91<H>  ----------------------------<  74  3.8   |  36<H>  |  2.09<H>    Ca    9.9      25 Aug 2022 07:29  Phos  3.8     08-25  Mg     2.2     08-25    TPro  7.4  /  Alb  4.3  /  TBili  0.4  /  DBili  x   /  AST  27  /  ALT  16  /  AlkPhos  125<H>  08-25    PT/INR - ( 25 Aug 2022 07:29 )   PT: 13.1 sec;   INR: 1.14 ratio         PTT - ( 25 Aug 2022 07:29 )  PTT:30.6 sec         HEART FAILURE PROGRESS NOTE    Patient seen and examined at bedside.    Overnight Events: NAEON, weight still at 199, but having significant UOP with current regimen    Review Of Systems: No chest pain, shortness of breath, or palpitations            Current Meds:  acetaminophen     Tablet .. 975 milliGRAM(s) Oral every 6 hours PRN  ALBUTerol    90 MICROgram(s) HFA Inhaler 2 Puff(s) Inhalation every 6 hours PRN  ambrisentan 10 milliGRAM(s) Oral daily  apixaban 5 milliGRAM(s) Oral two times a day  atorvastatin 20 milliGRAM(s) Oral at bedtime  budesonide  80 MICROgram(s)/formoterol 4.5 MICROgram(s) Inhaler 2 Puff(s) Inhalation two times a day  buPROPion XL (24-Hour) . 300 milliGRAM(s) Oral daily  digoxin     Tablet 125 MICROGram(s) Oral daily  diltiazem    milliGRAM(s) Oral daily  ferrous    sulfate 325 milliGRAM(s) Oral <User Schedule>  furosemide Infusion 10 mG/Hr IV Continuous <Continuous>  melatonin 3 milliGRAM(s) Oral at bedtime PRN  pantoprazole    Tablet 40 milliGRAM(s) Oral before breakfast  polyethylene glycol 3350 17 Gram(s) Oral daily  sodium chloride 0.65% Nasal 2 Spray(s) Both Nostrils four times a day  sodium chloride 2% . 150 milliLiter(s) IV Continuous <Continuous>  tadalafil 40 milliGRAM(s) Oral daily  tiotropium 18 MICROgram(s) Capsule 1 Capsule(s) Inhalation daily      Vitals:  T(F): 97.3 (08-25), Max: 98.2 (08-24)  HR: 66 (08-25) (54 - 67)  BP: 105/61 (08-25) (101/61 - 114/68)  RR: 18 (08-25)  SpO2: 94% (08-25)  I&O's Summary    24 Aug 2022 07:01  -  25 Aug 2022 07:00  --------------------------------------------------------  IN: 989 mL / OUT: 2750 mL / NET: -1761 mL    25 Aug 2022 07:01  -  25 Aug 2022 12:23  --------------------------------------------------------  IN: 0 mL / OUT: 200 mL / NET: -200 mL        Physical Exam:  General: No distress. Comfortable.  HEENT: EOM intact.  Neck: Neck supple. JVP ~10cm sitting upright.   Chest: Unlabored, fine crackles bilateral bases.   CV: Normal S1 and S2. No murmurs, rub, or gallops. Radial pulses normal. 2+ BLE edema.   Abdomen: Soft, non-distended, non-tender  Skin: No rashes or skin breakdown, however, diffuse ecchymosis  Neurology: Alert  Psych: Affect normal                          8.1    3.91  )-----------( 218      ( 25 Aug 2022 07:29 )             27.1     08-25    132<L>  |  84<L>  |  91<H>  ----------------------------<  74  3.8   |  36<H>  |  2.09<H>    Ca    9.9      25 Aug 2022 07:29  Phos  3.8     08-25  Mg     2.2     08-25    TPro  7.4  /  Alb  4.3  /  TBili  0.4  /  DBili  x   /  AST  27  /  ALT  16  /  AlkPhos  125<H>  08-25    PT/INR - ( 25 Aug 2022 07:29 )   PT: 13.1 sec;   INR: 1.14 ratio         PTT - ( 25 Aug 2022 07:29 )  PTT:30.6 sec

## 2022-08-25 NOTE — PROGRESS NOTE ADULT - ATTENDING COMMENTS
Responding well to HTS and lasix gtt  Continue for now same dosing   She is diuresing well with this regimen  Maintain 1 liter fluid restriction  Renal function stable    Sarai Calle MD  Off: [phone]  contact me on teams    (After 5 pm or on weekends please page the on-call fellow/attending, can check [email or web address] for schedule. Login is abhay means, schedule under [facility] medicine, psych, derm)

## 2022-08-25 NOTE — PROGRESS NOTE ADULT - PROBLEM SELECTOR PLAN 1
Pt feeling at baseline. Improving on lasix drip. Having okay urine output. Weight today increased by 2 lbs from 197.7-199.5lbs. Goal weight 189 lbs  - Continue lasix drip 10/hr  - Continue with 2% HTS infusions  - Holding metolazone with Na <135  - Holding spironolactone  - standing daily weights, strict I/Os.  - pulm and HF following   - eventual SGLT2-i prior to discharge, once on stable dose of oral diuretics

## 2022-08-25 NOTE — PROGRESS NOTE ADULT - PROBLEM SELECTOR PLAN 7
- HGB 8.1 on 8/25 on eliquis. Stable No obvious signs of bleeding.  - maintain active T/S  - iron studies: total iron and % saturation decreased, ferritin on lower side, transferrin and TIBC normal; s/p IV iron infusion  - Continue oral iron daily 325  - Potential source of blood loss is bleeding into extremities from phlebotomy.  - 8/16: s/p 2nd unit of PRBC. First unit at OSH

## 2022-08-25 NOTE — PROGRESS NOTE ADULT - PROBLEM SELECTOR PLAN 8
History of Afib on eliquis 5mg BID  - Tele reviewed - patient currently in afib, rate controlled.  - continue digoxin 125 - dig level okay  - continue diltiazem 120mg qd  - continue eliquis History of Afib on eliquis 5mg BID  - Tele reviewed - patient currently in afib, rate controlled.  - decrease digoxin 125 to every other day. Repeat level in 1 week.  - continue diltiazem 120mg qd  - continue eliquis

## 2022-08-25 NOTE — PROGRESS NOTE ADULT - PROBLEM SELECTOR PROBLEM 9
Patient, Harry Chavarria calling for medication refill. Medication(s) set up as pending orders from medication list.    Caller has been advised that their call does not guarantee an immediate refill. This refill will be reviewed within 24-48 hours by a qualified provider who will determine whether he or she can refill the medication.    Patient has contacted the pharmacy?  No    Patient advised     Additional information:         Patient’s preferred pharmacy has been noted and populated.       [id number] [place], [place] - [facility] [address] [address]  [address] [address]  Phone: [phone] Fax: [phone]       Prophylactic measure Constipation

## 2022-08-25 NOTE — PROGRESS NOTE ADULT - ATTENDING COMMENTS
Patient was seen and examined with the fellow.  Still remains volume overloaded.  No acute bleeding thus far with re-introduction of Eliquis.  Will continue with lasix 10mg drip and hypertonic saline. She is achieving 1.5 to 2L net negative daily.  Digoxin level elevated to 1.9.  Will decrease dose frequency to every other day.

## 2022-08-25 NOTE — PROGRESS NOTE ADULT - ATTENDING COMMENTS
above plans discussed with Dr. Limon    # severe pulmonary HTN  # acute on chronic respiratory failure with hypoxemia  # acute on chronic HFpEF exacerbation  # chronic Afib  # JUANA on CKD III  # COPD  # epistaxis    - weight 2lbs up since yesterday but still with good urine output; (dc'ed in June at 189lbs); pt otherwise feels much improved  - appreciate HF recs: continue aggressive diuresis  - appreciate pulm recs: not a candidate for lung transplant  - appreciate nephrology recs: JUANA likely in setting of cardiorenal, continue IV lasix gtt with hypertonic saline  - if uremia and kidney function worsens, she may need RRT - pt would want to pursue if indicated/offered  - appreciate palliative care recs: pt wants full medical management and not interested in palliative care yet  - eliquis resumed yesterday and so far, no signs of bleed and H/H stable  - PT consult  - overall poor prognosis    Zhanna Velasquez MD  Division of Hospital Medicine  Contact via [employer] Teams  Office: [phone].

## 2022-08-25 NOTE — PROGRESS NOTE ADULT - PROBLEM SELECTOR PLAN 3
Cr elevated to 2.42 on admission- baseline is 1.7-1.8. Likely in s/o CHF exacerbation.  - Cr 2.06 8/25  - diuresis as above  - avoid nephrotoxic agents  - strict I/Os - -1761 in 24 hours but -1600 overnight. 680 in oral fluids yesterday.  - Nephrology following  - Cr elevation could also be new baseline for patient in setting of worsening kidney function 2/2 pulm htn/HF  - Pt with BUN 90's. No uremic symptoms current but may require dialysis in the future.

## 2022-08-26 NOTE — PROGRESS NOTE ADULT - PROBLEM SELECTOR PLAN 1
- managed by pulmonary hypertension team on ambirsentan and tadalafil.    - not a lung transplant candidate at this point, also not a candidate for IV therapy for pulmonary dilators  - palliative care signed off

## 2022-08-26 NOTE — PROGRESS NOTE ADULT - SUBJECTIVE AND OBJECTIVE BOX
[facility] Division of Kidney Diseases & Hypertension  FOLLOW UP NOTE  [phone]--------------------------------------------------------------------------------  Chief Complaint:Pulmonary hypertension due to lung diseases and hypoxia      HPI: 66 year old with PMH of Pulmonary hypertension (Group 1,2,3) HFpEF, atrial fibrillation, COPD on home oxygen, ROLANDA on CPAP, morbid obesity with acute hypoxic respiratory failure. Nephrology consulted for JUANA. baseline creatinine as of june appears to be 1.5-1.6 on august 11 patient creatinine was 1.89 and has continued to up trend most recently 2.06 8/13. Patient was initially started on lasix 60 mg IV BID on admission with marked improvement in pro-BNP since presentation 59862 (8/11) and most recently 8947 (8/13). Now on lasix gtt with HTS      24 hour events/subjective: Patient seen & examined. Labs & vitals reviewed. Remains on lasix gtt with 3%  cc x 2 given yesterday. UOP not accurately recorded.  Reports some improvement in SOB & leg edema. Remains on 7L NC.           PAST HISTORY  --------------------------------------------------------------------------------  No significant changes to PMH, PSH, FHx, SHx, unless otherwise noted    ALLERGIES & MEDICATIONS  --------------------------------------------------------------------------------  Allergies    nitrates (Unknown)    Intolerances      Standing Inpatient Medications  ambrisentan 10 milliGRAM(s) Oral daily  apixaban 5 milliGRAM(s) Oral two times a day  atorvastatin 20 milliGRAM(s) Oral at bedtime  budesonide  80 MICROgram(s)/formoterol 4.5 MICROgram(s) Inhaler 2 Puff(s) Inhalation two times a day  buPROPion XL (24-Hour) . 300 milliGRAM(s) Oral daily  digoxin     Tablet 125 MICROGram(s) Oral every other day  diltiazem    milliGRAM(s) Oral daily  ferrous    sulfate 325 milliGRAM(s) Oral <User Schedule>  furosemide Infusion 10 mG/Hr IV Continuous <Continuous>  pantoprazole    Tablet 40 milliGRAM(s) Oral before breakfast  polyethylene glycol 3350 17 Gram(s) Oral daily  sodium chloride 0.65% Nasal 2 Spray(s) Both Nostrils four times a day  sodium chloride 2% . 150 milliLiter(s) IV Continuous <Continuous>  tadalafil 40 milliGRAM(s) Oral daily  tiotropium 18 MICROgram(s) Capsule 1 Capsule(s) Inhalation daily    PRN Inpatient Medications  acetaminophen     Tablet .. 975 milliGRAM(s) Oral every 6 hours PRN  ALBUTerol    90 MICROgram(s) HFA Inhaler 2 Puff(s) Inhalation every 6 hours PRN  melatonin 3 milliGRAM(s) Oral at bedtime PRN      REVIEW OF SYSTEMS  --------------------------------------------------------------------------------  Gen: No chills  Respiratory: + dyspnea, cough  CV: No chest pain  GI: No abdominal pain, diarrhea,  nausea, vomiting  : No dysuria, hematuria  MSK:  + edema  Neuro: No dizziness/lightheadedness      All other systems were reviewed and are negative, except as noted.    VITALS/PHYSICAL EXAM  --------------------------------------------------------------------------------  T(C): 36.3 (08-26-22 @ 11:40), Max: 36.9 (08-25-22 @ 20:34)  HR: 81 (08-26-22 @ 11:40) (58 - 81)  BP: 114/65 (08-26-22 @ 11:40) (103/60 - 114/65)  RR: 21 (08-26-22 @ 11:40) (18 - 21)  SpO2: 97% (08-26-22 @ 11:40) (91% - 100%)  Wt(kg): --        08-25-22 @ 07:01  -  08-26-22 @ 07:00  --------------------------------------------------------  IN: 480 mL / OUT: 700 mL / NET: -220 mL      Physical Exam:  Gen: NAD, on 7L NC  HEENT: anicteric  Pulm: bibasilar crackles  CV: RRR  Abd: soft, +BS  CNS: awake, alert  : No [place]  LE: +++ b/l LE edema (improving), +tremor but no asterexis  Skin: Warm, without rashes  Vascular access:       LABS/STUDIES  --------------------------------------------------------------------------------              7.5    3.67  >-----------<  206      [08-26-22 @ 07:44]              25.1     130  |  84  |  89  ----------------------------<  133      [08-25-22 @ 18:48]  4.0   |  34  |  2.36        Ca     9.6     [08-25-22 @ 18:48]      Mg     2.2     [08-26-22 @ 07:32]      Phos  3.5     [08-26-22 @ 07:32]    TPro  7.4  /  Alb  4.3  /  TBili  0.4  /  DBili  x   /  AST  27  /  ALT  16  /  AlkPhos  125  [08-25-22 @ 07:29]    PT/INR: PT 13.1 , INR 1.14       [08-25-22 @ 07:29]  PTT: 30.6       [08-25-22 @ 07:29]      Creatinine Trend:  SCr 2.36 [08-25 @ 18:48]  SCr 2.09 [08-25 @ 07:29]  SCr 2.28 [08-24 @ 06:14]  SCr 2.32 [08-23 @ 17:52]  SCr 2.29 [08-23 @ 06:10]

## 2022-08-26 NOTE — PROGRESS NOTE ADULT - ATTENDING COMMENTS
above plans discussed with Dr. Limon    # severe pulmonary HTN  # acute on chronic respiratory failure with hypoxemia  # acute on chronic HFpEF exacerbation  # chronic Afib  # JUANA on CKD III  # COPD  # epistaxis    - weight continues to trend down (194lb) this morning but pt reports decreased urine output overall since last night  - continue IV lasix gtt + HTS  - appreciate HF recs: continue aggressive diuresis  - appreciate pulm recs: not a candidate for lung transplant  - appreciate nephrology recs: care discussed with Dr. Calle - will try another day of IV lasix gtt+HTS and then transitioned to IV bumex tomorrow. If she does well on it, then will transition to PO but if she doesn't respond well to IV bumex, then we may have to start RRT  - if uremia and kidney function worsens, she may need RRT - pt would want to pursue if indicated/offered  - appreciate palliative care recs: pt wants full medical management and not interested in palliative care yet  - eliquis resumed and so far, no signs of bleed and H/H stable  - PT consult appreciated  - overall poor prognosis    Zhanna Velasquez MD  Division of Hospital Medicine  Contact via [employer] Teams  Office: [phone].

## 2022-08-26 NOTE — PROGRESS NOTE ADULT - PROBLEM SELECTOR PLAN 3
Cr elevated to 2.42 on admission- baseline is 1.7-1.8. Likely in s/o CHF exacerbation.  - Cr 2.06 8/25  - diuresis as above  - avoid nephrotoxic agents  - strict I/Os - -1761 in 24 hours but -1600 overnight. 680 in oral fluids yesterday.  - Nephrology following  - Cr elevation could also be new baseline for patient in setting of worsening kidney function 2/2 pulm htn/HF  - Pt with BUN 90's. No uremic symptoms current but may require dialysis in the future. Cr elevated to 2.42 on admission- baseline is 1.7-1.8. Likely in s/o CHF exacerbation.  - Cr 2.36 8/26  - diuresis as above  - avoid nephrotoxic agents  - strict I/Os  - Nephrology following  - Cr elevation could also be new baseline for patient in setting of worsening kidney function 2/2 pulm htn/HF  - Pt with BUN 90's. No uremic symptoms current but may require dialysis in the future. Cr elevated to 2.42 on admission- baseline is 1.7-1.8. Likely in s/o CHF exacerbation.  - Cr 2.36 8/25  - diuresis as above  - avoid nephrotoxic agents  - strict I/Os  - Nephrology following  - Cr elevation could also be new baseline for patient in setting of worsening kidney function 2/2 pulm htn/HF  - Pt with BUN 90's. No uremic symptoms current but may require dialysis in the future.

## 2022-08-26 NOTE — PROGRESS NOTE ADULT - PROBLEM SELECTOR PLAN 9
There is an order for miralax but patient did not take from 8/18-8/23. Took it 8/24. Last BM 3 days ago. Will continue to monitor. Ferrous sulfate switched to every other day

## 2022-08-26 NOTE — PROGRESS NOTE ADULT - PROBLEM SELECTOR PLAN 4
RHC showed elevated right sided filling pressures with severe pulmonary hypertension with systemic PA pressures, slightly elevated PCWP and preserved cardiac output.   - Followed with Dr. Rodriguez at [place] for PH but he recently left the practice. Needs a pulmonary hypertension doctor near her home in [place].  - Continue tadalafil 40 mg daily   - continue Ambrisentan 10mg qd  - follow Pulm recs  - Transplant reporting that patient is currently not a candidate following eval, given this, also not a candidate for IV Flolan per Pulm

## 2022-08-26 NOTE — PROGRESS NOTE ADULT - ASSESSMENT
66 F with h/o COPD, CHFpEF, AFib, obesity (Type III, hx bariatric sx 2012), and severe pulmonary HTN combined group 1,2,3 with chronic combined hypercapnic and hypoxemic respiratory failure on ATC O2 supplementation (dependent on 8L NC at rest) with qhs/prn AVAPS (Trelegy Antonio via nasal prong interface) presenting for progressive dyspnea with minimal exertion admitted for acute decompensated right heart failure.   RHC 6/20/2022 revealed sPAP: 106, dPAP: 44 mPAP:65, PCWP: 16, PVR 7.32W, CO/CI 6.7/3.2.     not a candidate for lung transplant due to multiorgan dysfxn, high BMI, and poor functional status  no plans to start flolan  continue diuresis as per Nephro and Heart Failure  should follow up with Dr. Mosher as outpatient   c/w pulmonary vasodilators Tadalafil and Ambrisentan.   Continue supplemental O2 with goal O2 sat > 88%. Patient is on 8L NC at home , now tolerating 7 and can continue to wean down as tolerated  would strongly encourage ambulation to recondition patient as much as possible  Continue AVAPs at night   c/w bronchodilators  DVT ppx, ambulate as tolerated, incentive spirometry    Teofilo Riggs MD  [facility] PGY4  [facility] [id number], [facility] [phone]

## 2022-08-26 NOTE — PROGRESS NOTE ADULT - PROBLEM SELECTOR PLAN 3
- rate controlled on Digoxin and Cardizem   - c/w digoxin dose to 125 mcg every other day, repeat level in 6 days  - on home Eliquis

## 2022-08-26 NOTE — PROGRESS NOTE ADULT - SUBJECTIVE AND OBJECTIVE BOX
PROGRESS NOTE:   Authored by: Andrea Limon M.D.   Internal Medicine PGY-1  Please Contact Via Teams    Patient is a 66y old  Female who presents with a chief complaint of lung txp eval (26 Aug 2022 07:03)      SUBJECTIVE / OVERNIGHT EVENTS:  No acute events overnight. Was moved to a new room and is unhappy that she was not able to sit in a chair early in the morning. Otherwise is feeling the same, essentially at baseline.     Patient saying ReadWave was not working well and is sitting in wetness. May affect I/O    Tele with afib 60-70's with pvc's      MEDICATIONS  (STANDING):  ambrisentan 10 milliGRAM(s) Oral daily  apixaban 5 milliGRAM(s) Oral two times a day  atorvastatin 20 milliGRAM(s) Oral at bedtime  budesonide  80 MICROgram(s)/formoterol 4.5 MICROgram(s) Inhaler 2 Puff(s) Inhalation two times a day  buPROPion XL (24-Hour) . 300 milliGRAM(s) Oral daily  digoxin     Tablet 125 MICROGram(s) Oral every other day  diltiazem    milliGRAM(s) Oral daily  ferrous    sulfate 325 milliGRAM(s) Oral <User Schedule>  furosemide Infusion 10 mG/Hr (5 mL/Hr) IV Continuous <Continuous>  pantoprazole    Tablet 40 milliGRAM(s) Oral before breakfast  polyethylene glycol 3350 17 Gram(s) Oral daily  sodium chloride 0.65% Nasal 2 Spray(s) Both Nostrils four times a day  tadalafil 40 milliGRAM(s) Oral daily  tiotropium 18 MICROgram(s) Capsule 1 Capsule(s) Inhalation daily    MEDICATIONS  (PRN):  acetaminophen     Tablet .. 975 milliGRAM(s) Oral every 6 hours PRN Temp greater or equal to 38C (100.4F), Mild Pain (1 - 3), Moderate Pain (4 - 6)  ALBUTerol    90 MICROgram(s) HFA Inhaler 2 Puff(s) Inhalation every 6 hours PRN Shortness of Breath and/or Wheezing  melatonin 3 milliGRAM(s) Oral at bedtime PRN Insomnia      CAPILLARY BLOOD GLUCOSE        I&O's Summary    25 Aug 2022 07:01  -  26 Aug 2022 07:00  --------------------------------------------------------  IN: 480 mL / OUT: 700 mL / NET: -220 mL        PHYSICAL EXAM:  Vital Signs Last 24 Hrs  T(C): 36.6 (26 Aug 2022 03:52), Max: 36.9 (25 Aug 2022 20:34)  T(F): 97.8 (26 Aug 2022 03:52), Max: 98.5 (25 Aug 2022 20:34)  HR: 62 (26 Aug 2022 06:52) (58 - 77)  BP: 103/60 (26 Aug 2022 06:52) (103/60 - 111/68)  BP(mean): --  RR: 18 (26 Aug 2022 03:52) (18 - 20)  SpO2: 95% (26 Aug 2022 06:40) (91% - 100%)    Parameters below as of 26 Aug 2022 03:52  Patient On (Oxygen Delivery Method): nasal cannula w/ humidification        CONSTITUTIONAL: NAD, multiple bruises at phlebotomy sites.   RESPIRATORY: Slightly SOB on 7LNC. Crackles at lung bases b/l  CARDIOVASCULAR: Afib, no murmurs. Tense lower extremity edema  ABDOMEN: Nontender to palpation, normoactive bowel sounds. Stool burden LLQ  PSYCH: A+O to person, place, and time; affect appropriate    LABS:                        8.1    3.91  )-----------( 218      ( 25 Aug 2022 07:29 )             27.1     08-25    130<L>  |  84<L>  |  89<H>  ----------------------------<  133<H>  4.0   |  34<H>  |  2.36<H>    Ca    9.6      25 Aug 2022 18:48  Phos  3.8     08-25  Mg     2.2     08-25    TPro  7.4  /  Alb  4.3  /  TBili  0.4  /  DBili  x   /  AST  27  /  ALT  16  /  AlkPhos  125<H>  08-25    PT/INR - ( 25 Aug 2022 07:29 )   PT: 13.1 sec;   INR: 1.14 ratio         PTT - ( 25 Aug 2022 07:29 )  PTT:30.6 sec

## 2022-08-26 NOTE — PROGRESS NOTE ADULT - ATTENDING COMMENTS
Continue HTS and lasix gtt  Strict UO or wts  Maintain 1 liter fluid restriction  Renal function stable    Sarai Calle MD  Off: [phone]  contact me on teams    (After 5 pm or on weekends please page the on-call fellow/attending, can check [email or web address] for schedule. Login is abhay means, schedule under [facility] medicine, psych, derm)

## 2022-08-26 NOTE — PROGRESS NOTE ADULT - SUBJECTIVE AND OBJECTIVE BOX
CHIEF COMPLAINT:Patient is a 66y old  Female who presents with a chief complaint of lung txp eval (26 Aug 2022 07:03)      Interval Events:    REVIEW OF SYSTEMS:  [x] All other systems negative except per HPI   [ ] Unable to assess ROS because ________    OBJECTIVE:  ICU Vital Signs Last 24 Hrs  T(C): 36.6 (26 Aug 2022 03:52), Max: 36.9 (25 Aug 2022 20:34)  T(F): 97.8 (26 Aug 2022 03:52), Max: 98.5 (25 Aug 2022 20:34)  HR: 62 (26 Aug 2022 06:52) (58 - 77)  BP: 103/60 (26 Aug 2022 06:52) (103/60 - 111/68)  BP(mean): --  ABP: --  ABP(mean): --  RR: 18 (26 Aug 2022 03:52) (18 - 20)  SpO2: 95% (26 Aug 2022 06:40) (91% - 100%)    O2 Parameters below as of 26 Aug 2022 03:52  Patient On (Oxygen Delivery Method): nasal cannula w/ humidification              08-25 @ 07:01  -  08-26 @ 07:00  --------------------------------------------------------  IN: 480 mL / OUT: 700 mL / NET: -220 mL        PHYSICAL EXAM:  GENERAL: NAD, well-groomed, well-developed  HEAD:  Atraumatic, Normocephalic  EYES: EOMI, PERRLA, conjunctiva and sclera clear  ENMT: No tonsillar erythema, exudates, or enlargement; Moist mucous membranes, Good dentition, No lesions  NECK: Supple, No JVD, Normal thyroid  CHEST/LUNG: Clear to auscultation bilaterally; No rales, rhonchi, wheezing, or rubs  HEART: Regular rate and rhythm; No murmurs, rubs, or gallops  ABDOMEN: Soft, Nontender, Nondistended; Bowel sounds present  VASCULAR:  2+ Peripheral Pulses, No clubbing, cyanosis, or edema  LYMPH: No lymphadenopathy noted  SKIN: No rashes or lesions  NERVOUS SYSTEM:  Alert & Oriented X3, Good concentration; Motor Strength 5/5 B/L upper and lower extremities; DTRs 2+ intact and symmetric    HOSPITAL MEDICATIONS:  MEDICATIONS  (STANDING):  ambrisentan 10 milliGRAM(s) Oral daily  apixaban 5 milliGRAM(s) Oral two times a day  atorvastatin 20 milliGRAM(s) Oral at bedtime  budesonide  80 MICROgram(s)/formoterol 4.5 MICROgram(s) Inhaler 2 Puff(s) Inhalation two times a day  buPROPion XL (24-Hour) . 300 milliGRAM(s) Oral daily  digoxin     Tablet 125 MICROGram(s) Oral every other day  diltiazem    milliGRAM(s) Oral daily  ferrous    sulfate 325 milliGRAM(s) Oral <User Schedule>  furosemide Infusion 10 mG/Hr (5 mL/Hr) IV Continuous <Continuous>  pantoprazole    Tablet 40 milliGRAM(s) Oral before breakfast  polyethylene glycol 3350 17 Gram(s) Oral daily  sodium chloride 0.65% Nasal 2 Spray(s) Both Nostrils four times a day  tadalafil 40 milliGRAM(s) Oral daily  tiotropium 18 MICROgram(s) Capsule 1 Capsule(s) Inhalation daily    MEDICATIONS  (PRN):  acetaminophen     Tablet .. 975 milliGRAM(s) Oral every 6 hours PRN Temp greater or equal to 38C (100.4F), Mild Pain (1 - 3), Moderate Pain (4 - 6)  ALBUTerol    90 MICROgram(s) HFA Inhaler 2 Puff(s) Inhalation every 6 hours PRN Shortness of Breath and/or Wheezing  melatonin 3 milliGRAM(s) Oral at bedtime PRN Insomnia      LABS:    The Labs were reviewed by me   The Radiology was reviewed by me    EKG tracing reviewed by me    08-25    130<L>  |  84<L>  |  89<H>  ----------------------------<  133<H>  4.0   |  34<H>  |  2.36<H>  08-25    132<L>  |  84<L>  |  91<H>  ----------------------------<  74  3.8   |  36<H>  |  2.09<H>  08-24    132<L>  |  85<L>  |  94<H>  ----------------------------<  91  4.2   |  36<H>  |  2.28<H>    Ca    9.6      25 Aug 2022 18:48  Ca    9.9      25 Aug 2022 07:29  Ca    9.7      24 Aug 2022 06:14  Phos  3.8     08-25  Mg     2.2     08-25    TPro  7.4  /  Alb  4.3  /  TBili  0.4  /  DBili  x   /  AST  27  /  ALT  16  /  AlkPhos  125<H>  08-25  TPro  7.0  /  Alb  4.4  /  TBili  0.4  /  DBili  0.2  /  AST  28  /  ALT  15  /  AlkPhos  124<H>  08-24    Magnesium, Serum: 2.2 mg/dL (08-25-22 @ 07:29)  Magnesium, Serum: 2.1 mg/dL (08-24-22 @ 06:14)  Magnesium, Serum: 2.0 mg/dL (08-23-22 @ 17:52)    Phosphorus Level, Serum: 3.8 mg/dL (08-25-22 @ 07:29)  Phosphorus Level, Serum: 4.1 mg/dL (08-24-22 @ 06:14)  Phosphorus Level, Serum: 3.9 mg/dL (08-23-22 @ 17:52)      PT/INR - ( 25 Aug 2022 07:29 )   PT: 13.1 sec;   INR: 1.14 ratio         PTT - ( 25 Aug 2022 07:29 )  PTT:30.6 sec                                        8.1    3.91  )-----------( 218      ( 25 Aug 2022 07:29 )             27.1                         8.0    4.22  )-----------( 218      ( 24 Aug 2022 06:13 )             26.2     CAPILLARY BLOOD GLUCOSE            MICROBIOLOGY:     RADIOLOGY:  [ ] Reviewed and interpreted by me    Point of Care Ultrasound Findings:    PFT:    EKG: CHIEF COMPLAINT:Patient is a 66y old  Female who presents with a chief complaint of lung txp eval (26 Aug 2022 07:03)      Interval Events: overnight unable to sleep well, continues to diurese, still has some shortness of breath on exertion may need to go up to 8 L again. also with some sinus congestion. chest pain, fevers, chills    REVIEW OF SYSTEMS:  [x] All other systems negative except per HPI   [ ] Unable to assess ROS because ________    OBJECTIVE:  ICU Vital Signs Last 24 Hrs  T(C): 36.6 (26 Aug 2022 03:52), Max: 36.9 (25 Aug 2022 20:34)  T(F): 97.8 (26 Aug 2022 03:52), Max: 98.5 (25 Aug 2022 20:34)  HR: 62 (26 Aug 2022 06:52) (58 - 77)  BP: 103/60 (26 Aug 2022 06:52) (103/60 - 111/68)  BP(mean): --  ABP: --  ABP(mean): --  RR: 18 (26 Aug 2022 03:52) (18 - 20)  SpO2: 95% (26 Aug 2022 06:40) (91% - 100%)    O2 Parameters below as of 26 Aug 2022 03:52  Patient On (Oxygen Delivery Method): nasal cannula w/ humidification              08-25 @ 07:01  -  08-26 @ 07:00  --------------------------------------------------------  IN: 480 mL / OUT: 700 mL / NET: -220 mL        PHYSICAL EXAM:  GENERAL: NAD, well-groomed, well-developed  HEAD:  Atraumatic, Normocephalic  EYES: EOMI, PERRLA, conjunctiva and sclera clear  ENMT: No tonsillar erythema, exudates, or enlargement; Moist mucous membranes, Good dentition, No lesions  NECK: Supple, No JVD, Normal thyroid  CHEST/LUNG:crackles No rales, rhonchi, wheezing, or rubs  HEART: Regular rate and rhythm; No murmurs, rubs, or gallops  ABDOMEN: Soft, Nontender, Nondistended; Bowel sounds present  VASCULAR:  2+ Peripheral Pulses, No clubbing, cyanosis,. bilateral pitting edema  LYMPH: No lymphadenopathy noted  SKIN: No rashes or lesions  NERVOUS SYSTEM:  Alert & Oriented X3, Good concentration; Motor Strength 5/5 B/L upper and lower extremities; DTRs 2+ intact and symmetric    HOSPITAL MEDICATIONS:  MEDICATIONS  (STANDING):  ambrisentan 10 milliGRAM(s) Oral daily  apixaban 5 milliGRAM(s) Oral two times a day  atorvastatin 20 milliGRAM(s) Oral at bedtime  budesonide  80 MICROgram(s)/formoterol 4.5 MICROgram(s) Inhaler 2 Puff(s) Inhalation two times a day  buPROPion XL (24-Hour) . 300 milliGRAM(s) Oral daily  digoxin     Tablet 125 MICROGram(s) Oral every other day  diltiazem    milliGRAM(s) Oral daily  ferrous    sulfate 325 milliGRAM(s) Oral <User Schedule>  furosemide Infusion 10 mG/Hr (5 mL/Hr) IV Continuous <Continuous>  pantoprazole    Tablet 40 milliGRAM(s) Oral before breakfast  polyethylene glycol 3350 17 Gram(s) Oral daily  sodium chloride 0.65% Nasal 2 Spray(s) Both Nostrils four times a day  tadalafil 40 milliGRAM(s) Oral daily  tiotropium 18 MICROgram(s) Capsule 1 Capsule(s) Inhalation daily    MEDICATIONS  (PRN):  acetaminophen     Tablet .. 975 milliGRAM(s) Oral every 6 hours PRN Temp greater or equal to 38C (100.4F), Mild Pain (1 - 3), Moderate Pain (4 - 6)  ALBUTerol    90 MICROgram(s) HFA Inhaler 2 Puff(s) Inhalation every 6 hours PRN Shortness of Breath and/or Wheezing  melatonin 3 milliGRAM(s) Oral at bedtime PRN Insomnia      LABS:    The Labs were reviewed by me   The Radiology was reviewed by me    EKG tracing reviewed by me    08-25    130<L>  |  84<L>  |  89<H>  ----------------------------<  133<H>  4.0   |  34<H>  |  2.36<H>  08-25    132<L>  |  84<L>  |  91<H>  ----------------------------<  74  3.8   |  36<H>  |  2.09<H>  08-24    132<L>  |  85<L>  |  94<H>  ----------------------------<  91  4.2   |  36<H>  |  2.28<H>    Ca    9.6      25 Aug 2022 18:48  Ca    9.9      25 Aug 2022 07:29  Ca    9.7      24 Aug 2022 06:14  Phos  3.8     08-25  Mg     2.2     08-25    TPro  7.4  /  Alb  4.3  /  TBili  0.4  /  DBili  x   /  AST  27  /  ALT  16  /  AlkPhos  125<H>  08-25  TPro  7.0  /  Alb  4.4  /  TBili  0.4  /  DBili  0.2  /  AST  28  /  ALT  15  /  AlkPhos  124<H>  08-24    Magnesium, Serum: 2.2 mg/dL (08-25-22 @ 07:29)  Magnesium, Serum: 2.1 mg/dL (08-24-22 @ 06:14)  Magnesium, Serum: 2.0 mg/dL (08-23-22 @ 17:52)    Phosphorus Level, Serum: 3.8 mg/dL (08-25-22 @ 07:29)  Phosphorus Level, Serum: 4.1 mg/dL (08-24-22 @ 06:14)  Phosphorus Level, Serum: 3.9 mg/dL (08-23-22 @ 17:52)      PT/INR - ( 25 Aug 2022 07:29 )   PT: 13.1 sec;   INR: 1.14 ratio         PTT - ( 25 Aug 2022 07:29 )  PTT:30.6 sec                                        8.1    3.91  )-----------( 218      ( 25 Aug 2022 07:29 )             27.1                         8.0    4.22  )-----------( 218      ( 24 Aug 2022 06:13 )             26.2     CAPILLARY BLOOD GLUCOSE            MICROBIOLOGY:     RADIOLOGY:  [ ] Reviewed and interpreted by me    Point of Care Ultrasound Findings:    PFT:    EKG:

## 2022-08-26 NOTE — PROGRESS NOTE ADULT - PROBLEM SELECTOR PLAN 2
- CardioMEMS PAD 8/11 43 mmHg. goal ~40 but difficult to ascertain given RV dysfunction  - cardiomems PAD 8/15 45 mmHg, 98/45/66  - daily standing weight. so far down-trending but not at baseline. Was discharged in June at 189 lbs  - bumex did not work well for her  - c/w lasix gtt at 10 mg/hr for goal of net -1-2L/day  - c/w hypertonic saline with current regimen  - hold spironolactone for now  - please replete K > 4, Mg > 2  - eventual SGLT2-i prior to discharge, once on stable dose of oral diuretics  - please obtain daily BMP while being actively diuresed

## 2022-08-26 NOTE — PROGRESS NOTE ADULT - SUBJECTIVE AND OBJECTIVE BOX
Patient seen and examined at bedside.    Overnight Events: LISA    Review Of Systems: No chest pain, shortness of breath, or palpitations            Current Meds:  acetaminophen     Tablet .. 975 milliGRAM(s) Oral every 6 hours PRN  ALBUTerol    90 MICROgram(s) HFA Inhaler 2 Puff(s) Inhalation every 6 hours PRN  ambrisentan 10 milliGRAM(s) Oral daily  apixaban 5 milliGRAM(s) Oral two times a day  atorvastatin 20 milliGRAM(s) Oral at bedtime  budesonide  80 MICROgram(s)/formoterol 4.5 MICROgram(s) Inhaler 2 Puff(s) Inhalation two times a day  buPROPion XL (24-Hour) . 300 milliGRAM(s) Oral daily  digoxin     Tablet 125 MICROGram(s) Oral every other day  diltiazem    milliGRAM(s) Oral daily  ferrous    sulfate 325 milliGRAM(s) Oral <User Schedule>  furosemide Infusion 10 mG/Hr IV Continuous <Continuous>  melatonin 3 milliGRAM(s) Oral at bedtime PRN  pantoprazole    Tablet 40 milliGRAM(s) Oral before breakfast  polyethylene glycol 3350 17 Gram(s) Oral daily  sodium chloride 0.65% Nasal 2 Spray(s) Both Nostrils four times a day  sodium chloride 2% . 150 milliLiter(s) IV Continuous <Continuous>  tadalafil 40 milliGRAM(s) Oral daily  tiotropium 18 MICROgram(s) Capsule 1 Capsule(s) Inhalation daily      Vitals:  T(F): 97.4 (08-26), Max: 98.5 (08-25)  HR: 81 (08-26) (58 - 81)  BP: 114/65 (08-26) (103/60 - 114/65)  RR: 21 (08-26)  SpO2: 97% (08-26)  I&O's Summary    25 Aug 2022 07:01  -  26 Aug 2022 07:00  --------------------------------------------------------  IN: 480 mL / OUT: 700 mL / NET: -220 mL    26 Aug 2022 07:01  -  26 Aug 2022 13:06  --------------------------------------------------------  IN: 0 mL / OUT: 300 mL / NET: -300 mL        Physical Exam:  Appearance: No acute distress; well appearing  Eyes: PERRL, EOMI, pink conjunctiva  HEENT: Normal oral mucosa  Cardiovascular: RRR, S1, S2, no murmurs, rubs, or gallops; no edema; no JVD  Respiratory: Clear to auscultation bilaterally  Gastrointestinal: soft, non-tender, non-distended with normal bowel sounds  Musculoskeletal: No clubbing; no joint deformity   Neurologic: Non-focal  Lymphatic: No lymphadenopathy  Psychiatry: AAOx3, mood & affect appropriate  Skin: No rashes, ecchymoses, or cyanosis                          7.5    3.67  )-----------( 206      ( 26 Aug 2022 07:44 )             25.1     08-25    130<L>  |  84<L>  |  89<H>  ----------------------------<  133<H>  4.0   |  34<H>  |  2.36<H>    Ca    9.6      25 Aug 2022 18:48  Phos  3.5     08-26  Mg     2.2     08-26    TPro  7.4  /  Alb  4.3  /  TBili  0.4  /  DBili  x   /  AST  27  /  ALT  16  /  AlkPhos  125<H>  08-25    PT/INR - ( 25 Aug 2022 07:29 )   PT: 13.1 sec;   INR: 1.14 ratio         PTT - ( 25 Aug 2022 07:29 )  PTT:30.6 sec     HEART FAILURE PROGRESS NOTE    Patient seen and examined at bedside.    Overnight Events: NAEON    Review Of Systems: No chest pain, shortness of breath, or palpitations            Current Meds:  acetaminophen     Tablet .. 975 milliGRAM(s) Oral every 6 hours PRN  ALBUTerol    90 MICROgram(s) HFA Inhaler 2 Puff(s) Inhalation every 6 hours PRN  ambrisentan 10 milliGRAM(s) Oral daily  apixaban 5 milliGRAM(s) Oral two times a day  atorvastatin 20 milliGRAM(s) Oral at bedtime  budesonide  80 MICROgram(s)/formoterol 4.5 MICROgram(s) Inhaler 2 Puff(s) Inhalation two times a day  buPROPion XL (24-Hour) . 300 milliGRAM(s) Oral daily  digoxin     Tablet 125 MICROGram(s) Oral every other day  diltiazem    milliGRAM(s) Oral daily  ferrous    sulfate 325 milliGRAM(s) Oral <User Schedule>  furosemide Infusion 10 mG/Hr IV Continuous <Continuous>  melatonin 3 milliGRAM(s) Oral at bedtime PRN  pantoprazole    Tablet 40 milliGRAM(s) Oral before breakfast  polyethylene glycol 3350 17 Gram(s) Oral daily  sodium chloride 0.65% Nasal 2 Spray(s) Both Nostrils four times a day  sodium chloride 2% . 150 milliLiter(s) IV Continuous <Continuous>  tadalafil 40 milliGRAM(s) Oral daily  tiotropium 18 MICROgram(s) Capsule 1 Capsule(s) Inhalation daily      Vitals:  T(F): 97.4 (08-26), Max: 98.5 (08-25)  HR: 81 (08-26) (58 - 81)  BP: 114/65 (08-26) (103/60 - 114/65)  RR: 21 (08-26)  SpO2: 97% (08-26)  I&O's Summary    25 Aug 2022 07:01  -  26 Aug 2022 07:00  --------------------------------------------------------  IN: 480 mL / OUT: 700 mL / NET: -220 mL    26 Aug 2022 07:01  -  26 Aug 2022 13:06  --------------------------------------------------------  IN: 0 mL / OUT: 300 mL / NET: -300 mL      Physical Exam:  Appearance: No acute distress; well appearing  Eyes: PERRL, EOMI, pink conjunctiva  HEENT: Normal oral mucosa  Cardiovascular: irregularly irregular, no murmurs, rubs, or gallops; no edema; no JVD  Respiratory: Clear to auscultation bilaterally  Gastrointestinal: soft, non-tender, non-distended with normal bowel sounds  Musculoskeletal: No clubbing; no joint deformity   Neurologic: Non-focal  Lymphatic: No lymphadenopathy  Skin: No rashes, ecchymoses, or cyanosis                          7.5    3.67  )-----------( 206      ( 26 Aug 2022 07:44 )             25.1     08-25    130<L>  |  84<L>  |  89<H>  ----------------------------<  133<H>  4.0   |  34<H>  |  2.36<H>    Ca    9.6      25 Aug 2022 18:48  Phos  3.5     08-26  Mg     2.2     08-26    TPro  7.4  /  Alb  4.3  /  TBili  0.4  /  DBili  x   /  AST  27  /  ALT  16  /  AlkPhos  125<H>  08-25    PT/INR - ( 25 Aug 2022 07:29 )   PT: 13.1 sec;   INR: 1.14 ratio         PTT - ( 25 Aug 2022 07:29 )  PTT:30.6 sec

## 2022-08-26 NOTE — PROGRESS NOTE ADULT - PROBLEM SELECTOR PLAN 1
Pt. with CKD in the setting of chronic cardiorenal syndrome, now with JUANA (non oliguric) on CKD likely 2/2 renal venous congestion from hypervolemia due to HF in the setting of severe pulmonary HTN.     -On review of [facility] MARIN, noted that Baseline SCr ranges from 1.4-1.9 since March '22. SCr on arrival was 1.8 on 8/11; peaked to 2.32 on 8/23 & now plateaued at 2-2.3 (likely new baseline)  -Remains on lasix gtt 10 mg/hr with 2% HTS 150cc bolus x 2 given    -Would continue both for now  -No plan for HD at this time.   -No uremic symptoms  -Avoid NSAIDs, ACEI/ARBS, RCA and nephrotoxins. Dose medications as per eGFR.  -Needs Strict I & O's. Please place primafit

## 2022-08-26 NOTE — PROGRESS NOTE ADULT - PROBLEM SELECTOR PLAN 1
Pt feeling at baseline. Improving on lasix drip. Having okay urine output. Weight today increased by 2 lbs from 197.7-199.5lbs. Goal weight 189 lbs  - Continue lasix drip 10/hr  - Continue with 2% HTS infusions  - Holding metolazone with Na <135  - Holding spironolactone  - standing daily weights, strict I/Os.  - pulm and HF following   - eventual SGLT2-i prior to discharge, once on stable dose of oral diuretics Pt feeling at baseline. Improving on lasix drip. Having okay urine output. Weight today decreased by 5lbs to 194. Goal weight 189 lbs  - Continue lasix drip 10/hr  - Continue with 2% HTS infusions  - Holding metolazone with Na <135  - Holding spironolactone  - standing daily weights, strict I/Os.  - pulm and HF following   - eventual SGLT2-i prior to discharge, once on stable dose of oral diuretics

## 2022-08-26 NOTE — PROGRESS NOTE ADULT - PROBLEM SELECTOR PLAN 8
History of Afib on eliquis 5mg BID  - Tele reviewed - patient currently in afib, rate controlled.  - decrease digoxin 125 to every other day. Repeat level in 1 week.  - continue diltiazem 120mg qd  - continue eliquis

## 2022-08-26 NOTE — PROGRESS NOTE ADULT - ATTENDING COMMENTS
Patient was seen and examined with the fellow.  On exam JVP remains elevated.  However, weights continue to downtrend to now 194lbs.  Continue with IV diuresis with hypertonic saline.  Prior to discharge would interrogate cardiomems to have a good sense of "dry" values.  Would benefit from SGLT2i on discharge.

## 2022-08-27 NOTE — PROGRESS NOTE ADULT - ATTENDING COMMENTS
Monitor her wts on Bumex IV now  Fluid restrict    Sarai Calle MD  Off: [phone]  contact me on teams    (After 5 pm or on weekends please page the on-call fellow/attending, can check [email or web address] for schedule. Login is abhay means, schedule under [facility] medicine, psych, derm)

## 2022-08-27 NOTE — PROGRESS NOTE ADULT - SUBJECTIVE AND OBJECTIVE BOX
PROGRESS NOTE:   Authored by: Andrea Limon M.D.   Internal Medicine PGY-1  Please Contact Via Teams    Patient is a 66y old  Female who presents with a chief complaint of lung txp eval (26 Aug 2022 13:06)      SUBJECTIVE / OVERNIGHT EVENTS:  No acute events overnight.     ADDITIONAL REVIEW OF SYSTEMS:  Patient denies fevers, chills, chest pain, shortness of breath, nausea, abdominal pain, diarrhea, constipation, dysuria, leg swelling, headache, light headedness.    MEDICATIONS  (STANDING):  ambrisentan 10 milliGRAM(s) Oral daily  apixaban 5 milliGRAM(s) Oral two times a day  atorvastatin 20 milliGRAM(s) Oral at bedtime  budesonide  80 MICROgram(s)/formoterol 4.5 MICROgram(s) Inhaler 2 Puff(s) Inhalation two times a day  buMETAnide Injectable 2 milliGRAM(s) IV Push every 8 hours  buPROPion XL (24-Hour) . 300 milliGRAM(s) Oral daily  digoxin     Tablet 125 MICROGram(s) Oral every other day  diltiazem    milliGRAM(s) Oral daily  ferrous    sulfate 325 milliGRAM(s) Oral <User Schedule>  pantoprazole    Tablet 40 milliGRAM(s) Oral before breakfast  polyethylene glycol 3350 17 Gram(s) Oral daily  sodium chloride 0.65% Nasal 2 Spray(s) Both Nostrils four times a day  tadalafil 40 milliGRAM(s) Oral daily  tiotropium 18 MICROgram(s) Capsule 1 Capsule(s) Inhalation daily    MEDICATIONS  (PRN):  acetaminophen     Tablet .. 975 milliGRAM(s) Oral every 6 hours PRN Temp greater or equal to 38C (100.4F), Mild Pain (1 - 3), Moderate Pain (4 - 6)  ALBUTerol    90 MICROgram(s) HFA Inhaler 2 Puff(s) Inhalation every 6 hours PRN Shortness of Breath and/or Wheezing  melatonin 3 milliGRAM(s) Oral at bedtime PRN Insomnia      CAPILLARY BLOOD GLUCOSE        I&O's Summary    26 Aug 2022 07:01  -  27 Aug 2022 07:00  --------------------------------------------------------  IN: 355 mL / OUT: 2200 mL / NET: -1845 mL        PHYSICAL EXAM:  Vital Signs Last 24 Hrs  T(C): 36.7 (27 Aug 2022 03:52), Max: 36.9 (26 Aug 2022 21:07)  T(F): 98 (27 Aug 2022 03:52), Max: 98.5 (26 Aug 2022 21:07)  HR: 55 (27 Aug 2022 06:48) (55 - 81)  BP: 96/46 (27 Aug 2022 06:48) (90/52 - 114/65)  BP(mean): --  RR: 18 (27 Aug 2022 03:52) (18 - 21)  SpO2: 98% (27 Aug 2022 06:01) (96% - 99%)    Parameters below as of 27 Aug 2022 03:52  Patient On (Oxygen Delivery Method): nasal cannula, high flow        CONSTITUTIONAL: NAD, well-developed  RESPIRATORY: Normal respiratory effort; lungs are clear to auscultation bilaterally  CARDIOVASCULAR: Regular rate and rhythm, normal S1 and S2, no murmur/rub/gallop; No lower extremity edema; Peripheral pulses are 2+ bilaterally  ABDOMEN: Nontender to palpation, normoactive bowel sounds, no rebound/guarding; No hepatosplenomegaly  MUSCLOSKELETAL: no clubbing or cyanosis of digits; no joint swelling or tenderness to palpation  PSYCH: A+O to person, place, and time; affect appropriate    LABS:                        8.1    3.69  )-----------( 189      ( 27 Aug 2022 06:23 )             26.5     08-27    133<L>  |  87<L>  |  90<H>  ----------------------------<  73  4.0   |  34<H>  |  2.10<H>    Ca    9.8      27 Aug 2022 06:23  Phos  3.6     08-27  Mg     2.3     08-27    TPro  6.9  /  Alb  4.0  /  TBili  0.3  /  DBili  x   /  AST  33  /  ALT  17  /  AlkPhos  120  08-27                Tele Reviewed:    RADIOLOGY & ADDITIONAL TESTS:  Results Reviewed:   Imaging Personally Reviewed:  Electrocardiogram Personally Reviewed:     PROGRESS NOTE:   Authored by: Andrea Limon M.D.   Internal Medicine PGY-1  Please Contact Via Teams    Patient is a 66y old  Female who presents with a chief complaint of lung txp eval (26 Aug 2022 13:06)      SUBJECTIVE / OVERNIGHT EVENTS:  No acute events overnight. Is feeling at baseline.     ADDITIONAL REVIEW OF SYSTEMS:  Patient denies fevers, chills, chest pain, shortness of breath, nausea, abdominal pain, diarrhea, constipation, dysuria, leg swelling, headache, light headedness.    MEDICATIONS  (STANDING):  ambrisentan 10 milliGRAM(s) Oral daily  apixaban 5 milliGRAM(s) Oral two times a day  atorvastatin 20 milliGRAM(s) Oral at bedtime  budesonide  80 MICROgram(s)/formoterol 4.5 MICROgram(s) Inhaler 2 Puff(s) Inhalation two times a day  buMETAnide Injectable 2 milliGRAM(s) IV Push every 8 hours  buPROPion XL (24-Hour) . 300 milliGRAM(s) Oral daily  digoxin     Tablet 125 MICROGram(s) Oral every other day  diltiazem    milliGRAM(s) Oral daily  ferrous    sulfate 325 milliGRAM(s) Oral <User Schedule>  pantoprazole    Tablet 40 milliGRAM(s) Oral before breakfast  polyethylene glycol 3350 17 Gram(s) Oral daily  sodium chloride 0.65% Nasal 2 Spray(s) Both Nostrils four times a day  tadalafil 40 milliGRAM(s) Oral daily  tiotropium 18 MICROgram(s) Capsule 1 Capsule(s) Inhalation daily    MEDICATIONS  (PRN):  acetaminophen     Tablet .. 975 milliGRAM(s) Oral every 6 hours PRN Temp greater or equal to 38C (100.4F), Mild Pain (1 - 3), Moderate Pain (4 - 6)  ALBUTerol    90 MICROgram(s) HFA Inhaler 2 Puff(s) Inhalation every 6 hours PRN Shortness of Breath and/or Wheezing  melatonin 3 milliGRAM(s) Oral at bedtime PRN Insomnia      CAPILLARY BLOOD GLUCOSE        I&O's Summary    26 Aug 2022 07:01  -  27 Aug 2022 07:00  --------------------------------------------------------  IN: 355 mL / OUT: 2200 mL / NET: -1845 mL        PHYSICAL EXAM:  Vital Signs Last 24 Hrs  T(C): 36.7 (27 Aug 2022 03:52), Max: 36.9 (26 Aug 2022 21:07)  T(F): 98 (27 Aug 2022 03:52), Max: 98.5 (26 Aug 2022 21:07)  HR: 55 (27 Aug 2022 06:48) (55 - 81)  BP: 96/46 (27 Aug 2022 06:48) (90/52 - 114/65)  BP(mean): --  RR: 18 (27 Aug 2022 03:52) (18 - 21)  SpO2: 98% (27 Aug 2022 06:01) (96% - 99%)    Parameters below as of 27 Aug 2022 03:52  Patient On (Oxygen Delivery Method): nasal cannula, high flow        CONSTITUTIONAL: NAD, well-developed  RESPIRATORY: Normal respiratory effort; lungs are clear to auscultation bilaterally  CARDIOVASCULAR: Regular rate and rhythm, normal S1 and S2, no murmur/rub/gallop; No lower extremity edema; Peripheral pulses are 2+ bilaterally  ABDOMEN: Nontender to palpation, normoactive bowel sounds, no rebound/guarding; No hepatosplenomegaly  MUSCLOSKELETAL: no clubbing or cyanosis of digits; no joint swelling or tenderness to palpation  PSYCH: A+O to person, place, and time; affect appropriate    LABS:                        8.1    3.69  )-----------( 189      ( 27 Aug 2022 06:23 )             26.5     08-27    133<L>  |  87<L>  |  90<H>  ----------------------------<  73  4.0   |  34<H>  |  2.10<H>    Ca    9.8      27 Aug 2022 06:23  Phos  3.6     08-27  Mg     2.3     08-27    TPro  6.9  /  Alb  4.0  /  TBili  0.3  /  DBili  x   /  AST  33  /  ALT  17  /  AlkPhos  120  08-27                Tele Reviewed:    RADIOLOGY & ADDITIONAL TESTS:  Results Reviewed:   Imaging Personally Reviewed:  Electrocardiogram Personally Reviewed:     PROGRESS NOTE:   Authored by: Andrea Limon M.D.   Internal Medicine PGY-1  Please Contact Via Teams    Patient is a 66y old  Female who presents with a chief complaint of lung txp eval (26 Aug 2022 13:06)      SUBJECTIVE / OVERNIGHT EVENTS:  No acute events overnight. Is feeling at baseline. No complaints. Today pt is for 2mg bumex TID trial.      MEDICATIONS  (STANDING):  ambrisentan 10 milliGRAM(s) Oral daily  apixaban 5 milliGRAM(s) Oral two times a day  atorvastatin 20 milliGRAM(s) Oral at bedtime  budesonide  80 MICROgram(s)/formoterol 4.5 MICROgram(s) Inhaler 2 Puff(s) Inhalation two times a day  buMETAnide Injectable 2 milliGRAM(s) IV Push every 8 hours  buPROPion XL (24-Hour) . 300 milliGRAM(s) Oral daily  digoxin     Tablet 125 MICROGram(s) Oral every other day  diltiazem    milliGRAM(s) Oral daily  ferrous    sulfate 325 milliGRAM(s) Oral <User Schedule>  pantoprazole    Tablet 40 milliGRAM(s) Oral before breakfast  polyethylene glycol 3350 17 Gram(s) Oral daily  sodium chloride 0.65% Nasal 2 Spray(s) Both Nostrils four times a day  tadalafil 40 milliGRAM(s) Oral daily  tiotropium 18 MICROgram(s) Capsule 1 Capsule(s) Inhalation daily    MEDICATIONS  (PRN):  acetaminophen     Tablet .. 975 milliGRAM(s) Oral every 6 hours PRN Temp greater or equal to 38C (100.4F), Mild Pain (1 - 3), Moderate Pain (4 - 6)  ALBUTerol    90 MICROgram(s) HFA Inhaler 2 Puff(s) Inhalation every 6 hours PRN Shortness of Breath and/or Wheezing  melatonin 3 milliGRAM(s) Oral at bedtime PRN Insomnia      CAPILLARY BLOOD GLUCOSE        I&O's Summary    26 Aug 2022 07:01  -  27 Aug 2022 07:00  --------------------------------------------------------  IN: 355 mL / OUT: 2200 mL / NET: -1845 mL        PHYSICAL EXAM:  Vital Signs Last 24 Hrs  T(C): 36.7 (27 Aug 2022 03:52), Max: 36.9 (26 Aug 2022 21:07)  T(F): 98 (27 Aug 2022 03:52), Max: 98.5 (26 Aug 2022 21:07)  HR: 55 (27 Aug 2022 06:48) (55 - 81)  BP: 96/46 (27 Aug 2022 06:48) (90/52 - 114/65)  BP(mean): --  RR: 18 (27 Aug 2022 03:52) (18 - 21)  SpO2: 98% (27 Aug 2022 06:01) (96% - 99%)    Parameters below as of 27 Aug 2022 03:52  Patient On (Oxygen Delivery Method): nasal cannula, high flow        CONSTITUTIONAL: NAD, multiple bruises at phlebotomy sites.   RESPIRATORY: Slightly SOB on 7LNC. Crackles at lung bases b/l  CARDIOVASCULAR: Afib, no murmurs. Tense lower extremity edema  ABDOMEN: Nontender to palpation, normoactive bowel sounds. Stool burden LLQ  PSYCH: A+O to person, place, and time; affect appropriate    LABS:                        8.1    3.69  )-----------( 189      ( 27 Aug 2022 06:23 )             26.5     08-27    133<L>  |  87<L>  |  90<H>  ----------------------------<  73  4.0   |  34<H>  |  2.10<H>    Ca    9.8      27 Aug 2022 06:23  Phos  3.6     08-27  Mg     2.3     08-27    TPro  6.9  /  Alb  4.0  /  TBili  0.3  /  DBili  x   /  AST  33  /  ALT  17  /  AlkPhos  120  08-27

## 2022-08-27 NOTE — PROGRESS NOTE ADULT - PROBLEM SELECTOR PLAN 9
There is an order for miralax but patient did not take from 8/18-8/23. Took it 8/24. Last BM 3 days ago. Will continue to monitor. Ferrous sulfate switched to every other day - Continue miralax  - Continue ferrous sulfate every other day

## 2022-08-27 NOTE — PROGRESS NOTE ADULT - PROBLEM SELECTOR PLAN 1
Pt. with CKD in the setting of chronic cardiorenal syndrome, now with JUANA (non oliguric) on CKD likely 2/2 renal venous congestion from hypervolemia due to HF in the setting of severe pulmonary HTN.     -On [facility] of [facility], noted that Baseline SCr ranges from 1.4-1.9 since March '22. SCr on arrival was 1.8 on 8/11; peaked to 2.32 on 8/23 & now plateaued at 2-2.3 (likely new baseline)  -now on bumex 2mg IV TID  -No plan for HD at this time.   -No uremic symptoms  -Avoid NSAIDs, ACEI/ARBS, RCA and nephrotoxins. Dose medications as per eGFR.  -Needs Strict I & O's. or daily wts

## 2022-08-27 NOTE — PROGRESS NOTE ADULT - SUBJECTIVE AND OBJECTIVE BOX
[facility] DIVISION OF KIDNEY DISEASES AND HYPERTENSION --    24 hour events/subjective:    pt now off lasix gtt    PAST HISTORY  --------------------------------------------------------------------------------  No significant changes to PMH, PSH, FHx, SHx, unless otherwise noted    ALLERGIES & MEDICATIONS  --------------------------------------------------------------------------------  Allergies    nitrates (Unknown)    Intolerances      Standing Inpatient Medications  ambrisentan 10 milliGRAM(s) Oral daily  apixaban 5 milliGRAM(s) Oral two times a day  atorvastatin 20 milliGRAM(s) Oral at bedtime  budesonide  80 MICROgram(s)/formoterol 4.5 MICROgram(s) Inhaler 2 Puff(s) Inhalation two times a day  buMETAnide Injectable 2 milliGRAM(s) IV Push every 8 hours  buPROPion XL (24-Hour) . 300 milliGRAM(s) Oral daily  digoxin     Tablet 125 MICROGram(s) Oral every other day  diltiazem    milliGRAM(s) Oral daily  ferrous    sulfate 325 milliGRAM(s) Oral <User Schedule>  pantoprazole    Tablet 40 milliGRAM(s) Oral before breakfast  polyethylene glycol 3350 17 Gram(s) Oral daily  sodium chloride 0.65% Nasal 2 Spray(s) Both Nostrils four times a day  tadalafil 40 milliGRAM(s) Oral daily  tiotropium 18 MICROgram(s) Capsule 1 Capsule(s) Inhalation daily    PRN Inpatient Medications  acetaminophen     Tablet .. 975 milliGRAM(s) Oral every 6 hours PRN  ALBUTerol    90 MICROgram(s) HFA Inhaler 2 Puff(s) Inhalation every 6 hours PRN  melatonin 3 milliGRAM(s) Oral at bedtime PRN      REVIEW OF SYSTEMS  --------------------------------------------------------------------------------  Gen: No weight changes, fatigue, fevers/chills, weakness  Skin: No rashes  Respiratory: No dyspnea, cough, wheezing, hemoptysis  CV: No chest pain, PND, orthopnea  GI: No abdominal pain, diarrhea, constipation, nausea, vomiting, melena, hematochezia  : No increased frequency, dysuria, hematuria, nocturia  Neuro: No dizziness/lightheadedness    All other systems were reviewed and are negative, except as noted.    VITALS/PHYSICAL EXAM  --------------------------------------------------------------------------------  T(C): 36.3 (08-27-22 @ 11:07), Max: 36.9 (08-26-22 @ 21:07)  HR: 64 (08-27-22 @ 11:07) (55 - 79)  BP: 105/65 (08-27-22 @ 11:07) (90/52 - 105/65)  RR: 18 (08-27-22 @ 11:07) (18 - 18)  SpO2: 98% (08-27-22 @ 11:07) (96% - 99%)  Wt(kg): --        08-26-22 @ 07:01  -  08-27-22 @ 07:00  --------------------------------------------------------  IN: 355 mL / OUT: 2200 mL / NET: -1845 mL      Physical Exam:  	Gen: NAD  	Pulm: crackles bases  	CV: RRR, S1S2  	Abd: +BS, soft,   	LE: Warm, FROM, edema  	Skin: Warm,  	Vascular access:    LABS/STUDIES  --------------------------------------------------------------------------------              8.1    3.69  >-----------<  189      [08-27-22 @ 06:23]              26.5     133  |  87  |  90  ----------------------------<  73      [08-27-22 @ 06:23]  4.0   |  34  |  2.10        Ca     9.8     [08-27-22 @ 06:23]      Mg     2.3     [08-27-22 @ 06:23]      Phos  3.6     [08-27-22 @ 06:23]    TPro  6.9  /  Alb  4.0  /  TBili  0.3  /  DBili  x   /  AST  33  /  ALT  17  /  AlkPhos  120  [08-27-22 @ 06:23]          Creatinine Trend:  SCr 2.10 [08-27 @ 06:23]  SCr 2.16 [08-26 @ 20:40]  SCr 2.36 [08-25 @ 18:48]  SCr 2.09 [08-25 @ 07:29]  SCr 2.28 [08-24 @ 06:14]        Iron 27, TIBC 350, %sat 8      [08-12-22 @ 06:56]  Ferritin 26      [08-12-22 @ 06:56]  Lipid: chol 103, TG 48, HDL 64, LDL --      [06-14-22 @ 02:45]

## 2022-08-27 NOTE — PROGRESS NOTE ADULT - PROBLEM SELECTOR PLAN 3
Cr elevated to 2.42 on admission- baseline is 1.7-1.8. Likely in s/o CHF exacerbation.  - Cr 2.36 8/25  - diuresis as above  - avoid nephrotoxic agents  - strict I/Os  - Nephrology following  - Cr elevation could also be new baseline for patient in setting of worsening kidney function 2/2 pulm htn/HF  - Pt with BUN 90's. No uremic symptoms current but may require dialysis in the future. Cr elevated to 2.42 on admission- baseline is 1.7-1.8. Likely in s/o CHF exacerbation.  - Cr 2.1 8/27  - diuresis as above  - avoid nephrotoxic agents  - strict I/Os  - Nephrology following  - Cr elevation could also be new baseline for patient in setting of worsening kidney function 2/2 pulm htn/HF  - Pt with BUN 90's. No uremic symptoms current but may require dialysis in the future.

## 2022-08-27 NOTE — PROGRESS NOTE ADULT - ATTENDING COMMENTS
66F w/ severe pulmonary HTN (Group 1, 2 & 3), HFpEF, Afib, COPD on home O2 (5L NC), CKD, ROLANDA on CPAP, morbid obesity presenting with acute hypoxic respiratory failure, admitted for CHF exacerbation; was on lasix gtt until this AM, now transitioned to IV bumex. Renal and HF teams following.     Feels better this AM, denies any SOB, on 7LNC, slightly improved LE edema per pt.     # severe pulmonary HTN  # acute on chronic respiratory failure with hypoxemia  # acute on chronic HFpEF exacerbation  # chronic Afib  # JUANA on CKD III  # COPD  # epistaxis    - transitioned from lasix gtt this AM to IV bumex 2mg TID  - will c/t monitor I/Os, Cr remains stable this AM   - appreciate HF recs: continue aggressive diuresis  - appreciate pulm recs: not a candidate for lung transplant  - appreciate nephrology recs: if she doesn't respond well to IV bumex, then we may have to start RRT  - if uremia and kidney function worsens, she may need RRT - pt would want to pursue if indicated/offered  - appreciate palliative care recs: pt wants full medical management and not interested in palliative care yet  - eliquis resumed and so far, no signs of bleed and H/H stable  - PT consult appreciated  - overall poor prognosis

## 2022-08-27 NOTE — PROGRESS NOTE ADULT - PROBLEM SELECTOR PLAN 1
Pt feeling at baseline. Improving on lasix drip. Having okay urine output. Weight today decreased by 5lbs to 194. Goal weight 189 lbs  - Continue lasix drip 10/hr  - Continue with 2% HTS infusions  - Holding metolazone with Na <135  - Holding spironolactone  - standing daily weights, strict I/Os.  - pulm and HF following   - eventual SGLT2-i prior to discharge, once on stable dose of oral diuretics Pt feeling at baseline. Improving on lasix drip. Having okay urine output. Weight today up 2lbs to 196.4 Goal weight 189 lbs  - Trial of 2mg IV bumex TID  - Holding metolazone with Na <135  - Holding spironolactone  - standing daily weights, strict I/Os.  - Pulm, nephro, and HF following   - eventual SGLT2-i prior to discharge, once on stable dose of oral diuretics Pt feeling at baseline. Improving on lasix drip. Having okay urine output. Weight today up 2lbs to 196.4 Goal weight 189 lbs  - Trial of 2mg IV bumex TID - monitor I/O.  - Holding metolazone with Na <135  - Holding spironolactone  - standing daily weights, strict I/Os.  - Pulm, nephro, and HF following   - eventual SGLT2-i prior to discharge, once on stable dose of oral diuretics

## 2022-08-28 NOTE — PROGRESS NOTE ADULT - PROBLEM SELECTOR PLAN 3
Cr elevated to 2.42 on admission- baseline is 1.7-1.8. Likely in s/o CHF exacerbation.  - Cr 2.1 8/27  - diuresis as above  - avoid nephrotoxic agents  - strict I/Os  - Nephrology following  - Cr elevation could also be new baseline for patient in setting of worsening kidney function 2/2 pulm htn/HF  - Pt with BUN 90's. No uremic symptoms current but may require dialysis in the future.

## 2022-08-28 NOTE — PROGRESS NOTE ADULT - ATTENDING COMMENTS
66F w/ severe pulmonary HTN (Group 1, 2 & 3), HFpEF, Afib, COPD on home O2 (5L NC), CKD, ROLANDA on CPAP, morbid obesity presenting with acute hypoxic respiratory failure, admitted for CHF exacerbation; was on lasix gtt until this AM, now transitioned to IV bumex. Renal and HF teams following.     states not urinating well, but pt is net neg 1600cc over past 24hrs; SOB improved, on 6LNC this AM; still with significant b/l LE edema     # severe pulmonary HTN  # acute on chronic respiratory failure with hypoxemia  # acute on chronic HFpEF exacerbation  # chronic Afib  # JUANA on CKD III  # COPD  # epistaxis    - diuresing well IV bumex 2mg TID  - c/w this dose, will c/t monitor I/Os, Cr remains stable this AM   - appreciate HF recs: continue aggressive diuresis  - appreciate pulm recs: not a candidate for lung transplant  - appreciate nephrology recs: does not appear she will need RRT at this time   - appreciate palliative care recs: pt wants full medical management and not interested in palliative care yet  - eliquis resumed and so far, no signs of bleed and H/H stable  - PT consult appreciated  - overall poor prognosis .

## 2022-08-28 NOTE — PROGRESS NOTE ADULT - PROBLEM SELECTOR PLAN 1
Pt feeling at baseline. Improving on lasix drip. Having okay urine output. Weight today up 2lbs to 196.4 Goal weight 189 lbs  - Trial of 2mg IV bumex TID - monitor I/O.  - Holding metolazone with Na <135  - Holding spironolactone  - standing daily weights, strict I/Os.  - Pulm, nephro, and HF following   - eventual SGLT2-i prior to discharge, once on stable dose of oral diuretics

## 2022-08-28 NOTE — PROGRESS NOTE ADULT - SUBJECTIVE AND OBJECTIVE BOX
PROGRESS NOTE:   Authored by Dr. Caitlin Garcia MD (PGY-2). Pager [facility] [phone]/ [facility]     Patient is a 66y old  Female who presents with a chief complaint of lung txp eval (27 Aug 2022 12:49)      SUBJECTIVE / OVERNIGHT EVENTS:  No acute events overnight.       MEDICATIONS  (STANDING):  ambrisentan 10 milliGRAM(s) Oral daily  apixaban 5 milliGRAM(s) Oral two times a day  atorvastatin 20 milliGRAM(s) Oral at bedtime  budesonide  80 MICROgram(s)/formoterol 4.5 MICROgram(s) Inhaler 2 Puff(s) Inhalation two times a day  buMETAnide Injectable 2 milliGRAM(s) IV Push every 8 hours  buPROPion XL (24-Hour) . 300 milliGRAM(s) Oral daily  digoxin     Tablet 125 MICROGram(s) Oral every other day  diltiazem    milliGRAM(s) Oral daily  ferrous    sulfate 325 milliGRAM(s) Oral <User Schedule>  pantoprazole    Tablet 40 milliGRAM(s) Oral before breakfast  polyethylene glycol 3350 17 Gram(s) Oral daily  sodium chloride 0.65% Nasal 2 Spray(s) Both Nostrils four times a day  tadalafil 40 milliGRAM(s) Oral daily  tiotropium 18 MICROgram(s) Capsule 1 Capsule(s) Inhalation daily    MEDICATIONS  (PRN):  acetaminophen     Tablet .. 975 milliGRAM(s) Oral every 6 hours PRN Temp greater or equal to 38C (100.4F), Mild Pain (1 - 3), Moderate Pain (4 - 6)  ALBUTerol    90 MICROgram(s) HFA Inhaler 2 Puff(s) Inhalation every 6 hours PRN Shortness of Breath and/or Wheezing  melatonin 3 milliGRAM(s) Oral at bedtime PRN Insomnia      CAPILLARY BLOOD GLUCOSE        I&O's Summary    27 Aug 2022 07:01  -  28 Aug 2022 07:00  --------------------------------------------------------  IN: 760 mL / OUT: 2350 mL / NET: -1590 mL        PHYSICAL EXAM:  Vital Signs Last 24 Hrs  T(C): 36.8 (27 Aug 2022 21:03), Max: 36.8 (27 Aug 2022 21:03)  T(F): 98.2 (27 Aug 2022 21:03), Max: 98.2 (27 Aug 2022 21:03)  HR: 64 (28 Aug 2022 04:55) (61 - 65)  BP: 98/58 (27 Aug 2022 21:03) (98/58 - 105/65)  BP(mean): --  RR: 18 (27 Aug 2022 21:03) (18 - 18)  SpO2: 98% (28 Aug 2022 04:55) (96% - 98%)    Parameters below as of 27 Aug 2022 21:03  Patient On (Oxygen Delivery Method): nasal cannula        CONSTITUTIONAL: NAD, well-developed  HEET: MMM, EOMI, PERRLA  NECK: supple  RESPIRATORY: Normal respiratory effort; lungs are clear to auscultation bilaterally  CARDIOVASCULAR: Regular rate and rhythm, normal S1 and S2, no murmur/rub/gallop; No lower extremity edema; Peripheral pulses are 2+ bilaterally  ABDOMEN: Nontender to palpation, normoactive bowel sounds, no rebound/guarding; No hepatosplenomegaly  MUSCULOSKELETAL: no clubbing or cyanosis of digits; no joint swelling or tenderness to palpation  NEURO: Moving all four extremities, sensation grossly intact  PSYCH: A+O to person, place, and time; affect appropriate  SKIN: No rash    LABS:                        8.0    3.61  )-----------( 187      ( 28 Aug 2022 06:28 )             26.5     08-28    133<L>  |  88<L>  |  92<H>  ----------------------------<  72  3.9   |  33<H>  |  2.12<H>    Ca    9.5      28 Aug 2022 06:27  Phos  3.6     08-28  Mg     2.3     08-28    TPro  6.9  /  Alb  4.0  /  TBili  0.3  /  DBili  x   /  AST  33  /  ALT  17  /  AlkPhos  120  08-27                Tele Reviewed:    RADIOLOGY & ADDITIONAL TESTS:  Results Reviewed:   Imaging Personally Reviewed:  Electrocardiogram Personally Reviewed:

## 2022-08-28 NOTE — PROGRESS NOTE ADULT - ASSESSMENT
66F w/ severe pulmonary HTN (Group 1, 2 & 3), HFpEF, Afib, COPD on home O2 (5L NC), CKD, ROLANDA on CPAP, morbid obesity presenting with acute hypoxic respiratory failure, admitted for CHF exacerbation and ongoing lung transplant evaluation.   W/ epistaxis 8/13 now resolved, cbc stable 8/24, started back on nasal cannula during day ,CPAP at night, now off lasix drip and on bumex trial    Patient no longer a transplant candidate, seen by palliative, full code

## 2022-08-28 NOTE — PROGRESS NOTE ADULT - PROBLEM SELECTOR PLAN 4
RHC showed elevated right sided filling pressures with severe pulmonary hypertension with systemic PA pressures, slightly elevated PCWP and preserved cardiac output.   - Followed with Dr. Rodriguez at [facility] for PH but he recently left the practice. Needs a pulmonary hypertension doctor near her home in [place].  - Continue tadalafil 40 mg daily   - continue Ambrisentan 10mg qd  - follow Pulm recs  - Transplant reporting that patient is currently not a candidate following eval, given this, also not a candidate for IV Flolan per Pulm

## 2022-08-29 NOTE — PROGRESS NOTE ADULT - SUBJECTIVE AND OBJECTIVE BOX
[facility] Division of Kidney Diseases & Hypertension  FOLLOW UP NOTE  [phone]--------------------------------------------------------------------------------  Chief Complaint:Pulmonary hypertension due to lung diseases and hypoxia        HPI: 66 year old with PMH of Pulmonary hypertension (Group 1,2,3) HFpEF, atrial fibrillation, COPD on home oxygen, ROLANDA on CPAP, morbid obesity with acute hypoxic respiratory failure. Nephrology consulted for JUANA. baseline creatinine as of june appears to be 1.5-1.6 on august 11 patient creatinine was 1.89 and has continued to up trend most recently 2.06 8/13. Patient was initially started on lasix 60 mg IV BID on admission with marked improvement in pro-BNP since presentation 90576 (8/11) and most recently 8947 (8/13). Now on lasix gtt with HTS      24 hour events/subjective: Patient seen & examined. Labs & vitals reviewed. Now on bumex 2mg IV TID. UOP 1.5L in 24 hrs with 700cc net neg.  Reports improvement in SOB & leg edema. Remains on 7L NC. No uremic symptoms        PAST HISTORY  --------------------------------------------------------------------------------  No significant changes to PMH, PSH, FHx, SHx, unless otherwise noted    ALLERGIES & MEDICATIONS  --------------------------------------------------------------------------------  Allergies    nitrates (Unknown)    Intolerances      Standing Inpatient Medications  ambrisentan 10 milliGRAM(s) Oral daily  apixaban 5 milliGRAM(s) Oral two times a day  atorvastatin 20 milliGRAM(s) Oral at bedtime  budesonide  80 MICROgram(s)/formoterol 4.5 MICROgram(s) Inhaler 2 Puff(s) Inhalation two times a day  buMETAnide Injectable 2 milliGRAM(s) IV Push every 8 hours  buPROPion XL (24-Hour) . 300 milliGRAM(s) Oral daily  chlorhexidine 2% Cloths 1 Application(s) Topical <User Schedule>  digoxin     Tablet 125 MICROGram(s) Oral every other day  diltiazem    milliGRAM(s) Oral daily  ferrous    sulfate 325 milliGRAM(s) Oral <User Schedule>  pantoprazole    Tablet 40 milliGRAM(s) Oral before breakfast  polyethylene glycol 3350 17 Gram(s) Oral daily  sodium chloride 0.65% Nasal 2 Spray(s) Both Nostrils four times a day  tadalafil 40 milliGRAM(s) Oral daily  tiotropium 18 MICROgram(s) Capsule 1 Capsule(s) Inhalation daily    PRN Inpatient Medications  acetaminophen     Tablet .. 975 milliGRAM(s) Oral every 6 hours PRN  ALBUTerol    90 MICROgram(s) HFA Inhaler 2 Puff(s) Inhalation every 6 hours PRN  melatonin 3 milliGRAM(s) Oral at bedtime PRN      REVIEW OF SYSTEMS  --------------------------------------------------------------------------------        All other systems were reviewed and are negative, except as noted.    VITALS/PHYSICAL EXAM  --------------------------------------------------------------------------------  T(C): 36.8 (08-29-22 @ 11:30), Max: 36.8 (08-29-22 @ 11:30)  HR: 63 (08-29-22 @ 11:30) (59 - 68)  BP: 106/65 (08-29-22 @ 11:30) (105/61 - 107/61)  RR: 17 (08-29-22 @ 11:30) (17 - 20)  SpO2: 96% (08-29-22 @ 11:30) (94% - 99%)  Wt(kg): --        08-28-22 @ 07:01  -  08-29-22 @ 07:00  --------------------------------------------------------  IN: 800 mL / OUT: 1550 mL / NET: -750 mL      Physical Exam:  	Gen: NAD, on 7L NC  HEENT: anicteric  Pulm: bibasilar crackles  CV: RRR  Abd: soft, +BS  CNS: awake, alert  : No Jonn  LE: ++ b/l LE edema (improving), +tremor but no asterexis  Skin: Warm, without rashes  Vascular access:     LABS/STUDIES  --------------------------------------------------------------------------------              7.3    4.46  >-----------<  197      [08-29-22 @ 07:07]              23.8     133  |  89  |  97  ----------------------------<  74      [08-29-22 @ 07:09]  3.8   |  33  |  2.15        Ca     9.4     [08-29-22 @ 07:09]      Mg     2.1     [08-29-22 @ 07:09]      Phos  3.5     [08-29-22 @ 07:09]            Creatinine Trend:  SCr 2.15 [08-29 @ 07:09]  SCr 2.21 [08-28 @ 19:16]  SCr 2.12 [08-28 @ 06:27]  SCr 2.20 [08-28 @ 01:28]  SCr 2.10 [08-27 @ 06:23]

## 2022-08-29 NOTE — PROGRESS NOTE ADULT - ATTENDING COMMENTS
-CHF, renal, and pulm recs appreciated.   -Favor increasing IV diuresis. -Per CHF recs will go to 4mg IV BID.  -Strict I's/O's, daily weights. -BNP in am.   -Would like to check cardioMEMS.

## 2022-08-29 NOTE — PROGRESS NOTE ADULT - PROBLEM SELECTOR PLAN 2
- CardioMEMS PAD 8/11 43 mmHg. goal ~40 but difficult to ascertain given RV dysfunction  - cardiomems PAD 8/15 45 mmHg, 98/45/66  - daily standing weight. went up slightly to 196 from 194 yesterday. Was discharged in June at 189 lbs  -switch to bumex 4mg IV BID  - c/w hypertonic saline with current regimen  - hold spironolactone for now  - please replete K > 4, Mg > 2  - eventual SGLT2-i prior to discharge, once on stable dose of oral diuretics  - please obtain daily BMP while being actively diuresed

## 2022-08-29 NOTE — PROGRESS NOTE ADULT - SUBJECTIVE AND OBJECTIVE BOX
Interval History: no acute events overnight    Medications:  acetaminophen     Tablet .. 975 milliGRAM(s) Oral every 6 hours PRN  ALBUTerol    90 MICROgram(s) HFA Inhaler 2 Puff(s) Inhalation every 6 hours PRN  ambrisentan 10 milliGRAM(s) Oral daily  apixaban 5 milliGRAM(s) Oral two times a day  atorvastatin 20 milliGRAM(s) Oral at bedtime  budesonide  80 MICROgram(s)/formoterol 4.5 MICROgram(s) Inhaler 2 Puff(s) Inhalation two times a day  buMETAnide Injectable 2 milliGRAM(s) IV Push every 8 hours  buPROPion XL (24-Hour) . 300 milliGRAM(s) Oral daily  chlorhexidine 2% Cloths 1 Application(s) Topical <User Schedule>  digoxin     Tablet 125 MICROGram(s) Oral every other day  diltiazem    milliGRAM(s) Oral daily  ferrous    sulfate 325 milliGRAM(s) Oral <User Schedule>  melatonin 3 milliGRAM(s) Oral at bedtime PRN  pantoprazole    Tablet 40 milliGRAM(s) Oral before breakfast  polyethylene glycol 3350 17 Gram(s) Oral daily  potassium chloride    Tablet ER 20 milliEquivalent(s) Oral once  sodium chloride 0.65% Nasal 2 Spray(s) Both Nostrils four times a day  tadalafil 40 milliGRAM(s) Oral daily  tiotropium 18 MICROgram(s) Capsule 1 Capsule(s) Inhalation daily      Vitals:  T(C): 36.8 (22 @ 11:30), Max: 36.8 (22 @ 11:30)  HR: 63 (22 @ 11:30) (59 - 68)  BP: 106/65 (22 @ 11:30) (105/61 - 107/61)  BP(mean): --  RR: 17 (22 @ 11:30) (17 - 20)  SpO2: 96% (22 @ 11:30) (94% - 99%)    Daily     Daily Weight in k.2 (29 Aug 2022 08:00)        I&O's Summary    28 Aug 2022 07:01  -  29 Aug 2022 07:00  --------------------------------------------------------  IN: 800 mL / OUT: 1550 mL / NET: -750 mL        Physical Exam:  Appearance: No Acute Distress  HEENT: PERRL  Neck: No JVD  Cardiovascular: Normal S1 S2, No murmurs/rubs/gallops  Respiratory: Clear to auscultation bilaterally  Gastrointestinal: Soft, Non-tender	  Skin: No cyanosis	  Neurologic: Non-focal  Extremities: No LE edema  Psychiatry: A & O x 3, Mood & affect appropriate    Labs:                        7.3    4.46  )-----------( 197      ( 29 Aug 2022 07:07 )             23.8     08-    133<L>  |  89<L>  |  97<H>  ----------------------------<  74  3.8   |  33<H>  |  2.15<H>    Ca    9.4      29 Aug 2022 07:09  Phos  3.5       Mg     2.1

## 2022-08-29 NOTE — PROGRESS NOTE ADULT - ASSESSMENT
66 F with h/o COPD, CHFpEF, AFib, obesity (Type III, hx bariatric sx 2012), and severe pulmonary HTN combined group 1,2,3 with chronic combined hypercapnic and hypoxemic respiratory failure on ATC O2 supplementation (dependent on 8L NC at rest) with qhs/prn AVAPS (Trelegy Antonio via nasal prong interface) presenting for progressive dyspnea with minimal exertion admitted for acute decompensated right heart failure.   RHC 6/20/2022 revealed sPAP: 106, dPAP: 44 mPAP:65, PCWP: 16, PVR 7.32W, CO/CI 6.7/3.2.     not a candidate for lung transplant due to multiorgan dysfxn, high BMI, and poor functional status  no plans to start flolan  continue diuresis as per Nephro and Heart Failure  should follow up with Dr. Mosher as outpatient   c/w pulmonary vasodilators Tadalafil and Ambrisentan.   Continue supplemental O2 with goal O2 sat > 88%. Patient is on 8L NC at home , now tolerating 7 and can continue to wean down as tolerated  would strongly encourage ambulation to recondition patient as much as possible  Continue AVAPs at night   c/w bronchodilators  DVT ppx, ambulate as tolerated, incentive spirometry    Teofilo Riggs MD  [facility] PGY4  [facility] [address], [facility] [phone]         66 F with h/o COPD, CHFpEF, AFib, obesity (Type III, hx bariatric sx 2012), and severe pulmonary HTN combined group 1,2,3 with chronic combined hypercapnic and hypoxemic respiratory failure on ATC O2 supplementation (dependent on 8L NC at rest) with qhs/prn AVAPS (Trelegy Antonio via nasal prong interface) presenting for progressive dyspnea with minimal exertion admitted for acute decompensated right heart failure.   RHC 6/20/2022 revealed sPAP: 106, dPAP: 44 mPAP:65, PCWP: 16, PVR 7.32W, CO/CI 6.7/3.2.     not a candidate for lung transplant due to multiorgan dysfxn, high BMI, and poor functional status  no plans to start flolan  continue diuresis as per Nephro and Heart Failure  should follow up with Dr. Mosher as outpatient   c/w pulmonary vasodilators Tadalafil and Ambrisentan.   Continue supplemental O2 with goal O2 sat > 88%. Patient is on 8L NC at home , now tolerating 7 and can continue to wean down as tolerated  would strongly encourage ambulation to recondition patient as much as possible  Continue AVAPs at night   c/w bronchodilators  DVT ppx, ambulate as tolerated, incentive spirometry    Teofilo Riggs MD  [facility] PGY4  [facility] [id number], [facility] [phone]

## 2022-08-29 NOTE — PROGRESS NOTE ADULT - PROBLEM SELECTOR PLAN 3
in the setting of JUANA and use of Aldactone. Aldactone on hold  Improved    Upon discharge please make appointment with Nephrology clinic. For scheduling please email Nephrology at [email or web address]    If you have any questions, please feel free to contact me  Marisa Shay  Nephrology Fellow  Pager NS: [phone]/ [facility]: [phone]    (After 5 pm or on weekends please page the on-call fellow, can check [email or web address] for schedule. Login is abhay means, schedule under [facility] medicine, psych, derm)

## 2022-08-29 NOTE — PROGRESS NOTE ADULT - SUBJECTIVE AND OBJECTIVE BOX
PROGRESS NOTE:   Authored by: Andrea Limon M.D.   Internal Medicine PGY-1  Please Contact Via Teams    Patient is a 66y old  Female who presents with a chief complaint of lung txp eval (29 Aug 2022 09:13)      SUBJECTIVE / OVERNIGHT EVENTS:  No acute events overnight. Is feeling well this am. Endorses frequent urination with bumetanide. Tele with afib 60's-80's.    MEDICATIONS  (STANDING):  ambrisentan 10 milliGRAM(s) Oral daily  apixaban 5 milliGRAM(s) Oral two times a day  atorvastatin 20 milliGRAM(s) Oral at bedtime  budesonide  80 MICROgram(s)/formoterol 4.5 MICROgram(s) Inhaler 2 Puff(s) Inhalation two times a day  buMETAnide Injectable 2 milliGRAM(s) IV Push every 8 hours  buPROPion XL (24-Hour) . 300 milliGRAM(s) Oral daily  digoxin     Tablet 125 MICROGram(s) Oral every other day  diltiazem    milliGRAM(s) Oral daily  ferrous    sulfate 325 milliGRAM(s) Oral <User Schedule>  pantoprazole    Tablet 40 milliGRAM(s) Oral before breakfast  polyethylene glycol 3350 17 Gram(s) Oral daily  potassium chloride    Tablet ER 20 milliEquivalent(s) Oral once  sodium chloride 0.65% Nasal 2 Spray(s) Both Nostrils four times a day  tadalafil 40 milliGRAM(s) Oral daily  tiotropium 18 MICROgram(s) Capsule 1 Capsule(s) Inhalation daily    MEDICATIONS  (PRN):  acetaminophen     Tablet .. 975 milliGRAM(s) Oral every 6 hours PRN Temp greater or equal to 38C (100.4F), Mild Pain (1 - 3), Moderate Pain (4 - 6)  ALBUTerol    90 MICROgram(s) HFA Inhaler 2 Puff(s) Inhalation every 6 hours PRN Shortness of Breath and/or Wheezing  melatonin 3 milliGRAM(s) Oral at bedtime PRN Insomnia      CAPILLARY BLOOD GLUCOSE        I&O's Summary    28 Aug 2022 07:01  -  29 Aug 2022 07:00  --------------------------------------------------------  IN: 800 mL / OUT: 1550 mL / NET: -750 mL        PHYSICAL EXAM:  Vital Signs Last 24 Hrs  T(C): 36.5 (29 Aug 2022 04:33), Max: 36.5 (28 Aug 2022 11:28)  T(F): 97.7 (29 Aug 2022 04:33), Max: 97.7 (28 Aug 2022 11:28)  HR: 66 (29 Aug 2022 06:55) (59 - 68)  BP: 105/61 (29 Aug 2022 04:33) (105/61 - 109/70)  BP(mean): --  RR: 19 (29 Aug 2022 04:33) (18 - 20)  SpO2: 96% (29 Aug 2022 06:55) (94% - 100%)    Parameters below as of 29 Aug 2022 04:33  Patient On (Oxygen Delivery Method): BiPAP/CPAP  O2 Flow (L/min): 7      CONSTITUTIONAL: NAD, multiple bruises at phlebotomy sites.   RESPIRATORY: Slightly SOB on 7LNC. Crackles at lung bases b/l  CARDIOVASCULAR: Afib, no murmurs. Tense lower extremity edema  ABDOMEN: Nontender to palpation, normoactive bowel sounds. Stool burden LLQ  PSYCH: A+O to person, place, and time; affect appropriate    LABS:                        7.3    4.46  )-----------( 197      ( 29 Aug 2022 07:07 )             23.8     08-29    133<L>  |  89<L>  |  97<H>  ----------------------------<  74  3.8   |  33<H>  |  2.15<H>    Ca    9.4      29 Aug 2022 07:09  Phos  3.5     08-29  Mg     2.1     08-29         PROGRESS NOTE:   Authored by: Andrea Limon M.D.   Internal Medicine PGY-1  Please Contact Via Teams    Patient is a 66y old  Female who presents with a chief complaint of lung txp eval (29 Aug 2022 09:13)      SUBJECTIVE / OVERNIGHT EVENTS:  No acute events overnight. Is feeling well this am. Endorses frequent urination with bumetanide. Tele with afib 60's-80's.    MEDICATIONS  (STANDING):  ambrisentan 10 milliGRAM(s) Oral daily  apixaban 5 milliGRAM(s) Oral two times a day  atorvastatin 20 milliGRAM(s) Oral at bedtime  budesonide  80 MICROgram(s)/formoterol 4.5 MICROgram(s) Inhaler 2 Puff(s) Inhalation two times a day  buMETAnide Injectable 2 milliGRAM(s) IV Push every 8 hours  buPROPion XL (24-Hour) . 300 milliGRAM(s) Oral daily  digoxin     Tablet 125 MICROGram(s) Oral every other day  diltiazem    milliGRAM(s) Oral daily  ferrous    sulfate 325 milliGRAM(s) Oral <User Schedule>  pantoprazole    Tablet 40 milliGRAM(s) Oral before breakfast  polyethylene glycol 3350 17 Gram(s) Oral daily  potassium chloride    Tablet ER 20 milliEquivalent(s) Oral once  sodium chloride 0.65% Nasal 2 Spray(s) Both Nostrils four times a day  tadalafil 40 milliGRAM(s) Oral daily  tiotropium 18 MICROgram(s) Capsule 1 Capsule(s) Inhalation daily    MEDICATIONS  (PRN):  acetaminophen     Tablet .. 975 milliGRAM(s) Oral every 6 hours PRN Temp greater or equal to 38C (100.4F), Mild Pain (1 - 3), Moderate Pain (4 - 6)  ALBUTerol    90 MICROgram(s) HFA Inhaler 2 Puff(s) Inhalation every 6 hours PRN Shortness of Breath and/or Wheezing  melatonin 3 milliGRAM(s) Oral at bedtime PRN Insomnia        I&O's Summary    28 Aug 2022 07:01  -  29 Aug 2022 07:00  --------------------------------------------------------  IN: 800 mL / OUT: 1550 mL / NET: -750 mL        PHYSICAL EXAM:  Vital Signs Last 24 Hrs  T(C): 36.5 (29 Aug 2022 04:33), Max: 36.5 (28 Aug 2022 11:28)  T(F): 97.7 (29 Aug 2022 04:33), Max: 97.7 (28 Aug 2022 11:28)  HR: 66 (29 Aug 2022 06:55) (59 - 68)  BP: 105/61 (29 Aug 2022 04:33) (105/61 - 109/70)  BP(mean): --  RR: 19 (29 Aug 2022 04:33) (18 - 20)  SpO2: 96% (29 Aug 2022 06:55) (94% - 100%)    Parameters below as of 29 Aug 2022 04:33  Patient On (Oxygen Delivery Method): BiPAP/CPAP  O2 Flow (L/min): 7      CONSTITUTIONAL: NAD, multiple bruises at phlebotomy sites.   RESPIRATORY: Slightly SOB on 7LNC. Crackles at lung bases b/l  CARDIOVASCULAR: Afib, no murmurs. Tense lower extremity edema  ABDOMEN: Nontender to palpation, normoactive bowel sounds. Stool burden LLQ  PSYCH: A+O to person, place, and time; affect appropriate    LABS:                        7.3    4.46  )-----------( 197      ( 29 Aug 2022 07:07 )             23.8     08-29    133<L>  |  89<L>  |  97<H>  ----------------------------<  74  3.8   |  33<H>  |  2.15<H>    Ca    9.4      29 Aug 2022 07:09  Phos  3.5     08-29  Mg     2.1     08-29

## 2022-08-29 NOTE — PROGRESS NOTE ADULT - PROBLEM SELECTOR PLAN 1
Pt. with CKD in the setting of chronic cardiorenal syndrome, now with JUANA (non oliguric) on CKD likely 2/2 renal venous congestion from hypervolemia due to HF in the setting of severe pulmonary HTN.     -[facility], noted that Baseline SCr ranges from 1.4-1.9 since March '22. SCr on arrival was 1.8 on 8/11; peaked to 2.32 on 8/23 & now plateaued at 2-2.3 (likely new baseline)  -now on bumex 2mg IV TID. May switch to bumex 3 mg PO bid.   -No plan for HD at this time.   -No uremic symptoms  -Avoid NSAIDs, ACEI/ARBS, RCA and nephrotoxins. Dose medications as per eGFR.  -Needs Strict I & O's. or daily wts

## 2022-08-29 NOTE — PROGRESS NOTE ADULT - SUBJECTIVE AND OBJECTIVE BOX
CHIEF COMPLAINT:Patient is a 66y old  Female who presents with a chief complaint of lung txp eval (29 Aug 2022 07:04)      Interval Events:    REVIEW OF SYSTEMS:  [x] All other systems negative except per HPI   [ ] Unable to assess ROS because ________    OBJECTIVE:  ICU Vital Signs Last 24 Hrs  T(C): 36.5 (29 Aug 2022 04:33), Max: 36.5 (28 Aug 2022 11:28)  T(F): 97.7 (29 Aug 2022 04:33), Max: 97.7 (28 Aug 2022 11:28)  HR: 66 (29 Aug 2022 06:55) (59 - 68)  BP: 105/61 (29 Aug 2022 04:33) (105/61 - 109/70)  BP(mean): --  ABP: --  ABP(mean): --  RR: 19 (29 Aug 2022 04:33) (18 - 20)  SpO2: 96% (29 Aug 2022 06:55) (94% - 100%)    O2 Parameters below as of 29 Aug 2022 04:33  Patient On (Oxygen Delivery Method): BiPAP/CPAP  O2 Flow (L/min): 7 08-28 @ 07:01  -  08-29 @ 07:00  --------------------------------------------------------  IN: 800 mL / OUT: 1550 mL / NET: -750 mL        PHYSICAL EXAM:  GENERAL: NAD, well-groomed, well-developed  HEAD:  Atraumatic, Normocephalic  EYES: EOMI, PERRLA, conjunctiva and sclera clear  ENMT: No tonsillar erythema, exudates, or enlargement; Moist mucous membranes, Good dentition, No lesions  NECK: Supple, No JVD, Normal thyroid  CHEST/LUNG: Clear to auscultation bilaterally; No rales, rhonchi, wheezing, or rubs  HEART: Regular rate and rhythm; No murmurs, rubs, or gallops  ABDOMEN: Soft, Nontender, Nondistended; Bowel sounds present  VASCULAR:  2+ Peripheral Pulses, No clubbing, cyanosis, or edema  LYMPH: No lymphadenopathy noted  SKIN: No rashes or lesions  NERVOUS SYSTEM:  Alert & Oriented X3, Good concentration; Motor Strength 5/5 B/L upper and lower extremities; DTRs 2+ intact and symmetric    HOSPITAL MEDICATIONS:  MEDICATIONS  (STANDING):  ambrisentan 10 milliGRAM(s) Oral daily  apixaban 5 milliGRAM(s) Oral two times a day  atorvastatin 20 milliGRAM(s) Oral at bedtime  budesonide  80 MICROgram(s)/formoterol 4.5 MICROgram(s) Inhaler 2 Puff(s) Inhalation two times a day  buMETAnide Injectable 2 milliGRAM(s) IV Push every 8 hours  buPROPion XL (24-Hour) . 300 milliGRAM(s) Oral daily  digoxin     Tablet 125 MICROGram(s) Oral every other day  diltiazem    milliGRAM(s) Oral daily  ferrous    sulfate 325 milliGRAM(s) Oral <User Schedule>  pantoprazole    Tablet 40 milliGRAM(s) Oral before breakfast  polyethylene glycol 3350 17 Gram(s) Oral daily  potassium chloride    Tablet ER 20 milliEquivalent(s) Oral once  sodium chloride 0.65% Nasal 2 Spray(s) Both Nostrils four times a day  tadalafil 40 milliGRAM(s) Oral daily  tiotropium 18 MICROgram(s) Capsule 1 Capsule(s) Inhalation daily    MEDICATIONS  (PRN):  acetaminophen     Tablet .. 975 milliGRAM(s) Oral every 6 hours PRN Temp greater or equal to 38C (100.4F), Mild Pain (1 - 3), Moderate Pain (4 - 6)  ALBUTerol    90 MICROgram(s) HFA Inhaler 2 Puff(s) Inhalation every 6 hours PRN Shortness of Breath and/or Wheezing  melatonin 3 milliGRAM(s) Oral at bedtime PRN Insomnia      LABS:    The Labs were reviewed by me   The Radiology was reviewed by me    EKG tracing reviewed by me    08-29    133<L>  |  89<L>  |  97<H>  ----------------------------<  74  3.8   |  33<H>  |  2.15<H>  08-28    132<L>  |  87<L>  |  92<H>  ----------------------------<  114<H>  3.8   |  32<H>  |  2.21<H>  08-28    133<L>  |  88<L>  |  92<H>  ----------------------------<  72  3.9   |  33<H>  |  2.12<H>    Ca    9.4      29 Aug 2022 07:09  Ca    9.4      28 Aug 2022 19:16  Ca    9.5      28 Aug 2022 06:27  Phos  3.5     08-29  Mg     2.1     08-29    TPro  6.9  /  Alb  4.0  /  TBili  0.3  /  DBili  x   /  AST  33  /  ALT  17  /  AlkPhos  120  08-27    Magnesium, Serum: 2.1 mg/dL (08-29-22 @ 07:09)  Magnesium, Serum: 2.3 mg/dL (08-28-22 @ 06:27)  Magnesium, Serum: 2.3 mg/dL (08-28-22 @ 01:28)  Magnesium, Serum: 2.3 mg/dL (08-27-22 @ 06:23)    Phosphorus Level, Serum: 3.5 mg/dL (08-29-22 @ 07:09)  Phosphorus Level, Serum: 3.6 mg/dL (08-28-22 @ 06:27)  Phosphorus Level, Serum: 3.9 mg/dL (08-28-22 @ 01:28)  Phosphorus Level, Serum: 3.6 mg/dL (08-27-22 @ 06:23)                                              7.3    4.46  )-----------( 197      ( 29 Aug 2022 07:07 )             23.8                         8.0    3.61  )-----------( 187      ( 28 Aug 2022 06:28 )             26.5                         8.1    3.69  )-----------( 189      ( 27 Aug 2022 06:23 )             26.5     CAPILLARY BLOOD GLUCOSE            MICROBIOLOGY:     RADIOLOGY:  [ ] Reviewed and interpreted by me    Point of Care Ultrasound Findings:    PFT:    EKG: CHIEF COMPLAINT:Patient is a 66y old  Female who presents with a chief complaint of lung txp eval (29 Aug 2022 07:04)      Interval Events: feeling much improved, on decreased amounts of oxygen and able to walk with some stability in respiratory function, eager to go home, no fevers, chills, chest pain, phlegm     REVIEW OF SYSTEMS:  [x] All other systems negative except per HPI   [ ] Unable to assess ROS because ________    OBJECTIVE:  ICU Vital Signs Last 24 Hrs  T(C): 36.5 (29 Aug 2022 04:33), Max: 36.5 (28 Aug 2022 11:28)  T(F): 97.7 (29 Aug 2022 04:33), Max: 97.7 (28 Aug 2022 11:28)  HR: 66 (29 Aug 2022 06:55) (59 - 68)  BP: 105/61 (29 Aug 2022 04:33) (105/61 - 109/70)  BP(mean): --  ABP: --  ABP(mean): --  RR: 19 (29 Aug 2022 04:33) (18 - 20)  SpO2: 96% (29 Aug 2022 06:55) (94% - 100%)    O2 Parameters below as of 29 Aug 2022 04:33  Patient On (Oxygen Delivery Method): BiPAP/CPAP  O2 Flow (L/min): 7 08-28 @ 07:01  -  08-29 @ 07:00  --------------------------------------------------------  IN: 800 mL / OUT: 1550 mL / NET: -750 mL        PHYSICAL EXAM:  GENERAL: NAD, well-groomed, well-developed  HEAD:  Atraumatic, Normocephalic  EYES: EOMI, PERRLA, conjunctiva and sclera clear  ENMT: No tonsillar erythema, exudates, or enlargement; Moist mucous membranes, Good dentition, No lesions  NECK: Supple, No JVD, Normal thyroid  CHEST/LUNG: bilateral crackles No rales, rhonchi, wheezing, or rubs  HEART: Regular rate and rhythm; No murmurs, rubs, or gallops  ABDOMEN: Soft, Nontender, Nondistended; Bowel sounds present  VASCULAR:  2+ Peripheral Pulses, No clubbing, cyanosis. bilateral pitting edema improved   LYMPH: No lymphadenopathy noted  SKIN: No rashes or lesions  NERVOUS SYSTEM:  Alert & Oriented X3, Good concentration; Motor Strength 5/5 B/L upper and lower extremities; DTRs 2+ intact and symmetric    HOSPITAL MEDICATIONS:  MEDICATIONS  (STANDING):  ambrisentan 10 milliGRAM(s) Oral daily  apixaban 5 milliGRAM(s) Oral two times a day  atorvastatin 20 milliGRAM(s) Oral at bedtime  budesonide  80 MICROgram(s)/formoterol 4.5 MICROgram(s) Inhaler 2 Puff(s) Inhalation two times a day  buMETAnide Injectable 2 milliGRAM(s) IV Push every 8 hours  buPROPion XL (24-Hour) . 300 milliGRAM(s) Oral daily  digoxin     Tablet 125 MICROGram(s) Oral every other day  diltiazem    milliGRAM(s) Oral daily  ferrous    sulfate 325 milliGRAM(s) Oral <User Schedule>  pantoprazole    Tablet 40 milliGRAM(s) Oral before breakfast  polyethylene glycol 3350 17 Gram(s) Oral daily  potassium chloride    Tablet ER 20 milliEquivalent(s) Oral once  sodium chloride 0.65% Nasal 2 Spray(s) Both Nostrils four times a day  tadalafil 40 milliGRAM(s) Oral daily  tiotropium 18 MICROgram(s) Capsule 1 Capsule(s) Inhalation daily    MEDICATIONS  (PRN):  acetaminophen     Tablet .. 975 milliGRAM(s) Oral every 6 hours PRN Temp greater or equal to 38C (100.4F), Mild Pain (1 - 3), Moderate Pain (4 - 6)  ALBUTerol    90 MICROgram(s) HFA Inhaler 2 Puff(s) Inhalation every 6 hours PRN Shortness of Breath and/or Wheezing  melatonin 3 milliGRAM(s) Oral at bedtime PRN Insomnia      LABS:    The Labs were reviewed by me   The Radiology was reviewed by me    EKG tracing reviewed by me    08-29    133<L>  |  89<L>  |  97<H>  ----------------------------<  74  3.8   |  33<H>  |  2.15<H>  08-28    132<L>  |  87<L>  |  92<H>  ----------------------------<  114<H>  3.8   |  32<H>  |  2.21<H>  08-28    133<L>  |  88<L>  |  92<H>  ----------------------------<  72  3.9   |  33<H>  |  2.12<H>    Ca    9.4      29 Aug 2022 07:09  Ca    9.4      28 Aug 2022 19:16  Ca    9.5      28 Aug 2022 06:27  Phos  3.5     08-29  Mg     2.1     08-29    TPro  6.9  /  Alb  4.0  /  TBili  0.3  /  DBili  x   /  AST  33  /  ALT  17  /  AlkPhos  120  08-27    Magnesium, Serum: 2.1 mg/dL (08-29-22 @ 07:09)  Magnesium, Serum: 2.3 mg/dL (08-28-22 @ 06:27)  Magnesium, Serum: 2.3 mg/dL (08-28-22 @ 01:28)  Magnesium, Serum: 2.3 mg/dL (08-27-22 @ 06:23)    Phosphorus Level, Serum: 3.5 mg/dL (08-29-22 @ 07:09)  Phosphorus Level, Serum: 3.6 mg/dL (08-28-22 @ 06:27)  Phosphorus Level, Serum: 3.9 mg/dL (08-28-22 @ 01:28)  Phosphorus Level, Serum: 3.6 mg/dL (08-27-22 @ 06:23)                                              7.3    4.46  )-----------( 197      ( 29 Aug 2022 07:07 )             23.8                         8.0    3.61  )-----------( 187      ( 28 Aug 2022 06:28 )             26.5                         8.1    3.69  )-----------( 189      ( 27 Aug 2022 06:23 )             26.5     CAPILLARY BLOOD GLUCOSE            MICROBIOLOGY:     RADIOLOGY:  [ ] Reviewed and interpreted by me    Point of Care Ultrasound Findings:    PFT:    EKG:

## 2022-08-29 NOTE — PROGRESS NOTE ADULT - ATTENDING COMMENTS
Monitor her wts and if gaining wt on oral will need IV bumex   Fluid restrictw    Sarai Calle MD  Off: [phone]  contact me on teams    (After 5 pm or on weekends please page the on-call fellow/attending, can check [email or web address] for schedule. Login is [email or web address] means, schedule under [facility] medicine, psych, derm)

## 2022-08-29 NOTE — PROGRESS NOTE ADULT - ATTENDING COMMENTS
1.Acute on chronic hypoxemic respiratory failure from Pulmonary HTN and COPD. AVAPS nightly.  Continue 6 liters nasal canula  Continue bronchodilators  2. Severe pulmonary HTN ,class 1,2,3 continue tadalafil and ambrisentan.  3. R heart failure. Continue aggressive diuresis with Iv Bumex.  4. Chronic atrial fibrillation. Continue digoxin and Cardizem .Continue Apixaban for AC.

## 2022-08-29 NOTE — PROGRESS NOTE ADULT - PROBLEM SELECTOR PLAN 7
- HGB 8.1 on 8/25 on eliquis. Stable No obvious signs of bleeding.  - maintain active T/S  - iron studies: total iron and % saturation decreased, ferritin on lower side, transferrin and TIBC normal; s/p IV iron infusion  - Continue oral iron every other day 325  - Potential source of blood loss is bleeding into extremities from phlebotomy.  - 8/16: s/p 2nd unit of PRBC. First unit at OSH

## 2022-08-29 NOTE — PROGRESS NOTE ADULT - PROBLEM SELECTOR PLAN 4
RHC showed elevated right sided filling pressures with severe pulmonary hypertension with systemic PA pressures, slightly elevated PCWP and preserved cardiac output.   - Followed with Dr. Rodriguez at [facility] for PH but he recently left the practice. Needs a pulmonary hypertension doctor near her home in [place].  - Continue tadalafil 40 mg daily   - continue Ambrisentan 10mg qd  - follow Pulm recs  - Transplant reporting that patient is currently not a candidate following eval, given this, also not a candidate for IV Flolan per Pulm RHC showed elevated right sided filling pressures with severe pulmonary hypertension with systemic PA pressures, slightly elevated PCWP and preserved cardiac output.   - Followed with Dr. Rodriguez at [facility] for PH but he recently left the practice. Needs a pulmonary hypertension doctor near her home in [place]. Possibly [facility]  - Continue tadalafil 40 mg daily   - continue Ambrisentan 10mg qd  - Possible repeat cardiomems reading to help with evaluation of treatment    Transplant reporting that patient is currently not a candidate following eval, given this, also not a candidate for IV Flolan per Pulm

## 2022-08-29 NOTE — PROGRESS NOTE ADULT - PROBLEM SELECTOR PLAN 3
Cr elevated to 2.42 on admission- baseline is 1.7-1.8. Likely in s/o CHF exacerbation.  - Cr 2.15 8/29  - diuresis as above  - avoid nephrotoxic agents  - strict I/Os  - Nephrology following  - Cr elevation could also be new baseline for patient in setting of worsening kidney function 2/2 pulm htn/HF  - Pt with BUN 90's. No uremic symptoms current but may require dialysis in the future.

## 2022-08-30 NOTE — PROGRESS NOTE ADULT - PROBLEM SELECTOR PLAN 2
- CardioMEMS PAD 8/11 43 mmHg. goal ~40 but difficult to ascertain given RV dysfunction  - cardiomems PAD 8/15 45 mmHg, 98/45/66, Cardiomems reading 8/30 41  - Dry weight is 188-190, yesterday weight was 196  -continue with bumex 4mg BID, will assess in the next coming days if further uptitration is required  - c/w hypertonic saline with current regimen  - hold spironolactone for now  - please replete K > 4, Mg > 2  - eventual SGLT2-i prior to discharge, once on stable dose of oral diuretics  - please obtain daily BMP while being actively diuresed

## 2022-08-30 NOTE — PROGRESS NOTE ADULT - SUBJECTIVE AND OBJECTIVE BOX
Interval History: no acute events    Medications:  acetaminophen     Tablet .. 975 milliGRAM(s) Oral every 6 hours PRN  ALBUTerol    90 MICROgram(s) HFA Inhaler 2 Puff(s) Inhalation every 6 hours PRN  ambrisentan 10 milliGRAM(s) Oral daily  apixaban 5 milliGRAM(s) Oral two times a day  atorvastatin 20 milliGRAM(s) Oral at bedtime  budesonide  80 MICROgram(s)/formoterol 4.5 MICROgram(s) Inhaler 2 Puff(s) Inhalation two times a day  buMETAnide IVPB 4 milliGRAM(s) IV Intermittent every 12 hours  buPROPion XL (24-Hour) . 300 milliGRAM(s) Oral daily  chlorhexidine 2% Cloths 1 Application(s) Topical <User Schedule>  digoxin     Tablet 125 MICROGram(s) Oral every other day  diltiazem    milliGRAM(s) Oral daily  ferrous    sulfate 325 milliGRAM(s) Oral <User Schedule>  melatonin 3 milliGRAM(s) Oral at bedtime PRN  pantoprazole    Tablet 40 milliGRAM(s) Oral before breakfast  polyethylene glycol 3350 17 Gram(s) Oral daily  sodium chloride 0.65% Nasal 2 Spray(s) Both Nostrils four times a day  tadalafil 40 milliGRAM(s) Oral daily  tiotropium 18 MICROgram(s) Capsule 1 Capsule(s) Inhalation daily      Vitals:  T(C): 36.6 (22 @ 10:53), Max: 36.7 (22 @ 05:03)  HR: 58 (22 @ 11:05) (54 - 65)  BP: 110/62 (22 @ 10:53) (109/65 - 119/73)  BP(mean): --  RR: 18 (22 @ 10:53) (17 - 18)  SpO2: 99% (22 @ 11:05) (95% - 99%)    Daily     Daily Weight in k.3 (30 Aug 2022 07:42)        I&O's Summary    29 Aug 2022 07:01  -  30 Aug 2022 07:00  --------------------------------------------------------  IN: 790 mL / OUT: 1850 mL / NET: -1060 mL    30 Aug 2022 07:01  -  30 Aug 2022 11:30  --------------------------------------------------------  IN: 0 mL / OUT: 700 mL / NET: -700 mL        Physical Exam:  Appearance: No Acute Distress  HEENT: PERRL  Neck: No JVD  Cardiovascular: Normal S1 S2, No murmurs/rubs/gallops  Respiratory: Clear to auscultation bilaterally  Gastrointestinal: Soft, Non-tender	  Skin: No cyanosis	  Neurologic: Non-focal  Extremities: warm, 1+ edema bilaterally  Psychiatry: A & O x 3, Mood & affect appropriate    Labs:                        7.3    4.46  )-----------( 197      ( 29 Aug 2022 07:07 )             23.8     08    133<L>  |  88<L>  |  93<H>  ----------------------------<  115<H>  4.0   |  32<H>  |  1.77<H>    Ca    9.6      29 Aug 2022 20:48  Phos  3.5       Mg     2.1

## 2022-08-30 NOTE — PROGRESS NOTE ADULT - SUBJECTIVE AND OBJECTIVE BOX
[facility] Division of Kidney Diseases & Hypertension  FOLLOW UP NOTE  [phone]--------------------------------------------------------------------------------  Chief Complaint:Pulmonary hypertension due to lung diseases and hypoxia      HPI: 66 year old with PMH of Pulmonary hypertension (Group 1,2,3) HFpEF, atrial fibrillation, COPD on home oxygen, ROLANDA on CPAP, morbid obesity with acute hypoxic respiratory failure. Nephrology consulted for JUANA. baseline creatinine as of june appears to be 1.5-1.6 on august 11 patient creatinine was 1.89 and has continued to up trend most recently 2.06 8/13. Patient was initially started on lasix 60 mg IV BID on admission with marked improvement in pro-BNP since presentation 89611 (8/11) and most recently 8947 (8/13). s/p lasix gtt with HTS. Now on IV bumex      24 hour events/subjective: Patient seen & examined. Labs & vitals reviewed. Now on bumex 4mg IV q12. UOP 1.8L in 24 hrs with 1L net neg.  Reports improvement in SOB & leg edema. Remains on 6L NC. No uremic symptoms            PAST HISTORY  --------------------------------------------------------------------------------  No significant changes to PMH, PSH, FHx, SHx, unless otherwise noted    ALLERGIES & MEDICATIONS  --------------------------------------------------------------------------------  Allergies    nitrates (Unknown)    Intolerances      Standing Inpatient Medications  ambrisentan 10 milliGRAM(s) Oral daily  apixaban 5 milliGRAM(s) Oral two times a day  atorvastatin 20 milliGRAM(s) Oral at bedtime  budesonide  80 MICROgram(s)/formoterol 4.5 MICROgram(s) Inhaler 2 Puff(s) Inhalation two times a day  buMETAnide IVPB 4 milliGRAM(s) IV Intermittent every 12 hours  buPROPion XL (24-Hour) . 300 milliGRAM(s) Oral daily  chlorhexidine 2% Cloths 1 Application(s) Topical <User Schedule>  digoxin     Tablet 125 MICROGram(s) Oral every other day  diltiazem    milliGRAM(s) Oral daily  ferrous    sulfate 325 milliGRAM(s) Oral <User Schedule>  pantoprazole    Tablet 40 milliGRAM(s) Oral before breakfast  polyethylene glycol 3350 17 Gram(s) Oral daily  sodium chloride 0.65% Nasal 2 Spray(s) Both Nostrils four times a day  tadalafil 40 milliGRAM(s) Oral daily  tiotropium 18 MICROgram(s) Capsule 1 Capsule(s) Inhalation daily    PRN Inpatient Medications  acetaminophen     Tablet .. 975 milliGRAM(s) Oral every 6 hours PRN  ALBUTerol    90 MICROgram(s) HFA Inhaler 2 Puff(s) Inhalation every 6 hours PRN  melatonin 3 milliGRAM(s) Oral at bedtime PRN      REVIEW OF SYSTEMS  --------------------------------------------------------------------------------        All other systems were reviewed and are negative, except as noted.    VITALS/PHYSICAL EXAM  --------------------------------------------------------------------------------  T(C): 36.6 (08-30-22 @ 10:53), Max: 36.8 (08-29-22 @ 11:30)  HR: 58 (08-30-22 @ 11:05) (54 - 65)  BP: 110/62 (08-30-22 @ 10:53) (106/65 - 119/73)  RR: 18 (08-30-22 @ 10:53) (17 - 18)  SpO2: 99% (08-30-22 @ 11:05) (95% - 99%)  Wt(kg): --        08-29-22 @ 07:01  -  08-30-22 @ 07:00  --------------------------------------------------------  IN: 790 mL / OUT: 1850 mL / NET: -1060 mL      Physical Exam:  	Gen: NAD, on 6L NC  HEENT: anicteric  Pulm: bibasilar crackles  CV: RRR  Abd: soft, +BS  CNS: awake, alert  : No [place]  LE: ++ b/l LE edema (improving), +tremor but no asterexis  Skin: Warm, without rashes  Vascular access:         LABS/STUDIES  --------------------------------------------------------------------------------              7.3    4.46  >-----------<  197      [08-29-22 @ 07:07]              23.8     133  |  88  |  93  ----------------------------<  115      [08-29-22 @ 20:48]  4.0   |  32  |  1.77        Ca     9.6     [08-29-22 @ 20:48]      Mg     2.1     [08-29-22 @ 07:09]      Phos  3.5     [08-29-22 @ 07:09]            Creatinine Trend:  SCr 1.77 [08-29 @ 20:48]  SCr 2.15 [08-29 @ 07:09]  SCr 2.21 [08-28 @ 19:16]  SCr 2.12 [08-28 @ 06:27]  SCr 2.20 [08-28 @ 01:28]

## 2022-08-30 NOTE — PROGRESS NOTE ADULT - PROBLEM SELECTOR PLAN 5
Last echo 6/22 with EF 68%, normal LV systolic function w/o WMA. Flattening of interventricular septum c/w RV overload, w/ severe R atrial enlargement, RV enlargement w/ decreased RV systolic function, severely elevated pulmonary pressures.   - diuretics as above  - strict I/Os, daily standing weights  - BNP decreased since 2 weeks ago but still elevated in 7000's  - holding spironolactone in setting of previous hyperkalemia, consider restarting pending HF recommendations

## 2022-08-30 NOTE — PROGRESS NOTE ADULT - ATTENDING COMMENTS
-Patient overall feels better since being diuresed.   -CHF, renal, pulm recs appreciated.   -F/u cardioMEMS reading.   -Consider second opinion at [facility]. -Patient overall feels better since being diuresed.   -CHF, renal, pulm recs appreciated.   -F/u cardioMEMS reading.   -Consider second opinion at [facility] or other Lung transplant center in the [facility].

## 2022-08-30 NOTE — PROGRESS NOTE ADULT - ATTENDING COMMENTS
Continue IV bumex per heart failure  Renal function improving  Fluid restrict 1 liter daily    Sarai Calle MD  Off: [phone]  contact me on teams    (After 5 pm or on weekends please page the on-call fellow/attending, can check [email or web address] for schedule. Login is abhay means, schedule under [facility] medicine, psych, derm)

## 2022-08-30 NOTE — PROGRESS NOTE ADULT - PROBLEM SELECTOR PLAN 3
in the setting of JUANA and use of Aldactone. Aldactone on hold  Improved    Upon discharge please make appointment with Nephrology clinic. For scheduling please email Nephrology at [email or web address]    If you have any questions, please feel free to contact me  Marisa Shay  Nephrology Fellow  Pager NS: [phone]/ [facility]: [phone]    (After 5 pm or on weekends please page the on-call fellow, can check [email or web address] for schedule. Login is abhay means, schedule under [facility] medicine, psych, derm)

## 2022-08-30 NOTE — PROGRESS NOTE ADULT - PROBLEM SELECTOR PLAN 4
- recent baseline Cr ranging 1.7-1.8. Today creatinine is 1.7  - eventual SGLT2-i as above to delay further progression of CKD  - continue to monitor closely with diuresis

## 2022-08-30 NOTE — PROGRESS NOTE ADULT - PROBLEM SELECTOR PLAN 1
Pt. with CKD in the setting of chronic cardiorenal syndrome, now with JUANA (non oliguric) on CKD likely 2/2 renal venous congestion from hypervolemia due to HF in the setting of severe pulmonary HTN.     -On review of [facility], noted that Baseline SCr ranges from 1.4-1.9 since March '22. SCr on arrival was 1.8 on 8/11; peaked to 2.32 on 8/23 & plateaued at 2-2.3, now 1.7 today  -C/w bumex 4mg IV BID. 7kgs lost since admission but yet with hypervolemia  -No plan for HD at this time. But likely heading there in the near future  -No uremic symptoms currently  -Avoid NSAIDs, ACEI/ARBS, RCA and nephrotoxins. Dose medications as per eGFR.  -Needs Strict I & O's. or daily wts

## 2022-08-30 NOTE — PROGRESS NOTE ADULT - PROBLEM SELECTOR PLAN 8
History of Afib on eliquis 5mg BID  - Tele reviewed - patient currently in afib, rate controlled.  - decrease digoxin 125 to every other day. Repeat level 9/1  - continue diltiazem 120mg qd  - continue eliquis

## 2022-08-30 NOTE — PROGRESS NOTE ADULT - PROBLEM SELECTOR PLAN 1
Pt feeling at baseline. Improving on lasix drip. Having okay urine output. Weight today 196.8. Goal weight 189 lbs  - Continue with trial of 4mg IV bumex BID - monitor I/O.  - Holding metolazone with Na <135  - Holding spironolactone  - standing daily weights, strict I/Os.  - Pulm, nephro, and HF following   - eventual SGLT2-i prior to discharge, once on stable dose of oral diuretics Pt feeling at baseline. Improving on lasix drip. Having okay urine output. Weight today 196.8. Goal weight 189 lbs  - Start trial of PO bumetanide 4mg BID - monitor I/O.  - Holding metolazone with Na <135  - Holding spironolactone  - standing daily weights, strict I/Os.  - Pulm, nephro, and HF following   - eventual SGLT2-i prior to discharge, once on stable dose of oral diuretics

## 2022-08-30 NOTE — PROGRESS NOTE ADULT - SUBJECTIVE AND OBJECTIVE BOX
PROGRESS NOTE:   Authored by: Andrea Limon M.D.   Internal Medicine PGY-1  Please Contact Via Teams    Patient is a 66y old  Female who presents with a chief complaint of lung txp eval (30 Aug 2022 06:57)      SUBJECTIVE / OVERNIGHT EVENTS:  No acute events overnight. Is feeling good on 6.5L NC when sitting but SOB when moving. Expresses a desire to drive when she gets home to go to the fish market and grocery store.      MEDICATIONS  (STANDING):  ambrisentan 10 milliGRAM(s) Oral daily  apixaban 5 milliGRAM(s) Oral two times a day  atorvastatin 20 milliGRAM(s) Oral at bedtime  budesonide  80 MICROgram(s)/formoterol 4.5 MICROgram(s) Inhaler 2 Puff(s) Inhalation two times a day  buMETAnide IVPB 4 milliGRAM(s) IV Intermittent every 12 hours  buPROPion XL (24-Hour) . 300 milliGRAM(s) Oral daily  chlorhexidine 2% Cloths 1 Application(s) Topical <User Schedule>  digoxin     Tablet 125 MICROGram(s) Oral every other day  diltiazem    milliGRAM(s) Oral daily  ferrous    sulfate 325 milliGRAM(s) Oral <User Schedule>  pantoprazole    Tablet 40 milliGRAM(s) Oral before breakfast  polyethylene glycol 3350 17 Gram(s) Oral daily  sodium chloride 0.65% Nasal 2 Spray(s) Both Nostrils four times a day  tadalafil 40 milliGRAM(s) Oral daily  tiotropium 18 MICROgram(s) Capsule 1 Capsule(s) Inhalation daily    MEDICATIONS  (PRN):  acetaminophen     Tablet .. 975 milliGRAM(s) Oral every 6 hours PRN Temp greater or equal to 38C (100.4F), Mild Pain (1 - 3), Moderate Pain (4 - 6)  ALBUTerol    90 MICROgram(s) HFA Inhaler 2 Puff(s) Inhalation every 6 hours PRN Shortness of Breath and/or Wheezing  melatonin 3 milliGRAM(s) Oral at bedtime PRN Insomnia      CAPILLARY BLOOD GLUCOSE        I&O's Summary    29 Aug 2022 07:01  -  30 Aug 2022 07:00  --------------------------------------------------------  IN: 790 mL / OUT: 1850 mL / NET: -1060 mL        PHYSICAL EXAM:  Vital Signs Last 24 Hrs  T(C): 36.7 (30 Aug 2022 05:03), Max: 36.8 (29 Aug 2022 11:30)  T(F): 98.1 (30 Aug 2022 05:03), Max: 98.2 (29 Aug 2022 11:30)  HR: 62 (30 Aug 2022 05:22) (55 - 65)  BP: 109/65 (30 Aug 2022 05:03) (106/65 - 119/73)  BP(mean): --  RR: 18 (30 Aug 2022 05:03) (17 - 18)  SpO2: 95% (30 Aug 2022 05:22) (95% - 99%)    Parameters below as of 29 Aug 2022 21:41  Patient On (Oxygen Delivery Method): BiPAP/CPAP      CONSTITUTIONAL: NAD, multiple bruises at phlebotomy sites.   RESPIRATORY: Feeling okay on 6.5 L NC. Crackles at lung bases b/l  CARDIOVASCULAR: Afib, no murmurs. Tense lower extremity edema  ABDOMEN: Nontender to palpation, normoactive bowel sounds. Stool burden LLQ  PSYCH: A+O to person, place, and time; affect appropriate    LABS:                        7.3    4.46  )-----------( 197      ( 29 Aug 2022 07:07 )             23.8     08-29    133<L>  |  88<L>  |  93<H>  ----------------------------<  115<H>  4.0   |  32<H>  |  1.77<H>    Ca    9.6      29 Aug 2022 20:48  Phos  3.5     08-29  Mg     2.1     08-29     PROGRESS NOTE:   Authored by: Andrea Limon M.D.   Internal Medicine PGY-1  Please Contact Via Teams    Patient is a 66y old  Female who presents with a chief complaint of lung txp eval (30 Aug 2022 06:57)      SUBJECTIVE / OVERNIGHT EVENTS:  No acute events overnight. Is feeling good on 6.5L NC when sitting but SOB when moving. Expresses a desire to drive when she gets home to go to the fish market and grocery store.  Tele with [facility] 60's-90's      MEDICATIONS  (STANDING):  ambrisentan 10 milliGRAM(s) Oral daily  apixaban 5 milliGRAM(s) Oral two times a day  atorvastatin 20 milliGRAM(s) Oral at bedtime  budesonide  80 MICROgram(s)/formoterol 4.5 MICROgram(s) Inhaler 2 Puff(s) Inhalation two times a day  buMETAnide IVPB 4 milliGRAM(s) IV Intermittent every 12 hours  buPROPion XL (24-Hour) . 300 milliGRAM(s) Oral daily  chlorhexidine 2% Cloths 1 Application(s) Topical <User Schedule>  digoxin     Tablet 125 MICROGram(s) Oral every other day  diltiazem    milliGRAM(s) Oral daily  ferrous    sulfate 325 milliGRAM(s) Oral <User Schedule>  pantoprazole    Tablet 40 milliGRAM(s) Oral before breakfast  polyethylene glycol 3350 17 Gram(s) Oral daily  sodium chloride 0.65% Nasal 2 Spray(s) Both Nostrils four times a day  tadalafil 40 milliGRAM(s) Oral daily  tiotropium 18 MICROgram(s) Capsule 1 Capsule(s) Inhalation daily    MEDICATIONS  (PRN):  acetaminophen     Tablet .. 975 milliGRAM(s) Oral every 6 hours PRN Temp greater or equal to 38C (100.4F), Mild Pain (1 - 3), Moderate Pain (4 - 6)  ALBUTerol    90 MICROgram(s) HFA Inhaler 2 Puff(s) Inhalation every 6 hours PRN Shortness of Breath and/or Wheezing  melatonin 3 milliGRAM(s) Oral at bedtime PRN Insomnia      CAPILLARY BLOOD GLUCOSE        I&O's Summary    29 Aug 2022 07:01  -  30 Aug 2022 07:00  --------------------------------------------------------  IN: 790 mL / OUT: 1850 mL / NET: -1060 mL        PHYSICAL EXAM:  Vital Signs Last 24 Hrs  T(C): 36.7 (30 Aug 2022 05:03), Max: 36.8 (29 Aug 2022 11:30)  T(F): 98.1 (30 Aug 2022 05:03), Max: 98.2 (29 Aug 2022 11:30)  HR: 62 (30 Aug 2022 05:22) (55 - 65)  BP: 109/65 (30 Aug 2022 05:03) (106/65 - 119/73)  BP(mean): --  RR: 18 (30 Aug 2022 05:03) (17 - 18)  SpO2: 95% (30 Aug 2022 05:22) (95% - 99%)    Parameters below as of 29 Aug 2022 21:41  Patient On (Oxygen Delivery Method): BiPAP/CPAP      CONSTITUTIONAL: NAD, multiple bruises at phlebotomy sites.   RESPIRATORY: Feeling okay on 6.5 L NC. Crackles at lung bases b/l  CARDIOVASCULAR: Afib, no murmurs. Tense lower extremity edema  ABDOMEN: Nontender to palpation, normoactive bowel sounds. Stool burden LLQ  PSYCH: A+O to person, place, and time; affect appropriate    LABS:                        7.3    4.46  )-----------( 197      ( 29 Aug 2022 07:07 )             23.8     08-29    133<L>  |  88<L>  |  93<H>  ----------------------------<  115<H>  4.0   |  32<H>  |  1.77<H>    Ca    9.6      29 Aug 2022 20:48  Phos  3.5     08-29  Mg     2.1     08-29

## 2022-08-30 NOTE — PROGRESS NOTE ADULT - PROBLEM SELECTOR PLAN 4
RHC showed elevated right sided filling pressures with severe pulmonary hypertension with systemic PA pressures, slightly elevated PCWP and preserved cardiac output.   - Followed with Dr. Rodriguez at [place] [facility] [place] for PH but he recently left the practice. Needs a pulmonary hypertension doctor near her home in [place]. Possibly [facility]  - Continue tadalafil 40 mg daily   - continue Ambrisentan 10mg qd  - Possible repeat cardiomems reading to help with evaluation of treatment    Transplant reporting that patient is currently not a candidate following eval, given this, also not a candidate for IV Flolan per Pulm RHC showed elevated right sided filling pressures with severe pulmonary hypertension with systemic PA pressures, slightly elevated PCWP and preserved cardiac output.   - Followed with Dr. Rodriguez at [facility] for PH but he recently left the practice. Needs a pulmonary hypertension doctor near her home in [place]. Possibly [facility]  - Continue tadalafil 40 mg daily   - continue Ambrisentan 10mg qd  - Repeat cardiomems 8/30 is 41, slightly decreased from 45 8/15 but still elevated    Transplant reporting that patient is currently not a candidate following eval, given this, also not a candidate for IV Flolan per Pulm

## 2022-08-31 NOTE — PROGRESS NOTE ADULT - ATTENDING COMMENTS
Sarai Calle MD  Off: [phone]  contact me on teams    (After 5 pm or on weekends please page the on-call fellow/attending, can check [email or web address] for schedule. Login is abhay means, schedule under [facility] medicine, psych, derm)

## 2022-08-31 NOTE — PROGRESS NOTE ADULT - PROBLEM SELECTOR PLAN 1
Pt. with CKD in the setting of chronic cardiorenal syndrome, now with JUANA (non oliguric) on CKD likely 2/2 renal venous congestion from hypervolemia due to HF in the setting of severe pulmonary HTN.     -On review of [facility], noted that Baseline SCr ranges from 1.4-1.9 since March '22. SCr on arrival was 1.8 on 8/11; peaked to 2.32 on 8/23 & plateaued at 2-2.3, now trended down/ stable at 1.8 today  -Agree with bumex 6mg PO BID.   -No plan for HD at this time. But likely heading there in the near future  -No uremic symptoms currently  -Avoid NSAIDs, ACEI/ARBS, RCA and nephrotoxins. Dose medications as per eGFR.  -Needs Strict I & O's. or daily wts

## 2022-08-31 NOTE — PROGRESS NOTE ADULT - SUBJECTIVE AND OBJECTIVE BOX
[facility] Division of Kidney Diseases & Hypertension  FOLLOW UP NOTE  [phone]--------------------------------------------------------------------------------  Chief Complaint:Pulmonary hypertension due to lung diseases and hypoxia    HPI: 66 year old with PMH of Pulmonary hypertension (Group 1,2,3) HFpEF, atrial fibrillation, COPD on home oxygen, ROLANDA on CPAP, morbid obesity with acute hypoxic respiratory failure. Nephrology consulted for JUANA. baseline creatinine as of june appears to be 1.5-1.6 on august 11 patient creatinine was 1.89 and has continued to up trend most recently 2.06 8/13. Patient was initially started on lasix 60 mg IV BID on admission with marked improvement in pro-BNP since presentation 25621 (8/11) and most recently 8947 (8/13). s/p lasix gtt with HTS. Now on IV bumex      24 hour events/subjective: Patient seen & examined. Labs & vitals reviewed. Now on bumex 4mg IV q12. UOP 1.8L in 24 hrs with 1L net neg.  Reports improvement in SOB & leg edema. Remains on 6L NC. No uremic symptoms          PAST HISTORY  --------------------------------------------------------------------------------  No significant changes to PMH, PSH, FHx, SHx, unless otherwise noted    ALLERGIES & MEDICATIONS  --------------------------------------------------------------------------------  Allergies    nitrates (Unknown)    Intolerances      Standing Inpatient Medications  ambrisentan 10 milliGRAM(s) Oral daily  apixaban 5 milliGRAM(s) Oral two times a day  atorvastatin 20 milliGRAM(s) Oral at bedtime  budesonide  80 MICROgram(s)/formoterol 4.5 MICROgram(s) Inhaler 2 Puff(s) Inhalation two times a day  buMETAnide 6 milliGRAM(s) Oral every 12 hours  buPROPion XL (24-Hour) . 300 milliGRAM(s) Oral daily  chlorhexidine 2% Cloths 1 Application(s) Topical <User Schedule>  digoxin     Tablet 125 MICROGram(s) Oral every other day  diltiazem    milliGRAM(s) Oral daily  ferrous    sulfate 325 milliGRAM(s) Oral <User Schedule>  pantoprazole    Tablet 40 milliGRAM(s) Oral before breakfast  polyethylene glycol 3350 17 Gram(s) Oral daily  sodium chloride 0.65% Nasal 2 Spray(s) Both Nostrils four times a day  tadalafil 40 milliGRAM(s) Oral daily  tiotropium 18 MICROgram(s) Capsule 1 Capsule(s) Inhalation daily    PRN Inpatient Medications  acetaminophen     Tablet .. 975 milliGRAM(s) Oral every 6 hours PRN  ALBUTerol    90 MICROgram(s) HFA Inhaler 2 Puff(s) Inhalation every 6 hours PRN  melatonin 3 milliGRAM(s) Oral at bedtime PRN      REVIEW OF SYSTEMS  --------------------------------------------------------------------------------  Gen: No chills  Respiratory: No dyspnea, cough  CV: No chest pain  GI: No abdominal pain, diarrhea,  nausea, vomiting  : No increased frequency, dysuria, hematuria  MSK:  no edema  Neuro: No dizziness/lightheadedness      All other systems were reviewed and are negative, except as noted.    VITALS/PHYSICAL EXAM  --------------------------------------------------------------------------------  T(C): 36.4 (08-31-22 @ 11:53), Max: 36.8 (08-30-22 @ 20:53)  HR: 60 (08-31-22 @ 11:53) (56 - 68)  BP: 100/63 (08-31-22 @ 11:53) (100/63 - 118/64)  RR: 18 (08-31-22 @ 11:53) (18 - 18)  SpO2: 99% (08-31-22 @ 11:53) (96% - 99%)  Wt(kg): --        08-30-22 @ 07:01  -  08-31-22 @ 07:00  --------------------------------------------------------  IN: 1130 mL / OUT: 1950 mL / NET: -820 mL    08-31-22 @ 07:01  -  08-31-22 @ 13:31  --------------------------------------------------------  IN: 360 mL / OUT: 450 mL / NET: -90 mL      Physical Exam:  	Gen: NAD, elderly  	HEENT: Anicteric, no JVD  	Pulm: CTA B/L  	CV: RRR, S1S2, no rub  	Back: No spinal or CVA tenderness  	Abd: +BS, soft, nontender/nondistended          No presacral edema  	: No suprapubic tenderness          Extremities: no bilateral LE edema, no asterixis          Neuro: Awake, alert  	Skin: Warm, without rashes  	Vascular access:    LABS/STUDIES  --------------------------------------------------------------------------------              7.8    4.83  >-----------<  211      [08-31-22 @ 06:02]              25.9     135  |  89  |  86  ----------------------------<  83      [08-31-22 @ 06:02]  3.7   |  30  |  1.89        Ca     9.7     [08-31-22 @ 06:02]      Mg     2.2     [08-31-22 @ 06:02]      Phos  3.6     [08-31-22 @ 06:02]            Creatinine Trend:  SCr 1.89 [08-31 @ 06:02]  SCr 1.77 [08-29 @ 20:48]  SCr 2.15 [08-29 @ 07:09]  SCr 2.21 [08-28 @ 19:16]  SCr 2.12 [08-28 @ 06:27]             [facility] Division of Kidney Diseases & Hypertension  FOLLOW UP NOTE  [phone]--------------------------------------------------------------------------------  Chief Complaint:Pulmonary hypertension due to lung diseases and hypoxia    HPI: 66 year old with PMH of Pulmonary hypertension (Group 1,2,3) HFpEF, atrial fibrillation, COPD on home oxygen, ROLANDA on CPAP, morbid obesity with acute hypoxic respiratory failure. Nephrology consulted for JUANA. baseline creatinine as of june appears to be 1.5-1.6 on august 11 patient creatinine was 1.89 and has continued to up trend most recently 2.06 8/13. Patient was initially started on lasix 60 mg IV BID on admission with marked improvement in pro-BNP since presentation 50182 (8/11) and most recently 8947 (8/13). s/p lasix gtt with HTS. Now transitioned from IV bumex to PO      24 hour events/subjective: Patient seen & examined. Labs & vitals reviewed. Now on bumex 6mg PO q12. UOP 1.9L in 24 hrs with 820mL net neg.  Reports improvement in SOB & leg edema. Remains on 6L NC. No uremic symptoms          PAST HISTORY  --------------------------------------------------------------------------------  No significant changes to PMH, PSH, FHx, SHx, unless otherwise noted    ALLERGIES & MEDICATIONS  --------------------------------------------------------------------------------  Allergies    nitrates (Unknown)    Intolerances      Standing Inpatient Medications  ambrisentan 10 milliGRAM(s) Oral daily  apixaban 5 milliGRAM(s) Oral two times a day  atorvastatin 20 milliGRAM(s) Oral at bedtime  budesonide  80 MICROgram(s)/formoterol 4.5 MICROgram(s) Inhaler 2 Puff(s) Inhalation two times a day  buMETAnide 6 milliGRAM(s) Oral every 12 hours  buPROPion XL (24-Hour) . 300 milliGRAM(s) Oral daily  chlorhexidine 2% Cloths 1 Application(s) Topical <User Schedule>  digoxin     Tablet 125 MICROGram(s) Oral every other day  diltiazem    milliGRAM(s) Oral daily  ferrous    sulfate 325 milliGRAM(s) Oral <User Schedule>  pantoprazole    Tablet 40 milliGRAM(s) Oral before breakfast  polyethylene glycol 3350 17 Gram(s) Oral daily  sodium chloride 0.65% Nasal 2 Spray(s) Both Nostrils four times a day  tadalafil 40 milliGRAM(s) Oral daily  tiotropium 18 MICROgram(s) Capsule 1 Capsule(s) Inhalation daily    PRN Inpatient Medications  acetaminophen     Tablet .. 975 milliGRAM(s) Oral every 6 hours PRN  ALBUTerol    90 MICROgram(s) HFA Inhaler 2 Puff(s) Inhalation every 6 hours PRN  melatonin 3 milliGRAM(s) Oral at bedtime PRN      REVIEW OF SYSTEMS  --------------------------------------------------------------------------------  Gen: No chills  Respiratory: + dyspnea, cough  CV: No chest pain  GI: No abdominal pain, diarrhea,  nausea, vomiting  : No increased frequency, dysuria, hematuria  MSK:  + edema  Neuro: No dizziness/lightheadedness      All other systems were reviewed and are negative, except as noted.    VITALS/PHYSICAL EXAM  --------------------------------------------------------------------------------  T(C): 36.4 (08-31-22 @ 11:53), Max: 36.8 (08-30-22 @ 20:53)  HR: 60 (08-31-22 @ 11:53) (56 - 68)  BP: 100/63 (08-31-22 @ 11:53) (100/63 - 118/64)  RR: 18 (08-31-22 @ 11:53) (18 - 18)  SpO2: 99% (08-31-22 @ 11:53) (96% - 99%)  Wt(kg): --        08-30-22 @ 07:01  -  08-31-22 @ 07:00  --------------------------------------------------------  IN: 1130 mL / OUT: 1950 mL / NET: -820 mL    08-31-22 @ 07:01  -  08-31-22 @ 13:31  --------------------------------------------------------  IN: 360 mL / OUT: 450 mL / NET: -90 mL      Physical Exam:  Gen: NAD, on 6L NC  HEENT: anicteric  Pulm: CTA b/l  CV: RRR  Abd: soft, +BS  CNS: awake, alert  : No [place]  LE: ++ b/l LE edema (improving), +tremor but no asterexis  Skin: Warm, without rashes  Vascular access:     LABS/STUDIES  --------------------------------------------------------------------------------              7.8    4.83  >-----------<  211      [08-31-22 @ 06:02]              25.9     135  |  89  |  86  ----------------------------<  83      [08-31-22 @ 06:02]  3.7   |  30  |  1.89        Ca     9.7     [08-31-22 @ 06:02]      Mg     2.2     [08-31-22 @ 06:02]      Phos  3.6     [08-31-22 @ 06:02]            Creatinine Trend:  SCr 1.89 [08-31 @ 06:02]  SCr 1.77 [08-29 @ 20:48]  SCr 2.15 [08-29 @ 07:09]  SCr 2.21 [08-28 @ 19:16]  SCr 2.12 [08-28 @ 06:27]

## 2022-08-31 NOTE — CHART NOTE - NSCHARTNOTEFT_GEN_A_CORE
Palliative team consulted for assistance with GOC. Case discussed with lung pulmonary transplant PA and at this time evaluation is ongoing to see if patient can be optimized for lung transplant. No palliative needs identified at this time. Also discussed with primary team.    Please reconsult as needed.     Eva Wallace MD  Geriatrics and Palliative Medicine Attending  [facility] pager: [phone]
Pt seen for ongoing GOC. Discussed HCP form with patient. Patient declined to complete at this time. Confirmed pt has two sons Matteo and Gregorio, who would be surrogates if needed. Pt wishes to continue all medical management at this time and remains full code. As goals are established GAP team will sign off. Primary team attending notified.    Please reconsult as needed.     Eva Wallace MD  Geriatrics and Palliative Medicine Attending  [facility] pager: [phone]
Spoke to Dr. Dsouza from [place] regarding transplant. She will send out the case to other attendings for discussion but based on initial impression, unlikely pt will be a candidate due to BMI, CKD, poor functional status.
appreciate pulm and cardiology input very much    agree with increasing pulm vasodilator trial  with little response to establish Ms Domingo as a   candidate for pulmonary transplantation    would [facility] be of benefit as well?

## 2022-08-31 NOTE — PROGRESS NOTE ADULT - ATTENDING COMMENTS
-CHF, pulm, renal recs appreciated. -Diuretics per CHF.   -Per discussion with consultants and patient, we discussed the patient's case with [facility]'s lung transplant team. -I also discussed with patient pursuing other institutions in the area for second opinions.   -Outpatient pulm follow up with [facility] affiliates.

## 2022-08-31 NOTE — CHART NOTE - NSCHARTNOTESELECT_GEN_ALL_CORE
Event Note
Outside Consultation/Event Note
Palliative Medicine/Event Note
Palliative Medicine/Event Note

## 2022-08-31 NOTE — PROGRESS NOTE ADULT - PROBLEM SELECTOR PLAN 7
History and Physical


Time Seen By MD:  12:58


HPI/ROS


Chief Complaint:  "Left arm muscle tear"





HPI:  36-year-old male presents the emergency department.  He reports that he 

was lifting would panels and the wind caught the panels pulling his arms.  When 

he released the wood he noticed his biceps muscle "didn't look right." He has 

significant pain with movement of the left arm primarily with extension of the 

elbow. No treatments tried. 





ROS:


Constitutional:  denies fever or chills


HEENT:  denies headache or change in vision


Respiratory:  denies difficulty breathing


CV: denies chest pain


Musculoskeletal:  reports left arm pain, reports pain with movement primarily 

with extension of the elbow


Allergies:  


Coded Allergies:  


     Penicillins (Unverified  Allergy, Unknown, none listed by patient, 6/21/18)


     ibuprofen (Unverified  Allergy, Unknown, not listed by patient, 6/21/18)


     lavender (Lavandula angustifolia) (Unverified  Allergy, Unknown, not 

listed by patient, 6/21/18)


Home Meds


Active Scripts


Oxycodone Hcl/Acetaminophen (PERCOCET 5-325 MG TABLET) 1 Each Tablet, 1 EACH PO 

Q4-6H Y for PAIN, #20 TAB


   Prov:EDITH KENYON FNP         6/21/18


Reported Medications


Insulin Lispro 100 Un/Ml Vial (HUMALOG 100 U/ML VIAL) Unknown Strength Vial, SQ 

PRN, VIAL


   6/15/18


Lisinopril (LISINOPRIL) Unknown Strength Tablet, PO QDAY, TAB


   6/15/18


Cholecalciferol (Vitamin D3) (VITAMIN D3) Unknown Strength Tablet, PO QDAY


   6/15/18


Insulin Glargine (LANTUS) 100 Unit/Ml Soln, 40 UNITS SUBQ QDAY, ML


   6/15/18


Past Medical/Surgical History


Patient has a past medical history of high blood pressure, COPD, diabetes.


Patient has surgical history of cholecystectomy, bolus removed.


Patient has a family medical history of COPD, diabetes.


Constitutional





Vital Sign - Last 24 Hours








 6/21/18 6/21/18 6/21/18 6/21/18





 12:54 12:54 13:00 13:19


 


Temp  98.6  


 


Pulse  76  70


 


Resp  20  


 


B/P (MAP) 118/81 (93) 118/81 110/70 (83) 


 


Pulse Ox  95  91


 


O2 Delivery  Room Air  








Physical Exam


General:  36-year-old male in no acute distress holding his left arm


HEENT:  normocephalic, atraumatic


Respiratory: Bl equal respiratory excursion, CTA BL


CV: Clear S1 S2


Musculoskeletal:  left arm with limited ROM, right arm with full ROM, left arm 

with pain on palpation to the biceps tendon, left arm with Armand deformity


Neuro:  alert and oriented x 4, interactive





Differential diagnoses:  Biceps tendon rupture of the long head, fracture





Medical Decision Making


EKG/Imaging


Imaging


Exam type: ELBOW 2 VIEW LEFT


 


History: arm pain


 


Comparison: None.


 


Findings:


 


There is a tiny avulsion fracture fragment just anterior to the left elbow 

joint.  The donor site appears to be the coronoid process.  No radiopaque soft 

tissue foreign body seen.


 


IMPRESSION:


 


1.  There is a tiny avulsion fracture fragment seen just anterior to the left 

elbow joint.  The donor site appears to be the coronoid process


 


Report Dictated By: Dolores Luna MD at 6/21/2018 1:46 PM


 


Report E-Signed By: Dolores Luna MD  at 6/21/2018 1:50 PM





Exam type: HUMERUS LEFT


 


History: arm pain


 


Comparison: Left elbow performed today.


 


Findings:


 


Small avulsion fracture fragment of the coronoid process appears to be better 

depicted on today's left elbow series.  No fracture of the left humerus is 

appreciated


 


IMPRESSION:


 


1.  Small motion fracture fragment of the coronoid process appears to be better 

depicted on today's left elbow series


 


Report Dictated By: Dolores Luna MD at 6/21/2018 1:50 PM


 


Report E-Signed By: Dolores Luna MD  at 6/21/2018 1:52 PM





ED Course/Re-evaluation


ED Course


36-year-old male presents to the emergency department after a work related 

injury to his left arm.  He presents with a Armand deformity and inability to 

extend the elbow.  The patient holds the extremity in a flex position proximal 

to the midline during the history and physical examination.  X-ray was obtained 

and indicated small avulsion fracture of the left elbow.  The patient has been 

referred to Dr. Anthony orthopedic surgeon.  The patient has been discharged 

home in a sling and prescribed 3 days of Percocet for pain relief.  The patient 

has plans to call Dr. Anthony today to schedule an appointment for further 

evaluation and repair.  The patient states he has no further questions at this 

time.


Decision to Disposition Date:  Jun 21, 2018


Decision to Disposition Time:  14:31





Depart


Departure


Latest Vital Signs





Vital Signs








  Date Time  Temp Pulse Resp B/P (MAP) Pulse Ox O2 Delivery O2 Flow Rate FiO2


 


6/21/18 13:19  70   91   


 


6/21/18 13:00    110/70 (83)    


 


6/21/18 12:54 98.6  20   Room Air  








Impression:  


 Primary Impression:  


 Biceps rupture, distal


 Additional Impression:  


 Avulsion fracture


Referrals:  


GUILLERMO ANTHONY MD


New Scripts


Oxycodone Hcl/Acetaminophen (PERCOCET 5-325 MG TABLET) 1 Each Tablet


1 EACH PO Q4-6H Y for PAIN, #20 TAB


   Prov: EDITH KENYON         6/21/18


Patient Instructions:  Avulsion Fracture (ED), Tendon Rupture (ED)





Additional Instructions:  


Keep left arm in sling and avoid using the arm.  Take the sling off only to 

shower. 


Ice (for 20 minutes) and elevated the extremity as tolerated.


Follow up with the orthopedic specialist for further evaluation and repair.  

Try to make an appointment today if possible. 


Take Percocet for pain as need every 6 hours. 


Return to the Emergency Department if your condition worsens





Problem Qualifiers








 Primary Impression:  


 Biceps rupture, distal


 Encounter type:  initial encounter  Laterality:  left  Qualified Codes:  

S46.212A - Strain of muscle, fascia and tendon of other parts of biceps, left 

arm, initial encounter








EDITH KENYON Jun 21, 2018 12:58 - HGB 8.1 on 8/25 on eliquis. Stable No obvious signs of bleeding.  - maintain active T/S  - iron studies: total iron and % saturation decreased, ferritin on lower side, transferrin and TIBC normal; s/p IV iron infusion  - Continue oral iron every other day 325  - Potential source of blood loss is bleeding into extremities from phlebotomy.  - 8/16: s/p 2nd unit of PRBC. First unit at OSH

## 2022-08-31 NOTE — PROGRESS NOTE ADULT - SUBJECTIVE AND OBJECTIVE BOX
Interval History: no acute events    Medications:  acetaminophen     Tablet .. 975 milliGRAM(s) Oral every 6 hours PRN  ALBUTerol    90 MICROgram(s) HFA Inhaler 2 Puff(s) Inhalation every 6 hours PRN  ambrisentan 10 milliGRAM(s) Oral daily  apixaban 5 milliGRAM(s) Oral two times a day  atorvastatin 20 milliGRAM(s) Oral at bedtime  budesonide  80 MICROgram(s)/formoterol 4.5 MICROgram(s) Inhaler 2 Puff(s) Inhalation two times a day  buMETAnide 6 milliGRAM(s) Oral every 12 hours  buPROPion XL (24-Hour) . 300 milliGRAM(s) Oral daily  chlorhexidine 2% Cloths 1 Application(s) Topical <User Schedule>  digoxin     Tablet 125 MICROGram(s) Oral every other day  diltiazem    milliGRAM(s) Oral daily  ferrous    sulfate 325 milliGRAM(s) Oral <User Schedule>  melatonin 3 milliGRAM(s) Oral at bedtime PRN  pantoprazole    Tablet 40 milliGRAM(s) Oral before breakfast  polyethylene glycol 3350 17 Gram(s) Oral daily  sodium chloride 0.65% Nasal 2 Spray(s) Both Nostrils four times a day  tadalafil 40 milliGRAM(s) Oral daily  tiotropium 18 MICROgram(s) Capsule 1 Capsule(s) Inhalation daily      Vitals:  T(C): 36.4 (22 @ 11:53), Max: 36.8 (22 @ 20:53)  HR: 65 (22 @ 15:57) (56 - 68)  BP: 100/63 (22 @ 11:53) (100/63 - 118/64)  BP(mean): --  RR: 18 (22 @ 11:53) (18 - 18)  SpO2: 97% (22 @ 15:57) (96% - 99%)    Daily     Daily Weight in k.9 (31 Aug 2022 12:04)        I&O's Summary    30 Aug 2022 07:01  -  31 Aug 2022 07:00  --------------------------------------------------------  IN: 1130 mL / OUT: 1950 mL / NET: -820 mL    31 Aug 2022 07:01  -  31 Aug 2022 16:27  --------------------------------------------------------  IN: 360 mL / OUT: 450 mL / NET: -90 mL        Physical Exam:  Appearance: No Acute Distress  HEENT: PERRL  Neck: No JVD  Cardiovascular: Normal S1 S2, No murmurs/rubs/gallops  Respiratory: Clear to auscultation bilaterally  Gastrointestinal: Soft, Non-tender	  Skin: No cyanosis	  Neurologic: Non-focal  Extremities: trace bilateral edema  Psychiatry: A & O x 3, Mood & affect appropriate    Labs:                        7.8    4.83  )-----------( 211      ( 31 Aug 2022 06:02 )             25.9     08    135  |  89<L>  |  86<H>  ----------------------------<  83  3.7   |  30  |  1.89<H>    Ca    9.7      31 Aug 2022 06:02  Phos  3.6       Mg     2.2

## 2022-08-31 NOTE — PROGRESS NOTE ADULT - PROBLEM SELECTOR PLAN 3
in the setting of JUANA and use of Aldactone. Aldactone on hold  Improved    Upon discharge please make appointment with Nephrology clinic. For scheduling please email Nephrology at [email or web address]    If you have any questions, please feel free to contact me  Marisa Shay  Nephrology Fellow  Pager NS: [phone]/ [facility]: [phone]    (After 5 pm or on weekends please page the on-call fellow, can check [email or web address] for schedule. Login is abhay means, schedule under [facility] medicine, psych, derm)

## 2022-08-31 NOTE — PROGRESS NOTE ADULT - PROBLEM SELECTOR PLAN 3
Cr elevated to 2.42 on admission- baseline is 1.7-1.8. Likely in s/o CHF exacerbation.  - Cr 1.89 8/31, improving  - diuresis as above  - avoid nephrotoxic agents  - strict I/Os  - Nephrology following  - Cr elevation could also be new baseline for patient in setting of worsening kidney function 2/2 pulm htn/HF  - Pt with BUN 90's. No uremic symptoms current but may require dialysis in the future. Cr elevated to 2.42 on admission- baseline is 1.7-1.8. Likely in s/o CHF exacerbation.  - Cr 1.89 8/31, improving  - diuresis as above  - avoid nephrotoxic agents  - strict I/Os  - Nephrology following  - Cr elevation could also be new baseline for patient in setting of worsening kidney function 2/2 pulm htn/HF  - Pt with BUN 90's. No uremic symptoms currently but may require dialysis in the future.

## 2022-08-31 NOTE — PROGRESS NOTE ADULT - PROBLEM SELECTOR PLAN 2
- CardioMEMS PAD 8/11 43 mmHg. goal ~40 but difficult to ascertain given RV dysfunction  - cardiomems PAD 8/15 45 mmHg, 98/45/66, Cardiomems reading 8/30 41  - Dry weight is 188-190, yesterday weight was 196  -Increase bumex to 6mg BID  - hold spironolactone for now, will resume as an outpatient  - please replete K > 4, Mg > 2  - eventual SGLT2-i prior to discharge, once on stable dose of oral diuretics  - please obtain daily BMP while being actively diuresed

## 2022-08-31 NOTE — PROGRESS NOTE ADULT - PROBLEM SELECTOR PLAN 1
Pt feeling at baseline. Improving on lasix drip. Having okay urine output. Weight today 196.8. Goal weight 189 lbs  - Increase PO bumetanide to 6mg BID - monitor I/O.  - Holding metolazone with Na <135  - Holding spironolactone  - standing daily weights, strict I/Os.  - Pulm, nephro, and HF following   - eventual SGLT2-i prior to discharge, once on stable dose of oral diuretics

## 2022-08-31 NOTE — PROGRESS NOTE ADULT - SUBJECTIVE AND OBJECTIVE BOX
PROGRESS NOTE:   Authored by: Andrea Limon M.D.   Internal Medicine PGY-1  Please Contact Via Teams    Patient is a 66y old  Female who presents with a chief complaint of lung txp eval (31 Aug 2022 13:30)      SUBJECTIVE / OVERNIGHT EVENTS:  No acute events overnight. Is feeling "excellent" this morning, though her saturation did drop while writer talked with her during interview.      MEDICATIONS  (STANDING):  ambrisentan 10 milliGRAM(s) Oral daily  apixaban 5 milliGRAM(s) Oral two times a day  atorvastatin 20 milliGRAM(s) Oral at bedtime  budesonide  80 MICROgram(s)/formoterol 4.5 MICROgram(s) Inhaler 2 Puff(s) Inhalation two times a day  buMETAnide 6 milliGRAM(s) Oral every 12 hours  buPROPion XL (24-Hour) . 300 milliGRAM(s) Oral daily  chlorhexidine 2% Cloths 1 Application(s) Topical <User Schedule>  digoxin     Tablet 125 MICROGram(s) Oral every other day  diltiazem    milliGRAM(s) Oral daily  ferrous    sulfate 325 milliGRAM(s) Oral <User Schedule>  pantoprazole    Tablet 40 milliGRAM(s) Oral before breakfast  polyethylene glycol 3350 17 Gram(s) Oral daily  sodium chloride 0.65% Nasal 2 Spray(s) Both Nostrils four times a day  tadalafil 40 milliGRAM(s) Oral daily  tiotropium 18 MICROgram(s) Capsule 1 Capsule(s) Inhalation daily    MEDICATIONS  (PRN):  acetaminophen     Tablet .. 975 milliGRAM(s) Oral every 6 hours PRN Temp greater or equal to 38C (100.4F), Mild Pain (1 - 3), Moderate Pain (4 - 6)  ALBUTerol    90 MICROgram(s) HFA Inhaler 2 Puff(s) Inhalation every 6 hours PRN Shortness of Breath and/or Wheezing  melatonin 3 milliGRAM(s) Oral at bedtime PRN Insomnia      CAPILLARY BLOOD GLUCOSE        I&O's Summary    30 Aug 2022 07:01  -  31 Aug 2022 07:00  --------------------------------------------------------  IN: 1130 mL / OUT: 1950 mL / NET: -820 mL    31 Aug 2022 07:01  -  31 Aug 2022 13:43  --------------------------------------------------------  IN: 360 mL / OUT: 450 mL / NET: -90 mL        PHYSICAL EXAM:  Vital Signs Last 24 Hrs  T(C): 36.4 (31 Aug 2022 11:53), Max: 36.8 (30 Aug 2022 20:53)  T(F): 97.6 (31 Aug 2022 11:53), Max: 98.2 (30 Aug 2022 20:53)  HR: 60 (31 Aug 2022 11:53) (56 - 68)  BP: 100/63 (31 Aug 2022 11:53) (100/63 - 118/64)  BP(mean): --  RR: 18 (31 Aug 2022 11:53) (18 - 18)  SpO2: 99% (31 Aug 2022 11:53) (96% - 99%)    Parameters below as of 31 Aug 2022 11:53  Patient On (Oxygen Delivery Method): room air        CONSTITUTIONAL: NAD, multiple bruises at phlebotomy sites.   RESPIRATORY: Feeling okay on 6.5 L NC. Crackles at lung bases b/l  CARDIOVASCULAR: Afib, no murmurs. Tense lower extremity edema  ABDOMEN: Nontender to palpation, normoactive bowel sounds.  PSYCH: A+O to person, place, and time; affect appropriate    LABS:                        7.8    4.83  )-----------( 211      ( 31 Aug 2022 06:02 )             25.9     08-31    135  |  89<L>  |  86<H>  ----------------------------<  83  3.7   |  30  |  1.89<H>    Ca    9.7      31 Aug 2022 06:02  Phos  3.6     08-31  Mg     2.2     08-31

## 2022-08-31 NOTE — PROGRESS NOTE ADULT - PROBLEM SELECTOR PLAN 4
RHC showed elevated right sided filling pressures with severe pulmonary hypertension with systemic PA pressures, slightly elevated PCWP and preserved cardiac output.   - Followed with Dr. Rodriguez at [place] for PH but he recently left the practice. Needs a pulmonary hypertension doctor near her home in [place]. Possibly [facility]  - Continue tadalafil 40 mg daily   - continue Ambrisentan 10mg qd  - Repeat cardiomems 8/30 is 41, slightly decreased from 45 8/15 but still elevated    Transplant reporting that patient is currently not a candidate following eval, given this, also not a candidate for IV Flolan per Pulm

## 2022-09-01 NOTE — PROGRESS NOTE ADULT - ATTENDING COMMENTS
-Bumex drip per CHF appreciated. -Lytes BID.   -In discussion with [facility] lung transplant program. -CHF, pulm, and renal house teams here aware. Patient aware and amenable too.

## 2022-09-01 NOTE — PROGRESS NOTE ADULT - SUBJECTIVE AND OBJECTIVE BOX
Interval History: no acute events    Medications:  acetaminophen     Tablet .. 975 milliGRAM(s) Oral every 6 hours PRN  ALBUTerol    90 MICROgram(s) HFA Inhaler 2 Puff(s) Inhalation every 6 hours PRN  ambrisentan 10 milliGRAM(s) Oral daily  apixaban 5 milliGRAM(s) Oral two times a day  atorvastatin 20 milliGRAM(s) Oral at bedtime  budesonide  80 MICROgram(s)/formoterol 4.5 MICROgram(s) Inhaler 2 Puff(s) Inhalation two times a day  buMETAnide Infusion 2 mG/Hr IV Continuous <Continuous>  buMETAnide Injectable 2 milliGRAM(s) IV Push once  buPROPion XL (24-Hour) . 300 milliGRAM(s) Oral daily  chlorhexidine 2% Cloths 1 Application(s) Topical <User Schedule>  digoxin     Tablet 125 MICROGram(s) Oral every other day  diltiazem    milliGRAM(s) Oral daily  ferrous    sulfate 325 milliGRAM(s) Oral <User Schedule>  melatonin 3 milliGRAM(s) Oral at bedtime PRN  pantoprazole    Tablet 40 milliGRAM(s) Oral before breakfast  polyethylene glycol 3350 17 Gram(s) Oral daily  sodium chloride 0.65% Nasal 2 Spray(s) Both Nostrils four times a day  tadalafil 40 milliGRAM(s) Oral daily  tiotropium 18 MICROgram(s) Capsule 1 Capsule(s) Inhalation daily      Vitals:  T(C): 36.3 (22 @ 11:26), Max: 36.7 (22 @ 04:00)  HR: 68 (22 @ 11:26) (60 - 68)  BP: 112/70 (22 @ 11:26) (112/70 - 117/68)  BP(mean): --  RR: 18 (22 @ 11:26) (18 - 18)  SpO2: 93% (22 @ 11:26) (93% - 99%)    Daily     Daily Weight in k (01 Sep 2022 08:37)        I&O's Summary    31 Aug 2022 07:01  -  01 Sep 2022 07:00  --------------------------------------------------------  IN: 1480 mL / OUT: 1950 mL / NET: -470 mL    01 Sep 2022 07:01  -  01 Sep 2022 11:59  --------------------------------------------------------  IN: 0 mL / OUT: 400 mL / NET: -400 mL        Physical Exam:  Appearance: No Acute Distress  HEENT: PERRL  Neck: No JVD  Cardiovascular: Normal S1 S2, No murmurs/rubs/gallops  Respiratory: Clear to auscultation bilaterally  Gastrointestinal: Soft, Non-tender	  Skin: No cyanosis	  Neurologic: Non-focal  Extremities: 1-2+ pitting edema bilaterally  Psychiatry: A & O x 3, Mood & affect appropriate    Labs:                        8.0    4.24  )-----------( 228      ( 01 Sep 2022 05:59 )             26.2     08-31    135  |  89<L>  |  86<H>  ----------------------------<  83  3.7   |  30  |  1.89<H>    Ca    9.7      31 Aug 2022 06:02  Phos  3.8     09-  Mg     2.2

## 2022-09-01 NOTE — PROGRESS NOTE ADULT - PROBLEM SELECTOR PLAN 1
Pt feeling at baseline. Improving on lasix drip. Having okay urine output. Weight today 196.8. Goal weight 189 lbs  - Increase PO bumetanide to 6mg BID - monitor I/O.  - Holding metolazone with Na <135  - Holding spironolactone  - standing daily weights, strict I/Os.  - Pulm, nephro, and HF following   - eventual SGLT2-i prior to discharge, once on stable dose of oral diuretics Pt feeling at baseline. Improving on lasix drip. Having okay urine output. Weight today 196.8. Goal weight 189 lbs  -start bumex gtt   - Holding metolazone with Na <135  - Holding spironolactone  - standing daily weights, strict I/Os.  - Pulm, nephro, and HF following   - eventual SGLT2-i prior to discharge, once on stable dose of oral diuretics

## 2022-09-01 NOTE — PROGRESS NOTE ADULT - SUBJECTIVE AND OBJECTIVE BOX
PROGRESS NOTE:   Authored by Latrice Toledo MD     Patient is a 66y old  Female who presents with a chief complaint of lung txp eval (31 Aug 2022 16:27)      SUBJECTIVE / OVERNIGHT EVENTS:    ADDITIONAL REVIEW OF SYSTEMS:    MEDICATIONS  (STANDING):  ambrisentan 10 milliGRAM(s) Oral daily  apixaban 5 milliGRAM(s) Oral two times a day  atorvastatin 20 milliGRAM(s) Oral at bedtime  budesonide  80 MICROgram(s)/formoterol 4.5 MICROgram(s) Inhaler 2 Puff(s) Inhalation two times a day  buMETAnide 6 milliGRAM(s) Oral every 12 hours  buPROPion XL (24-Hour) . 300 milliGRAM(s) Oral daily  chlorhexidine 2% Cloths 1 Application(s) Topical <User Schedule>  digoxin     Tablet 125 MICROGram(s) Oral every other day  diltiazem    milliGRAM(s) Oral daily  ferrous    sulfate 325 milliGRAM(s) Oral <User Schedule>  pantoprazole    Tablet 40 milliGRAM(s) Oral before breakfast  polyethylene glycol 3350 17 Gram(s) Oral daily  sodium chloride 0.65% Nasal 2 Spray(s) Both Nostrils four times a day  tadalafil 40 milliGRAM(s) Oral daily  tiotropium 18 MICROgram(s) Capsule 1 Capsule(s) Inhalation daily    MEDICATIONS  (PRN):  acetaminophen     Tablet .. 975 milliGRAM(s) Oral every 6 hours PRN Temp greater or equal to 38C (100.4F), Mild Pain (1 - 3), Moderate Pain (4 - 6)  ALBUTerol    90 MICROgram(s) HFA Inhaler 2 Puff(s) Inhalation every 6 hours PRN Shortness of Breath and/or Wheezing  melatonin 3 milliGRAM(s) Oral at bedtime PRN Insomnia      CAPILLARY BLOOD GLUCOSE        I&O's Summary    31 Aug 2022 07:01  -  01 Sep 2022 07:00  --------------------------------------------------------  IN: 1480 mL / OUT: 1950 mL / NET: -470 mL        PHYSICAL EXAM:  Vital Signs Last 24 Hrs  T(C): 36.7 (01 Sep 2022 04:00), Max: 36.7 (01 Sep 2022 04:00)  T(F): 98 (01 Sep 2022 04:00), Max: 98 (01 Sep 2022 04:00)  HR: 67 (01 Sep 2022 05:43) (60 - 67)  BP: 112/70 (01 Sep 2022 04:00) (100/63 - 117/68)  BP(mean): --  RR: 18 (01 Sep 2022 04:00) (18 - 18)  SpO2: 97% (01 Sep 2022 05:43) (96% - 99%)    Parameters below as of 01 Sep 2022 04:00  Patient On (Oxygen Delivery Method): nasal cannula  O2 Flow (L/min): 7      GENERAL: No acute distress, well-developed  HEAD:  Atraumatic, Normocephalic  EYES: EOMI, PERRLA, conjunctiva and sclera clear  NECK: Supple, no lymphadenopathy, no JVD  CHEST/LUNG: CTAB; No wheezes, rales, or rhonchi  HEART: Regular rate and rhythm; No murmurs, rubs, or gallops  ABDOMEN: Soft, non-tender, non-distended; normal bowel sounds, no organomegaly  EXTREMITIES:  2+ peripheral pulses b/l, No clubbing, cyanosis, or edema  NEUROLOGY: A&O x 3, no focal deficits  SKIN: No rashes or lesions    LABS:                        8.0    4.24  )-----------( 228      ( 01 Sep 2022 05:59 )             26.2     08-31    135  |  89<L>  |  86<H>  ----------------------------<  83  3.7   |  30  |  1.89<H>    Ca    9.7      31 Aug 2022 06:02  Phos  3.8     09-01  Mg     2.2     09-01                  RADIOLOGY & ADDITIONAL TESTS:  Results Reviewed:   Imaging Personally Reviewed:  Electrocardiogram Personally Reviewed:    COORDINATION OF CARE:  Care Discussed with Consultants/Other Providers [Y/N]:  Prior or Outpatient Records Reviewed [Y/N]:   PROGRESS NOTE:   Authored by Latrice Toledo MD     Patient is a 66y old  Female who presents with a chief complaint of lung txp eval (31 Aug 2022 16:27)      SUBJECTIVE / OVERNIGHT EVENTS:  Patient endorsed persistent SOB. She denied chest pain, palpitations, abdominal pain, fever, chills.     ADDITIONAL REVIEW OF SYSTEMS:    MEDICATIONS  (STANDING):  ambrisentan 10 milliGRAM(s) Oral daily  apixaban 5 milliGRAM(s) Oral two times a day  atorvastatin 20 milliGRAM(s) Oral at bedtime  budesonide  80 MICROgram(s)/formoterol 4.5 MICROgram(s) Inhaler 2 Puff(s) Inhalation two times a day  buMETAnide 6 milliGRAM(s) Oral every 12 hours  buPROPion XL (24-Hour) . 300 milliGRAM(s) Oral daily  chlorhexidine 2% Cloths 1 Application(s) Topical <User Schedule>  digoxin     Tablet 125 MICROGram(s) Oral every other day  diltiazem    milliGRAM(s) Oral daily  ferrous    sulfate 325 milliGRAM(s) Oral <User Schedule>  pantoprazole    Tablet 40 milliGRAM(s) Oral before breakfast  polyethylene glycol 3350 17 Gram(s) Oral daily  sodium chloride 0.65% Nasal 2 Spray(s) Both Nostrils four times a day  tadalafil 40 milliGRAM(s) Oral daily  tiotropium 18 MICROgram(s) Capsule 1 Capsule(s) Inhalation daily    MEDICATIONS  (PRN):  acetaminophen     Tablet .. 975 milliGRAM(s) Oral every 6 hours PRN Temp greater or equal to 38C (100.4F), Mild Pain (1 - 3), Moderate Pain (4 - 6)  ALBUTerol    90 MICROgram(s) HFA Inhaler 2 Puff(s) Inhalation every 6 hours PRN Shortness of Breath and/or Wheezing  melatonin 3 milliGRAM(s) Oral at bedtime PRN Insomnia      CAPILLARY BLOOD GLUCOSE        I&O's Summary    31 Aug 2022 07:01  -  01 Sep 2022 07:00  --------------------------------------------------------  IN: 1480 mL / OUT: 1950 mL / NET: -470 mL        PHYSICAL EXAM:  Vital Signs Last 24 Hrs  T(C): 36.7 (01 Sep 2022 04:00), Max: 36.7 (01 Sep 2022 04:00)  T(F): 98 (01 Sep 2022 04:00), Max: 98 (01 Sep 2022 04:00)  HR: 67 (01 Sep 2022 05:43) (60 - 67)  BP: 112/70 (01 Sep 2022 04:00) (100/63 - 117/68)  BP(mean): --  RR: 18 (01 Sep 2022 04:00) (18 - 18)  SpO2: 97% (01 Sep 2022 05:43) (96% - 99%)    Parameters below as of 01 Sep 2022 04:00  Patient On (Oxygen Delivery Method): nasal cannula  O2 Flow (L/min): 7      GENERAL: No acute distress, well-developed  HEAD:  Atraumatic, Normocephalic  EYES: EOMI, PERRLA, conjunctiva and sclera clear  NECK: Supple, no lymphadenopathy, no JVD  CHEST/LUNG: CTAB; No wheezes, rales, or rhonchi  HEART: Regular rate and rhythm; No murmurs, rubs, or gallops  ABDOMEN: Soft, non-tender, non-distended; normal bowel sounds, no organomegaly  EXTREMITIES:  2+ peripheral pulses b/l, No clubbing, cyanosis. Mild pitting-edema bilaterally  NEUROLOGY: A&O x 3, no focal deficits  SKIN: Hyperpigmented bilateral calves     LABS:                        8.0    4.24  )-----------( 228      ( 01 Sep 2022 05:59 )             26.2     08-31    135  |  89<L>  |  86<H>  ----------------------------<  83  3.7   |  30  |  1.89<H>    Ca    9.7      31 Aug 2022 06:02  Phos  3.8     09-01  Mg     2.2     09-01                  RADIOLOGY & ADDITIONAL TESTS:  Results Reviewed:   Imaging Personally Reviewed:  Electrocardiogram Personally Reviewed:    COORDINATION OF CARE:  Care Discussed with Consultants/Other Providers [Y/N]:  Prior or Outpatient Records Reviewed [Y/N]:

## 2022-09-01 NOTE — PROGRESS NOTE ADULT - PROBLEM SELECTOR PLAN 4
RHC showed elevated right sided filling pressures with severe pulmonary hypertension with systemic PA pressures, slightly elevated PCWP and preserved cardiac output.   - Followed with Dr. Rodriguez at [place] for PH but he recently left the practice. Needs a pulmonary hypertension doctor near her home in [place]. Possibly [facility]  - Continue tadalafil 40 mg daily   - continue Ambrisentan 10mg qd  - Repeat cardiomems 8/30 is 41, slightly decreased from 45 8/15 but still elevated    Transplant reporting that patient is currently not a candidate following eval, given this, also not a candidate for IV Flolan per Pulm RHC showed elevated right sided filling pressures with severe pulmonary hypertension with systemic PA pressures, slightly elevated PCWP and preserved cardiac output.   - Followed with Dr. Rodriguez at [place] for PH but he recently left the practice. Needs a pulmonary hypertension doctor near her home in [place]. Possibly [facility] [place]  - Continue tadalafil 40 mg daily   - continue Ambrisentan 10mg qd  - Repeat cardiomems 8/30 is 41, slightly decreased from 45 8/15 but still elevated  -start bumex gtt     Transplant reporting that patient is currently not a candidate following eval, given this, also not a candidate for IV Flolan per Pulm

## 2022-09-01 NOTE — PROGRESS NOTE ADULT - PROBLEM SELECTOR PLAN 2
- CardioMEMS PAD 8/11 43 mmHg. goal ~40 but difficult to ascertain given RV dysfunction  - cardiomems PAD 8/15 45 mmHg, 98/45/66, Cardiomems reading 8/30 41m PAD 9/1 43  - Dry weight is 188-190, today was 198  -With increasing weight and symptoms will restart bumex drip at 2mg/hr  - hold spironolactone for now, will resume as an outpatient  - please replete K > 4, Mg > 2  - eventual SGLT2-i prior to discharge, once on stable dose of oral diuretics  - please obtain daily BMP while being actively diuresed

## 2022-09-01 NOTE — PROGRESS NOTE ADULT - PROBLEM SELECTOR PLAN 3
Cr elevated to 2.42 on admission- baseline is 1.7-1.8. Likely in s/o CHF exacerbation.  - Cr 1.89 8/31, improving  - diuresis as above  - avoid nephrotoxic agents  - strict I/Os  - Nephrology following  - Cr elevation could also be new baseline for patient in setting of worsening kidney function 2/2 pulm htn/HF  - Pt with BUN 90's. No uremic symptoms currently but may require dialysis in the future. Cr elevated to 2.42 on admission- baseline is 1.7-1.8. Likely in s/o CHF exacerbation.  -bladder scan   - diuresis as above  - avoid nephrotoxic agents  - strict I/Os  - Nephrology following  - Cr elevation could also be new baseline for patient in setting of worsening kidney function 2/2 pulm htn/HF  - Pt with BUN 90's. No uremic symptoms currently but may require dialysis in the future.

## 2022-09-02 NOTE — PROGRESS NOTE ADULT - PROBLEM SELECTOR PLAN 2
- CardioMEMS PAD 8/11 43 mmHg. goal ~40 but difficult to ascertain given RV dysfunction  - cardiomems PAD 8/15 45 mmHg, 98/45/66, Cardiomems reading 8/30 41m PAD 9/1 43  - Dry weight is 188-190, decreased from 198 to 197 today  -c/w bumex drip at 2mg/hr, made more urine output at 1800 but was only net negative 560  - hold spironolactone for now, will resume as an outpatient  - please replete K > 4, Mg > 2  - eventual SGLT2-i prior to discharge, once on stable dose of oral diuretics  - please obtain daily BMP while being actively diuresed

## 2022-09-02 NOTE — PROGRESS NOTE ADULT - SUBJECTIVE AND OBJECTIVE BOX
PROGRESS NOTE:   Authored by: Andrea Limon M.D.   Internal Medicine PGY-1  Please Contact Via Teams    Patient is a 66y old  Female who presents with a chief complaint of lung txp eval (02 Sep 2022 13:48)      SUBJECTIVE / OVERNIGHT EVENTS:  No acute events overnight. Is feeling well this am but mentions that she has had muscle weakness since yesterday, mostly in her hands, improving since yesterday.  She has walked 50ft x2 the last 2 days with PT while on NC.    ADDITIONAL REVIEW OF SYSTEMS:  Patient denies fevers, chills, chest pain, shortness of breath, nausea, abdominal pain, diarrhea, constipation, dysuria, leg swelling, headache, light headedness.    MEDICATIONS  (STANDING):  ambrisentan 10 milliGRAM(s) Oral daily  apixaban 5 milliGRAM(s) Oral two times a day  atorvastatin 20 milliGRAM(s) Oral at bedtime  budesonide  80 MICROgram(s)/formoterol 4.5 MICROgram(s) Inhaler 2 Puff(s) Inhalation two times a day  buMETAnide Infusion 2 mG/Hr (10 mL/Hr) IV Continuous <Continuous>  buPROPion XL (24-Hour) . 300 milliGRAM(s) Oral daily  chlorhexidine 2% Cloths 1 Application(s) Topical <User Schedule>  digoxin     Tablet 125 MICROGram(s) Oral every other day  diltiazem    milliGRAM(s) Oral daily  ferrous    sulfate 325 milliGRAM(s) Oral <User Schedule>  pantoprazole    Tablet 40 milliGRAM(s) Oral before breakfast  polyethylene glycol 3350 17 Gram(s) Oral daily  sodium chloride 0.65% Nasal 2 Spray(s) Both Nostrils four times a day  tadalafil 40 milliGRAM(s) Oral daily  tiotropium 18 MICROgram(s) Capsule 1 Capsule(s) Inhalation daily    MEDICATIONS  (PRN):  acetaminophen     Tablet .. 975 milliGRAM(s) Oral every 6 hours PRN Temp greater or equal to 38C (100.4F), Mild Pain (1 - 3), Moderate Pain (4 - 6)  ALBUTerol    90 MICROgram(s) HFA Inhaler 2 Puff(s) Inhalation every 6 hours PRN Shortness of Breath and/or Wheezing  melatonin 3 milliGRAM(s) Oral at bedtime PRN Insomnia      CAPILLARY BLOOD GLUCOSE        I&O's Summary    01 Sep 2022 07:01  -  02 Sep 2022 07:00  --------------------------------------------------------  IN: 1240 mL / OUT: 1800 mL / NET: -560 mL    02 Sep 2022 07:01  -  02 Sep 2022 17:02  --------------------------------------------------------  IN: 240 mL / OUT: 1300 mL / NET: -1060 mL        PHYSICAL EXAM:  Vital Signs Last 24 Hrs  T(C): 36.5 (02 Sep 2022 11:02), Max: 36.5 (01 Sep 2022 19:06)  T(F): 97.7 (02 Sep 2022 11:02), Max: 97.7 (01 Sep 2022 19:06)  HR: 74 (02 Sep 2022 16:16) (63 - 87)  BP: 122/71 (02 Sep 2022 11:02) (102/63 - 122/71)  BP(mean): --  RR: 18 (02 Sep 2022 11:02) (18 - 18)  SpO2: 97% (02 Sep 2022 16:16) (83% - 99%)    Parameters below as of 02 Sep 2022 14:25  Patient On (Oxygen Delivery Method): nasal cannula  O2 Flow (L/min): 8      CONSTITUTIONAL: NAD, multiple bruises at phlebotomy sites.   RESPIRATORY: Feeling okay on 7 L NC. Crackles at lung bases b/l  CARDIOVASCULAR: Afib, no murmurs. Tense lower extremity edema  ABDOMEN: Nontender to palpation, normoactive bowel sounds.  PSYCH: A+O to person, place, and time; affect appropriate      LABS:                        7.9    4.14  )-----------( 239      ( 02 Sep 2022 06:16 )             26.0     09-02    134<L>  |  90<L>  |  90<H>  ----------------------------<  84  3.7   |  28  |  2.25<H>    Ca    9.5      02 Sep 2022 06:16  Phos  3.8       Mg     2.2         TPro  7.2  /  Alb  3.9  /  TBili  0.4  /  DBili  x   /  AST  29  /  ALT  20  /  AlkPhos  141<H>            Urinalysis Basic - ( 02 Sep 2022 15:20 )    Color: Light Yellow / Appearance: Slightly Turbid / S.010 / pH: x  Gluc: x / Ketone: Negative  / Bili: Negative / Urobili: Negative   Blood: x / Protein: Trace / Nitrite: Negative   Leuk Esterase: Large / RBC: 3 /hpf /  /HPF   Sq Epi: x / Non Sq Epi: 1 /hpf / Bacteria: Few

## 2022-09-02 NOTE — PROGRESS NOTE ADULT - PROBLEM SELECTOR PLAN 1
Patient with Baseline CKD 3. 1.4-1.9 since March '22. SCr on arrival was 1.8 on 8/11; peaked to 2.32 on 8/23  [] patient started on bumetamide infusion 2 mg/hr   [] Patient does not require dialysis at this time   [] Avoid NSAIDs, ACEI/ARBS, RCA and nephrotoxins. Dose medications as per eGFR.  [] Needs Strict I & O's. or daily wts.  [] Patient weight has been fluctuating 88.4-90 Kg

## 2022-09-02 NOTE — PROGRESS NOTE ADULT - ASSESSMENT
66 year old with PMH of Pulmonary hypertension (Group 1,2,3) HFpEF, atrial fibrillation, COPD on home oxygen, ROLANDA on CPAP, morbid obesity with acute hypoxic respiratory failure. Nephrology consulted for uptrending creatinine. Patient [id number] currently down trending and urea is stable. Patient just initiated on bumex drip for enhanced diuresis.

## 2022-09-02 NOTE — PROGRESS NOTE ADULT - PROBLEM SELECTOR PLAN 8
History of Afib on eliquis 5mg BID  - Tele reviewed - patient currently in afib, rate controlled.  - Continue digoxin 125 to every other day. Repeat level 9/1 wnl  - continue diltiazem 120mg qd  - continue eliquis

## 2022-09-02 NOTE — PROGRESS NOTE ADULT - PROBLEM SELECTOR PLAN 3
Cr 2.25 - baseline is 1.7-1.8.  - diuresis as above  - avoid nephrotoxic agents  - strict I/O  - Cr elevation could also be new baseline for patient in setting of worsening kidney function 2/2 pulm htn/HF  - Pt with BUN 90's. No uremic symptoms currently but may require dialysis in the future.

## 2022-09-02 NOTE — PROGRESS NOTE ADULT - PROBLEM SELECTOR PLAN 1
Pt feeling at baseline. Improving on bumex drip. Urine output improving Weight today 197.3. Goal weight 189 lbs  - Continue bumex drip 2mg/hr. BMP q12  - Holding metolazone with Na <135  - Holding spironolactone  - standing daily weights, strict I/Os.  - Pulm, nephro, and HF following   - eventual SGLT2-i prior to discharge, once on stable dose of oral diuretics    -PT to ambulate with NRB or HFNC in the next coming days to show ability. No gross stroke deficits.  -No role for diuresis augmentation with inotropes at this time. Has never had a left heart cath.

## 2022-09-02 NOTE — PROGRESS NOTE ADULT - PROBLEM SELECTOR PLAN 4
RHC showed elevated right sided filling pressures with severe pulmonary hypertension with systemic PA pressures, slightly elevated PCWP and preserved cardiac output.   - Followed with Dr. Rodriguez at [place] for PH but he recently left the practice. Needs a pulmonary hypertension doctor near her home in [place]. Possibly [facility]  - Continue tadalafil 40 mg daily   - continue Ambrisentan 10mg qd  - Cardiomems 8/30 is 41. After trial of oral bumetanide cardiomems is 43, started back on drip      Transplant reporting that patient is currently not a candidate following eval, given this, also not a candidate for IV Flolan per Pulm

## 2022-09-02 NOTE — PROGRESS NOTE ADULT - SUBJECTIVE AND OBJECTIVE BOX
Interval History: no acute events, was started on bumex drip yesterday    Medications:  acetaminophen     Tablet .. 975 milliGRAM(s) Oral every 6 hours PRN  ALBUTerol    90 MICROgram(s) HFA Inhaler 2 Puff(s) Inhalation every 6 hours PRN  ambrisentan 10 milliGRAM(s) Oral daily  apixaban 5 milliGRAM(s) Oral two times a day  atorvastatin 20 milliGRAM(s) Oral at bedtime  budesonide  80 MICROgram(s)/formoterol 4.5 MICROgram(s) Inhaler 2 Puff(s) Inhalation two times a day  buMETAnide Infusion 2 mG/Hr IV Continuous <Continuous>  buPROPion XL (24-Hour) . 300 milliGRAM(s) Oral daily  chlorhexidine 2% Cloths 1 Application(s) Topical <User Schedule>  digoxin     Tablet 125 MICROGram(s) Oral every other day  diltiazem    milliGRAM(s) Oral daily  ferrous    sulfate 325 milliGRAM(s) Oral <User Schedule>  melatonin 3 milliGRAM(s) Oral at bedtime PRN  pantoprazole    Tablet 40 milliGRAM(s) Oral before breakfast  polyethylene glycol 3350 17 Gram(s) Oral daily  sodium chloride 0.65% Nasal 2 Spray(s) Both Nostrils four times a day  tadalafil 40 milliGRAM(s) Oral daily  tiotropium 18 MICROgram(s) Capsule 1 Capsule(s) Inhalation daily      Vitals:  T(C): 36.5 (22 @ 11:02), Max: 36.5 (22 @ 19:06)  HR: 67 (22 @ 11:02) (63 - 87)  BP: 122/71 (22 @ 11:02) (102/63 - 122/71)  BP(mean): --  RR: 18 (22 @ 11:02) (18 - 18)  SpO2: 95% (22 @ 11:02) (89% - 99%)    Daily     Daily Weight in k.5 (02 Sep 2022 08:13)        I&O's Summary    01 Sep 2022 07:01  -  02 Sep 2022 07:00  --------------------------------------------------------  IN: 1240 mL / OUT: 1800 mL / NET: -560 mL    02 Sep 2022 07:01  -  02 Sep 2022 12:10  --------------------------------------------------------  IN: 240 mL / OUT: 700 mL / NET: -460 mL        Physical Exam:  Appearance: No Acute Distress  HEENT: PERRL  Neck: No JVD  Cardiovascular: Normal S1 S2, No murmurs/rubs/gallops  Respiratory: Clear to auscultation bilaterally  Gastrointestinal: Soft, Non-tender	  Skin: No cyanosis	  Neurologic: Non-focal  Extremities: No LE edema  Psychiatry: A & O x 3, Mood & affect appropriate    Labs:                        7.9    4.14  )-----------( 239      ( 02 Sep 2022 06:16 )             26.0         134<L>  |  90<L>  |  90<H>  ----------------------------<  84  3.7   |  28  |  2.25<H>    Ca    9.5      02 Sep 2022 06:16  Phos  3.8     09-  Mg     2.2         TPro  7.2  /  Alb  3.9  /  TBili  0.4  /  DBili  x   /  AST  29  /  ALT  20  /  AlkPhos  141<H>              TELEMETRY:    Echocardiogram:

## 2022-09-02 NOTE — PROGRESS NOTE ADULT - PROBLEM SELECTOR PLAN 7
- HGB 8.1 on 8/25 on eliquis. Stable No obvious signs of bleeding.  - maintain active T/S  - iron studies: total iron and % saturation decreased, ferritin on lower side, transferrin and TIBC normal; s/p IV iron infusion  - Continue oral iron every other day 325  - Potential source of blood loss is bleeding into extremities from phlebotomy vs AOCD  - 8/16: s/p 2nd unit of PRBC. First unit at OSH

## 2022-09-02 NOTE — PROGRESS NOTE ADULT - ATTENDING COMMENTS
# JUANA on CKD. Serum creatinine worsened likely due to renal-venous congestion. Agree with IV duresis.      # Volume overload - Agree with bumetanide 2mg/hr IV to keep I/O net negative.     # Medication toxicity monitoring: medication dose reviewed. Please dose medications to CrCl~20. Since patient is on furosemide, monitor for hypokalemia.     The rest of the recommendations as per fellow's note.    Candace Elizabeth MD  Attending Nephrologist  [phone] or via [employer]

## 2022-09-02 NOTE — PROGRESS NOTE ADULT - ATTENDING COMMENTS
-C/w bumex drip. -F/u CHF and renal recs. -Monitor lytes and replete.   -Discussed with Dr. Mclean from [facility] lung transplant program yesterday.   -Patient with decent functional status and says she is independent at home. Needs a cane/walker sometimes. Lives with her sons and has family around her. She doesn't have any neuro deficits from her prior CVA.   -PT/OOB to chair/ambulation daily with NRB/high flow support as possible.

## 2022-09-02 NOTE — PROGRESS NOTE ADULT - PROBLEM SELECTOR PLAN 1
- managed by pulmonary hypertension team on ambrisentan and tadalafil.    - not a lung transplant candidate at this point, also not a candidate for IV therapy for pulmonary dilators  - palliative care signed off

## 2022-09-02 NOTE — PROGRESS NOTE ADULT - SUBJECTIVE AND OBJECTIVE BOX
[facility] DIVISION OF KIDNEY DISEASES AND HYPERTENSION -- FOLLOW UP NOTE  --------------------------------------------------------------------------------  Chief Complaint:    24 hour events/subjective:  Patient urine output 1000 ml   patient reports to having increased urine output  Bumetamide infusion was initiated over night       PAST HISTORY  --------------------------------------------------------------------------------  No significant changes to PMH, PSH, FHx, SHx, unless otherwise noted    ALLERGIES & MEDICATIONS  --------------------------------------------------------------------------------  Allergies    nitrates (Unknown)    Intolerances      Standing Inpatient Medications  ambrisentan 10 milliGRAM(s) Oral daily  apixaban 5 milliGRAM(s) Oral two times a day  atorvastatin 20 milliGRAM(s) Oral at bedtime  budesonide  80 MICROgram(s)/formoterol 4.5 MICROgram(s) Inhaler 2 Puff(s) Inhalation two times a day  buMETAnide Infusion 2 mG/Hr IV Continuous <Continuous>  buPROPion XL (24-Hour) . 300 milliGRAM(s) Oral daily  chlorhexidine 2% Cloths 1 Application(s) Topical <User Schedule>  digoxin     Tablet 125 MICROGram(s) Oral every other day  diltiazem    milliGRAM(s) Oral daily  ferrous    sulfate 325 milliGRAM(s) Oral <User Schedule>  pantoprazole    Tablet 40 milliGRAM(s) Oral before breakfast  polyethylene glycol 3350 17 Gram(s) Oral daily  sodium chloride 0.65% Nasal 2 Spray(s) Both Nostrils four times a day  tadalafil 40 milliGRAM(s) Oral daily  tiotropium 18 MICROgram(s) Capsule 1 Capsule(s) Inhalation daily    PRN Inpatient Medications  acetaminophen     Tablet .. 975 milliGRAM(s) Oral every 6 hours PRN  ALBUTerol    90 MICROgram(s) HFA Inhaler 2 Puff(s) Inhalation every 6 hours PRN  melatonin 3 milliGRAM(s) Oral at bedtime PRN      REVIEW OF SYSTEMS  --------------------------------------------------------------------------------    All other systems were reviewed and are negative, except as noted.    VITALS/PHYSICAL EXAM  --------------------------------------------------------------------------------  T(C): 36.5 (09-02-22 @ 11:02), Max: 36.5 (09-01-22 @ 19:06)  HR: 67 (09-02-22 @ 11:02) (63 - 87)  BP: 122/71 (09-02-22 @ 11:02) (102/63 - 122/71)  RR: 18 (09-02-22 @ 11:02) (18 - 18)  SpO2: 95% (09-02-22 @ 11:02) (89% - 99%)  Wt(kg): --        09-01-22 @ 07:01  -  09-02-22 @ 07:00  --------------------------------------------------------  IN: 1240 mL / OUT: 1800 mL / NET: -560 mL    09-02-22 @ 07:01  -  09-02-22 @ 13:48  --------------------------------------------------------  IN: 240 mL / OUT: 1000 mL / NET: -760 mL        Physical Exam:  	Gen: NAD  	HEENT: MMM  	Pulm: Crackles heard bilaterally  	CV: S1S2  	Abd: Soft, +BS   	Ext: Bilateral lower extremity edema   	Neuro: Awake  	Skin: Warm and erythematous legs       LABS/STUDIES  --------------------------------------------------------------------------------              7.9    4.14  >-----------<  239      [09-02-22 @ 06:16]              26.0     134  |  90  |  90  ----------------------------<  84      [09-02-22 @ 06:16]  3.7   |  28  |  2.25        Ca     9.5     [09-02-22 @ 06:16]      Mg     2.2     [09-02-22 @ 06:16]      Phos  3.8     [09-01-22 @ 05:59]    TPro  7.2  /  Alb  3.9  /  TBili  0.4  /  DBili  x   /  AST  29  /  ALT  20  /  AlkPhos  141  [09-02-22 @ 06:16]          Creatinine Trend:  SCr 2.25 [09-02 @ 06:16]  SCr 2.39 [09-01 @ 20:04]  SCr 1.89 [08-31 @ 06:02]  SCr 1.77 [08-29 @ 20:48]  SCr 2.15 [08-29 @ 07:09]        Iron 27, TIBC 350, %sat 8      [08-12-22 @ 06:56]  Ferritin 26      [08-12-22 @ 06:56]  Lipid: chol 103, TG 48, HDL 64, LDL --      [06-14-22 @ 02:45]       [facility] DIVISION OF KIDNEY DISEASES AND HYPERTENSION -- FOLLOW UP NOTE  --------------------------------------------------------------------------------  Chief Complaint:    24 hour events/subjective:  Patient urine output 1000 ml   patient reports to having increased urine output  Bumetamide infusion was initiated over night       PAST HISTORY  --------------------------------------------------------------------------------  No significant changes to PMH, PSH, FHx, SHx, unless otherwise noted    ALLERGIES & MEDICATIONS  --------------------------------------------------------------------------------  Allergies    nitrates (Unknown)    Intolerances      Standing Inpatient Medications  ambrisentan 10 milliGRAM(s) Oral daily  apixaban 5 milliGRAM(s) Oral two times a day  atorvastatin 20 milliGRAM(s) Oral at bedtime  budesonide  80 MICROgram(s)/formoterol 4.5 MICROgram(s) Inhaler 2 Puff(s) Inhalation two times a day  buMETAnide Infusion 2 mG/Hr IV Continuous <Continuous>  buPROPion XL (24-Hour) . 300 milliGRAM(s) Oral daily  chlorhexidine 2% Cloths 1 Application(s) Topical <User Schedule>  digoxin     Tablet 125 MICROGram(s) Oral every other day  diltiazem    milliGRAM(s) Oral daily  ferrous    sulfate 325 milliGRAM(s) Oral <User Schedule>  pantoprazole    Tablet 40 milliGRAM(s) Oral before breakfast  polyethylene glycol 3350 17 Gram(s) Oral daily  sodium chloride 0.65% Nasal 2 Spray(s) Both Nostrils four times a day  tadalafil 40 milliGRAM(s) Oral daily  tiotropium 18 MICROgram(s) Capsule 1 Capsule(s) Inhalation daily    PRN Inpatient Medications  acetaminophen     Tablet .. 975 milliGRAM(s) Oral every 6 hours PRN  ALBUTerol    90 MICROgram(s) HFA Inhaler 2 Puff(s) Inhalation every 6 hours PRN  melatonin 3 milliGRAM(s) Oral at bedtime PRN      REVIEW OF SYSTEMS  --------------------------------------------------------------------------------    All other systems were reviewed and are negative, except as noted.    VITALS/PHYSICAL EXAM  --------------------------------------------------------------------------------  T(C): 36.5 (09-02-22 @ 11:02), Max: 36.5 (09-01-22 @ 19:06)  HR: 67 (09-02-22 @ 11:02) (63 - 87)  BP: 122/71 (09-02-22 @ 11:02) (102/63 - 122/71)  RR: 18 (09-02-22 @ 11:02) (18 - 18)  SpO2: 95% (09-02-22 @ 11:02) (89% - 99%)  Wt(kg): --        09-01-22 @ 07:01  -  09-02-22 @ 07:00  --------------------------------------------------------  IN: 1240 mL / OUT: 1800 mL / NET: -560 mL    09-02-22 @ 07:01  -  09-02-22 @ 13:48  --------------------------------------------------------  IN: 240 mL / OUT: 1000 mL / NET: -760 mL        Physical Exam:  	Gen: NAD  	HEENT: Moist mucosa, no nck mass  	Pulm: Crackles heard bilaterally  	CV: S1S2  	Abd: Soft, +BS               : no suprapubic tenderness.   	Ext: Bilateral lower extremity edema   	Neuro: Awake, no focal deficit.   	Skin: Warm and erythematous legs               psych: normal mood and affect               LABS/STUDIES  --------------------------------------------------------------------------------              7.9    4.14  >-----------<  239      [09-02-22 @ 06:16]              26.0     134  |  90  |  90  ----------------------------<  84      [09-02-22 @ 06:16]  3.7   |  28  |  2.25        Ca     9.5     [09-02-22 @ 06:16]      Mg     2.2     [09-02-22 @ 06:16]      Phos  3.8     [09-01-22 @ 05:59]    TPro  7.2  /  Alb  3.9  /  TBili  0.4  /  DBili  x   /  AST  29  /  ALT  20  /  AlkPhos  141  [09-02-22 @ 06:16]          Creatinine Trend:  SCr 2.25 [09-02 @ 06:16]  SCr 2.39 [09-01 @ 20:04]  SCr 1.89 [08-31 @ 06:02]  SCr 1.77 [08-29 @ 20:48]  SCr 2.15 [08-29 @ 07:09]        Iron 27, TIBC 350, %sat 8      [08-12-22 @ 06:56]  Ferritin 26      [08-12-22 @ 06:56]  Lipid: chol 103, TG 48, HDL 64, LDL --      [06-14-22 @ 02:45]

## 2022-09-03 NOTE — PROGRESS NOTE ADULT - SUBJECTIVE AND OBJECTIVE BOX
Patient seen and examined at bedside.    Overnight Events: No acute events.       Current Meds:  acetaminophen     Tablet .. 975 milliGRAM(s) Oral every 6 hours PRN  ALBUTerol    90 MICROgram(s) HFA Inhaler 2 Puff(s) Inhalation every 6 hours PRN  ambrisentan 10 milliGRAM(s) Oral daily  apixaban 5 milliGRAM(s) Oral two times a day  atorvastatin 20 milliGRAM(s) Oral at bedtime  budesonide  80 MICROgram(s)/formoterol 4.5 MICROgram(s) Inhaler 2 Puff(s) Inhalation two times a day  buMETAnide Infusion 2 mG/Hr IV Continuous <Continuous>  buPROPion XL (24-Hour) . 300 milliGRAM(s) Oral daily  chlorhexidine 2% Cloths 1 Application(s) Topical <User Schedule>  digoxin     Tablet 125 MICROGram(s) Oral every other day  diltiazem    milliGRAM(s) Oral daily  ferrous    sulfate 325 milliGRAM(s) Oral <User Schedule>  melatonin 3 milliGRAM(s) Oral at bedtime PRN  pantoprazole    Tablet 40 milliGRAM(s) Oral before breakfast  polyethylene glycol 3350 17 Gram(s) Oral daily  sodium chloride 0.65% Nasal 2 Spray(s) Both Nostrils four times a day  tadalafil 40 milliGRAM(s) Oral daily  tiotropium 18 MICROgram(s) Capsule 1 Capsule(s) Inhalation daily      Vitals:  T(F): 97.5 (09-03), Max: 97.7 (09-02)  HR: 63 (09-03) (63 - 90)  BP: 116/69 (09-03) (115/57 - 122/71)  RR: 20 (09-03)  SpO2: 95% (09-03)  I&O's Summary    02 Sep 2022 07:01  -  03 Sep 2022 07:00  --------------------------------------------------------  IN: 480 mL / OUT: 2500 mL / NET: -2020 mL        Physical Exam:  Appearance: No Acute Distress  HEENT: PERRL  Neck: No JVD  Cardiovascular: Normal S1 S2, No murmurs/rubs/gallops  Respiratory: Clear to auscultation bilaterally  Gastrointestinal: Soft, Non-tender	  Skin: No cyanosis	  Neurologic: Non-focal  Extremities: No LE edema  Psychiatry: A & O x 3, Mood & affect appropriate                          8.0    4.51  )-----------( 237      ( 03 Sep 2022 06:56 )             25.7     09-03    135  |  91<L>  |  77<H>  ----------------------------<  76  3.7   |  29  |  1.84<H>    Ca    9.4      03 Sep 2022 06:55  Phos  3.6     09-03  Mg     2.1     09-03    TPro  6.8  /  Alb  3.7  /  TBili  0.4  /  DBili  x   /  AST  27  /  ALT  17  /  AlkPhos  136<H>  09-03          Serum Pro-Brain Natriuretic Peptide: 7563 pg/mL (08-30 @ 04:36)         Patient seen and examined at bedside.    Overnight Events: No acute events. Sitting up in chair.      Current Meds:  acetaminophen     Tablet .. 975 milliGRAM(s) Oral every 6 hours PRN  ALBUTerol    90 MICROgram(s) HFA Inhaler 2 Puff(s) Inhalation every 6 hours PRN  ambrisentan 10 milliGRAM(s) Oral daily  apixaban 5 milliGRAM(s) Oral two times a day  atorvastatin 20 milliGRAM(s) Oral at bedtime  budesonide  80 MICROgram(s)/formoterol 4.5 MICROgram(s) Inhaler 2 Puff(s) Inhalation two times a day  buMETAnide Infusion 2 mG/Hr IV Continuous <Continuous>  buPROPion XL (24-Hour) . 300 milliGRAM(s) Oral daily  chlorhexidine 2% Cloths 1 Application(s) Topical <User Schedule>  digoxin     Tablet 125 MICROGram(s) Oral every other day  diltiazem    milliGRAM(s) Oral daily  ferrous    sulfate 325 milliGRAM(s) Oral <User Schedule>  melatonin 3 milliGRAM(s) Oral at bedtime PRN  pantoprazole    Tablet 40 milliGRAM(s) Oral before breakfast  polyethylene glycol 3350 17 Gram(s) Oral daily  sodium chloride 0.65% Nasal 2 Spray(s) Both Nostrils four times a day  tadalafil 40 milliGRAM(s) Oral daily  tiotropium 18 MICROgram(s) Capsule 1 Capsule(s) Inhalation daily      Vitals:  T(F): 97.5 (09-03), Max: 97.7 (09-02)  HR: 63 (09-03) (63 - 90)  BP: 116/69 (09-03) (115/57 - 122/71)  RR: 20 (09-03)  SpO2: 95% (09-03)  I&O's Summary    02 Sep 2022 07:01  -  03 Sep 2022 07:00  --------------------------------------------------------  IN: 480 mL / OUT: 2500 mL / NET: -2020 mL        Physical Exam:  Appearance: No Acute Distress  HEENT: PERRL  Neck: No JVD  Cardiovascular: Normal S1 S2, No murmurs/rubs/gallops  Respiratory: Clear to auscultation bilaterally  Gastrointestinal: Soft, Non-tender	  Skin: No cyanosis	  Neurologic: Non-focal  Extremities: No LE edema  Psychiatry: A & O x 3, Mood & affect appropriate                          8.0    4.51  )-----------( 237      ( 03 Sep 2022 06:56 )             25.7     09-03    135  |  91<L>  |  77<H>  ----------------------------<  76  3.7   |  29  |  1.84<H>    Ca    9.4      03 Sep 2022 06:55  Phos  3.6     09-03  Mg     2.1     09-03    TPro  6.8  /  Alb  3.7  /  TBili  0.4  /  DBili  x   /  AST  27  /  ALT  17  /  AlkPhos  136<H>  09-03          Serum Pro-Brain Natriuretic Peptide: 7563 pg/mL (08-30 @ 04:36)

## 2022-09-03 NOTE — PROGRESS NOTE ADULT - ATTENDING COMMENTS
Patient seen and examined at bedside. Patient is now diuresing well on bumex 2mg/hr. Creatinine is coming back to baseline with diuresis. Will assess if she can be transitioned to oral diuretics in the next day or two

## 2022-09-03 NOTE — PROGRESS NOTE ADULT - PROBLEM SELECTOR PLAN 4
RHC showed elevated right sided filling pressures with severe pulmonary hypertension with systemic PA pressures, slightly elevated PCWP and preserved cardiac output.   - Followed with Dr. Rordiguez at [place] for PH but he recently left the practice. Needs a pulmonary hypertension doctor near her home in [place]. Possibly [facility]  - Continue tadalafil 40 mg daily   - continue Ambrisentan 10mg qd  - Cardiomems 8/30 is 41. After trial of oral bumetanide cardiomems is 43, started back on drip      Transplant reporting that patient is currently not a candidate following eval, given this, also not a candidate for IV Flolan per Pulm

## 2022-09-03 NOTE — PROGRESS NOTE ADULT - PROBLEM SELECTOR PLAN 2
:- CardioMEMS PAD 8/11 43 mmHg. goal ~40 but difficult to ascertain given RV dysfunction  - cardiomems PAD 8/15 45 mmHg, 98/45/66, Cardiomems reading 8/30 41m PAD 9/1 43  - Dry weight is 188-190, today pending   -c/w bumex drip at 2mg/hr  - hold spironolactone for now, will resume as an outpatient  - please replete K > 4, Mg > 2  - eventual SGLT2-i prior to discharge, once on stable dose of oral diuretics  - please obtain daily BMP while being actively diuresed.

## 2022-09-03 NOTE — PROGRESS NOTE ADULT - PROBLEM SELECTOR PLAN 1
- managed by pulmonary hypertension team on ambrisentan and tadalafil.    - not a lung transplant candidate at this point, also not a candidate for IV therapy for pulmonary dilators  - palliative care signed off.

## 2022-09-03 NOTE — PROGRESS NOTE ADULT - ATTENDING COMMENTS
Patient seen and examined. Diuresis with bumex gtt with net neg 2L. Check lytes q12 while on bumex gtt. Back on home O2 7-8L NC. Readdress candidate for lung transplant early next week     d/w HS 7    Blanca Santamaria MD  Division of Hospital Medicine  Available on [employer] Teams

## 2022-09-03 NOTE — PROGRESS NOTE ADULT - ASSESSMENT
66 year old woman with HFpEF with primarily RV dysfunction s/p CardioMEMS, severe pulmonary hypertension (Group 1, 2 & 3), COPD on home O2 8L, AF (on Eliquis), PE, CVA (MCA infarct 2012), CKD (b/l Cr 1.7-1.8), ROLANDA on CPAP and morbid obesity (s/p bariatric surgery 2012 with lap band removal d/t GIB) who was recently hospitalized 6/14-6/29/22 for volume overload where she met with lung transplant team with plan for evaluation pending improvement in BMI and deconditioned state. Had RHC in June with severely elevated PAP and preserved CO.     She presents with RINALDI, ABD bloating and LE swelling in the setting of gradual 25 lb weight gain despite escalation of diuretic regimen. She was started on intermittent IV Lasix and was responding well, but remains markedly overloaded with predominate symptoms of elevated R sided filling pressure. She was switched to IV bumex and did not respond as well so now back on IV lasix with as needed hypertonic saline. She is likely not a lung transplant candidate and also not a candidate for IV therapy pulmonary dilators. Plan is for continued diuresis and palliative care was consulted but signed off, pt remains full code at this time.     Cardiac Studies  CardioMEMS interrogation 8/16: PAP 98/45/66, HR 87  RHC and CardioMEMS Implant 6/20/22: RA 18, /44/65, PCWP 16 (v to 18), RA 95% on 8L NC, PA 58%, CO/CI (F) 6.7/3.2, PVR 7.32 ryan, TPG 49, /60/86 (CardioMEMS PAD 49)  TTE 6/15/22: LVIDd 4.8 cm, LVEF 68%, flattening of interventricular septum consistent with RV overload, mild concentric LVH (septum 1.2 PWT 1.1), RVE with decreased systolic function, mild LAE, severe ROE, mod TR, est RVSP 61 mmHg     Problem/Plan - 1:  ·  Problem: Severe pulmonary hypertension.   ·  Plan: - managed by pulmonary hypertension team on ambrisentan and tadalafil.    - not a lung transplant candidate at this point, also not a candidate for IV therapy for pulmonary dilators  - palliative care signed off.     Problem/Plan - 2:  ·  Problem: Acute on chronic diastolic congestive heart failure.   ·  Plan: - CardioMEMS PAD 8/11 43 mmHg. goal ~40 but difficult to ascertain given RV dysfunction  - cardiomems PAD 8/15 45 mmHg, 98/45/66, Cardiomems reading 8/30 41m PAD 9/1 43  - Dry weight is 188-190, ***  -c/w bumex drip at 2mg/hr  - hold spironolactone for now, will resume as an outpatient  - please replete K > 4, Mg > 2  - eventual SGLT2-i prior to discharge, once on stable dose of oral diuretics  - please obtain daily BMP while being actively diuresed.     Problem/Plan - 3:  ·  Problem: Chronic atrial fibrillation.   ·  Plan: - rate controlled on Digoxin and Cardizem   - c/w digoxin dose to 125 mcg every other day, repeat level in 6 days  - on home Eliquis.     Problem/Plan - 4:  ·  Problem: Chronic kidney disease, unspecified CKD stage.   ·  Plan: - recent baseline Cr ranging 1.7-1.8. Today creatinine is 1.84  - eventual SGLT2-i as above to delay further progression of CKD  - continue to monitor closely with diuresis.     Problem/Plan - 5:  ·  Problem: Epistaxis.   ·  Plan: - iron level 27, iron sat 8%, TIBC 350 checked on 8/12, s/p IV iron sucrose 200 x1 and 1u RBC, c/w PO iron daily  - s/p nasal packing with ENT, removed on 8/16 without recurrent bleeding  - home aspirin on hold while being re-challenged with AC. 66 year old woman with HFpEF with primarily RV dysfunction s/p CardioMEMS, severe pulmonary hypertension (Group 1, 2 & 3), COPD on home O2 8L, AF (on Eliquis), PE, CVA (MCA infarct 2012), CKD (b/l Cr 1.7-1.8), ROLANDA on CPAP and morbid obesity (s/p bariatric surgery 2012 with lap band removal d/t GIB) who was recently hospitalized 6/14-6/29/22 for volume overload where she met with lung transplant team with plan for evaluation pending improvement in BMI and deconditioned state. Had RHC in June with severely elevated PAP and preserved CO.     She presents with RINALDI, ABD bloating and LE swelling in the setting of gradual 25 lb weight gain despite escalation of diuretic regimen. She was started on intermittent IV Lasix and was responding well, but remains markedly overloaded with predominate symptoms of elevated R sided filling pressure. She was switched to IV bumex and did not respond as well so now back on IV lasix with as needed hypertonic saline. She is likely not a lung transplant candidate and also not a candidate for IV therapy pulmonary dilators. Plan is for continued diuresis and palliative care was consulted but signed off, pt remains full code at this time.     Cardiac Studies  CardioMEMS interrogation 8/16: PAP 98/45/66, HR 87  RHC and CardioMEMS Implant 6/20/22: RA 18, /44/65, PCWP 16 (v to 18), RA 95% on 8L NC, PA 58%, CO/CI (F) 6.7/3.2, PVR 7.32 ryan, TPG 49, /60/86 (CardioMEMS PAD 49)  TTE 6/15/22: LVIDd 4.8 cm, LVEF 68%, flattening of interventricular septum consistent with RV overload, mild concentric LVH (septum 1.2 PWT 1.1), RVE with decreased systolic function, mild LAE, severe ROE, mod TR, est RVSP 61 mmHg     Problem/Plan - 1:  ·  Problem: Severe pulmonary hypertension.   ·  Plan: - managed by pulmonary hypertension team on ambrisentan and tadalafil.    - not a lung transplant candidate at this point, also not a candidate for IV therapy for pulmonary dilators  - palliative care signed off.     Problem/Plan - 2:  ·  Problem: Acute on chronic diastolic congestive heart failure.   ·  Plan: - CardioMEMS PAD 8/11 43 mmHg. goal ~40 but difficult to ascertain given RV dysfunction  - cardiomems PAD 8/15 45 mmHg, 98/45/66, Cardiomems reading 8/30 41m PAD 9/1 43  - Dry weight is 188-190, today pending   -c/w bumex drip at 2mg/hr  - hold spironolactone for now, will resume as an outpatient  - please replete K > 4, Mg > 2  - eventual SGLT2-i prior to discharge, once on stable dose of oral diuretics  - please obtain daily BMP while being actively diuresed.     Problem/Plan - 3:  ·  Problem: Chronic atrial fibrillation.   ·  Plan: - rate controlled on Digoxin and Cardizem   - c/w digoxin dose to 125 mcg every other day, repeat level in 6 days  - on home Eliquis.     Problem/Plan - 4:  ·  Problem: Chronic kidney disease, unspecified CKD stage.   ·  Plan: - recent baseline Cr ranging 1.7-1.8. Today creatinine is 1.84  - eventual SGLT2-i as above to delay further progression of CKD  - continue to monitor closely with diuresis.     Problem/Plan - 5:  ·  Problem: Epistaxis.   ·  Plan: - iron level 27, iron sat 8%, TIBC 350 checked on 8/12, s/p IV iron sucrose 200 x1 and 1u RBC, c/w PO iron daily  - s/p nasal packing with ENT, removed on 8/16 without recurrent bleeding  - home aspirin on hold while being re-challenged with AC. 66 year old woman with HFpEF with primarily RV dysfunction s/p CardioMEMS, severe pulmonary hypertension (Group 1, 2 & 3), COPD on home O2 8L, AF (on Eliquis), PE, CVA (MCA infarct 2012), CKD (b/l Cr 1.7-1.8), ROLANDA on CPAP and morbid obesity (s/p bariatric surgery 2012 with lap band removal d/t GIB) who was recently hospitalized 6/14-6/29/22 for volume overload where she met with lung transplant team with plan for evaluation pending improvement in BMI and deconditioned state. Had RHC in June with severely elevated PAP and preserved CO.     She presents with RINALDI, ABD bloating and LE swelling in the setting of gradual 25 lb weight gain despite escalation of diuretic regimen. She was started on intermittent IV Lasix and was responding well, but remains markedly overloaded with predominate symptoms of elevated R sided filling pressure. She was switched to IV bumex and did not respond as well so now back on IV lasix with as needed hypertonic saline. She is likely not a lung transplant candidate and also not a candidate for IV therapy pulmonary dilators. Plan is for continued diuresis and palliative care was consulted but signed off, pt remains full code at this time.     Cardiac Studies  CardioMEMS interrogation 8/16: PAP 98/45/66, HR 87  RHC and CardioMEMS Implant 6/20/22: RA 18, /44/65, PCWP 16 (v to 18), RA 95% on 8L NC, PA 58%, CO/CI (F) 6.7/3.2, PVR 7.32 yran, TPG 49, /60/86 (CardioMEMS PAD 49)  TTE 6/15/22: LVIDd 4.8 cm, LVEF 68%, flattening of interventricular septum consistent with RV overload, mild concentric LVH (septum 1.2 PWT 1.1), RVE with decreased systolic function, mild LAE, severe ROE, mod TR, est RVSP 61 mmHg

## 2022-09-03 NOTE — PROGRESS NOTE ADULT - PROBLEM SELECTOR PLAN 4
- recent baseline Cr ranging 1.7-1.8. Today creatinine is 1.84  - eventual SGLT2-i as above to delay further progression of CKD  - continue to monitor closely with diuresis.

## 2022-09-03 NOTE — PROGRESS NOTE ADULT - PROBLEM SELECTOR PLAN 3
Cr 1.84 - baseline is 1.7-1.8.  - diuresis as above  - avoid nephrotoxic agents  - strict I/O  - Cr elevation could also be new baseline for patient in setting of worsening kidney function 2/2 pulm htn/HF  - Pt with BUN 70's. No uremic symptoms currently but may require dialysis in the future.

## 2022-09-03 NOTE — PROGRESS NOTE ADULT - SUBJECTIVE AND OBJECTIVE BOX
PROGRESS NOTE:   Authored by: Andrea Limon M.D.   Internal Medicine PGY-1  Please Contact Via Teams    Patient is a 66y old  Female who presents with a chief complaint of lung txp eval (03 Sep 2022 08:42)      SUBJECTIVE / OVERNIGHT EVENTS:  No acute events overnight. Is feeling well this am. Her muscle soreness has resolved. She is a bit nervous that she is only walking 100ft a day and feels that she could do more last week.    MEDICATIONS  (STANDING):  ambrisentan 10 milliGRAM(s) Oral daily  apixaban 5 milliGRAM(s) Oral two times a day  atorvastatin 20 milliGRAM(s) Oral at bedtime  budesonide  80 MICROgram(s)/formoterol 4.5 MICROgram(s) Inhaler 2 Puff(s) Inhalation two times a day  buMETAnide Infusion 2 mG/Hr (10 mL/Hr) IV Continuous <Continuous>  buPROPion XL (24-Hour) . 300 milliGRAM(s) Oral daily  chlorhexidine 2% Cloths 1 Application(s) Topical <User Schedule>  digoxin     Tablet 125 MICROGram(s) Oral every other day  diltiazem    milliGRAM(s) Oral daily  ferrous    sulfate 325 milliGRAM(s) Oral <User Schedule>  pantoprazole    Tablet 40 milliGRAM(s) Oral before breakfast  polyethylene glycol 3350 17 Gram(s) Oral daily  potassium chloride    Tablet ER 20 milliEquivalent(s) Oral once  sodium chloride 0.65% Nasal 2 Spray(s) Both Nostrils four times a day  tadalafil 40 milliGRAM(s) Oral daily  tiotropium 18 MICROgram(s) Capsule 1 Capsule(s) Inhalation daily    MEDICATIONS  (PRN):  acetaminophen     Tablet .. 975 milliGRAM(s) Oral every 6 hours PRN Temp greater or equal to 38C (100.4F), Mild Pain (1 - 3), Moderate Pain (4 - 6)  ALBUTerol    90 MICROgram(s) HFA Inhaler 2 Puff(s) Inhalation every 6 hours PRN Shortness of Breath and/or Wheezing  melatonin 3 milliGRAM(s) Oral at bedtime PRN Insomnia      CAPILLARY BLOOD GLUCOSE        I&O's Summary    02 Sep 2022 07:01  -  03 Sep 2022 07:00  --------------------------------------------------------  IN: 480 mL / OUT: 2500 mL / NET: -2020 mL    03 Sep 2022 07:01  -  03 Sep 2022 12:03  --------------------------------------------------------  IN: 360 mL / OUT: 600 mL / NET: -240 mL        PHYSICAL EXAM:  Vital Signs Last 24 Hrs  T(C): 36.4 (03 Sep 2022 10:46), Max: 36.4 (03 Sep 2022 05:22)  T(F): 97.5 (03 Sep 2022 10:46), Max: 97.5 (03 Sep 2022 05:22)  HR: 69 (03 Sep 2022 10:46) (63 - 90)  BP: 115/63 (03 Sep 2022 10:46) (115/57 - 116/69)  BP(mean): --  RR: 21 (03 Sep 2022 10:46) (18 - 21)  SpO2: 90% (03 Sep 2022 10:46) (83% - 97%)    Parameters below as of 03 Sep 2022 10:46  Patient On (Oxygen Delivery Method): nasal cannula  O2 Flow (L/min): 7      CONSTITUTIONAL: NAD, multiple bruises at phlebotomy sites.   RESPIRATORY: Feeling okay on 7 L NC. Crackles at lung bases b/l  CARDIOVASCULAR: Afib, no murmurs. Tense lower extremity edema  ABDOMEN: Nontender to palpation, normoactive bowel sounds.  PSYCH: A+O to person, place, and time; affect appropriate    LABS:                        8.0    4.51  )-----------( 237      ( 03 Sep 2022 06:56 )             25.7     09    135  |  91<L>  |  77<H>  ----------------------------<  76  3.7   |  29  |  1.84<H>    Ca    9.4      03 Sep 2022 06:55  Phos  3.6     09  Mg     2.1         TPro  6.8  /  Alb  3.7  /  TBili  0.4  /  DBili  x   /  AST  27  /  ALT  17  /  AlkPhos  136<H>            Urinalysis Basic - ( 02 Sep 2022 15:20 )    Color: Light Yellow / Appearance: Slightly Turbid / S.010 / pH: x  Gluc: x / Ketone: Negative  / Bili: Negative / Urobili: Negative   Blood: x / Protein: Trace / Nitrite: Negative   Leuk Esterase: Large / RBC: 3 /hpf /  /HPF   Sq Epi: x / Non Sq Epi: 1 /hpf / Bacteria: Few

## 2022-09-03 NOTE — PROGRESS NOTE ADULT - PROBLEM SELECTOR PLAN 3
- rate controlled on Digoxin and Cardizem   - c/w digoxin dose to 125 mcg every other day, repeat level in 6 days  - on home Eliquis.

## 2022-09-04 NOTE — PROGRESS NOTE ADULT - ATTENDING COMMENTS
Patient seen and examined. Diuresis with bumex gtt with net neg 1.5L. Check lytes q12 while on bumex gtt with goal of 1-2L. Back on home O2 7-8L NC. Readdress candidate for lung transplant early next week. Plan for cardiomems reading Tuesday    d/w HS 7    Blanca Santamaria MD  Division of Hospital Medicine  Available on [employer] Teams

## 2022-09-04 NOTE — PROGRESS NOTE ADULT - ATTENDING COMMENTS
Patient seen and examined at bedside. Noted to still have volume overload. Weight still up at 196 but down from 198. Patient is now diuresing well on bumex 2mg/hr. Creatinine pending today. Will recheck cardiomems Tuesday

## 2022-09-04 NOTE — PROGRESS NOTE ADULT - PROBLEM SELECTOR PLAN 1
Pt feeling at baseline. Improving on bumex drip. Urine output improving Weight today not done at time of note writing. Goal weight 189 lbs  - Continue bumex drip 2mg/hr. BMP q12  - Holding metolazone with Na <135  - Holding spironolactone  - standing daily weights, strict I/Os.  - Pulm, nephro, and HF following   - eventual SGLT2-i prior to discharge, once on stable dose of oral diuretics    -Continue to ambulate with NRB or HFNC in the next coming days to show ability. No gross stroke deficits.  -No role for diuresis augmentation with inotropes at this time. Has never had a left heart cath.

## 2022-09-04 NOTE — PROGRESS NOTE ADULT - PROBLEM SELECTOR PLAN 3
- rate controlled on Digoxin and Cardizem   - c/w digoxin dose to 125 mcg every other day, repeat level next week  - on home Eliquis.

## 2022-09-04 NOTE — PROGRESS NOTE ADULT - PROBLEM SELECTOR PLAN 5
- iron level 27, iron sat 8%, TIBC 350 checked on 8/12, s/p IV iron sucrose 200 x1 and 1u RBC, c/w PO iron daily  - s/p nasal packing with ENT, removed on 8/16 without recurrent bleeding  - home aspirin on hold while being re-challenged with AC.

## 2022-09-04 NOTE — PROGRESS NOTE ADULT - PROBLEM SELECTOR PLAN 4
RHC showed elevated right sided filling pressures with severe pulmonary hypertension with systemic PA pressures, slightly elevated PCWP and preserved cardiac output.   - Followed with Dr. Rodriguez at [place] for PH but he recently left the practice. Needs a pulmonary hypertension doctor near her home in [place]. Possibly [facility]  - Continue tadalafil 40 mg daily   - continue Ambrisentan 10mg qd  - Cardiomems 8/30 is 41. After trial of oral bumetanide cardiomems is 43, started back on drip      Transplant reporting that patient is currently not a candidate following eval, given this, also not a candidate for IV Flolan per Pulm

## 2022-09-04 NOTE — PROGRESS NOTE ADULT - SUBJECTIVE AND OBJECTIVE BOX
PROGRESS NOTE:   Authored by: Andrea Limon M.D.   Internal Medicine PGY-1  Please Contact Via Teams    Patient is a 66y old  Female who presents with a chief complaint of lung txp eval (04 Sep 2022 06:07)      SUBJECTIVE / OVERNIGHT EVENTS:  No acute events overnight. Pt ambulated yesterday using NRB for first time. She says it was like walking with a new lung and didnt feel short of breath. She walked 150 according to her and did 15 minutes of exercise. Only got to 92% as per documentation.    MEDICATIONS  (STANDING):  ambrisentan 10 milliGRAM(s) Oral daily  apixaban 5 milliGRAM(s) Oral two times a day  atorvastatin 20 milliGRAM(s) Oral at bedtime  budesonide  80 MICROgram(s)/formoterol 4.5 MICROgram(s) Inhaler 2 Puff(s) Inhalation two times a day  buMETAnide Infusion 2 mG/Hr (10 mL/Hr) IV Continuous <Continuous>  buPROPion XL (24-Hour) . 300 milliGRAM(s) Oral daily  chlorhexidine 2% Cloths 1 Application(s) Topical <User Schedule>  digoxin     Tablet 125 MICROGram(s) Oral every other day  diltiazem    milliGRAM(s) Oral daily  ferrous    sulfate 325 milliGRAM(s) Oral <User Schedule>  pantoprazole    Tablet 40 milliGRAM(s) Oral before breakfast  polyethylene glycol 3350 17 Gram(s) Oral daily  sodium chloride 0.65% Nasal 2 Spray(s) Both Nostrils four times a day  tadalafil 40 milliGRAM(s) Oral daily  tiotropium 18 MICROgram(s) Capsule 1 Capsule(s) Inhalation daily    MEDICATIONS  (PRN):  acetaminophen     Tablet .. 975 milliGRAM(s) Oral every 6 hours PRN Temp greater or equal to 38C (100.4F), Mild Pain (1 - 3), Moderate Pain (4 - 6)  ALBUTerol    90 MICROgram(s) HFA Inhaler 2 Puff(s) Inhalation every 6 hours PRN Shortness of Breath and/or Wheezing  melatonin 3 milliGRAM(s) Oral at bedtime PRN Insomnia      CAPILLARY BLOOD GLUCOSE        I&O's Summary    03 Sep 2022 07:01  -  04 Sep 2022 07:00  --------------------------------------------------------  IN: 840 mL / OUT: 2300 mL / NET: -1460 mL        PHYSICAL EXAM:  Vital Signs Last 24 Hrs  T(C): 36.2 (04 Sep 2022 04:05), Max: 36.9 (03 Sep 2022 20:57)  T(F): 97.1 (04 Sep 2022 04:05), Max: 98.4 (03 Sep 2022 20:57)  HR: 67 (04 Sep 2022 04:05) (67 - 91)  BP: 105/50 (04 Sep 2022 04:05) (105/50 - 122/70)  BP(mean): --  RR: 20 (04 Sep 2022 04:05) (20 - 21)  SpO2: 97% (04 Sep 2022 04:05) (90% - 97%)    Parameters below as of 04 Sep 2022 04:05  Patient On (Oxygen Delivery Method): BiPAP/CPAP      CONSTITUTIONAL: NAD, multiple bruises at phlebotomy sites.   RESPIRATORY: Feeling okay on 7 L NC. Crackles at lung bases b/l  CARDIOVASCULAR: Afib, no murmurs. Tense lower extremity edema  ABDOMEN: Nontender to palpation, normoactive bowel sounds.  PSYCH: A+O to person, place, and time; affect appropriate    LABS:                        8.0    4.51  )-----------( 237      ( 03 Sep 2022 06:56 )             25.7     -    135  |  91<L>  |  77<H>  ----------------------------<  76  3.7   |  29  |  1.84<H>    Ca    9.4      03 Sep 2022 06:55  Phos  3.6     09-  Mg     2.1     -03    TPro  6.8  /  Alb  3.7  /  TBili  0.4  /  DBili  x   /  AST  27  /  ALT  17  /  AlkPhos  136<H>  09-03          Urinalysis Basic - ( 02 Sep 2022 15:20 )    Color: Light Yellow / Appearance: Slightly Turbid / S.010 / pH: x  Gluc: x / Ketone: Negative  / Bili: Negative / Urobili: Negative   Blood: x / Protein: Trace / Nitrite: Negative   Leuk Esterase: Large / RBC: 3 /hpf /  /HPF   Sq Epi: x / Non Sq Epi: 1 /hpf / Bacteria: Few

## 2022-09-04 NOTE — PROGRESS NOTE ADULT - SUBJECTIVE AND OBJECTIVE BOX
Patient seen and examined at bedside.    Overnight Events: No acute events. Has no concerns this morning.        Current Meds:  acetaminophen     Tablet .. 975 milliGRAM(s) Oral every 6 hours PRN  ALBUTerol    90 MICROgram(s) HFA Inhaler 2 Puff(s) Inhalation every 6 hours PRN  ambrisentan 10 milliGRAM(s) Oral daily  apixaban 5 milliGRAM(s) Oral two times a day  atorvastatin 20 milliGRAM(s) Oral at bedtime  budesonide  80 MICROgram(s)/formoterol 4.5 MICROgram(s) Inhaler 2 Puff(s) Inhalation two times a day  buMETAnide Infusion 2 mG/Hr IV Continuous <Continuous>  buPROPion XL (24-Hour) . 300 milliGRAM(s) Oral daily  chlorhexidine 2% Cloths 1 Application(s) Topical <User Schedule>  digoxin     Tablet 125 MICROGram(s) Oral every other day  diltiazem    milliGRAM(s) Oral daily  ferrous    sulfate 325 milliGRAM(s) Oral <User Schedule>  melatonin 3 milliGRAM(s) Oral at bedtime PRN  pantoprazole    Tablet 40 milliGRAM(s) Oral before breakfast  polyethylene glycol 3350 17 Gram(s) Oral daily  sodium chloride 0.65% Nasal 2 Spray(s) Both Nostrils four times a day  tadalafil 40 milliGRAM(s) Oral daily  tiotropium 18 MICROgram(s) Capsule 1 Capsule(s) Inhalation daily      Vitals:  T(F): 97.1 (09-04), Max: 98.4 (09-03)  HR: 67 (09-04) (67 - 91)  BP: 105/50 (09-04) (105/50 - 122/70)  RR: 20 (09-04)  SpO2: 97% (09-04)  I&O's Summary    03 Sep 2022 07:01  -  04 Sep 2022 07:00  --------------------------------------------------------  IN: 840 mL / OUT: 2300 mL / NET: -1460 mL        Physical Exam:  Appearance: No Acute Distress  HEENT: PERRL  Neck: elevated JVD  Cardiovascular: Normal S1 S2, No murmurs/rubs/gallops  Respiratory: Clear to auscultation bilaterally  Gastrointestinal: Soft, Non-tender	  Skin: No cyanosis	  Neurologic: Non-focal  Extremities: No LE edema  Psychiatry: A & O x 3, Mood & affect appropriate                          8.0    4.51  )-----------( 237      ( 03 Sep 2022 06:56 )             25.7     09-03    135  |  91<L>  |  77<H>  ----------------------------<  76  3.7   |  29  |  1.84<H>    Ca    9.4      03 Sep 2022 06:55  Phos  3.6     09-03  Mg     2.1     09-03    TPro  6.8  /  Alb  3.7  /  TBili  0.4  /  DBili  x   /  AST  27  /  ALT  17  /  AlkPhos  136<H>  09-03          Serum Pro-Brain Natriuretic Peptide: 7563 pg/mL (08-30 @ 04:36)

## 2022-09-04 NOTE — PROGRESS NOTE ADULT - PROBLEM SELECTOR PLAN 2
- CardioMEMS PAD 8/11 43 mmHg. goal ~40 but difficult to ascertain given RV dysfunction  - cardiomems PAD 8/15 45 mmHg, 98/45/66, Cardiomems reading 8/30 41m PAD 9/1 43  - Dry weight is 188-190, today 197  -c/w bumex drip at 2mg/hr  - hold spironolactone for now, will resume as an outpatient  - please replete K > 4, Mg > 2  - eventual SGLT2-i prior to discharge, once on stable dose of oral diuretics  - please obtain daily BMP while being actively diuresed.

## 2022-09-05 NOTE — PROGRESS NOTE ADULT - ATTENDING COMMENTS
Patient seen and examined. Diuresis with bumex gtt at 2/hr. Check lytes q12 while on bumex gtt with goal of 1-2L. Back on home O2 7-8L NC. Patient reports she is able to ambulate well with minimal SOB on NRB since diuresis was started. Readdress candidate for lung transplant early next week. Plan for cardiomems reading Tuesday    d/w HS 7    Blanca Santamaria MD  Division of Hospital Medicine  Available on [employer] Teams .

## 2022-09-05 NOTE — PROGRESS NOTE ADULT - SUBJECTIVE AND OBJECTIVE BOX
INCOMPLETE NOTE    Benito Torres | PGY1| Pager: [phone]  Interval Events:    REVIEW OF SYSTEMS:  CONSTITUTIONAL: No weakness, fevers or chills  EYES/ENT: No visual changes;  No vertigo or throat pain   NECK: No pain or stiffness  RESPIRATORY: No cough, wheezing, hemoptysis; No shortness of breath  CARDIOVASCULAR: No chest pain or palpitations  GASTROINTESTINAL: No abdominal or epigastric pain. No nausea, vomiting, or hematemesis; No diarrhea or constipation. No melena or hematochezia.  GENITOURINARY: No dysuria, frequency or hematuria  NEUROLOGICAL: No numbness or weakness  SKIN: No itching, burning, rashes, or lesions   All other review of systems is negative unless indicated above.    OBJECTIVE:  ICU Vital Signs Last 24 Hrs  T(C): 36.4 (05 Sep 2022 04:08), Max: 36.4 (05 Sep 2022 04:08)  T(F): 97.6 (05 Sep 2022 04:08), Max: 97.6 (05 Sep 2022 04:08)  HR: 74 (05 Sep 2022 05:28) (61 - 97)  BP: 102/65 (05 Sep 2022 05:28) (101/65 - 122/76)  BP(mean): --  ABP: --  ABP(mean): --  RR: 18 (05 Sep 2022 04:08) (18 - 19)  SpO2: 98% (05 Sep 2022 05:25) (91% - 99%)    O2 Parameters below as of 05 Sep 2022 04:08  Patient On (Oxygen Delivery Method): BiPAP/CPAP              09-03 @ 07:01 - 09-04 @ 07:00  --------------------------------------------------------  IN: 840 mL / OUT: 2300 mL / NET: -1460 mL    09-04 @ 07:01  -  09-05 @ 06:53  --------------------------------------------------------  IN: 0 mL / OUT: 1300 mL / NET: -1300 mL      CAPILLARY BLOOD GLUCOSE          PHYSICAL EXAM:  General: WN/WD NAD  Neurology: A&Ox3, nonfocal, LEWIS x 4  Eyes: PERRLA/ EOMI, Gross vision intact  ENT/Neck: Neck supple, trachea midline, No JVD, Gross hearing intact  Respiratory: CTA B/L, No wheezing, rales, rhonchi  CV: RRR, +S1/S2, -S3/S4, no murmurs, rubs or gallops  Abdominal: Soft, NT, ND +BS, No HSM  MSK: 5/5 strength UE/LE bilaterally  Extremities: No edema, 2+ peripheral pulses  Skin: No Rashes, Hematoma, Ecchymosis  Incisions:   Tubes:    HOSPITAL MEDICATIONS:  MEDICATIONS  (STANDING):  ambrisentan 10 milliGRAM(s) Oral daily  apixaban 5 milliGRAM(s) Oral two times a day  atorvastatin 20 milliGRAM(s) Oral at bedtime  budesonide  80 MICROgram(s)/formoterol 4.5 MICROgram(s) Inhaler 2 Puff(s) Inhalation two times a day  buMETAnide Infusion 2 mG/Hr (10 mL/Hr) IV Continuous <Continuous>  buPROPion XL (24-Hour) . 300 milliGRAM(s) Oral daily  chlorhexidine 2% Cloths 1 Application(s) Topical <User Schedule>  digoxin     Tablet 125 MICROGram(s) Oral every other day  diltiazem    milliGRAM(s) Oral daily  ferrous    sulfate 325 milliGRAM(s) Oral <User Schedule>  pantoprazole    Tablet 40 milliGRAM(s) Oral before breakfast  polyethylene glycol 3350 17 Gram(s) Oral daily  sodium chloride 0.65% Nasal 2 Spray(s) Both Nostrils four times a day  tadalafil 40 milliGRAM(s) Oral daily  tiotropium 18 MICROgram(s) Capsule 1 Capsule(s) Inhalation daily    MEDICATIONS  (PRN):  acetaminophen     Tablet .. 975 milliGRAM(s) Oral every 6 hours PRN Temp greater or equal to 38C (100.4F), Mild Pain (1 - 3), Moderate Pain (4 - 6)  ALBUTerol    90 MICROgram(s) HFA Inhaler 2 Puff(s) Inhalation every 6 hours PRN Shortness of Breath and/or Wheezing  melatonin 3 milliGRAM(s) Oral at bedtime PRN Insomnia      LABS:                        7.6    4.10  )-----------( 248      ( 05 Sep 2022 06:07 )             25.3     Hgb Trend: 7.6<--, 8.1<--, 8.0<--, 7.9<--, 8.0<--  09-05    137  |  94<L>  |  69<H>  ----------------------------<  79  4.1   |  29  |  1.65<H>    Ca    9.4      05 Sep 2022 06:07  Phos  3.3     09-05  Mg     2.0     09-05    TPro  6.5  /  Alb  3.4  /  TBili  0.4  /  DBili  x   /  AST  22  /  ALT  16  /  AlkPhos  126<H>  09-05    Creatinine Trend: 1.65<--, 1.67<--, 1.84<--, 1.92<--, 2.25<--, 2.39<--            MICROBIOLOGY:      Benito Torres | PGY1| Pager: [phone]  Interval Events: -1.3L o/n, toelrating CPAP and NC at home O2 levels, patient reports feeling bored and diffusely crampy. States worried about physical ability and deconditioning.       OBJECTIVE:  ICU Vital Signs Last 24 Hrs  T(C): 36.4 (05 Sep 2022 04:08), Max: 36.4 (05 Sep 2022 04:08)  T(F): 97.6 (05 Sep 2022 04:08), Max: 97.6 (05 Sep 2022 04:08)  HR: 74 (05 Sep 2022 05:28) (61 - 97)  BP: 102/65 (05 Sep 2022 05:28) (101/65 - 122/76)  BP(mean): --  ABP: --  ABP(mean): --  RR: 18 (05 Sep 2022 04:08) (18 - 19)  SpO2: 98% (05 Sep 2022 05:25) (91% - 99%)    O2 Parameters below as of 05 Sep 2022 04:08  Patient On (Oxygen Delivery Method): BiPAP/CPAP      09-03 @ 07:01 - 09-04 @ 07:00  --------------------------------------------------------  IN: 840 mL / OUT: 2300 mL / NET: -1460 mL    09-04 @ 07:01 - 09-05 @ 06:53  --------------------------------------------------------  IN: 0 mL / OUT: 1300 mL / NET: -1300 mL      CAPILLARY BLOOD GLUCOSE          PHYSICAL EXAM:  General: WN/WD NAD, seen comfortably sitting in  chair reading  Neurology: A&Ox3, nonfocal, LEWIS x 4  Eyes: PERRLA/ EOMI, Gross vision intact  ENT/Neck: Neck supple, trachea midline, No JVD, Gross hearing intact  Respiratory: b/l crackles at bases  CV: RRR, +S1/S2, -S3/S4, no murmurs, rubs or gallops  Abdominal: Soft, NT, ND +BS, No HSM  MSK: 5/5 strength UE/LE bilaterally  Extremities: No edema, 2+ peripheral pulses  Skin: No Rashes, Hematoma, Ecchymosis  Incisions:   Tubes:    HOSPITAL MEDICATIONS:  MEDICATIONS  (STANDING):  ambrisentan 10 milliGRAM(s) Oral daily  apixaban 5 milliGRAM(s) Oral two times a day  atorvastatin 20 milliGRAM(s) Oral at bedtime  budesonide  80 MICROgram(s)/formoterol 4.5 MICROgram(s) Inhaler 2 Puff(s) Inhalation two times a day  buMETAnide Infusion 2 mG/Hr (10 mL/Hr) IV Continuous <Continuous>  buPROPion XL (24-Hour) . 300 milliGRAM(s) Oral daily  chlorhexidine 2% Cloths 1 Application(s) Topical <User Schedule>  digoxin     Tablet 125 MICROGram(s) Oral every other day  diltiazem    milliGRAM(s) Oral daily  ferrous    sulfate 325 milliGRAM(s) Oral <User Schedule>  pantoprazole    Tablet 40 milliGRAM(s) Oral before breakfast  polyethylene glycol 3350 17 Gram(s) Oral daily  sodium chloride 0.65% Nasal 2 Spray(s) Both Nostrils four times a day  tadalafil 40 milliGRAM(s) Oral daily  tiotropium 18 MICROgram(s) Capsule 1 Capsule(s) Inhalation daily    MEDICATIONS  (PRN):  acetaminophen     Tablet .. 975 milliGRAM(s) Oral every 6 hours PRN Temp greater or equal to 38C (100.4F), Mild Pain (1 - 3), Moderate Pain (4 - 6)  ALBUTerol    90 MICROgram(s) HFA Inhaler 2 Puff(s) Inhalation every 6 hours PRN Shortness of Breath and/or Wheezing  melatonin 3 milliGRAM(s) Oral at bedtime PRN Insomnia      LABS:                        7.6    4.10  )-----------( 248      ( 05 Sep 2022 06:07 )             25.3     Hgb Trend: 7.6<--, 8.1<--, 8.0<--, 7.9<--, 8.0<--  09-05    137  |  94<L>  |  69<H>  ----------------------------<  79  4.1   |  29  |  1.65<H>    Ca    9.4      05 Sep 2022 06:07  Phos  3.3     09-05  Mg     2.0     09-05    TPro  6.5  /  Alb  3.4  /  TBili  0.4  /  DBili  x   /  AST  22  /  ALT  16  /  AlkPhos  126<H>  09-05    Creatinine Trend: 1.65<--, 1.67<--, 1.84<--, 1.92<--, 2.25<--, 2.39<--            MICROBIOLOGY:

## 2022-09-05 NOTE — PROGRESS NOTE ADULT - PROBLEM SELECTOR PLAN 3
Cr 1.65 - baseline is 1.7-1.8.  - diuresis as above  - avoid nephrotoxic agents  - strict I/O  - Cr elevation could also be new baseline for patient in setting of worsening kidney function 2/2 pulm htn/HF  - Pt with BUN 70's. No uremic symptoms currently but may require dialysis in the future.

## 2022-09-05 NOTE — PROGRESS NOTE ADULT - PROBLEM SELECTOR PLAN 4
RHC showed elevated right sided filling pressures with severe pulmonary hypertension with systemic PA pressures, slightly elevated PCWP and preserved cardiac output.   - Followed with Dr. Rodriguez at [place] for PH but he recently left the practice. Needs a pulmonary hypertension doctor near her home in [place]. Possibly [facility]  - Continue tadalafil 40 mg daily   - continue Ambrisentan 10mg qd  - Cardiomems 8/30 is 41. After trial of oral bumetanide cardiomems is 43, started back on drip      Transplant reporting that patient is currently not a candidate following eval, given this, also not a candidate for IV Flolan per Pulm

## 2022-09-05 NOTE — PROGRESS NOTE ADULT - ASSESSMENT
66F w/ severe pulmonary HTN (Group 1, 2 & 3), HFpEF, Afib, COPD on home O2 (5L NC), CKD, ROLANDA on CPAP, morbid obesity presenting with acute hypoxic respiratory failure, admitted for CHF exacerbation and ongoing lung transplant evaluation.   W/ epistaxis 8/13 now resolved, cbc stable 8/24, started back on nasal cannula during day ,CPAP at night, now on bumex trial.     Patient no longer a transplant candidate, seen by palliative, full code

## 2022-09-05 NOTE — PROGRESS NOTE ADULT - PROBLEM SELECTOR PLAN 10
Diet: DASH  DVT: on eilquis  Dispo: pending medical course. Home PT with rolling walker.
Diet: DASH  DVT: on eilquis  Dispo: pending medical course. Home PT with rolling walker.  Directives: Full code
Diet: DASH  DVT: on eilquis  Dispo: pending medical course. Home PT with rolling walker.

## 2022-09-06 NOTE — PROGRESS NOTE ADULT - SUBJECTIVE AND OBJECTIVE BOX
Interval History:  NAEON     Medications:  acetaminophen     Tablet .. 975 milliGRAM(s) Oral every 6 hours PRN  ALBUTerol    90 MICROgram(s) HFA Inhaler 2 Puff(s) Inhalation every 6 hours PRN  ambrisentan 10 milliGRAM(s) Oral daily  apixaban 5 milliGRAM(s) Oral two times a day  atorvastatin 20 milliGRAM(s) Oral at bedtime  budesonide  80 MICROgram(s)/formoterol 4.5 MICROgram(s) Inhaler 2 Puff(s) Inhalation two times a day  buMETAnide Infusion 2 mG/Hr IV Continuous <Continuous>  buPROPion XL (24-Hour) . 300 milliGRAM(s) Oral daily  chlorhexidine 2% Cloths 1 Application(s) Topical <User Schedule>  digoxin     Tablet 125 MICROGram(s) Oral every other day  diltiazem    milliGRAM(s) Oral daily  ferrous    sulfate 325 milliGRAM(s) Oral <User Schedule>  melatonin 3 milliGRAM(s) Oral at bedtime PRN  pantoprazole    Tablet 40 milliGRAM(s) Oral before breakfast  polyethylene glycol 3350 17 Gram(s) Oral daily  sodium chloride 0.65% Nasal 2 Spray(s) Both Nostrils four times a day  tadalafil 40 milliGRAM(s) Oral daily  tiotropium 18 MICROgram(s) Capsule 1 Capsule(s) Inhalation daily      Vitals:  T(C): 37.2 (22 @ 11:26), Max: 37.2 (22 @ 11:26)  HR: 71 (22 @ 12:49) (55 - 87)  BP: 111/63 (22 @ 12:49) (99/56 - 115/70)  BP(mean): --  RR: 18 (22 @ 11:26) (18 - 18)  SpO2: 94% (22 @ 12:49) (94% - 100%)    Daily     Daily Weight in k.4 (06 Sep 2022 07:29)        I&O's Summary    05 Sep 2022 07:01  -  06 Sep 2022 07:00  --------------------------------------------------------  IN: 780 mL / OUT: 950 mL / NET: -170 mL        Physical Exam:  Appearance: No Acute Distress  HEENT: PERRL  Neck: elevated JVD  Cardiovascular: Normal S1 S2, No murmurs/rubs/gallops  Respiratory: Clear to auscultation bilaterally  Gastrointestinal: Soft, Non-tender	  Skin: No cyanosis	  Neurologic: Non-focal  Extremities: No LE edema  Psychiatry: A & O x 3, Mood & affect appropriate    Labs:                        7.3    3.90  )-----------( 230      ( 06 Sep 2022 06:06 )             24.1     09-    136  |  94<L>  |  78<H>  ----------------------------<  74  3.6   |  28  |  1.70<H>    Ca    9.4      06 Sep 2022 06:05  Phos  4.4       Mg     2.1         TPro  6.3  /  Alb  3.4  /  TBili  0.3  /  DBili  x   /  AST  21  /  ALT  14  /  AlkPhos  120

## 2022-09-06 NOTE — PROGRESS NOTE ADULT - PROBLEM SELECTOR PLAN 4
- recent baseline Cr ranging 1.7-1.8. Today creatinine is 1.7  - eventual SGLT2-i as above to delay further progression of CKD  - continue to monitor closely with diuresis.

## 2022-09-06 NOTE — PROGRESS NOTE ADULT - ATTENDING COMMENTS
# JUANA - cardiorenal syndrome. Agree with IV bumetanide drip. Serum creatinine now reached a plateau of 1.6mg/dL. No indication for dialysis.     # Volume overload - due to heart failure. Continue IV bumetanide to keep I/O net negative.     # Medication toxicity monitoring: medication dose reviewed. Since patient is on bumetanide, continue to monitor for hypokalemia. Keep K closer to 4.0.     The rest of the recommendations as per fellow's note.    Candace Elizabeth MD  Attending Nephrologist  [phone] or via [employer]

## 2022-09-06 NOTE — PROGRESS NOTE ADULT - PROBLEM SELECTOR PLAN 3
RHC showed elevated right sided filling pressures with severe pulmonary hypertension with systemic PA pressures, slightly elevated PCWP and preserved cardiac output.   - Followed with Dr. Rodriguez at [facility] for PH but he recently left the practice. Needs a pulmonary hypertension doctor near her home in [place]. Possibly [facility]  - Continue tadalafil 40 mg daily   - continue Ambrisentan 10mg qd  - Cardiomems 8/30 is 41. After trial of oral bumetanide cardiomems is 43, started back on drip  - pending Cardio mems read 9/6      Transplant reporting that patient is currently not a candidate following eval, given this, also not a candidate for IV Flolan per Pulm

## 2022-09-06 NOTE — PROGRESS NOTE ADULT - SUBJECTIVE AND OBJECTIVE BOX
INCOMPLETE NOTE    Benito Torres | PGY1| Pager: [phone]  Interval Events:    REVIEW OF SYSTEMS:  CONSTITUTIONAL: No weakness, fevers or chills  EYES/ENT: No visual changes;  No vertigo or throat pain   NECK: No pain or stiffness  RESPIRATORY: No cough, wheezing, hemoptysis; No shortness of breath  CARDIOVASCULAR: No chest pain or palpitations  GASTROINTESTINAL: No abdominal or epigastric pain. No nausea, vomiting, or hematemesis; No diarrhea or constipation. No melena or hematochezia.  GENITOURINARY: No dysuria, frequency or hematuria  NEUROLOGICAL: No numbness or weakness  SKIN: No itching, burning, rashes, or lesions   All other review of systems is negative unless indicated above.    OBJECTIVE:  ICU Vital Signs Last 24 Hrs  T(C): 36.4 (06 Sep 2022 04:35), Max: 36.6 (05 Sep 2022 11:07)  T(F): 97.6 (06 Sep 2022 04:35), Max: 97.8 (05 Sep 2022 11:07)  HR: 67 (06 Sep 2022 06:13) (60 - 89)  BP: 115/70 (06 Sep 2022 06:13) (99/56 - 115/70)  BP(mean): --  ABP: --  ABP(mean): --  RR: 18 (06 Sep 2022 04:35) (18 - 18)  SpO2: 100% (06 Sep 2022 04:35) (92% - 100%)    O2 Parameters below as of 06 Sep 2022 04:35  Patient On (Oxygen Delivery Method): BiPAP/CPAP              09-04 @ 07:01 - 09-05 @ 07:00  --------------------------------------------------------  IN: 0 mL / OUT: 1300 mL / NET: -1300 mL    09-05 @ 07:01  -  09-06 @ 06:41  --------------------------------------------------------  IN: 780 mL / OUT: 950 mL / NET: -170 mL      CAPILLARY BLOOD GLUCOSE          PHYSICAL EXAM:  General: WN/WD NAD  Neurology: A&Ox3, nonfocal, LEWIS x 4  Eyes: PERRLA/ EOMI, Gross vision intact  ENT/Neck: Neck supple, trachea midline, No JVD, Gross hearing intact  Respiratory: CTA B/L, No wheezing, rales, rhonchi  CV: RRR, +S1/S2, -S3/S4, no murmurs, rubs or gallops  Abdominal: Soft, NT, ND +BS, No HSM  MSK: 5/5 strength UE/LE bilaterally  Extremities: No edema, 2+ peripheral pulses  Skin: No Rashes, Hematoma, Ecchymosis  Incisions:   Tubes:    HOSPITAL MEDICATIONS:  MEDICATIONS  (STANDING):  ambrisentan 10 milliGRAM(s) Oral daily  apixaban 5 milliGRAM(s) Oral two times a day  atorvastatin 20 milliGRAM(s) Oral at bedtime  budesonide  80 MICROgram(s)/formoterol 4.5 MICROgram(s) Inhaler 2 Puff(s) Inhalation two times a day  buMETAnide Infusion 2 mG/Hr (10 mL/Hr) IV Continuous <Continuous>  buPROPion XL (24-Hour) . 300 milliGRAM(s) Oral daily  chlorhexidine 2% Cloths 1 Application(s) Topical <User Schedule>  digoxin     Tablet 125 MICROGram(s) Oral every other day  diltiazem    milliGRAM(s) Oral daily  ferrous    sulfate 325 milliGRAM(s) Oral <User Schedule>  pantoprazole    Tablet 40 milliGRAM(s) Oral before breakfast  polyethylene glycol 3350 17 Gram(s) Oral daily  sodium chloride 0.65% Nasal 2 Spray(s) Both Nostrils four times a day  tadalafil 40 milliGRAM(s) Oral daily  tiotropium 18 MICROgram(s) Capsule 1 Capsule(s) Inhalation daily    MEDICATIONS  (PRN):  acetaminophen     Tablet .. 975 milliGRAM(s) Oral every 6 hours PRN Temp greater or equal to 38C (100.4F), Mild Pain (1 - 3), Moderate Pain (4 - 6)  ALBUTerol    90 MICROgram(s) HFA Inhaler 2 Puff(s) Inhalation every 6 hours PRN Shortness of Breath and/or Wheezing  melatonin 3 milliGRAM(s) Oral at bedtime PRN Insomnia      LABS:                        7.6    4.10  )-----------( 248      ( 05 Sep 2022 06:07 )             25.3     Hgb Trend: 7.6<--, 8.1<--, 8.0<--, 7.9<--, 8.0<--  09-05    137  |  94<L>  |  69<H>  ----------------------------<  79  4.1   |  29  |  1.65<H>    Ca    9.4      05 Sep 2022 06:07  Phos  3.3     09-05  Mg     2.0     09-05    TPro  6.5  /  Alb  3.4  /  TBili  0.4  /  DBili  x   /  AST  22  /  ALT  16  /  AlkPhos  126<H>  09-05    Creatinine Trend: 1.65<--, 1.67<--, 1.84<--, 1.92<--, 2.25<--, 2.39<--            MICROBIOLOGY:      Benito Marquezdre | PGY1| Pager: [phone]  Interval Events: No acute events overnight. Reports eating comfortably. requesting help with toilet to standing transition    REVIEW OF SYSTEMS:  CONSTITUTIONAL: No weakness, fevers or chills  EYES/ENT: No visual changes;  No vertigo or throat pain   NECK: No pain or stiffness  RESPIRATORY: No cough, wheezing, hemoptysis; No shortness of breath  CARDIOVASCULAR: No chest pain or palpitations  GASTROINTESTINAL: No abdominal or epigastric pain. No nausea, vomiting, or hematemesis; No diarrhea or constipation. No melena or hematochezia.  GENITOURINARY: No dysuria, frequency or hematuria  NEUROLOGICAL: No numbness or weakness  SKIN: No itching, burning, rashes, or lesions   All other review of systems is negative unless indicated above.    OBJECTIVE:  ICU Vital Signs Last 24 Hrs  T(C): 36.4 (06 Sep 2022 04:35), Max: 36.6 (05 Sep 2022 11:07)  T(F): 97.6 (06 Sep 2022 04:35), Max: 97.8 (05 Sep 2022 11:07)  HR: 67 (06 Sep 2022 06:13) (60 - 89)  BP: 115/70 (06 Sep 2022 06:13) (99/56 - 115/70)  BP(mean): --  ABP: --  ABP(mean): --  RR: 18 (06 Sep 2022 04:35) (18 - 18)  SpO2: 100% (06 Sep 2022 04:35) (92% - 100%)    O2 Parameters below as of 06 Sep 2022 04:35  Patient On (Oxygen Delivery Method): BiPAP/CPAP              09-04 @ 07:01 - 09-05 @ 07:00  --------------------------------------------------------  IN: 0 mL / OUT: 1300 mL / NET: -1300 mL    09-05 @ 07:01 - 09-06 @ 06:41  --------------------------------------------------------  IN: 780 mL / OUT: 950 mL / NET: -170 mL      CAPILLARY BLOOD GLUCOSE          PHYSICAL EXAM:  General: WN/WD NAD  Neurology: A&Ox3, nonfocal, LEWIS x 4  Eyes: PERRLA/ EOMI, Gross vision intact  ENT/Neck: Neck supple, trachea midline, No JVD, Gross hearing intact  Respiratory: CTA B/L, No wheezing, rales, rhonchi  CV: RRR, +S1/S2, -S3/S4, no murmurs, rubs or gallops  Abdominal: Soft, NT, ND +BS, No HSM  MSK: 5/5 strength UE/LE bilaterally  Extremities: No edema, 2+ peripheral pulses  Skin: No Rashes, Hematoma, Ecchymosis  Incisions:   Tubes:    HOSPITAL MEDICATIONS:  MEDICATIONS  (STANDING):  ambrisentan 10 milliGRAM(s) Oral daily  apixaban 5 milliGRAM(s) Oral two times a day  atorvastatin 20 milliGRAM(s) Oral at bedtime  budesonide  80 MICROgram(s)/formoterol 4.5 MICROgram(s) Inhaler 2 Puff(s) Inhalation two times a day  buMETAnide Infusion 2 mG/Hr (10 mL/Hr) IV Continuous <Continuous>  buPROPion XL (24-Hour) . 300 milliGRAM(s) Oral daily  chlorhexidine 2% Cloths 1 Application(s) Topical <User Schedule>  digoxin     Tablet 125 MICROGram(s) Oral every other day  diltiazem    milliGRAM(s) Oral daily  ferrous    sulfate 325 milliGRAM(s) Oral <User Schedule>  pantoprazole    Tablet 40 milliGRAM(s) Oral before breakfast  polyethylene glycol 3350 17 Gram(s) Oral daily  sodium chloride 0.65% Nasal 2 Spray(s) Both Nostrils four times a day  tadalafil 40 milliGRAM(s) Oral daily  tiotropium 18 MICROgram(s) Capsule 1 Capsule(s) Inhalation daily    MEDICATIONS  (PRN):  acetaminophen     Tablet .. 975 milliGRAM(s) Oral every 6 hours PRN Temp greater or equal to 38C (100.4F), Mild Pain (1 - 3), Moderate Pain (4 - 6)  ALBUTerol    90 MICROgram(s) HFA Inhaler 2 Puff(s) Inhalation every 6 hours PRN Shortness of Breath and/or Wheezing  melatonin 3 milliGRAM(s) Oral at bedtime PRN Insomnia      LABS:                        7.6    4.10  )-----------( 248      ( 05 Sep 2022 06:07 )             25.3     Hgb Trend: 7.6<--, 8.1<--, 8.0<--, 7.9<--, 8.0<--  09-05    137  |  94<L>  |  69<H>  ----------------------------<  79  4.1   |  29  |  1.65<H>    Ca    9.4      05 Sep 2022 06:07  Phos  3.3     09-05  Mg     2.0     09-05    TPro  6.5  /  Alb  3.4  /  TBili  0.4  /  DBili  x   /  AST  22  /  ALT  16  /  AlkPhos  126<H>  09-05    Creatinine Trend: 1.65<--, 1.67<--, 1.84<--, 1.92<--, 2.25<--, 2.39<--            MICROBIOLOGY:

## 2022-09-06 NOTE — OCCUPATIONAL THERAPY INITIAL EVALUATION ADULT - RANGE OF MOTION EXAMINATION, UPPER EXTREMITY
except b/l shoulder flexion, R limited to 3/4 range, L limited to 1/2 range/bilateral UE Active ROM was WFL  (within functional limits)

## 2022-09-06 NOTE — PROGRESS NOTE ADULT - PROBLEM SELECTOR PLAN 1
Patient with Baseline CKD 3. 1.4-1.9 since March '22. SCr on arrival was 1.8 on 8/11; peaked to 2.32 on 8/23  [] patient  on bumetamide infusion 2 mg/hr, continue diuresis   [] Trend renal function panel for creatinine and electrolytes   [] Patient does not require dialysis at this time   [] Avoid NSAIDs, ACEI/ARBS, RCA and nephrotoxins. Dose medications as per eGFR.  [] Needs Strict I & O's. or daily wts.  [] Patient weight has been fluctuating 88.4-90 Kg. most recent 89.4 Kg

## 2022-09-06 NOTE — PROGRESS NOTE ADULT - SUBJECTIVE AND OBJECTIVE BOX
[facility] DIVISION OF KIDNEY DISEASES AND HYPERTENSION -- FOLLOW UP NOTE  --------------------------------------------------------------------------------  Chief Complaint:    24 hour events/subjective:    Patient with documented urine output of 600 and 1.3 liters 9/4   reports some improvement in dyspnea however states her lower extremity edema is persistent. reports desaturating if moving on 6 liters nasal cannula     PAST HISTORY  --------------------------------------------------------------------------------  No significant changes to PMH, PSH, FHx, SHx, unless otherwise noted    ALLERGIES & MEDICATIONS  --------------------------------------------------------------------------------  Allergies    nitrates (Unknown)    Intolerances      Standing Inpatient Medications  ambrisentan 10 milliGRAM(s) Oral daily  apixaban 5 milliGRAM(s) Oral two times a day  atorvastatin 20 milliGRAM(s) Oral at bedtime  budesonide  80 MICROgram(s)/formoterol 4.5 MICROgram(s) Inhaler 2 Puff(s) Inhalation two times a day  buMETAnide Infusion 2 mG/Hr IV Continuous <Continuous>  buPROPion XL (24-Hour) . 300 milliGRAM(s) Oral daily  chlorhexidine 2% Cloths 1 Application(s) Topical <User Schedule>  digoxin     Tablet 125 MICROGram(s) Oral every other day  diltiazem    milliGRAM(s) Oral daily  ferrous    sulfate 325 milliGRAM(s) Oral <User Schedule>  pantoprazole    Tablet 40 milliGRAM(s) Oral before breakfast  polyethylene glycol 3350 17 Gram(s) Oral daily  sodium chloride 0.65% Nasal 2 Spray(s) Both Nostrils four times a day  tadalafil 40 milliGRAM(s) Oral daily  tiotropium 18 MICROgram(s) Capsule 1 Capsule(s) Inhalation daily    PRN Inpatient Medications  acetaminophen     Tablet .. 975 milliGRAM(s) Oral every 6 hours PRN  ALBUTerol    90 MICROgram(s) HFA Inhaler 2 Puff(s) Inhalation every 6 hours PRN  melatonin 3 milliGRAM(s) Oral at bedtime PRN      REVIEW OF SYSTEMS  --------------------------------------------------------------------------------        All other systems were reviewed and are negative, except as noted.    VITALS/PHYSICAL EXAM  --------------------------------------------------------------------------------  T(C): 37.2 (09-06-22 @ 11:26), Max: 37.2 (09-06-22 @ 11:26)  HR: 55 (09-06-22 @ 11:26) (55 - 87)  BP: 112/66 (09-06-22 @ 11:26) (99/56 - 115/70)  RR: 18 (09-06-22 @ 11:26) (18 - 18)  SpO2: 99% (09-06-22 @ 11:26) (95% - 100%)  Wt(kg): --        09-05-22 @ 07:01  -  09-06-22 @ 07:00  --------------------------------------------------------  IN: 780 mL / OUT: 950 mL / NET: -170 mL        Physical Exam:  	Gen: NAD  	HEENT: MMM  	Pulm: Crackles in the bases bilaterally   	CV: regular rate and rhythm   	Abd: Soft, +BS   	Ext: 1+ pitting edema   	Neuro: Awake  	Skin: Warm and dry  	Vascular access: no access       LABS/STUDIES  --------------------------------------------------------------------------------              7.3    3.90  >-----------<  230      [09-06-22 @ 06:06]              24.1     136  |  94  |  78  ----------------------------<  74      [09-06-22 @ 06:05]  3.6   |  28  |  1.70        Ca     9.4     [09-06-22 @ 06:05]      Mg     2.1     [09-06-22 @ 06:05]      Phos  4.4     [09-06-22 @ 06:05]    TPro  6.3  /  Alb  3.4  /  TBili  0.3  /  DBili  x   /  AST  21  /  ALT  14  /  AlkPhos  120  [09-06-22 @ 06:05]          Creatinine Trend:  SCr 1.70 [09-06 @ 06:05]  SCr 1.65 [09-05 @ 06:07]  SCr 1.67 [09-04 @ 10:44]  SCr 1.84 [09-03 @ 06:55]  SCr 1.92 [09-02 @ 19:57]    Urinalysis - [09-02-22 @ 15:20]      Color Light Yellow / Appearance Slightly Turbid / SG 1.010 / pH 6.0      Gluc Negative / Ketone Negative  / Bili Negative / Urobili Negative       Blood Small / Protein Trace / Leuk Est Large / Nitrite Negative      RBC 3 /  / Hyaline 2 / Gran  / Sq Epi  / Non Sq Epi 1 / Bacteria Few    Urine Creatinine 34      [09-05-22 @ 06:36]  Urine Protein 10      [09-05-22 @ 06:36]  Urine Sodium 40      [09-02-22 @ 15:21]  Urine Urea Nitrogen 428      [09-02-22 @ 15:21]    Iron 27, TIBC 350, %sat 8      [08-12-22 @ 06:56]  Ferritin 26      [08-12-22 @ 06:56]  Lipid: chol 103, TG 48, HDL 64, LDL --      [06-14-22 @ 02:45]       [facility] DIVISION OF KIDNEY DISEASES AND HYPERTENSION -- FOLLOW UP NOTE  --------------------------------------------------------------------------------  Chief Complaint:    24 hour events/subjective:    Patient with documented urine output of 600 and 1.3 liters 9/4   reports some improvement in dyspnea however states her lower extremity edema is persistent. reports desaturating if moving on 6 liters nasal cannula     PAST HISTORY  --------------------------------------------------------------------------------  No significant changes to PMH, PSH, FHx, SHx, unless otherwise noted    ALLERGIES & MEDICATIONS  --------------------------------------------------------------------------------  Allergies    nitrates (Unknown)    Intolerances      Standing Inpatient Medications  ambrisentan 10 milliGRAM(s) Oral daily  apixaban 5 milliGRAM(s) Oral two times a day  atorvastatin 20 milliGRAM(s) Oral at bedtime  budesonide  80 MICROgram(s)/formoterol 4.5 MICROgram(s) Inhaler 2 Puff(s) Inhalation two times a day  buMETAnide Infusion 2 mG/Hr IV Continuous <Continuous>  buPROPion XL (24-Hour) . 300 milliGRAM(s) Oral daily  chlorhexidine 2% Cloths 1 Application(s) Topical <User Schedule>  digoxin     Tablet 125 MICROGram(s) Oral every other day  diltiazem    milliGRAM(s) Oral daily  ferrous    sulfate 325 milliGRAM(s) Oral <User Schedule>  pantoprazole    Tablet 40 milliGRAM(s) Oral before breakfast  polyethylene glycol 3350 17 Gram(s) Oral daily  sodium chloride 0.65% Nasal 2 Spray(s) Both Nostrils four times a day  tadalafil 40 milliGRAM(s) Oral daily  tiotropium 18 MICROgram(s) Capsule 1 Capsule(s) Inhalation daily    PRN Inpatient Medications  acetaminophen     Tablet .. 975 milliGRAM(s) Oral every 6 hours PRN  ALBUTerol    90 MICROgram(s) HFA Inhaler 2 Puff(s) Inhalation every 6 hours PRN  melatonin 3 milliGRAM(s) Oral at bedtime PRN      REVIEW OF SYSTEMS  --------------------------------------------------------------------------------        All other systems were reviewed and are negative, except as noted.    VITALS/PHYSICAL EXAM  --------------------------------------------------------------------------------  T(C): 37.2 (09-06-22 @ 11:26), Max: 37.2 (09-06-22 @ 11:26)  HR: 55 (09-06-22 @ 11:26) (55 - 87)  BP: 112/66 (09-06-22 @ 11:26) (99/56 - 115/70)  RR: 18 (09-06-22 @ 11:26) (18 - 18)  SpO2: 99% (09-06-22 @ 11:26) (95% - 100%)  Wt(kg): --        09-05-22 @ 07:01  -  09-06-22 @ 07:00  --------------------------------------------------------  IN: 780 mL / OUT: 950 mL / NET: -170 mL        Physical Exam:  	Gen: NAD, alert  	HEENT: MMM, supple neck  	Pulm: Crackles in the bases bilaterally   	CV: regular rate and rhythm, no rub.    	Abd: Soft, +BS   	Ext: 1+ pitting edema, warm  	Neuro: Awake, no focal deficit  	Skin: Warm and dry  	Vascular access: no access               Psych: normal mood and affect.                    LABS/STUDIES  --------------------------------------------------------------------------------              7.3    3.90  >-----------<  230      [09-06-22 @ 06:06]              24.1     136  |  94  |  78  ----------------------------<  74      [09-06-22 @ 06:05]  3.6   |  28  |  1.70        Ca     9.4     [09-06-22 @ 06:05]      Mg     2.1     [09-06-22 @ 06:05]      Phos  4.4     [09-06-22 @ 06:05]    TPro  6.3  /  Alb  3.4  /  TBili  0.3  /  DBili  x   /  AST  21  /  ALT  14  /  AlkPhos  120  [09-06-22 @ 06:05]          Creatinine Trend:  SCr 1.70 [09-06 @ 06:05]  SCr 1.65 [09-05 @ 06:07]  SCr 1.67 [09-04 @ 10:44]  SCr 1.84 [09-03 @ 06:55]  SCr 1.92 [09-02 @ 19:57]    Urinalysis - [09-02-22 @ 15:20]      Color Light Yellow / Appearance Slightly Turbid / SG 1.010 / pH 6.0      Gluc Negative / Ketone Negative  / Bili Negative / Urobili Negative       Blood Small / Protein Trace / Leuk Est Large / Nitrite Negative      RBC 3 /  / Hyaline 2 / Gran  / Sq Epi  / Non Sq Epi 1 / Bacteria Few    Urine Creatinine 34      [09-05-22 @ 06:36]  Urine Protein 10      [09-05-22 @ 06:36]  Urine Sodium 40      [09-02-22 @ 15:21]  Urine Urea Nitrogen 428      [09-02-22 @ 15:21]    Iron 27, TIBC 350, %sat 8      [08-12-22 @ 06:56]  Ferritin 26      [08-12-22 @ 06:56]  Lipid: chol 103, TG 48, HDL 64, LDL --      [06-14-22 @ 02:45]

## 2022-09-06 NOTE — PROGRESS NOTE ADULT - PROBLEM SELECTOR PLAN 3
rate controlled on Digoxin and Cardizem   - c/w digoxin dose to 125 mcg every other day, repeat level next week  - on home Eliquis.

## 2022-09-06 NOTE — PROGRESS NOTE ADULT - ASSESSMENT
66 year old with PMH of Pulmonary hypertension (Group 1,2,3) HFpEF, atrial fibrillation, COPD on home oxygen, ROLANDA on CPAP, morbid obesity with acute hypoxic respiratory failure. Nephrology consulted for uptrending creatinine. Creatinine today stable. BUN is however noted to be down trending. Is non oliguric however on bumetamide infusion at 2 mg/hr.   Patient with no urgent need for renal replacement therapy.

## 2022-09-06 NOTE — OCCUPATIONAL THERAPY INITIAL EVALUATION ADULT - LIVES WITH, PROFILE
Pt lives in a trailer with his son, 4 steps to enter, +walk-in shower. PTA IND with ADL/mobility. Owns RW & rollator.

## 2022-09-06 NOTE — PROGRESS NOTE ADULT - PROBLEM SELECTOR PLAN 9
Diet: DASH  DVT: on eilquis  Dispo: pending medical course. Home PT with rolling walker.  Directives: Full code

## 2022-09-06 NOTE — PROGRESS NOTE ADULT - ATTENDING COMMENTS
will switch to bumex 4mg po BID  resume spironolactone 25mg po BID  will get PAD reading on cardioMEMS tomorrow

## 2022-09-06 NOTE — PROGRESS NOTE ADULT - PROBLEM SELECTOR PLAN 2
- CardioMEMS PAD 8/11 43 mmHg. goal ~40 but difficult to ascertain given RV dysfunction  - cardiomems PAD 8/15 45 mmHg, 98/45/66, Cardiomems reading 8/30 41m PAD 9/1 43  - Dry weight is 188-190, today 197  - transition to Bumex 4 po BID   - hold spironolactone for now, will assess K for resumption this admission   - please replete K > 4, Mg > 2  - eventual SGLT2-i prior to discharge, once on stable dose of oral diuretics  - please obtain daily BMP while being actively diuresed.

## 2022-09-06 NOTE — PROGRESS NOTE ADULT - ATTENDING COMMENTS
-Patient says she ambulated well with PT with the NRB mask on.   -C/w bumex drip.   -F/u CHF recs and cardioMEMS reading.

## 2022-09-07 NOTE — PROGRESS NOTE ADULT - SUBJECTIVE AND OBJECTIVE BOX
INCOMPLETE NOTE    Benito Torres | PGY1| Pager: [phone]  Interval Events:    REVIEW OF SYSTEMS:  CONSTITUTIONAL: No weakness, fevers or chills  EYES/ENT: No visual changes;  No vertigo or throat pain   NECK: No pain or stiffness  RESPIRATORY: No cough, wheezing, hemoptysis; No shortness of breath  CARDIOVASCULAR: No chest pain or palpitations  GASTROINTESTINAL: No abdominal or epigastric pain. No nausea, vomiting, or hematemesis; No diarrhea or constipation. No melena or hematochezia.  GENITOURINARY: No dysuria, frequency or hematuria  NEUROLOGICAL: No numbness or weakness  SKIN: No itching, burning, rashes, or lesions   All other review of systems is negative unless indicated above.    OBJECTIVE:  ICU Vital Signs Last 24 Hrs  T(C): 36.3 (07 Sep 2022 05:47), Max: 37.2 (06 Sep 2022 11:26)  T(F): 97.4 (07 Sep 2022 05:47), Max: 98.9 (06 Sep 2022 11:26)  HR: 65 (07 Sep 2022 05:47) (55 - 87)  BP: 103/61 (07 Sep 2022 05:47) (101/63 - 112/66)  BP(mean): --  ABP: --  ABP(mean): --  RR: 18 (07 Sep 2022 05:47) (18 - 18)  SpO2: 95% (07 Sep 2022 05:47) (94% - 99%)    O2 Parameters below as of 07 Sep 2022 05:47  Patient On (Oxygen Delivery Method): BiPAP/CPAP              09-05 @ 07:01 - 09-06 @ 07:00  --------------------------------------------------------  IN: 780 mL / OUT: 950 mL / NET: -170 mL    09-06 @ 07:01  -  09-07 @ 06:56  --------------------------------------------------------  IN: 240 mL / OUT: 700 mL / NET: -460 mL      CAPILLARY BLOOD GLUCOSE          PHYSICAL EXAM:  General: WN/WD NAD  Neurology: A&Ox3, nonfocal, LEWIS x 4  Eyes: PERRLA/ EOMI, Gross vision intact  ENT/Neck: Neck supple, trachea midline, No JVD, Gross hearing intact  Respiratory: CTA B/L, No wheezing, rales, rhonchi  CV: RRR, +S1/S2, -S3/S4, no murmurs, rubs or gallops  Abdominal: Soft, NT, ND +BS, No HSM  MSK: 5/5 strength UE/LE bilaterally  Extremities: No edema, 2+ peripheral pulses  Skin: No Rashes, Hematoma, Ecchymosis  Incisions:   Tubes:    HOSPITAL MEDICATIONS:  MEDICATIONS  (STANDING):  ambrisentan 10 milliGRAM(s) Oral daily  apixaban 5 milliGRAM(s) Oral two times a day  atorvastatin 20 milliGRAM(s) Oral at bedtime  budesonide  80 MICROgram(s)/formoterol 4.5 MICROgram(s) Inhaler 2 Puff(s) Inhalation two times a day  buMETAnide 4 milliGRAM(s) Oral every 12 hours  buPROPion XL (24-Hour) . 300 milliGRAM(s) Oral daily  chlorhexidine 2% Cloths 1 Application(s) Topical <User Schedule>  digoxin     Tablet 125 MICROGram(s) Oral every other day  diltiazem    milliGRAM(s) Oral daily  ferrous    sulfate 325 milliGRAM(s) Oral <User Schedule>  pantoprazole    Tablet 40 milliGRAM(s) Oral before breakfast  polyethylene glycol 3350 17 Gram(s) Oral daily  sodium chloride 0.65% Nasal 2 Spray(s) Both Nostrils four times a day  tadalafil 40 milliGRAM(s) Oral daily  tiotropium 18 MICROgram(s) Capsule 1 Capsule(s) Inhalation daily    MEDICATIONS  (PRN):  acetaminophen     Tablet .. 975 milliGRAM(s) Oral every 6 hours PRN Temp greater or equal to 38C (100.4F), Mild Pain (1 - 3), Moderate Pain (4 - 6)  ALBUTerol    90 MICROgram(s) HFA Inhaler 2 Puff(s) Inhalation every 6 hours PRN Shortness of Breath and/or Wheezing  melatonin 3 milliGRAM(s) Oral at bedtime PRN Insomnia      LABS:                        7.6    4.18  )-----------( 254      ( 07 Sep 2022 06:23 )             24.5     Hgb Trend: 7.6<--, 7.3<--, 7.6<--, 8.1<--, 8.0<--  09-06    135  |  92<L>  |  74<H>  ----------------------------<  107<H>  3.4<L>   |  27  |  1.61<H>    Ca    9.7      06 Sep 2022 18:10  Phos  4.4     09-06  Mg     2.1     09-06    TPro  6.3  /  Alb  3.4  /  TBili  0.3  /  DBili  x   /  AST  21  /  ALT  14  /  AlkPhos  120  09-06    Creatinine Trend: 1.61<--, 1.70<--, 1.65<--, 1.67<--, 1.84<--, 1.92<--            MICROBIOLOGY:      Benito Torres | PGY1| Pager: [phone]  Interval Events: No events overnight. Transitioned from IV gtt bumex to PO, added spironolactone back. No new SOB, CP. BMs regular.      OBJECTIVE:  ICU Vital Signs Last 24 Hrs  T(C): 36.3 (07 Sep 2022 05:47), Max: 37.2 (06 Sep 2022 11:26)  T(F): 97.4 (07 Sep 2022 05:47), Max: 98.9 (06 Sep 2022 11:26)  HR: 65 (07 Sep 2022 05:47) (55 - 87)  BP: 103/61 (07 Sep 2022 05:47) (101/63 - 112/66)  BP(mean): --  ABP: --  ABP(mean): --  RR: 18 (07 Sep 2022 05:47) (18 - 18)  SpO2: 95% (07 Sep 2022 05:47) (94% - 99%)    O2 Parameters below as of 07 Sep 2022 05:47  Patient On (Oxygen Delivery Method): BiPAP/CPAP        09-05 @ 07:01 - 09-06 @ 07:00  --------------------------------------------------------  IN: 780 mL / OUT: 950 mL / NET: -170 mL    09-06 @ 07:01 - 09-07 @ 06:56  --------------------------------------------------------  IN: 240 mL / OUT: 700 mL / NET: -460 mL      CAPILLARY BLOOD GLUCOSE      PHYSICAL EXAM:  General: WN/WD NAD, seen comfortably sitting upright in chair.   Neurology: A&Ox3, nonfocal, LEWIS x 4  Eyes: PERRLA/ EOMI, Gross vision intact  ENT/Neck: Neck supple, trachea midline, No JVD, Gross hearing intact  Respiratory: b/l crackles at bases  CV: RRR, +S1/S2, -S3/S4, no murmurs, rubs or gallops  Abdominal: Soft, NT, ND +BS, No HSM  MSK: 5/5 strength UE/LE bilaterally  Extremities: Tense edema with chronic venous stasis ulcerations of leg.   Skin: No Rashes, Hematoma, Ecchymosis  Incisions:   Tubes:    HOSPITAL MEDICATIONS:  MEDICATIONS  (STANDING):  ambrisentan 10 milliGRAM(s) Oral daily  apixaban 5 milliGRAM(s) Oral two times a day  atorvastatin 20 milliGRAM(s) Oral at bedtime  budesonide  80 MICROgram(s)/formoterol 4.5 MICROgram(s) Inhaler 2 Puff(s) Inhalation two times a day  buMETAnide 4 milliGRAM(s) Oral every 12 hours  buPROPion XL (24-Hour) . 300 milliGRAM(s) Oral daily  chlorhexidine 2% Cloths 1 Application(s) Topical <User Schedule>  digoxin     Tablet 125 MICROGram(s) Oral every other day  diltiazem    milliGRAM(s) Oral daily  ferrous    sulfate 325 milliGRAM(s) Oral <User Schedule>  pantoprazole    Tablet 40 milliGRAM(s) Oral before breakfast  polyethylene glycol 3350 17 Gram(s) Oral daily  sodium chloride 0.65% Nasal 2 Spray(s) Both Nostrils four times a day  tadalafil 40 milliGRAM(s) Oral daily  tiotropium 18 MICROgram(s) Capsule 1 Capsule(s) Inhalation daily    MEDICATIONS  (PRN):  acetaminophen     Tablet .. 975 milliGRAM(s) Oral every 6 hours PRN Temp greater or equal to 38C (100.4F), Mild Pain (1 - 3), Moderate Pain (4 - 6)  ALBUTerol    90 MICROgram(s) HFA Inhaler 2 Puff(s) Inhalation every 6 hours PRN Shortness of Breath and/or Wheezing  melatonin 3 milliGRAM(s) Oral at bedtime PRN Insomnia      LABS:                        7.6    4.18  )-----------( 254      ( 07 Sep 2022 06:23 )             24.5     Hgb Trend: 7.6<--, 7.3<--, 7.6<--, 8.1<--, 8.0<--  09-06    135  |  92<L>  |  74<H>  ----------------------------<  107<H>  3.4<L>   |  27  |  1.61<H>    Ca    9.7      06 Sep 2022 18:10  Phos  4.4     09-06  Mg     2.1     09-06    TPro  6.3  /  Alb  3.4  /  TBili  0.3  /  DBili  x   /  AST  21  /  ALT  14  /  AlkPhos  120  09-06    Creatinine Trend: 1.61<--, 1.70<--, 1.65<--, 1.67<--, 1.84<--, 1.92<--            MICROBIOLOGY:

## 2022-09-07 NOTE — PROGRESS NOTE ADULT - PROBLEM SELECTOR PLAN 1
Pt feeling at baseline. Improving on bumex drip. Urine output improving Weight today not done at time of note writing. Goal weight 189 lbs  - Bumex PO 4mg q12  - c/w spironolactone  - Holding metolazone with Na <135  - standing daily weights, strict I/Os.  - Pulm, nephro, and HF following   - eventual SGLT2-i prior to discharge, once on stable dose of oral diuretics  -Continue to ambulate with NRB or HFNC in the next coming days to show ability. No gross stroke deficits.

## 2022-09-07 NOTE — PROGRESS NOTE ADULT - ATTENDING COMMENTS
-F/u CHF and pulm recs. -CardioMEMS reportedly supposed to be read today.   -Will get CT chest/abd/pelvis to eval for any atherosclerosis and lung parenchyma and any other occult issues. Though will be limited without IV contrast.   -Patient overall appears to have had good response to the Bumex drip but not weight loss.   -Spoke with Dr. Arndt at [place] for lung transplant eval. Updated her. She requested above scans and cystatin C in am. Patient will also eventually need a LHC.

## 2022-09-07 NOTE — PROGRESS NOTE ADULT - PROBLEM SELECTOR PLAN 3
RHC showed elevated right sided filling pressures with severe pulmonary hypertension with systemic PA pressures, slightly elevated PCWP and preserved cardiac output.   - Followed with Dr. Rodriguez at [place] for PH but he recently left the practice. Needs a pulmonary hypertension doctor near her home in [place]. Possibly [facility]  - Continue tadalafil 40 mg daily   - continue Ambrisentan 10mg qd  - Cardiomems 8/30 is 41. After trial of oral bumetanide cardiomems is 43, started back on drip  - pending Cardio mems read 9/6      Transplant reporting that patient is currently not a candidate following eval, given this, also not a candidate for IV Flolan per Pulm

## 2022-09-07 NOTE — PROGRESS NOTE ADULT - ASSESSMENT
66 F with h/o COPD, CHFpEF, AFib, obesity (Type III, hx bariatric sx ), and severe pulmonary HTN combined group 1,2,3 with chronic combined hypercapnic and hypoxemic respiratory failure on ATC O2 supplementation (dependent on 8L NC at rest) with qhs/prn AVAPS (Trelegy Antonio via nasal prong interface) presenting for progressive dyspnea with minimal exertion admitted for acute on chronic diastolic failure, RV failure, and severe pulmonary hypertension.     RHC 2022 revealed sPAP: 106, dPAP: 44 mPAP:65, PCWP: 16, PVR 7.32W, CO/CI 6.7/3.2.     -Not a candidate for lung transplant due to multiorgan dysfxn, high BMI, and poor functional status.  -C/w pulmonary vasodilators Tadalafil and Ambrisentan.   -Out out of bed to chair daily; aggressive PT.  -Continue supplemental O2 with goal O2 sat > 88%. Patient is on 8L NC at home , now tolerating 6-7L.  -Continue symbicort and tiotropium. Albuterol prn.  -Continue home AVAPs at night   -HF assistance w/ care greatly appreciated.    Patient should f/u with pulmonary within 1-2 weeks of discharge with Dr. Mosher (Pulmonary Hypertension). Please email [email or web address] and include patient's name,  and MRN and allow 24 hours for scheduling.    [address] [place] [address]  [phone]    Dr. Asa Gordon, DO  Pulmonary and Critical Care Medicine Fellow   Available via Microsoft Teams - **Preferred**  Pulmonary Spectra #[id number] ([facility]) / [id number] ([facility])  Pager:  [phone]    66 F with h/o COPD, CHFpEF, AFib, obesity (Type III, hx bariatric sx ), and severe pulmonary HTN combined group 1,2,3 with chronic combined hypercapnic and hypoxemic respiratory failure on ATC O2 supplementation (dependent on 8L NC at rest) with qhs/prn AVAPS (Trelegy Antonio via nasal prong interface) presenting for progressive dyspnea with minimal exertion admitted for acute on chronic diastolic failure, RV failure, and severe pulmonary hypertension.     RHC 2022 revealed sPAP: 106, dPAP: 44 mPAP:65, PCWP: 16, PVR 7.32W, CO/CI 6.7/3.2.     -Not a candidate for lung transplant due to multiorgan dysfxn, high BMI, and poor functional status.  -C/w pulmonary vasodilators Tadalafil and Ambrisentan.   -Out out of bed to chair daily; aggressive PT.  -Continue supplemental O2 with goal O2 sat > 88%. Patient is on 8L NC at home , now tolerating 6-7L.  -Continue symbicort and tiotropium. Albuterol prn.  -Continue home AVAPs at night (Trelegy Antoino via nasal prong interface).  -HF assistance w/ care greatly appreciated.    Patient should f/u with pulmonary within 1-2 weeks of discharge with Dr. Mosher (Pulmonary Hypertension). Please email [email or web address] and include patient's name,  and MRN and allow 24 hours for scheduling.    [address] [place] [address]  [phone]    Dr. Asa Gordon, DO  Pulmonary and Critical Care Medicine Fellow   Available via Microsoft Teams - **Preferred**  Pulmonary Spectra #[id number] ([facility]) / [id number] ([facility])  Pager:  [phone]    66 F with h/o COPD, CHFpEF, AFib, obesity (Type III, hx bariatric sx ), and severe pulmonary HTN combined group 1,2,3 with chronic combined hypercapnic and hypoxemic respiratory failure on ATC O2 supplementation (dependent on 8L NC at rest) with qhs/prn AVAPS (Trelegy Antonio via nasal prong interface) presenting for progressive dyspnea with minimal exertion admitted for acute on chronic diastolic failure, RV failure, and severe pulmonary hypertension.     RHC 2022 revealed sPAP: 106, dPAP: 44 mPAP:65, PCWP: 16, PVR 7.32W, CO/CI 6.7/3.2.     -Not a candidate for lung transplant due to multiorgan dysfxn, high BMI, and poor functional status.  -C/w pulmonary vasodilators Tadalafil and Ambrisentan.   -Out out of bed to chair daily; aggressive PT.  -Continue supplemental O2 with goal O2 sat > 88%. Patient is on 8L NC at home , now tolerating 6-7L.  -Continue symbicort and tiotropium. Albuterol prn.  -Continue home AVAPs at night (Trelegy Antonio via nasal prong interface).  -HF assistance w/ care greatly appreciated.  -CTC noncontrast study ordered. Uploaded CT from [place] in  with right sided opacity. Will need interval follow up CT.     Patient should f/u with pulmonary within 1-2 weeks of discharge with Dr. Mosher (Pulmonary Hypertension). Please email [email or web address] and include patient's name,  and MRN and allow 24 hours for scheduling.    [address] [place] [address]  [phone]    Dr. Asa Gordon, DO  Pulmonary and Critical Care Medicine Fellow   Available via Microsoft Teams - **Preferred**  Pulmonary Spectra #[id number] (NS) / [id number] ([facility])  Pager:  [phone]

## 2022-09-07 NOTE — PROGRESS NOTE ADULT - ATTENDING COMMENTS
66 F with h/o COPD, CHFpEF, AFib, obesity (Type III, hx bariatric sx 2012), and severe pulmonary HTN combined group 1,2,3 with chronic combined hypercapnic and hypoxemic respiratory failure on ATC O2 supplementation (dependent on 8L NC at rest) with qhs/prn AVAPS (Trelegy Antonio via nasal prong interface) presenting for progressive dyspnea with minimal exertion admitted for acute on chronic diastolic failure, RV failure, and severe pulmonary hypertension.    RHC 6/20/2022 revealed sPAP: 106, dPAP: 44 mPAP:65, PCWP: 16, PVR 7.32W, CO/CI 6.7/3.2.   She has been aggressively diuresed and is currently feeling better, tolerating ambrisentan and tadalafil.  Also using AVAPS QHS.  Agree with current management as outlined above.

## 2022-09-07 NOTE — PROGRESS NOTE ADULT - SUBJECTIVE AND OBJECTIVE BOX
PULMONARY SERVICE FOLLOW UP CONSULT NOTE    SUBJECTIVE:          REVIEW OF SYSTEMS:       MEDICATIONS:  Pulmonary:  ALBUTerol    90 MICROgram(s) HFA Inhaler 2 Puff(s) Inhalation every 6 hours PRN  budesonide  80 MICROgram(s)/formoterol 4.5 MICROgram(s) Inhaler 2 Puff(s) Inhalation two times a day  tiotropium 18 MICROgram(s) Capsule 1 Capsule(s) Inhalation daily    Antimicrobials:    Anticoagulants:  apixaban 5 milliGRAM(s) Oral two times a day    Onc:    GI/:  pantoprazole    Tablet 40 milliGRAM(s) Oral before breakfast  polyethylene glycol 3350 17 Gram(s) Oral daily    Endocrine:  atorvastatin 20 milliGRAM(s) Oral at bedtime    Cardiac:  ambrisentan 10 milliGRAM(s) Oral daily  buMETAnide 4 milliGRAM(s) Oral every 12 hours  digoxin     Tablet 125 MICROGram(s) Oral every other day  diltiazem    milliGRAM(s) Oral daily  tadalafil 40 milliGRAM(s) Oral daily    Other Medications:  acetaminophen     Tablet .. 975 milliGRAM(s) Oral every 6 hours PRN  buPROPion XL (24-Hour) . 300 milliGRAM(s) Oral daily  chlorhexidine 2% Cloths 1 Application(s) Topical <User Schedule>  ferrous    sulfate 325 milliGRAM(s) Oral <User Schedule>  melatonin 3 milliGRAM(s) Oral at bedtime PRN  sodium chloride 0.65% Nasal 2 Spray(s) Both Nostrils four times a day      PHYSICAL EXAM  Vital Signs Last 24 Hrs  T(C): 36.3 (07 Sep 2022 05:47), Max: 37.2 (06 Sep 2022 11:26)  T(F): 97.4 (07 Sep 2022 05:47), Max: 98.9 (06 Sep 2022 11:26)  HR: 65 (07 Sep 2022 05:47) (55 - 87)  BP: 103/61 (07 Sep 2022 05:47) (101/63 - 112/66)  BP(mean): --  RR: 18 (07 Sep 2022 05:47) (18 - 18)  SpO2: 95% (07 Sep 2022 05:47) (94% - 99%)    Parameters below as of 07 Sep 2022 05:47  Patient On (Oxygen Delivery Method): BiPAP/CPAP        09-05 @ 07:01  -  09-06 @ 07:00  --------------------------------------------------------  IN: 780 mL / OUT: 950 mL / NET: -170 mL    09-06 @ 07:01  - 09-07 @ 06:47  --------------------------------------------------------  IN: 240 mL / OUT: 700 mL / NET: -460 mL            CONSTITUTIONAL: No acute distress.   HEENT:  Conjunctiva clear B/L.  Moist oral mucosa.   Cardiovascular: RRR with no murmurs. No JVD noted. No lower extremity edema B/L. Extremities are warm and well perfused.    Respiratory: Lungs CTAB. No wrr. No accessory muscle use.   Gastrointestinal:  Soft, nontender. Non-distended. Non-rigid.    Neurologic:  Alert and awake. Moving all extremities. Following commands.    Skin:  No gross rashes notes.    LABS:      CBC Full  -  ( 07 Sep 2022 06:23 )  WBC Count : 4.18 K/uL  RBC Count : 2.69 M/uL  Hemoglobin : 7.6 g/dL  Hematocrit : 24.5 %  Platelet Count - Automated : 254 K/uL  Mean Cell Volume : 91.1 fl  Mean Cell Hemoglobin : 28.3 pg  Mean Cell Hemoglobin Concentration : 31.0 gm/dL  Auto Neutrophil # : x  Auto Lymphocyte # : x  Auto Monocyte # : x  Auto Eosinophil # : x  Auto Basophil # : x  Auto Neutrophil % : x  Auto Lymphocyte % : x  Auto Monocyte % : x  Auto Eosinophil % : x  Auto Basophil % : x    09-06    135  |  92<L>  |  74<H>  ----------------------------<  107<H>  3.4<L>   |  27  |  1.61<H>    Ca    9.7      06 Sep 2022 18:10  Phos  4.4     09-06  Mg     2.1     09-06    TPro  6.3  /  Alb  3.4  /  TBili  0.3  /  DBili  x   /  AST  21  /  ALT  14  /  AlkPhos  120  09-06                      RADIOLOGY & ADDITIONAL STUDIES: PULMONARY SERVICE FOLLOW UP CONSULT NOTE    SUBJECTIVE:  RINALDI. Not at rest.     ROS  12 point ros negative except for above.        MEDICATIONS:  Pulmonary:  ALBUTerol    90 MICROgram(s) HFA Inhaler 2 Puff(s) Inhalation every 6 hours PRN  budesonide  80 MICROgram(s)/formoterol 4.5 MICROgram(s) Inhaler 2 Puff(s) Inhalation two times a day  tiotropium 18 MICROgram(s) Capsule 1 Capsule(s) Inhalation daily    Antimicrobials:    Anticoagulants:  apixaban 5 milliGRAM(s) Oral two times a day    Onc:    GI/:  pantoprazole    Tablet 40 milliGRAM(s) Oral before breakfast  polyethylene glycol 3350 17 Gram(s) Oral daily    Endocrine:  atorvastatin 20 milliGRAM(s) Oral at bedtime    Cardiac:  ambrisentan 10 milliGRAM(s) Oral daily  buMETAnide 4 milliGRAM(s) Oral every 12 hours  digoxin     Tablet 125 MICROGram(s) Oral every other day  diltiazem    milliGRAM(s) Oral daily  tadalafil 40 milliGRAM(s) Oral daily    Other Medications:  acetaminophen     Tablet .. 975 milliGRAM(s) Oral every 6 hours PRN  buPROPion XL (24-Hour) . 300 milliGRAM(s) Oral daily  chlorhexidine 2% Cloths 1 Application(s) Topical <User Schedule>  ferrous    sulfate 325 milliGRAM(s) Oral <User Schedule>  melatonin 3 milliGRAM(s) Oral at bedtime PRN  sodium chloride 0.65% Nasal 2 Spray(s) Both Nostrils four times a day      PHYSICAL EXAM  Vital Signs Last 24 Hrs  T(C): 36.3 (07 Sep 2022 05:47), Max: 37.2 (06 Sep 2022 11:26)  T(F): 97.4 (07 Sep 2022 05:47), Max: 98.9 (06 Sep 2022 11:26)  HR: 65 (07 Sep 2022 05:47) (55 - 87)  BP: 103/61 (07 Sep 2022 05:47) (101/63 - 112/66)  BP(mean): --  RR: 18 (07 Sep 2022 05:47) (18 - 18)  SpO2: 95% (07 Sep 2022 05:47) (94% - 99%)    Parameters below as of 07 Sep 2022 05:47  Patient On (Oxygen Delivery Method): BiPAP/CPAP        09-05 @ 07:01  -  09-06 @ 07:00  --------------------------------------------------------  IN: 780 mL / OUT: 950 mL / NET: -170 mL    09-06 @ 07:01  -  09-07 @ 06:47  --------------------------------------------------------  IN: 240 mL / OUT: 700 mL / NET: -460 mL            CONSTITUTIONAL: No acute distress.   HEENT:  Conjunctiva clear B/L.  Moist oral mucosa.   Cardiovascular: RRR with no murmurs. No JVD noted. 2+lower extremity edema B/L. Extremities are warm and well perfused.    Respiratory: Dec bs. No accessory muscle use.   Gastrointestinal:  Soft, nontender. Non-distended. Non-rigid.    Neurologic:  Alert and awake. Moving all extremities. Following commands.    Skin:  Venous stasis.     LABS:      CBC Full  -  ( 07 Sep 2022 06:23 )  WBC Count : 4.18 K/uL  RBC Count : 2.69 M/uL  Hemoglobin : 7.6 g/dL  Hematocrit : 24.5 %  Platelet Count - Automated : 254 K/uL  Mean Cell Volume : 91.1 fl  Mean Cell Hemoglobin : 28.3 pg  Mean Cell Hemoglobin Concentration : 31.0 gm/dL  Auto Neutrophil # : x  Auto Lymphocyte # : x  Auto Monocyte # : x  Auto Eosinophil # : x  Auto Basophil # : x  Auto Neutrophil % : x  Auto Lymphocyte % : x  Auto Monocyte % : x  Auto Eosinophil % : x  Auto Basophil % : x    09-06    135  |  92<L>  |  74<H>  ----------------------------<  107<H>  3.4<L>   |  27  |  1.61<H>    Ca    9.7      06 Sep 2022 18:10  Phos  4.4     09-06  Mg     2.1     09-06    TPro  6.3  /  Alb  3.4  /  TBili  0.3  /  DBili  x   /  AST  21  /  ALT  14  /  AlkPhos  120  09-06                      RADIOLOGY & ADDITIONAL STUDIES:

## 2022-09-07 NOTE — PHARMACOTHERAPY INTERVENTION NOTE - COMMENTS
Counseled patient on the following inpatient/discharge medications names (brand/generic), indication, and possible side effects:  Bumex 4mg PO BID (pt was on bumex gtt, now transitioned to PO)  Added back on spironolactone 25mg BID    Patient was provided with a medication card for their new medication.   HF following patient, obtained PAD reading on cardioMEMs today.     Denise Kearney PharmD, [facility]  Clinical Pharmacy Specialist  [phone] or Teams

## 2022-09-08 NOTE — PROGRESS NOTE ADULT - PROBLEM SELECTOR PLAN 1
managed by pulmonary hypertension team on ambrisentan and tadalafil.    - Possible candidate for transplant at [place]   - palliative care signed off.

## 2022-09-08 NOTE — PROGRESS NOTE ADULT - ASSESSMENT
66 F with h/o COPD, CHFpEF, AFib, obesity (Type III, hx bariatric sx ), and severe pulmonary HTN combined group 1,2,3 with chronic combined hypercapnic and hypoxemic respiratory failure on ATC O2 supplementation (dependent on 8L NC at rest) with qhs/prn AVAPS (Trelegy Antonio via nasal prong interface) presenting for progressive dyspnea with minimal exertion admitted for acute on chronic diastolic failure, RV failure, and severe pulmonary hypertension.     RHC 2022 revealed sPAP: 106, dPAP: 44 mPAP:65, PCWP: 16, PVR 7.32W, CO/CI 6.7/3.2.   CTC :  Emphysema, enlarged PA, shotty hilar and mediastinal LNs. Evidence of CRISTOPHER wedge resection.     -Not a candidate for lung transplant due to multiorgan dysfxn, high BMI, and poor functional status.  -C/w Tadalafil and Ambrisentan.   -Out out of bed to chair daily; aggressive PT.  -Continue supplemental O2 with goal O2 sat > 88%. Patient is on 8L NC at home , now tolerating 6-7L.  -Continue symbicort and tiotropium. Albuterol prn.  -Continue home AVAPs at night (Trelegy Antonio via nasal prong interface).  -HF assistance w/ care greatly appreciated.  -On Diltiazem 120 mg po daily, dig 125 mcg po daily, mariela 25 mg po daily, and bumex 2 mg po bid.   -Transplant input greatly appreciated.     Patient should f/u with pulmonary within 1-2 weeks of discharge with Dr. Mosher (Pulmonary Hypertension). Please email [email or web address] and include patient's name,  and MRN and allow 24 hours for scheduling.    [address] [place] [address]  [phone]    Dr. Asa Gordon, DO  Pulmonary and Critical Care Medicine Fellow   Available via Microsoft Teams - **Preferred**  Pulmonary Spectra #[id number] ([facility]) / [id number] ([facility])  Pager:  [phone]    66 F with h/o COPD, CHFpEF, AFib, obesity (Type III, hx bariatric sx 2012), and severe pulmonary HTN combined group 1,2,3 with chronic combined hypercapnic and hypoxemic respiratory failure on ATC O2 supplementation (dependent on 8L NC at rest) with qhs/prn AVAPS (Trelegy Antonio via nasal prong interface) presenting for progressive dyspnea with minimal exertion admitted for acute on chronic diastolic failure, RV failure, and severe pulmonary hypertension.     RHC 6/20/2022 revealed sPAP: 106, dPAP: 44 mPAP:65, PCWP: 16, PVR 7.32W, CO/CI 6.7/3.2.   CTC 9/7:  Emphysema, enlarged PA, shotty hilar and mediastinal LNs. Evidence of CRISTOPHER wedge resection.     -Not a candidate for lung transplant due to multiorgan dysfxn, high BMI, and poor functional status.  -C/w Tadalafil and Ambrisentan.   -Out out of bed to chair daily; aggressive PT.  -Continue supplemental O2 with goal O2 sat > 88%. Patient is on 8L NC at home , now tolerating 6-7L.  -Continue symbicort and tiotropium. Albuterol prn.  -Continue home AVAPs at night (Trelegy Antonio via nasal prong interface).  -HF assistance w/ care greatly appreciated.  -On Diltiazem 120 mg po daily, dig 125 mcg po daily, mariela 25 mg po daily, and bumex 2 mg po bid.   -Transplant input greatly appreciated. Patient pending transfer to [facility] for second opinion.     Patient has appointment with Dr. Mosher (Pulmonary Hypertension), 9/22/22 at 12:30 pm.   Please place into DC summary.   [address] [place] [address]  [phone]    Dr. Asa Gordon, DO  Pulmonary and Critical Care Medicine Fellow   Available via Microsoft Teams - **Preferred**  Pulmonary Spectra #[id number] ([facility]) / [id number] ([facility])  Pager:  [phone]

## 2022-09-08 NOTE — PROGRESS NOTE ADULT - ATTENDING COMMENTS
agree with above  cont po bumex, good u/o on 4mg po BID  will plan for d/c home and follow up  will reassess CardioMEMS reading tomorrow prior to d/c

## 2022-09-08 NOTE — PROGRESS NOTE ADULT - ATTENDING COMMENTS
-Mems read from yesterday shows improvement in PAD, but could this be reflecting recent bumex drip which was stopped? -Cr also up today.   -Discussed with CHF fellow.   -Discussed with Nelsy Browning - Dr. Arndt yesterday. -CT chest here without obvious signs of CAD.

## 2022-09-08 NOTE — PROGRESS NOTE ADULT - SUBJECTIVE AND OBJECTIVE BOX
Interval History:  Patient with improvement in symptoms and cardiomems numbers     Medications:  acetaminophen     Tablet .. 975 milliGRAM(s) Oral every 6 hours PRN  ALBUTerol    90 MICROgram(s) HFA Inhaler 2 Puff(s) Inhalation every 6 hours PRN  ambrisentan 10 milliGRAM(s) Oral daily  apixaban 5 milliGRAM(s) Oral two times a day  atorvastatin 20 milliGRAM(s) Oral at bedtime  budesonide  80 MICROgram(s)/formoterol 4.5 MICROgram(s) Inhaler 2 Puff(s) Inhalation two times a day  buMETAnide 4 milliGRAM(s) Oral every 12 hours  buPROPion XL (24-Hour) . 300 milliGRAM(s) Oral daily  chlorhexidine 2% Cloths 1 Application(s) Topical <User Schedule>  digoxin     Tablet 125 MICROGram(s) Oral every other day  diltiazem    milliGRAM(s) Oral daily  ferrous    sulfate 325 milliGRAM(s) Oral <User Schedule>  melatonin 3 milliGRAM(s) Oral at bedtime PRN  pantoprazole    Tablet 40 milliGRAM(s) Oral before breakfast  polyethylene glycol 3350 17 Gram(s) Oral daily  sodium chloride 0.65% Nasal 2 Spray(s) Both Nostrils four times a day  spironolactone 25 milliGRAM(s) Oral two times a day  tadalafil 40 milliGRAM(s) Oral daily  tiotropium 18 MICROgram(s) Capsule 1 Capsule(s) Inhalation daily      Vitals:  T(C): 36.4 (22 @ 11:14), Max: 36.9 (22 @ 05:44)  HR: 68 (22 @ 12:46) (62 - 74)  BP: 102/62 (22 @ 11:14) (102/62 - 111/65)  BP(mean): --  RR: 18 (22 @ 11:14) (18 - 18)  SpO2: 93% (22 @ 12:46) (91% - 98%)    Daily     Daily Weight in k (08 Sep 2022 07:27)        I&O's Summary    07 Sep 2022 07:01  -  08 Sep 2022 07:00  --------------------------------------------------------  IN: 340 mL / OUT: 1400 mL / NET: -1060 mL    08 Sep 2022 07:01  -  08 Sep 2022 13:28  --------------------------------------------------------  IN: 0 mL / OUT: 700 mL / NET: -700 mL        Physical Exam:  Appearance: No Acute Distress  HEENT: PERRL  Neck: elevated JVD   Cardiovascular: Normal S1 S2,   Respiratory: Clear to auscultation bilaterally  Gastrointestinal: Soft, Non-tender	  Skin: No cyanosis	  Neurologic: Non-focal  Extremities: + B/L edema   Psychiatry: A & O x 3, Mood & affect appropriate    Labs:                        7.8    4.47  )-----------( 255      ( 08 Sep 2022 06:23 )             25.6         134<L>  |  93<L>  |  76<H>  ----------------------------<  80  3.9   |  27  |  1.80<H>    Ca    9.5      08 Sep 2022 06:23  Phos  3.8       Mg     2.1         TPro  6.4  /  Alb  3.6  /  TBili  0.2  /  DBili  x   /  AST  20  /  ALT  12  /  AlkPhos  130<H>

## 2022-09-08 NOTE — PROGRESS NOTE ADULT - ASSESSMENT
66 year old woman with HFpEF with primarily RV dysfunction s/p CardioMEMS, severe pulmonary hypertension (Group 1, 2 & 3), COPD on home O2 8L, AF (on Eliquis), PE, CVA (MCA infarct 2012), CKD (b/l Cr 1.7-1.8), ROLANDA on CPAP and morbid obesity (s/p bariatric surgery 2012 with lap band removal d/t GIB) who was recently hospitalized 6/14-6/29/22 for volume overload where she met with lung transplant team with plan for evaluation pending improvement in BMI and deconditioned state. Had RHC in June with severely elevated PAP and preserved CO.     She presents with RINALDI, ABD bloating and LE swelling in the setting of gradual 25 lb weight gain despite escalation of diuretic regimen. She was started on intermittent IV Lasix and was responding well, but remains markedly overloaded with predominate symptoms of elevated R sided filling pressure. She was switched to IV bumex and did not respond as well so now back on IV lasix with as needed hypertonic saline. She is likely not a lung transplant candidate and also not a candidate for IV therapy pulmonary dilators. Plan is for continued diuresis and palliative care was consulted but signed off, pt remains full code at this time.     She was eventually transitioned to PO Bumex 4 BID, placed back on aldactone. Likely discharge 9/9, will repeat cardiomems tomorrow     Cardiac Studies  CardioMEMS interrogation 8/16: PAP 98/45/66, HR 87  RHC and CardioMEMS Implant 6/20/22: RA 18, /44/65, PCWP 16 (v to 18), RA 95% on 8L NC, PA 58%, CO/CI (F) 6.7/3.2, PVR 7.32 ryan, TPG 49, /60/86 (CardioMEMS PAD 49)  TTE 6/15/22: LVIDd 4.8 cm, LVEF 68%, flattening of interventricular septum consistent with RV overload, mild concentric LVH (septum 1.2 PWT 1.1), RVE with decreased systolic function, mild LAE, severe ROE, mod TR, est RVSP 61 mmHg

## 2022-09-08 NOTE — PROGRESS NOTE ADULT - PROBLEM SELECTOR PLAN 3
RHC showed elevated right sided filling pressures with severe pulmonary hypertension with systemic PA pressures, slightly elevated PCWP and preserved cardiac output.   - Followed with Dr. Rodriguez at [facility] for PH but he recently left the practice. Needs a pulmonary hypertension doctor near her home in [place]. [facility]ibly [facility]  - Continue tadalafil 40 mg daily   - continue Ambrisentan 10mg qd  - Cardiomems 8/30 is 41. After trial of oral bumetanide cardiomems is 43, started back on drip  - pending Cardio mems read 9/6      Transplant reporting that patient is currently not a candidate following eval, given this, also not a candidate for IV Flolan per Pulm

## 2022-09-08 NOTE — PROGRESS NOTE ADULT - PROBLEM SELECTOR PLAN 1
Pt feeling at baseline. Improving on bumex drip. Urine output improving Weight today not done at time of note writing. Goal weight 189 lbs  - Bumex PO 4mg q12  - c/w spironolactone  - Holding metolazone with Na <135  - standing daily weights, strict I/Os.  - Pulm, nephro, and HF following   - eventual SGLT2-i prior to discharge, once on stable dose of oral diuretics  -Continue to ambulate with NRB or HFNC in the next coming days to show ability. No gross stroke deficits.  - s/p CT C/A/P for malignancy w/u as per [facility] transplant eval.

## 2022-09-08 NOTE — PROGRESS NOTE ADULT - PROBLEM SELECTOR PLAN 2
- CardioMEMS PAD 8/11 43 mmHg. goal ~40 but difficult to ascertain given RV dysfunction  - cardiomems PAD 8/15 45 mmHg, 98/45/66, Cardiomems reading 8/30 41m PAD 9/1 43, 9/7 40  - transitioned to Bumex 4 po BID   - Restarted on aldactone   - please replete K > 4, Mg > 2  - please obtain daily BMP

## 2022-09-08 NOTE — PROGRESS NOTE ADULT - ATTENDING COMMENTS
66 F with h/o COPD, CHFpEF, AFib, obesity (Type III, hx bariatric sx 2012), and severe pulmonary HTN combined group 1,2,3 with chronic combined hypercapnic and hypoxemic respiratory failure on ATC O2 supplementation (dependent on 8L NC at rest) with qhs/prn AVAPS (Trelegy Antonio via nasal prong interface) presenting for progressive dyspnea with minimal exertion admitted for acute on chronic diastolic failure, RV failure, and severe pulmonary hypertension.    RHC 6/20/2022 revealed sPAP: 106, dPAP: 44 mPAP:65, PCWP: 16, PVR 7.32W, CO/CI 6.7/3.2.   CT  Chest did not show any new or enlarging opacities and I do not see previous opacity paratracheal at level of josh.  She had a nose bleed which appears to have resolved.  Agree with plan as outlined above.

## 2022-09-08 NOTE — PROGRESS NOTE ADULT - SUBJECTIVE AND OBJECTIVE BOX
INCOMPLETE NOTE    Benito Torres | PGY1| Pager: [phone]  Interval Events:    REVIEW OF SYSTEMS:  CONSTITUTIONAL: No weakness, fevers or chills  EYES/ENT: No visual changes;  No vertigo or throat pain   NECK: No pain or stiffness  RESPIRATORY: No cough, wheezing, hemoptysis; No shortness of breath  CARDIOVASCULAR: No chest pain or palpitations  GASTROINTESTINAL: No abdominal or epigastric pain. No nausea, vomiting, or hematemesis; No diarrhea or constipation. No melena or hematochezia.  GENITOURINARY: No dysuria, frequency or hematuria  NEUROLOGICAL: No numbness or weakness  SKIN: No itching, burning, rashes, or lesions   All other review of systems is negative unless indicated above.    OBJECTIVE:  ICU Vital Signs Last 24 Hrs  T(C): 36.9 (08 Sep 2022 05:44), Max: 36.9 (08 Sep 2022 05:44)  T(F): 98.4 (08 Sep 2022 05:44), Max: 98.4 (08 Sep 2022 05:44)  HR: 74 (08 Sep 2022 05:55) (60 - 74)  BP: 108/67 (08 Sep 2022 05:44) (108/67 - 112/66)  BP(mean): --  ABP: --  ABP(mean): --  RR: 18 (08 Sep 2022 05:44) (18 - 18)  SpO2: 91% (08 Sep 2022 05:55) (91% - 98%)    O2 Parameters below as of 08 Sep 2022 05:44  Patient On (Oxygen Delivery Method): nasal cannula w/ humidification  O2 Flow (L/min): 7 09-07 @ 07:01  -  09-08 @ 07:00  --------------------------------------------------------  IN: 340 mL / OUT: 1400 mL / NET: -1060 mL      CAPILLARY BLOOD GLUCOSE          PHYSICAL EXAM:  General: WN/WD NAD  Neurology: A&Ox3, nonfocal, LEWIS x 4  Eyes: PERRLA/ EOMI, Gross vision intact  ENT/Neck: Neck supple, trachea midline, No JVD, Gross hearing intact  Respiratory: CTA B/L, No wheezing, rales, rhonchi  CV: RRR, +S1/S2, -S3/S4, no murmurs, rubs or gallops  Abdominal: Soft, NT, ND +BS, No HSM  MSK: 5/5 strength UE/LE bilaterally  Extremities: No edema, 2+ peripheral pulses  Skin: No Rashes, Hematoma, Ecchymosis  Incisions:   Tubes:    HOSPITAL MEDICATIONS:  MEDICATIONS  (STANDING):  ambrisentan 10 milliGRAM(s) Oral daily  apixaban 5 milliGRAM(s) Oral two times a day  atorvastatin 20 milliGRAM(s) Oral at bedtime  budesonide  80 MICROgram(s)/formoterol 4.5 MICROgram(s) Inhaler 2 Puff(s) Inhalation two times a day  buMETAnide 4 milliGRAM(s) Oral every 12 hours  buPROPion XL (24-Hour) . 300 milliGRAM(s) Oral daily  chlorhexidine 2% Cloths 1 Application(s) Topical <User Schedule>  digoxin     Tablet 125 MICROGram(s) Oral every other day  diltiazem    milliGRAM(s) Oral daily  ferrous    sulfate 325 milliGRAM(s) Oral <User Schedule>  pantoprazole    Tablet 40 milliGRAM(s) Oral before breakfast  polyethylene glycol 3350 17 Gram(s) Oral daily  sodium chloride 0.65% Nasal 2 Spray(s) Both Nostrils four times a day  spironolactone 25 milliGRAM(s) Oral two times a day  tadalafil 40 milliGRAM(s) Oral daily  tiotropium 18 MICROgram(s) Capsule 1 Capsule(s) Inhalation daily    MEDICATIONS  (PRN):  acetaminophen     Tablet .. 975 milliGRAM(s) Oral every 6 hours PRN Temp greater or equal to 38C (100.4F), Mild Pain (1 - 3), Moderate Pain (4 - 6)  ALBUTerol    90 MICROgram(s) HFA Inhaler 2 Puff(s) Inhalation every 6 hours PRN Shortness of Breath and/or Wheezing  melatonin 3 milliGRAM(s) Oral at bedtime PRN Insomnia      LABS:                        7.8    4.47  )-----------( 255      ( 08 Sep 2022 06:23 )             25.6     Hgb Trend: 7.8<--, 7.6<--, 7.3<--, 7.6<--, 8.1<--  09-07    137  |  95<L>  |  73<H>  ----------------------------<  82  3.2<L>   |  29  |  1.56<H>    Ca    9.3      07 Sep 2022 06:23  Phos  3.7     09-07  Mg     2.0     09-07    TPro  6.4  /  Alb  3.6  /  TBili  0.2  /  DBili  x   /  AST  20  /  ALT  12  /  AlkPhos  130<H>  09-07    Creatinine Trend: 1.56<--, 1.61<--, 1.70<--, 1.65<--, 1.67<--, 1.84<--            MICROBIOLOGY:      Benito Torres | PGY1| Pager: [phone]  Interval Events: No events overnight. Now on humidified 7LNC, R nose congestion with a little blood on napkin. States feels well otherwise.     OBJECTIVE:  ICU Vital Signs Last 24 Hrs  T(C): 36.9 (08 Sep 2022 05:44), Max: 36.9 (08 Sep 2022 05:44)  T(F): 98.4 (08 Sep 2022 05:44), Max: 98.4 (08 Sep 2022 05:44)  HR: 74 (08 Sep 2022 05:55) (60 - 74)  BP: 108/67 (08 Sep 2022 05:44) (108/67 - 112/66)  BP(mean): --  ABP: --  ABP(mean): --  RR: 18 (08 Sep 2022 05:44) (18 - 18)  SpO2: 91% (08 Sep 2022 05:55) (91% - 98%)    O2 Parameters below as of 08 Sep 2022 05:44  Patient On (Oxygen Delivery Method): nasal cannula w/ humidification  O2 Flow (L/min): 7            09-07 @ 07:01  -  09-08 @ 07:00  --------------------------------------------------------  IN: 340 mL / OUT: 1400 mL / NET: -1060 mL      CAPILLARY BLOOD GLUCOSE          PHYSICAL EXAM:  General: WN/WD NAD, comfortably laying in bed having breakfast  Neurology: A&Ox3, nonfocal, LEWIS x 4  Eyes: PERRLA/ EOMI, Gross vision intact  ENT/Neck: Neck supple, trachea midline, No JVD, Gross hearing intact  Respiratory: CTA B/L, No wheezing, rales, rhonchi  CV: RRR, +S1/S2, -S3/S4, no murmurs, rubs or gallops  Abdominal: Soft, NT, ND +BS, No HSM  MSK: 5/5 strength UE/LE bilaterally  Extremities: No edema, 2+ peripheral pulses  Skin: No Rashes, Hematoma, Ecchymosis  Incisions:   Tubes:    HOSPITAL MEDICATIONS:  MEDICATIONS  (STANDING):  ambrisentan 10 milliGRAM(s) Oral daily  apixaban 5 milliGRAM(s) Oral two times a day  atorvastatin 20 milliGRAM(s) Oral at bedtime  budesonide  80 MICROgram(s)/formoterol 4.5 MICROgram(s) Inhaler 2 Puff(s) Inhalation two times a day  buMETAnide 4 milliGRAM(s) Oral every 12 hours  buPROPion XL (24-Hour) . 300 milliGRAM(s) Oral daily  chlorhexidine 2% Cloths 1 Application(s) Topical <User Schedule>  digoxin     Tablet 125 MICROGram(s) Oral every other day  diltiazem    milliGRAM(s) Oral daily  ferrous    sulfate 325 milliGRAM(s) Oral <User Schedule>  pantoprazole    Tablet 40 milliGRAM(s) Oral before breakfast  polyethylene glycol 3350 17 Gram(s) Oral daily  sodium chloride 0.65% Nasal 2 Spray(s) Both Nostrils four times a day  spironolactone 25 milliGRAM(s) Oral two times a day  tadalafil 40 milliGRAM(s) Oral daily  tiotropium 18 MICROgram(s) Capsule 1 Capsule(s) Inhalation daily    MEDICATIONS  (PRN):  acetaminophen     Tablet .. 975 milliGRAM(s) Oral every 6 hours PRN Temp greater or equal to 38C (100.4F), Mild Pain (1 - 3), Moderate Pain (4 - 6)  ALBUTerol    90 MICROgram(s) HFA Inhaler 2 Puff(s) Inhalation every 6 hours PRN Shortness of Breath and/or Wheezing  melatonin 3 milliGRAM(s) Oral at bedtime PRN Insomnia      LABS:                        7.8    4.47  )-----------( 255      ( 08 Sep 2022 06:23 )             25.6     Hgb Trend: 7.8<--, 7.6<--, 7.3<--, 7.6<--, 8.1<--  09-07    137  |  95<L>  |  73<H>  ----------------------------<  82  3.2<L>   |  29  |  1.56<H>    Ca    9.3      07 Sep 2022 06:23  Phos  3.7     09-07  Mg     2.0     09-07    TPro  6.4  /  Alb  3.6  /  TBili  0.2  /  DBili  x   /  AST  20  /  ALT  12  /  AlkPhos  130<H>  09-07    Creatinine Trend: 1.56<--, 1.61<--, 1.70<--, 1.65<--, 1.67<--, 1.84<--            MICROBIOLOGY:

## 2022-09-08 NOTE — PROGRESS NOTE ADULT - SUBJECTIVE AND OBJECTIVE BOX
PULMONARY SERVICE FOLLOW UP CONSULT NOTE    SUBJECTIVE:  RINALDI, not at rest.     REVIEW OF SYSTEMS:  All additional ROS negative.    MEDICATIONS:  Pulmonary:  ALBUTerol    90 MICROgram(s) HFA Inhaler 2 Puff(s) Inhalation every 6 hours PRN  budesonide  80 MICROgram(s)/formoterol 4.5 MICROgram(s) Inhaler 2 Puff(s) Inhalation two times a day  tiotropium 18 MICROgram(s) Capsule 1 Capsule(s) Inhalation daily    Antimicrobials:    Anticoagulants:  apixaban 5 milliGRAM(s) Oral two times a day    Onc:    GI/:  pantoprazole    Tablet 40 milliGRAM(s) Oral before breakfast  polyethylene glycol 3350 17 Gram(s) Oral daily    Endocrine:  atorvastatin 20 milliGRAM(s) Oral at bedtime    Cardiac:  ambrisentan 10 milliGRAM(s) Oral daily  buMETAnide 4 milliGRAM(s) Oral every 12 hours  digoxin     Tablet 125 MICROGram(s) Oral every other day  diltiazem    milliGRAM(s) Oral daily  spironolactone 25 milliGRAM(s) Oral two times a day  tadalafil 40 milliGRAM(s) Oral daily    Other Medications:  acetaminophen     Tablet .. 975 milliGRAM(s) Oral every 6 hours PRN  buPROPion XL (24-Hour) . 300 milliGRAM(s) Oral daily  chlorhexidine 2% Cloths 1 Application(s) Topical <User Schedule>  ferrous    sulfate 325 milliGRAM(s) Oral <User Schedule>  melatonin 3 milliGRAM(s) Oral at bedtime PRN  sodium chloride 0.65% Nasal 2 Spray(s) Both Nostrils four times a day      PHYSICAL EXAM  Vital Signs Last 24 Hrs  T(C): 36.9 (08 Sep 2022 05:44), Max: 36.9 (08 Sep 2022 05:44)  T(F): 98.4 (08 Sep 2022 05:44), Max: 98.4 (08 Sep 2022 05:44)  HR: 74 (08 Sep 2022 05:55) (60 - 74)  BP: 108/67 (08 Sep 2022 05:44) (108/67 - 112/66)  BP(mean): --  RR: 18 (08 Sep 2022 05:44) (18 - 18)  SpO2: 91% (08 Sep 2022 05:55) (91% - 98%)    Parameters below as of 08 Sep 2022 05:44  Patient On (Oxygen Delivery Method): nasal cannula w/ humidification  O2 Flow (L/min): 7      09-07 @ 07:01  -  09-08 @ 07:00  --------------------------------------------------------  IN: 340 mL / OUT: 1400 mL / NET: -1060 mL            CONSTITUTIONAL: No acute distress.   HEENT:  Conjunctiva clear B/L.  Moist oral mucosa.   Cardiovascular: RRR with no murmurs. No JVD noted. 2+ lower extremity edema B/L. Extremities are warm and well perfused.    Respiratory: Dec bs b/l. No accessory muscle use.   Gastrointestinal:  Soft, nontender. Non-distended. Non-rigid.    Neurologic:  Alert and awake. Moving all extremities. Following commands.    Skin: Venous stasis.    LABS:      CBC Full  -  ( 08 Sep 2022 06:23 )  WBC Count : 4.47 K/uL  RBC Count : 2.81 M/uL  Hemoglobin : 7.8 g/dL  Hematocrit : 25.6 %  Platelet Count - Automated : 255 K/uL  Mean Cell Volume : 91.1 fl  Mean Cell Hemoglobin : 27.8 pg  Mean Cell Hemoglobin Concentration : 30.5 gm/dL  Auto Neutrophil # : x  Auto Lymphocyte # : x  Auto Monocyte # : x  Auto Eosinophil # : x  Auto Basophil # : x  Auto Neutrophil % : x  Auto Lymphocyte % : x  Auto Monocyte % : x  Auto Eosinophil % : x  Auto Basophil % : x    09-07    137  |  95<L>  |  73<H>  ----------------------------<  82  3.2<L>   |  29  |  1.56<H>    Ca    9.3      07 Sep 2022 06:23  Phos  3.7     09-07  Mg     2.0     09-07    TPro  6.4  /  Alb  3.6  /  TBili  0.2  /  DBili  x   /  AST  20  /  ALT  12  /  AlkPhos  130<H>  09-07                      RADIOLOGY & ADDITIONAL STUDIES:

## 2022-09-09 NOTE — PROGRESS NOTE ADULT - PROBLEM SELECTOR PLAN 4
# Medication toxicity monitoring: medication dose reviewed. Since patient is on bumetanide, continue to monitor for hypokalemia.     Candace Elizabeth MD  Attending Nephrologist  [phone] or via [employer] .

## 2022-09-09 NOTE — PROVIDER CONTACT NOTE (CRITICAL VALUE NOTIFICATION) - BACKGROUND
66F w/ severe pulmonary HTN (Group 1, 2 & 3), HFpEF, Afib, COPD on home O2 (5L NC), CKD, ROLANDA on CPAP, morbid obesity presenting with acute hypoxic respiratory failure, admitted for CHF exacerbation

## 2022-09-09 NOTE — PROGRESS NOTE ADULT - SUBJECTIVE AND OBJECTIVE BOX
Interval History:  PAD ~41 today     Medications:  acetaminophen     Tablet .. 975 milliGRAM(s) Oral every 6 hours PRN  ALBUTerol    90 MICROgram(s) HFA Inhaler 2 Puff(s) Inhalation every 6 hours PRN  ambrisentan 10 milliGRAM(s) Oral daily  apixaban 5 milliGRAM(s) Oral two times a day  atorvastatin 20 milliGRAM(s) Oral at bedtime  budesonide  80 MICROgram(s)/formoterol 4.5 MICROgram(s) Inhaler 2 Puff(s) Inhalation two times a day  buMETAnide 4 milliGRAM(s) Oral every 12 hours  buPROPion XL (24-Hour) . 300 milliGRAM(s) Oral daily  chlorhexidine 2% Cloths 1 Application(s) Topical <User Schedule>  digoxin     Tablet 125 MICROGram(s) Oral every other day  diltiazem    milliGRAM(s) Oral daily  ferrous    sulfate 325 milliGRAM(s) Oral <User Schedule>  melatonin 3 milliGRAM(s) Oral at bedtime PRN  pantoprazole    Tablet 40 milliGRAM(s) Oral before breakfast  polyethylene glycol 3350 17 Gram(s) Oral daily  senna 2 Tablet(s) Oral at bedtime  sodium chloride 0.65% Nasal 2 Spray(s) Both Nostrils four times a day  spironolactone 25 milliGRAM(s) Oral two times a day  tadalafil 40 milliGRAM(s) Oral daily  tiotropium 18 MICROgram(s) Capsule 1 Capsule(s) Inhalation daily      Vitals:  T(C): 36.8 (22 @ 11:06), Max: 36.8 (22 @ 11:06)  HR: 68 (22 @ 11:06) (62 - 68)  BP: 112/71 (22 @ 11:06) (103/60 - 112/71)  BP(mean): --  RR: 18 (22 @ 11:06) (18 - 18)  SpO2: 97% (22 @ 11:06) (93% - 98%)    Daily     Daily Weight in k.6 (09 Sep 2022 07:17)        I&O's Summary    08 Sep 2022 07:01  -  09 Sep 2022 07:00  --------------------------------------------------------  IN: 365 mL / OUT: 1380 mL / NET: -1015 mL        Physical Exam:  Appearance: No Acute Distress  HEENT: PERRL  Neck: elevated JVD   Cardiovascular: Normal S1 S2,   Respiratory: Clear to auscultation bilaterally  Gastrointestinal: Soft, Non-tender	  Skin: No cyanosis	  Neurologic: Non-focal  Extremities: + B/L edema   Psychiatry: A & O x 3, Mood & affect appropriate    Labs:                        8.0    4.90  )-----------( 256      ( 09 Sep 2022 10:26 )             26.4         134<L>  |  94<L>  |  76<H>  ----------------------------<  79  3.8   |  30  |  2.05<H>    Ca    9.3      09 Sep 2022 05:48  Phos  4.0       Mg     2.1         TPro  6.4  /  Alb  3.6  /  TBili  0.2  /  DBili  x   /  AST  22  /  ALT  11  /  AlkPhos  123<H>

## 2022-09-09 NOTE — PROGRESS NOTE ADULT - ATTENDING COMMENTS
-Discussed with CHF attending and renal attendings.  -CardioMems PAD today ~41.   -Cr and weight worse today on oral diuretics. -Will keep another day and see Cr and weight tomorrow. If still worsening, may need to resume IV Bumex.   -Gave patient contact info pulmonologists at [facility] near her home. Also, gave her the contact info for the [facility] lung transplant and the [place] lung transplant clinics.

## 2022-09-09 NOTE — PROGRESS NOTE ADULT - SUBJECTIVE AND OBJECTIVE BOX
INCOMPLETE NOTE    Benito Torres | PGY1| Pager: [phone]  Interval Events:    REVIEW OF SYSTEMS:  CONSTITUTIONAL: No weakness, fevers or chills  EYES/ENT: No visual changes;  No vertigo or throat pain   NECK: No pain or stiffness  RESPIRATORY: No cough, wheezing, hemoptysis; No shortness of breath  CARDIOVASCULAR: No chest pain or palpitations  GASTROINTESTINAL: No abdominal or epigastric pain. No nausea, vomiting, or hematemesis; No diarrhea or constipation. No melena or hematochezia.  GENITOURINARY: No dysuria, frequency or hematuria  NEUROLOGICAL: No numbness or weakness  SKIN: No itching, burning, rashes, or lesions   All other review of systems is negative unless indicated above.    OBJECTIVE:  ICU Vital Signs Last 24 Hrs  T(C): 36.4 (09 Sep 2022 04:33), Max: 36.5 (08 Sep 2022 20:55)  T(F): 97.5 (09 Sep 2022 04:33), Max: 97.7 (08 Sep 2022 20:55)  HR: 64 (09 Sep 2022 05:30) (62 - 68)  BP: 103/60 (09 Sep 2022 04:33) (102/62 - 106/67)  BP(mean): --  ABP: --  ABP(mean): --  RR: 18 (09 Sep 2022 04:33) (18 - 18)  SpO2: 98% (09 Sep 2022 05:30) (93% - 98%)    O2 Parameters below as of 09 Sep 2022 04:33  Patient On (Oxygen Delivery Method): BiPAP/CPAP              09-07 @ 07:01  -  09-08 @ 07:00  --------------------------------------------------------  IN: 340 mL / OUT: 1400 mL / NET: -1060 mL    09-08 @ 07:01  -  09-09 @ 06:49  --------------------------------------------------------  IN: 365 mL / OUT: 1380 mL / NET: -1015 mL      CAPILLARY BLOOD GLUCOSE          PHYSICAL EXAM:  General: WN/WD NAD  Neurology: A&Ox3, nonfocal, LEWIS x 4  Eyes: PERRLA/ EOMI, Gross vision intact  ENT/Neck: Neck supple, trachea midline, No JVD, Gross hearing intact  Respiratory: CTA B/L, No wheezing, rales, rhonchi  CV: RRR, +S1/S2, -S3/S4, no murmurs, rubs or gallops  Abdominal: Soft, NT, ND +BS, No HSM  MSK: 5/5 strength UE/LE bilaterally  Extremities: No edema, 2+ peripheral pulses  Skin: No Rashes, Hematoma, Ecchymosis  Incisions:   Tubes:    HOSPITAL MEDICATIONS:  MEDICATIONS  (STANDING):  ambrisentan 10 milliGRAM(s) Oral daily  apixaban 5 milliGRAM(s) Oral two times a day  atorvastatin 20 milliGRAM(s) Oral at bedtime  budesonide  80 MICROgram(s)/formoterol 4.5 MICROgram(s) Inhaler 2 Puff(s) Inhalation two times a day  buMETAnide 4 milliGRAM(s) Oral every 12 hours  buPROPion XL (24-Hour) . 300 milliGRAM(s) Oral daily  chlorhexidine 2% Cloths 1 Application(s) Topical <User Schedule>  digoxin     Tablet 125 MICROGram(s) Oral every other day  diltiazem    milliGRAM(s) Oral daily  ferrous    sulfate 325 milliGRAM(s) Oral <User Schedule>  pantoprazole    Tablet 40 milliGRAM(s) Oral before breakfast  polyethylene glycol 3350 17 Gram(s) Oral daily  senna 2 Tablet(s) Oral at bedtime  sodium chloride 0.65% Nasal 2 Spray(s) Both Nostrils four times a day  spironolactone 25 milliGRAM(s) Oral two times a day  tadalafil 40 milliGRAM(s) Oral daily  tiotropium 18 MICROgram(s) Capsule 1 Capsule(s) Inhalation daily    MEDICATIONS  (PRN):  acetaminophen     Tablet .. 975 milliGRAM(s) Oral every 6 hours PRN Temp greater or equal to 38C (100.4F), Mild Pain (1 - 3), Moderate Pain (4 - 6)  ALBUTerol    90 MICROgram(s) HFA Inhaler 2 Puff(s) Inhalation every 6 hours PRN Shortness of Breath and/or Wheezing  melatonin 3 milliGRAM(s) Oral at bedtime PRN Insomnia      LABS:                        7.0    3.76  )-----------( 202      ( 09 Sep 2022 05:48 )             22.8     Hgb Trend: 7.0<--, 7.8<--, 7.6<--, 7.3<--, 7.6<--  09-09    134<L>  |  94<L>  |  76<H>  ----------------------------<  79  3.8   |  30  |  2.05<H>    Ca    9.3      09 Sep 2022 05:48  Phos  4.0     09-09  Mg     2.1     09-09    TPro  6.4  /  Alb  3.6  /  TBili  0.2  /  DBili  x   /  AST  22  /  ALT  11  /  AlkPhos  123<H>  09-09    Creatinine Trend: 2.05<--, 1.80<--, 1.56<--, 1.61<--, 1.70<--, 1.65<--            MICROBIOLOGY:      Benito Torres | PGY1| Pager: [phone]  Interval Events:no events overnight. on po diuresis now, weight increasing, creatinine worse.       OBJECTIVE:  ICU Vital Signs Last 24 Hrs  T(C): 36.4 (09 Sep 2022 04:33), Max: 36.5 (08 Sep 2022 20:55)  T(F): 97.5 (09 Sep 2022 04:33), Max: 97.7 (08 Sep 2022 20:55)  HR: 64 (09 Sep 2022 05:30) (62 - 68)  BP: 103/60 (09 Sep 2022 04:33) (102/62 - 106/67)  BP(mean): --  ABP: --  ABP(mean): --  RR: 18 (09 Sep 2022 04:33) (18 - 18)  SpO2: 98% (09 Sep 2022 05:30) (93% - 98%)    O2 Parameters below as of 09 Sep 2022 04:33  Patient On (Oxygen Delivery Method): BiPAP/CPAP      09-07 @ 07:01 - 09-08 @ 07:00  --------------------------------------------------------  IN: 340 mL / OUT: 1400 mL / NET: -1060 mL    09-08 @ 07:01  -  09-09 @ 06:49  --------------------------------------------------------  IN: 365 mL / OUT: 1380 mL / NET: -1015 mL      CAPILLARY BLOOD GLUCOSE          PHYSICAL EXAM:  General: WN/WD NAD, comfortably sitting on chair.  Neurology: A&Ox3, nonfocal, LEWIS x 4  Eyes: PERRLA/ EOMI, Gross vision intact  ENT/Neck: Neck supple, trachea midline, No JVD, Gross hearing intact  Respiratory: crackles at the bases   CV: RRR, +S1/S2, -S3/S4, no murmurs, rubs or gallops  Abdominal: Soft, NT, ND +BS, No HSM  MSK: 5/5 strength UE/LE bilaterally  Extremities: No edema, 2+ peripheral pulses  Skin: No Rashes, Hematoma, Ecchymosis  Incisions:   Tubes:    HOSPITAL MEDICATIONS:  MEDICATIONS  (STANDING):  ambrisentan 10 milliGRAM(s) Oral daily  apixaban 5 milliGRAM(s) Oral two times a day  atorvastatin 20 milliGRAM(s) Oral at bedtime  budesonide  80 MICROgram(s)/formoterol 4.5 MICROgram(s) Inhaler 2 Puff(s) Inhalation two times a day  buMETAnide 4 milliGRAM(s) Oral every 12 hours  buPROPion XL (24-Hour) . 300 milliGRAM(s) Oral daily  chlorhexidine 2% Cloths 1 Application(s) Topical <User Schedule>  digoxin     Tablet 125 MICROGram(s) Oral every other day  diltiazem    milliGRAM(s) Oral daily  ferrous    sulfate 325 milliGRAM(s) Oral <User Schedule>  pantoprazole    Tablet 40 milliGRAM(s) Oral before breakfast  polyethylene glycol 3350 17 Gram(s) Oral daily  senna 2 Tablet(s) Oral at bedtime  sodium chloride 0.65% Nasal 2 Spray(s) Both Nostrils four times a day  spironolactone 25 milliGRAM(s) Oral two times a day  tadalafil 40 milliGRAM(s) Oral daily  tiotropium 18 MICROgram(s) Capsule 1 Capsule(s) Inhalation daily    MEDICATIONS  (PRN):  acetaminophen     Tablet .. 975 milliGRAM(s) Oral every 6 hours PRN Temp greater or equal to 38C (100.4F), Mild Pain (1 - 3), Moderate Pain (4 - 6)  ALBUTerol    90 MICROgram(s) HFA Inhaler 2 Puff(s) Inhalation every 6 hours PRN Shortness of Breath and/or Wheezing  melatonin 3 milliGRAM(s) Oral at bedtime PRN Insomnia      LABS:                        7.0    3.76  )-----------( 202      ( 09 Sep 2022 05:48 )             22.8     Hgb Trend: 7.0<--, 7.8<--, 7.6<--, 7.3<--, 7.6<--  09-09    134<L>  |  94<L>  |  76<H>  ----------------------------<  79  3.8   |  30  |  2.05<H>    Ca    9.3      09 Sep 2022 05:48  Phos  4.0     09-09  Mg     2.1     09-09    TPro  6.4  /  Alb  3.6  /  TBili  0.2  /  DBili  x   /  AST  22  /  ALT  11  /  AlkPhos  123<H>  09-09    Creatinine Trend: 2.05<--, 1.80<--, 1.56<--, 1.61<--, 1.70<--, 1.65<--            MICROBIOLOGY:

## 2022-09-09 NOTE — PROGRESS NOTE ADULT - PROBLEM SELECTOR PLAN 1
# JUANA - cardiorenal syndrome. Currently on PO bumetanide.   Creatinine going up to 1.8. MOnitor kidney function is not worsening before discharging patient.

## 2022-09-09 NOTE — PROGRESS NOTE ADULT - PROBLEM SELECTOR PLAN 1
Pt feeling at baseline. Improving on bumex drip. Urine output improving Weight today not done at time of note writing. Goal weight 189 lbs  - Bumex PO 4mg q12  - c/w spironolactone  - Holding metolazone with Na <135  - standing daily weights, strict I/Os.  - Pulm, nephro, and HF following   - eventual SGLT2-i prior to discharge, once on stable dose of oral diuretics  -Continue to ambulate with NRB or HFNC in the next coming days to show ability. No gross stroke deficits.  - s/p CT C/A/P for malignancy w/u as per [place] transplant eval.

## 2022-09-09 NOTE — PROGRESS NOTE ADULT - PROBLEM SELECTOR PLAN 2
- CardioMEMS PAD 8/11 43 mmHg. goal ~40 but difficult to ascertain given RV dysfunction  - cardiomems PAD 8/15 45 mmHg, 98/45/66, Cardiomems reading 8/30 41m PAD 9/1 43, 9/7 40. 9/9 41  - transitioned to Bumex 4 po BID   - Restarted on aldactone   - please replete K > 4, Mg > 2  - please obtain daily BMP

## 2022-09-09 NOTE — PROGRESS NOTE ADULT - ATTENDING COMMENTS
PAD 41 today  stable on PO diuretics  plan for follow up with HF in 1-2 weeks  check cardio MEMS daily  d/c home  outpt second opinion on lung transplant at [facility]

## 2022-09-09 NOTE — PROVIDER CONTACT NOTE (CRITICAL VALUE NOTIFICATION) - ASSESSMENT
Pt A&Ox4, 7LNC, VSS, afib on tele no events. No bleeding noted. Pt denies cp, SOB, N/V, and palp.
no s/s of bleeding, pt stable, VSS

## 2022-09-09 NOTE — PROGRESS NOTE ADULT - ASSESSMENT
66 year old woman with HFpEF with primarily RV dysfunction s/p CardioMEMS, severe pulmonary hypertension (Group 1, 2 & 3), COPD on home O2 8L, AF (on Eliquis), PE, CVA (MCA infarct 2012), CKD (b/l Cr 1.7-1.8), ROLANDA on CPAP and morbid obesity (s/p bariatric surgery 2012 with lap band removal d/t GIB) who was recently hospitalized 6/14-6/29/22 for volume overload where she met with lung transplant team with plan for evaluation pending improvement in BMI and deconditioned state. Had RHC in June with severely elevated PAP and preserved CO.     She presents with RINALDI, ABD bloating and LE swelling in the setting of gradual 25 lb weight gain despite escalation of diuretic regimen. She was started on intermittent IV Lasix and was responding well, but remains markedly overloaded with predominate symptoms of elevated R sided filling pressure. She was switched to IV bumex and did not respond as well so now back on IV lasix with as needed hypertonic saline. She is likely not a lung transplant candidate and also not a candidate for IV therapy pulmonary dilators. Plan is for continued diuresis and palliative care was consulted but signed off, pt remains full code at this time.     She was eventually transitioned to PO Bumex 4 BID, placed back on aldactone. Likely discharge 9/9    Cardiac Studies  CardioMEMS interrogation 8/16: PAP 98/45/66, HR 87,  9/1 43, 9/7 40, 9/9 41  RHC and CardioMEMS Implant 6/20/22: RA 18, /44/65, PCWP 16 (v to 18), RA 95% on 8L NC, PA 58%, CO/CI (F) 6.7/3.2, PVR 7.32 ryan, TPG 49, /60/86 (CardioMEMS PAD 49)  TTE 6/15/22: LVIDd 4.8 cm, LVEF 68%, flattening of interventricular septum consistent with RV overload, mild concentric LVH (septum 1.2 PWT 1.1), RVE with decreased systolic function, mild LAE, severe ROE, mod TR, est RVSP 61 mmHg

## 2022-09-09 NOTE — PROGRESS NOTE ADULT - PROBLEM SELECTOR PLAN 1
managed by pulmonary hypertension team on ambrisentan and tadalafil.    - Possible candidate for transplant at [place]   - palliative care signed off.

## 2022-09-09 NOTE — PROGRESS NOTE ADULT - SUBJECTIVE AND OBJECTIVE BOX
[facility] Division of Kidney Diseases & Hypertension  FOLLOW UP NOTE  --------------------------------------------------------------------------------  Chief Complaint:    24 hour events/subjective:        PAST HISTORY  --------------------------------------------------------------------------------  No significant changes to PMH, PSH, FHx, SHx, unless otherwise noted    ALLERGIES & MEDICATIONS  --------------------------------------------------------------------------------  Allergies    nitrates (Unknown)    Intolerances      Standing Inpatient Medications  ambrisentan 10 milliGRAM(s) Oral daily  apixaban 5 milliGRAM(s) Oral two times a day  atorvastatin 20 milliGRAM(s) Oral at bedtime  budesonide  80 MICROgram(s)/formoterol 4.5 MICROgram(s) Inhaler 2 Puff(s) Inhalation two times a day  buMETAnide 4 milliGRAM(s) Oral every 12 hours  buPROPion XL (24-Hour) . 300 milliGRAM(s) Oral daily  chlorhexidine 2% Cloths 1 Application(s) Topical <User Schedule>  digoxin     Tablet 125 MICROGram(s) Oral every other day  diltiazem    milliGRAM(s) Oral daily  ferrous    sulfate 325 milliGRAM(s) Oral <User Schedule>  pantoprazole    Tablet 40 milliGRAM(s) Oral before breakfast  polyethylene glycol 3350 17 Gram(s) Oral daily  senna 2 Tablet(s) Oral at bedtime  sodium chloride 0.65% Nasal 2 Spray(s) Both Nostrils four times a day  spironolactone 25 milliGRAM(s) Oral two times a day  tadalafil 40 milliGRAM(s) Oral daily  tiotropium 18 MICROgram(s) Capsule 1 Capsule(s) Inhalation daily    PRN Inpatient Medications  acetaminophen     Tablet .. 975 milliGRAM(s) Oral every 6 hours PRN  ALBUTerol    90 MICROgram(s) HFA Inhaler 2 Puff(s) Inhalation every 6 hours PRN  melatonin 3 milliGRAM(s) Oral at bedtime PRN      REVIEW OF SYSTEMS  --------------------------------------------------------------------------------  Gen: No weight changes, fatigue, fevers/chills, weakness  Skin: No rashes  Head/Eyes/Ears/Mouth: No headache; Normal hearing; Normal vision w/o blurriness; No sinus pain/discomfort, sore throat  Respiratory: No dyspnea, cough, wheezing, hemoptysis  CV: No chest pain, PND, orthopnea  GI: No abdominal pain, diarrhea, constipation, nausea, vomiting, melena, hematochezia  : No increased frequency, dysuria, hematuria, nocturia  MSK: No joint pain/swelling; no back pain; no edema  Neuro: No dizziness/lightheadedness, weakness, seizures, numbness, tingling  Heme: No easy bruising or bleeding  Endo: No heat/cold intolerance  Psych: No significant nervousness, anxiety, stress, depression    All other systems were reviewed and are negative, except as noted.    VITALS/PHYSICAL EXAM  --------------------------------------------------------------------------------  T(C): 36.8 (09-09-22 @ 11:06), Max: 36.8 (09-09-22 @ 11:06)  HR: 74 (09-09-22 @ 15:31) (62 - 74)  BP: 111/67 (09-09-22 @ 15:31) (103/60 - 112/71)  RR: 18 (09-09-22 @ 15:31) (18 - 18)  SpO2: 97% (09-09-22 @ 15:31) (97% - 98%)  Wt(kg): --        09-08-22 @ 07:01  -  09-09-22 @ 07:00  --------------------------------------------------------  IN: 365 mL / OUT: 1380 mL / NET: -1015 mL    09-09-22 @ 07:01  -  09-09-22 @ 19:44  --------------------------------------------------------  IN: 0 mL / OUT: 1150 mL / NET: -1150 mL      Physical Exam:  	Gen: NAD, well-appearing  	HEENT:  supple neck, clear oropharynx  	Pulm: CTA B/L  	CV: RRR, S1S2; no rub  	Abd: +BS, soft, nontender/nondistended  	: No suprapubic tenderness  	UE: Warm, no edema; no asterixis  	LE: Warm, 2+ edema  	Neuro: No focal deficits, intact CN  	Psych: Normal affect and mood  	Skin: Warm, without rashes      LABS/STUDIES  --------------------------------------------------------------------------------              8.0    4.90  >-----------<  256      [09-09-22 @ 10:26]              26.4     134  |  94  |  76  ----------------------------<  79      [09-09-22 @ 05:48]  3.8   |  30  |  2.05        Ca     9.3     [09-09-22 @ 05:48]      Mg     2.1     [09-09-22 @ 05:48]      Phos  4.0     [09-09-22 @ 05:48]    TPro  6.4  /  Alb  3.6  /  TBili  0.2  /  DBili  x   /  AST  22  /  ALT  11  /  AlkPhos  123  [09-09-22 @ 05:48]          Creatinine Trend:  SCr 2.05 [09-09 @ 05:48]  SCr 1.80 [09-08 @ 06:23]  SCr 1.56 [09-07 @ 06:23]  SCr 1.61 [09-06 @ 18:10]  SCr 1.70 [09-06 @ 06:05]      Urine Creatinine 34      [09-05-22 @ 06:36]  Urine Protein 10      [09-05-22 @ 06:36]    Iron 31, TIBC 293, %sat 10      [09-09-22 @ 05:48]  Ferritin 53      [09-09-22 @ 05:48]

## 2022-09-09 NOTE — PROGRESS NOTE ADULT - PROBLEM SELECTOR PLAN 4
- eventual SGLT2-i as above to delay further progression of CKD  - continue to monitor closely with diuresis

## 2022-09-10 NOTE — PROGRESS NOTE ADULT - PROBLEM SELECTOR PLAN 1
Pt feeling at baseline. Improving on bumex drip. Urine output improving Weight today not done at time of note writing. Goal weight 189 lbs  - Bumex PO 4mg q12  - c/w spironolactone  - Holding metolazone with Na <135  - standing daily weights, strict I/Os.  - Pulm, nephro, and HF following   - eventual SGLT2-i prior to discharge, once on stable dose of oral diuretics  -Continue to ambulate with NRB or HFNC in the next coming days to show ability. No gross stroke deficits.  - s/p CT C/A/P for malignancy w/u as per [place] transplant eval. Pt feeling at baseline. Improving on bumex drip. Urine output improving Weight today not done at time of note writing. Goal weight 189 lbs  - Bumex PO 4mg q12, 2L UOP overnight  - c/w spironolactone  - Holding metolazone with Na <135  - standing daily weights, strict I/Os.  - Pulm, nephro, and HF following   - eventual SGLT2-i prior to discharge, once on stable dose of oral diuretics  -Continue to ambulate with NRB or HFNC in the next coming days to show ability. No gross stroke deficits.  - s/p CT C/A/P for malignancy w/u as per [place] transplant eval.

## 2022-09-10 NOTE — PROGRESS NOTE ADULT - PROBLEM SELECTOR PLAN 4
# Medication toxicity monitoring: medication dose reviewed. Since patient is on bumetanide, continue to monitor for hypokalemia.      If you have any questions, please feel free to contact me  Max Bates  Nephrology Fellow  [phone]; Prefer [employer] TEAMS  (After 5pm or on weekends please page the on-call fellow).    Patient was discussed with Dr. Waddell  who agrees with my A/P. Addendum to follow

## 2022-09-10 NOTE — PROGRESS NOTE ADULT - ATTENDING COMMENTS
66F w/ severe pulmonary HTN, HFpEF, Afib, COPD on home O2, CKD, ROLANDA on CPAP, morbid obesity presenting with acute hypoxic respiratory failure, admitted for acute decompensated heart failure and ongoing lung transplant evaluation.    - Acute decompensated heart failure - Able to be successfully diuresed with PO Bumex and spironolactone, with good UOP (2L overnight) as well as improvement in renal function. Weight has remained stable. Possible repeat cardiomems reading during the week after PO diuresis. Monitor electrolytes, I/Os, standing weights. Consider reintroducing metolazone, can discuss with heart failure.  - pHTN - at baseline O2 (7L) with AVAPS at night. Continue with ambrisentan and tadalafil. Per transplant pulmonology, not a candidate for lung transplant due to other comorbidities. Possible eventual transfer to [place] for second opinion  - A-fib - monitor on telemetry, continue with Eliquis, digoxin, diltiazem  - Dispo - likely DC early this week given reaching baseline. Will likely need transplant to [facility] [place].

## 2022-09-10 NOTE — PROGRESS NOTE ADULT - PROBLEM SELECTOR PLAN 2
Cr 1.65 - baseline is 1.7-1.8.  - diuresis as above  - avoid nephrotoxic agents  - strict I/O  - Cr elevation could also be new baseline for patient in setting of worsening kidney function 2/2 pulm htn/HF  - Pt with BUN 70's. No uremic symptoms currently but may require dialysis in the future. Cr 1.68 - baseline is 1.7-1.8.  - diuresis as above  - avoid nephrotoxic agents  - strict I/O  - Cr elevation could also be new baseline for patient in setting of worsening kidney function 2/2 pulm htn/HF  - Pt with BUN 70's. No uremic symptoms currently but may require dialysis in the future.  - SCr downtrended overnight in response to oral diuretics, continue with PO bumex at this time

## 2022-09-10 NOTE — PROGRESS NOTE ADULT - SUBJECTIVE AND OBJECTIVE BOX
[facility] Division of Kidney Diseases & Hypertension  FOLLOW UP NOTE  [phone]--------------------------------------------------------------------------------  Chief Complaint:Pulmonary hypertension due to lung diseases and hypoxia    24 hour events/subjective:  Patient still on 7L O2. on Bumex 4mg Q12H and aldactone 25 BID. UOP 1.5L    PAST HISTORY  --------------------------------------------------------------------------------  No significant changes to PMH, PSH, FHx, SHx, unless otherwise noted    ALLERGIES & MEDICATIONS  --------------------------------------------------------------------------------  Allergies    nitrates (Unknown)    Intolerances      Standing Inpatient Medications  ambrisentan 10 milliGRAM(s) Oral daily  apixaban 5 milliGRAM(s) Oral two times a day  atorvastatin 20 milliGRAM(s) Oral at bedtime  budesonide  80 MICROgram(s)/formoterol 4.5 MICROgram(s) Inhaler 2 Puff(s) Inhalation two times a day  buMETAnide 4 milliGRAM(s) Oral every 12 hours  buPROPion XL (24-Hour) . 300 milliGRAM(s) Oral daily  chlorhexidine 2% Cloths 1 Application(s) Topical <User Schedule>  digoxin     Tablet 125 MICROGram(s) Oral every other day  diltiazem    milliGRAM(s) Oral daily  ferrous    sulfate 325 milliGRAM(s) Oral <User Schedule>  pantoprazole    Tablet 40 milliGRAM(s) Oral before breakfast  polyethylene glycol 3350 17 Gram(s) Oral daily  senna 2 Tablet(s) Oral at bedtime  sodium chloride 0.65% Nasal 2 Spray(s) Both Nostrils four times a day  spironolactone 25 milliGRAM(s) Oral two times a day  tadalafil 40 milliGRAM(s) Oral daily  tiotropium 18 MICROgram(s) Capsule 1 Capsule(s) Inhalation daily    PRN Inpatient Medications  acetaminophen     Tablet .. 975 milliGRAM(s) Oral every 6 hours PRN  ALBUTerol    90 MICROgram(s) HFA Inhaler 2 Puff(s) Inhalation every 6 hours PRN  melatonin 3 milliGRAM(s) Oral at bedtime PRN      REVIEW OF SYSTEMS  --------------------------------------------------------------------------------  Gen: No  fevers/chills; was sitting up in a chair comfortably  Skin: No rashes  Head/Eyes/Ears/Mouth: No headache; Normal hearing; Normal vision w/o blurriness  Respiratory: No dyspnea, cough, wheezing, hemoptysis  CV: No chest pain, PND, orthopnea  GI: No abdominal pain, diarrhea, constipation, nausea, vomiting  : No increased frequency, dysuria, hematuria, nocturia  MSK: No joint pain/swelling; no back pain; no edema  Neuro: No dizziness/lightheadedness, weakness, seizures, numbness, tingling      All other systems were reviewed and are negative, except as noted.    VITALS/PHYSICAL EXAM  --------------------------------------------------------------------------------  T(C): 36.6 (09-10-22 @ 11:32), Max: 36.6 (09-10-22 @ 11:32)  HR: 65 (09-10-22 @ 11:32) (61 - 74)  BP: 109/65 (09-10-22 @ 11:32) (109/65 - 119/68)  RR: 18 (09-10-22 @ 11:32) (18 - 18)  SpO2: 97% (09-10-22 @ 11:32) (91% - 97%)  Wt(kg): --        09-09-22 @ 07:01  -  09-10-22 @ 07:00  --------------------------------------------------------  IN: 220 mL / OUT: 1550 mL / NET: -1330 mL    09-10-22 @ 07:01  -  09-10-22 @ 14:27  --------------------------------------------------------  IN: 0 mL / OUT: 400 mL / NET: -400 mL      Physical Exam:    	Gen: NAD, well-appearing  	HEENT:  supple neck, clear oropharynx  	Pulm: CTA B/L  	CV: RRR, S1S2; no rub  	Abd: +BS, soft, nontender/nondistended  	: No suprapubic tenderness  	UE: Warm, no edema; no asterixis  	LE: Warm, 2+ edema  	Neuro: No focal deficits, intact CN  	Psych: Normal affect and mood  	Skin: Warm, without rashes    LABS/STUDIES  --------------------------------------------------------------------------------              7.4    3.38  >-----------<  231      [09-10-22 @ 07:06]              24.0     136  |  95  |  75  ----------------------------<  82      [09-10-22 @ 07:04]  3.9   |  28  |  1.68        Ca     9.2     [09-10-22 @ 07:04]      Mg     2.2     [09-10-22 @ 07:04]      Phos  3.8     [09-10-22 @ 07:04]    TPro  6.7  /  Alb  3.6  /  TBili  0.3  /  DBili  x   /  AST  18  /  ALT  12  /  AlkPhos  131  [09-10-22 @ 07:04]          Creatinine Trend:  SCr 1.68 [09-10 @ 07:04]  SCr 2.05 [09-09 @ 05:48]  SCr 1.80 [09-08 @ 06:23]  SCr 1.56 [09-07 @ 06:23]  SCr 1.61 [09-06 @ 18:10]      Urine Creatinine 34      [09-05-22 @ 06:36]  Urine Protein 10      [09-05-22 @ 06:36]    Iron 31, TIBC 293, %sat 10      [09-09-22 @ 05:48]  Ferritin 53      [09-09-22 @ 05:48]

## 2022-09-10 NOTE — PROGRESS NOTE ADULT - PROBLEM SELECTOR PLAN 1
# JUANA - cardiorenal syndrome. Currently on PO bumetanide.   Creatinine downtrending to 1.68 now (baseline 1.5-1.8)-improving with diuresis. MOnitor kidney function is not worsening before discharging patient. # JUANA - cardiorenal syndrome. Currently on PO bumetanide.   Creatinine downtrending to 1.68 now (baseline 1.5-1.8)-improving with diuresis. continue

## 2022-09-10 NOTE — PROGRESS NOTE ADULT - PROBLEM SELECTOR PLAN 3
RHC showed elevated right sided filling pressures with severe pulmonary hypertension with systemic PA pressures, slightly elevated PCWP and preserved cardiac output.   - Followed with Dr. Rodriguez at [place] for PH but he recently left the practice. Needs a pulmonary hypertension doctor near her home in [place]. Possibly [facility]  - Continue tadalafil 40 mg daily   - continue Ambrisentan 10mg qd  - Cardiomems 8/30 is 41. After trial of oral bumetanide cardiomems is 43, started back on drip  - pending Cardio mems read 9/6      Transplant reporting that patient is currently not a candidate following eval, given this, also not a candidate for IV Flolan per Pulm RHC showed elevated right sided filling pressures with severe pulmonary hypertension with systemic PA pressures, slightly elevated PCWP and preserved cardiac output.   - Followed with Dr. Rodriguez at [place] for PH but he recently left the practice. Needs a pulmonary hypertension doctor near her home in [place]. Possibly [facility]  - Continue tadalafil 40 mg daily   - continue Ambrisentan 10mg qd  - Cardiomems 8/30 is 41. After trial of oral bumetanide cardiomems is 43, started back on drip  - pending Cardio mems read 9/6  - on home O2 7-8L NC      Transplant reporting that patient is currently not a candidate following eval, given this, also not a candidate for IV Flolan per Pulm

## 2022-09-10 NOTE — PROGRESS NOTE ADULT - SUBJECTIVE AND OBJECTIVE BOX
Kevin Alexandre MD  Internal medicine, PGY-2    Interval Events:  No acute events overnight. Assessed at bedside.       OBJECTIVE:  ICU Vital Signs Last 24 Hrs  T(C): 36.4 (09 Sep 2022 04:33), Max: 36.5 (08 Sep 2022 20:55)  T(F): 97.5 (09 Sep 2022 04:33), Max: 97.7 (08 Sep 2022 20:55)  HR: 64 (09 Sep 2022 05:30) (62 - 68)  BP: 103/60 (09 Sep 2022 04:33) (102/62 - 106/67)  BP(mean): --  ABP: --  ABP(mean): --  RR: 18 (09 Sep 2022 04:33) (18 - 18)  SpO2: 98% (09 Sep 2022 05:30) (93% - 98%)    O2 Parameters below as of 09 Sep 2022 04:33  Patient On (Oxygen Delivery Method): BiPAP/CPAP      09-07 @ 07:01  -  09-08 @ 07:00  --------------------------------------------------------  IN: 340 mL / OUT: 1400 mL / NET: -1060 mL    09-08 @ 07:01  -  09-09 @ 06:49  --------------------------------------------------------  IN: 365 mL / OUT: 1380 mL / NET: -1015 mL      CAPILLARY BLOOD GLUCOSE          PHYSICAL EXAM:  General: WN/WD NAD, comfortably sitting on chair.  Neurology: A&Ox3, nonfocal, LEWIS x 4  Eyes: PERRLA/ EOMI, Gross vision intact  ENT/Neck: Neck supple, trachea midline, No JVD, Gross hearing intact  Respiratory: crackles at the bases   CV: RRR, +S1/S2, -S3/S4, no murmurs, rubs or gallops  Abdominal: Soft, NT, ND +BS, No HSM  MSK: 5/5 strength UE/LE bilaterally  Extremities: No edema, 2+ peripheral pulses  Skin: No Rashes, Hematoma, Ecchymosis  Incisions:   Tubes:    HOSPITAL MEDICATIONS:  MEDICATIONS  (STANDING):  ambrisentan 10 milliGRAM(s) Oral daily  apixaban 5 milliGRAM(s) Oral two times a day  atorvastatin 20 milliGRAM(s) Oral at bedtime  budesonide  80 MICROgram(s)/formoterol 4.5 MICROgram(s) Inhaler 2 Puff(s) Inhalation two times a day  buMETAnide 4 milliGRAM(s) Oral every 12 hours  buPROPion XL (24-Hour) . 300 milliGRAM(s) Oral daily  chlorhexidine 2% Cloths 1 Application(s) Topical <User Schedule>  digoxin     Tablet 125 MICROGram(s) Oral every other day  diltiazem    milliGRAM(s) Oral daily  ferrous    sulfate 325 milliGRAM(s) Oral <User Schedule>  pantoprazole    Tablet 40 milliGRAM(s) Oral before breakfast  polyethylene glycol 3350 17 Gram(s) Oral daily  senna 2 Tablet(s) Oral at bedtime  sodium chloride 0.65% Nasal 2 Spray(s) Both Nostrils four times a day  spironolactone 25 milliGRAM(s) Oral two times a day  tadalafil 40 milliGRAM(s) Oral daily  tiotropium 18 MICROgram(s) Capsule 1 Capsule(s) Inhalation daily    MEDICATIONS  (PRN):  acetaminophen     Tablet .. 975 milliGRAM(s) Oral every 6 hours PRN Temp greater or equal to 38C (100.4F), Mild Pain (1 - 3), Moderate Pain (4 - 6)  ALBUTerol    90 MICROgram(s) HFA Inhaler 2 Puff(s) Inhalation every 6 hours PRN Shortness of Breath and/or Wheezing  melatonin 3 milliGRAM(s) Oral at bedtime PRN Insomnia      LABS:                        7.0    3.76  )-----------( 202      ( 09 Sep 2022 05:48 )             22.8     Hgb Trend: 7.0<--, 7.8<--, 7.6<--, 7.3<--, 7.6<--  09-09    134<L>  |  94<L>  |  76<H>  ----------------------------<  79  3.8   |  30  |  2.05<H>    Ca    9.3      09 Sep 2022 05:48  Phos  4.0     09-09  Mg     2.1     09-09    TPro  6.4  /  Alb  3.6  /  TBili  0.2  /  DBili  x   /  AST  22  /  ALT  11  /  AlkPhos  123<H>  09-09    Creatinine Trend: 2.05<--, 1.80<--, 1.56<--, 1.61<--, 1.70<--, 1.65<--            MICROBIOLOGY:      Kevin Alexandre MD  Internal medicine, PGY-2    Interval Events:  No acute events overnight. Assessed at bedside. The patient is sitting in chair with nasal cannula. She reports feeling back to her normal self. No other concerns at this time.       OBJECTIVE:  ICU Vital Signs Last 24 Hrs  T(C): 36.4 (09 Sep 2022 04:33), Max: 36.5 (08 Sep 2022 20:55)  T(F): 97.5 (09 Sep 2022 04:33), Max: 97.7 (08 Sep 2022 20:55)  HR: 64 (09 Sep 2022 05:30) (62 - 68)  BP: 103/60 (09 Sep 2022 04:33) (102/62 - 106/67)  BP(mean): --  ABP: --  ABP(mean): --  RR: 18 (09 Sep 2022 04:33) (18 - 18)  SpO2: 98% (09 Sep 2022 05:30) (93% - 98%)    O2 Parameters below as of 09 Sep 2022 04:33  Patient On (Oxygen Delivery Method): BiPAP/CPAP      09-07 @ 07:01  -  09-08 @ 07:00  --------------------------------------------------------  IN: 340 mL / OUT: 1400 mL / NET: -1060 mL    09-08 @ 07:01  -  09-09 @ 06:49  --------------------------------------------------------  IN: 365 mL / OUT: 1380 mL / NET: -1015 mL      CAPILLARY BLOOD GLUCOSE          PHYSICAL EXAM:  General: WN/WD NAD, comfortably sitting on chair with NC  Neurology: A&Ox3, nonfocal, LEWIS x 4  Eyes: PERRLA/ EOMI, Gross vision intact  ENT/Neck: Neck supple, trachea midline, No JVD, Gross hearing intact  Respiratory: CTAB, no wheezing or crackles  CV: RRR, +S1/S2, -S3/S4, no murmurs, rubs or gallops  Abdominal: Soft, NT, ND +BS, No HSM  MSK: 5/5 strength UE/LE bilaterally  Extremities: Chronic venous stasis of bilateral lower extremities trace peripheral edema  Skin: No Rashes, Hematoma, Ecchymosis      HOSPITAL MEDICATIONS:  MEDICATIONS  (STANDING):  ambrisentan 10 milliGRAM(s) Oral daily  apixaban 5 milliGRAM(s) Oral two times a day  atorvastatin 20 milliGRAM(s) Oral at bedtime  budesonide  80 MICROgram(s)/formoterol 4.5 MICROgram(s) Inhaler 2 Puff(s) Inhalation two times a day  buMETAnide 4 milliGRAM(s) Oral every 12 hours  buPROPion XL (24-Hour) . 300 milliGRAM(s) Oral daily  chlorhexidine 2% Cloths 1 Application(s) Topical <User Schedule>  digoxin     Tablet 125 MICROGram(s) Oral every other day  diltiazem    milliGRAM(s) Oral daily  ferrous    sulfate 325 milliGRAM(s) Oral <User Schedule>  pantoprazole    Tablet 40 milliGRAM(s) Oral before breakfast  polyethylene glycol 3350 17 Gram(s) Oral daily  senna 2 Tablet(s) Oral at bedtime  sodium chloride 0.65% Nasal 2 Spray(s) Both Nostrils four times a day  spironolactone 25 milliGRAM(s) Oral two times a day  tadalafil 40 milliGRAM(s) Oral daily  tiotropium 18 MICROgram(s) Capsule 1 Capsule(s) Inhalation daily    MEDICATIONS  (PRN):  acetaminophen     Tablet .. 975 milliGRAM(s) Oral every 6 hours PRN Temp greater or equal to 38C (100.4F), Mild Pain (1 - 3), Moderate Pain (4 - 6)  ALBUTerol    90 MICROgram(s) HFA Inhaler 2 Puff(s) Inhalation every 6 hours PRN Shortness of Breath and/or Wheezing  melatonin 3 milliGRAM(s) Oral at bedtime PRN Insomnia      LABS:                        7.0    3.76  )-----------( 202      ( 09 Sep 2022 05:48 )             22.8     Hgb Trend: 7.0<--, 7.8<--, 7.6<--, 7.3<--, 7.6<--  09-09    134<L>  |  94<L>  |  76<H>  ----------------------------<  79  3.8   |  30  |  2.05<H>    Ca    9.3      09 Sep 2022 05:48  Phos  4.0     09-09  Mg     2.1     09-09    TPro  6.4  /  Alb  3.6  /  TBili  0.2  /  DBili  x   /  AST  22  /  ALT  11  /  AlkPhos  123<H>  09-09    Creatinine Trend: 2.05<--, 1.80<--, 1.56<--, 1.61<--, 1.70<--, 1.65<--            MICROBIOLOGY:

## 2022-09-11 NOTE — PROGRESS NOTE ADULT - PROBLEM SELECTOR PLAN 3
RHC showed elevated right sided filling pressures with severe pulmonary hypertension with systemic PA pressures, slightly elevated PCWP and preserved cardiac output.   - Followed with Dr. Rodriguez at [place] for PH but he recently left the practice. Needs a pulmonary hypertension doctor near her home in [place]. Possibly [facility]  - Continue tadalafil 40 mg daily   - continue Ambrisentan 10mg qd  - Cardiomems 8/30 is 41. After trial of oral bumetanide cardiomems is 43,  - on home O2 7-8L NC      Transplant reporting that patient is currently not a candidate following eval, given this, also not a candidate for IV Flolan per Pulm

## 2022-09-11 NOTE — PROGRESS NOTE ADULT - PROBLEM SELECTOR PLAN 2
Cr 1.68 - baseline is 1.7-1.8.  - diuresis as above  - avoid nephrotoxic agents  - strict I/O  - Cr elevation could also be new baseline for patient in setting of worsening kidney function 2/2 pulm htn/HF  - Pt with BUN 70's. No uremic symptoms currently but may require dialysis in the future.  - SCr downtrended overnight in response to oral diuretics, continue with PO bumex at this time

## 2022-09-11 NOTE — PROGRESS NOTE ADULT - SUBJECTIVE AND OBJECTIVE BOX
INCOMPLETE NOTE    Benito Torres | PGY1| Pager: [phone]  Interval Events:    REVIEW OF SYSTEMS:  CONSTITUTIONAL: No weakness, fevers or chills  EYES/ENT: No visual changes;  No vertigo or throat pain   NECK: No pain or stiffness  RESPIRATORY: No cough, wheezing, hemoptysis; No shortness of breath  CARDIOVASCULAR: No chest pain or palpitations  GASTROINTESTINAL: No abdominal or epigastric pain. No nausea, vomiting, or hematemesis; No diarrhea or constipation. No melena or hematochezia.  GENITOURINARY: No dysuria, frequency or hematuria  NEUROLOGICAL: No numbness or weakness  SKIN: No itching, burning, rashes, or lesions   All other review of systems is negative unless indicated above.    OBJECTIVE:  ICU Vital Signs Last 24 Hrs  T(C): 36.5 (11 Sep 2022 04:36), Max: 36.6 (10 Sep 2022 11:32)  T(F): 97.7 (11 Sep 2022 04:36), Max: 97.8 (10 Sep 2022 11:32)  HR: 68 (11 Sep 2022 04:36) (61 - 85)  BP: 101/58 (11 Sep 2022 04:36) (101/58 - 109/65)  BP(mean): --  ABP: --  ABP(mean): --  RR: 18 (11 Sep 2022 04:36) (18 - 18)  SpO2: 98% (11 Sep 2022 04:36) (90% - 98%)    O2 Parameters below as of 11 Sep 2022 04:36  Patient On (Oxygen Delivery Method): BiPAP/CPAP              09-09 @ 07:01  -  09-10 @ 07:00  --------------------------------------------------------  IN: 220 mL / OUT: 1550 mL / NET: -1330 mL    09-10 @ 07:01 - 09-11 @ 06:53  --------------------------------------------------------  IN: 0 mL / OUT: 700 mL / NET: -700 mL      CAPILLARY BLOOD GLUCOSE          PHYSICAL EXAM:  General: WN/WD NAD  Neurology: A&Ox3, nonfocal, LEWIS x 4  Eyes: PERRLA/ EOMI, Gross vision intact  ENT/Neck: Neck supple, trachea midline, No JVD, Gross hearing intact  Respiratory: CTA B/L, No wheezing, rales, rhonchi  CV: RRR, +S1/S2, -S3/S4, no murmurs, rubs or gallops  Abdominal: Soft, NT, ND +BS, No HSM  MSK: 5/5 strength UE/LE bilaterally  Extremities: No edema, 2+ peripheral pulses  Skin: No Rashes, Hematoma, Ecchymosis  Incisions:   Tubes:    HOSPITAL MEDICATIONS:  MEDICATIONS  (STANDING):  ambrisentan 10 milliGRAM(s) Oral daily  apixaban 5 milliGRAM(s) Oral two times a day  atorvastatin 20 milliGRAM(s) Oral at bedtime  budesonide  80 MICROgram(s)/formoterol 4.5 MICROgram(s) Inhaler 2 Puff(s) Inhalation two times a day  buMETAnide 4 milliGRAM(s) Oral every 12 hours  buPROPion XL (24-Hour) . 300 milliGRAM(s) Oral daily  chlorhexidine 2% Cloths 1 Application(s) Topical <User Schedule>  digoxin     Tablet 125 MICROGram(s) Oral every other day  diltiazem    milliGRAM(s) Oral daily  ferrous    sulfate 325 milliGRAM(s) Oral <User Schedule>  pantoprazole    Tablet 40 milliGRAM(s) Oral before breakfast  polyethylene glycol 3350 17 Gram(s) Oral daily  senna 2 Tablet(s) Oral at bedtime  sodium chloride 0.65% Nasal 2 Spray(s) Both Nostrils four times a day  spironolactone 25 milliGRAM(s) Oral two times a day  tadalafil 40 milliGRAM(s) Oral daily  tiotropium 18 MICROgram(s) Capsule 1 Capsule(s) Inhalation daily    MEDICATIONS  (PRN):  acetaminophen     Tablet .. 975 milliGRAM(s) Oral every 6 hours PRN Temp greater or equal to 38C (100.4F), Mild Pain (1 - 3), Moderate Pain (4 - 6)  ALBUTerol    90 MICROgram(s) HFA Inhaler 2 Puff(s) Inhalation every 6 hours PRN Shortness of Breath and/or Wheezing  melatonin 3 milliGRAM(s) Oral at bedtime PRN Insomnia      LABS:                        7.4    3.38  )-----------( 231      ( 10 Sep 2022 07:06 )             24.0     Hgb Trend: 7.4<--, 8.0<--, 7.0<--, 7.8<--, 7.6<--  09-10    136  |  95<L>  |  75<H>  ----------------------------<  82  3.9   |  28  |  1.68<H>    Ca    9.2      10 Sep 2022 07:04  Phos  3.8     09-10  Mg     2.2     09-10    TPro  6.7  /  Alb  3.6  /  TBili  0.3  /  DBili  x   /  AST  18  /  ALT  12  /  AlkPhos  131<H>  09-10    Creatinine Trend: 1.68<--, 2.05<--, 1.80<--, 1.56<--, 1.61<--, 1.70<--            MICROBIOLOGY:      Benito Torres | PGY1| Pager: [phone]  Interval Events: No events overnight. Tolerating Oral bumex/spironolactone regimen. States feeling well and wanting to go home soon       OBJECTIVE:  ICU Vital Signs Last 24 Hrs  T(C): 36.5 (11 Sep 2022 04:36), Max: 36.6 (10 Sep 2022 11:32)  T(F): 97.7 (11 Sep 2022 04:36), Max: 97.8 (10 Sep 2022 11:32)  HR: 68 (11 Sep 2022 04:36) (61 - 85)  BP: 101/58 (11 Sep 2022 04:36) (101/58 - 109/65)  BP(mean): --  ABP: --  ABP(mean): --  RR: 18 (11 Sep 2022 04:36) (18 - 18)  SpO2: 98% (11 Sep 2022 04:36) (90% - 98%)    O2 Parameters below as of 11 Sep 2022 04:36  Patient On (Oxygen Delivery Method): BiPAP/CPAP      09-09 @ 07:01  -  09-10 @ 07:00  --------------------------------------------------------  IN: 220 mL / OUT: 1550 mL / NET: -1330 mL    09-10 @ 07:01 - 09-11 @ 06:53  --------------------------------------------------------  IN: 0 mL / OUT: 700 mL / NET: -700 mL      CAPILLARY BLOOD GLUCOSE    PHYSICAL EXAM:  General: WN/WD NAD, comfortably sitting upright in chair, eating.   Neurology: A&Ox3, nonfocal, LEWIS x 4  Eyes: PERRLA/ EOMI, Gross vision intact  ENT/Neck: Neck supple, trachea midline, No JVD, Gross hearing intact  Respiratory: CTA B/L, No wheezing, rales, rhonchi  CV: RRR, +S1/S2, -S3/S4, no murmurs, rubs or gallops  Abdominal: Soft, NT, ND +BS, No HSM  MSK: 5/5 strength UE/LE bilaterally  Extremities: Tense LE b/l pitting edema, 2+ peripheral pulses  Skin: No Rashes, Hematoma, Ecchymosis  Incisions:   Tubes:7LNC    HOSPITAL MEDICATIONS:  MEDICATIONS  (STANDING):  ambrisentan 10 milliGRAM(s) Oral daily  apixaban 5 milliGRAM(s) Oral two times a day  atorvastatin 20 milliGRAM(s) Oral at bedtime  budesonide  80 MICROgram(s)/formoterol 4.5 MICROgram(s) Inhaler 2 Puff(s) Inhalation two times a day  buMETAnide 4 milliGRAM(s) Oral every 12 hours  buPROPion XL (24-Hour) . 300 milliGRAM(s) Oral daily  chlorhexidine 2% Cloths 1 Application(s) Topical <User Schedule>  digoxin     Tablet 125 MICROGram(s) Oral every other day  diltiazem    milliGRAM(s) Oral daily  ferrous    sulfate 325 milliGRAM(s) Oral <User Schedule>  pantoprazole    Tablet 40 milliGRAM(s) Oral before breakfast  polyethylene glycol 3350 17 Gram(s) Oral daily  senna 2 Tablet(s) Oral at bedtime  sodium chloride 0.65% Nasal 2 Spray(s) Both Nostrils four times a day  spironolactone 25 milliGRAM(s) Oral two times a day  tadalafil 40 milliGRAM(s) Oral daily  tiotropium 18 MICROgram(s) Capsule 1 Capsule(s) Inhalation daily    MEDICATIONS  (PRN):  acetaminophen     Tablet .. 975 milliGRAM(s) Oral every 6 hours PRN Temp greater or equal to 38C (100.4F), Mild Pain (1 - 3), Moderate Pain (4 - 6)  ALBUTerol    90 MICROgram(s) HFA Inhaler 2 Puff(s) Inhalation every 6 hours PRN Shortness of Breath and/or Wheezing  melatonin 3 milliGRAM(s) Oral at bedtime PRN Insomnia      LABS:                        7.4    3.38  )-----------( 231      ( 10 Sep 2022 07:06 )             24.0     Hgb Trend: 7.4<--, 8.0<--, 7.0<--, 7.8<--, 7.6<--  09-10    136  |  95<L>  |  75<H>  ----------------------------<  82  3.9   |  28  |  1.68<H>    Ca    9.2      10 Sep 2022 07:04  Phos  3.8     09-10  Mg     2.2     09-10    TPro  6.7  /  Alb  3.6  /  TBili  0.3  /  DBili  x   /  AST  18  /  ALT  12  /  AlkPhos  131<H>  09-10    Creatinine Trend: 1.68<--, 2.05<--, 1.80<--, 1.56<--, 1.61<--, 1.70<--            MICROBIOLOGY:

## 2022-09-11 NOTE — PROGRESS NOTE ADULT - ATTENDING COMMENTS
66F w/ severe pulmonary HTN, HFpEF, Afib, COPD on home O2, CKD, ROLANDA on CPAP, morbid obesity presenting with acute hypoxic respiratory failure, admitted for acute decompensated heart failure and ongoing lung transplant evaluation.    - Acute decompensated heart failure - Able to be successfully diuresed with PO Bumex and spironolactone, with good UOP (2L overnight) as well as improvement in renal function. Weight has remained stable. Monitor electrolytes, I/Os, standing weights. Consider reintroducing metolazone, can discuss with heart failure.  - pHTN - at baseline O2 (7L) with AVAPS at night. Continue with ambrisentan and tadalafil. Per transplant pulmonology, not a candidate for lung transplant due to other comorbidities. Possible eventual transfer to [facility] for second opinion  - A-fib - monitor on telemetry, continue with Eliquis, digoxin, diltiazem  - Dispo - likely DC early this week given reaching baseline, likely tomorrow. Will likely need transport to [facility].

## 2022-09-11 NOTE — PROGRESS NOTE ADULT - PROBLEM SELECTOR PROBLEM 1
Acute respiratory failure with hypoxia
JUANA (acute kidney injury)
Severe pulmonary hypertension
Acute respiratory failure with hypoxia
Epistaxis
JUANA (acute kidney injury)
Severe pulmonary hypertension
Severe pulmonary hypertension
JUANA (acute kidney injury)
JUANA (acute kidney injury)
Severe pulmonary hypertension
Acute respiratory failure with hypoxia
JUANA (acute kidney injury)
Severe pulmonary hypertension
Acute respiratory failure with hypoxia
Epistaxis
Epistaxis
JUANA (acute kidney injury)
Severe pulmonary hypertension
Severe pulmonary hypertension
Acute on chronic diastolic congestive heart failure
Acute respiratory failure with hypoxia
JUANA (acute kidney injury)
Severe pulmonary hypertension
Severe pulmonary hypertension
Acute respiratory failure with hypoxia
Severe pulmonary hypertension
Severe pulmonary hypertension
JUANA (acute kidney injury)
Severe pulmonary hypertension
Severe pulmonary hypertension
JUANA (acute kidney injury)
Severe pulmonary hypertension
Severe pulmonary hypertension
JUANA (acute kidney injury)
Severe pulmonary hypertension
JUANA (acute kidney injury)
Acute respiratory failure with hypoxia
Severe pulmonary hypertension
Acute respiratory failure with hypoxia
Severe pulmonary hypertension
Acute respiratory failure with hypoxia
Severe pulmonary hypertension
Acute respiratory failure with hypoxia

## 2022-09-11 NOTE — PROGRESS NOTE ADULT - PROBLEM SELECTOR PLAN 1
Pt feeling at baseline. Improving on bumex drip. Urine output improving Weight today not done at time of note writing. Goal weight 189 lbs  - Bumex PO 4mg q12  - c/w spironolactone  - Holding metolazone with Na <135  - standing daily weights, strict I/Os.  - Pulm, nephro, and HF following   - consider eventual SGLT2-i prior to discharge, once on stable dose of oral diuretics  -Continue to ambulate with NRB or HFNC in the next coming days to show ability. No gross stroke deficits.  - s/p CT C/A/P for malignancy w/u as per [facility] transplant eval.

## 2022-09-11 NOTE — PROGRESS NOTE ADULT - ASSESSMENT
66F w/ severe pulmonary HTN (Group 1, 2 & 3), HFpEF, Afib, COPD on home O2 (5L NC), CKD, ROLANDA on CPAP, morbid obesity presenting with acute hypoxic respiratory failure, admitted for CHF exacerbation and ongoing lung transplant evaluation.   W/ epistaxis 8/13 now resolved, cbc stable 8/24, started back on nasal cannula during day ,CPAP at night, tolerating oral bumex and spironolactione.     Patient no longer a transplant candidate, seen by palliative, full code

## 2022-09-12 NOTE — PROGRESS NOTE ADULT - REASON FOR ADMISSION
lung txp eval
lung tranplant eval
lung txp eval

## 2022-09-12 NOTE — DISCHARGE NOTE NURSING/CASE MANAGEMENT/SOCIAL WORK - NSDCFUADDAPPT_GEN_ALL_CORE_FT
You can choose between following up with Dr. Jackson, Dr. Monroy, or Dr. Mosher pending on your personal preference and ease of access.

## 2022-09-12 NOTE — DISCHARGE NOTE NURSING/CASE MANAGEMENT/SOCIAL WORK - PATIENT PORTAL LINK FT
You can access the FollowMyHealth Patient Portal offered by [facility] by registering at the following website: [email or web address]. By joining [facility]’s FollowMyHealth portal, you will also be able to view your health information using other applications (apps) compatible with our system.

## 2022-09-12 NOTE — PROGRESS NOTE ADULT - ASSESSMENT
66 F with h/o COPD, CHFpEF, AFib, obesity (Type III, hx bariatric sx 2012), and severe pulmonary HTN combined group 1,2,3 with chronic combined hypercapnic and hypoxemic respiratory failure on ATC O2 supplementation (dependent on 8L NC at rest) with qhs/prn AVAPS (Trelegy Antonio via nasal prong interface) presenting for progressive dyspnea with minimal exertion admitted for acute on chronic diastolic failure, RV failure, and severe pulmonary hypertension.     RHC 6/20/2022 revealed sPAP: 106, dPAP: 44 mPAP:65, PCWP: 16, PVR 7.32W, CO/CI 6.7/3.2.   CTC 9/7:  Emphysema, enlarged PA, shotty hilar and mediastinal LNs. Evidence of CRISTOPHER wedge resection.     -Not a candidate for lung transplant due to multiorgan dysfxn, high BMI, and poor functional status.  -C/w Tadalafil and Ambrisentan.   -Out out of bed to chair daily; aggressive PT.  -Continue supplemental O2 with goal O2 sat > 88%. Patient is on 8L NC at home , now tolerating 6-7L.  -Continue symbicort and tiotropium. Albuterol prn.  -Continue home AVAPs at night (Trelegy Antonio via nasal prong interface).  -HF assistance w/ care greatly appreciated.  -On Diltiazem 120 mg po daily, dig 125 mcg po daily, mariela 25 mg po daily, and bumex 2 mg po bid.   -Transplant input greatly appreciated. Patient follow up [place] for second opinion.     Patient has appointment with Dr. Mosher (Pulmonary Hypertension), 9/22/22 at 12:30 pm.   Please place into DC summary.   [address] [place] [address]  [phone]    Dr. Asa Gordon, DO  Pulmonary and Critical Care Medicine Fellow   Available via [employer] Teams - **Preferred**  Pulmonary [facility] #[id number] ([facility]) / [id number] ([facility])  Pager:  [phone]

## 2022-09-12 NOTE — PROGRESS NOTE ADULT - SUBJECTIVE AND OBJECTIVE BOX
PULMONARY SERVICE FOLLOW UP CONSULT NOTE    SUBJECTIVE:  Denies chest pain, dyspnea, cough, or wheezing.  No acute complaints.     REVIEW OF SYSTEMS:  All additional ROS negative.    MEDICATIONS:  Pulmonary:  ALBUTerol    90 MICROgram(s) HFA Inhaler 2 Puff(s) Inhalation every 6 hours PRN  budesonide  80 MICROgram(s)/formoterol 4.5 MICROgram(s) Inhaler 2 Puff(s) Inhalation two times a day  tiotropium 18 MICROgram(s) Capsule 1 Capsule(s) Inhalation daily    Antimicrobials:    Anticoagulants:  apixaban 5 milliGRAM(s) Oral two times a day    Onc:    GI/:  pantoprazole    Tablet 40 milliGRAM(s) Oral before breakfast  polyethylene glycol 3350 17 Gram(s) Oral daily  senna 2 Tablet(s) Oral at bedtime    Endocrine:  atorvastatin 20 milliGRAM(s) Oral at bedtime    Cardiac:  ambrisentan 10 milliGRAM(s) Oral daily  buMETAnide 4 milliGRAM(s) Oral every 12 hours  digoxin     Tablet 125 MICROGram(s) Oral every other day  diltiazem    milliGRAM(s) Oral daily  spironolactone 25 milliGRAM(s) Oral two times a day  tadalafil 40 milliGRAM(s) Oral daily    Other Medications:  acetaminophen     Tablet .. 975 milliGRAM(s) Oral every 6 hours PRN  buPROPion XL (24-Hour) . 300 milliGRAM(s) Oral daily  chlorhexidine 2% Cloths 1 Application(s) Topical <User Schedule>  ferrous    sulfate 325 milliGRAM(s) Oral <User Schedule>  melatonin 3 milliGRAM(s) Oral at bedtime PRN  sodium chloride 0.65% Nasal 2 Spray(s) Both Nostrils four times a day      PHYSICAL EXAM  Vital Signs Last 24 Hrs  T(C): 36.8 (12 Sep 2022 04:59), Max: 36.8 (12 Sep 2022 04:59)  T(F): 98.3 (12 Sep 2022 04:59), Max: 98.3 (12 Sep 2022 04:59)  HR: 64 (12 Sep 2022 04:59) (60 - 74)  BP: 113/72 (12 Sep 2022 04:59) (102/63 - 113/72)  BP(mean): --  RR: 18 (12 Sep 2022 04:59) (18 - 18)  SpO2: 97% (12 Sep 2022 04:59) (90% - 98%)    Parameters below as of 12 Sep 2022 04:59  Patient On (Oxygen Delivery Method): BiPAP/CPAP        09-11 @ 07:01  -  09-12 @ 07:00  --------------------------------------------------------  IN: 120 mL / OUT: 1000 mL / NET: -880 mL            CONSTITUTIONAL: No acute distress.   HEENT:  Conjunctiva clear B/L.  Moist oral mucosa.   Cardiovascular: RRR with no murmurs. No JVD noted. No lower extremity edema B/L. Extremities are warm and well perfused.    Respiratory: Lungs CTAB. No wrr. No accessory muscle use.   Gastrointestinal:  Soft, nontender. Non-distended. Non-rigid.    Neurologic:  Alert and awake. Moving all extremities. Following commands.    Skin:  No gross rashes notes.    LABS:      CBC Full  -  ( 12 Sep 2022 06:33 )  WBC Count : 4.24 K/uL  RBC Count : 2.77 M/uL  Hemoglobin : 7.7 g/dL  Hematocrit : 25.2 %  Platelet Count - Automated : 234 K/uL  Mean Cell Volume : 91.0 fl  Mean Cell Hemoglobin : 27.8 pg  Mean Cell Hemoglobin Concentration : 30.6 gm/dL  Auto Neutrophil # : x  Auto Lymphocyte # : x  Auto Monocyte # : x  Auto Eosinophil # : x  Auto Basophil # : x  Auto Neutrophil % : x  Auto Lymphocyte % : x  Auto Monocyte % : x  Auto Eosinophil % : x  Auto Basophil % : x    09-11    133<L>  |  94<L>  |  71<H>  ----------------------------<  84  4.1   |  28  |  1.62<H>    Ca    9.7      11 Sep 2022 07:16  Phos  3.6     09-11  Mg     2.2     09-11    TPro  7.0  /  Alb  3.9  /  TBili  0.3  /  DBili  x   /  AST  20  /  ALT  11  /  AlkPhos  137<H>  09-11                      RADIOLOGY & ADDITIONAL STUDIES: PULMONARY SERVICE FOLLOW UP CONSULT NOTE    SUBJECTIVE:  RINALDI, not at rest.  No acute complaints.     REVIEW OF SYSTEMS:  All additional ROS negative.    MEDICATIONS:  Pulmonary:  ALBUTerol    90 MICROgram(s) HFA Inhaler 2 Puff(s) Inhalation every 6 hours PRN  budesonide  80 MICROgram(s)/formoterol 4.5 MICROgram(s) Inhaler 2 Puff(s) Inhalation two times a day  tiotropium 18 MICROgram(s) Capsule 1 Capsule(s) Inhalation daily    Antimicrobials:    Anticoagulants:  apixaban 5 milliGRAM(s) Oral two times a day    Onc:    GI/:  pantoprazole    Tablet 40 milliGRAM(s) Oral before breakfast  polyethylene glycol 3350 17 Gram(s) Oral daily  senna 2 Tablet(s) Oral at bedtime    Endocrine:  atorvastatin 20 milliGRAM(s) Oral at bedtime    Cardiac:  ambrisentan 10 milliGRAM(s) Oral daily  buMETAnide 4 milliGRAM(s) Oral every 12 hours  digoxin     Tablet 125 MICROGram(s) Oral every other day  diltiazem    milliGRAM(s) Oral daily  spironolactone 25 milliGRAM(s) Oral two times a day  tadalafil 40 milliGRAM(s) Oral daily    Other Medications:  acetaminophen     Tablet .. 975 milliGRAM(s) Oral every 6 hours PRN  buPROPion XL (24-Hour) . 300 milliGRAM(s) Oral daily  chlorhexidine 2% Cloths 1 Application(s) Topical <User Schedule>  ferrous    sulfate 325 milliGRAM(s) Oral <User Schedule>  melatonin 3 milliGRAM(s) Oral at bedtime PRN  sodium chloride 0.65% Nasal 2 Spray(s) Both Nostrils four times a day      PHYSICAL EXAM  Vital Signs Last 24 Hrs  T(C): 36.8 (12 Sep 2022 04:59), Max: 36.8 (12 Sep 2022 04:59)  T(F): 98.3 (12 Sep 2022 04:59), Max: 98.3 (12 Sep 2022 04:59)  HR: 64 (12 Sep 2022 04:59) (60 - 74)  BP: 113/72 (12 Sep 2022 04:59) (102/63 - 113/72)  BP(mean): --  RR: 18 (12 Sep 2022 04:59) (18 - 18)  SpO2: 97% (12 Sep 2022 04:59) (90% - 98%)    Parameters below as of 12 Sep 2022 04:59  Patient On (Oxygen Delivery Method): BiPAP/CPAP        09-11 @ 07:01  -  09-12 @ 07:00  --------------------------------------------------------  IN: 120 mL / OUT: 1000 mL / NET: -880 mL            CONSTITUTIONAL: No acute distress.   HEENT:  Conjunctiva clear B/L.  Moist oral mucosa.   Cardiovascular: RRR with no murmurs. No JVD noted. 1+ lower extremity edema B/L. Extremities are warm and well perfused.    Respiratory: Lungs CTAB. No wrr. No accessory muscle use.   Gastrointestinal:  Soft, nontender. Non-distended. Non-rigid.    Neurologic:  Alert and awake. Moving all extremities. Following commands.    Skin:  No gross rashes notes.    LABS:      CBC Full  -  ( 12 Sep 2022 06:33 )  WBC Count : 4.24 K/uL  RBC Count : 2.77 M/uL  Hemoglobin : 7.7 g/dL  Hematocrit : 25.2 %  Platelet Count - Automated : 234 K/uL  Mean Cell Volume : 91.0 fl  Mean Cell Hemoglobin : 27.8 pg  Mean Cell Hemoglobin Concentration : 30.6 gm/dL  Auto Neutrophil # : x  Auto Lymphocyte # : x  Auto Monocyte # : x  Auto Eosinophil # : x  Auto Basophil # : x  Auto Neutrophil % : x  Auto Lymphocyte % : x  Auto Monocyte % : x  Auto Eosinophil % : x  Auto Basophil % : x    09-11    133<L>  |  94<L>  |  71<H>  ----------------------------<  84  4.1   |  28  |  1.62<H>    Ca    9.7      11 Sep 2022 07:16  Phos  3.6     09-11  Mg     2.2     09-11    TPro  7.0  /  Alb  3.9  /  TBili  0.3  /  DBili  x   /  AST  20  /  ALT  11  /  AlkPhos  137<H>  09-11                      RADIOLOGY & ADDITIONAL STUDIES:

## 2022-09-12 NOTE — DISCHARGE NOTE NURSING/CASE MANAGEMENT/SOCIAL WORK - NSDCPEFALRISK_GEN_ALL_CORE
For information on Fall & Injury Prevention, visit: [email or web address] OR  [email or web address] OR  https://www.cdc.gov/steadi/patient.html

## 2022-09-12 NOTE — PROGRESS NOTE ADULT - SUBJECTIVE AND OBJECTIVE BOX
[facility] DIVISION OF KIDNEY DISEASES AND HYPERTENSION -- FOLLOW UP NOTE  --------------------------------------------------------------------------------  Chief Complaint:    24 hour events/subjective:  Still on bumex 4 q12h  Patient reports no chest pain, reports still requiring oxygen  Weight stable since 9/8       PAST HISTORY  --------------------------------------------------------------------------------  No significant changes to PMH, PSH, FHx, SHx, unless otherwise noted    ALLERGIES & MEDICATIONS  --------------------------------------------------------------------------------  Allergies    nitrates (Unknown)    Intolerances      Standing Inpatient Medications  ambrisentan 10 milliGRAM(s) Oral daily  apixaban 5 milliGRAM(s) Oral two times a day  atorvastatin 20 milliGRAM(s) Oral at bedtime  budesonide  80 MICROgram(s)/formoterol 4.5 MICROgram(s) Inhaler 2 Puff(s) Inhalation two times a day  buMETAnide 4 milliGRAM(s) Oral every 12 hours  buPROPion XL (24-Hour) . 300 milliGRAM(s) Oral daily  chlorhexidine 2% Cloths 1 Application(s) Topical <User Schedule>  digoxin     Tablet 125 MICROGram(s) Oral every other day  diltiazem    milliGRAM(s) Oral daily  ferrous    sulfate 325 milliGRAM(s) Oral <User Schedule>  pantoprazole    Tablet 40 milliGRAM(s) Oral before breakfast  polyethylene glycol 3350 17 Gram(s) Oral daily  senna 2 Tablet(s) Oral at bedtime  sodium chloride 0.65% Nasal 2 Spray(s) Both Nostrils four times a day  spironolactone 25 milliGRAM(s) Oral two times a day  tadalafil 40 milliGRAM(s) Oral daily  tiotropium 18 MICROgram(s) Capsule 1 Capsule(s) Inhalation daily    PRN Inpatient Medications  acetaminophen     Tablet .. 975 milliGRAM(s) Oral every 6 hours PRN  ALBUTerol    90 MICROgram(s) HFA Inhaler 2 Puff(s) Inhalation every 6 hours PRN  melatonin 3 milliGRAM(s) Oral at bedtime PRN      REVIEW OF SYSTEMS  --------------------------------------------------------------------------------  Gen: No fevers/chills  Skin: No rashes  Head/Eyes/Ears: Normal hearing,   Respiratory: No dyspnea, cough  CV: No chest pain  GI: No abdominal pain, diarrhea  : No dysuria, hematuria  MSK: No  edema  Heme: No easy bruising or bleeding  Psych: No significant depression      All other systems were reviewed and are negative, except as noted.    VITALS/PHYSICAL EXAM  --------------------------------------------------------------------------------  T(C): 36.6 (09-12-22 @ 11:02), Max: 36.8 (09-12-22 @ 04:59)  HR: 58 (09-12-22 @ 11:02) (58 - 74)  BP: 104/62 (09-12-22 @ 11:02) (102/63 - 113/72)  RR: 18 (09-12-22 @ 11:02) (18 - 18)  SpO2: 94% (09-12-22 @ 11:02) (90% - 98%)  Wt(kg): --        09-11-22 @ 07:01  -  09-12-22 @ 07:00  --------------------------------------------------------  IN: 120 mL / OUT: 1000 mL / NET: -880 mL    09-12-22 @ 07:01  -  09-12-22 @ 14:13  --------------------------------------------------------  IN: 840 mL / OUT: 0 mL / NET: 840 mL        Physical Exam:  	Gen: NAD  	HEENT: MMM  	Pulm: CTA B/L  	CV: S1S2  	Abd: Soft, +BS   	Ext: No LE edema B/L  	Neuro: Awake  	Skin: Warm and dry  	Vascular access:      LABS/STUDIES  --------------------------------------------------------------------------------              7.7    4.24  >-----------<  234      [09-12-22 @ 06:33]              25.2     133  |  93  |  70  ----------------------------<  81      [09-12-22 @ 06:35]  4.1   |  29  |  1.68        Ca     9.5     [09-12-22 @ 06:35]      Mg     2.2     [09-12-22 @ 06:35]      Phos  3.7     [09-12-22 @ 06:35]    TPro  6.8  /  Alb  4.0  /  TBili  0.3  /  DBili  x   /  AST  19  /  ALT  11  /  AlkPhos  138  [09-12-22 @ 06:35]          Creatinine Trend:  SCr 1.68 [09-12 @ 06:35]  SCr 1.62 [09-11 @ 07:16]  SCr 1.68 [09-10 @ 07:04]  SCr 2.05 [09-09 @ 05:48]  SCr 1.80 [09-08 @ 06:23]    Urinalysis - [09-02-22 @ 15:20]      Color Light Yellow / Appearance Slightly Turbid / SG 1.010 / pH 6.0      Gluc Negative / Ketone Negative  / Bili Negative / Urobili Negative       Blood Small / Protein Trace / Leuk Est Large / Nitrite Negative      RBC 3 /  / Hyaline 2 / Gran  / Sq Epi  / Non Sq Epi 1 / Bacteria Few      Iron 31, TIBC 293, %sat 10      [09-09-22 @ 05:48]  Ferritin 53      [09-09-22 @ 05:48]  Lipid: chol 103, TG 48, HDL 64, LDL --      [06-14-22 @ 02:45]

## 2022-09-12 NOTE — PROGRESS NOTE ADULT - ATTENDING COMMENTS
Agree with above and will remain on current regimen for her Pulmonary hypertension  Plan is for discharge home today with out patient Pulmonary follow up

## 2022-09-12 NOTE — PROGRESS NOTE ADULT - SUBJECTIVE AND OBJECTIVE BOX
INCOMPLETE NOTE    Benito Torres | PGY1| Pager: [phone]  Interval Events:    REVIEW OF SYSTEMS:  CONSTITUTIONAL: No weakness, fevers or chills  EYES/ENT: No visual changes;  No vertigo or throat pain   NECK: No pain or stiffness  RESPIRATORY: No cough, wheezing, hemoptysis; No shortness of breath  CARDIOVASCULAR: No chest pain or palpitations  GASTROINTESTINAL: No abdominal or epigastric pain. No nausea, vomiting, or hematemesis; No diarrhea or constipation. No melena or hematochezia.  GENITOURINARY: No dysuria, frequency or hematuria  NEUROLOGICAL: No numbness or weakness  SKIN: No itching, burning, rashes, or lesions   All other review of systems is negative unless indicated above.    OBJECTIVE:  ICU Vital Signs Last 24 Hrs  T(C): 36.8 (12 Sep 2022 04:59), Max: 36.8 (12 Sep 2022 04:59)  T(F): 98.3 (12 Sep 2022 04:59), Max: 98.3 (12 Sep 2022 04:59)  HR: 64 (12 Sep 2022 04:59) (60 - 74)  BP: 113/72 (12 Sep 2022 04:59) (102/63 - 113/72)  BP(mean): --  ABP: --  ABP(mean): --  RR: 18 (12 Sep 2022 04:59) (18 - 18)  SpO2: 97% (12 Sep 2022 04:59) (90% - 98%)    O2 Parameters below as of 12 Sep 2022 04:59  Patient On (Oxygen Delivery Method): BiPAP/CPAP              09-11 @ 07:01  -  09-12 @ 07:00  --------------------------------------------------------  IN: 120 mL / OUT: 1000 mL / NET: -880 mL      CAPILLARY BLOOD GLUCOSE          PHYSICAL EXAM:  General: WN/WD NAD  Neurology: A&Ox3, nonfocal, LEWIS x 4  Eyes: PERRLA/ EOMI, Gross vision intact  ENT/Neck: Neck supple, trachea midline, No JVD, Gross hearing intact  Respiratory: CTA B/L, No wheezing, rales, rhonchi  CV: RRR, +S1/S2, -S3/S4, no murmurs, rubs or gallops  Abdominal: Soft, NT, ND +BS, No HSM  MSK: 5/5 strength UE/LE bilaterally  Extremities: No edema, 2+ peripheral pulses  Skin: No Rashes, Hematoma, Ecchymosis  Incisions:   Tubes:    HOSPITAL MEDICATIONS:  MEDICATIONS  (STANDING):  ambrisentan 10 milliGRAM(s) Oral daily  apixaban 5 milliGRAM(s) Oral two times a day  atorvastatin 20 milliGRAM(s) Oral at bedtime  budesonide  80 MICROgram(s)/formoterol 4.5 MICROgram(s) Inhaler 2 Puff(s) Inhalation two times a day  buMETAnide 4 milliGRAM(s) Oral every 12 hours  buPROPion XL (24-Hour) . 300 milliGRAM(s) Oral daily  chlorhexidine 2% Cloths 1 Application(s) Topical <User Schedule>  digoxin     Tablet 125 MICROGram(s) Oral every other day  diltiazem    milliGRAM(s) Oral daily  ferrous    sulfate 325 milliGRAM(s) Oral <User Schedule>  pantoprazole    Tablet 40 milliGRAM(s) Oral before breakfast  polyethylene glycol 3350 17 Gram(s) Oral daily  senna 2 Tablet(s) Oral at bedtime  sodium chloride 0.65% Nasal 2 Spray(s) Both Nostrils four times a day  spironolactone 25 milliGRAM(s) Oral two times a day  tadalafil 40 milliGRAM(s) Oral daily  tiotropium 18 MICROgram(s) Capsule 1 Capsule(s) Inhalation daily    MEDICATIONS  (PRN):  acetaminophen     Tablet .. 975 milliGRAM(s) Oral every 6 hours PRN Temp greater or equal to 38C (100.4F), Mild Pain (1 - 3), Moderate Pain (4 - 6)  ALBUTerol    90 MICROgram(s) HFA Inhaler 2 Puff(s) Inhalation every 6 hours PRN Shortness of Breath and/or Wheezing  melatonin 3 milliGRAM(s) Oral at bedtime PRN Insomnia      LABS:                        7.7    4.24  )-----------( 234      ( 12 Sep 2022 06:33 )             25.2     Hgb Trend: 7.7<--, 7.8<--, 7.4<--, 8.0<--, 7.0<--  09-11    133<L>  |  94<L>  |  71<H>  ----------------------------<  84  4.1   |  28  |  1.62<H>    Ca    9.7      11 Sep 2022 07:16  Phos  3.6     09-11  Mg     2.2     09-11    TPro  7.0  /  Alb  3.9  /  TBili  0.3  /  DBili  x   /  AST  20  /  ALT  11  /  AlkPhos  137<H>  09-11    Creatinine Trend: 1.62<--, 1.68<--, 2.05<--, 1.80<--, 1.56<--, 1.61<--            MICROBIOLOGY:

## 2022-09-12 NOTE — PROGRESS NOTE ADULT - PROVIDER SPECIALTY LIST ADULT
Heart Failure
Heart Failure
Internal Medicine
Nephrology
Pulmonology
Transplant Pulmonology
Heart Failure
Internal Medicine
Nephrology
Pulmonology
Transplant Pulmonology
Nephrology
Pulmonology
Pulmonology
Transplant Pulmonology
ENT
Heart Failure
Nephrology
Pulmonology
Pulmonology
Heart Failure
Internal Medicine
Internal Medicine
Transplant Surgery
ENT
ENT
Heart Failure
Heart Failure
Internal Medicine
ENT
Heart Failure
Heart Failure
Nephrology
Heart Failure
Nephrology
Nephrology
Heart Failure
Heart Failure
Internal Medicine
Nephrology
Heart Failure
Heart Failure
Nephrology
Heart Failure
Heart Failure
Nephrology
Internal Medicine
Heart Failure
Internal Medicine

## 2022-09-12 NOTE — PROGRESS NOTE ADULT - TIME BILLING
- Generation of cardiovascular treatment plan.  - Coordination of care with primary team.
- Generation of cardiovascular treatment plan.  - Coordination of care with primary team.
seen and examined and coordinated care sheri grimaldo [phone]   med director of transplant    I also coordinated ryan and financial assessment by the transplant team
Review of patient records, including history, laboratory data, and imaging. Patient evaluation and assessment. Coordination of care.
Review of patient records, including history, laboratory data, and imaging. Patient evaluation and assessment. Coordination of care.
reviewing chart and coordinating care with primary team/staff, as well as reviewing vitals, radiology, medication list, recent labs, and prior records.
reviewing chart and coordinating care with primary team/staff, as well as reviewing vitals, radiology, medication list, recent labs, and prior records.
Review of patient records, including history, laboratory data, and imaging. Patient evaluation and assessment. Coordination of care.
as above.    I took part in medically managing this patient.   vikram grimaldo, medical director lung transplant, [phone]
review of EMR and imaging, discussion with patient.
Personal review of data, imaging and discussion with medical team.
Review of patient records, including history, laboratory data, and imaging. Patient evaluation and assessment. Coordination of care.
- Generation of cardiovascular treatment plan.  - Coordination of care with primary team.
Personal review of data, imaging and discussion with medical team.
Review of patient records, including history, laboratory data, and imaging. Patient evaluation and assessment. Coordination of care.
- Generation of cardiovascular treatment plan.  - Coordination of care with primary team.
Review of patient records, including history, laboratory data, and imaging. Patient evaluation and assessment. Coordination of care.
Review of patient records, including history, laboratory data, and imaging. Patient evaluation and assessment. Coordination of care.
reviewing chart and coordinating care with primary team/staff, as well as reviewing vitals, radiology, medication list, recent labs, and prior records.
seen and examined and coordinated care sheri grimaldo [phone]   med director of transplant
- Generation of cardiovascular treatment plan.  - Coordination of care with primary team.
Time spent at bedside interviewing and examining the patient, reviewing the chart and telemetry, and coordinating care with the primary team.

## 2022-09-12 NOTE — PROGRESS NOTE ADULT - ATTENDING SUPERVISION STATEMENT
Resident
Fellow
Resident

## 2022-09-12 NOTE — PROGRESS NOTE ADULT - ATTENDING COMMENTS
above plans discussed with Dr. Torres    Severe pulmonary HTN, s/p IV bumex, IV lasix now transitioned to PO bumex with stable volume status. Not a candidate for lung transplant. Pt back to baseline O2 requirement.     39 minutes spent on discharge process  pt in agreement with dc planning  pt to closely follow up with Dr. Mosher upon discharge    Zhanna Velasquez MD  Division of Hospital Medicine  Contact via Microsoft Teams  Office: [phone]

## 2022-09-30 NOTE — REASON FOR VISIT
[Cardiac Failure] : cardiac failure [Other: ____] : [unfilled] [FreeTextEntry1] : \par Cardiologist: Dr. Dangelo\par \par HF Cardiologist: Dr. Solomon

## 2022-09-30 NOTE — ASSESSMENT
[FreeTextEntry1] : 66 year old female with history of severe pulmonary HTN (Group 1, 2 & 3), HFpEF, Afib, COPD on home O2 (5L NC), CKD, ROLANDA on CPAP, morbid obesity ( s/p bariatric surgery in 2012 with subsequent lap band removal due to GI bleed), PE,  CVA (MCA infarct in 2012) who comes in to establish care with heart failure due to her pulmonary HTN who comes in for follow up.\par \par #Pulmonary HTN/cor pulmonale: Patient has had a diagnosis of pulmonary HTN for years and follows with a pulmonary HTN specialist (Dr. Gallardo but is now transitioning to another pulmonologist). Her pulmonary HTN is from WHO group 1,2,3. Her RHC was significant for severe pulmonary HTN with preserved cardiac output\par - c/w tadalafil and letaris, follows with pulmonologist who manages these medications\par - Patient is planned for outpatient evaluation for lung transplant. Had some barriers previously including weight (BMI is now in acceptable range) and physical activity (which she is working on)\par -Spoke to patient that she should get her other health maintenance scheduled also such as dental eval, colonoscopy screening if indicated and pap smear if indicated\par \par #Chronic diastolic heart failure: Prior to the leaving the hospital her dry weight was 189 and today she is 199. She still endorses NYHA class 3b symptoms and has symptoms of RINALDI with minimal exertion. Her RHC was significant for severe pulmonary HTN with preserved cardiac output\par - Although PAD is 42 (goal of 44-46) her weight is up and still has significant symptoms, will increase her torsemide to 40mg BID\par - c.w spironolactone 50mg\par - will add jardiance on next visit\par \par #Afib \par - c/w diltiazem and eliquis\par \par #COPD\par - patient has followup with her pulmonologist coming up\par \par Follow up with Dr. Solomon in

## 2022-09-30 NOTE — CARDIOLOGY SUMMARY
[de-identified] : EKG: rate controlled Afib  [de-identified] : Holter October 2021: rate controlled Afib, rare ventricular couplets and triplets  [de-identified] : Echo: October 2021: Normal LVEF, dilated LA, decreased RV function with EV enlargement, moderate TR, PASP 85, severe pulm HTN  [de-[facility] RA 18, /44/65, PCWP 16 v 18, PVR 7.32, TPG 49, /60/86, PA 58, Ra 95% on 8L NC, CO/CI 6.7/3.2 (cardiomems PAD 49)\par \par Cardiac Cath: [facility] from May 2013, normal left ventricular function, right dominant circulation, left main arises normally from the left coronary sinus of Valsalva, bifurcates into LAD and circumflex, left main normal, LAD arises normally from the left main normal, left circumflex arises normally from the left main and terminates in the AV groove normal, RCA arises normally from the right sinus of Valsalva, RCA is normal, \par \par RH (6/2020): RA 2, RV 60/5, PAP 58/18/33, CO/CI: 6.5/3.3 \par \par  \par \par 6 Minute Walk Test (6/2020): 150m

## 2022-09-30 NOTE — PHYSICAL EXAM
[Normal S1, S2] : normal S1, S2 [No Murmur] : no murmur [Normal] : moves all extremities, no focal deficits, normal speech [de-identified] : SOB with talking [de-identified] : +JVD [de-identified] : mild wheezing [de-identified] : warm, 1+ edema bilaterally

## 2022-09-30 NOTE — HISTORY OF PRESENT ILLNESS
[FreeTextEntry1] : 66 year old female with history of severe pulmonary HTN (Group 1, 2 & 3), HFpEF, Afib, COPD on home O2 (5L NC), CKD, ROLANDA on CPAP, morbid obesity ( s/p bariatric surgery in 2012 with subsequent lap band removal due to GI bleed), PE,  CVA (MCA infarct in 2012) who comes in to establish care with heart failure due to her pulmonary HTN. \par In regards to her pulmonary HTN management, she follows with Dr. Gallardo at [place] who she recently saw in 5/2021. She is on letaris, tadalafil in addition to digoxin and diuretics. She has had prior RHCs which showed low cardiac output however her most recently RHC in 2020 showed normal RA, elevated mPAP with normal cardiac output. She was admitted in June 2021 to the hospital for SOB where she was found to be anemic. She was given blood and and placed on BIPAP. She was also sent home on higher oxygenation \par She endroses that she has SOB with walking across the room but states that this has been her baseline for a while but she notices an over decline over the years\par \par 7/21/22: Patient comes in for followup after a hospitalization. The patient was hospitalized for volume overload and worsening RINALDI. She was initially admitted to [facility] but then was transferred to [facility] hospital for evaluation of lung transplant. She was started on IV bumex drip and was adequately diuresed. She underwent a RHC which showed normal wedge but elevated right sided filling pressures. A Cardiomems was placed. She was evaluated by the lung transplant team and before she could be considered for a lung transplant she would need to  rehab more and lose weight to get to a BMI less than 35\par \par 9/30/22: Pt presents for hospital follow up. She was originally admitted to [facility] and then subsequently transferred to [facility] for lung transplant evaluation. She was deemed not a transplant candidate due to multi-system organ dysfunction, high BMI, and poor functional status. She was planned to meet with pHTN specialist Dr. Mosher.\par -Requiring 8L NC at baseline, combined hypercapnic and hypoxemic respiratory failure, chronic HFpEF, RV failure, severe pHTN.

## 2022-10-25 PROBLEM — I27.20 PULMONARY HTN: Status: ACTIVE | Noted: 2018-02-02

## 2022-10-25 PROBLEM — J96.10 CHRONIC RESPIRATORY FAILURE, UNSPECIFIED WHETHER WITH HYPOXIA OR HYPERCAPNIA: Status: ACTIVE | Noted: 2018-02-05

## 2022-10-25 PROBLEM — I27.81 COR PULMONALE (CHRONIC): Status: ACTIVE | Noted: 2018-02-05

## 2022-10-25 PROBLEM — R06.09 DYSPNEA ON EXERTION: Status: ACTIVE | Noted: 2022-01-01

## 2022-10-25 NOTE — HISTORY OF PRESENT ILLNESS
[TextBox_4] : 67 year old woman with severe pulmonary HTN (Group 1, 2 & 3), HFpEF, Afib, COPD on home O2 (5L NC), CKD, ROLANDA on CPAP, morbid obesity, PE, CVA (MCA infarct in 2012). She is on Letairis, tadalafil in addition to digoxin and diuretics (torsemide and spironolactone). She has had prior RHCs which showed low cardiac output however RHC in 2020 showed normal RA, elevated mPAP with normal cardiac output. She was admitted to [facility] July 2022 for volume overload and worsening RINALDI. She was transferred to [facility] for evaluation of lung transplant. She was started on IV bumex drip and was adequately diuresed. She underwent a RHC which showed normal wedge but elevated right sided filling pressures. A Cardiomems was placed. She was evaluated by the lung transplant team and before she could be considered for a lung transplant she would need to rehab more and lose weight to get to a BMI less than 35.  Her cardiologist is planning to add Jardiance at her next visit. She was a no show for her last cardiology appointment in September and for her new pt appointment with Dr. Mosher in September. \par \par PH Risk factors:\par FH sudden cardiac death: (-)\par IVDU or substance use including alcohol :(-)\par Anorexigens :(-)\par Amphetamines :(-)\par Rheumatological dx :(-)\par Congenital heart dx :(-)\par HTN,Afib, diastolic dysfunction, large LA, valvular dx :(+ HFpEF, + AFib)\par Metabolic syndrome including obesity :(+ morbid obesity)\par Parenchymal lung dx :(+ emphysema)\par ROLANDA :(-)\par Hx of DVT or PE: (+ PE)\par Hx of splenectomy :(-)\par Liver dx :(-)\par Kidney dx :(+ CKD)\par Hematological/Malignancy/Anemia :(-)\par HIV:(-)\par

## 2022-10-25 NOTE — PROCEDURE
[FreeTextEntry1] : 9/7/22 CT CHEST\par Emphysema. Pulmonary hypertension. No evidence of bowel obstruction.\par *****\par 8/11/22 ECHO\par 1. Normal mitral valve. Minimal mitral regurgitation.\par 2. Aortic Root: 3.3 cm.Sinotubular Junction: 3 cm.Ascending\par Aorta: 3 cm.\par 3. Endocardial visualization enhanced with intravenous injection of Ultrasonic Enhancing Agent (Lumason). This was a limited study due to poor image quality and off-axis\par imaging. The LV function is probably normal but difficult o determine the presence of wall motion abnormalities. [place] is not visualized. Would reassess once the patient is able to be positioned optimally. Septal flattening consistent with right ventricular pressure and volume\par overload. Unable to determine due to lack of data.\par 4. Severe right atrial enlargement. Right ventricular enlargement with decreased right ventricular systolic function. Normal tricuspid valve. Minimal tricuspid regurgitation. Unable to determine RVSP due to insufficient Doppler.\par 5. No pericardial effusion seen.\par Compared with echocardiogram of 6/15/2022, unable to compare equally to the previous study but overall, LV and RV function probably are unchanged.\par *****\par RHC 6/20/2022: RA 18, /44/65, PCWP 16 v 18, PVR 7.32, TPG 49, /60/86, PA 58, Ra 95% on 8L NC, CO/CI ([place]) 6.7/3.2 (cardiomems PAD 49)\par ****\par Holter October 2021: rate controlled Afib, rare ventricular couplets and triplets \par *****\par Echo: October 2021: Normal LVEF, dilated LA, decreased RV function with EV enlargement, moderate TR, PASP 85, severe pulm HTN \par *****\par RHC (6/2020): RA 2, RV 60/5, PAP 58/18/33, CO/CI: 6.5/3.3 \par *****\par 6 Minute Walk Test (6/2020): 150m \par \par

## 2022-11-23 NOTE — PROGRESS NOTE ADULT - PROBLEM SELECTOR PROBLEM 10
GERD (gastroesophageal reflux disease)
Anxiety and depression
Anxiety and depression
GERD (gastroesophageal reflux disease)
Anxiety and depression
GERD (gastroesophageal reflux disease)
no concerns
GERD (gastroesophageal reflux disease)
GERD (gastroesophageal reflux disease)

## 2023-02-22 NOTE — DISCHARGE NOTE PROVIDER - NSDCFUSCHEDAPPT_GEN_ALL_CORE_FT
[address]  Scheduled Appointment: 07/15/2022    [address]  Scheduled Appointment: 07/15/2022    Emory Reaves  [address]  Scheduled Appointment: 07/15/2022     Melita Solomon  [facility] Physician [facility] [address]  Scheduled Appointment: 07/05/2022    [facility]  [facility] [address]  Scheduled Appointment: 07/15/2022    [facility] [address]  Scheduled Appointment: 07/15/2022    Emory Reaves  [facility] [address]  Scheduled Appointment: 07/15/2022     no

## 2023-06-17 NOTE — PROGRESS NOTE ADULT - SUBJECTIVE AND OBJECTIVE BOX
ENT ISSUE/POD: L Epistaxis     SUBJECTIVE: Pt seen and examined at bedside. There were no acute overnight events. The patient has no complaints this morning. His pain is controlled. He denies any active nasal bleed, any metallic taste or drip in the back of his throat. He denies any CP, SOB, fevers, chills, N/V/D.      PAST MEDICAL & SURGICAL HISTORY:  COPD exacerbation      Severe pulmonary hypertension      Chronic kidney disease, unspecified CKD stage      Acute on chronic diastolic congestive heart failure      Pulmonary embolism      Anxiety and depression      GERD (gastroesophageal reflux disease)      Obstructive sleep apnea      Chronic atrial fibrillation      H/O pneumonectomy      Right leg weakness        Allergies    nitrates (Unknown)    Intolerances      MEDICATIONS  (STANDING):  ambrisentan 10 milliGRAM(s) Oral daily  aspirin  chewable 81 milliGRAM(s) Oral daily  atorvastatin 20 milliGRAM(s) Oral at bedtime  budesonide  80 MICROgram(s)/formoterol 4.5 MICROgram(s) Inhaler 2 Puff(s) Inhalation two times a day  buPROPion XL (24-Hour) . 300 milliGRAM(s) Oral daily  cephalexin 500 milliGRAM(s) Oral every 12 hours  digoxin     Tablet 125 MICROGram(s) Oral daily  diltiazem    milliGRAM(s) Oral daily  furosemide   Injectable 60 milliGRAM(s) IV Push two times a day  pantoprazole    Tablet 40 milliGRAM(s) Oral before breakfast  polyethylene glycol 3350 17 Gram(s) Oral daily  sodium chloride 0.65% Nasal 1 Spray(s) Both Nostrils four times a day  spironolactone 50 milliGRAM(s) Oral daily  tadalafil 40 milliGRAM(s) Oral daily  tiotropium 18 MICROgram(s) Capsule 1 Capsule(s) Inhalation daily    MEDICATIONS  (PRN):  acetaminophen     Tablet .. 975 milliGRAM(s) Oral every 6 hours PRN Temp greater or equal to 38C (100.4F), Mild Pain (1 - 3), Moderate Pain (4 - 6)  ALBUTerol    90 MICROgram(s) HFA Inhaler 2 Puff(s) Inhalation every 6 hours PRN Shortness of Breath and/or Wheezing  HYDROmorphone  Injectable 0.25 milliGRAM(s) IV Push every 4 hours PRN Severe Pain (7 - 10)  melatonin 3 milliGRAM(s) Oral at bedtime PRN Insomnia      Social History: see consult    Family history: see consult    GIUSEPPE:   ENT: all negative except as noted in HPI   Pulm: denies SOB, cough, hemoptysis  Neuro: denies numbness/tingling, loss of sensation  Endo: denies heat/cold intolerance, excessive sweating      Vital Signs Last 24 Hrs  T(C): 36.6 (15 Aug 2022 05:00), Max: 37 (14 Aug 2022 20:33)  T(F): 97.8 (15 Aug 2022 05:00), Max: 98.6 (14 Aug 2022 20:33)  HR: 88 (15 Aug 2022 06:10) (68 - 88)  BP: 102/61 (15 Aug 2022 05:00) (101/61 - 123/71)  BP(mean): 75 (15 Aug 2022 05:00) (75 - 88)  RR: 18 (15 Aug 2022 05:00) (18 - 18)  SpO2: 92% (15 Aug 2022 06:10) (90% - 97%)    Parameters below as of 15 Aug 2022 05:00  Patient On (Oxygen Delivery Method): mask, aerosol                              7.2    4.28  )-----------( 207      ( 15 Aug 2022 05:39 )             23.7    08-15    130<L>  |  87<L>  |  95<H>  ----------------------------<  73  5.3   |  32<H>  |  1.96<H>    Ca    9.4      15 Aug 2022 05:43  Phos  3.6     08-15  Mg     2.1     08-15    TPro  6.7  /  Alb  3.9  /  TBili  0.4  /  DBili  x   /  AST  21  /  ALT  8<L>  /  AlkPhos  105  08-15       PHYSICAL EXAM:  Gen: NAD  Skin: No rashes, bruises, or lesions  Head: Normocephalic, Atraumatic  Face: no edema, erythema, or fluctuance. Parotid glands soft without mass  Eyes: no scleral injection  Nose: L rhino rocket in place, R nare patent, no active nasal bleed, discharge  Mouth: No Stridor / Drooling / Trismus.  Mucosa moist, tongue/uvula midline, oropharynx clear  Neck: Flat, supple, no lymphadenopathy, trachea midline, no masses  Resp: breathing easily, no stridor          Procedure: Nasal Packing Removal  Left Rhino Rocket was deflated and patient was observed for a few minutes to see if L nare would start to rebleed. There was no active rebleed. The left rhino rocket was removed. Pt was observed for another few minutes and continued not to bleed. Bacitracin was placed in L nare. ENT ISSUE/POD: L Epistaxis     SUBJECTIVE: Pt seen and examined at bedside. There were no acute overnight events. The patient has no complaints this morning. Her pain is controlled. He denies any active nasal bleed, any metallic taste or drip in the back of his throat. He denies any CP, SOB, fevers, chills, N/V/D.      PAST MEDICAL & SURGICAL HISTORY:  COPD exacerbation      Severe pulmonary hypertension      Chronic kidney disease, unspecified CKD stage      Acute on chronic diastolic congestive heart failure      Pulmonary embolism      Anxiety and depression      GERD (gastroesophageal reflux disease)      Obstructive sleep apnea      Chronic atrial fibrillation      H/O pneumonectomy      Right leg weakness        Allergies    nitrates (Unknown)    Intolerances      MEDICATIONS  (STANDING):  ambrisentan 10 milliGRAM(s) Oral daily  aspirin  chewable 81 milliGRAM(s) Oral daily  atorvastatin 20 milliGRAM(s) Oral at bedtime  budesonide  80 MICROgram(s)/formoterol 4.5 MICROgram(s) Inhaler 2 Puff(s) Inhalation two times a day  buPROPion XL (24-Hour) . 300 milliGRAM(s) Oral daily  cephalexin 500 milliGRAM(s) Oral every 12 hours  digoxin     Tablet 125 MICROGram(s) Oral daily  diltiazem    milliGRAM(s) Oral daily  furosemide   Injectable 60 milliGRAM(s) IV Push two times a day  pantoprazole    Tablet 40 milliGRAM(s) Oral before breakfast  polyethylene glycol 3350 17 Gram(s) Oral daily  sodium chloride 0.65% Nasal 1 Spray(s) Both Nostrils four times a day  spironolactone 50 milliGRAM(s) Oral daily  tadalafil 40 milliGRAM(s) Oral daily  tiotropium 18 MICROgram(s) Capsule 1 Capsule(s) Inhalation daily    MEDICATIONS  (PRN):  acetaminophen     Tablet .. 975 milliGRAM(s) Oral every 6 hours PRN Temp greater or equal to 38C (100.4F), Mild Pain (1 - 3), Moderate Pain (4 - 6)  ALBUTerol    90 MICROgram(s) HFA Inhaler 2 Puff(s) Inhalation every 6 hours PRN Shortness of Breath and/or Wheezing  HYDROmorphone  Injectable 0.25 milliGRAM(s) IV Push every 4 hours PRN Severe Pain (7 - 10)  melatonin 3 milliGRAM(s) Oral at bedtime PRN Insomnia      Social History: see consult    Family history: see consult    GIUSEPPE:   ENT: all negative except as noted in HPI   Pulm: denies SOB, cough, hemoptysis  Neuro: denies numbness/tingling, loss of sensation  Endo: denies heat/cold intolerance, excessive sweating      Vital Signs Last 24 Hrs  T(C): 36.6 (15 Aug 2022 05:00), Max: 37 (14 Aug 2022 20:33)  T(F): 97.8 (15 Aug 2022 05:00), Max: 98.6 (14 Aug 2022 20:33)  HR: 88 (15 Aug 2022 06:10) (68 - 88)  BP: 102/61 (15 Aug 2022 05:00) (101/61 - 123/71)  BP(mean): 75 (15 Aug 2022 05:00) (75 - 88)  RR: 18 (15 Aug 2022 05:00) (18 - 18)  SpO2: 92% (15 Aug 2022 06:10) (90% - 97%)    Parameters below as of 15 Aug 2022 05:00  Patient On (Oxygen Delivery Method): mask, aerosol                              7.2    4.28  )-----------( 207      ( 15 Aug 2022 05:39 )             23.7    08-15    130<L>  |  87<L>  |  95<H>  ----------------------------<  73  5.3   |  32<H>  |  1.96<H>    Ca    9.4      15 Aug 2022 05:43  Phos  3.6     08-15  Mg     2.1     08-15    TPro  6.7  /  Alb  3.9  /  TBili  0.4  /  DBili  x   /  AST  21  /  ALT  8<L>  /  AlkPhos  105  08-15       PHYSICAL EXAM:  Gen: NAD  Skin: No rashes, bruises, or lesions  Head: Normocephalic, Atraumatic  Face: no edema, erythema, or fluctuance. Parotid glands soft without mass  Eyes: no scleral injection  Nose: +L rhino Rocket in place with 4cc, + blood clots around the nasal packing, no active bleed  Mouth: No Stridor / Drooling / Trismus.  Mucosa moist, tongue/uvula midline, oropharynx clear  Neck: Flat, supple, no lymphadenopathy, trachea midline, no masses  Resp: breathing easily, no stridor       ENT ISSUE/POD: L Epistaxis     Subjective: Pt seen and examined at bedside. There were no acute overnight events. The patient has no complaints this morning. She states she has not had any further bleeding from her nares since the packing was place. Her pain is controlled. She denies CP, SOB, fevers, chills, N/V/D, metallic taste or any dripping in the back of her throat.      PAST MEDICAL & SURGICAL HISTORY:  COPD exacerbation      Severe pulmonary hypertension      Chronic kidney disease, unspecified CKD stage      Acute on chronic diastolic congestive heart failure      Pulmonary embolism      Anxiety and depression      GERD (gastroesophageal reflux disease)      Obstructive sleep apnea      Chronic atrial fibrillation      H/O pneumonectomy      Right leg weakness        Allergies    nitrates (Unknown)    Intolerances      MEDICATIONS  (STANDING):  ambrisentan 10 milliGRAM(s) Oral daily  aspirin  chewable 81 milliGRAM(s) Oral daily  atorvastatin 20 milliGRAM(s) Oral at bedtime  budesonide  80 MICROgram(s)/formoterol 4.5 MICROgram(s) Inhaler 2 Puff(s) Inhalation two times a day  buPROPion XL (24-Hour) . 300 milliGRAM(s) Oral daily  cephalexin 500 milliGRAM(s) Oral every 12 hours  digoxin     Tablet 125 MICROGram(s) Oral daily  diltiazem    milliGRAM(s) Oral daily  furosemide   Injectable 60 milliGRAM(s) IV Push two times a day  pantoprazole    Tablet 40 milliGRAM(s) Oral before breakfast  polyethylene glycol 3350 17 Gram(s) Oral daily  sodium chloride 0.65% Nasal 1 Spray(s) Both Nostrils four times a day  spironolactone 50 milliGRAM(s) Oral daily  tadalafil 40 milliGRAM(s) Oral daily  tiotropium 18 MICROgram(s) Capsule 1 Capsule(s) Inhalation daily    MEDICATIONS  (PRN):  acetaminophen     Tablet .. 975 milliGRAM(s) Oral every 6 hours PRN Temp greater or equal to 38C (100.4F), Mild Pain (1 - 3), Moderate Pain (4 - 6)  ALBUTerol    90 MICROgram(s) HFA Inhaler 2 Puff(s) Inhalation every 6 hours PRN Shortness of Breath and/or Wheezing  HYDROmorphone  Injectable 0.25 milliGRAM(s) IV Push every 4 hours PRN Severe Pain (7 - 10)  melatonin 3 milliGRAM(s) Oral at bedtime PRN Insomnia      Social History: see consult    Family history: see consult    ROS:   ENT: all negative except as noted in HPI   Pulm: denies SOB, cough, hemoptysis  Neuro: denies numbness/tingling, loss of sensation  Endo: denies heat/cold intolerance, excessive sweating      Vital Signs Last 24 Hrs  T(C): 36.6 (15 Aug 2022 05:00), Max: 37 (14 Aug 2022 20:33)  T(F): 97.8 (15 Aug 2022 05:00), Max: 98.6 (14 Aug 2022 20:33)  HR: 88 (15 Aug 2022 06:10) (68 - 88)  BP: 102/61 (15 Aug 2022 05:00) (101/61 - 123/71)  BP(mean): 75 (15 Aug 2022 05:00) (75 - 88)  RR: 18 (15 Aug 2022 05:00) (18 - 18)  SpO2: 92% (15 Aug 2022 06:10) (90% - 97%)    Parameters below as of 15 Aug 2022 05:00  Patient On (Oxygen Delivery Method): mask, aerosol                              7.2    4.28  )-----------( 207      ( 15 Aug 2022 05:39 )             23.7    08-15    130<L>  |  87<L>  |  95<H>  ----------------------------<  73  5.3   |  32<H>  |  1.96<H>    Ca    9.4      15 Aug 2022 05:43  Phos  3.6     08-15  Mg     2.1     08-15    TPro  6.7  /  Alb  3.9  /  TBili  0.4  /  DBili  x   /  AST  21  /  ALT  8<L>  /  AlkPhos  105  08-15       PHYSICAL EXAM:  Gen: NAD  Skin: No rashes, bruises, or lesions  Head: Normocephalic, Atraumatic  Face: no edema, erythema, or fluctuance. Parotid glands soft without mass  Eyes: no scleral injection  Nose: +L rhino Rocket in place with 4cc, + blood clots around the nasal packing, no active bleed  Mouth: No Stridor / Drooling / Trismus.  Mucosa moist, tongue/uvula midline, oropharynx clear  Neck: Flat, supple, no lymphadenopathy, trachea midline, no masses  Resp: breathing easily, no stridor       Patient/EMS

## 2023-08-05 NOTE — PROGRESS NOTE ADULT - PROBLEM SELECTOR PLAN 3
- rate controlled on Digoxin (Digoxin level 8/11 0.6) and Cardizem   - restarted on heparin gtt, if hgb stable for 24-48 hrs, would transition to Eliquis 36.9

## 2023-08-08 NOTE — PROGRESS NOTE ADULT - PROBLEM SELECTOR PLAN 2
-continue with bumex to 4mg IV BID today  -Plan RHC and cardiomems today, please hold eliquis, restart after RHC [FreeTextEntry2] : New Patient- L Spine -continue with bumex to 4mg IV BID today  - She will benefit from MRA/SGLT2i  -Plan RHC and cardiomems today, please hold eliquis, restart after RHC

## 2023-09-18 ENCOUNTER — APPOINTMENT (OUTPATIENT)
Dept: CARDIOLOGY | Facility: CLINIC | Age: 68
End: 2023-09-18

## 2023-09-23 NOTE — PROGRESS NOTE ADULT - ASSESSMENT
66 year old with PMH of Pulmonary hypertension (Group 1,2,3) HFpEF, atrial fibrillation, COPD on home oxygen, ROLANDA on CPAP, morbid obesity with acute hypoxic respiratory failure. Nephrology consulted for uptrending creatinine C/o fever, generalized abdominal pain and vomiting.

## 2023-12-14 NOTE — OCCUPATIONAL THERAPY INITIAL EVALUATION ADULT - RUE MMT, REHAB EVAL
----- Message from Pete Pryor MD sent at 12/14/2023  9:53 AM CST -----  Please call him ( he does not check My chart ) and let him that his IgE levels are significantly elevated which causes severe allergies. I have contacted . Advice him to contact her office to get started on therapy to control the inflammation. Thanks.    3+/5

## 2024-03-04 NOTE — PHYSICAL THERAPY INITIAL EVALUATION ADULT - PRECAUTIONS/LIMITATIONS, REHAB EVAL
She has been hospitalized 4 times in the past year and has been treated as an outpatient with aggressively with diuretics and vasodilators for COPD. At the OSH, she was noted to have mil interstitial edema. She was given lasix and was admitted to medicine for diuresis. CT scan was noted to have a RUL consolidation concerning for active infection. TTE:  Normal left ventricular systolic function. No segmental wall motion abnormalities. Right ventricular enlargement with decreased right ventricular systolic function.  Severe pulmonary hypertension.
handoff received from Luís [employer] for break coverage

## 2024-03-08 NOTE — PROGRESS NOTE ADULT - PROBLEM SELECTOR PLAN 5
Shower only On 8L baseline O2 at come, currently on 7L NC and feeling well. On trelegy and albuterol inhalers at home.   - continue trelegy therapeutic equivalent --> symbicort + tiotropium  - Has not needed albuterol PRN since 8/18  - continue CPAP qhs (has home machine)

## 2024-03-17 NOTE — PROGRESS NOTE ADULT - PROBLEM SELECTOR PLAN 11
c/w home buproprion.
DVT/PE ppx: Eliquis 5mg BID   Diet: DASH/TLC  Code: Full   Dispo: pending medical stabilization
c/w home buproprion.
DVT/PE ppx: Eliquis 5mg BID   Diet: DASH/TLC  Code: Full   Dispo: pending medical stabilization
DVT/PE ppx: Eliquis 5mg BID for Afib  Diet: DASH/TLC  Code: Full   Dispo: pending medical stabilization
c/w home buproprion.
no

## 2024-05-09 NOTE — PROGRESS NOTE ADULT - PROBLEM SELECTOR PLAN 7
General Hgb 6.9 at OSH, transfused 1U PRBC with improvement to 7.8. Patient w/o signs/symptoms of acute blood loss, HD stable. Denies changes in bowel/urinary habits, denies recent falls/trauma.   - maintain active T/S  - Holding eliquis as above.   - f/u iron studies: total iron and % saturation decreased, ferritin on lower side, transferrin and TIBC normal; s/p IV iron infusion  - 8/16: Hg 6.7, receiving unit of pRBC, f/u repeat CBC 8.1  - started on oral iron daily 325 never

## 2024-09-06 NOTE — OCCUPATIONAL THERAPY INITIAL EVALUATION ADULT - IMPAIRED TRANSFERS: TOILET, REHAB EVAL
Group Therapy Note    Date: 9/6/2024    Group Start Time: 1300  Group End Time: 1350  Group Topic: Music Therapy      Toni Burger        Group Therapy Note    Attendees: 5/11    Group Focus: Music therapy group explored the theme of self-expression through marimar analysis and collaborative music making. Group members discussed topics including tendencies for saying what is on their mind versus holding it in and times when it is important for group members to express themselves. The group continued with group members selecting songs from a song list for live music making. The group may help increase awareness of benefits of self-expression in helping cope with stressors and use of music as an outlet for expression.       Notes:  Pt was crying in his room at the start of group and intermittently attended the group. Pt sang along at a low volume to one of the songs in group and accepted lyrics handouts. Pt made negative self-statements in regards to his group participation. Pt presented as calmer by the end of the group.     Status After Intervention:  Improved    Participation Level: Minimal    Participation Quality: Appropriate and Attentive      Speech:  normal      Thought Process/Content: Logical      Affective Functioning: Congruent      Mood: depressed      Level of consciousness:  Alert and Attentive      Response to Learning: Able to verbalize current knowledge/experience      Endings: None Reported    Modes of Intervention: Support, Socialization, Exploration, and Media      Discipline Responsible: /Counselor      Signature:  LUCIAN Dickson, LPC  Board-Certified Music Therapist  Licensed Professional Counselor   impaired balance/impaired postural control/decreased strength

## 2025-05-23 NOTE — PROGRESS NOTE ADULT - ASSESSMENT
missed dialysis missed dialysis 66F w/ severe pulmonary HTN (Group 1, 2 & 3), HFpEF, Afib, COPD on home O2 (5L NC), CKD, ROLANDA on CPAP, morbid obesity presenting with acute hypoxic respiratory failure, admitted for CHF exacerbation and ongoing lung transplant evaluation.   W/ epistaxis 8/13 now resolved, cbc stable 8/24, started back on nasal cannula during day ,CPAP at night, continuing 10 mg/hr lasix drip with HTS 2% pushes as needed.    Patient no longer a transplant candidate, seen by palliative, not DNR nor DNI.  missed dialysis